# Patient Record
Sex: MALE | Race: WHITE | NOT HISPANIC OR LATINO | Employment: OTHER | URBAN - METROPOLITAN AREA
[De-identification: names, ages, dates, MRNs, and addresses within clinical notes are randomized per-mention and may not be internally consistent; named-entity substitution may affect disease eponyms.]

---

## 2017-03-23 DIAGNOSIS — I10 ESSENTIAL HYPERTENSION WITH GOAL BLOOD PRESSURE LESS THAN 130/80: ICD-10-CM

## 2017-03-23 RX ORDER — LOSARTAN POTASSIUM 100 MG/1
TABLET ORAL
Qty: 90 TABLET | Refills: 0 | Status: SHIPPED | OUTPATIENT
Start: 2017-03-23 | End: 2017-06-20

## 2017-04-12 DIAGNOSIS — K21.9 GASTROESOPHAGEAL REFLUX DISEASE WITHOUT ESOPHAGITIS: ICD-10-CM

## 2017-04-12 RX ORDER — PANTOPRAZOLE SODIUM 40 MG/1
TABLET, DELAYED RELEASE ORAL
Qty: 90 TABLET | Refills: 0 | Status: ON HOLD | OUTPATIENT
Start: 2017-04-12 | End: 2017-06-30

## 2017-04-20 DIAGNOSIS — E78.00 HYPERCHOLESTEREMIA: ICD-10-CM

## 2017-04-20 RX ORDER — SIMVASTATIN 10 MG
TABLET ORAL
Qty: 90 TABLET | Refills: 0 | Status: ON HOLD | OUTPATIENT
Start: 2017-04-20 | End: 2017-06-30

## 2017-05-10 DIAGNOSIS — G47.00 PERSISTENT DISORDER OF INITIATING OR MAINTAINING SLEEP: ICD-10-CM

## 2017-05-10 DIAGNOSIS — Z76.0 ENCOUNTER FOR MEDICATION REFILL: Primary | ICD-10-CM

## 2017-05-10 NOTE — TELEPHONE ENCOUNTER
Pending Prescriptions:                       Disp   Refills    zolpidem (AMBIEN) 10 MG tablet [Pharmacy M*90 tab*0        Sig: TAKE 1 TABLET BY MOUTH EVERY NIGHT AT BEDTIME    Last OV 10/20/16  TSH, Lipid, BMP 10/17/15  CBC 3/27/15

## 2017-05-11 RX ORDER — ZOLPIDEM TARTRATE 10 MG/1
TABLET ORAL
Qty: 90 TABLET | Refills: 0 | Status: ON HOLD | OUTPATIENT
Start: 2017-05-11 | End: 2017-06-30

## 2017-05-25 ENCOUNTER — MYC MEDICAL ADVICE (OUTPATIENT)
Dept: FAMILY MEDICINE | Facility: CLINIC | Age: 62
End: 2017-05-25

## 2017-05-26 ENCOUNTER — HOSPITAL ENCOUNTER (EMERGENCY)
Facility: CLINIC | Age: 62
Discharge: HOME | End: 2017-05-26
Attending: EMERGENCY MEDICINE | Admitting: EMERGENCY MEDICINE
Payer: COMMERCIAL

## 2017-05-26 ENCOUNTER — APPOINTMENT (OUTPATIENT)
Dept: MRI IMAGING | Facility: CLINIC | Age: 62
End: 2017-05-26
Attending: EMERGENCY MEDICINE
Payer: COMMERCIAL

## 2017-05-26 VITALS
HEIGHT: 69 IN | WEIGHT: 210 LBS | SYSTOLIC BLOOD PRESSURE: 137 MMHG | DIASTOLIC BLOOD PRESSURE: 78 MMHG | OXYGEN SATURATION: 95 % | HEART RATE: 87 BPM | TEMPERATURE: 98.1 F | BODY MASS INDEX: 31.1 KG/M2 | RESPIRATION RATE: 18 BRPM

## 2017-05-26 DIAGNOSIS — R60.0 BILATERAL LEG EDEMA: ICD-10-CM

## 2017-05-26 DIAGNOSIS — M51.26 DISPLACEMENT OF LUMBAR INTERVERTEBRAL DISC WITHOUT MYELOPATHY: ICD-10-CM

## 2017-05-26 DIAGNOSIS — M54.16 LUMBAR RADICULOPATHY: ICD-10-CM

## 2017-05-26 DIAGNOSIS — R20.2 PARESTHESIA OF LEFT LEG: ICD-10-CM

## 2017-05-26 DIAGNOSIS — M48.061 SPINAL STENOSIS OF LUMBAR REGION: ICD-10-CM

## 2017-05-26 DIAGNOSIS — M47.26 OSTEOARTHRITIS OF SPINE WITH RADICULOPATHY, LUMBAR REGION: ICD-10-CM

## 2017-05-26 LAB
ANION GAP SERPL CALCULATED.3IONS-SCNC: 9 MMOL/L (ref 3–14)
BASOPHILS # BLD AUTO: 0 10E9/L (ref 0–0.2)
BASOPHILS NFR BLD AUTO: 0.2 %
BUN SERPL-MCNC: 11 MG/DL (ref 7–30)
CALCIUM SERPL-MCNC: 8.7 MG/DL (ref 8.5–10.1)
CHLORIDE SERPL-SCNC: 95 MMOL/L (ref 94–109)
CO2 SERPL-SCNC: 28 MMOL/L (ref 20–32)
CREAT SERPL-MCNC: 1.03 MG/DL (ref 0.66–1.25)
DIFFERENTIAL METHOD BLD: ABNORMAL
EOSINOPHIL # BLD AUTO: 0.3 10E9/L (ref 0–0.7)
EOSINOPHIL NFR BLD AUTO: 4.1 %
ERYTHROCYTE [DISTWIDTH] IN BLOOD BY AUTOMATED COUNT: 12.2 % (ref 10–15)
GFR SERPL CREATININE-BSD FRML MDRD: 73 ML/MIN/1.7M2
GLUCOSE SERPL-MCNC: 160 MG/DL (ref 70–99)
HCT VFR BLD AUTO: 37.2 % (ref 40–53)
HGB BLD-MCNC: 13.6 G/DL (ref 13.3–17.7)
IMM GRANULOCYTES # BLD: 0 10E9/L (ref 0–0.4)
IMM GRANULOCYTES NFR BLD: 0.4 %
LYMPHOCYTES # BLD AUTO: 0.9 10E9/L (ref 0.8–5.3)
LYMPHOCYTES NFR BLD AUTO: 11.3 %
MCH RBC QN AUTO: 33.3 PG (ref 26.5–33)
MCHC RBC AUTO-ENTMCNC: 36.6 G/DL (ref 31.5–36.5)
MCV RBC AUTO: 91 FL (ref 78–100)
MONOCYTES # BLD AUTO: 0.7 10E9/L (ref 0–1.3)
MONOCYTES NFR BLD AUTO: 9.1 %
NEUTROPHILS # BLD AUTO: 6.1 10E9/L (ref 1.6–8.3)
NEUTROPHILS NFR BLD AUTO: 74.9 %
NRBC # BLD AUTO: 0 10*3/UL
NRBC BLD AUTO-RTO: 0 /100
PLATELET # BLD AUTO: 197 10E9/L (ref 150–450)
POTASSIUM SERPL-SCNC: 3.6 MMOL/L (ref 3.4–5.3)
RBC # BLD AUTO: 4.08 10E12/L (ref 4.4–5.9)
SODIUM SERPL-SCNC: 132 MMOL/L (ref 133–144)
WBC # BLD AUTO: 8.1 10E9/L (ref 4–11)

## 2017-05-26 PROCEDURE — 80048 BASIC METABOLIC PNL TOTAL CA: CPT | Performed by: EMERGENCY MEDICINE

## 2017-05-26 PROCEDURE — 96375 TX/PRO/DX INJ NEW DRUG ADDON: CPT

## 2017-05-26 PROCEDURE — 96376 TX/PRO/DX INJ SAME DRUG ADON: CPT

## 2017-05-26 PROCEDURE — 72148 MRI LUMBAR SPINE W/O DYE: CPT

## 2017-05-26 PROCEDURE — 85025 COMPLETE CBC W/AUTO DIFF WBC: CPT | Performed by: EMERGENCY MEDICINE

## 2017-05-26 PROCEDURE — 25000128 H RX IP 250 OP 636: Performed by: EMERGENCY MEDICINE

## 2017-05-26 PROCEDURE — 96374 THER/PROPH/DIAG INJ IV PUSH: CPT

## 2017-05-26 PROCEDURE — 99285 EMERGENCY DEPT VISIT HI MDM: CPT | Mod: 25

## 2017-05-26 RX ORDER — DEXAMETHASONE SODIUM PHOSPHATE 10 MG/ML
10 INJECTION, SOLUTION INTRAMUSCULAR; INTRAVENOUS ONCE
Status: COMPLETED | OUTPATIENT
Start: 2017-05-26 | End: 2017-05-26

## 2017-05-26 RX ORDER — OXYCODONE HYDROCHLORIDE 5 MG/1
5-10 TABLET ORAL EVERY 6 HOURS PRN
Qty: 25 TABLET | Refills: 0 | Status: SHIPPED | OUTPATIENT
Start: 2017-05-26 | End: 2017-06-01

## 2017-05-26 RX ORDER — HYDROMORPHONE HYDROCHLORIDE 1 MG/ML
0.5 INJECTION, SOLUTION INTRAMUSCULAR; INTRAVENOUS; SUBCUTANEOUS
Status: COMPLETED | OUTPATIENT
Start: 2017-05-26 | End: 2017-05-26

## 2017-05-26 RX ORDER — HYDROMORPHONE HYDROCHLORIDE 1 MG/ML
0.5 INJECTION, SOLUTION INTRAMUSCULAR; INTRAVENOUS; SUBCUTANEOUS ONCE
Status: COMPLETED | OUTPATIENT
Start: 2017-05-26 | End: 2017-05-26

## 2017-05-26 RX ORDER — FUROSEMIDE 20 MG
20 TABLET ORAL DAILY
Qty: 5 TABLET | Refills: 0 | Status: ON HOLD | OUTPATIENT
Start: 2017-05-26 | End: 2017-06-06

## 2017-05-26 RX ORDER — GABAPENTIN 300 MG/1
300-600 CAPSULE ORAL AT BEDTIME
Qty: 60 CAPSULE | Refills: 1 | Status: SHIPPED | OUTPATIENT
Start: 2017-05-26 | End: 2017-06-01

## 2017-05-26 RX ORDER — DEXAMETHASONE 4 MG/1
4 TABLET ORAL 3 TIMES DAILY
Qty: 15 TABLET | Refills: 0 | Status: SHIPPED | OUTPATIENT
Start: 2017-05-26 | End: 2017-06-01

## 2017-05-26 RX ORDER — LORAZEPAM 2 MG/ML
0.5 INJECTION INTRAMUSCULAR ONCE
Status: COMPLETED | OUTPATIENT
Start: 2017-05-26 | End: 2017-05-26

## 2017-05-26 RX ADMIN — HYDROMORPHONE HYDROCHLORIDE 0.5 MG: 1 INJECTION, SOLUTION INTRAMUSCULAR; INTRAVENOUS; SUBCUTANEOUS at 12:21

## 2017-05-26 RX ADMIN — HYDROMORPHONE HYDROCHLORIDE 0.5 MG: 1 INJECTION, SOLUTION INTRAMUSCULAR; INTRAVENOUS; SUBCUTANEOUS at 11:10

## 2017-05-26 RX ADMIN — HYDROMORPHONE HYDROCHLORIDE 0.5 MG: 1 INJECTION, SOLUTION INTRAMUSCULAR; INTRAVENOUS; SUBCUTANEOUS at 11:44

## 2017-05-26 RX ADMIN — DEXAMETHASONE SODIUM PHOSPHATE 10 MG: 10 INJECTION, SOLUTION INTRAMUSCULAR; INTRAVENOUS at 10:05

## 2017-05-26 RX ADMIN — LORAZEPAM 0.5 MG: 2 INJECTION INTRAMUSCULAR; INTRAVENOUS at 10:10

## 2017-05-26 RX ADMIN — HYDROMORPHONE HYDROCHLORIDE 0.5 MG: 1 INJECTION, SOLUTION INTRAMUSCULAR; INTRAVENOUS; SUBCUTANEOUS at 10:06

## 2017-05-26 ASSESSMENT — ENCOUNTER SYMPTOMS
FREQUENCY: 0
WEAKNESS: 1
DYSURIA: 0
BACK PAIN: 1
NUMBNESS: 1
DIFFICULTY URINATING: 0

## 2017-05-26 NOTE — DISCHARGE INSTRUCTIONS
Discharge Instructions  Back Pain  You were seen today for back pain. Back pain can have many causes, but most will get better without surgery or other specific treatment. Sometimes there is a herniated ( slipped ) disc. We don t usually do MRI scans to look for these right away, since most herniated discs will get better on their own with time.  Today, we did not find any evidence that your back pain was caused by a serious condition, such as an infection, fracture, or tumor. However, sometimes symptoms develop over time and cannot be found during an emergency visit, so it is very important that you follow up with your primary doctor.  Return to the Emergency Department if:    You develop a fever with your back pain.     You have weakness or change in sensation in one or both legs.    You lose control of your bowels or bladder, or can t empty your bladder.    Your pain gets much worse.     Follow-up with your doctor:    Unless your pain has completely gone away, please make an appointment with your doctor within one week.  You may need further management of your back pain, such as more pain medication, imaging such as an X-ray or MRI, or physical therapy.    What can I do to help myself?    Remain Active -- People are often afraid that they will hurt their back further or delay recovery by remaining active, but this is one of the best things you can do for your back. In fact, prolonged bed rest is not recommended. Studies have shown that people with low back pain recover faster when they remain active. Movement helps to bring blood flow to the muscles and relieve muscle spasms as well as preventing loss of muscle strength.    Heat -- Using a heating pad can help with low back pain during the first few weeks. Do not sleep with a heating pad, as you can be burned.     Pain medications - You may take a pain medication such as Tylenol  (acetaminophen), Advil , Nuprin  (ibuprofen) or Aleve  (naproxen).  If you have  been given a narcotic such as Vicodin  (hydrocodone with acetaminophen), Percocet  (oxycodone with acetaminophen), codeine, or a muscle relaxant such as Flexeril  (cyclobenzaprine) or Soma  (carisoprodol), do not drive for four hours after you have taken it. If the narcotic contains Tylenol  (acetaminophen), do not take Tylenol  with it. All narcotics will cause constipation, so eat a high fiber diet.   If you were given a prescription for medicine here today, be sure to read all of the information (including the package insert) that comes with your prescription.  This will include important information about the medicine, its side effects, and any warnings that you need to know about.  The pharmacist who fills the prescription can provide more information and answer questions you may have about the medicine.  If you have questions or concerns that the pharmacist cannot address, please call or return to the Emergency Department.   Opioid Medication Information    Pain medications are among the most commonly prescribed medicines, so we are including this information for all our patients. If you did not receive pain medication or get a prescription for pain medicine, you can ignore it.     You may have been given a prescription for an opioid (narcotic) pain medicine and/or have received a pain medicine while here in the Emergency Department. These medicines can make you drowsy or impaired. You must not drive, operate dangerous equipment, or engage in any other dangerous activities while taking these medications. If you drive while taking these medications, you could be arrested for DUI, or driving under the influence. Do not drink any alcohol while you are taking these medications.     Opioid pain medications can cause addiction. If you have a history of chemical dependency of any type, you are at a higher risk of becoming addicted to pain medications.  Only take these prescribed medications to treat your pain when  all other options have been tried. Take it for as short a time and as few doses as possible. Store your pain pills in a secure place, as they are frequently stolen and provide a dangerous opportunity for children or visitors in your house to start abusing these powerful medications. We will not replace any lost or stolen medicine.  As soon as your pain is better, you should flush all your remaining medication.     Many prescription pain medications contain Tylenol  (acetaminophen), including Vicodin , Tylenol #3 , Norco , Lortab , and Percocet .  You should not take any extra pills of Tylenol  if you are using these prescription medications or you can get very sick.  Do not ever take more than 3000 mg of acetaminophen in any 24 hour period.    All opioids tend to cause constipation. Drink plenty of water and eat foods that have a lot of fiber, such as fruits, vegetables, prune juice, apple juice and high fiber cereal.  Take a laxative if you don t move your bowels at least every other day. Miralax , Milk of Magnesia, Colace , or Senna  can be used to keep you regular.      Remember that you can always come back to the Emergency Department if you are not able to see your regular doctor in the amount of time listed above, if you get any new symptoms, or if there is anything that worries you.        See Neurosurgery for consult next week  Return if pain is not controlled

## 2017-05-26 NOTE — ED PROVIDER NOTES
History     Chief Complaint:  Back Pain      HPI   Syd Joyce is a 62 year old male who presents to the emergency department today for evaluation of back pain. The patient complains of chronic back pain that increased in intensity over the past week.  His PCP, Dr. Shan Arenas, prescribed Flexeril which the patient has taken with no improvements in pain.  He reports that his back pain today feels more like nerve pain rather than his typical muscle pain. His pain shoots down the back of his legs. He describes the pain in his legs as a sharp sensation.  Patient also reports increased bilateral leg swelling and weakness that began a few months ago. In addition, he also complains of bilateral toe and feet tingling. Patient has abrasion to right knee from crawling on the carpet because he couldn't ambulate secondary to pain. He also has some bruising in his toes from a possible fall however patient is not sure if he fell or hit his toes on something. Patient denies any changes in bladder or bowel habits. He also denies history of back surgery.      Allergies:  No Known Drug Allergies    Medications:    zolpidem (AMBIEN) 10 MG tablet  simvastatin (ZOCOR) 10 MG tablet  pantoprazole (PROTONIX) 40 MG EC tablet  losartan (COZAAR) 100 MG tablet  amLODIPine (NORVASC) 10 MG tablet  cyclobenzaprine (FLEXERIL) 10 MG tablet  linagliptin (TRADJENTA) 5 MG TABS tablet  lisinopril-hydrochlorothiazide (PRINZIDE,ZESTORETIC) 20-12.5 MG per tablet  metFORMIN (GLUCOPHAGE-XR) 500 MG 24 hr tablet  carvedilol (COREG) 12.5 MG tablet  blood glucose monitoring (ACCU-CHEK MED PLUS) test strip  aspirin 81 MG tablet  blood glucose monitoring (ACCU-CHEK MED PLUS) meter device kit    Past Medical History:    DM2 (diabetes mellitus, type 2) (H)   Esophageal reflux   HTN (hypertension)   Obesity, unspecified    Past Surgical History:    History reviewed. No pertinent past surgical history.    Family History:    History reviewed. No pertinent  "family history.     Social History:  The patient was accompanied to the ED by wife.  Smoking Status: Never smoker  Smokeless Tobacco: Never used  Alcohol Use: No  Marital Status:   [2]       Review of Systems   Cardiovascular: Positive for leg swelling (with pain).   Genitourinary: Negative for difficulty urinating, dysuria, frequency and urgency.   Musculoskeletal: Positive for back pain.   Neurological: Positive for weakness and numbness (tingling ).   All other systems reviewed and are negative.    Physical Exam   Vitals:  Patient Vitals for the past 24 hrs:   BP Temp Temp src Pulse Resp SpO2 Height Weight   05/26/17 0904 138/66 98.1  F (36.7  C) Oral 87 18 98 % 1.753 m (5' 9\") 95.3 kg (210 lb)       Physical Exam  General: The patient appears uncomfortable  Head:  The scalp, face, and head appear normal  Eyes:  The pupils are equal, round, and reactive to light    There is no nystagmus    Extraocular muscles are intact    Conjunctivae and sclerae are normal  ENT:    The nose is normal    Pinnae are normal    The oropharynx is normal    Uvula is in the midline  Neck:  Normal range of motion    There is no midline cervical spine pain/tenderness  CV:  Regular rate and underlying rhythm     Normal S1 and S2    No S3 or S4    No pathological murmur detected  Resp:  Lungs are clear    There is no tachypnea    Non-labored breathing    No rales    No wheezing   GI:  Abdomen is soft, there is no rigidity    No distension    No tympani    No rebound tenderness     Non-surgical without peritoneal features  MS:  Back:    The cervical and thoracic spine is non-tender in the midline    The lumbar spine is non-tender in the midline    There is mild lumbar paraspinous muscular pain to palpation    Legs:    There is normal motor strength:     Iliopsoas, quadriceps, hamstrings, tibialis anterior, gastrocnemius, EHL    There is bilateral mild sensory abnormality/paresthesia, both feet    There is bilateral sibjective " numbness, both feet    There is bilateral S1 intermittent radiculopathy    There is normal capillary refill to the toes    There is 2+ symmetric edema to the bilateral legs which is subacute, no asymmetry   Skin:  No rash or acute skin lesions noted.  No shingles noted. Rug burn on right knee.  Neuro: Speech is normal and fluent    Reflexes patellar are normal.    Achilles reflexes are absent bilaterally       Brachioradialis and Triceps Reflexes are normal  Psych:  Awake. Alert.  Normal affect.  Appropriate interactions.  Lymph: No anterior or posterior cervical lymphadenopathy noted    Emergency Department Course   Imaging:  Radiology findings were communicated with the patient who voiced understanding of the findings.    Lumbar spine MRI W/o Contrast:  1. Mild or early multilevel degenerative disc disease from L2-L3  through L5-S1.  2. Multilevel central stenosis, greatest at L2-L3 and next greatest at  L3-L4.  3. Multilevel foraminal stenosis, greatest on the left at L4-L5 and  next greatest on the right at L3-L4.  4. L4-L5 broad-based left foraminal/lateral recess disc protrusion.  Besides resulting in the left L4 foraminal stenosis, this could  asymmetrically contact or compress the descending left L5 nerve root  within the left lateral recess.  5. L5-S1 small central disc protrusion without apparent neural  impingement or central stenosis.  Reading per radiology    Laboratory:  Laboratory findings were communicated with the patient and family who voiced understanding of the findings.    CBC: AWNL. (WBC 8.1, HGB 13.6, )     basic metabolic panel: Glucose: 160(H), NA: 132(L)    Interventions:  1006-Dilaudid 0.5 mg IV  1005- Decadron 10 mg IV  1010- Ativan 0.5 mg IV   1110- Dilaudid 0.5 mg IV  1144- Dilaudid 0.5 mg IV  1221- Dilaudid 0.5 mg IV    Emergency Department Course:  Nursing notes and vitals reviewed.  I performed an exam of the patient as documented above.     IV was inserted and blood was drawn  for laboratory testing, results above.    The patient was sent for a MRI while in the emergency department, results above.       1207: I rechecked the patient and his pain improved but he still has some noticeable pain on his left.       I discussed the treatment plan with the patient. They expressed understanding of this plan and consented to discharge.    I personally reviewed the laboratory results with the Patient and answered all related questions prior to discharge.    Impression & Plan      Medical Decision Making:  Syd Joyce is a 62 year old male presents with low back pain and bilateral leg paresthesias, numbness, and some mild weakness. The patient underwent extensive evaluation in the ED. His lumbar MRI is shown above and this does reveal diffuse generalized disc disease, numerous areas of foraminal narrowing, and a disc herniation at L4-L5 which is trending toward the left. There is no evidence of cauda equina syndrome clinically or on the MRI. The patient will likely need neurosurgical consultation as an outpatient for coordination of care and potential surgery if things do not improve. He will be given pain control agents, low dose Gabapentin at night to use for nerve pain and paresthesia, and Dexamethasone. He also notes increasing lower extremity edema and he will be started on a low dose diuretic this week to help clear this up.  This may relate to his anti-hypertensive agents.          Diagnosis:    ICD-10-CM    1. Osteoarthritis of spine with radiculopathy, lumbar region M47.26    2. Displacement of lumbar intervertebral disc without myelopathy M51.26    3. Lumbar radiculopathy M54.16    4. Paresthesia of left leg R20.2    5. Spinal stenosis of lumbar region M48.06          Disposition:   The patient was discharged home. Follow up with neurosurgery and follow up with primary care Dr. Arenas as needed. Return to the ED as needed for increased pain. Take medications as directed.      Discharge  Medications:  New Prescriptions    DEXAMETHASONE (DECADRON) 4 MG TABLET    Take 1 tablet (4 mg) by mouth 3 times daily for 5 days    FUROSEMIDE (LASIX) 20 MG TABLET    Take 1 tablet (20 mg) by mouth daily for 5 days    GABAPENTIN (NEURONTIN) 300 MG CAPSULE    Take 1-2 capsules (300-600 mg) by mouth At Bedtime As needed for nerve pain    OXYCODONE (ROXICODONE) 5 MG IR TABLET    Take 1-2 tablets (5-10 mg) by mouth every 6 hours as needed for moderate to severe pain       Scribe Disclosure:  Paul PINTO, am serving as a scribe at 9:21 AM on 5/26/2017 to document services personally performed by Parth Magaña MD, based on my observations and the provider's statements to me.    5/26/2017    EMERGENCY DEPARTMENT       Parth Magaña MD  05/26/17 2889

## 2017-05-26 NOTE — ED AVS SNAPSHOT
Emergency Department    [address] [place] [id number]    Phone:  [phone]    Fax:  [phone]                                       Syd Joyce   MRN: [id number]    Department:   Emergency Department   Date of Visit:  5/26/2017           After Visit Summary Signature Page     I have received my discharge instructions, and my questions have been answered. I have discussed any challenges I see with this plan with the nurse or doctor.    ..........................................................................................................................................  Patient/Patient Representative Signature      ..........................................................................................................................................  Patient Representative Print Name and Relationship to Patient    ..................................................               ................................................  Date                                            Time    ..........................................................................................................................................  Reviewed by Signature/Title    ...................................................              ..............................................  Date                                                            Time

## 2017-05-26 NOTE — ED AVS SNAPSHOT
Emergency Department    [address] [facility] [address]    Phone:  [phone]    Fax:  [phone]                                       Syd Joyce   MRN: [id number]    Department:   Emergency Department   Date of Visit:  5/26/2017           Patient Information     Date Of Birth          1955        Your diagnoses for this visit were:     Osteoarthritis of spine with radiculopathy, lumbar region     Displacement of lumbar intervertebral disc without myelopathy     Lumbar radiculopathy     Paresthesia of left leg     Spinal stenosis of lumbar region        You were seen by Parth Magaña MD.      Follow-up Information     Follow up with Shan Arenas MD.    Specialty:  Family Practice    Why:  If symptoms worsen    Contact information:    [facility]  [address]  [facility] [address] [phone]          Follow up with  Emergency Department.    Specialty:  EMERGENCY MEDICINE    Why:  As needed, If symptoms worsen    Contact information:    [address] [facility] [address] [phone]        Schedule an appointment as soon as possible for a visit with Ray Perez MD.    Specialty:  Neurosurgery    Contact information:    [facility] AT   [address] [facility] [address] [facility] [address]  [facility] [place] [address] [phone]          Discharge Instructions         Discharge Instructions  Back Pain  You were seen today for back pain. Back pain can have many causes, but most will get better without surgery or other specific treatment. Sometimes there is a herniated ( slipped ) disc. We don t usually do MRI scans to look for these right away, since most herniated discs will get better on their own with time.  Today, we did not find any evidence that your back pain was caused by a serious condition, such as an infection, fracture, or tumor. However, sometimes symptoms develop over time and cannot be found during an emergency visit, so it is  very important that you follow up with your primary doctor.  Return to the Emergency Department if:    You develop a fever with your back pain.     You have weakness or change in sensation in one or both legs.    You lose control of your bowels or bladder, or can t empty your bladder.    Your pain gets much worse.     Follow-up with your doctor:    Unless your pain has completely gone away, please make an appointment with your doctor within one week.  You may need further management of your back pain, such as more pain medication, imaging such as an X-ray or MRI, or physical therapy.    What can I do to help myself?    Remain Active -- People are often afraid that they will hurt their back further or delay recovery by remaining active, but this is one of the best things you can do for your back. In fact, prolonged bed rest is not recommended. Studies have shown that people with low back pain recover faster when they remain active. Movement helps to bring blood flow to the muscles and relieve muscle spasms as well as preventing loss of muscle strength.    Heat -- Using a heating pad can help with low back pain during the first few weeks. Do not sleep with a heating pad, as you can be burned.     Pain medications - You may take a pain medication such as Tylenol  (acetaminophen), Advil , Nuprin  (ibuprofen) or Aleve  (naproxen).  If you have been given a narcotic such as Vicodin  (hydrocodone with acetaminophen), Percocet  (oxycodone with acetaminophen), codeine, or a muscle relaxant such as Flexeril  (cyclobenzaprine) or Soma  (carisoprodol), do not drive for four hours after you have taken it. If the narcotic contains Tylenol  (acetaminophen), do not take Tylenol  with it. All narcotics will cause constipation, so eat a high fiber diet.   If you were given a prescription for medicine here today, be sure to read all of the information (including the package insert) that comes with your prescription.  This will include  important information about the medicine, its side effects, and any warnings that you need to know about.  The pharmacist who fills the prescription can provide more information and answer questions you may have about the medicine.  If you have questions or concerns that the pharmacist cannot address, please call or return to the Emergency Department.   Opioid Medication Information    Pain medications are among the most commonly prescribed medicines, so we are including this information for all our patients. If you did not receive pain medication or get a prescription for pain medicine, you can ignore it.     You may have been given a prescription for an opioid (narcotic) pain medicine and/or have received a pain medicine while here in the Emergency Department. These medicines can make you drowsy or impaired. You must not drive, operate dangerous equipment, or engage in any other dangerous activities while taking these medications. If you drive while taking these medications, you could be arrested for DUI, or driving under the influence. Do not drink any alcohol while you are taking these medications.     Opioid pain medications can cause addiction. If you have a history of chemical dependency of any type, you are at a higher risk of becoming addicted to pain medications.  Only take these prescribed medications to treat your pain when all other options have been tried. Take it for as short a time and as few doses as possible. Store your pain pills in a secure place, as they are frequently stolen and provide a dangerous opportunity for children or visitors in your house to start abusing these powerful medications. We will not replace any lost or stolen medicine.  As soon as your pain is better, you should flush all your remaining medication.     Many prescription pain medications contain Tylenol  (acetaminophen), including Vicodin , Tylenol #3 , Norco , Lortab , and Percocet .  You should not take any extra pills of  Tylenol  if you are using these prescription medications or you can get very sick.  Do not ever take more than 3000 mg of acetaminophen in any 24 hour period.    All opioids tend to cause constipation. Drink plenty of water and eat foods that have a lot of fiber, such as fruits, vegetables, prune juice, apple juice and high fiber cereal.  Take a laxative if you don t move your bowels at least every other day. Miralax , Milk of Magnesia, Colace , or Senna  can be used to keep you regular.      Remember that you can always come back to the Emergency Department if you are not able to see your regular doctor in the amount of time listed above, if you get any new symptoms, or if there is anything that worries you.        See Neurosurgery for consult next week  Return if pain is not controlled      Discharge References/Attachments     SCIATICA, UNDERSTANDING (ENGLISH)    BACK PAIN W/ SCIATICA (ENGLISH)      Future Appointments        Provider Department Dept Phone Center    5/26/2017 2:15 PM Dianne Rivera MD [facility] [phone] [facility]    7/7/2017 8:15 AM [facility] [phone] [facility]    8/1/2017 1:00 PM Shan Arenas MD [facility] [phone] [facility]      24 Hour Appointment Hotline       To make an appointment at any [facility], call [phone] ([phone]). If you don't have a family doctor or clinic, we will help you find one. [place] clinics are conveniently located to serve the needs of you and your family.             Review of your medicines      START taking        Dose / Directions Last dose taken    dexamethasone 4 MG tablet   Commonly known as:  DECADRON   Dose:  4 mg   Quantity:  15 tablet        Take 1 tablet (4 mg) by mouth 3 times daily for 5 days   Refills:  0        furosemide 20 MG tablet   Commonly known as:  LASIX   Dose:  20 mg   Quantity:  5 tablet        Take 1 tablet (20 mg) by mouth daily  for 5 days   Refills:  0        gabapentin 300 MG capsule   Commonly known as:  NEURONTIN   Dose:  300-600 mg   Quantity:  60 capsule        Take 1-2 capsules (300-600 mg) by mouth At Bedtime As needed for nerve pain   Refills:  1        oxyCODONE 5 MG IR tablet   Commonly known as:  ROXICODONE   Dose:  5-10 mg   Quantity:  25 tablet        Take 1-2 tablets (5-10 mg) by mouth every 6 hours as needed for moderate to severe pain   Refills:  0          Our records show that you are taking the medicines listed below. If these are incorrect, please call your family doctor or clinic.        Dose / Directions Last dose taken    amLODIPine 10 MG tablet   Commonly known as:  NORVASC   Dose:  10 mg   Quantity:  90 tablet        Take 1 tablet (10 mg) by mouth daily   Refills:  3        aspirin 81 MG tablet   Dose:  81 mg   Quantity:  90 tablet        Take 1 tablet (81 mg) by mouth daily   Refills:  prn        blood glucose monitoring meter device kit   Quantity:  1 kit        Use to test blood sugar 4 to 5 times weekly or as directed.   Refills:  0        blood glucose monitoring test strip   Commonly known as:  ACCU-CHEK MED PLUS   Quantity:  150 each        USE 4 TO 5 TIMES PER WEEK OR AS DIRECTED   Refills:  3        carvedilol 12.5 MG tablet   Commonly known as:  COREG   Quantity:  180 tablet        TAKE 1 TABLET BY MOUTH TWICE DAILY WITH MEALS   Refills:  3        cyclobenzaprine 10 MG tablet   Commonly known as:  FLEXERIL   Dose:  10 mg   Quantity:  90 tablet        Take 1 tablet (10 mg) by mouth daily as needed for muscle spasms   Refills:  1        linagliptin 5 MG Tabs tablet   Commonly known as:  TRADJENTA   Dose:  5 mg   Quantity:  90 tablet        Take 1 tablet (5 mg) by mouth daily   Refills:  3        lisinopril-hydrochlorothiazide 20-12.5 MG per tablet   Commonly known as:  PRINZIDE/ZESTORETIC   Dose:  1 tablet   Quantity:  90 tablet        Take 1 tablet by mouth daily   Refills:  3        losartan 100 MG  tablet   Commonly known as:  COZAAR   Quantity:  90 tablet        TAKE 1 TABLET(100 MG) BY MOUTH DAILY   Refills:  0        metFORMIN 500 MG 24 hr tablet   Commonly known as:  GLUCOPHAGE-XR   Quantity:  180 tablet        TAKE 2 TABLETS BY MOUTH EVERY MORNING   Refills:  3        pantoprazole 40 MG EC tablet   Commonly known as:  PROTONIX   Quantity:  90 tablet        TAKE 1 TABLET(40 MG) BY MOUTH DAILY   Refills:  0        simvastatin 10 MG tablet   Commonly known as:  ZOCOR   Quantity:  90 tablet        TAKE 1 TABLET BY MOUTH EVERY NIGHT AT BEDTIME   Refills:  0        zolpidem 10 MG tablet   Commonly known as:  AMBIEN   Quantity:  90 tablet        TAKE 1 TABLET BY MOUTH EVERY NIGHT AT BEDTIME   Refills:  0                Prescriptions were sent or printed at these locations (4 Prescriptions)                   Other Prescriptions                Printed at Department/Unit printer (4 of 4)         oxyCODONE (ROXICODONE) 5 MG IR tablet               gabapentin (NEURONTIN) 300 MG capsule               dexamethasone (DECADRON) 4 MG tablet               furosemide (LASIX) 20 MG tablet                Procedures and tests performed during your visit     Basic metabolic panel    CBC with platelets differential    IV access    Lumbar spine MRI w/o contrast      Orders Needing Specimen Collection     None      Pending Results     No orders found from 5/24/2017 to 5/27/2017.            Pending Culture Results     No orders found from 5/24/2017 to 5/27/2017.            Pending Results Instructions     If you had any lab results that were not finalized at the time of your Discharge, you can call the ED Lab Result RN at [phone]. You will be contacted by this team for any positive Lab results or changes in treatment. The nurses are available 7 days a week from 10A to 6:30P.  You can leave a message 24 hours per day and they will return your call.        Test Results From Your Hospital Stay        5/26/2017 10:12 AM       Component Results     Component Value Ref Range & Units Status    WBC 8.1 4.0 - 11.0 10e9/L Final    RBC Count 4.08 (L) 4.4 - 5.9 10e12/L Final    Hemoglobin 13.6 13.3 - 17.7 g/dL Final    Hematocrit 37.2 (L) 40.0 - 53.0 % Final    MCV 91 78 - 100 fl Final    MCH 33.3 (H) 26.5 - 33.0 pg Final    MCHC 36.6 (H) 31.5 - 36.5 g/dL Final    RDW 12.2 10.0 - 15.0 % Final    Platelet Count 197 150 - 450 10e9/L Final    Diff Method Automated Method  Final    % Neutrophils 74.9 % Final    % Lymphocytes 11.3 % Final    % Monocytes 9.1 % Final    % Eosinophils 4.1 % Final    % Basophils 0.2 % Final    % Immature Granulocytes 0.4 % Final    Nucleated RBCs 0 0 /100 Final    Absolute Neutrophil 6.1 1.6 - 8.3 10e9/L Final    Absolute Lymphocytes 0.9 0.8 - 5.3 10e9/L Final    Absolute Monocytes 0.7 0.0 - 1.3 10e9/L Final    Absolute Eosinophils 0.3 0.0 - 0.7 10e9/L Final    Absolute Basophils 0.0 0.0 - 0.2 10e9/L Final    Abs Immature Granulocytes 0.0 0 - 0.4 10e9/L Final    Absolute Nucleated RBC 0.0  Final         5/26/2017 10:26 AM      Component Results     Component Value Ref Range & Units Status    Sodium 132 (L) 133 - 144 mmol/L Final    Potassium 3.6 3.4 - 5.3 mmol/L Final    Chloride 95 94 - 109 mmol/L Final    Carbon Dioxide 28 20 - 32 mmol/L Final    Anion Gap 9 3 - 14 mmol/L Final    Glucose 160 (H) 70 - 99 mg/dL Final    Urea Nitrogen 11 7 - 30 mg/dL Final    Creatinine 1.03 0.66 - 1.25 mg/dL Final    GFR Estimate 73 >60 mL/min/1.7m2 Final    Non  GFR Calc    GFR Estimate If Black 88 >60 mL/min/1.7m2 Final    African American GFR Calc    Calcium 8.7 8.5 - 10.1 mg/dL Final         5/26/2017 11:43 AM      Narrative     MR LUMBAR SPINE WITHOUT CONTRAST  5/26/2017 10:55 AM    HISTORY:  Bilateral S1 pain, numbness/tingling in both legs, poor  Achilles reflexes, back pain; cauda equina, large L5-S1 disc  herniation.    TECHNIQUE: Sagittal T1 and T2, sagittal IR, and transverse proton  density and T2-weighted  pulse sequences.    FINDINGS: Five lumbar vertebrae are assumed. Degenerative loss of disc  signal is noted at each disc level from L2-L3 through L5-S1 with  normal disc height throughout. Apophyseal joints appear essentially  within normal limits with no periarticular reactive marrow edema.  Vertebral body heights and sagittal alignment are within normal  limits. The conus medullaris is unremarkable in appearance on the  sagittal images.     T12-L1, L1-L2: Normal    L2-L3: Diffuse annular bulge with mild bilateral foraminal stenosis  below the exiting nerve roots and borderline central stenosis.    L3-L4: Diffuse annular bulge and ligamentum flavum thickening,  superimposed on a congenitally small central canal with resultant  moderate to severe central stenosis. Right foraminal disc protrusion  results in moderate to severe right L4 foraminal stenosis. Moderate  left L4 foraminal stenosis is also noted.    L4-L5: Diffuse annular bulge and superimposed left foraminal/lateral  recess broad-based disc protrusion. This results in severe left L4  foraminal stenosis. Mild right L4 foraminal stenosis. Mild or  borderline central stenosis.    L5-S1: Posterior annular bulge or broad-based central disc protrusion  without indentation on the thecal sac. There is no central stenosis or  displacement of either S1 nerve root. L5 neural foramina are  bilaterally patent.        Impression     IMPRESSION:  1. Mild or early multilevel degenerative disc disease from L2-L3  through L5-S1.  2. Multilevel central stenosis, greatest at L2-L3 and next greatest at  L3-L4.  3. Multilevel foraminal stenosis, greatest on the left at L4-L5 and  next greatest on the right at L3-L4.  4. L4-L5 broad-based left foraminal/lateral recess disc protrusion.  Besides resulting in the left L4 foraminal stenosis, this could  asymmetrically contact or compress the descending left L5 nerve root  within the left lateral recess.  5. L5-S1 small central disc  protrusion without apparent neural  impingement or central stenosis.    NADJA RINCON MD                Clinical Quality Measure: Blood Pressure Screening     Your blood pressure was checked while you were in the emergency department today. The last reading we obtained was  BP: 146/83 . Please read the guidelines below about what these numbers mean and what you should do about them.  If your systolic blood pressure (the top number) is less than 120 and your diastolic blood pressure (the bottom number) is less than 80, then your blood pressure is normal. There is nothing more that you need to do about it.  If your systolic blood pressure (the top number) is 120-139 or your diastolic blood pressure (the bottom number) is 80-89, your blood pressure may be higher than it should be. You should have your blood pressure rechecked within a year by a primary care provider.  If your systolic blood pressure (the top number) is 140 or greater or your diastolic blood pressure (the bottom number) is 90 or greater, you may have high blood pressure. High blood pressure is treatable, but if left untreated over time it can put you at risk for heart attack, stroke, or kidney failure. You should have your blood pressure rechecked by a primary care provider within the next 4 weeks.  If your provider in the emergency department today gave you specific instructions to follow-up with your doctor or provider even sooner than that, you should follow that instruction and not wait for up to 4 weeks for your follow-up visit.        Thank you for choosing [facility]       Thank you for choosing [facility] for your care. Our goal is always to provide you with excellent care. Hearing back from our patients is one way we can continue to improve our services. Please take a few minutes to complete the written survey that you may receive in the mail after you visit with us. Thank you!        [facility] Information     [employer] gives you secure access to your  electronic health record. If you see a primary care provider, you can also send messages to your care team and make appointments. If you have questions, please call your primary care clinic.  If you do not have a primary care provider, please call [phone] and they will assist you.        Care EveryWhere ID     This is your Care EveryWhere ID. This could be used by other organizations to access your [facility] medical records  [id number]        After Visit Summary       This is your record. Keep this with you and show to your community pharmacist(s) and doctor(s) at your next visit.

## 2017-05-27 ENCOUNTER — MYC MEDICAL ADVICE (OUTPATIENT)
Dept: FAMILY MEDICINE | Facility: CLINIC | Age: 62
End: 2017-05-27

## 2017-05-30 NOTE — TELEPHONE ENCOUNTER
Called and spoke with patients spouse.  She states Syd is feeling slightly better.  He has an appointment to see neurosurgeon June 5th.  She feels he is fine to wait until then. Informed Dr Arenas.

## 2017-05-31 ENCOUNTER — OFFICE VISIT (OUTPATIENT)
Dept: NEUROSURGERY | Facility: CLINIC | Age: 62
End: 2017-05-31
Attending: PHYSICIAN ASSISTANT
Payer: COMMERCIAL

## 2017-05-31 ENCOUNTER — MYC MEDICAL ADVICE (OUTPATIENT)
Dept: FAMILY MEDICINE | Facility: CLINIC | Age: 62
End: 2017-05-31

## 2017-05-31 VITALS
DIASTOLIC BLOOD PRESSURE: 77 MMHG | HEIGHT: 69 IN | BODY MASS INDEX: 31.1 KG/M2 | HEART RATE: 83 BPM | OXYGEN SATURATION: 97 % | TEMPERATURE: 98.2 F | WEIGHT: 210 LBS | SYSTOLIC BLOOD PRESSURE: 159 MMHG

## 2017-05-31 DIAGNOSIS — M54.16 LUMBAR RADICULOPATHY: Primary | ICD-10-CM

## 2017-05-31 PROCEDURE — 99211 OFF/OP EST MAY X REQ PHY/QHP: CPT | Performed by: PHYSICIAN ASSISTANT

## 2017-05-31 PROCEDURE — 99203 OFFICE O/P NEW LOW 30 MIN: CPT | Performed by: PHYSICIAN ASSISTANT

## 2017-05-31 RX ORDER — HYDROMORPHONE HYDROCHLORIDE 2 MG/1
2 TABLET ORAL EVERY 4 HOURS PRN
Qty: 40 TABLET | Refills: 0 | Status: SHIPPED | OUTPATIENT
Start: 2017-05-31 | End: 2017-06-01

## 2017-05-31 NOTE — PROGRESS NOTES
"Dr. Ray Perez  [facility]  Neurosurgery Clinic Visit      CC: Low back pain with radiculopathy and weakness    Primary care Provider: Shan Arenas      Reason For Visit:   I was asked by Shan Arenas MD to consult on the patient for low back pain with radiculopathy and weakness.      HPI: Syd Joyce is a 62 year old male who presents for evaluation of low back pain with radiculopathy and weakness. Pain has been chronic, but recently worsened over the past week. Pain radiates down posterior aspect of bilateral legs to the ankles. Describes the pain as intermittent and sharp and achy. He was also seen at [facility] ED on 5/26/2017 and MRI was ordered which revealed disc herniation at L4-5. He was also started on a low dose diuretic to help with his BLE edema. He subsequently followed up with his PCP who noted increased weakness, difficulty walking, and and BLE edema who referred him here. Pain worsens with transition with sitting to standing. Pain improves with \"walking it off\". He has been able to sleep well. He has had difficulty walking and difficulty with standing and feels as though his balance his significantly declined. He has not done any injections or PT. Denies bladder or bowel incontinence.  Current pain: 7/10 At worst: 9/10    Past Medical History:   Diagnosis Date     DM2 (diabetes mellitus, type 2) (H)      Esophageal reflux 6/15/2012     HTN (hypertension) 8/12/2011     Obesity, unspecified 1/10/2014       Past Medical History reviewed with patient during visit.    Past Surgical History:   Procedure Laterality Date     NO HISTORY OF SURGERY       Past Surgical History reviewed with patient during visit.    Current Outpatient Prescriptions   Medication     oxyCODONE (ROXICODONE) 5 MG IR tablet     gabapentin (NEURONTIN) 300 MG capsule     dexamethasone (DECADRON) 4 MG tablet     furosemide (LASIX) 20 MG tablet     zolpidem (AMBIEN) 10 MG tablet     simvastatin (ZOCOR) 10 " MG tablet     pantoprazole (PROTONIX) 40 MG EC tablet     losartan (COZAAR) 100 MG tablet     amLODIPine (NORVASC) 10 MG tablet     cyclobenzaprine (FLEXERIL) 10 MG tablet     linagliptin (TRADJENTA) 5 MG TABS tablet     lisinopril-hydrochlorothiazide (PRINZIDE,ZESTORETIC) 20-12.5 MG per tablet     metFORMIN (GLUCOPHAGE-XR) 500 MG 24 hr tablet     carvedilol (COREG) 12.5 MG tablet     blood glucose monitoring (ACCU-CHEK MED PLUS) test strip     aspirin 81 MG tablet     blood glucose monitoring (ACCU-CHEK MED PLUS) meter device kit     No current facility-administered medications for this visit.        No Known Allergies    Social History     Social History     Marital status:      Spouse name: N/A     Number of children: 2     Years of education: N/A     Occupational History     manager [employer]     Social History Main Topics     Smoking status: Never Smoker     Smokeless tobacco: Never Used     Alcohol use No     Drug use: No     Sexual activity: Not on file     Other Topics Concern     Not on file     Social History Narrative       No family history on file.       ROS: 10 point ROS neg other than the symptoms noted above in the HPI.    Vital Signs: There were no vitals taken for this visit.    Examination:  Constitutional:  Alert, well nourished, NAD.  HEENT: Normocephalic, atraumatic.   Pulmonary:  Without shortness of breath, normal effort.   Lymph: no lymphadenopathy to low back or LE.   Integumentary: Skin is free of rashes or lesions.   Cardiovascular:  No pitting edema of BLE.      Neurological:  Awake  Alert  Oriented x 3  Speech clear  Cranial nerves II - XII grossly intact  PERRL  EOMI  Face symmetric  Tongue midline  Motor exam   Shoulder Abduction:  Right:  5/5   Left:  5/5  Biceps:                      Right:  5/5   Left:  5/5  Triceps:                     Right:  5/5   Left:  5/5  Wrist Extensors:       Right:  5/5   Left:  5/5  Wrist Flexors:           Right:  5/5    Left:  5/5  Intrinsics:                   Right:  5/5   Left:  5/5  Hip Flexor:                Right: 5/5  Left:  5/5  Hip Adductor:             Right:  5/5  Left:  5/5  Hip Abductor:             Right:  5/5  Left:  5/5  Gastroc Soleus:        Right:  5/5  Left:  5/5  Tib/Ant:                      Right:  5/5  Left:  5/5  EHL:                          Right:  4/5  Left:  4/5       Sensation normal to bilateral upper and lower extremities.    Reflexes are 2+ in the patellar and Achilles. There is no clonus. Downgoing Babinski.    Reflexes are 2+ in the brachial radialis and triceps. Negative Lucius sign bilaterally.  Musculoskeletal:  Gait: Able to stand from a seated position with difficulty. Antalgic gait noted.  Unable to heel/toe walk without loss of balance  Lumbar examination reveals mild tenderness of the spine. No paraspinous muscles.  Hip height is symmetrical. Negative SI joint, sciatic notch or greater trochanteric tenderness to palpation bilaterally.  Straight leg raise is negative bilaterally.      Imaging:   MRI of the lumbar spine from 5/26/2017 was reviewed in the office today.     MR LUMBAR SPINE WITHOUT CONTRAST  5/26/2017 10:55 AM     HISTORY:  Bilateral S1 pain, numbness/tingling in both legs, poor  Achilles reflexes, back pain; cauda equina, large L5-S1 disc  herniation.     TECHNIQUE: Sagittal T1 and T2, sagittal IR, and transverse proton density and T2-weighted pulse sequences.     FINDINGS: Five lumbar vertebrae are assumed. Degenerative loss of disc signal is noted at each disc level from L2-L3 through L5-S1 with normal disc height throughout. Apophyseal joints appear essentially within normal limits with no periarticular reactive marrow edema. Vertebral body heights and sagittal alignment are within normal limits. The conus medullaris is unremarkable in appearance on the sagittal images.      T12-L1, L1-L2: Normal     L2-L3: Diffuse annular bulge with mild bilateral foraminal  stenosis  below the exiting nerve roots and borderline central stenosis.     L3-L4: Diffuse annular bulge and ligamentum flavum thickening,  superimposed on a congenitally small central canal with resultant  moderate to severe central stenosis. Right foraminal disc protrusion results in moderate to severe right L4 foraminal stenosis. Moderate left L4 foraminal stenosis is also noted.     L4-L5: Diffuse annular bulge and superimposed left foraminal/lateral recess broad-based disc protrusion. This results in severe left L4 foraminal stenosis. Mild right L4 foraminal stenosis. Mild or borderline central stenosis.     L5-S1: Posterior annular bulge or broad-based central disc protrusion without indentation on the thecal sac. There is no central stenosis or displacement of either S1 nerve root. L5 neural foramina are bilaterally patent.         IMPRESSION:  1. Mild or early multilevel degenerative disc disease from L2-L3  through L5-S1.  2. Multilevel central stenosis, greatest at L2-L3 and next greatest at L3-L4.  3. Multilevel foraminal stenosis, greatest on the left at L4-L5 and  next greatest on the right at L3-L4.  4. L4-L5 broad-based left foraminal/lateral recess disc protrusion.  Besides resulting in the left L4 foraminal stenosis, this could  asymmetrically contact or compress the descending left L5 nerve rootwithin the left lateral recess.  5. L5-S1 small central disc protrusion without apparent neural  impingement or central stenosis.     NADJA RINCON MD    Assessment/Plan:   Syd Joyce is a 62 year old male who presents for evaluation of low back pain with radiculopathy and weakness. Pain has been chronic, but recently worsened over the past week. Pain radiates down posterior aspect of bilateral legs to the ankles. Describes the pain as intermittent and sharp and achy. MRI reviewed in office with patient. Discussed based on his symptoms and imaging, I will order an L4-5 GARETH and have him follow-up in  clinic with Dr. Perez. Advised that should he develop any increased weakness or other neurological changes, he should present to the ED immediately. Patient in agreement with this plan.           Dariana Lopez PA-C  [facility]  [address] [address]    Tel [phone]  Pager [phone]

## 2017-05-31 NOTE — TELEPHONE ENCOUNTER
See patients mychart message below.  [facility] where patient has an appointment for June 5 th.  Spoke with Genevieve and asked if they could get  Patient in sooner to be seen.  She will check with the Dr and PA and see if anyone can see patient sooner.  She will call patient to let him know.  I called patient to   Inform him of this.  If they can not get him seen in the next day or so  he will call us back and come in to see someone.

## 2017-05-31 NOTE — NURSING NOTE
"Syd Joyce is a 62 year old male who presents for:  Chief Complaint   Patient presents with     Neurologic Problem     BOAZ 5-26-17 lumbar radiculopathy, weakness, edema and pain        Initial Vitals:  There were no vitals taken for this visit. Estimated body mass index is 31.01 kg/(m^2) as calculated from the following:    Height as of 5/26/17: 5' 9\" (1.753 m).    Weight as of 5/26/17: 210 lb (95.3 kg).. There is no height or weight on file to calculate BSA. BP completed using cuff size: large  Data Unavailable    Do you feel safe in your environment?  Yes  Do you need any refills today? No    Nursing Comments: BOAZ 5-26-17 lumbar radiculopathy, weakness, edema and pain.  Patient rates his pain today as 10  Bilateral leg pain, weakness, swelling      5 min. nursing intake time  Genevieve River CMA, AAS      Discharge plan:   See providers dictation   2 min. nursing discharge time  Genevieve River CMA, AAS       "

## 2017-05-31 NOTE — MR AVS SNAPSHOT
After Visit Summary   5/31/2017    Syd Joyce    MRN: [id number]           Patient Information     Date Of Birth          1955        Visit Information        Provider Department      5/31/2017 1:00 PM Dariana Lopez PA-C [facility] Neurosurgery [facility]        Today's Diagnoses     Lumbar radiculopathy    -  1       Follow-ups after your visit        Your next 10 appointments already scheduled     Jul 07, 2017  8:15 AM CDT   LAB with RF LAB   [facility] ([facility])    [address] [place] [address] [phone]           Patient must bring picture ID.  Patient should be prepared to give a urine specimen  Please do not eat 10-12 hours before your appointment if you are coming in fasting for labs on lipids, cholesterol, or glucose (sugar).  Pregnant women should follow their Care Team instructions. Water with medications is okay. Do not drink coffee or other fluids.   If you have concerns about taking  your medications, please ask at office or if scheduling via [employer], send a message by clicking on Secure Messaging, Message Your Care Team.            Aug 01, 2017  1:00 PM CDT   PHYSICAL with Shan Arenas MD   [facility] ([facility])    [address] [place] [address] [phone]              Future tests that were ordered for you today     Open Future Orders        Priority Expected Expires Ordered    XR Lumbar Epidural Injection Incl Imaging Routine 5/31/2017 5/31/2018 5/31/2017            Who to contact     If you have questions or need follow up information about today's clinic visit or your schedule please contact [facility]UROS[facility] directly at [phone].  Normal or non-critical lab and imaging results will be communicated to you by MyChart, letter or phone within 4 business days after the clinic has received the results. If you do not hear from us within 7 days,  "please contact the clinic through [employer] or phone. If you have a critical or abnormal lab result, we will notify you by phone as soon as possible.  Submit refill requests through [employer] or call your pharmacy and they will forward the refill request to us. Please allow 3 business days for your refill to be completed.          Additional Information About Your Visit        [email or web address] Information     [employer] gives you secure access to your electronic health record. If you see a primary care provider, you can also send messages to your care team and make appointments. If you have questions, please call your primary care clinic.  If you do not have a primary care provider, please call [phone] and they will assist you.        Care EveryWhere ID     This is your Care EveryWhere ID. This could be used by other organizations to access your [place] medical records  [id number]        Your Vitals Were     Pulse Temperature Height Pulse Oximetry BMI (Body Mass Index)       83 98.2  F (36.8  C) (Oral) 5' 9\" (1.753 m) 97% 31.01 kg/m2        Blood Pressure from Last 3 Encounters:   05/31/17 159/77   05/26/17 137/78   10/20/16 158/78    Weight from Last 3 Encounters:   05/31/17 210 lb (95.3 kg)   05/26/17 210 lb (95.3 kg)   10/20/16 203 lb (92.1 kg)                 Today's Medication Changes          These changes are accurate as of: 5/31/17  1:57 PM.  If you have any questions, ask your nurse or doctor.               Start taking these medicines.        Dose/Directions    HYDROmorphone 2 MG tablet   Commonly known as:  DILAUDID   Used for:  Lumbar radiculopathy   Started by:  Dariana Lopez PA-C        Dose:  2 mg   Take 1 tablet (2 mg) by mouth every 4 hours as needed for pain   Quantity:  40 tablet   Refills:  0            Where to get your medicines      Some of these will need a paper prescription and others can be bought over the counter.  Ask your nurse if you have questions.     Bring a paper prescription for " each of these medications     HYDROmorphone 2 MG tablet                Primary Care Provider Office Phone # Fax #    Shan Arenas [phone]       [facility] [address]  [facility] [address]        Thank you!     Thank you for choosing [facility]  for your care. Our goal is always to provide you with excellent care. Hearing back from our patients is one way we can continue to improve our services. Please take a few minutes to complete the written survey that you may receive in the mail after your visit with us. Thank you!             Your Updated Medication List - Protect others around you: Learn how to safely use, store and throw away your medicines at www.disposemymeds.org.          This list is accurate as of: 5/31/17  1:57 PM.  Always use your most recent med list.                   Brand Name Dispense Instructions for use    amLODIPine 10 MG tablet    NORVASC    90 tablet    Take 1 tablet (10 mg) by mouth daily       aspirin 81 MG tablet     90 tablet    Take 1 tablet (81 mg) by mouth daily       blood glucose monitoring meter device kit     1 kit    Use to test blood sugar 4 to 5 times weekly or as directed.       blood glucose monitoring test strip    ACCU-CHEK MED PLUS    150 each    USE 4 TO 5 TIMES PER WEEK OR AS DIRECTED       carvedilol 12.5 MG tablet    COREG    180 tablet    TAKE 1 TABLET BY MOUTH TWICE DAILY WITH MEALS       cyclobenzaprine 10 MG tablet    FLEXERIL    90 tablet    Take 1 tablet (10 mg) by mouth daily as needed for muscle spasms       dexamethasone 4 MG tablet    DECADRON    15 tablet    Take 1 tablet (4 mg) by mouth 3 times daily for 5 days       furosemide 20 MG tablet    LASIX    5 tablet    Take 1 tablet (20 mg) by mouth daily for 5 days       gabapentin 300 MG capsule    NEURONTIN    60 capsule    Take 1-2 capsules (300-600 mg) by mouth At Bedtime As needed for nerve pain       HYDROmorphone 2 MG tablet     DILAUDID    40 tablet    Take 1 tablet (2 mg) by mouth every 4 hours as needed for pain       linagliptin 5 MG Tabs tablet    TRADJENTA    90 tablet    Take 1 tablet (5 mg) by mouth daily       lisinopril-hydrochlorothiazide 20-12.5 MG per tablet    PRINZIDE/ZESTORETIC    90 tablet    Take 1 tablet by mouth daily       losartan 100 MG tablet    COZAAR    90 tablet    TAKE 1 TABLET(100 MG) BY MOUTH DAILY       metFORMIN 500 MG 24 hr tablet    GLUCOPHAGE-XR    180 tablet    TAKE 2 TABLETS BY MOUTH EVERY MORNING       oxyCODONE 5 MG IR tablet    ROXICODONE    25 tablet    Take 1-2 tablets (5-10 mg) by mouth every 6 hours as needed for moderate to severe pain       pantoprazole 40 MG EC tablet    PROTONIX    90 tablet    TAKE 1 TABLET(40 MG) BY MOUTH DAILY       simvastatin 10 MG tablet    ZOCOR    90 tablet    TAKE 1 TABLET BY MOUTH EVERY NIGHT AT BEDTIME       zolpidem 10 MG tablet    AMBIEN    90 tablet    TAKE 1 TABLET BY MOUTH EVERY NIGHT AT BEDTIME

## 2017-06-01 ENCOUNTER — OFFICE VISIT (OUTPATIENT)
Dept: FAMILY MEDICINE | Facility: CLINIC | Age: 62
End: 2017-06-01

## 2017-06-01 VITALS
HEART RATE: 80 BPM | OXYGEN SATURATION: 97 % | DIASTOLIC BLOOD PRESSURE: 70 MMHG | BODY MASS INDEX: 31.1 KG/M2 | TEMPERATURE: 97.9 F | HEIGHT: 69 IN | SYSTOLIC BLOOD PRESSURE: 144 MMHG | WEIGHT: 210 LBS

## 2017-06-01 DIAGNOSIS — E11.43 TYPE 2 DIABETES MELLITUS WITH DIABETIC AUTONOMIC NEUROPATHY, WITHOUT LONG-TERM CURRENT USE OF INSULIN (H): ICD-10-CM

## 2017-06-01 DIAGNOSIS — M54.16 LUMBAR RADICULOPATHY: ICD-10-CM

## 2017-06-01 DIAGNOSIS — I10 ESSENTIAL HYPERTENSION: Primary | ICD-10-CM

## 2017-06-01 DIAGNOSIS — E78.00 HYPERCHOLESTEREMIA: ICD-10-CM

## 2017-06-01 PROCEDURE — 99214 OFFICE O/P EST MOD 30 MIN: CPT | Performed by: FAMILY MEDICINE

## 2017-06-01 RX ORDER — HYDROMORPHONE HYDROCHLORIDE 2 MG/1
4 TABLET ORAL EVERY 4 HOURS PRN
Qty: 40 TABLET | Refills: 0 | COMMUNITY
Start: 2017-06-01 | End: 2017-06-19

## 2017-06-01 RX ORDER — MORPHINE SULFATE 15 MG/1
15 TABLET, FILM COATED, EXTENDED RELEASE ORAL EVERY 12 HOURS
Qty: 60 TABLET | Refills: 0 | Status: ON HOLD | OUTPATIENT
Start: 2017-06-01 | End: 2017-06-30

## 2017-06-01 RX ORDER — LIDOCAINE 50 MG/G
PATCH TOPICAL
Qty: 30 PATCH | Refills: 0 | Status: SHIPPED | OUTPATIENT
Start: 2017-06-01 | End: 2017-06-19

## 2017-06-01 RX ORDER — GABAPENTIN 300 MG/1
900 CAPSULE ORAL AT BEDTIME
Qty: 90 CAPSULE | Refills: 1 | Status: ON HOLD | OUTPATIENT
Start: 2017-06-01 | End: 2017-06-30

## 2017-06-01 NOTE — PROGRESS NOTES
SUBJECTIVE:                                                    Syd Joyce is a 62 year old male who presents to clinic today for the following health issues:  CC leg pain    Patient Description: white male glasses    The main issue today is pain control  He brings papers from ER and copy of MRI report.    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  Friday am [facility] MRI Multilevel DJD, central stenosis  Epidural schedule for Tuesday [facility].  ER Oxycontin no change in pain  Dilaudid from Neurosurgery [facility] yesterday did nothing  Was on dexadrone pills now done. No change  Edema improved per his report    He does not report a history of cancer PSA negative 11/24/14        Smoker no never  ETOH rare beer  Chem dep no history  Caffeine yes 1 cup coffee        Colon 11/1/06 and 6/15/12    Musculoskeletal problem/pain leg pain      Duration: see above    Description  Location: see above    Intensity:  severe    Accompanying signs and symptoms: radiation of pain to legs both, numbness and weakness of LE bilateral    History  Previous similar problem: no   Previous evaluation:  MRI and neurosurgery consult and planning epidural injection Tuesday    Precipitating or alleviating factors:  Trauma or overuse: no   Aggravating factors include: walking    Therapies tried and outcome: see above         Diabetes Follow-up  A1C goal: <7    Lab Results   Component Value Date    A1C 5.5 10/17/2016    A1C 5.3 09/21/2015    A1C 5.7 11/24/2014    A1C 6.1 01/10/2014    A1C 5.4 06/07/2013     No results found for: TSH     BS not checked today  Every couple weeks 130 BS      Patient is checking blood sugars: not at all    Diabetic concerns: None     Symptoms of hypoglycemia (low blood sugar): none      Paresthesias (numbness or burning in feet) or sores: Yes      Diabetic eye exam within the last year: Yes     Hyperlipidemia Follow-Up  LDL goal: <100    LDL Cholesterol Calculated   Date Value Ref Range Status   10/17/2016 67 0 - 99 mg/dL Final   ]      Rate your low fat/cholesterol diet?: good    Taking statin?  Yes, no muscle aches from statin    Other lipid medications/supplements?:  none     Hypertension Follow-up  BP goal: <140/90    BP Readings from Last 3 Encounters:   06/01/17 144/70   05/31/17 159/77   05/26/17 137/78     Last Basic Metabolic Panel:  Lab Results   Component Value Date     05/26/2017      Lab Results   Component Value Date    POTASSIUM 3.6 05/26/2017     Lab Results   Component Value Date    CHLORIDE 95 05/26/2017     Lab Results   Component Value Date    HEAVENLY 8.7 05/26/2017     Lab Results   Component Value Date    CO2 28 05/26/2017     Lab Results   Component Value Date    BUN 11 05/26/2017     Lab Results   Component Value Date    CR 1.03 05/26/2017     Lab Results   Component Value Date     05/26/2017         Outpatient blood pressures are not being checked.    Low Salt Diet: no added salt     Depression Followup    Status since last visit: Stable     See PHQ-9 for current symptoms.  Other associated symptoms: None    Complicating factors:   Significant life event:  Yes-  Unable to work due to pain   Current substance abuse:  None  Anxiety or Panic symptoms:  No  MENTAL STATUS EXAM  Appearance: Appears stated age, dressed and groomed appropriately for the visit today.  Attitude: cooperative  Behavior: normal  Eye Contact: normal  Speech: normal  Orientation: oriented to person, place, time and situation  Mood:  admits sadness and anxiety most of time  Affect: Mood Congruent  Thought Process: clear  Suicidal Ideation: reports no thoughts, no intention  Hallucination: no  Paranoid-no  Manic-no  Panic-no  Self harm-no  OCD-no  Gambling-no  Excessive shopping no  Legal no  Self  Harm no    Sleep - good 8 hours  Appetite - ok  Exercise - limited walking 20 feet     Sit 4-5/10  Stand/walk 10/10  Transitioning hurts  Lying is fine    They bought folding walker front wheels      Diluadid 2 mg at 9 am and 1 pm with no relief  Oxycodone yesterday am 10 mg and did not help.      Recent falls - Thursday night onto side lost balance  Recent blackouts - no  Seizures - no        Left leg is weaker      PHQ-9  English PHQ-9   Any Language             Amount of exercise or physical activity: None    Problems taking medications regularly: No    Medication side effects: none    Diet: low salt, low fat/cholesterol and diabetic    Social History   Substance Use Topics     Smoking status: Never Smoker     Smokeless tobacco: Never Used     Alcohol use No      Problem list and histories reviewed & adjusted, as indicated.  Additional history: as documented    Patient Active Problem List   Diagnosis     HTN (hypertension)     Hypercholesteremia     Esophageal reflux     Advanced directives, counseling/discussion     Insomnia     Type 2 diabetes mellitus with diabetic autonomic neuropathy, without long-term current use of insulin (H)     Past Surgical History:   Procedure Laterality Date     NO HISTORY OF SURGERY         Social History   Substance Use Topics     Smoking status: Never Smoker     Smokeless tobacco: Never Used     Alcohol use No     No family history on file.      Current Outpatient Prescriptions   Medication Sig Dispense Refill     HYDROmorphone (DILAUDID) 2 MG tablet Take 1 tablet (2 mg) by mouth every 4 hours as needed for pain 40 tablet 0     oxyCODONE (ROXICODONE) 5 MG IR tablet Take 1-2 tablets (5-10 mg) by mouth every 6 hours as needed for moderate to severe pain 25 tablet 0     gabapentin (NEURONTIN) 300 MG capsule Take 1-2 capsules (300-600 mg) by mouth At Bedtime As needed for nerve pain 60 capsule 1     dexamethasone (DECADRON) 4 MG tablet Take 1 tablet (4 mg) by mouth 3 times daily for 5  days 15 tablet 0     furosemide (LASIX) 20 MG tablet Take 1 tablet (20 mg) by mouth daily for 5 days 5 tablet 0     zolpidem (AMBIEN) 10 MG tablet TAKE 1 TABLET BY MOUTH EVERY NIGHT AT BEDTIME 90 tablet 0     simvastatin (ZOCOR) 10 MG tablet TAKE 1 TABLET BY MOUTH EVERY NIGHT AT BEDTIME 90 tablet 0     pantoprazole (PROTONIX) 40 MG EC tablet TAKE 1 TABLET(40 MG) BY MOUTH DAILY 90 tablet 0     losartan (COZAAR) 100 MG tablet TAKE 1 TABLET(100 MG) BY MOUTH DAILY 90 tablet 0     amLODIPine (NORVASC) 10 MG tablet Take 1 tablet (10 mg) by mouth daily 90 tablet 3     cyclobenzaprine (FLEXERIL) 10 MG tablet Take 1 tablet (10 mg) by mouth daily as needed for muscle spasms 90 tablet 1     linagliptin (TRADJENTA) 5 MG TABS tablet Take 1 tablet (5 mg) by mouth daily 90 tablet 3     lisinopril-hydrochlorothiazide (PRINZIDE,ZESTORETIC) 20-12.5 MG per tablet Take 1 tablet by mouth daily 90 tablet 3     metFORMIN (GLUCOPHAGE-XR) 500 MG 24 hr tablet TAKE 2 TABLETS BY MOUTH EVERY MORNING 180 tablet 3     carvedilol (COREG) 12.5 MG tablet TAKE 1 TABLET BY MOUTH TWICE DAILY WITH MEALS 180 tablet 3     blood glucose monitoring (ACCU-CHEK MED PLUS) test strip USE 4 TO 5 TIMES PER WEEK OR AS DIRECTED 150 each 3     aspirin 81 MG tablet Take 1 tablet (81 mg) by mouth daily 90 tablet prn     blood glucose monitoring (ACCU-CHEK MED PLUS) meter device kit Use to test blood sugar 4 to 5 times weekly or as directed. 1 kit 0     No Known Allergies  Recent Labs   Lab Test  05/26/17   0957  10/17/16   1017  09/21/15   0901  03/27/15   1950  11/24/14   0959   06/07/13   0959   06/11/12   1607   A1C   --   5.5  5.3   --   5.7   < >   --    < >   --    LDL   --   67   --    --   58   --   60   --   50   HDL   --   87   --    --   72   --   72   --   53   TRIG   --   79   --    --   54   --   54   --   163*   ALT   --    --    --    --   32   --   29   --   39   CR  1.03  1.12   --   1.03  1.03   < >  0.87   --   1.27   GFRESTIMATED  73   --     "--   73   --    --    --    --    --    GFRESTBLACK  88   --    --   89   --    --    --    --    --    POTASSIUM  3.6  4.3   --   3.7  4.2   < >  4.0   --   3.7    < > = values in this interval not displayed.      BP Readings from Last 3 Encounters:   05/31/17 159/77   05/26/17 137/78   10/20/16 158/78    Wt Readings from Last 3 Encounters:   05/31/17 95.3 kg (210 lb)   05/26/17 95.3 kg (210 lb)   10/20/16 92.1 kg (203 lb)            Labs reviewed in EPIC  Problem list, Medication list, Allergies, and Medical/Social/Surgical histories reviewed in [facility] and updated as appropriate.    ROS: 10 point ROS neg other than the symptoms noted above in the HPI.      OBJECTIVE:                                                    /70  Pulse 80  Temp 97.9  F (36.6  C) (Oral)  Ht 1.753 m (5' 9\")  Wt 95.3 kg (210 lb)  SpO2 97%  BMI 31.01 kg/m2  There is no height or weight on file to calculate BMI.  GENERAL:  White male, healthy, alert and no distress  He has to push off to stand and cannot get on the table. He was able to stand for part of the exam holding on to the table.   EYES: Eyes grossly normal to inspection, extraocular movements - intact, and PERRL  HENT: ear canals- normal; TMs- normal; Nose- normal; Mouth- no ulcers, no lesions  NECK: no tenderness, no adenopathy, no asymmetry, no masses, no stiffness; thyroid- normal to palpation  RESP: lungs clear to auscultation - no rales, no rhonchi, no wheezes  CV: regular rates and rhythm, normal S1 S2, no S3 or S4 and no murmur, no click or rub -no edema no elder/cords  ABDOMEN: soft, no tenderness, no  hepatosplenomegaly, no masses, normal bowel sounds no bruit or mass  MS: extremities- no gross deformities noted, no edema His LLE is weaker than the right. UE is normal  (yesterday neurosurgery notes EHL 4/5 bilateral otherwise they thought it was equal see their notes)  Light touch minimal change  SKIN: no suspicious lesions, no rashes  NEURO: strength and tone- " normal, sensory exam- grossly normal, mentation- intact, speech- normal, reflexes- symmetric  Non focal no aphasia. No facial asymmetry. Finger to nose, rapid alteration, finger thumb opposition, heel knee shin are abnormal. Antalgic gait.  Diabetic foot exam: unremarkable  BACK: no CVA tenderness, mild paralumbar tenderness  PSYCH: Alert and oriented times 3; speech- coherent , normal rate and volume; able to articulate logical thoughts, able to abstract reason, no tangential thoughts, no hallucinations or delusions, affect- normal  LYMPHATICS: ant. cervical- normal, post. cervical- normal, axillary- normal, supraclavicular- normal, inguinal- normal     IMPRESSION:  1. Mild or early multilevel degenerative disc disease from L2-L3  through L5-S1.  2. Multilevel central stenosis, greatest at L2-L3 and next greatest at  L3-L4.  3. Multilevel foraminal stenosis, greatest on the left at L4-L5 and  next greatest on the right at L3-L4.  4. L4-L5 broad-based left foraminal/lateral recess disc protrusion.  Besides resulting in the left L4 foraminal stenosis, this could  asymmetrically contact or compress the descending left L5 nerve root  within the left lateral recess.  5. L5-S1 small central disc protrusion without apparent neural  impingement or central stenosis.     NADJA RINCON MD    Planning L4-5 GARETH  Reviewed Neurosurgery notes Dariana MENDOSA from yesterday. Dr Ray Perez.  ASSESSMENT/PLAN:                                                      ASSESSMENT / PLAN:  (M54.16) Lumbar radiculopathy  Comment: PLANNING l4-5 INJECTION  Plan: gabapentin (NEURONTIN) 300 MG capsule,         HYDROmorphone (DILAUDID) 2 MG tablet, lidocaine        (LIDODERM) 5 % Patch, morphine (MS CONTIN) 15         MG 12 hr tablet, order for DME          H/O htn  BP Readings from Last 3 Encounters:   06/01/17 144/70   05/31/17 159/77   05/26/17 137/78   CONTINUE CARES    H/O DIABETES  Lab Results   Component Value Date    A1C 5.5 10/17/2016     A1C 5.3 09/21/2015    A1C 5.7 11/24/2014    A1C 6.1 01/10/2014    A1C 5.4 06/07/2013   CONTINUE CARES    LDL Cholesterol Calculated   Date Value Ref Range Status   10/17/2016 67 0 - 99 mg/dL Final   ]  CONTINUE CARES    Patient Instructions:  Handicapped parking sticker    MS Contin 15 mg twice daily scheduled  Diluadid up to 4 mg every 4 hours as needed for breakthru pain  Lidocaine topical patch  Increased gabapentin to 900 mg at bedtime    You declined narcan    You will  Miralax over the counter    F/u @ 2 weeks        Dianne Rivera MD  [facility]

## 2017-06-01 NOTE — MR AVS SNAPSHOT
After Visit Summary   6/1/2017    Syd Joyce    MRN: [id number]           Patient Information     Date Of Birth          1955        Visit Information        Provider Department      6/1/2017 1:45 PM Dianne Rivera MD [facility]        Today's Diagnoses     Lumbar radiculopathy          Care Instructions    Handicapped parking sticker    MS Contin 15 mg twice daily scheduled  Diluadid up to 4 mg every 4 hours as needed for breakthru pain  Lidocaine topical patch  Increased gabapentin to 900 mg at bedtime    You declined narcan    You will  Miralax over the counter    F/u @ 2 weeks          Follow-ups after your visit        Your next 10 appointments already scheduled     Jun 06, 2017  9:00 AM CDT   XR LUMBAR EPIDURAL INJECTION with BRUCE, SULMA IMAGING NURSE, SULMA HELMS   [facility] ([facility])    [address] [place] [id number] [phone]           For nerve root injection, please send or bring copies of any MRIs or other scans you have had.  Bring a list of your current medicines to your exam. (Include vitamins, minerals and over-the-counter medicines.) Leave your valuables at home.  Plan to have someone drive you home afterward.  Stop taking the following medicines (but talk to your doctor first):   If you take blood thinners, you may need to stop taking them a few days before treatment. Talk to your doctor before stopping these medicines.Stop taking Coumadin (warfarin) 3 days before treatment. Restart the day after treatment.   If you take Plavix, Ticlid, Pletal or Persantine, please ask your doctor if you should stop these medicines. You may need extra tests on the morning of your scan. You may take your other medicines as normal.  Stop all food and drink (including water) 3 hours before your test or treatment.  Please tell the doctor:   If you are allergic to X-ray dye (contrast fluid).   If you may be pregnant.   Injections take about 30 to 45 minutes. Most people spend up to 2 hours in the clinic or hospital.  Please call the Imaging Department at your exam site with any questions            Jun 12, 2017 10:00 AM CDT   Return Visit with Ray Perez MD   [facility] ([facility])    [address] [place] [address]   [phone]            Jul 07, 2017  8:15 AM CDT   LAB with RF LAB   [facility] ([facility])    [address] [place] [address] [phone]           Patient must bring picture ID.  Patient should be prepared to give a urine specimen  Please do not eat 10-12 hours before your appointment if you are coming in fasting for labs on lipids, cholesterol, or glucose (sugar).  Pregnant women should follow their Care Team instructions. Water with medications is okay. Do not drink coffee or other fluids.   If you have concerns about taking  your medications, please ask at office or if scheduling via TasteBook, send a message by clicking on Secure Messaging, Message Your Care Team.            Aug 01, 2017  1:00 PM CDT   PHYSICAL with Shan Arenas MD   [facility] ([facility])    [address] [place] [address] [phone]              Future tests that were ordered for you today     Open Future Orders        Priority Expected Expires Ordered    XR Lumbar Epidural Injection Incl Imaging Routine 5/31/2017 5/31/2018 5/31/2017            Who to contact     If you have questions or need follow up information about today's clinic visit or your schedule please contact [facility] directly at [phone].  Normal or non-critical lab and imaging results will be communicated to you by MyChart, letter or phone within 4 business days after the clinic has received the results. If you do not hear from us within 7 days, please contact the clinic through TasteBook or  "phone. If you have a critical or abnormal lab result, we will notify you by phone as soon as possible.  Submit refill requests through [employer] or call your pharmacy and they will forward the refill request to us. Please allow 3 business days for your refill to be completed.          Additional Information About Your Visit        Geles[employer] Information     [employer] gives you secure access to your electronic health record. If you see a primary care provider, you can also send messages to your care team and make appointments. If you have questions, please call your primary care clinic.  If you do not have a primary care provider, please call [phone] and they will assist you.        Care EveryWhere ID     This is your Care EveryWhere ID. This could be used by other organizations to access your [facility] medical records  [id number]        Your Vitals Were     Pulse Temperature Height Pulse Oximetry BMI (Body Mass Index)       80 97.9  F (36.6  C) (Oral) 1.753 m (5' 9\") 97% 31.01 kg/m2        Blood Pressure from Last 3 Encounters:   06/01/17 144/70   05/31/17 159/77   05/26/17 137/78    Weight from Last 3 Encounters:   06/01/17 95.3 kg (210 lb)   05/31/17 95.3 kg (210 lb)   05/26/17 95.3 kg (210 lb)              Today, you had the following     No orders found for display         Today's Medication Changes          These changes are accurate as of: 6/1/17  2:46 PM.  If you have any questions, ask your nurse or doctor.               Start taking these medicines.        Dose/Directions    lidocaine 5 % Patch   Commonly known as:  LIDODERM   Used for:  Lumbar radiculopathy   Started by:  Dianne Rivera MD        Apply up to 3 patches to painful area at once for up to 12 h within a 24 h period.  Remove after 12 hours.   Quantity:  30 patch   Refills:  0       morphine 15 MG 12 hr tablet   Commonly known as:  MS CONTIN   Used for:  Lumbar radiculopathy   Started by:  Dianne Rivera MD        Dose:  15 mg   Take 1 " tablet (15 mg) by mouth every 12 hours maximum 2 tablet(s) per day   Quantity:  60 tablet   Refills:  0       order for DME   Used for:  Lumbar radiculopathy   Started by:  Dianne Rivera MD        One tens unit   Quantity:  1 Device   Refills:  0         These medicines have changed or have updated prescriptions.        Dose/Directions    gabapentin 300 MG capsule   Commonly known as:  NEURONTIN   This may have changed:  how much to take   Used for:  Lumbar radiculopathy   Changed by:  Dianne Rivera MD        Dose:  900 mg   Take 3 capsules (900 mg) by mouth At Bedtime As needed for nerve pain   Quantity:  90 capsule   Refills:  1       HYDROmorphone 2 MG tablet   Commonly known as:  DILAUDID   This may have changed:  how much to take   Used for:  Lumbar radiculopathy   Changed by:  Dianne Rivera MD        Dose:  4 mg   Take 2 tablets (4 mg) by mouth every 4 hours as needed for pain   Quantity:  40 tablet   Refills:  0            Where to get your medicines      These medications were sent to [email or web address] [id number] [place] [address] [address] AT [facility] [address]  [address] [facility] [address]     Phone:  [phone]     gabapentin 300 MG capsule    lidocaine 5 % Patch         Some of these will need a paper prescription and others can be bought over the counter.  Ask your nurse if you have questions.     Bring a paper prescription for each of these medications     morphine 15 MG 12 hr tablet    order for DME                Primary Care Provider Office Phone # Fax #    Shan Arenas [phone]       [facility] [address]  [facility] [address]        Thank you!     Thank you for choosing [facility]  for your care. Our goal is always to provide you with excellent care. Hearing back from our patients is one way we can continue to improve our services. Please take a few minutes to complete the written survey that you  may receive in the mail after your visit with us. Thank you!             Your Updated Medication List - Protect others around you: Learn how to safely use, store and throw away your medicines at www.disposemymeds.org.          This list is accurate as of: 6/1/17  2:46 PM.  Always use your most recent med list.                   Brand Name Dispense Instructions for use    amLODIPine 10 MG tablet    NORVASC    90 tablet    Take 1 tablet (10 mg) by mouth daily       aspirin 81 MG tablet     90 tablet    Take 1 tablet (81 mg) by mouth daily       blood glucose monitoring meter device kit     1 kit    Use to test blood sugar 4 to 5 times weekly or as directed.       blood glucose monitoring test strip    ACCU-CHEK MED PLUS    150 each    USE 4 TO 5 TIMES PER WEEK OR AS DIRECTED       carvedilol 12.5 MG tablet    COREG    180 tablet    TAKE 1 TABLET BY MOUTH TWICE DAILY WITH MEALS       cyclobenzaprine 10 MG tablet    FLEXERIL    90 tablet    Take 1 tablet (10 mg) by mouth daily as needed for muscle spasms       furosemide 20 MG tablet    LASIX    5 tablet    Take 1 tablet (20 mg) by mouth daily for 5 days       gabapentin 300 MG capsule    NEURONTIN    90 capsule    Take 3 capsules (900 mg) by mouth At Bedtime As needed for nerve pain       HYDROmorphone 2 MG tablet    DILAUDID    40 tablet    Take 2 tablets (4 mg) by mouth every 4 hours as needed for pain       lidocaine 5 % Patch    LIDODERM    30 patch    Apply up to 3 patches to painful area at once for up to 12 h within a 24 h period.  Remove after 12 hours.       linagliptin 5 MG Tabs tablet    TRADJENTA    90 tablet    Take 1 tablet (5 mg) by mouth daily       lisinopril-hydrochlorothiazide 20-12.5 MG per tablet    PRINZIDE/ZESTORETIC    90 tablet    Take 1 tablet by mouth daily       losartan 100 MG tablet    COZAAR    90 tablet    TAKE 1 TABLET(100 MG) BY MOUTH DAILY       metFORMIN 500 MG 24 hr tablet    GLUCOPHAGE-XR    180 tablet    TAKE 2 TABLETS BY MOUTH  EVERY MORNING       morphine 15 MG 12 hr tablet    MS CONTIN    60 tablet    Take 1 tablet (15 mg) by mouth every 12 hours maximum 2 tablet(s) per day       order for DME     1 Device    One tens unit       pantoprazole 40 MG EC tablet    PROTONIX    90 tablet    TAKE 1 TABLET(40 MG) BY MOUTH DAILY       simvastatin 10 MG tablet    ZOCOR    90 tablet    TAKE 1 TABLET BY MOUTH EVERY NIGHT AT BEDTIME       zolpidem 10 MG tablet    AMBIEN    90 tablet    TAKE 1 TABLET BY MOUTH EVERY NIGHT AT BEDTIME

## 2017-06-01 NOTE — PATIENT INSTRUCTIONS
Handicapped parking sticker    MS Contin 15 mg twice daily scheduled  Diluadid up to 4 mg every 4 hours as needed for breakthru pain  Lidocaine topical patch  Increased gabapentin to 900 mg at bedtime    You declined narcan    You will  Miralax over the counter    F/u @ 2 weeks

## 2017-06-06 ENCOUNTER — HOSPITAL ENCOUNTER (OUTPATIENT)
Dept: GENERAL RADIOLOGY | Facility: CLINIC | Age: 62
End: 2017-06-06
Attending: PHYSICIAN ASSISTANT
Payer: COMMERCIAL

## 2017-06-06 ENCOUNTER — HOSPITAL ENCOUNTER (OUTPATIENT)
Facility: CLINIC | Age: 62
Discharge: HOME | End: 2017-06-06
Admitting: PHYSICIAN ASSISTANT
Payer: COMMERCIAL

## 2017-06-06 VITALS
RESPIRATION RATE: 14 BRPM | DIASTOLIC BLOOD PRESSURE: 76 MMHG | OXYGEN SATURATION: 95 % | SYSTOLIC BLOOD PRESSURE: 145 MMHG | HEART RATE: 80 BPM | TEMPERATURE: 95.6 F

## 2017-06-06 DIAGNOSIS — M54.16 LUMBAR RADICULOPATHY: ICD-10-CM

## 2017-06-06 PROCEDURE — 25000125 ZZHC RX 250: Performed by: PHYSICIAN ASSISTANT

## 2017-06-06 PROCEDURE — 25000128 H RX IP 250 OP 636: Performed by: PHYSICIAN ASSISTANT

## 2017-06-06 PROCEDURE — 40000863 ZZH STATISTIC RADIOLOGY XRAY, US, CT, MAR, NM

## 2017-06-06 PROCEDURE — 62323 NJX INTERLAMINAR LMBR/SAC: CPT

## 2017-06-06 PROCEDURE — 25500064 ZZH RX 255 OP 636: Performed by: PHYSICIAN ASSISTANT

## 2017-06-06 RX ORDER — IOPAMIDOL 408 MG/ML
10 INJECTION, SOLUTION INTRATHECAL ONCE
Status: COMPLETED | OUTPATIENT
Start: 2017-06-06 | End: 2017-06-06

## 2017-06-06 RX ORDER — DEXAMETHASONE SODIUM PHOSPHATE 10 MG/ML
10 INJECTION, SOLUTION INTRAMUSCULAR; INTRAVENOUS ONCE
Status: COMPLETED | OUTPATIENT
Start: 2017-06-06 | End: 2017-06-06

## 2017-06-06 RX ADMIN — LIDOCAINE HYDROCHLORIDE 9 ML: 10 INJECTION, SOLUTION EPIDURAL; INFILTRATION; INTRACAUDAL; PERINEURAL at 09:29

## 2017-06-06 RX ADMIN — IOPAMIDOL 3 ML: 408 INJECTION, SOLUTION INTRATHECAL at 09:30

## 2017-06-06 RX ADMIN — DEXAMETHASONE SODIUM PHOSPHATE 20 MG: 10 INJECTION, SOLUTION INTRAMUSCULAR; INTRAVENOUS at 09:32

## 2017-06-06 NOTE — PROGRESS NOTES
RADIOLOGY PROCEDURE NOTE  Patient name: Syd Joyce  MRN: [id number]  : 1955    Pre-procedure diagnosis: Low back pain  Post-procedure diagnosis: Same    Procedure Date/Time: 2017  9:36 AM  Procedure: Caudal GARETH  Estimated blood loss: None  Specimen(s) collected with description: none  The patient tolerated the procedure well with no immediate complications.  Significant findings:none    See imaging dictation for procedural details.    Provider name: Dipak Nation  Assistant(s):None

## 2017-06-06 NOTE — DISCHARGE INSTRUCTIONS
Lumbar Puncture/Blood Patch Discharge Instructions     After you go home:      You may resume your normal diet    Continue to drink at least 8 ounces of fluid every 1-2 hours until bedtime tonight and continue to drink extra fluids for the next 2 days    Caffeinated beverages may help prevent or reduce spinal headaches    Care of Puncture Site:      If there is a bandaid - you may remove it tomorrow morning    You may shower tomorrow    No tub baths, whirlpools or swimming for 48 hours     Activity - to help prevent spinal headache or spinal fluid leakage:      Minimize your activity today. Flat bedrest for 24 hrs is strongly suggested as this will help to prevent a spinal headache.  You can be up to the bathroom and for meals.    Resume normal activities tomorrow.     Avoid strenuous activity for the next 2 days    Do not drive a vehicle until tomorrow morning    Medicines:      You may resume all medications    Resume your Warfarin/Coumadin at your regular dose today. Follow up with your provider to have your INR rechecked    Resume your Platelet Inhibitors and Aspirin tomorrow at your regular dose    For minor pain, you may take Acetaminophen (Tylenol) or Ibuprofen (Advil)            Call the provider who ordered this procedure if:      Your headache becomes worse or is severe. (A minor headache is not unusual)    You have nausea or vomiting    The site is red, swollen, hot or tender    You have chills or a fever greater than 101 F (38 C)    Any questions or concerns    If you have questions call:        [facility] Radiology Dept @ [phone]    The provider who performed your procedure was Dr. Banks.

## 2017-06-06 NOTE — IP AVS SNAPSHOT
[place] [address]    [place] [address]    Phone:  [phone]                                       After Visit Summary   6/6/2017    Syd Joyce    MRN: [id number]           After Visit Summary Signature Page     I have received my discharge instructions, and my questions have been answered. I have discussed any challenges I see with this plan with the nurse or doctor.    ..........................................................................................................................................  Patient/Patient Representative Signature      ..........................................................................................................................................  Patient Representative Print Name and Relationship to Patient    ..................................................               ................................................  Date                                            Time    ..........................................................................................................................................  Reviewed by Signature/Title    ...................................................              ..............................................  Date                                                            Time

## 2017-06-06 NOTE — PROGRESS NOTES
"Pt admitted for lumbar GARETH, accompanied by spouse. Currently rating pain \"9/10\" with movement. Epidural questionnaire given.  "

## 2017-06-06 NOTE — IP AVS SNAPSHOT
MRN:[id number]                      After Visit Summary   6/6/2017    Syd Joyce    MRN: [id number]           Visit Information        Department      6/6/2017  8:26 AM [facility]          Review of your medicines      UNREVIEWED medicines. Ask your doctor about these medicines        Dose / Directions    amLODIPine 10 MG tablet   Commonly known as:  NORVASC   Used for:  Essential hypertension with goal blood pressure less than 130/80        Dose:  10 mg   Take 1 tablet (10 mg) by mouth daily   Quantity:  90 tablet   Refills:  3       aspirin 81 MG tablet   Used for:  Type II or unspecified type diabetes mellitus without mention of complication, not stated as uncontrolled        Dose:  81 mg   Take 1 tablet (81 mg) by mouth daily   Quantity:  90 tablet   Refills:  prn       carvedilol 12.5 MG tablet   Commonly known as:  COREG   Used for:  Essential hypertension with goal blood pressure less than 130/80        TAKE 1 TABLET BY MOUTH TWICE DAILY WITH MEALS   Quantity:  180 tablet   Refills:  3       cyclobenzaprine 10 MG tablet   Commonly known as:  FLEXERIL   Used for:  Chronic bilateral low back pain without sciatica        Dose:  10 mg   Take 1 tablet (10 mg) by mouth daily as needed for muscle spasms   Quantity:  90 tablet   Refills:  1       gabapentin 300 MG capsule   Commonly known as:  NEURONTIN   Used for:  Lumbar radiculopathy        Dose:  900 mg   Take 3 capsules (900 mg) by mouth At Bedtime As needed for nerve pain   Quantity:  90 capsule   Refills:  1       HYDROmorphone 2 MG tablet   Commonly known as:  DILAUDID   Used for:  Lumbar radiculopathy        Dose:  4 mg   Take 2 tablets (4 mg) by mouth every 4 hours as needed for pain   Quantity:  40 tablet   Refills:  0       lidocaine 5 % Patch   Commonly known as:  LIDODERM   Used for:  Lumbar radiculopathy        Apply up to 3 patches to painful area at once for up to 12 h within a 24 h period.  Remove after 12  hours.   Quantity:  30 patch   Refills:  0       linagliptin 5 MG Tabs tablet   Commonly known as:  TRADJENTA   Used for:  Type 2 diabetes mellitus with diabetic autonomic neuropathy, without long-term current use of insulin (H)        Dose:  5 mg   Take 1 tablet (5 mg) by mouth daily   Quantity:  90 tablet   Refills:  3       lisinopril-hydrochlorothiazide 20-12.5 MG per tablet   Commonly known as:  PRINZIDE/ZESTORETIC   Used for:  Essential hypertension with goal blood pressure less than 130/80        Dose:  1 tablet   Take 1 tablet by mouth daily   Quantity:  90 tablet   Refills:  3       losartan 100 MG tablet   Commonly known as:  COZAAR   Used for:  Essential hypertension with goal blood pressure less than 130/80        TAKE 1 TABLET(100 MG) BY MOUTH DAILY   Quantity:  90 tablet   Refills:  0       metFORMIN 500 MG 24 hr tablet   Commonly known as:  GLUCOPHAGE-XR   Used for:  Type 2 diabetes mellitus with diabetic autonomic neuropathy, without long-term current use of insulin (H)        TAKE 2 TABLETS BY MOUTH EVERY MORNING   Quantity:  180 tablet   Refills:  3       morphine 15 MG 12 hr tablet   Commonly known as:  MS CONTIN   Used for:  Lumbar radiculopathy        Dose:  15 mg   Take 1 tablet (15 mg) by mouth every 12 hours maximum 2 tablet(s) per day   Quantity:  60 tablet   Refills:  0       NEXIUM PO   Indication:  Heartburn        Dose:  20 mg   Take 20 mg by mouth daily   Refills:  0       pantoprazole 40 MG EC tablet   Commonly known as:  PROTONIX   Used for:  Gastroesophageal reflux disease without esophagitis        TAKE 1 TABLET(40 MG) BY MOUTH DAILY   Quantity:  90 tablet   Refills:  0       simvastatin 10 MG tablet   Commonly known as:  ZOCOR   Used for:  Hypercholesteremia        TAKE 1 TABLET BY MOUTH EVERY NIGHT AT BEDTIME   Quantity:  90 tablet   Refills:  0       zolpidem 10 MG tablet   Commonly known as:  AMBIEN   Used for:  Encounter for medication refill        TAKE 1 TABLET BY MOUTH EVERY  NIGHT AT BEDTIME   Quantity:  90 tablet   Refills:  0         CONTINUE these medicines which have NOT CHANGED        Dose / Directions    blood glucose monitoring meter device kit   Used for:  Refill clinic medication management patient        Use to test blood sugar 4 to 5 times weekly or as directed.   Quantity:  1 kit   Refills:  0       blood glucose monitoring test strip   Commonly known as:  ACCU-CHEK MED PLUS   Used for:  Refill clinic medication management patient        USE 4 TO 5 TIMES PER WEEK OR AS DIRECTED   Quantity:  150 each   Refills:  3       order for DME   Used for:  Lumbar radiculopathy        One tens unit   Quantity:  1 Device   Refills:  0                Protect others around you: Learn how to safely use, store and throw away your medicines at www.disposemymeds.org.         Follow-ups after your visit        Your next 10 appointments already scheduled     Jun 12, 2017 10:00 AM CDT   Return Visit with Ray Perez MD   [facility] ([facility])    [address] [place] [id number] [phone]            Jul 07, 2017  8:15 AM CDT   LAB with RF LAB   [facility]    [address] [place] [address] [phone]           Patient must bring picture ID.  Patient should be prepared to give a urine specimen  Please do not eat 10-12 hours before your appointment if you are coming in fasting for labs on lipids, cholesterol, or glucose (sugar).  Pregnant women should follow their Care Team instructions. Water with medications is okay. Do not drink coffee or other fluids.   If you have concerns about taking  your medications, please ask at office or if scheduling via [employer], send a message by clicking on Secure Messaging, Message Your Care Team.            Aug 01, 2017  1:00 PM CDT   PHYSICAL with Shan Arenas MD   [facility]     [address] [place] [address] [phone]               Care Instructions        Further instructions from your care team       Lumbar Puncture/Blood Patch Discharge Instructions     After you go home:      You may resume your normal diet    Continue to drink at least 8 ounces of fluid every 1-2 hours until bedtime tonight and continue to drink extra fluids for the next 2 days    Caffeinated beverages may help prevent or reduce spinal headaches    Care of Puncture Site:      If there is a bandaid - you may remove it tomorrow morning    You may shower tomorrow    No tub baths, whirlpools or swimming for 48 hours     Activity - to help prevent spinal headache or spinal fluid leakage:      Minimize your activity today. Flat bedrest for 24 hrs is strongly suggested as this will help to prevent a spinal headache.  You can be up to the bathroom and for meals.    Resume normal activities tomorrow.     Avoid strenuous activity for the next 2 days    Do not drive a vehicle until tomorrow morning    Medicines:      You may resume all medications    Resume your Warfarin/Coumadin at your regular dose today. Follow up with your provider to have your INR rechecked    Resume your Platelet Inhibitors and Aspirin tomorrow at your regular dose    For minor pain, you may take Acetaminophen (Tylenol) or Ibuprofen (Advil)            Call the provider who ordered this procedure if:      Your headache becomes worse or is severe. (A minor headache is not unusual)    You have nausea or vomiting    The site is red, swollen, hot or tender    You have chills or a fever greater than 101 F (38 C)    Any questions or concerns    If you have questions call:        [facility] Radiology Dept @ [phone]    The provider who performed your procedure was Dr. Banks.     Additional Information About Your Visit        [employer] Information     [employer] gives you secure access to your electronic health record. If you see a  primary care provider, you can also send messages to your care team and make appointments. If you have questions, please call your primary care clinic.  If you do not have a primary care provider, please call [phone] and they will assist you.        Care EveryWhere ID     This is your Care EveryWhere ID. This could be used by other organizations to access your [facility] medical records  [id number]        Your Vitals Were     Blood Pressure Pulse Temperature Respirations Pulse Oximetry       145/76 80 95.6  F (35.3  C) (Oral) 14 95%        Primary Care Provider Office Phone # Fax #    Shan Arenas [phone]      Thank you!     Thank you for choosing [facility] for your care. Our goal is always to provide you with excellent care. Hearing back from our patients is one way we can continue to improve our services. Please take a few minutes to complete the written survey that you may receive in the mail after you visit with us. Thank you!             Medication List: This is a list of all your medications and when to take them. Check marks below indicate your daily home schedule. Keep this list as a reference.      Medications           Morning Afternoon Evening Bedtime As Needed    amLODIPine 10 MG tablet   Commonly known as:  NORVASC   Take 1 tablet (10 mg) by mouth daily                                aspirin 81 MG tablet   Take 1 tablet (81 mg) by mouth daily                                blood glucose monitoring meter device kit   Use to test blood sugar 4 to 5 times weekly or as directed.                                blood glucose monitoring test strip   Commonly known as:  ACCU-CHEK MED PLUS   USE 4 TO 5 TIMES PER WEEK OR AS DIRECTED                                carvedilol 12.5 MG tablet   Commonly known as:  COREG   TAKE 1 TABLET BY MOUTH TWICE DAILY WITH MEALS                                cyclobenzaprine 10 MG tablet   Commonly known as:  FLEXERIL   Take 1 tablet (10 mg) by  mouth daily as needed for muscle spasms                                gabapentin 300 MG capsule   Commonly known as:  NEURONTIN   Take 3 capsules (900 mg) by mouth At Bedtime As needed for nerve pain                                HYDROmorphone 2 MG tablet   Commonly known as:  DILAUDID   Take 2 tablets (4 mg) by mouth every 4 hours as needed for pain                                lidocaine 5 % Patch   Commonly known as:  LIDODERM   Apply up to 3 patches to painful area at once for up to 12 h within a 24 h period.  Remove after 12 hours.                                linagliptin 5 MG Tabs tablet   Commonly known as:  TRADJENTA   Take 1 tablet (5 mg) by mouth daily                                lisinopril-hydrochlorothiazide 20-12.5 MG per tablet   Commonly known as:  PRINZIDE/ZESTORETIC   Take 1 tablet by mouth daily                                losartan 100 MG tablet   Commonly known as:  COZAAR   TAKE 1 TABLET(100 MG) BY MOUTH DAILY                                metFORMIN 500 MG 24 hr tablet   Commonly known as:  GLUCOPHAGE-XR   TAKE 2 TABLETS BY MOUTH EVERY MORNING                                morphine 15 MG 12 hr tablet   Commonly known as:  MS CONTIN   Take 1 tablet (15 mg) by mouth every 12 hours maximum 2 tablet(s) per day                                NEXIUM PO   Take 20 mg by mouth daily                                order for DME   One tens unit                                pantoprazole 40 MG EC tablet   Commonly known as:  PROTONIX   TAKE 1 TABLET(40 MG) BY MOUTH DAILY                                simvastatin 10 MG tablet   Commonly known as:  ZOCOR   TAKE 1 TABLET BY MOUTH EVERY NIGHT AT BEDTIME                                zolpidem 10 MG tablet   Commonly known as:  AMBIEN   TAKE 1 TABLET BY MOUTH EVERY NIGHT AT BEDTIME

## 2017-06-06 NOTE — PROGRESS NOTES
"Lumbar injection site dry, intact. Pt reporting pain \"10/10\" with movement, improved once BLE elevated on pillows. Discharge instructions reviewed with pt and spouse, questions answered. Both deny concerns. Discharged home with spouse.  "

## 2017-06-12 ENCOUNTER — OFFICE VISIT (OUTPATIENT)
Dept: NEUROSURGERY | Facility: CLINIC | Age: 62
End: 2017-06-12
Attending: NEUROLOGICAL SURGERY
Payer: COMMERCIAL

## 2017-06-12 VITALS
HEART RATE: 93 BPM | SYSTOLIC BLOOD PRESSURE: 107 MMHG | OXYGEN SATURATION: 95 % | TEMPERATURE: 97.9 F | WEIGHT: 208 LBS | DIASTOLIC BLOOD PRESSURE: 66 MMHG | BODY MASS INDEX: 30.72 KG/M2

## 2017-06-12 DIAGNOSIS — M54.16 LUMBAR RADICULOPATHY: Primary | ICD-10-CM

## 2017-06-12 PROCEDURE — 99214 OFFICE O/P EST MOD 30 MIN: CPT | Performed by: NEUROLOGICAL SURGERY

## 2017-06-12 PROCEDURE — 99211 OFF/OP EST MAY X REQ PHY/QHP: CPT | Performed by: NEUROLOGICAL SURGERY

## 2017-06-12 NOTE — MR AVS SNAPSHOT
After Visit Summary   6/12/2017    Syd Joyce    MRN: [id number]           Patient Information     Date Of Birth          1955        Visit Information        Provider Department      6/12/2017 10:00 AM Ray Perez MD [facility]        Today's Diagnoses     Lumbar radiculopathy    -  1      Care Instructions    Physical therapy referral to [facility]. They will call you to schedule.           Follow-ups after your visit        Additional Services     [facility] PT, HAND, AND CHIROPRACTIC REFERRAL       **This order will print in the [facility] Scheduling Office**    Physical Therapy, Hand Therapy and Chiropractic Care are available through:    *[facility]  *[facility]  *[facility]    Call one number to schedule at any of the above locations: [phone].    Your provider has referred you to: Physical Therapy at [facility] or [facility]    Indication/Reason for Referral: Low Back Pain  Onset of Illness:   Therapy Orders: Evaluate and Treat  Special Programs: None  Special Request: None    Mj Barker      Additional Comments for the Therapist or Chiropractor: please call patient to schedule. He'd like [facility]     Please be aware that coverage of these services is subject to the terms and limitations of your health insurance plan.  Call member services at your health plan with any benefit or coverage questions.      Please bring the following to your appointment:    *Your personal calendar for scheduling future appointments  *Comfortable clothing                  Your next 10 appointments already scheduled     Jun 19, 2017 10:45 AM CDT   Pre-Op physical with Dianne Rivera MD   [facility] ([facility])    [address] [place] [address] [phone]            Jun 30, 2017   Procedure with Ray Perez MD   [facility] (--)    [address]  [place] [address]   [phone]            Jul 07, 2017  8:15 AM CDT   LAB with RF LAB   [facility] ([facility])    [address] [place] [address] [phone]           Patient must bring picture ID.  Patient should be prepared to give a urine specimen  Please do not eat 10-12 hours before your appointment if you are coming in fasting for labs on lipids, cholesterol, or glucose (sugar).  Pregnant women should follow their Care Team instructions. Water with medications is okay. Do not drink coffee or other fluids.   If you have concerns about taking  your medications, please ask at office or if scheduling via [employer], send a message by clicking on Secure Messaging, Message Your Care Team.            Aug 01, 2017  1:00 PM CDT   PHYSICAL with Shan Arenas MD   [facility] ([facility])    [address] [place] [id number] [phone]              Who to contact     If you have questions or need follow up information about today's clinic visit or your schedule please contact [facility] directly at [phone].  Normal or non-critical lab and imaging results will be communicated to you by [employer], letter or phone within 4 business days after the clinic has received the results. If you do not hear from us within 7 days, please contact the clinic through [employer] or phone. If you have a critical or abnormal lab result, we will notify you by phone as soon as possible.  Submit refill requests through [employer] or call your pharmacy and they will forward the refill request to us. Please allow 3 business days for your refill to be completed.          Additional Information About Your Visit        [employer] Information     [employer] gives you secure access to your electronic health record. If you see a primary care provider, you can also send messages to your care team and make appointments. If you have  questions, please call your primary care clinic.  If you do not have a primary care provider, please call [phone] and they will assist you.        Care EveryWhere ID     This is your Care EveryWhere ID. This could be used by other organizations to access your [facility] medical records  [id number]        Your Vitals Were     Pulse Temperature Pulse Oximetry BMI (Body Mass Index)          93 97.9  F (36.6  C) (Oral) 95% 30.72 kg/m2         Blood Pressure from Last 3 Encounters:   06/12/17 107/66   06/06/17 145/76   06/01/17 144/70    Weight from Last 3 Encounters:   06/12/17 94.3 kg (208 lb)   06/01/17 95.3 kg (210 lb)   05/31/17 95.3 kg (210 lb)              We Performed the Following     [facility] PT, HAND, AND CHIROPRACTIC REFERRAL        Primary Care Provider Office Phone # Fax #    Shan Arenas [phone]       [facility] [address]  [facility] [address]        Thank you!     Thank you for choosing [facility]  for your care. Our goal is always to provide you with excellent care. Hearing back from our patients is one way we can continue to improve our services. Please take a few minutes to complete the written survey that you may receive in the mail after your visit with us. Thank you!             Your Updated Medication List - Protect others around you: Learn how to safely use, store and throw away your medicines at www.disposemymeds.org.          This list is accurate as of: 6/12/17 11:59 PM.  Always use your most recent med list.                   Brand Name Dispense Instructions for use    amLODIPine 10 MG tablet    NORVASC    90 tablet    Take 1 tablet (10 mg) by mouth daily       aspirin 81 MG tablet     90 tablet    Take 1 tablet (81 mg) by mouth daily       blood glucose monitoring meter device kit     1 kit    Use to test blood sugar 4 to 5 times weekly or as directed.       blood glucose monitoring test strip    ACCU-CHEK MED  PLUS    150 each    USE 4 TO 5 TIMES PER WEEK OR AS DIRECTED       carvedilol 12.5 MG tablet    COREG    180 tablet    TAKE 1 TABLET BY MOUTH TWICE DAILY WITH MEALS       cyclobenzaprine 10 MG tablet    FLEXERIL    90 tablet    Take 1 tablet (10 mg) by mouth daily as needed for muscle spasms       gabapentin 300 MG capsule    NEURONTIN    90 capsule    Take 3 capsules (900 mg) by mouth At Bedtime As needed for nerve pain       HYDROmorphone 2 MG tablet    DILAUDID    40 tablet    Take 2 tablets (4 mg) by mouth every 4 hours as needed for pain       lidocaine 5 % Patch    LIDODERM    30 patch    Apply up to 3 patches to painful area at once for up to 12 h within a 24 h period.  Remove after 12 hours.       linagliptin 5 MG Tabs tablet    TRADJENTA    90 tablet    Take 1 tablet (5 mg) by mouth daily       lisinopril-hydrochlorothiazide 20-12.5 MG per tablet    PRINZIDE/ZESTORETIC    90 tablet    Take 1 tablet by mouth daily       losartan 100 MG tablet    COZAAR    90 tablet    TAKE 1 TABLET(100 MG) BY MOUTH DAILY       metFORMIN 500 MG 24 hr tablet    GLUCOPHAGE-XR    180 tablet    TAKE 2 TABLETS BY MOUTH EVERY MORNING       morphine 15 MG 12 hr tablet    MS CONTIN    60 tablet    Take 1 tablet (15 mg) by mouth every 12 hours maximum 2 tablet(s) per day       NEXIUM PO      Take 20 mg by mouth daily       order for DME     1 Device    One tens unit       pantoprazole 40 MG EC tablet    PROTONIX    90 tablet    TAKE 1 TABLET(40 MG) BY MOUTH DAILY       simvastatin 10 MG tablet    ZOCOR    90 tablet    TAKE 1 TABLET BY MOUTH EVERY NIGHT AT BEDTIME       zolpidem 10 MG tablet    AMBIEN    90 tablet    TAKE 1 TABLET BY MOUTH EVERY NIGHT AT BEDTIME

## 2017-06-12 NOTE — NURSING NOTE
"Syd Joyce is a 62 year old male who presents for:  Chief Complaint   Patient presents with     Neurologic Problem     follow up lumbar radiculopathy, failed injection 6-6-17        Initial Vitals:  There were no vitals taken for this visit. Estimated body mass index is 31.01 kg/(m^2) as calculated from the following:    Height as of 6/1/17: 5' 9\" (1.753 m).    Weight as of 6/1/17: 210 lb (95.3 kg).. There is no height or weight on file to calculate BSA. BP completed using cuff size: large  Data Unavailable    Do you feel safe in your environment?  Yes  Do you need any refills today? No    Nursing Comments: follow up lumbar radiculopathy, failed injection 6-6-17.  Patient rates his pain today as 6-7 bilateral leg pain, injection gave slight improvement      5 min. nursing intake time  Genevieve River CMA, A[place]      Discharge plan:   See providers dictation   2 min. nursing discharge time  Genevieve River CMA, AAS       "

## 2017-06-13 ENCOUNTER — TELEPHONE (OUTPATIENT)
Dept: FAMILY MEDICINE | Facility: CLINIC | Age: 62
End: 2017-06-13

## 2017-06-13 ENCOUNTER — TELEPHONE (OUTPATIENT)
Dept: NEUROSURGERY | Facility: CLINIC | Age: 62
End: 2017-06-13

## 2017-06-13 NOTE — LETTER
[facility]   [facility]  [address] [place] [id number]    6/14/17    To Whom it May Concern,      Syd Joyce is being seen at our clinic and is scheduled for surgery with us. Please excuse him from work starting 6/12/17 to 6/30/17. He will also need 4-6 weeks off work after the surgery to help recover and heal.     Please call our clinic with questions or concerns.       Sincerely,            Ray Perez MD  [facility]  [address] [address]    Tel [phone]

## 2017-06-13 NOTE — TELEPHONE ENCOUNTER
Spoke with Kamini from [facility]. Patient would like a work letter excusing him from 6/12/17 to surgical date 6/30/17. Will write letter and mail to patient's home address.

## 2017-06-13 NOTE — TELEPHONE ENCOUNTER
Patient calls requesting letter for employer excusing him from work. Has been unable to work since 5/31 due to low back pain and leg weakness. Seen by Dr. Rivera 5/26/17 and 6/1/17. Has since seen Dr. Perez (neurosurgery) and is scheduled for lumbar surgery on 6/30/17. Scheduled for preop with Dr. Rivera 6/19/17.  Plan: letter done and signed by Dr. Rivera. Mailed to patient.   Kamini Price RN

## 2017-06-13 NOTE — TELEPHONE ENCOUNTER
[facility] pt called to get note to get out of work until surgery.   Dr Perez's note is not in EPIC can you please Kamini to confirm before she reaches out to pt.   If call [phone] goes to voicemail call  [phone]

## 2017-06-13 NOTE — LETTER
[facility] [address] [place]  [address]  Phone: [phone]          2017    Syd Joyce  : 1955    To whom it may concern-     Mr Joyce was seen in clinic on 17 and 17 for back pain and leg weakness. He has been unable to work since  due to these issues. He has since seen a neurosurgeon and is scheduled for back surgery on 17.           Dianne Rivera MD   [facility]

## 2017-06-13 NOTE — PROGRESS NOTES
"Syd Joyce is a 62 year old male who presents for evaluation of low back pain with radiculopathy and weakness. Pain has been chronic, but recently worsened over the past week. Pain radiates down posterior aspect of bilateral legs to the ankles. Describes the pain as intermittent and sharp and achy. He was also seen at [facility] ED on 5/26/2017 and MRI was ordered which revealed disc herniation at L4-5. He was also started on a low dose diuretic to help with his BLE edema. He subsequently followed up with his PCP who noted increased weakness, difficulty walking, and and BLE edema who referred him here. Pain worsens with transition with sitting to standing. Pain improves with \"walking it off\". He has been able to sleep well. He has had difficulty walking and difficulty with standing and feels as though his balance his significantly declined. He has not done any injections or PT. Denies bladder or bowel incontinence.    Returns for follow up.  Continues to have severe pain to the left>right leg, worse with standing, no improvement with GARETH.    Past Medical History:   Diagnosis Date     DM2 (diabetes mellitus, type 2) (H)      Esophageal reflux 6/15/2012     HTN (hypertension) 8/12/2011     Obesity, unspecified 1/10/2014       Past Medical History reviewed with patient during visit.    Past Surgical History:   Procedure Laterality Date     NO HISTORY OF SURGERY       Past Surgical History reviewed with patient during visit.    Current Outpatient Prescriptions   Medication     Esomeprazole Magnesium (NEXIUM PO)     gabapentin (NEURONTIN) 300 MG capsule     HYDROmorphone (DILAUDID) 2 MG tablet     lidocaine (LIDODERM) 5 % Patch     morphine (MS CONTIN) 15 MG 12 hr tablet     order for DME     zolpidem (AMBIEN) 10 MG tablet     simvastatin (ZOCOR) 10 MG tablet     pantoprazole (PROTONIX) 40 MG EC tablet     losartan (COZAAR) 100 MG tablet     amLODIPine (NORVASC) 10 MG tablet     cyclobenzaprine (FLEXERIL) 10 MG tablet     " linagliptin (TRADJENTA) 5 MG TABS tablet     lisinopril-hydrochlorothiazide (PRINZIDE,ZESTORETIC) 20-12.5 MG per tablet     metFORMIN (GLUCOPHAGE-XR) 500 MG 24 hr tablet     carvedilol (COREG) 12.5 MG tablet     blood glucose monitoring (ACCU-CHEK MED PLUS) test strip     aspirin 81 MG tablet     blood glucose monitoring (ACCU-CHEK MED PLUS) meter device kit     No current facility-administered medications for this visit.        No Known Allergies    Social History     Social History     Marital status:      Spouse name: N/A     Number of children: 2     Years of education: N/A     Occupational History     manager [employer]     Social History Main Topics     Smoking status: Never Smoker     Smokeless tobacco: Never Used     Alcohol use No     Drug use: No     Sexual activity: Not on file     Other Topics Concern     Not on file     Social History Narrative       History reviewed. No pertinent family history.       ROS: 10 point ROS neg other than the symptoms noted above in the HPI.    Vital Signs: /66 (BP Location: Left arm, Cuff Size: Adult Large)  Pulse 93  Temp 97.9  F (36.6  C) (Oral)  Wt 94.3 kg (208 lb)  SpO2 95%  BMI 30.72 kg/m2    Examination:  Constitutional:  Alert, well nourished, NAD.  HEENT: Normocephalic, atraumatic.   Pulmonary:  Without shortness of breath, normal effort.   Lymph: no lymphadenopathy to low back or LE.   Integumentary: Skin is free of rashes or lesions.   Cardiovascular:  No pitting edema of BLE.      Neurological:  Awake  Alert  Oriented x 3  Speech clear  Cranial nerves II - XII grossly intact  PERRL  EOMI  Face symmetric  Tongue midline  Motor exam   Shoulder Abduction:  Right:  5/5   Left:  5/5  Biceps:                      Right:  5/5   Left:  5/5  Triceps:                     Right:  5/5   Left:  5/5  Wrist Extensors:       Right:  5/5   Left:  5/5  Wrist Flexors:           Right:  5/5   Left:  5/5  Intrinsics:                   Right:   5/5   Left:  5/5  Hip Flexor:                Right: 5/5  Left:  5/5  Hip Adductor:             Right:  5/5  Left:  5/5  Hip Abductor:             Right:  5/5  Left:  5/5  Gastroc Soleus:        Right:  5/5  Left:  5/5  Tib/Ant:                      Right:  5/5  Left:  5/5  EHL:                          Right:  4/5  Left:  4/5       Sensation normal to bilateral upper and lower extremities.    Reflexes are 2+ in the patellar and Achilles. There is no clonus. Downgoing Babinski.    Reflexes are 2+ in the brachial radialis and triceps. Negative Lucius sign bilaterally.  Musculoskeletal:  Gait: Able to stand from a seated position with difficulty. Antalgic gait noted.  Unable to heel/toe walk without loss of balance  Lumbar examination reveals mild tenderness of the spine. No paraspinous muscles.  Hip height is symmetrical. Negative SI joint, sciatic notch or greater trochanteric tenderness to palpation bilaterally.  Straight leg raise is negative bilaterally.      Imaging:   MRI of the lumbar spine from 5/26/2017 was reviewed in the office today.     MR LUMBAR SPINE WITHOUT CONTRAST  5/26/2017 10:55 AM     HISTORY:  Bilateral S1 pain, numbness/tingling in both legs, poor  Achilles reflexes, back pain; cauda equina, large L5-S1 disc  herniation.     TECHNIQUE: Sagittal T1 and T2, sagittal IR, and transverse proton density and T2-weighted pulse sequences.     FINDINGS: Five lumbar vertebrae are assumed. Degenerative loss of disc signal is noted at each disc level from L2-L3 through L5-S1 with normal disc height throughout. Apophyseal joints appear essentially within normal limits with no periarticular reactive marrow edema. Vertebral body heights and sagittal alignment are within normal limits. The conus medullaris is unremarkable in appearance on the sagittal images.      T12-L1, L1-L2: Normal     L2-L3: Diffuse annular bulge with mild bilateral foraminal stenosis  below the exiting nerve roots and borderline central  stenosis.     L3-L4: Diffuse annular bulge and ligamentum flavum thickening,  superimposed on a congenitally small central canal with resultant  moderate to severe central stenosis. Right foraminal disc protrusion results in moderate to severe right L4 foraminal stenosis. Moderate left L4 foraminal stenosis is also noted.     L4-L5: Diffuse annular bulge and superimposed left foraminal/lateral recess broad-based disc protrusion. This results in severe left L4 foraminal stenosis. Mild right L4 foraminal stenosis. Mild or borderline central stenosis.     L5-S1: Posterior annular bulge or broad-based central disc protrusion without indentation on the thecal sac. There is no central stenosis or displacement of either S1 nerve root. L5 neural foramina are bilaterally patent.         IMPRESSION:  1. Mild or early multilevel degenerative disc disease from L2-L3  through L5-S1.  2. Multilevel central stenosis, greatest at L2-L3 and next greatest at L3-L4.  3. Multilevel foraminal stenosis, greatest on the left at L4-L5 and  next greatest on the right at L3-L4.  4. L4-L5 broad-based left foraminal/lateral recess disc protrusion.  Besides resulting in the left L4 foraminal stenosis, this could  asymmetrically contact or compress the descending left L5 nerve rootwithin the left lateral recess.  5. L5-S1 small central disc protrusion without apparent neural  impingement or central stenosis.     NADJA RINCON MD    Assessment/Plan:   Syd Joyce is a 62 year old male who presents for evaluation of low back pain with radiculopathy and weakness. Pain has been chronic, but recently worsened over the past week. Pain radiates down posterior aspect of bilateral legs to the ankles.    Discussed that he has left L5 radiculopathy from L4-5 disk herniation, and neurogenic claudications from L3-4 central stenosis  Will plan for L3-5 posterior decompression and Coflex stabilization  Risks and benefits discussed, and he wishes to  proceed

## 2017-06-14 NOTE — TELEPHONE ENCOUNTER
Pre-operative Education    Education included but not limited to:  - Pre-operative physical with primary care physician within 30 days of surgical date.   - Pre-operative clearance (cardiology, hematology, etc).   - Discontinue NSAIDS x 7 days prior to surgical date.   -Do not begin taking Non-Steroidal Anti-Inflammatory Drugs or NSAIDs (Advil,Motrin, Ibuprofen,Nuprin, Diclofenac,Meloxicam, Aleve, Celebrex, Aspirin, etc.) until 6 weeks after surgery if you had a fusion. May cause bleeding and interfere with bone healing.    -May try tylenol for pain 1000 mg three times per day for pain    -Patient is not a smoker  -Forms to be completed: 4-6 weeks    -Surgical risks: blood clots in the leg or lung, problems urinating, nerve damage, drainage from the incision, infection, stiffness.    -Preparation timeline   When to start NPO   Pre-op shower    -Hospital stay:   Checking in   Surgery   Recovery room   Hospital room     - Pain management  - Likely 1 night stay  - Post operative incisional pain x 1-2 weeks which will require pain medications and muscle relaxants.   -Do NOT drive while taking narcotic pain medication.  -Post operative incision care:   Keep your incision clean and dry at all times. OK to remove dressing on postop day 2. OK to shower on postop day 3 and allow water to run over incision, pat dry after shower.    No bathing, swimming or submerging in water. Call immediately or come to ED if any drainage occurs, or you develop new pain.   Watch for signs of infection: redness, swelling, warmth, drainage, and fever of 101 degrees or higher. Notify clinic.   - Post operative activity limitations: 6-8 weeks, no lifting > 10 pounds, no bending, twisting, or overhead reaching.  -If a brace is required per Dr. Perez, Orthotics will fit you for the brace in the hospital. Brace type and length of time to wear it will be determined by Dr. Perez.   - Follow up appointments: 2 week for staple removal, 6 week post op with  xray, 3 months post op. Please call to schedule follow up appointment at [phone].   - Education book was also given to the patient for further review.      Patient verbalized understanding of above instructions. All questions were answered to the best of my ability and the patient's satisfaction. Patient advised to call with any additional questions or concerns.

## 2017-06-14 NOTE — PATIENT INSTRUCTIONS
Surgery scheduled at [facility] for decompression and interspinous fixation without fusion (Coflex)    Pre-Operative:  -Surgical risks: blood clots in the leg or lung, problems urinating, nerve damage, drainage from the incision, infection, stiffness.  - Pre-operative physical with primary care physician within 30 days of surgical date.   -Stop all solid foods 8 hours before surgery.  -Keep drinking clear liquids until 4 hours before surgery. Clear liquids include water, clear juice, black coffee, or clear tea without milk, Gatorade, clear soda.   -Shower procedure: Please shower with antibacterial soap the night before surgery and the morning of surgery. Refer to information sheet in folder.   - Discontinue Aspirin, NSAIDs (Advil/Ibuprofen, Naproxen,Nuprin, Diclofenac,Meloxicam, Aleve, Celebrex) x 7 days prior to surgical date. Do not begin taking until 12 weeks after surgery. May cause bleeding and interfere with bone healing.  - May try Tylenol for pain 1000 mg three times per day for pain.      Post-Operative:  -1 night hospitalization.   - Post operative incisional pain x 1-2 weeks which will require pain medications and muscle relaxants. You will receive medication upon discharge.  -Do NOT drive while taking narcotic pain medication.  -Post operative incision care- Watch for signs of infection: redness, swelling, warmth, drainage, and fever of 101 degrees or higher. Notify clinic [phone].  -No submerging incision in water such as pools, hot tubs, baths for at least 8 weeks or until incision is healed. Showers are fine.   - Post operative activity limitations: 6-8 weeks, no lifting > 10 pounds, no bending, twisting, or overhead reaching.  -If a brace is required per Dr. Perez, Orth[facility] will fit you for the brace in the hospital. Brace type and length of time to wear it will be determined by Dr. Perez.   -If you are currently employed, you will need to be off work for 4-6 weeks for post op  recovery and healing.  - Follow up appointments: 6 week post op with xray prior, 3 months post op. Please call to schedule these appointments at [phone].

## 2017-06-15 NOTE — PROGRESS NOTES
Preop for back surgery  Per surgery notes  Assessment/Plan:   Syd Joyce is a 62 year old male who presents for evaluation of low back pain with radiculopathy and weakness. Pain has been chronic, but recently worsened over the past week. Pain radiates down posterior aspect of bilateral legs to the ankles.     Discussed that he has left L5 radiculopathy from L4-5 disk herniation, and neurogenic claudications from L3-4 central stenosis  Will plan for L3-5 posterior decompression and Coflex stabilization  Risks and benefits discussed, and he wishes to proceed    HCM due  Eye exam-  was done at [facility] and need cataract surgery in the future.  TSH  Microalbumin  Colon cancer screen-Colonoscopy 10 years ago, now due. Polyps sigmoid. [facility].  A1C      [facility] [address] [place] [address] [phone]  Dept: [phone]    PRE-OP EVALUATION:  Today's date: 2017    Syd Joyce (: 1955) presents for pre-operative evaluation assessment as requested by Dr. PRIDE.  He requires evaluation and anesthesia risk assessment prior to undergoing surgery/procedure for treatment of ruptured disc .  Proposed procedure: repair spinal stenosis     Date of Surgery/ Procedure: 2017  Time of Surgery/ Procedure:7:30 AM   Hospital/Surgical Facility: [facility]    Primary Physician: Shan Arenas  Type of Anesthesia Anticipated: TO BE DETERMINE.     Patient has a Health Care Directive or Living Will:  NO  1. NO - Do you have a history of heart attack, stroke, stent, bypass or surgery on an artery in the head, neck, heart or legs?  2. NO - Do you ever have any pain or discomfort in your chest?  3. NO - Do you have a history of  Heart Failure?  4. NO - Are you troubled by shortness of breath when: walking on the level, up a slight hill or at night?  5. NO - Do you currently have a cold, bronchitis or other respiratory infection?  6. NO - Do you have a cough,  shortness of breath or wheezing?  7. YES - DO YOU SOMETIMES GET PAINS IN THE CALVES OF YOUR LEGS WHEN YOU WALK? YES   8. NO - Do you or anyone in your family have previous history of blood clots?  9. NO - Do you or does anyone in your family have a serious bleeding problem such as prolonged bleeding following surgeries or cuts?  10. NO - Have you ever had problems with anemia or been told to take iron pills?  11. NO - Have you had any abnormal blood loss such as black, tarry or bloody stools, or abnormal vaginal bleeding?  12. NO - Have you ever had a blood transfusion?  13. NO - Have you or any of your relatives ever had problems with anesthesia?  14. NO - Do you have sleep apnea, excessive snoring or daytime drowsiness?  15. NO - Do you have any prosthetic heart valves?  16. NO - Do you have prosthetic joints?  17. NO - Is there any chance that you may be pregnant?      HPI:                                                      Brief HPI related to upcoming procedure: spinal stenosis surgery      DIABETES - Patient has a longstanding history of DiabetesType Type II .   Linagliptin 5 mg tabs daily    Metformin xr 1000 mg     ASA 81 mg on hold for surgery. 2 weeks ago stopped  BS today not today. Saturday was 130 fasting  Lab Results   Component Value Date    A1C 5.5 10/17/2016    A1C 5.3 09/21/2015    A1C 5.7 11/24/2014    A1C 6.1 01/10/2014    A1C 5.4 06/07/2013     Creatinine   Date Value Ref Range Status   05/26/2017 1.03 0.66 - 1.25 mg/dL Final   ]                                                                                                            .  HYPERTENSION - Patient has longstanding history of mod-severe HTN , currently denies any symptoms referable to elevated blood pressure. Specifically denies chest pain, palpitations, dyspnea, orthopnea, PND or peripheral edema. Blood pressure readings have been in normal range. Current medication regimen is as listed below. Patient denies any side effects of  medication.    Cozaar 100 mg QD  Norvasc 10 mg QD  Lisinopril 20/HCTZ 12.5  Coreg 12.5 mg po BID    No CP    No home numbers          BP Readings from Last 3 Encounters:   06/19/17 124/70   06/12/17 107/66   06/06/17 145/76     Last Basic Metabolic Panel:  Lab Results   Component Value Date     05/26/2017      Lab Results   Component Value Date    POTASSIUM 3.6 05/26/2017     Lab Results   Component Value Date    CHLORIDE 95 05/26/2017     Lab Results   Component Value Date    HEAVENLY 8.7 05/26/2017     Lab Results   Component Value Date    CO2 28 05/26/2017     Lab Results   Component Value Date    BUN 11 05/26/2017     Lab Results   Component Value Date    CR 1.03 05/26/2017     Lab Results   Component Value Date     05/26/2017                                                                                                                                                                                       .  HYPERLIPIDEMIA - Patient has a long history of significant Hyperlipidemia requiring medication for treatment with recent good control. Patient reports no problems or side effects with the medication.     LDL Cholesterol Calculated   Date Value Ref Range Status   10/17/2016 67 0 - 99 mg/dL Final   ]on Zocor 10 mg    GERD on protonix/nexium no symptoms today                                                                                                                                                      .  SLEEP PROBLEM - Patient has a longstanding history of insomnia on chart of moderate severity. Patient has tried OTC medications with limited success.      On Ambien. No devices. Last night 8.5 hours. Sleeping well.    Chronic pain:  Neurontin (gabapentin)  High Risk Drug Monitoring? Yes  Name of Drug being monitored: Neurontin  Reason for drug, and what is being monitored and why: no Mood/SI,no seizure, no postherpetic neuralgia  Follow-up Plan: continue    Pain rating 6/10 today across buttocks and  pain in lower legs    Dilaudid 4 mg q4 hrs prn done now.   Lidoderm patch 5% Not prescription took patch OTC   MS Contin 15 mg po BID   Took at 730 am (helping per his report)    Flexeril 10 mg po TID prn  Not using                                                                                                                             .    MEDICAL HISTORY:                                                      Patient Active Problem List    Diagnosis Date Noted     Type 2 diabetes mellitus with diabetic autonomic neuropathy, without long-term current use of insulin (H) 10/20/2016     Priority: Medium     Mostly in the medial lower leg.       Insomnia 09/11/2013     Priority: Medium     Esophageal reflux 06/15/2012     Priority: Medium     Advanced directives, counseling/discussion 06/15/2012     Priority: Medium     As is reasonable care for Most of us, wants in the Short term aggressive care.   No desire for long term prolongation of life through artificial means if no hope to bring back to a reasonable status.          HTN (hypertension) 08/12/2011     Priority: Medium     Hypercholesteremia 08/12/2011     Priority: Medium      Past Medical History:   Diagnosis Date     DM2 (diabetes mellitus, type 2) (H)      Esophageal reflux 6/15/2012     HTN (hypertension) 8/12/2011     Obesity, unspecified 1/10/2014     Past Surgical History:   Procedure Laterality Date     NO HISTORY OF SURGERY       Current Outpatient Prescriptions   Medication Sig Dispense Refill     Esomeprazole Magnesium (NEXIUM PO) Take 20 mg by mouth daily       gabapentin (NEURONTIN) 300 MG capsule Take 3 capsules (900 mg) by mouth At Bedtime As needed for nerve pain 90 capsule 1     HYDROmorphone (DILAUDID) 2 MG tablet Take 2 tablets (4 mg) by mouth every 4 hours as needed for pain 40 tablet 0     lidocaine (LIDODERM) 5 % Patch Apply up to 3 patches to painful area at once for up to 12 h within a 24 h period.  Remove after 12 hours. 30 patch 0      morphine (MS CONTIN) 15 MG 12 hr tablet Take 1 tablet (15 mg) by mouth every 12 hours maximum 2 tablet(s) per day 60 tablet 0     order for DME One tens unit 1 Device 0     zolpidem (AMBIEN) 10 MG tablet TAKE 1 TABLET BY MOUTH EVERY NIGHT AT BEDTIME 90 tablet 0     simvastatin (ZOCOR) 10 MG tablet TAKE 1 TABLET BY MOUTH EVERY NIGHT AT BEDTIME 90 tablet 0     pantoprazole (PROTONIX) 40 MG EC tablet TAKE 1 TABLET(40 MG) BY MOUTH DAILY 90 tablet 0     losartan (COZAAR) 100 MG tablet TAKE 1 TABLET(100 MG) BY MOUTH DAILY 90 tablet 0     amLODIPine (NORVASC) 10 MG tablet Take 1 tablet (10 mg) by mouth daily 90 tablet 3     cyclobenzaprine (FLEXERIL) 10 MG tablet Take 1 tablet (10 mg) by mouth daily as needed for muscle spasms 90 tablet 1     linagliptin (TRADJENTA) 5 MG TABS tablet Take 1 tablet (5 mg) by mouth daily 90 tablet 3     lisinopril-hydrochlorothiazide (PRINZIDE,ZESTORETIC) 20-12.5 MG per tablet Take 1 tablet by mouth daily 90 tablet 3     metFORMIN (GLUCOPHAGE-XR) 500 MG 24 hr tablet TAKE 2 TABLETS BY MOUTH EVERY MORNING 180 tablet 3     carvedilol (COREG) 12.5 MG tablet TAKE 1 TABLET BY MOUTH TWICE DAILY WITH MEALS 180 tablet 3     blood glucose monitoring (ACCU-CHEK MED PLUS) test strip USE 4 TO 5 TIMES PER WEEK OR AS DIRECTED 150 each 3     aspirin 81 MG tablet Take 1 tablet (81 mg) by mouth daily 90 tablet prn     blood glucose monitoring (ACCU-CHEK MED PLUS) meter device kit Use to test blood sugar 4 to 5 times weekly or as directed. 1 kit 0     OTC products: no recent use of OTC ASA, NSAIDS or Steroids    No Known Allergies   Latex Allergy: NO    Social History   Substance Use Topics     Smoking status: Never Smoker     Smokeless tobacco: Never Used     Alcohol use No     History   Drug Use No       REVIEW OF SYSTEMS:                                                    Constitutional, neuro, ENT, endocrine, pulmonary, cardiac, gastrointestinal, genitourinary, musculoskeletal, integument and  "psychiatric systems are negative, except as otherwise noted.    EXAM:                                                    /70  Pulse 64  Temp 97.9  F (36.6  C) (Oral)  Resp 16  Ht 1.753 m (5' 9\")  Wt 94.3 kg (208 lb)  SpO2 98%  BMI 30.72 kg/m2    GENERAL APPEARANCE: healthy, alert and no distress     EYES: EOMI,  PERRL     HENT: ear canals and TM's normal and nose and mouth without ulcers or lesions     NECK: no adenopathy, no asymmetry, masses, or scars and thyroid normal to palpation     RESP: lungs clear to auscultation - no rales, rhonchi or wheezes     CV: regular rates and rhythm, normal S1 S2, no S3 or S4 and no murmur, click or rub no edema no elder/cords     ABDOMEN:  soft, nontender, no HSM or masses and bowel sounds normal     MS: extremities normal- no gross deformities noted, no evidence of inflammation in joints, FROM in all extremities.     SKIN: no suspicious lesions or rashes     NEURO: Normal strength and tone, sensory exam grossly normal, mentation intact and speech normal     PSYCH: mentation appears normal. and affect normal/bright     LYMPHATICS: No axillary, cervical, or supraclavicular nodes  Back exam per surgery  Foot exam UTD  Deferred genital/rectal exam at patient request    DIAGNOSTICS:                                                      EKG: appears normal, NSR, normal axis, normal intervals, no acute ST/T changes c/w ischemia, no LVH by voltage criteria, unchanged from previous tracings  Chest XRay negative sent for final reading.  Labs Drawn and in Process:   Unresulted Labs Ordered in the Past 30 Days of this Admission     No orders found from 4/20/2017 to 6/20/2017.          Recent Labs   Lab Test  05/26/17   0957  10/17/16   1017  09/21/15   0901  03/27/15   1950   HGB  13.6   --    --   13.2*   PLT  197   --    --   160   NA  132*  140   --   133   POTASSIUM  3.6  4.3   --   3.7   CR  1.03  1.12   --   1.03   A1C   --   5.5  5.3   --       Results for orders placed " or performed in visit on 06/19/17   CBC with Diff/Plt ([facility])   Result Value Ref Range    WBC x10/cmm 6.4 3.8 - 11.0 x10/cmm    % Lymphocytes 15.9 (A) 20.5 - 51.1 %    % Monocytes 6.8 1.7 - 9.3 %    % Granulocytes 77.3 (A) 42.2 - 75.2 %    RBC x10/cmm 3.82 (A) 4.2 - 5.9 x10/cmm    Hemoglobin 12.3 (A) 13.4 - 17.5 g/dl    Hematocrit 35.4 (A) 39 - 51 %    MCV 92.7 80 - 100 fL    MCH 32.3 (A) 27.0 - 31.0 pg    MCHC 34.8 33.0 - 37.0 g/dL    Platelet Count 303 140 - 450 K/uL   Microalbumin ([facility])   Result Value Ref Range    Albumin mg/L 30 (A) mg/L    Urine Creatinine Mg/Dl 100 mg/dL    A/C Ratio mg/g <30 mg/g    Interpretation normal    Urinalysis w/reflex protein, bili ([facility])   Result Value Ref Range    Color Urine Yellow     pH Urine 5.5 5 - 9 pH    Specific Gravity Urine 1.020 1.005 - 1.025    Protein Urine 0 0.01 mg/dL    Glucose Urine 1+ (A)     Ketones Urine trace (A)     Leukocyte Esterase Urine 0     Blood Urine 0     Nitrite Urine Neg NEG    Bilirubin Urine Dip 0     Urobilinogen Urine 0.2 0.2 - 1.0 EU/dL    WBC Urine 0 0 - 3    RBC Urine 0 0 - 3    Epithelial Cells few     Crystal Urine 0     Bacteria Urine 0     Mucous Urine 0     Casts/LPF 0        IMPRESSION:                                                    Reason for surgery/procedure: spinal stenosis  Diagnosis/reason for consult: preoperative clearance    The proposed surgical procedure is considered INTERMEDIATE risk.    REVISED CARDIAC RISK INDEX  The patient has the following serious cardiovascular risks for perioperative complications such as (MI, PE, VFib and 3  AV Block):  No serious cardiac risks other Diabetes NOT on insulin  LDL Cholesterol Calculated   Date Value Ref Range Status   10/17/2016 67 0 - 99 mg/dL Final   ]  BP Readings from Last 3 Encounters:   06/19/17 124/70   06/12/17 107/66   06/06/17 145/76     Lab Results   Component Value Date    A1C 5.5 10/17/2016    A1C 5.3 09/21/2015    A1C 5.7 11/24/2014    A1C 6.1 01/10/2014    A1C 5.4  06/07/2013       INTERPRETATION: 0 risks: Class I (very low risk - 0.4% complication rate)    The patient has the following additional risks for perioperative complications:  No identified additional risks      ICD-10-CM    1. Preop general physical exam Z01.818 Urinalysis w/reflex protein, bili ([facility])     XR Chest 2 Views     EKG 12-lead complete w/read - Clinics     PSA Serum ([facility])     HCV Antibody ([facility])     ToxASSURE Urine Drug Screen ([facility])     XR Chest 2 Views     CANCELED: Comp. Metabolic Panel (14) ([facility])     CANCELED: Lipid Panel ([facility])     CANCELED: Hemoglobin A1C ([facility])     CANCELED: TSH ([facility])     CANCELED: PSA Serum ([facility])     CANCELED: HCV Antibody ([facility])     CANCELED: Drug of Abuse Screen Urine POCT   2. Spinal stenosis of lumbar region with neurogenic claudication M48.06    3. Lumbar radiculopathy M54.16    4. Type 2 diabetes mellitus with diabetic autonomic neuropathy, without long-term current use of insulin (H) E11.43 CBC with Diff/Plt ([facility])     Microalbumin ([facility])     Comp. Metabolic Panel (14) ([facility])     Hemoglobin A1C ([facility])     TSH ([facility])   5. Insomnia, unspecified type G47.00    6. Type 2 diabetes mellitus with hyperlipidemia (H) E11.69     E78.5    7. Gastroesophageal reflux disease without esophagitis K21.9 CBC with Diff/Plt ([facility])     Lipid Panel ([facility])   8. Hypertension due to endocrine disorder I15.2 Comp. Metabolic Panel (14) ([facility])   9. Screening for human immunodeficiency virus Z11.4 HIV 1/0/2 Rflx ([facility])   10. Screen for colon cancer Z12.11 GASTROENTEROLOGY ADULT REF PROCEDURE ONLY       RECOMMENDATIONS:                                                      --Consult hospital rounder / IM to assist post-op medical management    --Patient is to take all scheduled medications on the day of surgery EXCEPT for modifications listed below.    Diabetes Medication Use    -----Hold usual  oral diabetic meds (e.g. Metformin, Actos, Glipizide) while  NPO.       ACE Inhibitor or Angiotensin Receptor Blocker (ARB) Use  Ace inhibitor or Angiotensin Receptor Blocker (ARB) and will continue this medication due to the higher risk of uncontrolled perioerative hypertension (e.g. neurosurgical procedure)      APPROVAL GIVEN to proceed with proposed procedure, without further diagnostic evaluation       Signed Electronically by: Dianne Rivera MD    Copy of this evaluation report is provided to requesting physician.    [facility] Preop Guidelines

## 2017-06-15 NOTE — PATIENT INSTRUCTIONS
Before Your Surgery      Call your surgeon if there is any change in your health. This includes signs of a cold or flu (such as a sore throat, runny nose, cough, rash or fever).    Do not smoke, drink alcohol or take over the counter medicine (unless your surgeon or primary care doctor tells you to) for the 24 hours before and after surgery.    If you take prescribed drugs: Follow your doctor s orders about which medicines to take and which to stop until after surgery.    Eating and drinking prior to surgery: follow the instructions from your surgeon    Take a shower or bath the night before surgery. Use the soap your surgeon gave you to gently clean your skin. If you do not have soap from your surgeon, use your regular soap. Do not shave or scrub the surgery site.  Wear clean pajamas and have clean sheets on your bed.     We reviewed which medications to hold: metformin, ASA, Linagliptin, MS contin , protonix when NPO for surgery  Take BP medications with small sip of water day of surgery

## 2017-06-19 ENCOUNTER — OFFICE VISIT (OUTPATIENT)
Dept: FAMILY MEDICINE | Facility: CLINIC | Age: 62
End: 2017-06-19

## 2017-06-19 VITALS
DIASTOLIC BLOOD PRESSURE: 70 MMHG | OXYGEN SATURATION: 98 % | HEIGHT: 69 IN | WEIGHT: 208 LBS | BODY MASS INDEX: 30.81 KG/M2 | SYSTOLIC BLOOD PRESSURE: 124 MMHG | TEMPERATURE: 97.9 F | RESPIRATION RATE: 16 BRPM | HEART RATE: 64 BPM

## 2017-06-19 DIAGNOSIS — M54.16 LUMBAR RADICULOPATHY: ICD-10-CM

## 2017-06-19 DIAGNOSIS — Z11.4 SCREENING FOR HUMAN IMMUNODEFICIENCY VIRUS: ICD-10-CM

## 2017-06-19 DIAGNOSIS — Z01.818 PREOP GENERAL PHYSICAL EXAM: Primary | ICD-10-CM

## 2017-06-19 DIAGNOSIS — I15.2 HYPERTENSION DUE TO ENDOCRINE DISORDER: ICD-10-CM

## 2017-06-19 DIAGNOSIS — E78.5 TYPE 2 DIABETES MELLITUS WITH HYPERLIPIDEMIA (H): ICD-10-CM

## 2017-06-19 DIAGNOSIS — K21.9 GASTROESOPHAGEAL REFLUX DISEASE WITHOUT ESOPHAGITIS: ICD-10-CM

## 2017-06-19 DIAGNOSIS — G47.00 INSOMNIA, UNSPECIFIED TYPE: ICD-10-CM

## 2017-06-19 DIAGNOSIS — Z12.11 SCREEN FOR COLON CANCER: ICD-10-CM

## 2017-06-19 DIAGNOSIS — M48.062 SPINAL STENOSIS OF LUMBAR REGION WITH NEUROGENIC CLAUDICATION: ICD-10-CM

## 2017-06-19 DIAGNOSIS — E11.43 TYPE 2 DIABETES MELLITUS WITH DIABETIC AUTONOMIC NEUROPATHY, WITHOUT LONG-TERM CURRENT USE OF INSULIN (H): ICD-10-CM

## 2017-06-19 DIAGNOSIS — E11.69 TYPE 2 DIABETES MELLITUS WITH HYPERLIPIDEMIA (H): ICD-10-CM

## 2017-06-19 LAB
% GRANULOCYTES: 77.3 % (ref 42.2–75.2)
A/C RATIO MG/G: <30 MG/G
ALBUMIN (URINE) MG/L: 30 MG/L
BACTERIA URINE: 0
BILIRUB UR QL STRIP: 0
BLOOD URINE DIP: 0
CASTS/LPF: 0
COLOR UR: YELLOW
CRYSTAL URINE: 0
EPITHELIAL CELLS - QUEST: ABNORMAL
GLUCOSE UR STRIP-MCNC: ABNORMAL MG/DL
HCT VFR BLD AUTO: 35.4 % (ref 39–51)
HEMOGLOBIN: 12.3 G/DL (ref 13.4–17.5)
INTERPRETATION: NORMAL
KETONES UR QL STRIP: ABNORMAL
LEUKOCYTE ESTERASE URINE DIP: 0
LYMPHOCYTES NFR BLD AUTO: 15.9 % (ref 20.5–51.1)
MCH RBC QN AUTO: 32.3 PG (ref 27–31)
MCHC RBC AUTO-ENTMCNC: 34.8 G/DL (ref 33–37)
MCV RBC AUTO: 92.7 FL (ref 80–100)
MONOCYTES NFR BLD AUTO: 6.8 % (ref 1.7–9.3)
MUCOUS URINE: 0
NITRITE UR QL STRIP: ABNORMAL
PH UR STRIP: 5.5 PH (ref 5–9)
PLATELET # BLD AUTO: 303 K/UL (ref 140–450)
PROT UR QL: 0 MG/DL (ref ?–0.01)
RBC # BLD AUTO: 3.82 X10/CMM (ref 4.2–5.9)
RBC URINE: 0 (ref 0–3)
SP GR UR STRIP: 1.02 (ref 1–1.02)
URINE CREATININE MG/DL - QUEST: 100 MG/DL
UROBILINOGEN UR QL STRIP: 0.2 EU/DL (ref 0.2–1)
WBC # BLD AUTO: 6.4 X10/CMM (ref 3.8–11)
WBC URINE: 0 (ref 0–3)

## 2017-06-19 PROCEDURE — 36415 COLL VENOUS BLD VENIPUNCTURE: CPT | Performed by: FAMILY MEDICINE

## 2017-06-19 PROCEDURE — 81003 URINALYSIS AUTO W/O SCOPE: CPT | Performed by: FAMILY MEDICINE

## 2017-06-19 PROCEDURE — 82044 UR ALBUMIN SEMIQUANTITATIVE: CPT | Performed by: FAMILY MEDICINE

## 2017-06-19 PROCEDURE — 93000 ELECTROCARDIOGRAM COMPLETE: CPT | Performed by: FAMILY MEDICINE

## 2017-06-19 PROCEDURE — 85025 COMPLETE CBC W/AUTO DIFF WBC: CPT | Performed by: FAMILY MEDICINE

## 2017-06-19 PROCEDURE — 82570 ASSAY OF URINE CREATININE: CPT | Performed by: FAMILY MEDICINE

## 2017-06-19 PROCEDURE — 71020 XR CHEST 2 VW: CPT | Performed by: FAMILY MEDICINE

## 2017-06-19 PROCEDURE — 99214 OFFICE O/P EST MOD 30 MIN: CPT | Performed by: FAMILY MEDICINE

## 2017-06-19 RX ORDER — OXYCODONE HYDROCHLORIDE 5 MG/1
TABLET ORAL
Refills: 0 | COMMUNITY
Start: 2017-05-26 | End: 2017-06-19

## 2017-06-19 NOTE — MR AVS SNAPSHOT
After Visit Summary   6/19/2017    Syd Joyce    MRN: [id number]           Patient Information     Date Of Birth          1955        Visit Information        Provider Department      6/19/2017 10:45 AM Dianne Rivera MD [facility]        Today's Diagnoses     Preop general physical exam    -  1      Care Instructions      Before Your Surgery      Call your surgeon if there is any change in your health. This includes signs of a cold or flu (such as a sore throat, runny nose, cough, rash or fever).    Do not smoke, drink alcohol or take over the counter medicine (unless your surgeon or primary care doctor tells you to) for the 24 hours before and after surgery.    If you take prescribed drugs: Follow your doctor s orders about which medicines to take and which to stop until after surgery.    Eating and drinking prior to surgery: follow the instructions from your surgeon    Take a shower or bath the night before surgery. Use the soap your surgeon gave you to gently clean your skin. If you do not have soap from your surgeon, use your regular soap. Do not shave or scrub the surgery site.  Wear clean pajamas and have clean sheets on your bed.     We reviewed which medications to hold: metformin, ASA, Linagliptin, MS contin , protonix when NPO for surgery  Take BP medications with small sip of water day of surgery          Follow-ups after your visit        Additional Services     GASTROENTEROLOGY ADULT REF PROCEDURE ONLY       Last Lab Result: Creatinine (mg/dL)       Date                     Value                 05/26/2017               1.03             ----------  There is no height or weight on file to calculate BMI.     Needed:  No  Language:  English    Patient will be contacted to schedule procedure.     Please be aware that coverage of these services is subject to the terms and limitations of your health insurance plan.  Call member services at your health plan  with any benefit or coverage questions.  Any procedures must be performed at a [facility] facility OR coordinated by your clinic's referral office.    Please bring the following with you to your appointment:    (1) Any X-Rays, CTs or MRIs which have been performed.  Contact the facility where they were done to arrange for  prior to your scheduled appointment.    (2) List of current medications   (3) This referral request   (4) Any documents/labs given to you for this referral                  Your next 10 appointments already scheduled     Jun 30, 2017   Procedure with Ray Perez MD   [facility] (--)    [address], [address]  [facility] [address]   [phone]            Jul 07, 2017  8:15 AM CDT   LAB with  LAB   [facility] ([facility]    [address] [place] [address] [phone]           Patient must bring picture ID.  Patient should be prepared to give a urine specimen  Please do not eat 10-12 hours before your appointment if you are coming in fasting for labs on lipids, cholesterol, or glucose (sugar).  Pregnant women should follow their Care Team instructions. Water with medications is okay. Do not drink coffee or other fluids.   If you have concerns about taking  your medications, please ask at office or if scheduling via [employer], send a message by clicking on Secure Messaging, Message Your Care Team.            Aug 01, 2017  1:00 PM CDT   PHYSICAL with Shan Arenas MD   [facility] ([facility]    [address] [place] [address] [phone]              Who to contact     If you have questions or need follow up information about today's clinic visit or your schedule please contact [facility] directly at [phone].  Normal or non-critical lab and imaging results will be communicated to you by MyChart, letter or phone within 4 business days after the clinic  "has received the results. If you do not hear from us within 7 days, please contact the clinic through Dr. Z or phone. If you have a critical or abnormal lab result, we will notify you by phone as soon as possible.  Submit refill requests through Dr. Z or call your pharmacy and they will forward the refill request to us. Please allow 3 business days for your refill to be completed.          Additional Information About Your Visit        Remote [employer] It Information     Dr. Z gives you secure access to your electronic health record. If you see a primary care provider, you can also send messages to your care team and make appointments. If you have questions, please call your primary care clinic.  If you do not have a primary care provider, please call [phone] and they will assist you.        Care EveryWhere ID     This is your Care EveryWhere ID. This could be used by other organizations to access your [facility] medical records  [id number]        Your Vitals Were     Pulse Temperature Respirations Height Pulse Oximetry BMI (Body Mass Index)    64 97.9  F (36.6  C) (Oral) 16 1.753 m (5' 9\") 98% 30.72 kg/m2       Blood Pressure from Last 3 Encounters:   06/19/17 124/70   06/12/17 107/66   06/06/17 145/76    Weight from Last 3 Encounters:   06/19/17 94.3 kg (208 lb)   06/12/17 94.3 kg (208 lb)   06/01/17 95.3 kg (210 lb)              We Performed the Following     CBC with Diff/Plt ([facility])     Comp. Metabolic Panel (14) ([facility])     EKG 12-lead complete w/read - Clinics     GASTROENTEROLOGY ADULT REF PROCEDURE ONLY     HCV Antibody ([facility])     Hemoglobin A1C ([facility])     HIV 1/0/2 Rflx ([facility])     Lipid Panel ([facility])     Microalbumin ([facility])     PSA Serum ([facility])     ToxASSURE Urine Drug Screen ([facility])     TSH ([facility])     Urinalysis w/reflex protein, bili ([facility])     XR Chest 2 Views        Primary Care Provider Office Phone # Fax #    Shan Arenas [phone]       [facility] " GROUP [address]  [facility] [address]        Thank you!     Thank you for choosing [facility]  for your care. Our goal is always to provide you with excellent care. Hearing back from our patients is one way we can continue to improve our services. Please take a few minutes to complete the written survey that you may receive in the mail after your visit with us. Thank you!             Your Updated Medication List - Protect others around you: Learn how to safely use, store and throw away your medicines at www.disposemymeds.org.          This list is accurate as of: 6/19/17  1:39 PM.  Always use your most recent med list.                   Brand Name Dispense Instructions for use    amLODIPine 10 MG tablet    NORVASC    90 tablet    Take 1 tablet (10 mg) by mouth daily       aspirin 81 MG tablet     90 tablet    Take 1 tablet (81 mg) by mouth daily       blood glucose monitoring meter device kit     1 kit    Use to test blood sugar 4 to 5 times weekly or as directed.       blood glucose monitoring test strip    ACCU-CHEK MED PLUS    150 each    USE 4 TO 5 TIMES PER WEEK OR AS DIRECTED       carvedilol 12.5 MG tablet    COREG    180 tablet    TAKE 1 TABLET BY MOUTH TWICE DAILY WITH MEALS       cyclobenzaprine 10 MG tablet    FLEXERIL    90 tablet    Take 1 tablet (10 mg) by mouth daily as needed for muscle spasms       gabapentin 300 MG capsule    NEURONTIN    90 capsule    Take 3 capsules (900 mg) by mouth At Bedtime As needed for nerve pain       linagliptin 5 MG Tabs tablet    TRADJENTA    90 tablet    Take 1 tablet (5 mg) by mouth daily       lisinopril-hydrochlorothiazide 20-12.5 MG per tablet    PRINZIDE/ZESTORETIC    90 tablet    Take 1 tablet by mouth daily       losartan 100 MG tablet    COZAAR    90 tablet    TAKE 1 TABLET(100 MG) BY MOUTH DAILY       metFORMIN 500 MG 24 hr tablet    GLUCOPHAGE-XR    180 tablet    TAKE 2 TABLETS BY MOUTH EVERY MORNING       morphine 15 MG 12 hr tablet     MS CONTIN    60 tablet    Take 1 tablet (15 mg) by mouth every 12 hours maximum 2 tablet(s) per day       NEXIUM PO      Take 20 mg by mouth daily       order for DME     1 Device    One tens unit       pantoprazole 40 MG EC tablet    PROTONIX    90 tablet    TAKE 1 TABLET(40 MG) BY MOUTH DAILY       simvastatin 10 MG tablet    ZOCOR    90 tablet    TAKE 1 TABLET BY MOUTH EVERY NIGHT AT BEDTIME       zolpidem 10 MG tablet    AMBIEN    90 tablet    TAKE 1 TABLET BY MOUTH EVERY NIGHT AT BEDTIME

## 2017-06-20 DIAGNOSIS — I10 ESSENTIAL HYPERTENSION WITH GOAL BLOOD PRESSURE LESS THAN 130/80: ICD-10-CM

## 2017-06-20 LAB — HBA1C MFR BLD: 6.8 % (ref 4.8–5.6)

## 2017-06-20 RX ORDER — LOSARTAN POTASSIUM 100 MG/1
TABLET ORAL
Qty: 90 TABLET | Refills: 0 | Status: ON HOLD | OUTPATIENT
Start: 2017-06-20 | End: 2017-06-30

## 2017-06-20 NOTE — PROGRESS NOTES
a1c in the diabetic range. THIS NEEDS WORK. Consider meeting with ZANDER Koroma D to discuss medication options  Low level albumin in the urine  1+glucose and trace ketone in urine.  CBC hgb 12.3

## 2017-06-21 LAB
ALBUMIN SERPL-MCNC: 3.9 G/DL (ref 3.6–4.8)
ALBUMIN/GLOB SERPL: 2.2 {RATIO} (ref 1.2–2.2)
ALP SERPL-CCNC: 124 IU/L (ref 39–117)
ALT SERPL-CCNC: 50 IU/L (ref 0–44)
AST SERPL-CCNC: 30 IU/L (ref 0–40)
BILIRUB SERPL-MCNC: 0.3 MG/DL (ref 0–1.2)
BUN SERPL-MCNC: 15 MG/DL (ref 8–27)
BUN/CREATININE RATIO: 15 (ref 10–24)
CALCIUM SERPL-MCNC: 8.8 MG/DL (ref 8.6–10.2)
CHLORIDE SERPLBLD-SCNC: 97 MMOL/L (ref 96–106)
CHOLEST SERPL-MCNC: 160 MG/DL (ref 100–199)
CREAT SERPL-MCNC: 1.02 MG/DL (ref 0.76–1.27)
EGFR IF AFRICN AM: 89 ML/MIN/1.73
EGFR IF NONAFRICN AM: 77 ML/MIN/1.73
GLOBULIN, TOTAL: 1.8 G/DL (ref 1.5–4.5)
GLUCOSE SERPL-MCNC: 247 MG/DL (ref 65–99)
HCV AB SERPL QL IA: <0.1 S/CO RATIO (ref 0–0.9)
HDLC SERPL-MCNC: 56 MG/DL
HIV SCREEN 4TH GEN WITH RFLX: NON REACTIVE
LDL/HDL RATIO: 1.3 RATIO UNITS (ref 0–3.6)
LDLC SERPL CALC-MCNC: 71 MG/DL (ref 0–99)
POTASSIUM SERPL-SCNC: 4.5 MMOL/L (ref 3.5–5.2)
PROT SERPL-MCNC: 5.7 G/DL (ref 6–8.5)
PSA NG/ML: 0.7 NG/ML (ref 0–4)
SODIUM SERPL-SCNC: 139 MMOL/L (ref 134–144)
TOTAL CO2: 28 MMOL/L (ref 18–28)
TRIGL SERPL-MCNC: 167 MG/DL (ref 0–149)
TSH BLD-ACNC: 2.89 UIU/ML (ref 0.45–4.5)
VLDLC SERPL CALC-MCNC: 33 MG/DL (ref 5–40)

## 2017-06-22 NOTE — PROGRESS NOTES
HIV negative  Glucose elevated  Total protein is low  ALT liver test is mildly elevated  Alkaline phosphatase mildly elevated  CMP otherwise negative    Triglycerides are elevated. Eat less sugars. LDL is good at 71    TSH is fine  PSA is fine  Hep C is negative    A1C is in DIABETES RANGE. NEEDS WORK Monitor Blood sugars before meals and at bedtime. Make follow up appointment.  Continue medications for Diabetes and recheck lab 3 months.

## 2017-06-27 LAB
PDF RESULT: NORMAL
SUMMARY OF DRUGS IDENTIFIED: NORMAL

## 2017-06-27 NOTE — H&P (VIEW-ONLY)
Preop for back surgery  Per surgery notes  Assessment/Plan:   Syd Joyce is a 62 year old male who presents for evaluation of low back pain with radiculopathy and weakness. Pain has been chronic, but recently worsened over the past week. Pain radiates down posterior aspect of bilateral legs to the ankles.     Discussed that he has left L5 radiculopathy from L4-5 disk herniation, and neurogenic claudications from L3-4 central stenosis  Will plan for L3-5 posterior decompression and Coflex stabilization  Risks and benefits discussed, and he wishes to proceed    HCM due  Eye exam-  was done at [facility] and need cataract surgery in the future.  TSH  Microalbumin  Colon cancer screen-Colonoscopy 10 years ago, now due. Polyps sigmoid. [facility].  A1C      [facility] [address] [place] [address] [phone]  Dept: [phone]    PRE-OP EVALUATION:  Today's date: 2017    Syd Joyce (: 1955) presents for pre-operative evaluation assessment as requested by Dr. PRIDE.  He requires evaluation and anesthesia risk assessment prior to undergoing surgery/procedure for treatment of ruptured disc .  Proposed procedure: repair spinal stenosis     Date of Surgery/ Procedure: 2017  Time of Surgery/ Procedure:7:30 AM   Hospital/Surgical Facility: [facility]    Primary Physician: Shan Arenas  Type of Anesthesia Anticipated: TO BE DETERMINE.     Patient has a Health Care Directive or Living Will:  NO  1. NO - Do you have a history of heart attack, stroke, stent, bypass or surgery on an artery in the head, neck, heart or legs?  2. NO - Do you ever have any pain or discomfort in your chest?  3. NO - Do you have a history of  Heart Failure?  4. NO - Are you troubled by shortness of breath when: walking on the level, up a slight hill or at night?  5. NO - Do you currently have a cold, bronchitis or other respiratory infection?  6. NO - Do you have a cough,  shortness of breath or wheezing?  7. YES - DO YOU SOMETIMES GET PAINS IN THE CALVES OF YOUR LEGS WHEN YOU WALK? YES   8. NO - Do you or anyone in your family have previous history of blood clots?  9. NO - Do you or does anyone in your family have a serious bleeding problem such as prolonged bleeding following surgeries or cuts?  10. NO - Have you ever had problems with anemia or been told to take iron pills?  11. NO - Have you had any abnormal blood loss such as black, tarry or bloody stools, or abnormal vaginal bleeding?  12. NO - Have you ever had a blood transfusion?  13. NO - Have you or any of your relatives ever had problems with anesthesia?  14. NO - Do you have sleep apnea, excessive snoring or daytime drowsiness?  15. NO - Do you have any prosthetic heart valves?  16. NO - Do you have prosthetic joints?  17. NO - Is there any chance that you may be pregnant?      HPI:                                                      Brief HPI related to upcoming procedure: spinal stenosis surgery      DIABETES - Patient has a longstanding history of DiabetesType Type II .   Linagliptin 5 mg tabs daily    Metformin xr 1000 mg     ASA 81 mg on hold for surgery. 2 weeks ago stopped  BS today not today. Saturday was 130 fasting  Lab Results   Component Value Date    A1C 5.5 10/17/2016    A1C 5.3 09/21/2015    A1C 5.7 11/24/2014    A1C 6.1 01/10/2014    A1C 5.4 06/07/2013     Creatinine   Date Value Ref Range Status   05/26/2017 1.03 0.66 - 1.25 mg/dL Final   ]                                                                                                            .  HYPERTENSION - Patient has longstanding history of mod-severe HTN , currently denies any symptoms referable to elevated blood pressure. Specifically denies chest pain, palpitations, dyspnea, orthopnea, PND or peripheral edema. Blood pressure readings have been in normal range. Current medication regimen is as listed below. Patient denies any side effects of  medication.    Cozaar 100 mg QD  Norvasc 10 mg QD  Lisinopril 20/HCTZ 12.5  Coreg 12.5 mg po BID    No CP    No home numbers          BP Readings from Last 3 Encounters:   06/19/17 124/70   06/12/17 107/66   06/06/17 145/76     Last Basic Metabolic Panel:  Lab Results   Component Value Date     05/26/2017      Lab Results   Component Value Date    POTASSIUM 3.6 05/26/2017     Lab Results   Component Value Date    CHLORIDE 95 05/26/2017     Lab Results   Component Value Date    HEAVENLY 8.7 05/26/2017     Lab Results   Component Value Date    CO2 28 05/26/2017     Lab Results   Component Value Date    BUN 11 05/26/2017     Lab Results   Component Value Date    CR 1.03 05/26/2017     Lab Results   Component Value Date     05/26/2017                                                                                                                                                                                       .  HYPERLIPIDEMIA - Patient has a long history of significant Hyperlipidemia requiring medication for treatment with recent good control. Patient reports no problems or side effects with the medication.     LDL Cholesterol Calculated   Date Value Ref Range Status   10/17/2016 67 0 - 99 mg/dL Final   ]on Zocor 10 mg    GERD on protonix/nexium no symptoms today                                                                                                                                                      .  SLEEP PROBLEM - Patient has a longstanding history of insomnia on chart of moderate severity. Patient has tried OTC medications with limited success.      On Ambien. No devices. Last night 8.5 hours. Sleeping well.    Chronic pain:  Neurontin (gabapentin)  High Risk Drug Monitoring? Yes  Name of Drug being monitored: Neurontin  Reason for drug, and what is being monitored and why: no Mood/SI,no seizure, no postherpetic neuralgia  Follow-up Plan: continue    Pain rating 6/10 today across buttocks and  pain in lower legs    Dilaudid 4 mg q4 hrs prn done now.   Lidoderm patch 5% Not prescription took patch OTC   MS Contin 15 mg po BID   Took at 730 am (helping per his report)    Flexeril 10 mg po TID prn  Not using                                                                                                                             .    MEDICAL HISTORY:                                                      Patient Active Problem List    Diagnosis Date Noted     Type 2 diabetes mellitus with diabetic autonomic neuropathy, without long-term current use of insulin (H) 10/20/2016     Priority: Medium     Mostly in the medial lower leg.       Insomnia 09/11/2013     Priority: Medium     Esophageal reflux 06/15/2012     Priority: Medium     Advanced directives, counseling/discussion 06/15/2012     Priority: Medium     As is reasonable care for Most of us, wants in the Short term aggressive care.   No desire for long term prolongation of life through artificial means if no hope to bring back to a reasonable status.          HTN (hypertension) 08/12/2011     Priority: Medium     Hypercholesteremia 08/12/2011     Priority: Medium      Past Medical History:   Diagnosis Date     DM2 (diabetes mellitus, type 2) (H)      Esophageal reflux 6/15/2012     HTN (hypertension) 8/12/2011     Obesity, unspecified 1/10/2014     Past Surgical History:   Procedure Laterality Date     NO HISTORY OF SURGERY       Current Outpatient Prescriptions   Medication Sig Dispense Refill     Esomeprazole Magnesium (NEXIUM PO) Take 20 mg by mouth daily       gabapentin (NEURONTIN) 300 MG capsule Take 3 capsules (900 mg) by mouth At Bedtime As needed for nerve pain 90 capsule 1     HYDROmorphone (DILAUDID) 2 MG tablet Take 2 tablets (4 mg) by mouth every 4 hours as needed for pain 40 tablet 0     lidocaine (LIDODERM) 5 % Patch Apply up to 3 patches to painful area at once for up to 12 h within a 24 h period.  Remove after 12 hours. 30 patch 0      morphine (MS CONTIN) 15 MG 12 hr tablet Take 1 tablet (15 mg) by mouth every 12 hours maximum 2 tablet(s) per day 60 tablet 0     order for DME One tens unit 1 Device 0     zolpidem (AMBIEN) 10 MG tablet TAKE 1 TABLET BY MOUTH EVERY NIGHT AT BEDTIME 90 tablet 0     simvastatin (ZOCOR) 10 MG tablet TAKE 1 TABLET BY MOUTH EVERY NIGHT AT BEDTIME 90 tablet 0     pantoprazole (PROTONIX) 40 MG EC tablet TAKE 1 TABLET(40 MG) BY MOUTH DAILY 90 tablet 0     losartan (COZAAR) 100 MG tablet TAKE 1 TABLET(100 MG) BY MOUTH DAILY 90 tablet 0     amLODIPine (NORVASC) 10 MG tablet Take 1 tablet (10 mg) by mouth daily 90 tablet 3     cyclobenzaprine (FLEXERIL) 10 MG tablet Take 1 tablet (10 mg) by mouth daily as needed for muscle spasms 90 tablet 1     linagliptin (TRADJENTA) 5 MG TABS tablet Take 1 tablet (5 mg) by mouth daily 90 tablet 3     lisinopril-hydrochlorothiazide (PRINZIDE,ZESTORETIC) 20-12.5 MG per tablet Take 1 tablet by mouth daily 90 tablet 3     metFORMIN (GLUCOPHAGE-XR) 500 MG 24 hr tablet TAKE 2 TABLETS BY MOUTH EVERY MORNING 180 tablet 3     carvedilol (COREG) 12.5 MG tablet TAKE 1 TABLET BY MOUTH TWICE DAILY WITH MEALS 180 tablet 3     blood glucose monitoring (ACCU-CHEK MED PLUS) test strip USE 4 TO 5 TIMES PER WEEK OR AS DIRECTED 150 each 3     aspirin 81 MG tablet Take 1 tablet (81 mg) by mouth daily 90 tablet prn     blood glucose monitoring (ACCU-CHEK MED PLUS) meter device kit Use to test blood sugar 4 to 5 times weekly or as directed. 1 kit 0     OTC products: no recent use of OTC ASA, NSAIDS or Steroids    No Known Allergies   Latex Allergy: NO    Social History   Substance Use Topics     Smoking status: Never Smoker     Smokeless tobacco: Never Used     Alcohol use No     History   Drug Use No       REVIEW OF SYSTEMS:                                                    Constitutional, neuro, ENT, endocrine, pulmonary, cardiac, gastrointestinal, genitourinary, musculoskeletal, integument and  "psychiatric systems are negative, except as otherwise noted.    EXAM:                                                    /70  Pulse 64  Temp 97.9  F (36.6  C) (Oral)  Resp 16  Ht 1.753 m (5' 9\")  Wt 94.3 kg (208 lb)  SpO2 98%  BMI 30.72 kg/m2    GENERAL APPEARANCE: healthy, alert and no distress     EYES: EOMI,  PERRL     HENT: ear canals and TM's normal and nose and mouth without ulcers or lesions     NECK: no adenopathy, no asymmetry, masses, or scars and thyroid normal to palpation     RESP: lungs clear to auscultation - no rales, rhonchi or wheezes     CV: regular rates and rhythm, normal S1 S2, no S3 or S4 and no murmur, click or rub no edema no elder/cords     ABDOMEN:  soft, nontender, no HSM or masses and bowel sounds normal     MS: extremities normal- no gross deformities noted, no evidence of inflammation in joints, FROM in all extremities.     SKIN: no suspicious lesions or rashes     NEURO: Normal strength and tone, sensory exam grossly normal, mentation intact and speech normal     PSYCH: mentation appears normal. and affect normal/bright     LYMPHATICS: No axillary, cervical, or supraclavicular nodes  Back exam per surgery  Foot exam UTD  Deferred genital/rectal exam at patient request    DIAGNOSTICS:                                                      EKG: appears normal, NSR, normal axis, normal intervals, no acute ST/T changes c/w ischemia, no LVH by voltage criteria, unchanged from previous tracings  Chest XRay negative sent for final reading.  Labs Drawn and in Process:   Unresulted Labs Ordered in the Past 30 Days of this Admission     No orders found from 4/20/2017 to 6/20/2017.          Recent Labs   Lab Test  05/26/17   0957  10/17/16   1017  09/21/15   0901  03/27/15   1950   HGB  13.6   --    --   13.2*   PLT  197   --    --   160   NA  132*  140   --   133   POTASSIUM  3.6  4.3   --   3.7   CR  1.03  1.12   --   1.03   A1C   --   5.5  5.3   --       Results for orders placed " or performed in visit on 06/19/17   CBC with Diff/Plt ([facility])   Result Value Ref Range    WBC x10/cmm 6.4 3.8 - 11.0 x10/cmm    % Lymphocytes 15.9 (A) 20.5 - 51.1 %    % Monocytes 6.8 1.7 - 9.3 %    % Granulocytes 77.3 (A) 42.2 - 75.2 %    RBC x10/cmm 3.82 (A) 4.2 - 5.9 x10/cmm    Hemoglobin 12.3 (A) 13.4 - 17.5 g/dl    Hematocrit 35.4 (A) 39 - 51 %    MCV 92.7 80 - 100 fL    MCH 32.3 (A) 27.0 - 31.0 pg    MCHC 34.8 33.0 - 37.0 g/dL    Platelet Count 303 140 - 450 K/uL   Microalbumin ([facility])   Result Value Ref Range    Albumin mg/L 30 (A) mg/L    Urine Creatinine Mg/Dl 100 mg/dL    A/C Ratio mg/g <30 mg/g    Interpretation normal    Urinalysis w/reflex protein, bili ([facility])   Result Value Ref Range    Color Urine Yellow     pH Urine 5.5 5 - 9 pH    Specific Gravity Urine 1.020 1.005 - 1.025    Protein Urine 0 0.01 mg/dL    Glucose Urine 1+ (A)     Ketones Urine trace (A)     Leukocyte Esterase Urine 0     Blood Urine 0     Nitrite Urine Neg NEG    Bilirubin Urine Dip 0     Urobilinogen Urine 0.2 0.2 - 1.0 EU/dL    WBC Urine 0 0 - 3    RBC Urine 0 0 - 3    Epithelial Cells few     Crystal Urine 0     Bacteria Urine 0     Mucous Urine 0     Casts/LPF 0        IMPRESSION:                                                    Reason for surgery/procedure: spinal stenosis  Diagnosis/reason for consult: preoperative clearance    The proposed surgical procedure is considered INTERMEDIATE risk.    REVISED CARDIAC RISK INDEX  The patient has the following serious cardiovascular risks for perioperative complications such as (MI, PE, VFib and 3  AV Block):  No serious cardiac risks other Diabetes NOT on insulin  LDL Cholesterol Calculated   Date Value Ref Range Status   10/17/2016 67 0 - 99 mg/dL Final   ]  BP Readings from Last 3 Encounters:   06/19/17 124/70   06/12/17 107/66   06/06/17 145/76     Lab Results   Component Value Date    A1C 5.5 10/17/2016    A1C 5.3 09/21/2015    A1C 5.7 11/24/2014    A1C 6.1 01/10/2014    A1C 5.4  06/07/2013       INTERPRETATION: 0 risks: Class I (very low risk - 0.4% complication rate)    The patient has the following additional risks for perioperative complications:  No identified additional risks      ICD-10-CM    1. Preop general physical exam Z01.818 Urinalysis w/reflex protein, bili ([facility])     XR Chest 2 Views     EKG 12-lead complete w/read - Clinics     PSA Serum ([facility])     HCV Antibody ([facility])     ToxASSURE Urine Drug Screen ([facility])     XR Chest 2 Views     CANCELED: Comp. Metabolic Panel (14) ([facility])     CANCELED: Lipid Panel ([facility])     CANCELED: Hemoglobin A1C ([facility])     CANCELED: TSH ([facility])     CANCELED: PSA Serum ([facility])     CANCELED: HCV Antibody ([facility])     CANCELED: Drug of Abuse Screen Urine POCT   2. Spinal stenosis of lumbar region with neurogenic claudication M48.06    3. Lumbar radiculopathy M54.16    4. Type 2 diabetes mellitus with diabetic autonomic neuropathy, without long-term current use of insulin (H) E11.43 CBC with Diff/Plt ([facility])     Microalbumin ([facility])     Comp. Metabolic Panel (14) ([facility])     Hemoglobin A1C ([facility])     TSH ([facility])   5. Insomnia, unspecified type G47.00    6. Type 2 diabetes mellitus with hyperlipidemia (H) E11.69     E78.5    7. Gastroesophageal reflux disease without esophagitis K21.9 CBC with Diff/Plt ([facility])     Lipid Panel ([facility])   8. Hypertension due to endocrine disorder I15.2 Comp. Metabolic Panel (14) ([facility])   9. Screening for human immunodeficiency virus Z11.4 HIV 1/0/2 Rflx ([facility])   10. Screen for colon cancer Z12.11 GASTROENTEROLOGY ADULT REF PROCEDURE ONLY       RECOMMENDATIONS:                                                      --Consult hospital rounder / IM to assist post-op medical management    --Patient is to take all scheduled medications on the day of surgery EXCEPT for modifications listed below.    Diabetes Medication Use    -----Hold usual  oral diabetic meds (e.g. Metformin, Actos, Glipizide) while  NPO.       ACE Inhibitor or Angiotensin Receptor Blocker (ARB) Use  Ace inhibitor or Angiotensin Receptor Blocker (ARB) and will continue this medication due to the higher risk of uncontrolled perioerative hypertension (e.g. neurosurgical procedure)      APPROVAL GIVEN to proceed with proposed procedure, without further diagnostic evaluation       Signed Electronically by: Dianne Rivera MD    Copy of this evaluation report is provided to requesting physician.    [place] Preop Guidelines

## 2017-06-30 ENCOUNTER — ANESTHESIA (OUTPATIENT)
Dept: SURGERY | Facility: CLINIC | Age: 62
DRG: 520 | End: 2017-06-30
Payer: COMMERCIAL

## 2017-06-30 ENCOUNTER — HOSPITAL ENCOUNTER (INPATIENT)
Facility: CLINIC | Age: 62
LOS: 1 days | Discharge: HOME | DRG: 520 | End: 2017-07-01
Attending: NEUROLOGICAL SURGERY | Admitting: NEUROLOGICAL SURGERY
Payer: COMMERCIAL

## 2017-06-30 ENCOUNTER — SURGERY (OUTPATIENT)
Age: 62
End: 2017-06-30

## 2017-06-30 ENCOUNTER — DOCUMENTATION ONLY (OUTPATIENT)
Dept: NEUROSURGERY | Facility: CLINIC | Age: 62
End: 2017-06-30

## 2017-06-30 ENCOUNTER — APPOINTMENT (OUTPATIENT)
Dept: GENERAL RADIOLOGY | Facility: CLINIC | Age: 62
DRG: 520 | End: 2017-06-30
Attending: NEUROLOGICAL SURGERY
Payer: COMMERCIAL

## 2017-06-30 ENCOUNTER — ANESTHESIA EVENT (OUTPATIENT)
Dept: SURGERY | Facility: CLINIC | Age: 62
DRG: 520 | End: 2017-06-30
Payer: COMMERCIAL

## 2017-06-30 DIAGNOSIS — Z98.890 S/P LUMBAR LAMINECTOMY: Primary | ICD-10-CM

## 2017-06-30 LAB
GLUCOSE BLDC GLUCOMTR-MCNC: 145 MG/DL (ref 70–99)
GLUCOSE BLDC GLUCOMTR-MCNC: 171 MG/DL (ref 70–99)
GLUCOSE BLDC GLUCOMTR-MCNC: 194 MG/DL (ref 70–99)
GLUCOSE BLDC GLUCOMTR-MCNC: 285 MG/DL (ref 70–99)

## 2017-06-30 PROCEDURE — 25000128 H RX IP 250 OP 636: Performed by: NURSE ANESTHETIST, CERTIFIED REGISTERED

## 2017-06-30 PROCEDURE — 36000065 ZZH SURGERY LEVEL 4 W FLUORO 1ST 30 MIN: Performed by: NEUROLOGICAL SURGERY

## 2017-06-30 PROCEDURE — 22868 INSJ STABLJ DEV W/DCMPRN: CPT | Mod: AS | Performed by: PHYSICIAN ASSISTANT

## 2017-06-30 PROCEDURE — 25000128 H RX IP 250 OP 636: Performed by: PHYSICIAN ASSISTANT

## 2017-06-30 PROCEDURE — 25000125 ZZHC RX 250: Performed by: NEUROLOGICAL SURGERY

## 2017-06-30 PROCEDURE — 36000063 ZZH SURGERY LEVEL 4 EA 15 ADDTL MIN: Performed by: NEUROLOGICAL SURGERY

## 2017-06-30 PROCEDURE — 99207 ZZC DOWN CODE DUE TO INITIAL EXAM: CPT | Performed by: PHYSICIAN ASSISTANT

## 2017-06-30 PROCEDURE — 27210995 ZZH RX 272: Performed by: NEUROLOGICAL SURGERY

## 2017-06-30 PROCEDURE — 71000012 ZZH RECOVERY PHASE 1 LEVEL 1 FIRST HR: Performed by: NEUROLOGICAL SURGERY

## 2017-06-30 PROCEDURE — 40000277 XR SURGERY CARM FLUORO LESS THAN 5 MIN W STILLS

## 2017-06-30 PROCEDURE — 71000013 ZZH RECOVERY PHASE 1 LEVEL 1 EA ADDTL HR: Performed by: NEUROLOGICAL SURGERY

## 2017-06-30 PROCEDURE — 25000131 ZZH RX MED GY IP 250 OP 636 PS 637: Performed by: PHYSICIAN ASSISTANT

## 2017-06-30 PROCEDURE — 25000128 H RX IP 250 OP 636: Performed by: NEUROLOGICAL SURGERY

## 2017-06-30 PROCEDURE — 22867 INSJ STABLJ DEV W/DCMPRN: CPT | Performed by: NEUROLOGICAL SURGERY

## 2017-06-30 PROCEDURE — 0SB20ZZ EXCISION OF LUMBAR VERTEBRAL DISC, OPEN APPROACH: ICD-10-PCS | Performed by: NEUROLOGICAL SURGERY

## 2017-06-30 PROCEDURE — 37000009 ZZH ANESTHESIA TECHNICAL FEE, EACH ADDTL 15 MIN: Performed by: NEUROLOGICAL SURGERY

## 2017-06-30 PROCEDURE — 25000128 H RX IP 250 OP 636: Performed by: ANESTHESIOLOGY

## 2017-06-30 PROCEDURE — 25000566 ZZH SEVOFLURANE, EA 15 MIN: Performed by: NEUROLOGICAL SURGERY

## 2017-06-30 PROCEDURE — 00000146 ZZHCL STATISTIC GLUCOSE BY METER IP

## 2017-06-30 PROCEDURE — 0SH004Z INSERTION OF INTERNAL FIXATION DEVICE INTO LUMBAR VERTEBRAL JOINT, OPEN APPROACH: ICD-10-PCS | Performed by: NEUROLOGICAL SURGERY

## 2017-06-30 PROCEDURE — 99222 1ST HOSP IP/OBS MODERATE 55: CPT | Performed by: PHYSICIAN ASSISTANT

## 2017-06-30 PROCEDURE — 12000007 ZZH R&B INTERMEDIATE

## 2017-06-30 PROCEDURE — 27210794 ZZH OR GENERAL SUPPLY STERILE: Performed by: NEUROLOGICAL SURGERY

## 2017-06-30 PROCEDURE — 25000125 ZZHC RX 250: Performed by: NURSE ANESTHETIST, CERTIFIED REGISTERED

## 2017-06-30 PROCEDURE — 40000170 ZZH STATISTIC PRE-PROCEDURE ASSESSMENT II: Performed by: NEUROLOGICAL SURGERY

## 2017-06-30 PROCEDURE — C1713 ANCHOR/SCREW BN/BN,TIS/BN: HCPCS | Performed by: NEUROLOGICAL SURGERY

## 2017-06-30 PROCEDURE — 22868 INSJ STABLJ DEV W/DCMPRN: CPT | Performed by: NEUROLOGICAL SURGERY

## 2017-06-30 PROCEDURE — 25000132 ZZH RX MED GY IP 250 OP 250 PS 637: Performed by: PHYSICIAN ASSISTANT

## 2017-06-30 PROCEDURE — 27810322 ZZHC OR SPINE - CAGE/SPACER/DISK/CORD/CONNECTOR OPNP: Performed by: NEUROLOGICAL SURGERY

## 2017-06-30 PROCEDURE — 25000125 ZZHC RX 250: Performed by: ANESTHESIOLOGY

## 2017-06-30 PROCEDURE — 25000301 ZZH OR RX SURGIFLO W/THROMBIN KIT 2ML 1991 OPNP: Performed by: NEUROLOGICAL SURGERY

## 2017-06-30 PROCEDURE — 37000008 ZZH ANESTHESIA TECHNICAL FEE, 1ST 30 MIN: Performed by: NEUROLOGICAL SURGERY

## 2017-06-30 RX ORDER — NICOTINE POLACRILEX 4 MG
15-30 LOZENGE BUCCAL
Status: DISCONTINUED | OUTPATIENT
Start: 2017-06-30 | End: 2017-07-01 | Stop reason: HOSPADM

## 2017-06-30 RX ORDER — KETAMINE HCL IN 0.9 % NACL 20 MG/2 ML
15 SYRINGE (ML) INTRAVENOUS ONCE
Status: COMPLETED | OUTPATIENT
Start: 2017-06-30 | End: 2017-06-30

## 2017-06-30 RX ORDER — CEFAZOLIN SODIUM 2 G/100ML
2 INJECTION, SOLUTION INTRAVENOUS
Status: COMPLETED | OUTPATIENT
Start: 2017-06-30 | End: 2017-06-30

## 2017-06-30 RX ORDER — AMOXICILLIN 250 MG
1-2 CAPSULE ORAL 2 TIMES DAILY
Status: DISCONTINUED | OUTPATIENT
Start: 2017-06-30 | End: 2017-07-01 | Stop reason: HOSPADM

## 2017-06-30 RX ORDER — CALCIUM CARBONATE 500 MG/1
500-1000 TABLET, CHEWABLE ORAL 4 TIMES DAILY PRN
Status: DISCONTINUED | OUTPATIENT
Start: 2017-06-30 | End: 2017-07-01 | Stop reason: HOSPADM

## 2017-06-30 RX ORDER — PANTOPRAZOLE SODIUM 40 MG/1
40 TABLET, DELAYED RELEASE ORAL
Status: DISCONTINUED | OUTPATIENT
Start: 2017-06-30 | End: 2017-07-01 | Stop reason: HOSPADM

## 2017-06-30 RX ORDER — SODIUM CHLORIDE, SODIUM LACTATE, POTASSIUM CHLORIDE, CALCIUM CHLORIDE 600; 310; 30; 20 MG/100ML; MG/100ML; MG/100ML; MG/100ML
INJECTION, SOLUTION INTRAVENOUS CONTINUOUS
Status: DISCONTINUED | OUTPATIENT
Start: 2017-06-30 | End: 2017-06-30

## 2017-06-30 RX ORDER — MEPERIDINE HYDROCHLORIDE 25 MG/ML
12.5 INJECTION INTRAMUSCULAR; INTRAVENOUS; SUBCUTANEOUS
Status: DISCONTINUED | OUTPATIENT
Start: 2017-06-30 | End: 2017-06-30

## 2017-06-30 RX ORDER — FENTANYL CITRATE 0.05 MG/ML
25-50 INJECTION, SOLUTION INTRAMUSCULAR; INTRAVENOUS
Status: DISCONTINUED | OUTPATIENT
Start: 2017-06-30 | End: 2017-06-30

## 2017-06-30 RX ORDER — LISINOPRIL AND HYDROCHLOROTHIAZIDE 12.5; 2 MG/1; MG/1
1 TABLET ORAL DAILY
Status: DISCONTINUED | OUTPATIENT
Start: 2017-07-01 | End: 2017-06-30

## 2017-06-30 RX ORDER — FENTANYL CITRATE 50 UG/ML
INJECTION, SOLUTION INTRAMUSCULAR; INTRAVENOUS PRN
Status: DISCONTINUED | OUTPATIENT
Start: 2017-06-30 | End: 2017-06-30

## 2017-06-30 RX ORDER — LIDOCAINE 40 MG/G
CREAM TOPICAL
Status: DISCONTINUED | OUTPATIENT
Start: 2017-06-30 | End: 2017-07-01 | Stop reason: HOSPADM

## 2017-06-30 RX ORDER — LOSARTAN POTASSIUM 100 MG/1
100 TABLET ORAL EVERY MORNING
Status: DISCONTINUED | OUTPATIENT
Start: 2017-07-01 | End: 2017-06-30

## 2017-06-30 RX ORDER — OXYCODONE HYDROCHLORIDE 5 MG/1
5-10 TABLET ORAL EVERY 4 HOURS PRN
Qty: 90 TABLET | Refills: 0 | Status: SHIPPED | OUTPATIENT
Start: 2017-06-30 | End: 2017-07-01

## 2017-06-30 RX ORDER — CYCLOBENZAPRINE HCL 10 MG
10 TABLET ORAL 3 TIMES DAILY PRN
Status: DISCONTINUED | OUTPATIENT
Start: 2017-06-30 | End: 2017-07-01 | Stop reason: HOSPADM

## 2017-06-30 RX ORDER — AMLODIPINE BESYLATE 10 MG/1
10 TABLET ORAL EVERY MORNING
Status: DISCONTINUED | OUTPATIENT
Start: 2017-07-01 | End: 2017-07-01 | Stop reason: HOSPADM

## 2017-06-30 RX ORDER — HYDROXYZINE HYDROCHLORIDE 50 MG/ML
50 INJECTION, SOLUTION INTRAMUSCULAR ONCE
Status: COMPLETED | OUTPATIENT
Start: 2017-06-30 | End: 2017-06-30

## 2017-06-30 RX ORDER — ACETAMINOPHEN 325 MG/1
975 TABLET ORAL EVERY 8 HOURS
Status: DISCONTINUED | OUTPATIENT
Start: 2017-06-30 | End: 2017-07-01 | Stop reason: HOSPADM

## 2017-06-30 RX ORDER — ACETAMINOPHEN 10 MG/ML
1000 INJECTION, SOLUTION INTRAVENOUS ONCE
Status: COMPLETED | OUTPATIENT
Start: 2017-06-30 | End: 2017-06-30

## 2017-06-30 RX ORDER — ONDANSETRON 2 MG/ML
INJECTION INTRAMUSCULAR; INTRAVENOUS PRN
Status: DISCONTINUED | OUTPATIENT
Start: 2017-06-30 | End: 2017-06-30

## 2017-06-30 RX ORDER — HYDROMORPHONE HYDROCHLORIDE 1 MG/ML
.3-.5 INJECTION, SOLUTION INTRAMUSCULAR; INTRAVENOUS; SUBCUTANEOUS
Status: DISCONTINUED | OUTPATIENT
Start: 2017-06-30 | End: 2017-07-01 | Stop reason: HOSPADM

## 2017-06-30 RX ORDER — CEFAZOLIN SODIUM 1 G/3ML
1 INJECTION, POWDER, FOR SOLUTION INTRAMUSCULAR; INTRAVENOUS SEE ADMIN INSTRUCTIONS
Status: DISCONTINUED | OUTPATIENT
Start: 2017-06-30 | End: 2017-06-30 | Stop reason: HOSPADM

## 2017-06-30 RX ORDER — ONDANSETRON 4 MG/1
4 TABLET, ORALLY DISINTEGRATING ORAL EVERY 6 HOURS PRN
Status: DISCONTINUED | OUTPATIENT
Start: 2017-06-30 | End: 2017-07-01 | Stop reason: HOSPADM

## 2017-06-30 RX ORDER — KETOROLAC TROMETHAMINE 30 MG/ML
30 INJECTION, SOLUTION INTRAMUSCULAR; INTRAVENOUS ONCE
Status: COMPLETED | OUTPATIENT
Start: 2017-06-30 | End: 2017-06-30

## 2017-06-30 RX ORDER — HYDROXYZINE HYDROCHLORIDE 25 MG/1
25 TABLET, FILM COATED ORAL EVERY 6 HOURS PRN
Status: DISCONTINUED | OUTPATIENT
Start: 2017-06-30 | End: 2017-07-01 | Stop reason: HOSPADM

## 2017-06-30 RX ORDER — PROPOFOL 10 MG/ML
INJECTION, EMULSION INTRAVENOUS PRN
Status: DISCONTINUED | OUTPATIENT
Start: 2017-06-30 | End: 2017-06-30

## 2017-06-30 RX ORDER — METOCLOPRAMIDE HYDROCHLORIDE 5 MG/ML
10 INJECTION INTRAMUSCULAR; INTRAVENOUS EVERY 6 HOURS PRN
Status: DISCONTINUED | OUTPATIENT
Start: 2017-06-30 | End: 2017-07-01 | Stop reason: HOSPADM

## 2017-06-30 RX ORDER — METFORMIN HCL 500 MG
1000 TABLET, EXTENDED RELEASE 24 HR ORAL EVERY MORNING
Status: DISCONTINUED | OUTPATIENT
Start: 2017-07-01 | End: 2017-06-30

## 2017-06-30 RX ORDER — CYCLOBENZAPRINE HCL 10 MG
5-10 TABLET ORAL 3 TIMES DAILY PRN
Qty: 90 TABLET | Refills: 1 | Status: SHIPPED | OUTPATIENT
Start: 2017-06-30 | End: 2017-09-20

## 2017-06-30 RX ORDER — HYDRALAZINE HYDROCHLORIDE 20 MG/ML
10 INJECTION INTRAMUSCULAR; INTRAVENOUS EVERY 4 HOURS PRN
Status: DISCONTINUED | OUTPATIENT
Start: 2017-06-30 | End: 2017-07-01 | Stop reason: HOSPADM

## 2017-06-30 RX ORDER — DEXAMETHASONE SODIUM PHOSPHATE 4 MG/ML
INJECTION, SOLUTION INTRA-ARTICULAR; INTRALESIONAL; INTRAMUSCULAR; INTRAVENOUS; SOFT TISSUE PRN
Status: DISCONTINUED | OUTPATIENT
Start: 2017-06-30 | End: 2017-06-30

## 2017-06-30 RX ORDER — METOCLOPRAMIDE 10 MG/1
10 TABLET ORAL EVERY 6 HOURS PRN
Status: DISCONTINUED | OUTPATIENT
Start: 2017-06-30 | End: 2017-07-01 | Stop reason: HOSPADM

## 2017-06-30 RX ORDER — ONDANSETRON 4 MG/1
4 TABLET, ORALLY DISINTEGRATING ORAL EVERY 30 MIN PRN
Status: DISCONTINUED | OUTPATIENT
Start: 2017-06-30 | End: 2017-06-30

## 2017-06-30 RX ORDER — PROCHLORPERAZINE MALEATE 5 MG
5-10 TABLET ORAL EVERY 6 HOURS PRN
Status: DISCONTINUED | OUTPATIENT
Start: 2017-06-30 | End: 2017-07-01 | Stop reason: HOSPADM

## 2017-06-30 RX ORDER — CARVEDILOL 12.5 MG/1
12.5 TABLET ORAL 2 TIMES DAILY WITH MEALS
Status: DISCONTINUED | OUTPATIENT
Start: 2017-06-30 | End: 2017-07-01 | Stop reason: HOSPADM

## 2017-06-30 RX ORDER — NALOXONE HYDROCHLORIDE 0.4 MG/ML
.1-.4 INJECTION, SOLUTION INTRAMUSCULAR; INTRAVENOUS; SUBCUTANEOUS
Status: DISCONTINUED | OUTPATIENT
Start: 2017-06-30 | End: 2017-07-01 | Stop reason: HOSPADM

## 2017-06-30 RX ORDER — EPHEDRINE SULFATE 50 MG/ML
INJECTION, SOLUTION INTRAMUSCULAR; INTRAVENOUS; SUBCUTANEOUS PRN
Status: DISCONTINUED | OUTPATIENT
Start: 2017-06-30 | End: 2017-06-30

## 2017-06-30 RX ORDER — OXYCODONE HYDROCHLORIDE 5 MG/1
5-10 TABLET ORAL
Status: DISCONTINUED | OUTPATIENT
Start: 2017-06-30 | End: 2017-07-01 | Stop reason: HOSPADM

## 2017-06-30 RX ORDER — GABAPENTIN 300 MG/1
900 CAPSULE ORAL
Status: DISCONTINUED | OUTPATIENT
Start: 2017-06-30 | End: 2017-07-01 | Stop reason: HOSPADM

## 2017-06-30 RX ORDER — NALOXONE HYDROCHLORIDE 0.4 MG/ML
.1-.4 INJECTION, SOLUTION INTRAMUSCULAR; INTRAVENOUS; SUBCUTANEOUS
Status: DISCONTINUED | OUTPATIENT
Start: 2017-06-30 | End: 2017-06-30

## 2017-06-30 RX ORDER — BUPIVACAINE HYDROCHLORIDE AND EPINEPHRINE 5; 5 MG/ML; UG/ML
INJECTION, SOLUTION PERINEURAL PRN
Status: DISCONTINUED | OUTPATIENT
Start: 2017-06-30 | End: 2017-06-30 | Stop reason: HOSPADM

## 2017-06-30 RX ORDER — GLYCOPYRROLATE 0.2 MG/ML
INJECTION, SOLUTION INTRAMUSCULAR; INTRAVENOUS PRN
Status: DISCONTINUED | OUTPATIENT
Start: 2017-06-30 | End: 2017-06-30

## 2017-06-30 RX ORDER — SIMVASTATIN 10 MG
10 TABLET ORAL
Status: DISCONTINUED | OUTPATIENT
Start: 2017-06-30 | End: 2017-07-01 | Stop reason: HOSPADM

## 2017-06-30 RX ORDER — DEXTROSE MONOHYDRATE 25 G/50ML
25-50 INJECTION, SOLUTION INTRAVENOUS
Status: DISCONTINUED | OUTPATIENT
Start: 2017-06-30 | End: 2017-07-01 | Stop reason: HOSPADM

## 2017-06-30 RX ORDER — LIDOCAINE HYDROCHLORIDE 20 MG/ML
INJECTION, SOLUTION INFILTRATION; PERINEURAL PRN
Status: DISCONTINUED | OUTPATIENT
Start: 2017-06-30 | End: 2017-06-30

## 2017-06-30 RX ORDER — ONDANSETRON 2 MG/ML
4 INJECTION INTRAMUSCULAR; INTRAVENOUS EVERY 6 HOURS PRN
Status: DISCONTINUED | OUTPATIENT
Start: 2017-06-30 | End: 2017-07-01 | Stop reason: HOSPADM

## 2017-06-30 RX ORDER — SODIUM CHLORIDE 9 MG/ML
INJECTION, SOLUTION INTRAVENOUS CONTINUOUS
Status: DISCONTINUED | OUTPATIENT
Start: 2017-06-30 | End: 2017-07-01 | Stop reason: HOSPADM

## 2017-06-30 RX ORDER — ONDANSETRON 2 MG/ML
4 INJECTION INTRAMUSCULAR; INTRAVENOUS EVERY 30 MIN PRN
Status: DISCONTINUED | OUTPATIENT
Start: 2017-06-30 | End: 2017-06-30

## 2017-06-30 RX ORDER — SODIUM CHLORIDE, SODIUM LACTATE, POTASSIUM CHLORIDE, CALCIUM CHLORIDE 600; 310; 30; 20 MG/100ML; MG/100ML; MG/100ML; MG/100ML
INJECTION, SOLUTION INTRAVENOUS CONTINUOUS PRN
Status: DISCONTINUED | OUTPATIENT
Start: 2017-06-30 | End: 2017-06-30

## 2017-06-30 RX ORDER — NEOSTIGMINE METHYLSULFATE 1 MG/ML
VIAL (ML) INJECTION PRN
Status: DISCONTINUED | OUTPATIENT
Start: 2017-06-30 | End: 2017-06-30

## 2017-06-30 RX ORDER — ACETAMINOPHEN 325 MG/1
650 TABLET ORAL EVERY 4 HOURS PRN
Status: DISCONTINUED | OUTPATIENT
Start: 2017-07-03 | End: 2017-07-01 | Stop reason: HOSPADM

## 2017-06-30 RX ADMIN — ACETAMINOPHEN 1000 MG: 10 INJECTION, SOLUTION INTRAVENOUS at 11:56

## 2017-06-30 RX ADMIN — PHENYLEPHRINE HYDROCHLORIDE 100 MCG: 10 INJECTION, SOLUTION INTRAMUSCULAR; INTRAVENOUS; SUBCUTANEOUS at 08:27

## 2017-06-30 RX ADMIN — HYDROMORPHONE HYDROCHLORIDE 0.5 MG: 1 INJECTION, SOLUTION INTRAMUSCULAR; INTRAVENOUS; SUBCUTANEOUS at 12:35

## 2017-06-30 RX ADMIN — LIDOCAINE HYDROCHLORIDE 80 MG: 20 INJECTION, SOLUTION INFILTRATION; PERINEURAL at 07:45

## 2017-06-30 RX ADMIN — HYDROMORPHONE HYDROCHLORIDE 0.5 MG: 1 INJECTION, SOLUTION INTRAMUSCULAR; INTRAVENOUS; SUBCUTANEOUS at 11:28

## 2017-06-30 RX ADMIN — OXYCODONE HYDROCHLORIDE 10 MG: 5 TABLET ORAL at 16:05

## 2017-06-30 RX ADMIN — GENTAMICIN SULFATE 1000 ML: 40 INJECTION, SOLUTION INTRAMUSCULAR; INTRAVENOUS at 08:18

## 2017-06-30 RX ADMIN — SIMVASTATIN 10 MG: 10 TABLET, FILM COATED ORAL at 16:05

## 2017-06-30 RX ADMIN — PHENYLEPHRINE HYDROCHLORIDE 100 MCG: 10 INJECTION, SOLUTION INTRAMUSCULAR; INTRAVENOUS; SUBCUTANEOUS at 08:00

## 2017-06-30 RX ADMIN — ROCURONIUM BROMIDE 50 MG: 10 INJECTION INTRAVENOUS at 07:45

## 2017-06-30 RX ADMIN — SODIUM CHLORIDE, POTASSIUM CHLORIDE, SODIUM LACTATE AND CALCIUM CHLORIDE: 600; 310; 30; 20 INJECTION, SOLUTION INTRAVENOUS at 08:30

## 2017-06-30 RX ADMIN — PHENYLEPHRINE HYDROCHLORIDE 100 MCG: 10 INJECTION, SOLUTION INTRAMUSCULAR; INTRAVENOUS; SUBCUTANEOUS at 08:15

## 2017-06-30 RX ADMIN — CEFAZOLIN SODIUM 1 G: 2 INJECTION, SOLUTION INTRAVENOUS at 10:00

## 2017-06-30 RX ADMIN — PHENYLEPHRINE HYDROCHLORIDE 100 MCG: 10 INJECTION, SOLUTION INTRAMUSCULAR; INTRAVENOUS; SUBCUTANEOUS at 09:12

## 2017-06-30 RX ADMIN — PHENYLEPHRINE HYDROCHLORIDE 100 MCG: 10 INJECTION, SOLUTION INTRAMUSCULAR; INTRAVENOUS; SUBCUTANEOUS at 08:57

## 2017-06-30 RX ADMIN — GABAPENTIN 900 MG: 300 CAPSULE ORAL at 19:26

## 2017-06-30 RX ADMIN — FENTANYL CITRATE 50 MCG: 50 INJECTION, SOLUTION INTRAMUSCULAR; INTRAVENOUS at 10:55

## 2017-06-30 RX ADMIN — ONDANSETRON 4 MG: 2 INJECTION INTRAMUSCULAR; INTRAVENOUS at 09:50

## 2017-06-30 RX ADMIN — HYDROMORPHONE HYDROCHLORIDE 0.5 MG: 1 INJECTION, SOLUTION INTRAMUSCULAR; INTRAVENOUS; SUBCUTANEOUS at 11:20

## 2017-06-30 RX ADMIN — CYCLOBENZAPRINE HYDROCHLORIDE 10 MG: 10 TABLET, FILM COATED ORAL at 20:04

## 2017-06-30 RX ADMIN — CEFAZOLIN SODIUM 2 G: 2 INJECTION, SOLUTION INTRAVENOUS at 08:00

## 2017-06-30 RX ADMIN — MIDAZOLAM HYDROCHLORIDE 2 MG: 1 INJECTION, SOLUTION INTRAMUSCULAR; INTRAVENOUS at 07:39

## 2017-06-30 RX ADMIN — Medication 5 MG: at 08:39

## 2017-06-30 RX ADMIN — RANITIDINE 150 MG: 150 TABLET ORAL at 20:08

## 2017-06-30 RX ADMIN — HYDROXYZINE HYDROCHLORIDE 25 MG: 25 TABLET ORAL at 13:37

## 2017-06-30 RX ADMIN — FENTANYL CITRATE 50 MCG: 50 INJECTION, SOLUTION INTRAMUSCULAR; INTRAVENOUS at 11:38

## 2017-06-30 RX ADMIN — PHENYLEPHRINE HYDROCHLORIDE 100 MCG: 10 INJECTION, SOLUTION INTRAMUSCULAR; INTRAVENOUS; SUBCUTANEOUS at 08:06

## 2017-06-30 RX ADMIN — OXYCODONE HYDROCHLORIDE 10 MG: 5 TABLET ORAL at 19:26

## 2017-06-30 RX ADMIN — PROPOFOL 200 MG: 10 INJECTION, EMULSION INTRAVENOUS at 07:45

## 2017-06-30 RX ADMIN — PHENYLEPHRINE HYDROCHLORIDE 150 MCG: 10 INJECTION, SOLUTION INTRAMUSCULAR; INTRAVENOUS; SUBCUTANEOUS at 08:11

## 2017-06-30 RX ADMIN — PHENYLEPHRINE HYDROCHLORIDE 100 MCG: 10 INJECTION, SOLUTION INTRAMUSCULAR; INTRAVENOUS; SUBCUTANEOUS at 08:51

## 2017-06-30 RX ADMIN — CYCLOBENZAPRINE HYDROCHLORIDE 10 MG: 10 TABLET, FILM COATED ORAL at 13:37

## 2017-06-30 RX ADMIN — ACETAMINOPHEN 975 MG: 325 TABLET, FILM COATED ORAL at 20:08

## 2017-06-30 RX ADMIN — PHENYLEPHRINE HYDROCHLORIDE 100 MCG: 10 INJECTION, SOLUTION INTRAMUSCULAR; INTRAVENOUS; SUBCUTANEOUS at 09:03

## 2017-06-30 RX ADMIN — DEXAMETHASONE SODIUM PHOSPHATE 4 MG: 4 INJECTION, SOLUTION INTRA-ARTICULAR; INTRALESIONAL; INTRAMUSCULAR; INTRAVENOUS; SOFT TISSUE at 08:23

## 2017-06-30 RX ADMIN — SODIUM CHLORIDE, POTASSIUM CHLORIDE, SODIUM LACTATE AND CALCIUM CHLORIDE: 600; 310; 30; 20 INJECTION, SOLUTION INTRAVENOUS at 11:45

## 2017-06-30 RX ADMIN — SODIUM CHLORIDE: 9 INJECTION, SOLUTION INTRAVENOUS at 13:40

## 2017-06-30 RX ADMIN — HYDROMORPHONE HYDROCHLORIDE 0.5 MG: 1 INJECTION, SOLUTION INTRAMUSCULAR; INTRAVENOUS; SUBCUTANEOUS at 09:41

## 2017-06-30 RX ADMIN — DEXMEDETOMIDINE HYDROCHLORIDE 0.3 MCG/KG/HR: 4 INJECTION, SOLUTION INTRAVENOUS at 08:18

## 2017-06-30 RX ADMIN — PHENYLEPHRINE HYDROCHLORIDE 100 MCG: 10 INJECTION, SOLUTION INTRAMUSCULAR; INTRAVENOUS; SUBCUTANEOUS at 08:23

## 2017-06-30 RX ADMIN — DEXMEDETOMIDINE HYDROCHLORIDE 12 MCG: 100 INJECTION, SOLUTION INTRAVENOUS at 07:43

## 2017-06-30 RX ADMIN — HYDROMORPHONE HYDROCHLORIDE 0.5 MG: 1 INJECTION, SOLUTION INTRAMUSCULAR; INTRAVENOUS; SUBCUTANEOUS at 11:45

## 2017-06-30 RX ADMIN — FENTANYL CITRATE 50 MCG: 50 INJECTION, SOLUTION INTRAMUSCULAR; INTRAVENOUS at 07:39

## 2017-06-30 RX ADMIN — Medication 5 MG: at 08:56

## 2017-06-30 RX ADMIN — PHENYLEPHRINE HYDROCHLORIDE 150 MCG: 10 INJECTION, SOLUTION INTRAMUSCULAR; INTRAVENOUS; SUBCUTANEOUS at 08:43

## 2017-06-30 RX ADMIN — SODIUM CHLORIDE, POTASSIUM CHLORIDE, SODIUM LACTATE AND CALCIUM CHLORIDE: 600; 310; 30; 20 INJECTION, SOLUTION INTRAVENOUS at 07:39

## 2017-06-30 RX ADMIN — HYDROMORPHONE HYDROCHLORIDE 0.5 MG: 1 INJECTION, SOLUTION INTRAMUSCULAR; INTRAVENOUS; SUBCUTANEOUS at 14:32

## 2017-06-30 RX ADMIN — CARVEDILOL 12.5 MG: 12.5 TABLET, FILM COATED ORAL at 20:00

## 2017-06-30 RX ADMIN — Medication 15 MG: at 12:19

## 2017-06-30 RX ADMIN — HYDROMORPHONE HYDROCHLORIDE 0.5 MG: 1 INJECTION, SOLUTION INTRAMUSCULAR; INTRAVENOUS; SUBCUTANEOUS at 20:48

## 2017-06-30 RX ADMIN — HYDROMORPHONE HYDROCHLORIDE 0.5 MG: 1 INJECTION, SOLUTION INTRAMUSCULAR; INTRAVENOUS; SUBCUTANEOUS at 17:10

## 2017-06-30 RX ADMIN — FENTANYL CITRATE 50 MCG: 50 INJECTION, SOLUTION INTRAMUSCULAR; INTRAVENOUS at 07:43

## 2017-06-30 RX ADMIN — KETOROLAC TROMETHAMINE 30 MG: 30 INJECTION, SOLUTION INTRAMUSCULAR at 12:11

## 2017-06-30 RX ADMIN — GLYCOPYRROLATE 0.6 MG: 0.2 INJECTION, SOLUTION INTRAMUSCULAR; INTRAVENOUS at 10:32

## 2017-06-30 RX ADMIN — DEXMEDETOMIDINE HYDROCHLORIDE 16 MCG: 100 INJECTION, SOLUTION INTRAVENOUS at 07:44

## 2017-06-30 RX ADMIN — Medication 5 MG: at 08:44

## 2017-06-30 RX ADMIN — PHENYLEPHRINE HYDROCHLORIDE 0.25 MCG/KG/MIN: 10 INJECTION, SOLUTION INTRAMUSCULAR; INTRAVENOUS; SUBCUTANEOUS at 09:11

## 2017-06-30 RX ADMIN — PHENYLEPHRINE HYDROCHLORIDE 100 MCG: 10 INJECTION, SOLUTION INTRAMUSCULAR; INTRAVENOUS; SUBCUTANEOUS at 08:01

## 2017-06-30 RX ADMIN — FENTANYL CITRATE 50 MCG: 50 INJECTION, SOLUTION INTRAMUSCULAR; INTRAVENOUS at 11:05

## 2017-06-30 RX ADMIN — INSULIN GLARGINE 5 UNITS: 100 INJECTION, SOLUTION SUBCUTANEOUS at 23:47

## 2017-06-30 RX ADMIN — NEOSTIGMINE METHYLSULFATE 4 MG: 1 INJECTION INTRAMUSCULAR; INTRAVENOUS; SUBCUTANEOUS at 10:32

## 2017-06-30 RX ADMIN — SODIUM CHLORIDE, POTASSIUM CHLORIDE, SODIUM LACTATE AND CALCIUM CHLORIDE: 600; 310; 30; 20 INJECTION, SOLUTION INTRAVENOUS at 10:46

## 2017-06-30 RX ADMIN — PROPOFOL 40 MG: 10 INJECTION, EMULSION INTRAVENOUS at 07:47

## 2017-06-30 RX ADMIN — SENNOSIDES AND DOCUSATE SODIUM 1 TABLET: 8.6; 5 TABLET ORAL at 20:09

## 2017-06-30 RX ADMIN — PHENYLEPHRINE HYDROCHLORIDE 100 MCG: 10 INJECTION, SOLUTION INTRAMUSCULAR; INTRAVENOUS; SUBCUTANEOUS at 08:31

## 2017-06-30 RX ADMIN — HYDROXYZINE HYDROCHLORIDE 50 MG: 50 INJECTION, SOLUTION INTRAMUSCULAR at 11:31

## 2017-06-30 RX ADMIN — THROMBIN, TOPICAL (BOVINE) 20000 UNITS: KIT at 08:18

## 2017-06-30 RX ADMIN — HYDROMORPHONE HYDROCHLORIDE 0.5 MG: 1 INJECTION, SOLUTION INTRAMUSCULAR; INTRAVENOUS; SUBCUTANEOUS at 11:14

## 2017-06-30 RX ADMIN — DEXMEDETOMIDINE HYDROCHLORIDE 8 MCG: 100 INJECTION, SOLUTION INTRAVENOUS at 10:09

## 2017-06-30 RX ADMIN — BUPIVACAINE HYDROCHLORIDE AND EPINEPHRINE BITARTRATE 10 ML: 5; .005 INJECTION, SOLUTION PERINEURAL at 08:20

## 2017-06-30 RX ADMIN — PANTOPRAZOLE SODIUM 40 MG: 40 TABLET, DELAYED RELEASE ORAL at 16:05

## 2017-06-30 NOTE — DISCHARGE INSTRUCTIONS
Same Day Surgery Discharge Instructions for  Sedation and General Anesthesia       It's not unusual to feel dizzy, light-headed or faint for up to 24 hours after surgery or while taking pain medication.  If you have these symptoms: sit for a few minutes before standing and have someone assist you when you get up to walk or use the bathroom.      You should rest and relax for the next 24 hours. We recommend you make arrangements to have an adult stay with you for at least 24 hours after your discharge.  Avoid hazardous and strenuous activity.      DO NOT DRIVE any vehicle or operate mechanical equipment for 24 hours following the end of your surgery.  Even though you may feel normal, your reactions may be affected by the medication you have received.      Do not drink alcoholic beverages for 24 hours following surgery.       Slowly progress to your regular diet as you feel able. It's not unusual to feel nauseated and/or vomit after receiving anesthesia.  If you develop these symptoms, drink clear liquids (apple juice, ginger ale, broth, 7-up, etc. ) until you feel better.  If your nausea and vomiting persists for 24 hours, please notify your surgeon.        All narcotic pain medications, along with inactivity and anesthesia, can cause constipation. Drinking plenty of liquids and increasing fiber intake will help.      For any questions of a medical nature, call your surgeon.      Do not make important decisions for 24 hours.      If you had general anesthesia, you may have a sore throat for a couple of days related to the breathing tube used during surgery.  You may use Cepacol lozenges to help with this discomfort.  If it worsens or if you develop a fever, contact your surgeon.       If you feel your pain is not well managed with the pain medications prescribed by your surgeon, please contact your surgeon's office to let them know so they can address your concerns.         Discharge Instructions for Back  Surgery  Dr. MARIAELENA Perez    -Post Operative incisional pain often lasts 1-2 weeks which will require pain medications and muscle relaxants.  You will receive medication upon discharge.    - Do NOT drive while taking narcotic pain medication.  - No NSAIDs (Ibuprofen, Advil, Motrin, Aleve, Naproxen) for 7 days    Post-Operative Incision Care  - Watch for signs of infection:  redness, swelling, warmth, drainage, and fever 101 degrees or higher.  Notify Dr. Perez s office of any signs of infection.    - No submerging incision in water such as pools, hot tubs, and baths for at least 8 weeks or until incision is healed.  Showers are fine.    Post-Operative Activity Limitations  - For 6-8 weeks:  No lifting greater than 10 pounds, no bending, twisting or overhead reaching.    -If you are currently employed, you will need to be off work for 2-4 weeks for post-op recovery and healing.    Follow Up Appointment  Neurosurgery appointment with Dr. Perez or Devin Naylor PA-C at the clinic in 6 weeks.  Call [phone] for appointment.

## 2017-06-30 NOTE — PROGRESS NOTES
Completed the children's short term disability claim notice attending physician statement   Faxed to [phone]  Placed a copy in the bin and sent original to medical records

## 2017-06-30 NOTE — ANESTHESIA POSTPROCEDURE EVALUATION
Patient: Syd Joyce    Procedure(s):  L3-4 L4-5 POSTERIOR DECOMPRESSION AND INTERSPINUS FIXATION WITHOUT FUSION - Wound Class: I-Clean    Diagnosis:LUMBAR STENOSIS  Diagnosis Additional Information: No value filed.    Anesthesia Type:  General, ETT    Note:  Anesthesia Post Evaluation    Patient location during evaluation: bedside  Patient participation: Able to fully participate in evaluation  Level of consciousness: awake  Pain management: adequate  Airway patency: patent  Cardiovascular status: acceptable  Respiratory status: acceptable  Hydration status: acceptable  PONV: none     Anesthetic complications: None    Comments: No anesthetic complications noted.         Last vitals:  Vitals:    06/30/17 1230 06/30/17 1314 06/30/17 1341   BP: 132/73 130/76 110/66   Resp: 20 16 16   Temp: 36  C (96.8  F)  36.2  C (97.1  F)   SpO2: 100% 100% 95%         Electronically Signed By: Omega Pressley DO, DO  June 30, 2017  2:15 PM

## 2017-06-30 NOTE — ANESTHESIA CARE TRANSFER NOTE
Patient: Syd Joyce    Procedure(s):  L3-4 L4-5 POSTERIOR DECOMPRESSION AND INTERSPINUS FIXATION WITHOUT FUSION - Wound Class: I-Clean    Diagnosis: LUMBAR STENOSIS  Diagnosis Additional Information: No value filed.    Anesthesia Type:   General, ETT     Note:  Airway :Face Mask  Patient transferred to:PACU  Comments: To [facility] PACU: Arouses easily, good airway, 02 face mask, VSS  Follows simple commands & moves all extremities  Report to RN      Vitals: (Last set prior to Anesthesia Care Transfer)    CRNA VITALS  6/30/2017 1012 - 6/30/2017 1049      6/30/2017             Pulse: 92    SpO2: 99 %    Resp Rate (observed): (!)  1                Electronically Signed By: STACY Lyons CRNA  June 30, 2017  10:49 AM

## 2017-06-30 NOTE — PROGRESS NOTES
Admission medication history interview status for the 6/30/2017  admission is complete. See EPIC admission navigator for prior to admission medications     Medication history source reliability:Good    Medication history interview source(s):Patient    Medication history resources (including written lists, pill bottles, clinic record):None    Primary pharmacy.[facility], [place] ([address])    Additional medication history information not noted on PTA med list :None    Time spent in this activity: 30 minutes    Prior to Admission medications    Medication Sig Last Dose Taking? Auth Provider   AmLODIPine Besylate (NORVASC PO) Take 10 mg by mouth every morning 6/30/2017 at 0500 Yes Reported, Patient   ASPIRIN EC PO Take 81 mg by mouth every morning Over 2 weeks ago at am Yes Reported, Patient   Carvedilol (COREG PO) Take 12.5 mg by mouth 2 times daily (with meals) 6/30/2017 at 0500 Yes Reported, Patient   Gabapentin (NEURONTIN PO) Take 900 mg by mouth daily (with dinner) (patient takes 3 X 300 mg = 900 mg dose) 6/29/2017 at 1730 Yes Reported, Patient   Linagliptin (TRADJENTA PO) Take 5 mg by mouth every morning 6/29/2017 at am Yes Reported, Patient   Losartan Potassium (COZAAR PO) Take 100 mg by mouth every morning 6/30/2017 at 0500 Yes Reported, Patient   MetFORMIN HCl (GLUCOPHAGE XR PO) Take 1,000 mg by mouth every morning (patient takes 2 X 500 mg = 1,000 mg dose) 6/29/2017 at am Yes Reported, Patient   Morphine Sulfate (MS CONTIN PO) Take 15 mg by mouth every 12 hours (max 2 tablets per 24 hours.) 6/29/2017 at 1800 Yes Reported, Patient   Pantoprazole Sodium (PROTONIX PO) Take 40 mg by mouth daily (with dinner) (at 17:00) 6/29/2017 at 1700 Yes Reported, Patient   Simvastatin (ZOCOR PO) Take 10 mg by mouth daily (with dinner) 6/29/2017 at 1730 Yes Reported, Patient   Zolpidem Tartrate (AMBIEN PO) Take 10 mg by mouth At Bedtime 6/29/2017 at 1800 Yes Reported, Patient   Calcium Carbonate Antacid (TUMS PO) Take 2  chew tab by mouth daily (with dinner) 6/29/2017 at 1730 Yes Reported, Patient   Esomeprazole Magnesium (NEXIUM PO) Take 20 mg by mouth daily (with dinner) (at 17:00) 6/29/2017 at 1700 Yes Reported, Patient   lisinopril-hydrochlorothiazide (PRINZIDE,ZESTORETIC) 20-12.5 MG per tablet Take 1 tablet by mouth daily 6/30/2017 at 0500 Yes Shan Arenas MD   order for [facility] One tens unit   Dianne Rivera MD   blood glucose monitoring (ACCU-CHEK MED PLUS) test strip USE 4 TO 5 TIMES PER WEEK OR AS DIRECTED   Shan Arenas MD   blood glucose monitoring (ACCU-CHEK MED PLUS) meter device kit Use to test blood sugar 4 to 5 times weekly or as directed.   Shan Arenas MD

## 2017-06-30 NOTE — ANESTHESIA PREPROCEDURE EVALUATION
Anesthesia Evaluation     .             ROS/MED HX    ENT/Pulmonary:  - neg pulmonary ROS     Neurologic:  - neg neurologic ROS     Cardiovascular:     (+) Dyslipidemia, hypertension----. : . . . :. .       METS/Exercise Tolerance:     Hematologic:  - neg hematologic  ROS       Musculoskeletal:   (+) arthritis, , , -       GI/Hepatic:     (+) GERD Asymptomatic on medication,       Renal/Genitourinary:         Endo:     (+) type II DM Last HgA1c: 5.4 Not using insulin Obesity, .      Psychiatric:  - neg psychiatric ROS       Infectious Disease:         Malignancy:         Other:    (+) H/O Chronic Pain,H/O chronic opiod use ,                    Physical Exam  Normal systems: cardiovascular, pulmonary and dental    Airway   Mallampati: II  TM distance: >3 FB    Dental     Cardiovascular       Pulmonary                     Anesthesia Plan      History & Physical Review  History and physical reviewed and following examination; no interval change.    ASA Status:  3 .    NPO Status:  > 8 hours    Plan for General and ETT with Intravenous induction. Maintenance will be Balanced.    PONV prophylaxis:  Ondansetron (or other 5HT-3) and Dexamethasone or Solumedrol  Additional equipment: Videolaryngoscope      Postoperative Care  Postoperative pain management:  IV analgesics.      Consents  Anesthetic plan, risks, benefits and alternatives discussed with:  Patient..              Procedure: Procedure(s):  DISCECTOMY LUMBAR POSTERIOR MICROSCOPIC TWO LEVELS  Preop diagnosis: LUMBAR STENOSIS    No Known Allergies  Past Medical History:   Diagnosis Date     DM2 (diabetes mellitus, type 2) (H)      Esophageal reflux 6/15/2012     HTN (hypertension) 8/12/2011     Obesity, unspecified 1/10/2014     Past Surgical History:   Procedure Laterality Date     NO HISTORY OF SURGERY       Prior to Admission medications    Medication Sig Start Date End Date Taking? Authorizing Provider   AmLODIPine Besylate (NORVASC PO) Take 10 mg by mouth  every morning   Yes Reported, Patient   ASPIRIN EC PO Take 81 mg by mouth every morning   Yes Reported, Patient   Carvedilol (COREG PO) Take 12.5 mg by mouth 2 times daily (with meals)   Yes Reported, Patient   Gabapentin (NEURONTIN PO) Take 900 mg by mouth daily (with dinner) (patient takes 3 X 300 mg = 900 mg dose)   Yes Reported, Patient   Linagliptin (TRADJENTA PO) Take 5 mg by mouth every morning   Yes Reported, Patient   Losartan Potassium (COZAAR PO) Take 100 mg by mouth every morning   Yes Reported, Patient   MetFORMIN HCl (GLUCOPHAGE XR PO) Take 1,000 mg by mouth every morning (patient takes 2 X 500 mg = 1,000 mg dose)   Yes Reported, Patient   Morphine Sulfate (MS CONTIN PO) Take 15 mg by mouth every 12 hours (max 2 tablets per 24 hours.)   Yes Reported, Patient   Pantoprazole Sodium (PROTONIX PO) Take 40 mg by mouth daily (with dinner) (at 17:00)   Yes Reported, Patient   Simvastatin (ZOCOR PO) Take 10 mg by mouth daily (with dinner)   Yes Reported, Patient   Zolpidem Tartrate (AMBIEN PO) Take 10 mg by mouth At Bedtime   Yes Reported, Patient   Calcium Carbonate Antacid (TUMS PO) Take 2 chew tab by mouth daily (with dinner)   Yes Reported, Patient   Esomeprazole Magnesium (NEXIUM PO) Take 20 mg by mouth daily (with dinner) (at 17:00)   Yes Reported, Patient   lisinopril-hydrochlorothiazide (PRINZIDE,ZESTORETIC) 20-12.5 MG per tablet Take 1 tablet by mouth daily 10/20/16  Yes Shan Arenas MD   order for DME One tens unit 6/1/17   Dianne Rivera MD   blood glucose monitoring (ACCU-CHEK MED PLUS) test strip USE 4 TO 5 TIMES PER WEEK OR AS DIRECTED 10/20/16   Shan Arenas MD   blood glucose monitoring (ACCU-CHEK MED PLUS) meter device kit Use to test blood sugar 4 to 5 times weekly or as directed. 10/14/14   Shan Arenas MD     Current Facility-Administered Medications Ordered in Epic   Medication Dose Route Frequency Last Rate Last Dose     ceFAZolin sodium-dextrose (ANCEF)  infusion 2 g  2 g Intravenous Pre-Op/Pre-procedure x 1 dose         ceFAZolin (ANCEF) 1 g vial to attach to  ml bag for ADULT or 50 ml bag for PEDS  1 g Intravenous See Admin Instructions         No current Epic-ordered outpatient prescriptions on file.     Wt Readings from Last 1 Encounters:   06/30/17 93.1 kg (205 lb 4 oz)     Temp Readings from Last 1 Encounters:   06/30/17 36.2  C (97.2  F) (Oral)     BP Readings from Last 6 Encounters:   06/30/17 129/61   06/19/17 124/70   06/12/17 107/66   06/06/17 145/76   06/01/17 144/70   05/31/17 159/77     Pulse Readings from Last 4 Encounters:   06/19/17 64   06/12/17 93   06/06/17 80   06/01/17 80     Resp Readings from Last 1 Encounters:   06/30/17 16     SpO2 Readings from Last 1 Encounters:   06/30/17 96%     Recent Labs   Lab Test  06/19/17   1337  05/26/17   0957   03/27/15   1950   NA  139  132*   < >  133   POTASSIUM  4.5  3.6   < >  3.7   CHLORIDE  97  95   < >  98   CO2   --   28   --   29   ANIONGAP   --   9   --   6   GLC  247*  160*   < >  114*   BUN  15  15  11   < >  11   CR  1.02  1.03   < >  1.03   HEAVENLY  8.8  8.7   < >  8.3*    < > = values in this interval not displayed.     Recent Labs   Lab Test  06/19/17   1133  05/26/17   0957   WBC  6.4  8.1   HGB  12.3*  13.6   PLT  303  197     No results for input(s): INR in the last 97625 hours.    Invalid input(s): APTT   RECENT LABS:   ECG:   ECHO:   CXR:                .

## 2017-06-30 NOTE — IP AVS SNAPSHOT
MRN:[id number]                      After Visit Summary   6/30/2017    Syd Joyce    MRN: [id number]           Thank you!     Thank you for choosing [facility] for your care. Our goal is always to provide you with excellent care. Hearing back from our patients is one way we can continue to improve our services. Please take a few minutes to complete the written survey that you may receive in the mail after you visit with us. Thank you!        Patient Information     Date Of Birth          1955        Designated Caregiver       Most Recent Value    Caregiver    Will someone help with your care after discharge? yes    Name of designated caregiver ulises    Phone number of caregiver     Caregiver address home address      About your hospital stay     You were admitted on:  June 30, 2017 You last received care in the:  [facility] [address] [facility]    You were discharged on:  July 1, 2017        Reason for your hospital stay       You were in the hospital for lumbar surgery.                  Who to Call     For medical emergencies, please call 911.  For non-urgent questions about your medical care, please call your primary care provider or clinic, [phone]  For questions related to your surgery, please call your surgery clinic        Attending Provider     Provider Ray Murray MD Neurosurgery       Primary Care Provider Office Phone # Fax #    Shan Arenas [phone]      After Care Instructions     Activity       Discharge instructions:  No lifting of more than 10 pounds, no bending, no twisting until follow up visit.    Ok to shampoo hair, shower or bathe, but, do not scrub or submerge incision under water until evaluated post-operatively in clinic.    Ok to walk as tolerated, avoid bed rest and prolonged sitting.    No contact sports until after follow up visit  No high impact activities such as; running/jogging,  snowmobile or 4 sanchez riding or any other recreational vehicles.    Do not take NSAIDS (ibuprofen, advil, aleve, naproxen, etc) for pain control.    Do NOT drive while taking narcotic pain medication.    Call the [facility] at [phone] for increasing redness, swelling or pus draining from the incision, increased pain or any other questions and concerns.            Diet       Follow this diet upon discharge: Orders Placed This Encounter    Home diet            Wound care and dressings       Keep your incision clean and dry at all times.  OK to remove dressing on postop day 2.  OK to shower on postop day 3 and allow water to run over incision, pat dry after shower.  No bathing swimming or submerging in water.  Call immediately or come to ED if any drainage occurs, or you develop new pain, redness, or swelling.                  Follow-up Appointments     Follow-up and recommended labs and tests        Please follow up at the [facility] in 10-14 days for staple removal.  Please call the clinic at [phone] to schedule your appointment with Dariana Lopez PA-C or Devin Naylor PA-C.                  Your next 10 appointments already scheduled     Jul 07, 2017  8:15 AM CDT   LAB with [facility] LAB   [facility]    [address] [place] [address] [phone]           Patient must bring picture ID.  Patient should be prepared to give a urine specimen  Please do not eat 10-12 hours before your appointment if you are coming in fasting for labs on lipids, cholesterol, or glucose (sugar).  Pregnant women should follow their Care Team instructions. Water with medications is okay. Do not drink coffee or other fluids.   If you have concerns about taking  your medications, please ask at office or if scheduling via Sk[employer], send a message by clicking on Secure Messaging, Message Your Care Team.            Aug 01, 2017  1:00 PM CDT   PHYSICAL with Shan Hutchinson  MD Dagoberto   [facility] ([facility])    [address] [place] [address] [phone]              Future tests that were ordered for you     XR Lumbar Spine 2/3 Views                 Further instructions from your care team       Same Day Surgery Discharge Instructions for  Sedation and General Anesthesia       It's not unusual to feel dizzy, light-headed or faint for up to 24 hours after surgery or while taking pain medication.  If you have these symptoms: sit for a few minutes before standing and have someone assist you when you get up to walk or use the bathroom.      You should rest and relax for the next 24 hours. We recommend you make arrangements to have an adult stay with you for at least 24 hours after your discharge.  Avoid hazardous and strenuous activity.      DO NOT DRIVE any vehicle or operate mechanical equipment for 24 hours following the end of your surgery.  Even though you may feel normal, your reactions may be affected by the medication you have received.      Do not drink alcoholic beverages for 24 hours following surgery.       Slowly progress to your regular diet as you feel able. It's not unusual to feel nauseated and/or vomit after receiving anesthesia.  If you develop these symptoms, drink clear liquids (apple juice, ginger ale, broth, 7-up, etc. ) until you feel better.  If your nausea and vomiting persists for 24 hours, please notify your surgeon.        All narcotic pain medications, along with inactivity and anesthesia, can cause constipation. Drinking plenty of liquids and increasing fiber intake will help.      For any questions of a medical nature, call your surgeon.      Do not make important decisions for 24 hours.      If you had general anesthesia, you may have a sore throat for a couple of days related to the breathing tube used during surgery.  You may use Cepacol lozenges to help with this discomfort.  If it worsens or if you develop a  fever, contact your surgeon.       If you feel your pain is not well managed with the pain medications prescribed by your surgeon, please contact your surgeon's office to let them know so they can address your concerns.         Discharge Instructions for Back Surgery  Dr. MARIAELENA Perez    -Post Operative incisional pain often lasts 1-2 weeks which will require pain medications and muscle relaxants.  You will receive medication upon discharge.    - Do NOT drive while taking narcotic pain medication.  - No NSAIDs (Ibuprofen, Advil, Motrin, Aleve, Naproxen) for 7 days    Post-Operative Incision Care  - Watch for signs of infection:  redness, swelling, warmth, drainage, and fever 101 degrees or higher.  Notify Dr. Perez s office of any signs of infection.    - No submerging incision in water such as pools, hot tubs, and baths for at least 8 weeks or until incision is healed.  Showers are fine.    Post-Operative Activity Limitations  - For 6-8 weeks:  No lifting greater than 10 pounds, no bending, twisting or overhead reaching.    -If you are currently employed, you will need to be off work for 2-4 weeks for post-op recovery and healing.    Follow Up Appointment  Neurosurgery appointment with Dr. Perez or Devin Naylor PA-C at the clinic in 6 weeks.  Call [phone] for appointment.      Pending Results     Date and Time Order Name Status Description    6/30/2017 1024 XR Surgery SHAYNE L/T 5 Min Fluoro w Stills Preliminary             Statement of Approval     Ordered          07/01/17 0711  I have reviewed and agree with all the recommendations and orders detailed in this document.  EFFECTIVE NOW     Approved and electronically signed by:  Dariana Lopez PA-C             Admission Information     Date & Time Provider Department Dept. Phone    6/30/2017 Ray Perez MD [facility] [address] [facility] [phone]      Your Vitals Were     Blood Pressure Temperature Respirations Height Weight Pulse Oximetry     "115/64 98  F (36.7  C) (Oral) 16 1.753 m (5' 9\") 93.1 kg (205 lb 4 oz) 94%    BMI (Body Mass Index)                   30.31 kg/m2           [employer] Information     [employer] gives you secure access to your electronic health record. If you see a primary care provider, you can also send messages to your care team and make appointments. If you have questions, please call your primary care clinic.  If you do not have a primary care provider, please call [phone] and they will assist you.        Care EveryWhere ID     This is your Care EveryWhere ID. This could be used by other organizations to access your [facility] medical records  [id number]        Equal Access to Services     GAIL GRIFFITHS : Kimberlyn Garg, steven nixon, brenna church, jose elias. So [facility] [phone].    ATENCIÓN: Si habla español, tiene a persaud disposición servicios gratuitos de asistencia lingüística. Llame al [phone].    We comply with applicable federal civil rights laws and [place] laws. We do not discriminate on the basis of race, color, national origin, age, disability sex, sexual orientation or gender identity.               Review of your medicines      START taking        Dose / Directions    cyclobenzaprine 10 MG tablet   Commonly known as:  FLEXERIL        Dose:  5-10 mg   Take 0.5-1 tablets (5-10 mg) by mouth 3 times daily as needed for muscle spasms   Quantity:  90 tablet   Refills:  1         CONTINUE these medicines which may have CHANGED, or have new prescriptions. If we are uncertain of the size of tablets/capsules you have at home, strength may be listed as something that might have changed.        Dose / Directions    morphine 15 MG 12 hr tablet   Commonly known as:  MS CONTIN   This may have changed:  additional instructions   Notes to Patient:  Resume  Home schedule        Dose:  15 mg   Take 1 tablet (15 mg) by mouth every 12 hours maximum 2 tablet(s) per day "   Quantity:  60 tablet   Refills:  0         CONTINUE these medicines which have NOT CHANGED        Dose / Directions    blood glucose monitoring meter device kit   Used for:  Refill clinic medication management patient        Use to test blood sugar 4 to 5 times weekly or as directed.   Quantity:  1 kit   Refills:  0       blood glucose monitoring test strip   Commonly known as:  ACCU-CHEK MED PLUS   Used for:  Refill clinic medication management patient        USE 4 TO 5 TIMES PER WEEK OR AS DIRECTED   Quantity:  150 each   Refills:  3       COREG PO        Dose:  12.5 mg   Take 12.5 mg by mouth 2 times daily (with meals)   Refills:  0       COZAAR PO        Dose:  100 mg   Take 100 mg by mouth every morning   Refills:  0       GLUCOPHAGE XR PO   Notes to Patient:  Resume home schedule        Dose:  1000 mg   Take 1,000 mg by mouth every morning (patient takes 2 X 500 mg = 1,000 mg dose)   Refills:  0       lisinopril-hydrochlorothiazide 20-12.5 MG per tablet   Commonly known as:  PRINZIDE/ZESTORETIC   Used for:  Essential hypertension with goal blood pressure less than 130/80   Notes to Patient:  Resume home schedule        Dose:  1 tablet   Take 1 tablet by mouth daily   Quantity:  90 tablet   Refills:  3       NEURONTIN PO        Dose:  900 mg   Take 900 mg by mouth daily (with dinner) (patient takes 3 X 300 mg = 900 mg dose)   Refills:  0       NEXIUM PO   Indication:  Heartburn        Dose:  20 mg   Take 20 mg by mouth daily (with dinner) (at 17:00)   Refills:  0       NORVASC PO        Dose:  10 mg   Take 10 mg by mouth every morning   Refills:  0       order for DME   Used for:  Lumbar radiculopathy        One tens unit   Quantity:  1 Device   Refills:  0       PROTONIX PO        Dose:  40 mg   Take 40 mg by mouth daily (with dinner) (at 17:00)   Refills:  0       TRADJENTA PO   Notes to Patient:  Resume home schedule        Dose:  5 mg   Take 5 mg by mouth every morning   Refills:  0       TUMS PO    Notes to Patient:  Resume home schedule        Dose:  2 chew tab   Take 2 chew tab by mouth daily (with dinner)   Refills:  0       ZOCOR PO        Dose:  10 mg   Take 10 mg by mouth daily (with dinner)   Refills:  0         STOP taking     AMBIEN PO           ASPIRIN EC PO                Where to get your medicines      These medications were sent to [facility] [place], [place] - [address]  [address], [place] [address]     Phone:  [phone]     cyclobenzaprine 10 MG tablet         Some of these will need a paper prescription and others can be bought over the counter. Ask your nurse if you have questions.     Bring a paper prescription for each of these medications     morphine 15 MG 12 hr tablet                Protect others around you: Learn how to safely use, store and throw away your medicines at www.disposemymeds.org.             Medication List: This is a list of all your medications and when to take them. Check marks below indicate your daily home schedule. Keep this list as a reference.      Medications           Morning Afternoon Evening Bedtime As Needed    blood glucose monitoring meter device kit   Use to test blood sugar 4 to 5 times weekly or as directed.                                blood glucose monitoring test strip   Commonly known as:  ACCU-CHEK MED PLUS   USE 4 TO 5 TIMES PER WEEK OR AS DIRECTED                                COREG PO   Take 12.5 mg by mouth 2 times daily (with meals)   Last time this was given:  12.5 mg on 7/1/2017  9:23 AM   Next Dose Due:  This evening                                      COZAAR PO   Take 100 mg by mouth every morning   Next Dose Due:  Tomorrow morning                                   cyclobenzaprine 10 MG tablet   Commonly known as:  FLEXERIL   Take 0.5-1 tablets (5-10 mg) by mouth 3 times daily as needed for muscle spasms   Last time this was given:  10 mg on 6/30/2017  8:04 PM                                    GLUCOPHAGE XR PO   Take 1,000 mg by mouth every morning (patient takes 2 X 500 mg = 1,000 mg dose)   Notes to Patient:  Resume home schedule                                   lisinopril-hydrochlorothiazide 20-12.5 MG per tablet   Commonly known as:  PRINZIDE/ZESTORETIC   Take 1 tablet by mouth daily   Notes to Patient:  Resume home schedule                                   morphine 15 MG 12 hr tablet   Commonly known as:  MS CONTIN   Take 1 tablet (15 mg) by mouth every 12 hours maximum 2 tablet(s) per day   Notes to Patient:  Resume  Home schedule                                      NEURONTIN PO   Take 900 mg by mouth daily (with dinner) (patient takes 3 X 300 mg = 900 mg dose)   Last time this was given:  900 mg on 6/30/2017  7:26 PM   Next Dose Due:  This evening                                   NEXIUM PO   Take 20 mg by mouth daily (with dinner) (at 17:00)   Next Dose Due:  This evening                                   NORVASC PO   Take 10 mg by mouth every morning   Last time this was given:  10 mg on 7/1/2017  9:23 AM   Next Dose Due:  Tomorrow morning                                   order for DME   One tens unit                                PROTONIX PO   Take 40 mg by mouth daily (with dinner) (at 17:00)   Last time this was given:  40 mg on 6/30/2017  4:05 PM   Next Dose Due:  This evening with dinner                                   TRADJENTA PO   Take 5 mg by mouth every morning   Notes to Patient:  Resume home schedule                                   TUMS PO   Take 2 chew tab by mouth daily (with dinner)   Notes to Patient:  Resume home schedule                                   ZOCOR PO   Take 10 mg by mouth daily (with dinner)   Last time this was given:  10 mg on 6/30/2017  4:05 PM   Next Dose Due:  This evening

## 2017-06-30 NOTE — IP AVS SNAPSHOT
[address] [facility]    [facility] [address]    [place] [address]    Phone:  [phone]                                       After Visit Summary   6/30/2017    Syd Joyce    MRN: [id number]           After Visit Summary Signature Page     I have received my discharge instructions, and my questions have been answered. I have discussed any challenges I see with this plan with the nurse or doctor.    ..........................................................................................................................................  Patient/Patient Representative Signature      ..........................................................................................................................................  Patient Representative Print Name and Relationship to Patient    ..................................................               ................................................  Date                                            Time    ..........................................................................................................................................  Reviewed by Signature/Title    ...................................................              ..............................................  Date                                                            Time

## 2017-06-30 NOTE — OR NURSING
Report called to RN on 7th floor. Patient continues to have incisional back pain but has decreased to an 8 on a 1-10 scale. Denies nausea. VSS Neuro checks intact. Transferred with all personal belongings including walker and duffel bag. Wife updated. Dr. Perez here at bedside earlier and informed of patients pain level.

## 2017-06-30 NOTE — PLAN OF CARE
Problem: Goal Outcome Summary  Goal: Goal Outcome Summary  PT-Patient just had spine surgery today. Will reschedule PT for tomorrow.

## 2017-06-30 NOTE — PROGRESS NOTES
After 2 mg of Dilaudid and 150 mcg of Fentanyl and pain remained unchanged. MDA was updated and new orders for IV Tylenol and Ketamine were given.

## 2017-06-30 NOTE — BRIEF OP NOTE
[facility]   Brief Operative Note    Pre-operative diagnosis: LUMBAR STENOSIS   Post-operative diagnosis Same   Procedure: Procedure(s):  L3-4 L4-5 POSTERIOR DECOMPRESSION AND INTERSPINUS FIXATION WITHOUT FUSION - Wound Class: I-Clean   Surgeon(s): Surgeon(s) and Role:     * Ray Perez MD - Primary     * Dariana Lopez PA-C - Assisting   Estimated blood loss: 50 mL    Specimens: * No specimens in log *   Findings: None

## 2017-06-30 NOTE — PROGRESS NOTES
Patient complaining of severe back pain 10/10 despite receiving 100 mcg of Fentanyl and 1 mg of Dilaudid.  IGOR Montano was updated and new orders given for Dilaudid and Vistaril.

## 2017-06-30 NOTE — PLAN OF CARE
Problem: Goal Outcome Summary  Goal: Goal Outcome Summary  Outcome: Improving  Arrived on unit at approx 1315.  C/o severe pain in PACU with multiple meds given.  Upon arrival pain 8/10, currently 6/10 after flexeril and atarax.  CMS intact.  BS + x 4.  VSS.  Ox 95% 1 L.  Plan for pain management.

## 2017-07-01 ENCOUNTER — APPOINTMENT (OUTPATIENT)
Dept: PHYSICAL THERAPY | Facility: CLINIC | Age: 62
DRG: 520 | End: 2017-07-01
Attending: NEUROLOGICAL SURGERY
Payer: COMMERCIAL

## 2017-07-01 VITALS
WEIGHT: 205.25 LBS | SYSTOLIC BLOOD PRESSURE: 115 MMHG | HEIGHT: 69 IN | DIASTOLIC BLOOD PRESSURE: 64 MMHG | TEMPERATURE: 98 F | OXYGEN SATURATION: 94 % | BODY MASS INDEX: 30.4 KG/M2 | RESPIRATION RATE: 16 BRPM

## 2017-07-01 LAB
GLUCOSE BLDC GLUCOMTR-MCNC: 165 MG/DL (ref 70–99)
GLUCOSE BLDC GLUCOMTR-MCNC: 167 MG/DL (ref 70–99)

## 2017-07-01 PROCEDURE — 00000146 ZZHCL STATISTIC GLUCOSE BY METER IP

## 2017-07-01 PROCEDURE — 25000132 ZZH RX MED GY IP 250 OP 250 PS 637: Performed by: PHYSICIAN ASSISTANT

## 2017-07-01 PROCEDURE — 40000193 ZZH STATISTIC PT WARD VISIT

## 2017-07-01 PROCEDURE — 97161 PT EVAL LOW COMPLEX 20 MIN: CPT | Mod: GP

## 2017-07-01 PROCEDURE — 25000128 H RX IP 250 OP 636: Performed by: PHYSICIAN ASSISTANT

## 2017-07-01 PROCEDURE — 97530 THERAPEUTIC ACTIVITIES: CPT | Mod: GP

## 2017-07-01 RX ORDER — MORPHINE SULFATE 15 MG/1
15 TABLET, FILM COATED, EXTENDED RELEASE ORAL EVERY 12 HOURS
Qty: 60 TABLET | Refills: 0 | Status: SHIPPED | OUTPATIENT
Start: 2017-07-01 | End: 2017-07-01

## 2017-07-01 RX ORDER — MORPHINE SULFATE 15 MG/1
15 TABLET, FILM COATED, EXTENDED RELEASE ORAL EVERY 12 HOURS
Qty: 60 TABLET | Refills: 0 | Status: SHIPPED | OUTPATIENT
Start: 2017-07-01 | End: 2018-02-14

## 2017-07-01 RX ADMIN — ACETAMINOPHEN 975 MG: 325 TABLET, FILM COATED ORAL at 05:06

## 2017-07-01 RX ADMIN — RANITIDINE 150 MG: 150 TABLET ORAL at 09:23

## 2017-07-01 RX ADMIN — OXYCODONE HYDROCHLORIDE 10 MG: 5 TABLET ORAL at 00:08

## 2017-07-01 RX ADMIN — OXYCODONE HYDROCHLORIDE 10 MG: 5 TABLET ORAL at 05:06

## 2017-07-01 RX ADMIN — SENNOSIDES AND DOCUSATE SODIUM 1 TABLET: 8.6; 5 TABLET ORAL at 09:23

## 2017-07-01 RX ADMIN — AMLODIPINE BESYLATE 10 MG: 10 TABLET ORAL at 09:23

## 2017-07-01 RX ADMIN — CARVEDILOL 12.5 MG: 12.5 TABLET, FILM COATED ORAL at 09:23

## 2017-07-01 RX ADMIN — SODIUM CHLORIDE: 9 INJECTION, SOLUTION INTRAVENOUS at 02:54

## 2017-07-01 RX ADMIN — OXYCODONE HYDROCHLORIDE 5 MG: 5 TABLET ORAL at 13:56

## 2017-07-01 NOTE — PROGRESS NOTES
Lunch time insulin not given as patient ate a late breakfast/early lunch in which breakfast meal insulin was given for coverage.

## 2017-07-01 NOTE — PROGRESS NOTES
" 07/01/17 1000   Quick Adds   Type of Visit Initial PT Evaluation   Living Environment   Lives With spouse   Living Arrangements house   Home Accessibility stairs to enter home;stairs within home   Number of Stairs to Enter Home 2   Number of Stairs Within Home 10   Stair Railings at Home outside, present on right side;inside, present on right side   Transportation Available car;family or friend will provide   Living Environment Comment bedroom is downstairs   Self-Care   Usual Activity Tolerance good   Current Activity Tolerance good   Activity/Exercise/Self-Care Comment Pt reports beginning to use a FWW for mobility about 2 weeks due to LE weakness. He has a walker on both levels of the house   Functional Level Prior   Ambulation 1-->assistive equipment  (FWW)   Transferring 0-->independent   Toileting 0-->independent   Bathing 1-->assistive equipment  (shower chair)   Dressing 0-->independent   Eating 0-->independent   Communication 0-->understands/communicates without difficulty   Swallowing 0-->swallows foods/liquids without difficulty   Cognition 0 - no cognition issues reported   Fall history within last six months no   Prior Functional Level Comment Walk-in shower   General Information   Onset of Illness/Injury or Date of Surgery - Date 06/30/17   Referring Physician Dariana Lopez PA-C   Patient/Family Goals Statement \"Go home\"   Pertinent History of Current Problem (include personal factors and/or comorbidities that impact the POC) 62 YOM diagnosed with lumbar stenosis and neurogenic claudication now s/p L3-5 posterior decompression with interspinal fixation without fusion. PMH: HTN   Precautions/Limitations fall precautions;spinal precautions   Weight-Bearing Status - LUE full weight-bearing   Weight-Bearing Status - RUE full weight-bearing   Weight-Bearing Status - LLE full weight-bearing   Weight-Bearing Status - RLE full weight-bearing   General Observations Pleasant and cooperative   General Info " Comments Activity: ambulate   Cognitive Status Examination   Orientation orientation to person, place and time   Level of Consciousness alert   Follows Commands and Answers Questions 100% of the time;able to follow multistep instructions   Personal Safety and Judgment intact   Memory intact   Cognitive Comment no concerns   Pain Assessment   Patient Currently in Pain Yes, see Vital Sign flowsheet  (1/10 incision site)   Posture    Posture Forward head position;Protracted shoulders   Range of Motion (ROM)   ROM Comment BLEs WFL   Strength   Strength Comments BLEs 5/5 throughout   Bed Mobility   Bed Mobility Comments Supine>sit with SBA   Transfer Skills   Transfer Comments Sit<>stand from EOB to FWW modified independent   Gait   Gait Comments Gait with FWW x 300' modified independent with no LOB, safe use of walker, navigates around obstacles easily. Declined trial of gait without AD preferring to use the walker for now. Up/down 10 stairs with single railing and reciprocal pattern modified independent   Balance   Balance Comments Good sitting and static standing, pt prefers a FW for dynamic standing tasks   Sensory Examination   Sensory Perception no deficits were identified   Coordination   Coordination no deficits were identified   General Therapy Interventions   Planned Therapy Interventions bed mobility training;other (see comments)   Intervention Comments posture and body mechanics training   Clinical Impression   Criteria for Skilled Therapeutic Intervention yes, treatment indicated   PT Diagnosis Impaired posture and awareness of correct mobility techniques after spine surgery   Influenced by the following impairments pain   Functional limitations due to impairments bed mobility   Clinical Presentation Stable/Uncomplicated   Clinical Presentation Rationale elective spine surgery progressing per pathway   Clinical Decision Making (Complexity) Low complexity   Therapy Frequency` other (see comments)  (eval plus  "one treat)   Predicted Duration of Therapy Intervention (days/wks) 1 day   Anticipated Discharge Disposition Home with Assist   Risk & Benefits of therapy have been explained Yes   Patient, Family & other staff in agreement with plan of care Yes   Clinical Impression Comments OT screen completed with no skilled OT needs identified   [facility] TM \"6 Clicks\"   2016, Trustees of [facility], under license to [employer].  All rights reserved.   6 Clicks Short Forms Basic Mobility Inpatient Short Form   [facility]  \"6 Clicks\" V.2 Basic Mobility Inpatient Short Form   1. Turning from your back to your side while in a flat bed without using bedrails? 4 - None   2. Moving from lying on your back to sitting on the side of a flat bed without using bedrails? 4 - None   3. Moving to and from a bed to a chair (including a wheelchair)? 4 - None   4. Standing up from a chair using your arms (e.g., wheelchair, or bedside chair)? 4 - None   5. To walk in hospital room? 4 - None   6. Climbing 3-5 steps with a railing? 4 - None   Basic Mobility Raw Score (Score out of 24.Lower scores equate to lower levels of function) 24   Total Evaluation Time   Total Evaluation Time (Minutes) 15     "

## 2017-07-01 NOTE — PLAN OF CARE
Problem: Goal Outcome Summary  Goal: Goal Outcome Summary  PT: PT orders received, initial eval completed and treatment initiated. Patient lives with his wife in a house with stairs to enter and to access his bedroom. Previously independent with all ADLs using a shower chair in his walk-in shower and independent with mobility using a FWW on each floor level at home. Pt presents s/p L3-5 posterior decompression with interspinal fixation without fusion.     Discharge Planner PT   Patient plan for discharge: home with wife assist  Current status: Instructed pt in spine precautions and log roll with bed mobility. Pt demonstrates modified independence with bed mobility after instruction. Pt is modified independent with transfers and gait x 300' with his own FWW, up/down stairs with single railing. Provided handout and education regarding proper posture, positioning and body mechanics with functional tasks. OT screen completed with no skilled OT needs identified.  Barriers to return to prior living situation: none  Recommendations for discharge: Home with wife assist for household tasks  Rationale for recommendations: Pt is mobilizing independently. Pt declined to trial gait without a device. Anticipate pt will progress to independence with gait without a device without further PT intervention. Encouraged ambulation for exercise. Goal met. Will sign off.        Entered by: Naz Tolbert 07/01/2017 10:30 AM

## 2017-07-01 NOTE — PLAN OF CARE
Problem: Goal Outcome Summary  Goal: Goal Outcome Summary  Outcome: Improving  A/O, VSS.  Urine output good per quezada.  Tolerating clears, reg diet.  Oxycodone given x 2 for pain 7-8/10.  Repositioning with log rolling.  Dressing CDI.  CMS intact.

## 2017-07-01 NOTE — PLAN OF CARE
Problem: Goal Outcome Summary  Goal: Goal Outcome Summary  OT:Orders received chart reviewed. Per PT, no skilled OT needs. Will complete order, sign-off.

## 2017-07-01 NOTE — PLAN OF CARE
Problem: Goal Outcome Summary  Goal: Goal Outcome Summary  Outcome: Adequate for Discharge Date Met:  07/01/17  AxOx4. CMS intact. Bowel sounds active and audible in all quadrants. VSS. New dressing applied, CDI. No drain. Up independently. Complains of pain 5/10 declines pain medication. Able to void. Patient discharge to home. All education and discharge instructions reviewed with the patient with all questions answered.

## 2017-07-01 NOTE — PLAN OF CARE
Problem: Goal Outcome Summary  Goal: Goal Outcome Summary  Outcome: Improving  POD #0L3-5 posterior decompression. A&O x4. Neuros intact ; BLE numbness resolved since surgery. VSS. Tolerating regular diet well. Up with A1 + GB + walker. Oxycodone, dilaudid, flexeril and atarax helped control pain. Continue to monitor and follow POC.

## 2017-07-01 NOTE — OP NOTE
Date of surgery: 6/30/2017  Surgeon: Ray Perez MD  Assistant: DEONDRE Ramirez  Note: Dariana Lopez was present for and assisted with the entire surgery, and her role as an assistant was crucial for her aid in positioning, exposure, suctioning, retraction, and closure    Preoperative diagnosis: Neurogenic claudications  Postoperative diagnosis: Neurogenic claudications    Procedure:  1.  L3-4 posterior decompression with interspinous fixation without fusion with Coflex interspinous device  2.  L4-5 posterior decompression with interspinous fixation without fusion with Coflex interspinous device  3.  Use of intraoperative microscope and fluoroscopy    EBL: 50 mL    Indications: 62-year-old male who presented with back pain and neurogenic claudications.  MRI showed severe stenosis and slight spondylolisthesis at L3-4, and L4-5.  He underwent non-operative management with failure to improve.  Risks, benefits, indications, and alternatives were discussed with the patient and his family in detail, and they wished to proceed.    Description of surgery: The patient was positioned prone.  Sterile prepping and draping procedures were performed.  Antibiotics were administered and timeout was performed.  A midline lumbar incision was performed.  The monopolar was used to expose the spinous processes, lamina, and medial facets from L3-L5.  Fluoroscopy was used to verify the correct level.  The Leksell rongeurs was used to remove the interspinous ligament at L3-4, and L4-5.  The high speed drill was then used to perform bilateral laminotomies and medial facetectomies at both L3-4, and L4-5.  The Kerrison rongeurs were then used to remove the Ligamentum flavum at both L3-4, and L4-5, with decompression of the thecal sac and bilateral nerve roots.  The left L5 traversing root was retracted medially, and an extruded disk fragment was removed at L4-5.  Coflex interspinous devices were inserted, and finally compressed into position at  L3-4, and at L4-5.  Fluoroscopy demonstrated excellent positioning of the hardware.  The fascia was closed with 0-Vicryl sutures, and the dermal layer was closed with 2-0 vicryl sutures.  There were no intraprocedural complications.

## 2017-07-01 NOTE — DISCHARGE SUMMARY
"[facility]    Discharge Summary  Neurosurgery    Date of Admission:  6/30/2017  Date of Discharge:  7/1/2017  Discharging Provider: Dariana Lopez  Date of Service (when I saw the patient): 07/01/17    Discharge Diagnoses   Active Problems:    S/P lumbar laminectomy      History of Present Illness   Syd Joyce is an 62 year old male who presented with low back pain with radiculopathy and weakness. Pain has been chronic, but recently worsened over the past week. Pain radiates down posterior aspect of bilateral legs to the ankles. Describes the pain as intermittent and sharp and achy. He was also seen at [facility] ED on 5/26/2017 and MRI was ordered which revealed disc herniation at L4-5. He was also started on a low dose diuretic to help with his BLE edema. He subsequently followed up with his PCP who noted increased weakness, difficulty walking, and and BLE edema who referred him here. Pain worsens with transition with sitting to standing. Pain improves with \"walking it off\". He has been able to sleep well. He has had difficulty walking and difficulty with standing and feels as though his balance his significantly declined. Subsequently underwent L3-5 posterior decompression and interspinous fixation without fusion. Today, he is lying in bed and states he is feeling well. His radicular pain has resolved and only notes some incisional site pain. He has been up out of bed which he states went well. He is eager to discharge home. Denies nausea.Per Dr. Perez may resume aspirin 81mg in 2 weeks.    Hospital Course   Syd Joyce was admitted on 6/30/2017.  The following problems were addressed during his hospitalization:    Active Problems:    S/P lumbar laminectomy    Assessment: s/p L3-5 Koflex    Plan:   -D/c to home today  -[facility] in 10-14 days for stable removal        I have discussed the following assessment and plan with Dr. Perez who is in agreement with initial plan " and will follow up with further consultation recommendations.    Dariana Lopez PA-C  [facility]  [address]  [address] [place] [id number]    Tel [phone]  Pager [phone]        Pending Results   These results will be followed up by Dr. Perez  Unresulted Labs Ordered in the Past 30 Days of this Admission     No orders found for last 61 day(s).          Code Status   Full Code    Primary Care Physician   Shan Arenas    Physical Exam   Temp: 98.1  F (36.7  C) Temp src: Oral BP: 110/60   Heart Rate: 84 Resp: 16 SpO2: 95 % O2 Device: None (Room air) Oxygen Delivery: 1 LPM  Vitals:    06/30/17 0614   Weight: 205 lb 4 oz (93.1 kg)     Vital Signs with Ranges  Temp:  [96.7  F (35.9  C)-98.4  F (36.9  C)] 98.1  F (36.7  C)  Heart Rate:  [59-88] 84  Resp:  [10-20] 16  BP: ()/(60-76) 110/60  SpO2:  [88 %-100 %] 95 %  I/O last 3 completed shifts:  In: 4038.75 [P.O.:740; I.V.:3298.75]  Out: 2400 [Urine:2350; Blood:50]    Constitutional: Awake, alert, cooperative, no apparent distress, and appears stated age.  Eyes: Lids and lashes normal, pupils equal, round, extra ocular muscles intact  ENT: Normocephalic, without obvious abnormality, atraumatic  Respiratory: No increased work of breathing  Skin: No bruising or bleeding, normal skin color, texture, turgor, no redness, warmth, or swelling.  Incision with staples intact covered with dressing CDI.  Musculoskeletal: There is no redness, warmth, or swelling of the joints.  Full range of motion noted.  Motor strength is 5 out of 5 all extremities bilaterally.  Tone is normal.  Neurologic: Awake, alert, oriented to name, place and time.  Cranial nerves II-XII are grossly intact.  Motor is 5 out of 5 bilaterally.     Neuropsychiatric: Calm, normal eye contact, alert, normal affect, oriented to self, place, time and situation, memory for past and recent events intact and thought process normal.       Time Spent on  this Encounter   I, Dariana Lopez, personally saw the patient today and spent less than or equal to 30 minutes discharging this patient.    Discharge Disposition   Discharged to home  Condition at discharge: Stable    Consultations This Hospital Stay   OCCUPATIONAL THERAPY ADULT IP CONSULT  PHYSICAL THERAPY ADULT IP CONSULT  HOSPITALIST IP CONSULT    Discharge Orders     XR Lumbar Spine 2/3 Views     Follow-up and recommended labs and tests    Please follow up at the [facility] in 10-14 days for staple removal.  Please call the clinic at [phone] to schedule your appointment with Dariana Lopez PA-C or Devin Naylor PA-C.     Reason for your hospital stay   You were in the hospital for lumbar surgery.     Activity   Discharge instructions:  No lifting of more than 10 pounds, no bending, no twisting until follow up visit.    Ok to shampoo hair, shower or bathe, but, do not scrub or submerge incision under water until evaluated post-operatively in clinic.    Ok to walk as tolerated, avoid bed rest and prolonged sitting.    No contact sports until after follow up visit  No high impact activities such as; running/jogging, snowmobile or 4 sanchez riding or any other recreational vehicles.    Do not take NSAIDS (ibuprofen, advil, aleve, naproxen, etc) for pain control.    Do NOT drive while taking narcotic pain medication.    Call the [facility] at [phone] for increasing redness, swelling or pus draining from the incision, increased pain or any other questions and concerns.     Wound care and dressings   Keep your incision clean and dry at all times.  OK to remove dressing on postop day 2.  OK to shower on postop day 3 and allow water to run over incision, pat dry after shower.  No bathing swimming or submerging in water.  Call immediately or come to ED if any drainage occurs, or you develop new pain, redness, or swelling.     Full Code     Diet   Follow this diet upon discharge: Orders Placed  This Encounter   Home diet       Discharge Medications   Current Discharge Medication List      START taking these medications    Details   cyclobenzaprine (FLEXERIL) 10 MG tablet Take 0.5-1 tablets (5-10 mg) by mouth 3 times daily as needed for muscle spasms  Qty: 90 tablet, Refills: 1    Associated Diagnoses: S/P lumbar laminectomy         CONTINUE these medications which have CHANGED    Details   morphine (MS CONTIN) 15 MG 12 hr tablet Take 1 tablet (15 mg) by mouth every 12 hours maximum 2 tablet(s) per day  Qty: 60 tablet, Refills: 0    Associated Diagnoses: S/P lumbar laminectomy         CONTINUE these medications which have NOT CHANGED    Details   AmLODIPine Besylate (NORVASC PO) Take 10 mg by mouth every morning      Carvedilol (COREG PO) Take 12.5 mg by mouth 2 times daily (with meals)      Gabapentin (NEURONTIN PO) Take 900 mg by mouth daily (with dinner) (patient takes 3 X 300 mg = 900 mg dose)      Linagliptin (TRADJENTA PO) Take 5 mg by mouth every morning      Losartan Potassium (COZAAR PO) Take 100 mg by mouth every morning      MetFORMIN HCl (GLUCOPHAGE XR PO) Take 1,000 mg by mouth every morning (patient takes 2 X 500 mg = 1,000 mg dose)      Pantoprazole Sodium (PROTONIX PO) Take 40 mg by mouth daily (with dinner) (at 17:00)      Simvastatin (ZOCOR PO) Take 10 mg by mouth daily (with dinner)      Zolpidem Tartrate (AMBIEN PO) Take 10 mg by mouth At Bedtime      Calcium Carbonate Antacid (TUMS PO) Take 2 chew tab by mouth daily (with dinner)      Esomeprazole Magnesium (NEXIUM PO) Take 20 mg by mouth daily (with dinner) (at 17:00)      lisinopril-hydrochlorothiazide (PRINZIDE,ZESTORETIC) 20-12.5 MG per tablet Take 1 tablet by mouth daily  Qty: 90 tablet, Refills: 3    Associated Diagnoses: Essential hypertension with goal blood pressure less than 130/80      order for DME One tens unit  Qty: 1 Device, Refills: 0    Associated Diagnoses: Lumbar radiculopathy      blood glucose monitoring (ACCU-CHEK  MED PLUS) test strip USE 4 TO 5 TIMES PER WEEK OR AS DIRECTED  Qty: 150 each, Refills: 3    Associated Diagnoses: Refill clinic medication management patient      blood glucose monitoring (ACCU-CHEK MED PLUS) meter device kit Use to test blood sugar 4 to 5 times weekly or as directed.  Qty: 1 kit, Refills: 0    Associated Diagnoses: Refill clinic medication management patient         STOP taking these medications       ASPIRIN EC PO Comments:   Reason for Stopping:             Allergies   No Known Allergies    Discharge Orders   Discharge Medications   Current Discharge Medication List      START taking these medications    Details   cyclobenzaprine (FLEXERIL) 10 MG tablet Take 0.5-1 tablets (5-10 mg) by mouth 3 times daily as needed for muscle spasms  Qty: 90 tablet, Refills: 1    Associated Diagnoses: S/P lumbar laminectomy         CONTINUE these medications which have CHANGED    Details   morphine (MS CONTIN) 15 MG 12 hr tablet Take 1 tablet (15 mg) by mouth every 12 hours maximum 2 tablet(s) per day  Qty: 60 tablet, Refills: 0    Associated Diagnoses: S/P lumbar laminectomy         CONTINUE these medications which have NOT CHANGED    Details   AmLODIPine Besylate (NORVASC PO) Take 10 mg by mouth every morning      Carvedilol (COREG PO) Take 12.5 mg by mouth 2 times daily (with meals)      Gabapentin (NEURONTIN PO) Take 900 mg by mouth daily (with dinner) (patient takes 3 X 300 mg = 900 mg dose)      Linagliptin (TRADJENTA PO) Take 5 mg by mouth every morning      Losartan Potassium (COZAAR PO) Take 100 mg by mouth every morning      MetFORMIN HCl (GLUCOPHAGE XR PO) Take 1,000 mg by mouth every morning (patient takes 2 X 500 mg = 1,000 mg dose)      Pantoprazole Sodium (PROTONIX PO) Take 40 mg by mouth daily (with dinner) (at 17:00)      Simvastatin (ZOCOR PO) Take 10 mg by mouth daily (with dinner)      Calcium Carbonate Antacid (TUMS PO) Take 2 chew tab by mouth daily (with dinner)      Esomeprazole  Magnesium (NEXIUM PO) Take 20 mg by mouth daily (with dinner) (at 17:00)      lisinopril-hydrochlorothiazide (PRINZIDE,ZESTORETIC) 20-12.5 MG per tablet Take 1 tablet by mouth daily  Qty: 90 tablet, Refills: 3    Associated Diagnoses: Essential hypertension with goal blood pressure less than 130/80      order for DME One tens unit  Qty: 1 Device, Refills: 0    Associated Diagnoses: Lumbar radiculopathy      blood glucose monitoring (ACCU-CHEK MED PLUS) test strip USE 4 TO 5 TIMES PER WEEK OR AS DIRECTED  Qty: 150 each, Refills: 3    Associated Diagnoses: Refill clinic medication management patient      blood glucose monitoring (ACCU-CHEK MED PLUS) meter device kit Use to test blood sugar 4 to 5 times weekly or as directed.  Qty: 1 kit, Refills: 0    Associated Diagnoses: Refill clinic medication management patient         STOP taking these medications       ASPIRIN EC PO Comments:   Reason for Stopping:         Zolpidem Tartrate (AMBIEN PO) Comments:   Reason for Stopping:             Allergies   No Known Allergies

## 2017-07-03 ENCOUNTER — TELEPHONE (OUTPATIENT)
Dept: NEUROSURGERY | Facility: CLINIC | Age: 62
End: 2017-07-03

## 2017-07-03 DIAGNOSIS — Z98.890 S/P LUMBAR LAMINECTOMY: Primary | ICD-10-CM

## 2017-07-03 RX ORDER — CYCLOBENZAPRINE HCL 10 MG
5-10 TABLET ORAL 3 TIMES DAILY PRN
Qty: 90 TABLET | Refills: 1 | Status: SHIPPED | OUTPATIENT
Start: 2017-07-03 | End: 2018-02-14

## 2017-07-03 RX ORDER — METHYLPREDNISOLONE 4 MG
TABLET, DOSE PACK ORAL
Qty: 21 TABLET | Refills: 0 | Status: SHIPPED | OUTPATIENT
Start: 2017-07-03 | End: 2018-02-14

## 2017-07-03 NOTE — TELEPHONE ENCOUNTER
I did send a prescription for Flexeril and for a Medrol Dosepak over to his [facility] pharmacy in [place].    Devin Naylor

## 2017-07-03 NOTE — TELEPHONE ENCOUNTER
Pt states he was prescribed Flexeril but it never got filled. He has morphine but reports that is not helping with the pain and he can't walk. Requesting a call back asap.

## 2017-07-10 ENCOUNTER — OFFICE VISIT (OUTPATIENT)
Dept: NEUROSURGERY | Facility: CLINIC | Age: 62
End: 2017-07-10
Attending: PHYSICIAN ASSISTANT
Payer: COMMERCIAL

## 2017-07-10 VITALS
DIASTOLIC BLOOD PRESSURE: 62 MMHG | BODY MASS INDEX: 30.89 KG/M2 | OXYGEN SATURATION: 96 % | WEIGHT: 209.2 LBS | SYSTOLIC BLOOD PRESSURE: 118 MMHG | HEART RATE: 92 BPM

## 2017-07-10 DIAGNOSIS — K21.9 GASTROESOPHAGEAL REFLUX DISEASE WITHOUT ESOPHAGITIS: ICD-10-CM

## 2017-07-10 DIAGNOSIS — Z76.0 ENCOUNTER FOR MEDICATION REFILL: Primary | ICD-10-CM

## 2017-07-10 DIAGNOSIS — Z98.890 S/P LUMBAR LAMINECTOMY: Primary | ICD-10-CM

## 2017-07-10 PROCEDURE — 99024 POSTOP FOLLOW-UP VISIT: CPT | Performed by: PHYSICIAN ASSISTANT

## 2017-07-10 PROCEDURE — 40000269 ZZH STATISTIC NO CHARGE FACILITY FEE: Performed by: PHYSICIAN ASSISTANT

## 2017-07-10 NOTE — MR AVS SNAPSHOT
After Visit Summary   7/10/2017    Syd Joyce    MRN: [id number]           Patient Information     Date Of Birth          1955        Visit Information        Provider Department      7/10/2017 10:30 AM Dariana Lopez PA-C [facility] Neurosurgery [facility]        Today's Diagnoses     S/P lumbar laminectomy    -  1       Follow-ups after your visit        Who to contact     If you have questions or need follow up information about today's clinic visit or your schedule please contact [facility] NEUROSURGERY [facility] directly at [phone].  Normal or non-critical lab and imaging results will be communicated to you by [employer], letter or phone within 4 business days after the clinic has received the results. If you do not hear from us within 7 days, please contact the clinic through [employer] or phone. If you have a critical or abnormal lab result, we will notify you by phone as soon as possible.  Submit refill requests through [employer] or call your pharmacy and they will forward the refill request to us. Please allow 3 business days for your refill to be completed.          Additional Information About Your Visit        MyChart Information     [employer] gives you secure access to your electronic health record. If you see a primary care provider, you can also send messages to your care team and make appointments. If you have questions, please call your primary care clinic.  If you do not have a primary care provider, please call [phone] and they will assist you.        Care EveryWhere ID     This is your Care EveryWhere ID. This could be used by other organizations to access your [place] medical records  [id number]        Your Vitals Were     Pulse Pulse Oximetry BMI (Body Mass Index)             92 96% 30.89 kg/m2          Blood Pressure from Last 3 Encounters:   07/10/17 118/62   07/01/17 115/64   06/19/17 124/70    Weight from Last 3 Encounters:   07/10/17 209 lb 3.2 oz  (94.9 kg)   06/30/17 205 lb 4 oz (93.1 kg)   06/19/17 208 lb (94.3 kg)              Today, you had the following     No orders found for display       Primary Care Provider Office Phone # Fax #    Shan Arenas [phone]       [facility] [address]  [facility] [address]        Equal Access to Services     GAIL GRIFFITHS : Hadii aad ku hadasho Soomaali, waaxda luqadaha, qaybta kaalmada adeegyada, waxay idiin hayaan adeeg kharash la'flavio . So [facility] [phone].    ATENCIÓN: Si habla español, tiene a persaud disposición servicios gratuitos de asistencia lingüística. Llame al [phone].    We comply with applicable federal civil rights laws and [place] laws. We do not discriminate on the basis of race, color, national origin, age, disability sex, sexual orientation or gender identity.            Thank you!     Thank you for choosing [facility] NEUROSURGERY [facility]  for your care. Our goal is always to provide you with excellent care. Hearing back from our patients is one way we can continue to improve our services. Please take a few minutes to complete the written survey that you may receive in the mail after your visit with us. Thank you!             Your Updated Medication List - Protect others around you: Learn how to safely use, store and throw away your medicines at www.disposemymeds.org.          This list is accurate as of: 7/10/17 10:50 AM.  Always use your most recent med list.                   Brand Name Dispense Instructions for use Diagnosis    blood glucose monitoring meter device kit     1 kit    Use to test blood sugar 4 to 5 times weekly or as directed.    Refill clinic medication management patient       blood glucose monitoring test strip    ACCU-CHEK MED PLUS    150 each    USE 4 TO 5 TIMES PER WEEK OR AS DIRECTED    Refill clinic medication management patient       COREG PO      Take 12.5 mg by mouth 2 times daily (with meals)        COZAAR PO       Take 100 mg by mouth every morning        * cyclobenzaprine 10 MG tablet    FLEXERIL    90 tablet    Take 0.5-1 tablets (5-10 mg) by mouth 3 times daily as needed for muscle spasms    S/P lumbar laminectomy       * cyclobenzaprine 10 MG tablet    FLEXERIL    90 tablet    Take 0.5-1 tablets (5-10 mg) by mouth 3 times daily as needed for muscle spasms    S/P lumbar laminectomy       GLUCOPHAGE XR PO      Take 1,000 mg by mouth every morning (patient takes 2 X 500 mg = 1,000 mg dose)        lisinopril-hydrochlorothiazide 20-12.5 MG per tablet    PRINZIDE/ZESTORETIC    90 tablet    Take 1 tablet by mouth daily    Essential hypertension with goal blood pressure less than 130/80       methylPREDNISolone 4 MG tablet    MEDROL DOSEPAK    21 tablet    Follow package instructions    S/P lumbar laminectomy       morphine 15 MG 12 hr tablet    MS CONTIN    60 tablet    Take 1 tablet (15 mg) by mouth every 12 hours maximum 2 tablet(s) per day    S/P lumbar laminectomy       NEURONTIN PO      Take 900 mg by mouth daily (with dinner) (patient takes 3 X 300 mg = 900 mg dose)        NEXIUM PO      Take 20 mg by mouth daily (with dinner) (at 17:00)        NORVASC PO      Take 10 mg by mouth every morning        order for DME     1 Device    One tens unit    Lumbar radiculopathy       PROTONIX PO      Take 40 mg by mouth daily (with dinner) (at 17:00)        TRADJENTA PO      Take 5 mg by mouth every morning        TUMS PO      Take 2 chew tab by mouth daily (with dinner)        ZOCOR PO      Take 10 mg by mouth daily (with dinner)        * Notice:  This list has 2 medication(s) that are the same as other medications prescribed for you. Read the directions carefully, and ask your doctor or other care provider to review them with you.

## 2017-07-10 NOTE — NURSING NOTE
"Syd Joyce is a 62 year old male who presents for:  Chief Complaint   Patient presents with     Neurologic Problem     suture removal, s/p lumbar discectomy 6/30/17        Initial Vitals:  /62 (BP Location: Right arm, Patient Position: Chair, Cuff Size: Adult Large)  Pulse 92  Wt 209 lb 3.2 oz (94.9 kg)  SpO2 96%  BMI 30.89 kg/m2 Estimated body mass index is 30.89 kg/(m^2) as calculated from the following:    Height as of 6/30/17: 5' 9\" (1.753 m).    Weight as of this encounter: 209 lb 3.2 oz (94.9 kg).. Body surface area is 2.15 meters squared. BP completed using cuff size: large  Data Unavailable    Do you feel safe in your environment?  Yes  Do you need any refills today? No    Nursing Comments:suture removal, s/p lumbar discectomy 6/30/17 .  Patient rates 3 pain today as 7/10/17      5 min. nursing intake time  Rosalia Rabago CMA      Discharge plan: See PA dictation  2 min. nursing discharge time  Rosalia Rabago CMA         "

## 2017-07-10 NOTE — TELEPHONE ENCOUNTER
pantoprazole (PROTONIX) 40 MG EC tablet, LAST OV: 6/19/2017  TSH:6/19/2017  LIPID:6/19/2017  CMP:6/19/2017  CBC: 6/19/2017

## 2017-07-11 DIAGNOSIS — E11.43 TYPE 2 DIABETES MELLITUS WITH DIABETIC AUTONOMIC NEUROPATHY, WITHOUT LONG-TERM CURRENT USE OF INSULIN (H): ICD-10-CM

## 2017-07-11 DIAGNOSIS — I10 ESSENTIAL HYPERTENSION WITH GOAL BLOOD PRESSURE LESS THAN 130/80: ICD-10-CM

## 2017-07-11 RX ORDER — PANTOPRAZOLE SODIUM 40 MG/1
TABLET, DELAYED RELEASE ORAL
Qty: 90 TABLET | Refills: 0 | Status: SHIPPED | OUTPATIENT
Start: 2017-07-11 | End: 2017-10-18

## 2017-07-13 RX ORDER — CARVEDILOL 12.5 MG/1
TABLET ORAL
Qty: 180 TABLET | Refills: 0 | Status: SHIPPED | OUTPATIENT
Start: 2017-07-13 | End: 2018-02-03

## 2017-07-13 RX ORDER — METFORMIN HCL 500 MG
TABLET, EXTENDED RELEASE 24 HR ORAL
Qty: 180 TABLET | Refills: 0 | Status: SHIPPED | OUTPATIENT
Start: 2017-07-13 | End: 2018-01-13

## 2017-07-26 DIAGNOSIS — E78.00 HYPERCHOLESTEREMIA: ICD-10-CM

## 2017-07-26 NOTE — TELEPHONE ENCOUNTER
simvastatin last OV 6/1/17 last lipid 6/19/17  Rylie Clark MA July 26, 2017 1:54 PM    Recent Labs   Lab Test  06/19/17   1337  10/17/16   1017   CHOL  160  170   HDL  56  87   LDL  71  67   TRIG  167*  79

## 2017-07-28 RX ORDER — SIMVASTATIN 10 MG
TABLET ORAL
Qty: 90 TABLET | Refills: 3 | Status: ON HOLD | OUTPATIENT
Start: 2017-07-28 | End: 2018-02-21

## 2017-08-03 DIAGNOSIS — Z76.0 ENCOUNTER FOR MEDICATION REFILL: Primary | ICD-10-CM

## 2017-08-03 RX ORDER — ZOLPIDEM TARTRATE 10 MG/1
TABLET ORAL
Qty: 90 TABLET | Refills: 0 | Status: SHIPPED | OUTPATIENT
Start: 2017-08-03 | End: 2017-11-08

## 2017-08-07 ENCOUNTER — OFFICE VISIT (OUTPATIENT)
Dept: NEUROSURGERY | Facility: CLINIC | Age: 62
End: 2017-08-07
Attending: PHYSICIAN ASSISTANT
Payer: COMMERCIAL

## 2017-08-07 VITALS
SYSTOLIC BLOOD PRESSURE: 120 MMHG | WEIGHT: 218 LBS | DIASTOLIC BLOOD PRESSURE: 76 MMHG | BODY MASS INDEX: 32.19 KG/M2 | OXYGEN SATURATION: 97 % | TEMPERATURE: 97.5 F | HEART RATE: 92 BPM

## 2017-08-07 DIAGNOSIS — Z98.890 S/P LUMBAR LAMINECTOMY: ICD-10-CM

## 2017-08-07 DIAGNOSIS — Z98.890 S/P LUMBAR LAMINECTOMY: Primary | ICD-10-CM

## 2017-08-07 PROCEDURE — 99211 OFF/OP EST MAY X REQ PHY/QHP: CPT | Performed by: PHYSICIAN ASSISTANT

## 2017-08-07 PROCEDURE — 99024 POSTOP FOLLOW-UP VISIT: CPT | Performed by: PHYSICIAN ASSISTANT

## 2017-08-07 PROCEDURE — 72100 X-RAY EXAM L-S SPINE 2/3 VWS: CPT

## 2017-08-07 NOTE — MR AVS SNAPSHOT
After Visit Summary   8/7/2017    Syd Joyce    MRN: [id number]           Patient Information     Date Of Birth          1955        Visit Information        Provider Department      8/7/2017 10:10 AM Dariana Lopez PA-C [facility] Neurosurgery [facility]        Today's Diagnoses     S/P lumbar laminectomy    -  1       Follow-ups after your visit        Future tests that were ordered for you today     Open Future Orders        Priority Expected Expires Ordered    XR Lumbar Spine 2/3 Views Routine 9/18/2017 8/7/2018 8/7/2017            Who to contact     If you have questions or need follow up information about today's clinic visit or your schedule please contact [facility] NEUROSURGERY [facility] directly at [phone].  Normal or non-critical lab and imaging results will be communicated to you by [employer], letter or phone within 4 business days after the clinic has received the results. If you do not hear from us within 7 days, please contact the clinic through [employer] or phone. If you have a critical or abnormal lab result, we will notify you by phone as soon as possible.  Submit refill requests through [employer] or call your pharmacy and they will forward the refill request to us. Please allow 3 business days for your refill to be completed.          Additional Information About Your Visit        MyChart Information     [employer] gives you secure access to your electronic health record. If you see a primary care provider, you can also send messages to your care team and make appointments. If you have questions, please call your primary care clinic.  If you do not have a primary care provider, please call [phone] and they will assist you.        Care EveryWhere ID     This is your Care EveryWhere ID. This could be used by other organizations to access your [place] medical records  [id number]        Your Vitals Were     Pulse Temperature Pulse Oximetry BMI (Body Mass Index)           92 97.5  F (36.4  C) (Oral) 97% 32.19 kg/m2         Blood Pressure from Last 3 Encounters:   08/07/17 120/76   07/10/17 118/62   07/01/17 115/64    Weight from Last 3 Encounters:   08/07/17 218 lb (98.9 kg)   07/10/17 209 lb 3.2 oz (94.9 kg)   06/30/17 205 lb 4 oz (93.1 kg)               Primary Care Provider Office Phone # Fax #    Shan Arenas [phone]       [facility] [address]  [facility] [address]        Equal Access to Services     GAIL GRIFFITHS : Hadii eliana esquivel hadasho Soomaali, waaxda luqadaha, qaybta kaalmada adeegyada, jose elias. So [facility] [phone].    ATENCIÓN: Si habla español, tiene a persaud disposición servicios gratuitos de asistencia lingüística. Llame al [phone].    We comply with applicable federal civil rights laws and [place] laws. We do not discriminate on the basis of race, color, national origin, age, disability sex, sexual orientation or gender identity.            Thank you!     Thank you for choosing [facility] NEUROSURGERY [facility]  for your care. Our goal is always to provide you with excellent care. Hearing back from our patients is one way we can continue to improve our services. Please take a few minutes to complete the written survey that you may receive in the mail after your visit with us. Thank you!             Your Updated Medication List - Protect others around you: Learn how to safely use, store and throw away your medicines at www.disposemymeds.org.          This list is accurate as of: 8/7/17 10:21 AM.  Always use your most recent med list.                   Brand Name Dispense Instructions for use Diagnosis    blood glucose monitoring meter device kit     1 kit    Use to test blood sugar 4 to 5 times weekly or as directed.    Refill clinic medication management patient       blood glucose monitoring test strip    ACCU-CHEK MED PLUS    150 each    USE 4 TO 5 TIMES PER WEEK OR  AS DIRECTED    Refill clinic medication management patient       * COREG PO      Take 12.5 mg by mouth 2 times daily (with meals)        * carvedilol 12.5 MG tablet    COREG    180 tablet    TAKE 1 TABLET BY MOUTH TWICE DAILY WITH MEALS    Essential hypertension with goal blood pressure less than 130/80       COZAAR PO      Take 100 mg by mouth every morning        * cyclobenzaprine 10 MG tablet    FLEXERIL    90 tablet    Take 0.5-1 tablets (5-10 mg) by mouth 3 times daily as needed for muscle spasms    S/P lumbar laminectomy       * cyclobenzaprine 10 MG tablet    FLEXERIL    90 tablet    Take 0.5-1 tablets (5-10 mg) by mouth 3 times daily as needed for muscle spasms    S/P lumbar laminectomy       * GLUCOPHAGE XR PO      Take 1,000 mg by mouth every morning (patient takes 2 X 500 mg = 1,000 mg dose)        * metFORMIN 500 MG 24 hr tablet    GLUCOPHAGE-XR    180 tablet    TAKE 2 TABLETS BY MOUTH EVERY MORNING    Type 2 diabetes mellitus with diabetic autonomic neuropathy, without long-term current use of insulin (H)       lisinopril-hydrochlorothiazide 20-12.5 MG per tablet    PRINZIDE/ZESTORETIC    90 tablet    Take 1 tablet by mouth daily    Essential hypertension with goal blood pressure less than 130/80       methylPREDNISolone 4 MG tablet    MEDROL DOSEPAK    21 tablet    Follow package instructions    S/P lumbar laminectomy       morphine 15 MG 12 hr tablet    MS CONTIN    60 tablet    Take 1 tablet (15 mg) by mouth every 12 hours maximum 2 tablet(s) per day    S/P lumbar laminectomy       NEURONTIN PO      Take 900 mg by mouth daily (with dinner) (patient takes 3 X 300 mg = 900 mg dose)        NEXIUM PO      Take 20 mg by mouth daily (with dinner) (at 17:00)        NORVASC PO      Take 10 mg by mouth every morning        order for DME     1 Device    One tens unit    Lumbar radiculopathy       * PROTONIX PO      Take 40 mg by mouth daily (with dinner) (at 17:00)        * pantoprazole 40 MG EC tablet     PROTONIX    90 tablet    TAKE 1 TABLET(40 MG) BY MOUTH DAILY    Encounter for medication refill       TRADJENTA PO      Take 5 mg by mouth every morning        TUMS PO      Take 2 chew tab by mouth daily (with dinner)        * ZOCOR PO      Take 10 mg by mouth daily (with dinner)        * simvastatin 10 MG tablet    ZOCOR    90 tablet    TAKE 1 TABLET BY MOUTH EVERY NIGHT AT BEDTIME    Hypercholesteremia       zolpidem 10 MG tablet    AMBIEN    90 tablet    TAKE 1 TABLET BY MOUTH EVERY NIGHT AT BEDTIME.    Encounter for medication refill       * Notice:  This list has 10 medication(s) that are the same as other medications prescribed for you. Read the directions carefully, and ask your doctor or other care provider to review them with you.

## 2017-08-07 NOTE — NURSING NOTE
"Syd Joyce is a 62 year old male who presents for:  Chief Complaint   Patient presents with     Neurologic Problem     s/p lumbar discectomy 6-30-17        Initial Vitals:  There were no vitals taken for this visit. Estimated body mass index is 30.89 kg/(m^2) as calculated from the following:    Height as of 6/30/17: 5' 9\" (1.753 m).    Weight as of 7/10/17: 209 lb 3.2 oz (94.9 kg).. There is no height or weight on file to calculate BSA. BP completed using cuff size: regular  Data Unavailable    Do you feel safe in your environment?  Yes  Do you need any refills today? No    Nursing Comments: s/p lumbar discectomy 6-30-17.  Patient rates his pain today as 0 just tight      5 min. nursing intake time  Genevieve River CMA, AAS      Discharge plan:   See providers dictation   2 min. nursing discharge time  Genevieve River CMA, AAS       "

## 2017-08-07 NOTE — PROGRESS NOTES
[facility]  Neurosurgery followup:    HPI: 6 weeks s/p L3-5 posterior decompression and Coflex interspinous fixation without fusion. He states his radicular pain and weakness has improved significantly from pre-operatively and mainly only has some low back tightness at this time. He has not been taking any pain medication as he feels good.  Exam:  Constitutional:  Alert, well nourished, NAD.  HEENT: Normocephalic, atraumatic.   Pulm:  Without shortness of breath   CV:  No pitting edema of BLE.      Neurological:  Awake  Alert  Oriented x 3  Motor exam:        IP Q DF PF EHL  R   5  5   5   5    5  L   5  5   5   5    5     Reflexes are 2+ in the patellar and Achilles. There is no clonus. Downgoing Babinski.    Reflexes are 2+ in the brachial radialis and triceps. Negative Lucius sign bilaterally.    Able to spontaneously move L/E bilaterally  Sensation intact throughout all L/E dermatomes     Incision: Healing nicely.  Imaging: AP and lateral lumbar xray reveals stable hardware.  A/P: 6 weeks s/p L3-5 posterior decompression and Coflex interspinous fixation without fusion. Overall, he is doing well and his radicular pain and weakness has improved. Imaging looks good. Filled out paperwork to return to work (7/11/2017) with normal 6 week post-op restrictions.  - May increase lifting restriction to 20 pounds   - followup in 6 weeks with xray prior   - Call the clinic at [phone] for increasing redness, swelling or pus draining from the incision, increased pain or any other questions and concerns.      Dariana Lopez PA-C  [facility]  [address]  [address] [place] [address]    Tel [phone]  Pager [phone]

## 2017-09-20 ENCOUNTER — RADIANT APPOINTMENT (OUTPATIENT)
Dept: GENERAL RADIOLOGY | Facility: CLINIC | Age: 62
End: 2017-09-20
Attending: PHYSICIAN ASSISTANT
Payer: COMMERCIAL

## 2017-09-20 ENCOUNTER — OFFICE VISIT (OUTPATIENT)
Dept: NEUROSURGERY | Facility: CLINIC | Age: 62
End: 2017-09-20
Attending: PHYSICIAN ASSISTANT
Payer: COMMERCIAL

## 2017-09-20 VITALS
DIASTOLIC BLOOD PRESSURE: 78 MMHG | BODY MASS INDEX: 32.19 KG/M2 | HEART RATE: 95 BPM | SYSTOLIC BLOOD PRESSURE: 137 MMHG | OXYGEN SATURATION: 97 % | WEIGHT: 218 LBS | TEMPERATURE: 99.5 F

## 2017-09-20 DIAGNOSIS — Z98.890 S/P LUMBAR LAMINECTOMY: ICD-10-CM

## 2017-09-20 DIAGNOSIS — Z98.890 S/P LUMBAR LAMINECTOMY: Primary | ICD-10-CM

## 2017-09-20 PROCEDURE — 72100 X-RAY EXAM L-S SPINE 2/3 VWS: CPT

## 2017-09-20 PROCEDURE — 99024 POSTOP FOLLOW-UP VISIT: CPT | Performed by: PHYSICIAN ASSISTANT

## 2017-09-20 PROCEDURE — 99211 OFF/OP EST MAY X REQ PHY/QHP: CPT | Performed by: PHYSICIAN ASSISTANT

## 2017-09-20 RX ORDER — CYCLOBENZAPRINE HCL 10 MG
5-10 TABLET ORAL 3 TIMES DAILY PRN
Qty: 90 TABLET | Refills: 1 | Status: SHIPPED | OUTPATIENT
Start: 2017-09-20 | End: 2018-06-21

## 2017-09-20 NOTE — PROGRESS NOTES
[facility]  Neurosurgery followup:    HPI: 3 months s/p L3-5 posterior decompression and Coflex interspinous fixation. Sates he is doing wella nd rates his current pain 0/10. He would like to increase some of his restrictions at work but continue with his standing restriction of 5-10 minutes as this seems to cause him the most pain. He would also like to discuss starting PT for improvement of strength and gait. Denies radicular pain or weakness.  Exam:  Constitutional:  Alert, well nourished, NAD.  HEENT: Normocephalic, atraumatic.   Pulm:  Without shortness of breath   CV:  No pitting edema of BLE.      Neurological:  Awake  Alert  Oriented x 3  Motor exam:        IP Q DF PF EHL  R   5  5   5   5    5  L   5  5   5   5    5     Reflexes are 2+ in the patellar and Achilles. There is no clonus. Downgoing Babinski.    Reflexes are 2+ in the brachial radialis and triceps. Negative Lucius sign bilaterally.    Able to spontaneously move L/E bilaterally  Sensation intact throughout all L/E dermatomes     Incision: Nicely healed.  Imaging: AP and lateral lumbar films reveal stable hardware.  A/P: 3 months s/p L3-5 posterior decompression and Coflex interspinous fixation. Doing well and is pain free. Images reveal stable hardware. Updated work restriction to light work (20 pounds) and continued 5-10 minute standing restriction. I have also placed a referral for PT and refilled his flexeril.  - followup in 3 months with xray prior   - Call the clinic at [phone] for increased pain or any other questions and concerns.      Dariana Lopez PA-C  [facility]  [address]  [address] [place] [address]    Tel [phone]  Pager [phone]

## 2017-09-20 NOTE — NURSING NOTE
"Syd Joyce is a 62 year old male who presents for:  Chief Complaint   Patient presents with     Neurologic Problem     s/p lumbar discectomy 6-30-17        Initial Vitals:  There were no vitals taken for this visit. Estimated body mass index is 32.19 kg/(m^2) as calculated from the following:    Height as of 6/30/17: 5' 9\" (1.753 m).    Weight as of 8/7/17: 218 lb (98.9 kg).. There is no height or weight on file to calculate BSA. BP completed using cuff size: large  Data Unavailable    Do you feel safe in your environment?  Yes  Do you need any refills today? Yes  cyclobenzaprine    Nursing Comments: s/p lumbar discectomy 6-30-17.  Patient rates his pain today as 0      5 min. nursing intake time  Genevieve River CMA, AAS      Discharge plan:   See providers dictation   2 min. nursing discharge time  Genevieve River CMA, AAS       "

## 2017-09-20 NOTE — MR AVS SNAPSHOT
After Visit Summary   9/20/2017    Syd Joyce    MRN: [id number]           Patient Information     Date Of Birth          1955        Visit Information        Provider Department      9/20/2017 11:25 AM Dariana Lopez PA-C [facility]        Today's Diagnoses     S/P lumbar laminectomy    -  1       Follow-ups after your visit        Additional Services     [facility] PT, HAND, AND CHIROPRACTIC REFERRAL       **This order will print in the [facility] Scheduling Office**    Physical Therapy, Hand Therapy and Chiropractic Care are available through:    *[facility]  *[facility]  *[facility]    Call one number to schedule at any of the above locations: [phone].    Your provider has referred you to: Physical Therapy at [facility] or [facility]    Indication/Reason for Referral: Improve leg strength and gait s/p lumbar surgery  Onset of Illness:   Therapy Orders: Evaluate and Treat  Special Programs: None  Special Request: None    Mj Barker      Additional Comments for the Therapist or Chiropractor:     Please be aware that coverage of these services is subject to the terms and limitations of your health insurance plan.  Call member services at your health plan with any benefit or coverage questions.      Please bring the following to your appointment:    *Your personal calendar for scheduling future appointments  *Comfortable clothing                  Your next 10 appointments already scheduled     Dec 20, 2017 10:35 AM CST   XR LUMBAR SPINE 2/3 VIEWS with [facility]   [facility]    [address] [facility] [address] [address] [phone]           Please bring a list of your current medicines to your exam. (Include vitamins, minerals and over-thecounter medicines.) Leave your valuables at home.  Tell your doctor if there is a chance you may be pregnant.  You do not need to do anything  special for this exam.            Dec 20, 2017 11:05 AM CST   Return Visit with Dariana Lopez PA-C   [facility] ([facility])    [address] [place] [address] [phone]              Future tests that were ordered for you today     Open Future Orders        Priority Expected Expires Ordered    XR Lumbar Spine 2/3 Views Routine 12/20/2017 9/20/2018 9/20/2017            Who to contact     If you have questions or need follow up information about today's clinic visit or your schedule please contact [facility] directly at [phone].  Normal or non-critical lab and imaging results will be communicated to you by PolicyBazaarhart, letter or phone within 4 business days after the clinic has received the results. If you do not hear from us within 7 days, please contact the clinic through PolicyBazaarhart or phone. If you have a critical or abnormal lab result, we will notify you by phone as soon as possible.  Submit refill requests through Qpyn or call your pharmacy and they will forward the refill request to us. Please allow 3 business days for your refill to be completed.          Additional Information About Your Visit        MyChart Information     Qpyn gives you secure access to your electronic health record. If you see a primary care provider, you can also send messages to your care team and make appointments. If you have questions, please call your primary care clinic.  If you do not have a primary care provider, please call [phone] and they will assist you.        Care EveryWhere ID     This is your Care EveryWhere ID. This could be used by other organizations to access your [facility] medical records  [id number]        Your Vitals Were     Pulse Temperature Pulse Oximetry BMI (Body Mass Index)          95 99.5  F (37.5  C) (Oral) 97% 32.19 kg/m2         Blood Pressure from Last 3 Encounters:   09/20/17 137/78    08/07/17 120/76   07/10/17 118/62    Weight from Last 3 Encounters:   09/20/17 218 lb (98.9 kg)   08/07/17 218 lb (98.9 kg)   07/10/17 209 lb 3.2 oz (94.9 kg)              We Performed the Following     [facility] PT, HAND, AND CHIROPRACTIC REFERRAL          Where to get your medicines      These medications were sent to [facility] [address] AT [facility] [address]  [address], [facility] [address]     Phone:  [phone]     cyclobenzaprine 10 MG tablet          Primary Care Provider Office Phone # Fax #    Shan Arenas [phone] [address]  [facility] [address]        Equal Access to Services     [facility] Hospital[facility]ADDIE : Hadii aad sierra hadasho Soomaali, waaxda luqadaha, qaybta kaalmada adeegyada, waxay evelyn hayflavio kang . So [facility] [phone].    ATENCIÓN: Si habla español, tiene a persaud disposición servicios gratuitos de asistencia lingüística. Amanda al [phone].    We comply with applicable federal civil rights laws and [place] laws. We do not discriminate on the basis of race, color, national origin, age, disability sex, sexual orientation or gender identity.            Thank you!     Thank you for choosing [facility]  for your care. Our goal is always to provide you with excellent care. Hearing back from our patients is one way we can continue to improve our services. Please take a few minutes to complete the written survey that you may receive in the mail after your visit with us. Thank you!             Your Updated Medication List - Protect others around you: Learn how to safely use, store and throw away your medicines at www.disposemymeds.org.          This list is accurate as of: 9/20/17 11:40 AM.  Always use your most recent med list.                   Brand Name Dispense Instructions for use Diagnosis    blood glucose monitoring meter device kit     1 kit    Use to test blood sugar  4 to 5 times weekly or as directed.    Refill clinic medication management patient       blood glucose monitoring test strip    ACCU-CHEK MED PLUS    150 each    USE 4 TO 5 TIMES PER WEEK OR AS DIRECTED    Refill clinic medication management patient       * COREG PO      Take 12.5 mg by mouth 2 times daily (with meals)        * carvedilol 12.5 MG tablet    COREG    180 tablet    TAKE 1 TABLET BY MOUTH TWICE DAILY WITH MEALS    Essential hypertension with goal blood pressure less than 130/80       COZAAR PO      Take 100 mg by mouth every morning        * cyclobenzaprine 10 MG tablet    FLEXERIL    90 tablet    Take 0.5-1 tablets (5-10 mg) by mouth 3 times daily as needed for muscle spasms    S/P lumbar laminectomy       * cyclobenzaprine 10 MG tablet    FLEXERIL    90 tablet    Take 0.5-1 tablets (5-10 mg) by mouth 3 times daily as needed for muscle spasms    S/P lumbar laminectomy       * GLUCOPHAGE XR PO      Take 1,000 mg by mouth every morning (patient takes 2 X 500 mg = 1,000 mg dose)        * metFORMIN 500 MG 24 hr tablet    GLUCOPHAGE-XR    180 tablet    TAKE 2 TABLETS BY MOUTH EVERY MORNING    Type 2 diabetes mellitus with diabetic autonomic neuropathy, without long-term current use of insulin (H)       lisinopril-hydrochlorothiazide 20-12.5 MG per tablet    PRINZIDE/ZESTORETIC    90 tablet    Take 1 tablet by mouth daily    Essential hypertension with goal blood pressure less than 130/80       methylPREDNISolone 4 MG tablet    MEDROL DOSEPAK    21 tablet    Follow package instructions    S/P lumbar laminectomy       morphine 15 MG 12 hr tablet    MS CONTIN    60 tablet    Take 1 tablet (15 mg) by mouth every 12 hours maximum 2 tablet(s) per day    S/P lumbar laminectomy       NEURONTIN PO      Take 900 mg by mouth daily (with dinner) (patient takes 3 X 300 mg = 900 mg dose)        NEXIUM PO      Take 20 mg by mouth daily (with dinner) (at 17:00)        NORVASC PO      Take 10 mg by mouth every morning         order for DME     1 Device    One tens unit    Lumbar radiculopathy       * PROTONIX PO      Take 40 mg by mouth daily (with dinner) (at 17:00)        * pantoprazole 40 MG EC tablet    PROTONIX    90 tablet    TAKE 1 TABLET(40 MG) BY MOUTH DAILY    Encounter for medication refill       TRADJENTA PO      Take 5 mg by mouth every morning        TUMS PO      Take 2 chew tab by mouth daily (with dinner)        * ZOCOR PO      Take 10 mg by mouth daily (with dinner)        * simvastatin 10 MG tablet    ZOCOR    90 tablet    TAKE 1 TABLET BY MOUTH EVERY NIGHT AT BEDTIME    Hypercholesteremia       zolpidem 10 MG tablet    AMBIEN    90 tablet    TAKE 1 TABLET BY MOUTH EVERY NIGHT AT BEDTIME.    Encounter for medication refill       * Notice:  This list has 10 medication(s) that are the same as other medications prescribed for you. Read the directions carefully, and ask your doctor or other care provider to review them with you.

## 2017-09-24 DIAGNOSIS — I10 ESSENTIAL HYPERTENSION WITH GOAL BLOOD PRESSURE LESS THAN 130/80: ICD-10-CM

## 2017-09-24 RX ORDER — LOSARTAN POTASSIUM 100 MG/1
TABLET ORAL
Qty: 90 TABLET | Refills: 0 | Status: SHIPPED | OUTPATIENT
Start: 2017-09-24 | End: 2017-12-26

## 2017-10-04 ENCOUNTER — THERAPY VISIT (OUTPATIENT)
Dept: PHYSICAL THERAPY | Facility: CLINIC | Age: 62
End: 2017-10-04
Payer: COMMERCIAL

## 2017-10-04 DIAGNOSIS — Z98.1 S/P LUMBAR SPINAL FUSION: ICD-10-CM

## 2017-10-04 DIAGNOSIS — R26.89 LOSS OF BALANCE: ICD-10-CM

## 2017-10-04 DIAGNOSIS — R29.898 LEG WEAKNESS, BILATERAL: Primary | ICD-10-CM

## 2017-10-04 PROCEDURE — 97161 PT EVAL LOW COMPLEX 20 MIN: CPT | Mod: GP | Performed by: PHYSICAL THERAPIST

## 2017-10-04 PROCEDURE — 97110 THERAPEUTIC EXERCISES: CPT | Mod: GP | Performed by: PHYSICAL THERAPIST

## 2017-10-04 NOTE — PROGRESS NOTES
[facility] Initial Evaluation    Subjective:    Patient is a 62 year old male presenting with rehab back hpi.   Syd Joyce is a 62 year old male with a lumbar condition.  Condition occurred with:  Other reason.  Condition occurred: other.  This is a new condition  Patient notes B leg weakness and loss of balance and coordination following ruptured disc and spinal stenosis, of which he underwent L3-5 decompression/fusion surgery for on 6/30/17. .    Site of Pain: soreness/pain B hamstrings gastrocs.    Pain is described as aching and is intermittent and reported as 5/10.  Associated symptoms:  Loss of strength and loss of balance (swelling in B legs). Pain is worse in the P.M..  Exacerbated by: standing, walking for more than 5-10 minutes. Relieved by: sitting, rest, stretching legs.  Since onset symptoms are gradually improving.  Special tests:  X-ray (post op lumbar x-rays with good report).  Previous treatment includes surgery.  Improvement with previous treatment: good reponse to surgery from a pain and strength standpoint, still with weakness/fatigue and uncoordination LE's.  General health as reported by patient is fair.  Pertinent medical history includes:  High blood pressure and diabetes.  Medical allergies: no.  Other surgeries include:  No.  Current medications:  Muscle relaxants and high blood pressure medication.  Current occupation is   .  Patient is working in normal job with restrictions (standing limited to 5-10 minutes).  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    Barriers include:  None as reported by the patient.    Red flags:  None as reported by the patient.                        Objective:  LUMBAR:    Neurological:    Motor Deficit:  Myotomes L R   L1-2 (hip flexion) 5 5   L3 (knee extension) 5 5   L4 (ankle DF) 5 5   L5 (g. toe ext) 5 5   S1 (ankle PF or knee flex) 5 5     Sensory Deficit, Reflexes: intact  light touch dermatomal screen    Dural Signs:   L R   Slump negative negative   SLR         AROM: (Major, Moderate, Minimal or Nil loss)  Movement Loss Abdelrahman Mod Min Nil Pain   Flexion   X  To ankles without pain   Extension   X  Without pain   Side Gliding L   X  Without pain   Side Gliding R   X  Without pain     HIP:    Strength:   L R   HIP     Flex 5 5   Ext 4 4   Abd 5 5   KNEE     Flex 5 5   Ext 5 5     Balance able to stand feet together eyes open on level surface.  Unable to stand for more than 2 seconds in single leg stance prior to touch down.     Palpation: note edema B lower legs and feet moderate.  He may try over the counter compression socks    Functional: Able to squat double leg to 70 degree depth without pain, supported at sink    Gait: unsteadiness with gate noted, toe out, wide stance, side to side sway is increased---some loss of coordination with this.    System    Physical Exam    General     ROS    Assessment/Plan:      Patient is a 62 year old male with lumbar and bilateral leg complaints.    Patient has the following significant findings with corresponding treatment plan.                Diagnosis 1:  Bilateral leg weakness, incoordination and difficulty with gait 3 months s/p L3-5 lumbar decompression and fusion  (6/30/17)  Pain -  hot/cold therapy  Decreased ROM/flexibility - manual therapy and therapeutic exercise  Decreased strength - therapeutic exercise and therapeutic activities  Impaired balance - neuro re-education and therapeutic activities  Decreased proprioception - neuro re-education and therapeutic activities  Edema - cold therapy  Impaired gait - gait training  Decreased function - therapeutic activities  Impaired posture - neuro re-education    Therapy Evaluation Codes:   1) History comprised of:   Personal factors that impact the plan of care:      None.    Comorbidity factors that impact the plan of care are:      None.     Medications impacting care: None.  2) Examination  of Body Systems comprised of:   Body structures and functions that impact the plan of care:      Lumbar spine and B legs.   Activity limitations that impact the plan of care are:      Standing and Walking.  3) Clinical presentation characteristics are:   Stable/Uncomplicated.  4) Decision-Making    Low complexity using standardized patient assessment instrument and/or measureable assessment of functional outcome.  Cumulative Therapy Evaluation is: Low complexity.    Previous and current functional limitations:  (See Goal Flow Sheet for this information)    Short term and Long term goals: (See Goal Flow Sheet for this information)     Communication ability:  Patient appears to be able to clearly communicate and understand verbal and written communication and follow directions correctly.  Treatment Explanation - The following has been discussed with the patient:   RX ordered/plan of care  Anticipated outcomes  Possible risks and side effects  This patient would benefit from PT intervention to resume normal activities.   Rehab potential is good.    Frequency:  1 X week, once daily  Duration:  for 8 weeks  Discharge Plan:  Achieve all LTG.  Independent in home treatment program.  Reach maximal therapeutic benefit.    Please refer to the daily flowsheet for treatment today, total treatment time and time spent performing 1:1 timed codes.

## 2017-10-18 ENCOUNTER — THERAPY VISIT (OUTPATIENT)
Dept: PHYSICAL THERAPY | Facility: CLINIC | Age: 62
End: 2017-10-18
Payer: COMMERCIAL

## 2017-10-18 DIAGNOSIS — E11.43 TYPE 2 DIABETES MELLITUS WITH DIABETIC AUTONOMIC NEUROPATHY, WITHOUT LONG-TERM CURRENT USE OF INSULIN (H): ICD-10-CM

## 2017-10-18 DIAGNOSIS — R29.898 LEG WEAKNESS, BILATERAL: ICD-10-CM

## 2017-10-18 DIAGNOSIS — I10 ESSENTIAL HYPERTENSION WITH GOAL BLOOD PRESSURE LESS THAN 130/80: ICD-10-CM

## 2017-10-18 DIAGNOSIS — R26.89 LOSS OF BALANCE: ICD-10-CM

## 2017-10-18 DIAGNOSIS — Z98.1 S/P LUMBAR SPINAL FUSION: ICD-10-CM

## 2017-10-18 DIAGNOSIS — Z76.0 ENCOUNTER FOR MEDICATION REFILL: ICD-10-CM

## 2017-10-18 PROCEDURE — 97110 THERAPEUTIC EXERCISES: CPT | Mod: GP | Performed by: PHYSICAL THERAPIST

## 2017-10-18 PROCEDURE — 97112 NEUROMUSCULAR REEDUCATION: CPT | Mod: GP | Performed by: PHYSICAL THERAPIST

## 2017-10-18 PROCEDURE — 97530 THERAPEUTIC ACTIVITIES: CPT | Mod: GP | Performed by: PHYSICAL THERAPIST

## 2017-10-18 NOTE — TELEPHONE ENCOUNTER
TRADJENTA, protonix, lisinopril-hctz last OV & labs 6/19/17  Rylie Clark MA October 18, 2017 4:54 PM      Lab Results   Component Value Date    A1C 6.8 06/19/2017    A1C 5.5 10/17/2016    A1C 5.3 09/21/2015    A1C 5.7 11/24/2014    A1C 6.1 01/10/2014     BP Readings from Last 3 Encounters:   09/20/17 137/78   08/07/17 120/76   07/10/17 118/62

## 2017-10-19 RX ORDER — LISINOPRIL AND HYDROCHLOROTHIAZIDE 12.5; 2 MG/1; MG/1
TABLET ORAL
Qty: 90 TABLET | Refills: 3 | Status: SHIPPED | OUTPATIENT
Start: 2017-10-19 | End: 2018-10-05

## 2017-10-19 RX ORDER — PANTOPRAZOLE SODIUM 40 MG/1
TABLET, DELAYED RELEASE ORAL
Qty: 90 TABLET | Refills: 3 | Status: SHIPPED | OUTPATIENT
Start: 2017-10-19 | End: 2018-10-05

## 2017-10-19 RX ORDER — LINAGLIPTIN 5 MG/1
TABLET, FILM COATED ORAL
Qty: 90 TABLET | Refills: 3 | Status: SHIPPED | OUTPATIENT
Start: 2017-10-19 | End: 2018-10-05

## 2017-11-08 ENCOUNTER — THERAPY VISIT (OUTPATIENT)
Dept: PHYSICAL THERAPY | Facility: CLINIC | Age: 62
End: 2017-11-08
Payer: COMMERCIAL

## 2017-11-08 DIAGNOSIS — R29.898 LEG WEAKNESS, BILATERAL: ICD-10-CM

## 2017-11-08 DIAGNOSIS — Z98.1 S/P LUMBAR SPINAL FUSION: ICD-10-CM

## 2017-11-08 DIAGNOSIS — R26.89 LOSS OF BALANCE: ICD-10-CM

## 2017-11-08 PROCEDURE — 97112 NEUROMUSCULAR REEDUCATION: CPT | Mod: GP | Performed by: PHYSICAL THERAPIST

## 2017-11-08 PROCEDURE — 97530 THERAPEUTIC ACTIVITIES: CPT | Mod: GP | Performed by: PHYSICAL THERAPIST

## 2017-11-08 PROCEDURE — 97110 THERAPEUTIC EXERCISES: CPT | Mod: GP | Performed by: PHYSICAL THERAPIST

## 2017-11-09 NOTE — PROGRESS NOTES
Subjective:    HPI                    Objective:    System    Physical Exam    General     ROS    Assessment/Plan:      PROGRESS  REPORT    Progress reporting period is from 10/4/17 to 11/8/17 (3 visits).       SUBJECTIVE  Subjective changes noted by patient:  Patient reports that he feels his strength in his legs has improved; however, he doesn't not much change in his balance or gait.  He notes he's still walking with a shuffling gait pattern with a wide base of support and doesn't feel coordinated.  He wants more exercises for home regarding prioprioception, balance and will try those on his own until he see's his surgeon again later this year.     Current Pain level: 3/10.     Initial Pain level: 5/10.   Changes in function:  Yes (See Goal flowsheet attached for changes in current functional level)  Adverse reaction to treatment or activity: None    OBJECTIVE  Changes noted in objective findings:  Yes,   Objective:   Hip stength improved, now able to do 20 reps with supported squatting and reciprocate stairs.  Still with gait deviations with wide base of support and shuffling pattern.  Better with cues for heel to toe.  Given more balance/proprioception drills for home in addition to initial strength program.  He understands these concepts well.      ASSESSMENT/PLAN  Updated problem list and treatment plan: Diagnosis 1:  LE weakness, lack of coordination/balance s/p L3-L5 decompression/fusion    Pain -  hot/cold therapy and directional preference exercise  Decreased ROM/flexibility - manual therapy and therapeutic exercise  Decreased strength - therapeutic exercise and therapeutic activities  Impaired balance - neuro re-education and therapeutic activities  Decreased proprioception - neuro re-education and therapeutic activities  Impaired gait - gait training  Decreased function - therapeutic activities  Impaired posture - neuro re-education  STG/LTGs have been met or progress has been made towards goals:  Yes  (See Goal flow sheet completed today.)  Assessment of Progress: The patient's condition has potential to improve.  Self Management Plans:  Patient has been instructed in a home treatment program.  I have re-evaluated this patient and find that the nature, scope, duration and intensity of the therapy is appropriate for the medical condition of the patient.  Syd continues to require the following intervention to meet STG and LTG's:  Will continue to work on his HEP until he sees his surgeon, per his request.    Recommendations:  Continue HEP and re check with surgeon as planned.    Please refer to the daily flowsheet for treatment today, total treatment time and time spent performing 1:1 timed codes.

## 2017-12-20 ENCOUNTER — OFFICE VISIT (OUTPATIENT)
Dept: NEUROSURGERY | Facility: CLINIC | Age: 62
End: 2017-12-20
Attending: PHYSICIAN ASSISTANT
Payer: COMMERCIAL

## 2017-12-20 ENCOUNTER — RADIANT APPOINTMENT (OUTPATIENT)
Dept: GENERAL RADIOLOGY | Facility: CLINIC | Age: 62
End: 2017-12-20
Attending: PHYSICIAN ASSISTANT
Payer: COMMERCIAL

## 2017-12-20 VITALS
BODY MASS INDEX: 32.19 KG/M2 | OXYGEN SATURATION: 98 % | WEIGHT: 218 LBS | SYSTOLIC BLOOD PRESSURE: 141 MMHG | HEART RATE: 94 BPM | DIASTOLIC BLOOD PRESSURE: 78 MMHG | TEMPERATURE: 98 F

## 2017-12-20 DIAGNOSIS — Z98.890 S/P LUMBAR LAMINECTOMY: ICD-10-CM

## 2017-12-20 DIAGNOSIS — Z98.890 S/P LUMBAR LAMINECTOMY: Primary | ICD-10-CM

## 2017-12-20 PROCEDURE — 99211 OFF/OP EST MAY X REQ PHY/QHP: CPT | Performed by: PHYSICIAN ASSISTANT

## 2017-12-20 PROCEDURE — 72100 X-RAY EXAM L-S SPINE 2/3 VWS: CPT

## 2017-12-20 PROCEDURE — 99213 OFFICE O/P EST LOW 20 MIN: CPT | Performed by: PHYSICIAN ASSISTANT

## 2017-12-20 NOTE — LETTER
12/20/2017         RE: Syd Joyce  [address]  [facility] [address]        Dear Colleague,    Thank you for referring your patient, Syd Joyce, to the [facility]. Please see a copy of my visit note below.    [facility]  Neurosurgery followup:    HPI: 6 months s/p L3-5 posterior decompression and coflex interspinous fixation. He continues to have some localized right sided low back pain and intermittent numbness to the left leg. He has been working with physical therapy and taking flexeril as needed, which has been helpful. He states he has also felt more shortness of breath and overall weakness lately, as well as LLE edema. Denies overt weakness or bladder/bowel changes.  Exam:  Constitutional:  Alert, well nourished, NAD.  HEENT: Normocephalic, atraumatic.   Pulm:  Without shortness of breath   CV:  No pitting edema of BLE.      Neurological:  Awake  Alert  Oriented x 3  Motor exam:        IP Q DF PF EHL  R   5  5   5   5    5  L   5  5   5   5    5     Reflexes are 2+ in the patellar and Achilles. There is no clonus. Downgoing Babinski.    Reflexes are 2+ in the brachial radialis and triceps. Negative Lucius sign bilaterally.    Able to spontaneously move L/E bilaterally  Sensation intact throughout all L/E dermatomes     Incision: Nicely healed.  Imaging: AP and lateral lumbar films reveal stable hardware.  A/P: 6 months s/p L3-5 posterior decompression and coflex interspinous fixation. Discussed that nerves do take time to heal and that the numbness should continue to resolve with time. XR reveals stable hardware. Recommend continuing to work with PT and taking flexeril as needed for his localized low back pain which seems muscular in nature. I also advised he follow up with his PCP regarding his recent increase in SOB and fatigue as well as his LLE edema. Patient voiced understanding and agreement.  - followup in 6 months with xray prior   -  Call the clinic at [phone] for increased pain or any other questions and concerns.      Dariana Lopez PA-C  [facility]  [address] [address]    Tel [phone]  Pager [phone]      Again, thank you for allowing me to participate in the care of your patient.        Sincerely,        Dariana Lopez PA-C

## 2017-12-20 NOTE — PROGRESS NOTES
[facility]  Neurosurgery followup:    HPI: 6 months s/p L3-5 posterior decompression and coflex interspinous fixation. He continues to have some localized right sided low back pain and intermittent numbness to the left leg. He has been working with physical therapy and taking flexeril as needed, which has been helpful. He states he has also felt more shortness of breath and overall weakness lately, as well as LLE edema. Denies overt weakness or bladder/bowel changes.  Exam:  Constitutional:  Alert, well nourished, NAD.  HEENT: Normocephalic, atraumatic.   Pulm:  Without shortness of breath   CV:  No pitting edema of BLE.      Neurological:  Awake  Alert  Oriented x 3  Motor exam:        IP Q DF PF EHL  R   5  5   5   5    5  L   5  5   5   5    5     Reflexes are 2+ in the patellar and Achilles. There is no clonus. Downgoing Babinski.    Reflexes are 2+ in the brachial radialis and triceps. Negative Lucius sign bilaterally.    Able to spontaneously move L/E bilaterally  Sensation intact throughout all L/E dermatomes    1+ pitting edema noted to LLE.     Incision: Nicely healed.  Imaging: AP and lateral lumbar films reveal stable hardware.  A/P: 6 months s/p L3-5 posterior decompression and coflex interspinous fixation. Discussed that nerves do take time to heal and that the numbness should continue to resolve with time. XR reveals stable hardware. Recommend continuing to work with PT and taking flexeril as needed for his localized low back pain which seems muscular in nature. I also advised he follow up with his PCP regarding his recent increase in SOB and fatigue as well as his LLE edema. I have also continued with current work restrictions of light duty and no standing >5-10 minutes. Patient voiced understanding and agreement.  - followup in 6 months with xray prior   - Call the clinic at [phone] for increased pain or any other questions and concerns.      Dariana Lopez PA-C  [facility]  [address]  [address]  [place] [address]    Tel [phone]  Pager [phone]

## 2017-12-20 NOTE — PATIENT INSTRUCTIONS
1. Continue to working with physical therapy and completing exercises  2. Follow up with PCP regarding SOB and edema of left leg  3. followup in 6 months with xray prior   4. Call the clinic at [phone] for increased pain or any other questions and concerns.

## 2017-12-20 NOTE — MR AVS SNAPSHOT
After Visit Summary   12/20/2017    Syd Joyce    MRN: [id number]           Patient Information     Date Of Birth          1955        Visit Information        Provider Department      12/20/2017 11:05 AM Dariana Lopez PA-C [facility]        Today's Diagnoses     S/P lumbar laminectomy    -  1      Care Instructions    1. Continue to working with physical therapy and completing exercises  2. Follow up with PCP regarding SOB and edema of left leg  3. followup in 6 months with xray prior   4. Call the clinic at [phone] for increased pain or any other questions and concerns.          Follow-ups after your visit        Future tests that were ordered for you today     Open Future Orders        Priority Expected Expires Ordered    XR Lumbar Spine 2/3 Views Routine 6/20/2018 12/20/2018 12/20/2017            Who to contact     If you have questions or need follow up information about today's clinic visit or your schedule please contact [facility] directly at [phone].  Normal or non-critical lab and imaging results will be communicated to you by [employer], letter or phone within 4 business days after the clinic has received the results. If you do not hear from us within 7 days, please contact the clinic through [employer] or phone. If you have a critical or abnormal lab result, we will notify you by phone as soon as possible.  Submit refill requests through [employer] or call your pharmacy and they will forward the refill request to us. Please allow 3 business days for your refill to be completed.          Additional Information About Your Visit        [employer] Information     [employer] gives you secure access to your electronic health record. If you see a primary care provider, you can also send messages to your care team and make appointments. If you have questions, please call your primary care clinic.  If you do not have a primary care  provider, please call [phone] and they will assist you.        Care EveryWhere ID     This is your Care EveryWhere ID. This could be used by other organizations to access your [place] medical records  [id number]        Your Vitals Were     Pulse Temperature Pulse Oximetry BMI (Body Mass Index)          94 98  F (36.7  C) (Oral) 98% 32.19 kg/m2         Blood Pressure from Last 3 Encounters:   12/20/17 141/78   09/20/17 137/78   08/07/17 120/76    Weight from Last 3 Encounters:   12/20/17 218 lb (98.9 kg)   09/20/17 218 lb (98.9 kg)   08/07/17 218 lb (98.9 kg)               Primary Care Provider Office Phone # Fax #    Shan Arenas [phone] [address]  [facility] [address]        Equal Access to Services     [facility]: Hadii aad ku hadasho Soomaali, waaxda luqadaha, qaybta kaalmada adeegyada, waxay lindain haychandan venita kang . So [facility] [phone].    ATENCIÓN: Si habla español, tiene a persaud disposición servicios gratuitos de asistencia lingüística. Amanda al [phone].    We comply with applicable federal civil rights laws and [place] laws. We do not discriminate on the basis of race, color, national origin, age, disability, sex, sexual orientation, or gender identity.            Thank you!     Thank you for choosing [facility] NEUROSURGERY [facility]  for your care. Our goal is always to provide you with excellent care. Hearing back from our patients is one way we can continue to improve our services. Please take a few minutes to complete the written survey that you may receive in the mail after your visit with us. Thank you!             Your Updated Medication List - Protect others around you: Learn how to safely use, store and throw away your medicines at www.disposemymeds.org.          This list is accurate as of: 12/20/17 11:16 AM.  Always use your most recent med list.                   Brand Name Dispense Instructions for use Diagnosis     blood glucose monitoring meter device kit     1 kit    Use to test blood sugar 4 to 5 times weekly or as directed.    Refill clinic medication management patient       blood glucose monitoring test strip    ACCU-CHEK MED PLUS    150 each    USE 4 TO 5 TIMES PER WEEK OR AS DIRECTED    Refill clinic medication management patient       * COREG PO      Take 12.5 mg by mouth 2 times daily (with meals)        * carvedilol 12.5 MG tablet    COREG    180 tablet    TAKE 1 TABLET BY MOUTH TWICE DAILY WITH MEALS    Essential hypertension with goal blood pressure less than 130/80       * COZAAR PO      Take 100 mg by mouth every morning        * losartan 100 MG tablet    COZAAR    90 tablet    TAKE 1 TABLET(100 MG) BY MOUTH DAILY    Essential hypertension with goal blood pressure less than 130/80       * cyclobenzaprine 10 MG tablet    FLEXERIL    90 tablet    Take 0.5-1 tablets (5-10 mg) by mouth 3 times daily as needed for muscle spasms    S/P lumbar laminectomy       * cyclobenzaprine 10 MG tablet    FLEXERIL    90 tablet    Take 0.5-1 tablets (5-10 mg) by mouth 3 times daily as needed for muscle spasms    S/P lumbar laminectomy       * GLUCOPHAGE XR PO      Take 1,000 mg by mouth every morning (patient takes 2 X 500 mg = 1,000 mg dose)        * metFORMIN 500 MG 24 hr tablet    GLUCOPHAGE-XR    180 tablet    TAKE 2 TABLETS BY MOUTH EVERY MORNING    Type 2 diabetes mellitus with diabetic autonomic neuropathy, without long-term current use of insulin (H)       lisinopril-hydrochlorothiazide 20-12.5 MG per tablet    PRINZIDE/ZESTORETIC    90 tablet    TAKE 1 TABLET BY MOUTH DAILY    Essential hypertension with goal blood pressure less than 130/80       methylPREDNISolone 4 MG tablet    MEDROL DOSEPAK    21 tablet    Follow package instructions    S/P lumbar laminectomy       morphine 15 MG 12 hr tablet    MS CONTIN    60 tablet    Take 1 tablet (15 mg) by mouth every 12 hours maximum 2 tablet(s) per day    S/P lumbar  laminectomy       NEURONTIN PO      Take 900 mg by mouth daily (with dinner) (patient takes 3 X 300 mg = 900 mg dose)        NEXIUM PO      Take 20 mg by mouth daily (with dinner) (at 17:00)        * NORVASC PO      Take 10 mg by mouth every morning        * amLODIPine 10 MG tablet    NORVASC    90 tablet    TAKE 1 TABLET(10 MG) BY MOUTH DAILY    Essential hypertension with goal blood pressure less than 130/80       order for DME     1 Device    One tens unit    Lumbar radiculopathy       * PROTONIX PO      Take 40 mg by mouth daily (with dinner) (at 17:00)        * pantoprazole 40 MG EC tablet    PROTONIX    90 tablet    TAKE 1 TABLET(40 MG) BY MOUTH DAILY    Encounter for medication refill       * TRADJENTA PO      Take 5 mg by mouth every morning        * TRADJENTA 5 MG Tabs tablet   Generic drug:  linagliptin     90 tablet    TAKE 1 TABLET(5 MG) BY MOUTH DAILY    Type 2 diabetes mellitus with diabetic autonomic neuropathy, without long-term current use of insulin (H)       TUMS PO      Take 2 chew tab by mouth daily (with dinner)        * ZOCOR PO      Take 10 mg by mouth daily (with dinner)        * simvastatin 10 MG tablet    ZOCOR    90 tablet    TAKE 1 TABLET BY MOUTH EVERY NIGHT AT BEDTIME    Hypercholesteremia       zolpidem 10 MG tablet    AMBIEN    90 tablet    TAKE 1 TABLET BY MOUTH EVERY NIGHT AT BEDTIME    Encounter for medication refill       * Notice:  This list has 16 medication(s) that are the same as other medications prescribed for you. Read the directions carefully, and ask your doctor or other care provider to review them with you.

## 2017-12-20 NOTE — NURSING NOTE
"Syd Joyce is a 62 year old male who presents for:  Chief Complaint   Patient presents with     Neurologic Problem     s/p lumbar discectomy 6-30-17        Initial Vitals:  There were no vitals taken for this visit. Estimated body mass index is 32.19 kg/(m^2) as calculated from the following:    Height as of 6/30/17: 5' 9\" (1.753 m).    Weight as of 9/20/17: 218 lb (98.9 kg).. There is no height or weight on file to calculate BSA. BP completed using cuff size: regular  Data Unavailable    Do you feel safe in your environment?  Yes  Do you need any refills today? No    Nursing Comments: s/p lumbar discectomy 6-30-17.  Patient rates his pain today as 7-8 legs and low back numb and tingling      5 min. nursing intake time  Genevieve River CMA, AAS      Discharge plan:   See providers dictation   2 min. nursing discharge time  Genevieve River CMA, AAS       "

## 2017-12-26 DIAGNOSIS — I10 ESSENTIAL HYPERTENSION WITH GOAL BLOOD PRESSURE LESS THAN 130/80: ICD-10-CM

## 2017-12-26 RX ORDER — LOSARTAN POTASSIUM 100 MG/1
TABLET ORAL
Qty: 90 TABLET | Refills: 0 | Status: SHIPPED | OUTPATIENT
Start: 2017-12-26 | End: 2018-03-28

## 2017-12-26 NOTE — TELEPHONE ENCOUNTER
Last OV (PreOp) 6/19/17  Last Labs 6/19/17    BP Readings from Last 3 Encounters:   12/20/17 141/78   09/20/17 137/78   08/07/17 120/76     Last Basic Metabolic Panel:  Lab Results   Component Value Date     06/19/2017      Lab Results   Component Value Date    POTASSIUM 4.5 06/19/2017     Lab Results   Component Value Date    CHLORIDE 97 06/19/2017     Lab Results   Component Value Date    HEAVENLY 8.8 06/19/2017     Lab Results   Component Value Date    CO2 28 05/26/2017     Lab Results   Component Value Date    BUN 15 06/19/2017    BUN 15 06/19/2017     Lab Results   Component Value Date    CR 1.02 06/19/2017     Lab Results   Component Value Date     06/19/2017

## 2018-01-13 DIAGNOSIS — E11.43 TYPE 2 DIABETES MELLITUS WITH DIABETIC AUTONOMIC NEUROPATHY, WITHOUT LONG-TERM CURRENT USE OF INSULIN (H): ICD-10-CM

## 2018-01-14 RX ORDER — METFORMIN HCL 500 MG
TABLET, EXTENDED RELEASE 24 HR ORAL
Qty: 180 TABLET | Refills: 0 | Status: SHIPPED | OUTPATIENT
Start: 2018-01-14 | End: 2018-04-11

## 2018-01-22 ENCOUNTER — OFFICE VISIT (OUTPATIENT)
Dept: FAMILY MEDICINE | Facility: CLINIC | Age: 63
End: 2018-01-22

## 2018-01-22 VITALS — SYSTOLIC BLOOD PRESSURE: 140 MMHG | BODY MASS INDEX: 32.05 KG/M2 | DIASTOLIC BLOOD PRESSURE: 64 MMHG | WEIGHT: 217 LBS

## 2018-01-22 DIAGNOSIS — R60.0 PERIPHERAL EDEMA: ICD-10-CM

## 2018-01-22 DIAGNOSIS — R06.09 DYSPNEA ON EXERTION: Primary | ICD-10-CM

## 2018-01-22 LAB
% GRANULOCYTES: 82.7 % (ref 42.2–75.2)
HCT VFR BLD AUTO: 39 % (ref 39–51)
HEMOGLOBIN: 13.6 G/DL (ref 13.4–17.5)
LYMPHOCYTES NFR BLD AUTO: 13.8 % (ref 20.5–51.1)
MCH RBC QN AUTO: 32.4 PG (ref 27–31)
MCHC RBC AUTO-ENTMCNC: 35 G/DL (ref 33–37)
MCV RBC AUTO: 92.5 FL (ref 80–100)
MONOCYTES NFR BLD AUTO: 3.5 % (ref 1.7–9.3)
PLATELET # BLD AUTO: 231 K/UL (ref 140–450)
RBC # BLD AUTO: 4.21 X10/CMM (ref 4.2–5.9)
WBC # BLD AUTO: 9.3 X10/CMM (ref 3.8–11)

## 2018-01-22 PROCEDURE — 83880 ASSAY OF NATRIURETIC PEPTIDE: CPT | Mod: 90 | Performed by: FAMILY MEDICINE

## 2018-01-22 PROCEDURE — 84439 ASSAY OF FREE THYROXINE: CPT | Mod: 90 | Performed by: FAMILY MEDICINE

## 2018-01-22 PROCEDURE — 71046 X-RAY EXAM CHEST 2 VIEWS: CPT | Performed by: FAMILY MEDICINE

## 2018-01-22 PROCEDURE — 99214 OFFICE O/P EST MOD 30 MIN: CPT | Performed by: FAMILY MEDICINE

## 2018-01-22 PROCEDURE — 36415 COLL VENOUS BLD VENIPUNCTURE: CPT | Performed by: FAMILY MEDICINE

## 2018-01-22 PROCEDURE — 80050 GENERAL HEALTH PANEL: CPT | Mod: 90 | Performed by: FAMILY MEDICINE

## 2018-01-22 NOTE — MR AVS SNAPSHOT
After Visit Summary   1/22/2018    Syd Joyce    MRN: [id number]           Patient Information     Date Of Birth          1955        Visit Information        Provider Department      1/22/2018 10:15 AM Shan Arenas MD [facility]        Today's Diagnoses     Dyspnea on exertion    -  1    Peripheral edema          Care Instructions    Radiology work up first and if those are ok then Echo of the heart will be next.            Follow-ups after your visit        Additional Services     Referral to [facility]       Referral to [facility]  Phone: [phone]  Fax: [phone]  Reason for referral:venous doppler of both lower extremities and  Ct Chest to evaluate increasing dyspnea with leg swelling                  Your next 10 appointments already scheduled     Jun 27, 2018 10:05 AM CDT   (Arrive by 9:50 AM)   XR LUMBAR SPINE 2/3 VIEWS [facility]    [address] [place] [id number] [phone]           Please bring a list of your current medicines to your exam. (Include vitamins, minerals and over-thecounter medicines.) Leave your valuables at home.  Tell your doctor if there is a chance you may be pregnant.  You do not need to do anything special for this exam.            Jun 27, 2018 11:05 AM CDT   Return Visit with Dariana Lopez PA-C   [facility] ([facility])    [address] [place] [id number] [phone]              Who to contact     If you have questions or need follow up information about today's clinic visit or your schedule please contact [facility] directly at [phone].  Normal or non-critical lab and imaging results will be communicated to you by MyChart, letter or phone within 4 business days after the clinic has received the results. If you do not hear from us within 7 days, please contact  the clinic through [employer] or phone. If you have a critical or abnormal lab result, we will notify you by phone as soon as possible.  Submit refill requests through [employer] or call your pharmacy and they will forward the refill request to us. Please allow 3 business days for your refill to be completed.          Additional Information About Your Visit        [employer] Information     [employer] gives you secure access to your electronic health record. If you see a primary care provider, you can also send messages to your care team and make appointments. If you have questions, please call your primary care clinic.  If you do not have a primary care provider, please call [phone] and they will assist you.        Care EveryWhere ID     This is your Care EveryWhere ID. This could be used by other organizations to access your [place] medical records  [id number]        Your Vitals Were     BMI (Body Mass Index)                   32.05 kg/m2            Blood Pressure from Last 3 Encounters:   01/22/18 140/64   12/20/17 141/78   09/20/17 137/78    Weight from Last 3 Encounters:   01/22/18 98.4 kg (217 lb)   12/20/17 98.9 kg (218 lb)   09/20/17 98.9 kg (218 lb)              We Performed the Following     B-Type Natriuretic Peptide ([facility])     CBC with Diff/Plt ([facility])     Comp. Metabolic Panel (14) ([facility])     Referral to [facility]     Thyroxine (T4) Free  Direct  S ([facility])     TSH ([facility])     X-ray Chest 2 vws*        Primary Care Provider Office Phone # Fax #    Shan Arenas [phone] [address]  [facility] [address]        Equal Access to Services     [facility] : Hadii eliana Garg, waaxda hussain, qaybta kaaljose whaley. So [facility] [phone].    ATENCIÓN: Si habla español, tiene a persaud disposición servicios gratuitos de asistencia lingüística. Llame al [phone].    We comply with applicable federal  civil rights laws and [place] laws. We do not discriminate on the basis of race, color, national origin, age, disability, sex, sexual orientation, or gender identity.            Thank you!     Thank you for choosing [facility]  for your care. Our goal is always to provide you with excellent care. Hearing back from our patients is one way we can continue to improve our services. Please take a few minutes to complete the written survey that you may receive in the mail after your visit with us. Thank you!             Your Updated Medication List - Protect others around you: Learn how to safely use, store and throw away your medicines at www.disposemymeds.org.          This list is accurate as of: 1/22/18 11:36 AM.  Always use your most recent med list.                   Brand Name Dispense Instructions for use Diagnosis    blood glucose monitoring meter device kit     1 kit    Use to test blood sugar 4 to 5 times weekly or as directed.    Refill clinic medication management patient       blood glucose monitoring test strip    ACCU-CHEK MED PLUS    150 each    USE 4 TO 5 TIMES PER WEEK OR AS DIRECTED    Refill clinic medication management patient       * COREG PO      Take 12.5 mg by mouth 2 times daily (with meals)        * carvedilol 12.5 MG tablet    COREG    180 tablet    TAKE 1 TABLET BY MOUTH TWICE DAILY WITH MEALS    Essential hypertension with goal blood pressure less than 130/80       * COZAAR PO      Take 100 mg by mouth every morning        * losartan 100 MG tablet    COZAAR    90 tablet    TAKE 1 TABLET(100 MG) BY MOUTH DAILY    Essential hypertension with goal blood pressure less than 130/80       * cyclobenzaprine 10 MG tablet    FLEXERIL    90 tablet    Take 0.5-1 tablets (5-10 mg) by mouth 3 times daily as needed for muscle spasms    S/P lumbar laminectomy       * cyclobenzaprine 10 MG tablet    FLEXERIL    90 tablet    Take 0.5-1 tablets (5-10 mg) by mouth 3 times daily as needed for  muscle spasms    S/P lumbar laminectomy       * GLUCOPHAGE XR PO      Take 1,000 mg by mouth every morning (patient takes 2 X 500 mg = 1,000 mg dose)        * metFORMIN 500 MG 24 hr tablet    GLUCOPHAGE-XR    180 tablet    TAKE 2 TABLETS BY MOUTH EVERY MORNING    Type 2 diabetes mellitus with diabetic autonomic neuropathy, without long-term current use of insulin (H)       lisinopril-hydrochlorothiazide 20-12.5 MG per tablet    PRINZIDE/ZESTORETIC    90 tablet    TAKE 1 TABLET BY MOUTH DAILY    Essential hypertension with goal blood pressure less than 130/80       methylPREDNISolone 4 MG tablet    MEDROL DOSEPAK    21 tablet    Follow package instructions    S/P lumbar laminectomy       morphine 15 MG 12 hr tablet    MS CONTIN    60 tablet    Take 1 tablet (15 mg) by mouth every 12 hours maximum 2 tablet(s) per day    S/P lumbar laminectomy       NEURONTIN PO      Take 900 mg by mouth daily (with dinner) (patient takes 3 X 300 mg = 900 mg dose)        NEXIUM PO      Take 20 mg by mouth daily (with dinner) (at 17:00)        * NORVASC PO      Take 10 mg by mouth every morning        * amLODIPine 10 MG tablet    NORVASC    90 tablet    TAKE 1 TABLET(10 MG) BY MOUTH DAILY    Essential hypertension with goal blood pressure less than 130/80       order for DME     1 Device    One tens unit    Lumbar radiculopathy       * PROTONIX PO      Take 40 mg by mouth daily (with dinner) (at 17:00)        * pantoprazole 40 MG EC tablet    PROTONIX    90 tablet    TAKE 1 TABLET(40 MG) BY MOUTH DAILY    Encounter for medication refill       * TRADJENTA PO      Take 5 mg by mouth every morning        * TRADJENTA 5 MG Tabs tablet   Generic drug:  linagliptin     90 tablet    TAKE 1 TABLET(5 MG) BY MOUTH DAILY    Type 2 diabetes mellitus with diabetic autonomic neuropathy, without long-term current use of insulin (H)       TUMS PO      Take 2 chew tab by mouth daily (with dinner)        * ZOCOR PO      Take 10 mg by mouth daily (with  dinner)        * simvastatin 10 MG tablet    ZOCOR    90 tablet    TAKE 1 TABLET BY MOUTH EVERY NIGHT AT BEDTIME    Hypercholesteremia       zolpidem 10 MG tablet    AMBIEN    90 tablet    TAKE 1 TABLET BY MOUTH EVERY NIGHT AT BEDTIME    Encounter for medication refill       * Notice:  This list has 16 medication(s) that are the same as other medications prescribed for you. Read the directions carefully, and ask your doctor or other care provider to review them with you.

## 2018-01-22 NOTE — PROGRESS NOTES
Back surgery in June at [facility].  Post surgery has been getting more swelling in his legs, and getting more and more short of breath.  Slowly worsening over the past 6 months.  No acute event but now hard to do much active at all.  Limits salt to a great degree    Leg strength has been slow to recover.    Problem(s) Oriented visit      ROS:  5 point ROS completed and negative except noted above, including Gen, CV, Resp, GI, MS Resp:  POSITIVE for SOB/dyspnea and NEGATIVE for cough-non productive, cough-productive, hemoptysis, Hx asthma, Hx chronic bronchitis, Hx COPD and Hx positve PPD/mantoux     HISTORY:   reports that he drinks alcohol.   reports that he has never smoked. He has never used smokeless tobacco.    Past Medical History:   Diagnosis Date     DM2 (diabetes mellitus, type 2) (H)      Esophageal reflux 6/15/2012     HTN (hypertension) 8/12/2011     Leg pain, bilateral      Low back pain      Numbness and tingling      Obesity, unspecified 1/10/2014     Past Surgical History:   Procedure Laterality Date     COLONOSCOPY       DISCECTOMY LUMBAR POSTERIOR MICROSCOPIC TWO LEVELS N/A 6/30/2017    Procedure: DISCECTOMY LUMBAR POSTERIOR MICROSCOPIC TWO LEVELS;  L3-4 L4-5 POSTERIOR DECOMPRESSION AND INTERSPINUS FIXATION WITHOUT FUSION;  Surgeon: Ray Perez MD;  Location: [facility] OR     WISDOM TEETH         EXAM:  BP: 140/64   Pulse: Data Unavailable    Temp: Data Unavailable    Wt Readings from Last 2 Encounters:   01/22/18 98.4 kg (217 lb)   12/20/17 98.9 kg (218 lb)       BMI= Body mass index is 32.05 kg/(m^2).    EXAM:  APPEARANCE: = Relaxed and in no distress  Conj/Eyelids = noninjected and lids and lashes are without inflammation  PERRLA/Irises = Pupils Round Reactive to Light and Irisis without inflammation  Ears/Nose = External structures and Nares have usual shape and form  Ear canals and TM's = Canals are not inflammed and have none or little wax and the drums are not injected and have a light  "reflex   Lips/Teeth/Gums = No lesions seen, good dentition, and gums seem healthy  Oropharynx = No leukoplakia, No injection to the tissues, Normal Uvula  Neck = No anterior or posterior adenopathy appreciated.  Resp effort = Calm regular breathing  Breath Sounds = Good air movement with no rales or rhonchi in any lung fields  Ext (edema) =  2+ and to knees  Recent/Remote Memory = Alert and Oriented x 3  Mood/Affect = Cooperative and interested      Assessment/Plan:  Syd was seen today for edema.    Diagnoses and all orders for this visit:    Dyspnea on exertion  -     Comp. Metabolic Panel (14) ([facility])  -     CBC with Diff/Plt ([facility])  -     TSH ([facility])  -     Thyroxine (T4) Free  Direct  S ([facility])  -     B-Type Natriuretic Peptide ([facility])  -     X-ray Chest 2 vws*    Peripheral edema  -     Comp. Metabolic Panel (14) ([facility])  -     CBC with Diff/Plt ([facility])  -     TSH ([facility])  -     Thyroxine (T4) Free  Direct  S ([facility])  -     B-Type Natriuretic Peptide ([facility])  -     X-ray Chest 2 vws*        COUNSELING:   reports that he has never smoked. He has never used smokeless tobacco.    Estimated body mass index is 32.05 kg/(m^2) as calculated from the following:    Height as of 6/30/17: 1.753 m (5' 9\").    Weight as of this encounter: 98.4 kg (217 lb).   Weight management plan: Discussed healthy diet and exercise guidelines and patient will follow up in 1 month in clinic to re-evaluate.    Appropriate preventive services were discussed with this patient, including applicable screening as appropriate for cardiovascular disease, diabetes, osteopenia/osteoporosis, and glaucoma.  As appropriate for age/gender, discussed screening for colorectal cancer, prostate cancer, breast cancer, and cervical cancer. Checklist reviewing preventive services available has been given to the patient.    Reviewed patients plan of care and provided an AVS. The Basic Care Plan (routine screening as documented in Health " Maintenance) for Syd meets the Care Plan requirement. This Care Plan has been established and reviewed with the  Patient.      The following health maintenance items are reviewed in Epic and correct as of today:  Health Maintenance   Topic Date Due     EYE EXAM Q1 YEAR  04/14/2015     COLON CANCER SCREEN (SYSTEM ASSIGNED)  11/01/2016     ADVANCE DIRECTIVE PLANNING Q5 YRS  06/15/2017     FOOT EXAM Q1 YEAR  10/20/2017     A1C Q6 [place]  12/19/2017     CREATININE Q1 YEAR  06/19/2018     LIPID MONITORING Q1 YEAR  06/19/2018     MICROALBUMIN Q1 YEAR  06/19/2018     TSH W/ FREE T4 REFLEX Q2 YEAR  06/19/2019     TETANUS IMMUNIZATION (SYSTEM ASSIGNED)  06/14/2023     INFLUENZA VACCINE (SYSTEM ASSIGNED)  Completed     HEPATITIS C SCREENING  Completed       Shan Arenas  [facility]  For any issues my office # is [phone]

## 2018-01-24 LAB
ALBUMIN SERPL-MCNC: 4 G/DL (ref 3.6–4.8)
ALBUMIN/GLOB SERPL: 1.7 {RATIO} (ref 1.2–2.2)
ALP SERPL-CCNC: 138 IU/L (ref 39–117)
ALT SERPL-CCNC: 27 IU/L (ref 0–44)
AST SERPL-CCNC: 26 IU/L (ref 0–40)
BILIRUB SERPL-MCNC: 0.5 MG/DL (ref 0–1.2)
BUN SERPL-MCNC: 12 MG/DL (ref 8–27)
BUN/CREATININE RATIO: 10 (ref 10–24)
CALCIUM SERPL-MCNC: 9.1 MG/DL (ref 8.6–10.2)
CHLORIDE SERPLBLD-SCNC: 95 MMOL/L (ref 96–106)
CREAT SERPL-MCNC: 1.17 MG/DL (ref 0.76–1.27)
EGFR IF AFRICN AM: 76 ML/MIN/1.73
EGFR IF NONAFRICN AM: 65 ML/MIN/1.73
GLOBULIN, TOTAL: 2.4 G/DL (ref 1.5–4.5)
GLUCOSE SERPL-MCNC: 251 MG/DL (ref 65–99)
POTASSIUM SERPL-SCNC: 4 MMOL/L (ref 3.5–5.2)
PROT SERPL-MCNC: 6.4 G/DL (ref 6–8.5)
SODIUM SERPL-SCNC: 134 MMOL/L (ref 134–144)
T4 FREE SERPL-MCNC: 1.29 NG/DL (ref 0.82–1.77)
TOTAL CO2: 25 MMOL/L (ref 18–28)
TSH BLD-ACNC: 1.52 UIU/ML (ref 0.45–4.5)

## 2018-01-25 ENCOUNTER — TRANSFERRED RECORDS (OUTPATIENT)
Dept: FAMILY MEDICINE | Facility: CLINIC | Age: 63
End: 2018-01-25

## 2018-01-25 LAB
BNP SERPL-MCNC: 9.3 PG/ML (ref 0–100)
SPECIMEN STATUS REPORT: NORMAL

## 2018-01-30 ENCOUNTER — MYC MEDICAL ADVICE (OUTPATIENT)
Dept: FAMILY MEDICINE | Facility: CLINIC | Age: 63
End: 2018-01-30

## 2018-01-30 DIAGNOSIS — I25.10 CORONARY ATHEROSCLEROSIS OF NATIVE CORONARY ARTERY: Primary | ICD-10-CM

## 2018-01-30 NOTE — TELEPHONE ENCOUNTER
Called patient with CT of chest result.  Informed him that CT shows heavy calcification  in the coronary artery.   Recommended is cardiac evaluation.  Called [facility] and first appointment with cardiologist is Feb 14th.  Dr Arenas would like patient to get nuclear stress test done first before appointment.  Will send referral to [facility].

## 2018-01-31 DIAGNOSIS — I25.10 CORONARY ATHEROSCLEROSIS DUE TO CALCIFIED CORONARY LESION: ICD-10-CM

## 2018-01-31 DIAGNOSIS — R06.00 DYSPNEA: Primary | ICD-10-CM

## 2018-01-31 DIAGNOSIS — R60.9 EDEMA: ICD-10-CM

## 2018-01-31 DIAGNOSIS — I25.84 CORONARY ATHEROSCLEROSIS DUE TO CALCIFIED CORONARY LESION: ICD-10-CM

## 2018-02-03 DIAGNOSIS — I10 ESSENTIAL HYPERTENSION WITH GOAL BLOOD PRESSURE LESS THAN 130/80: ICD-10-CM

## 2018-02-04 RX ORDER — CARVEDILOL 12.5 MG/1
TABLET ORAL
Qty: 180 TABLET | Refills: 0 | Status: SHIPPED | OUTPATIENT
Start: 2018-02-04 | End: 2018-02-14

## 2018-02-06 ENCOUNTER — HOSPITAL ENCOUNTER (OUTPATIENT)
Dept: CARDIOLOGY | Facility: CLINIC | Age: 63
Discharge: HOME | End: 2018-02-06
Attending: FAMILY MEDICINE | Admitting: FAMILY MEDICINE
Payer: COMMERCIAL

## 2018-02-06 ENCOUNTER — MEDICAL CORRESPONDENCE (OUTPATIENT)
Dept: FAMILY MEDICINE | Facility: CLINIC | Age: 63
End: 2018-02-06

## 2018-02-06 VITALS — SYSTOLIC BLOOD PRESSURE: 132 MMHG | DIASTOLIC BLOOD PRESSURE: 76 MMHG

## 2018-02-06 DIAGNOSIS — R06.00 DYSPNEA: ICD-10-CM

## 2018-02-06 DIAGNOSIS — I25.84 CORONARY ATHEROSCLEROSIS DUE TO CALCIFIED CORONARY LESION: ICD-10-CM

## 2018-02-06 DIAGNOSIS — R60.9 EDEMA: ICD-10-CM

## 2018-02-06 DIAGNOSIS — I25.10 CORONARY ATHEROSCLEROSIS DUE TO CALCIFIED CORONARY LESION: ICD-10-CM

## 2018-02-06 PROCEDURE — 25000128 H RX IP 250 OP 636: Performed by: INTERNAL MEDICINE

## 2018-02-06 PROCEDURE — 93017 CV STRESS TEST TRACING ONLY: CPT

## 2018-02-06 PROCEDURE — 93018 CV STRESS TEST I&R ONLY: CPT | Performed by: INTERNAL MEDICINE

## 2018-02-06 PROCEDURE — 78452 HT MUSCLE IMAGE SPECT MULT: CPT | Mod: 26 | Performed by: INTERNAL MEDICINE

## 2018-02-06 PROCEDURE — 93016 CV STRESS TEST SUPVJ ONLY: CPT | Performed by: INTERNAL MEDICINE

## 2018-02-06 PROCEDURE — A9502 TC99M TETROFOSMIN: HCPCS | Performed by: FAMILY MEDICINE

## 2018-02-06 PROCEDURE — 34300033 ZZH RX 343: Performed by: FAMILY MEDICINE

## 2018-02-06 RX ORDER — ACYCLOVIR 200 MG/1
0-1 CAPSULE ORAL
Status: DISCONTINUED | OUTPATIENT
Start: 2018-02-06 | End: 2018-02-07 | Stop reason: HOSPADM

## 2018-02-06 RX ORDER — AMINOPHYLLINE 25 MG/ML
50-100 INJECTION, SOLUTION INTRAVENOUS
Status: DISCONTINUED | OUTPATIENT
Start: 2018-02-06 | End: 2018-02-07 | Stop reason: HOSPADM

## 2018-02-06 RX ORDER — ALBUTEROL SULFATE 90 UG/1
2 AEROSOL, METERED RESPIRATORY (INHALATION) EVERY 5 MIN PRN
Status: DISCONTINUED | OUTPATIENT
Start: 2018-02-06 | End: 2018-02-07 | Stop reason: HOSPADM

## 2018-02-06 RX ORDER — REGADENOSON 0.08 MG/ML
0.4 INJECTION, SOLUTION INTRAVENOUS ONCE
Status: COMPLETED | OUTPATIENT
Start: 2018-02-06 | End: 2018-02-06

## 2018-02-06 RX ADMIN — TETROFOSMIN 11.5 MCI.: 1.38 INJECTION, POWDER, LYOPHILIZED, FOR SOLUTION INTRAVENOUS at 13:18

## 2018-02-06 RX ADMIN — TETROFOSMIN 4.04 MCI.: 1.38 INJECTION, POWDER, LYOPHILIZED, FOR SOLUTION INTRAVENOUS at 11:23

## 2018-02-06 RX ADMIN — REGADENOSON 0.4 MG: 0.08 INJECTION, SOLUTION INTRAVENOUS at 13:36

## 2018-02-08 ENCOUNTER — MYC MEDICAL ADVICE (OUTPATIENT)
Dept: FAMILY MEDICINE | Facility: CLINIC | Age: 63
End: 2018-02-08

## 2018-02-08 NOTE — LETTER
[facility] [address] [place] [address] [phone]      February 8, 2018      Syd Joyce  [address]  [facility] [address]              Dear Syd,    -Colon Cancer Screening- Recommended every 5-10 years, depending on your history, in order to prevent and detect colon cancer at its earliest stages.  Colon cancer is now the second leading cause of death in the [place] for both men and women and there are over 130,000 new cases and 50,000 deaths per year from colon cancer.  Colonoscopies can prevent 90-95% of these deaths.  Problem lesions can be removed before they ever become cancer.  This test is not only looking for cancer, but also getting rid of precancerious lesions.  You are usually given some sedation which makes the test very comfortable for most people.      If you do not wish to do a colonoscopy or cannot afford to do one, at this time, there is another option. It is called a FIT test or Fecal Immunochemical Occult Blood Test (take home stool sample kit).  It does not replace the colonoscopy for colorectal cancer screening, but it can detect hidden bleeding in the lower colon.  It does need to be repeated every year and if a positive result is obtained, you would be referred for a colonoscopy. The FIT test is really easy to do and does not require any  diet or medication restrictions and involves only one collection sample.      If you have completed either one of these tests or had a flexible sigmoidoscopy in the past five years at another facility, please have the records sent to our clinic so that we can best coordinate your care.  Please call us ([phone]) if you have questions or would like arrange either to do a colonoscopy or obtain the necessary test kit for the Fecal Occult Test.         Sincerely,    Shan Arenas M.D.

## 2018-02-08 NOTE — TELEPHONE ENCOUNTER
Per HM - needs colonoscopy- letter mailed & MYCHART note sent  February 8, 2018  Rylie Clark MA February 8, 2018 9:52 AM

## 2018-02-14 ENCOUNTER — OFFICE VISIT (OUTPATIENT)
Dept: CARDIOLOGY | Facility: CLINIC | Age: 63
End: 2018-02-14
Payer: COMMERCIAL

## 2018-02-14 VITALS
HEIGHT: 69 IN | BODY MASS INDEX: 32.88 KG/M2 | HEART RATE: 96 BPM | SYSTOLIC BLOOD PRESSURE: 152 MMHG | DIASTOLIC BLOOD PRESSURE: 72 MMHG | WEIGHT: 222 LBS

## 2018-02-14 DIAGNOSIS — I10 BENIGN ESSENTIAL HYPERTENSION: ICD-10-CM

## 2018-02-14 DIAGNOSIS — G47.33 OSA (OBSTRUCTIVE SLEEP APNEA): ICD-10-CM

## 2018-02-14 DIAGNOSIS — I25.118 CORONARY ARTERY DISEASE OF NATIVE ARTERY OF NATIVE HEART WITH STABLE ANGINA PECTORIS (H): Primary | ICD-10-CM

## 2018-02-14 DIAGNOSIS — I87.2 VENOUS (PERIPHERAL) INSUFFICIENCY: ICD-10-CM

## 2018-02-14 DIAGNOSIS — E11.43 TYPE 2 DIABETES MELLITUS WITH DIABETIC AUTONOMIC NEUROPATHY, WITHOUT LONG-TERM CURRENT USE OF INSULIN (H): ICD-10-CM

## 2018-02-14 DIAGNOSIS — R06.09 DYSPNEA ON EXERTION: ICD-10-CM

## 2018-02-14 DIAGNOSIS — I89.0 LYMPHEDEMA OF BOTH LOWER EXTREMITIES: ICD-10-CM

## 2018-02-14 DIAGNOSIS — E78.2 MIXED HYPERLIPIDEMIA: ICD-10-CM

## 2018-02-14 DIAGNOSIS — R94.39 ABNORMAL CARDIOVASCULAR STRESS TEST: ICD-10-CM

## 2018-02-14 PROCEDURE — 99205 OFFICE O/P NEW HI 60 MIN: CPT | Performed by: INTERNAL MEDICINE

## 2018-02-14 RX ORDER — CARVEDILOL 12.5 MG/1
12.5 TABLET ORAL 2 TIMES DAILY WITH MEALS
Status: ON HOLD | COMMUNITY
End: 2018-02-21

## 2018-02-14 NOTE — PROGRESS NOTES
HPI and Plan:   Nice 63-year-old gentleman is referred with multiple concerns, including severely limiting dyspnea on exertion, abnormal coronary calcium score and abnormal cardiovascular stress test, resistant hypertension, and symptomatic uncontrolled peripheral edema.  He is accompanied by his wife who helps confirm the history.    He had spinal surgery last summer and was laid up for 3 months with minimal activity after that.  Since that time he is having limiting dyspnea on exertion at around 100-200 feet even at a  slower than normal pace.  This resolved in a few minutes with rest.  He is not having chest discomfort, epigastric discomfort, arm neck or jaw discomfort.  He denies wheezing.  He is not short of breath at rest and  denies orthopnea, PND.  He is without syncope, near syncope palpitations.  There is no cardiac history, no tobacco history.  No family history of vascular or cardiac disease in first-degree relatives, but he does have long-standing and currently resistant hypertension on 5 antiantihypertensive agents without optimal control.  He essentially has normal renal function for age, he has type 2 diabetes mellitus, and is on statin therapy for mixed dyslipidemia.    In addition he has had years of problematic peripheral edema.  He did not tolerate pressure stockings as they were too uncomfortable.  Edema is improved but not resolved at night and is worse during the day.  At his job he is mainly sitting and standing, particularly now that his activity is limited by his dyspnea.  His back surgery did significantly improve his radiculopathy although he has some residual neuropathic symptoms.    Because of his s dyspnea evaluation is included a chest CT which did not show significant pulmonary abnormalities but did show lots of coronary calcium, primarily in the LAD which was quantified as extensive.  A subsequent stress nuclear study done a week ago was abnormal with some apical anterior  anterolateral perfusion abnormalities with stress, normal arrest, normal ejection fraction.   Additionally his wife notes he is a very prominent snore and is concerned about apneic episodes.  He has not had a sleep study.   Other review of studies and show recent normal thyroid functions, hemoglobin 13.6, electrolytes, creatinine 1.17 with GFR estimate of 65.  Glucose 251 and last hemoglobin A1c 6.8%.    Exam-full exam below.  In summary:  Blood pressure elevated at 150 systolic.  He is very nervous.  No respiratory distress.  Regular tachycardic rhythm at 100.   Lungs-clear  Cardiac-grade 2/6 systolic ejection murmur mainly at the left lower sternal border and right upper sternal border heard fairly broadly.  Carotid radial and brachial pulses have fairly prominent almost hyperdynamic or pistol shot quality but are not increased in volume.  No diastolic murmurs.  No gallop.  JVD hard to assess but does appear elevated with some HJR.    Abdomen-nontender, obese, no bruits or organomegaly   Vascular-carotid femoral and pedal pulses normal no bruits   Extremities-there is 3+ pitting edema senior care to the knees bilaterally.  Chronic mild stasis changes.  There are some excoriated superficial abrasions bilaterally but no ulcerated lesion or cellulitis or chronic erythema   Remainder of exam below.    Impression/plan  1-limiting dyspnea on exertion.  Marked coronary calcification, abnormal stress study in the LAD distribution.  Symptoms will be readily compatible with dyspnea as anginal equivalent and it is quite limiting already onto antianginal therapies.  Therefore I recommend proceeding with definitive evaluation with coronary angiography.  I did discuss potential additional medical therapy and assessment of his response to that, and the risks indications and benefits of both coronary angiography and coronary angioplasty and drug-eluting stents.  Following this discussion they wanted to proceed with early angiography  which I think is quite reasonable.  I will arrange for this.  I will also check an echocardiogram to assess for diastolic function and any significant valvular disease.    2-probable sleep apnea.  This may be contributing to his resistant hypertension.  I have recommended a sleep study and they have asked me to arrange for this which I will do.    3-resistant hypertension.  Has been long-standing.  Already on 5 agents without adequate control.  There is room to move on the beta-blocker as he remains tachycardic.  There may be an element of sleep apnea contributing to this so we will get a sleep study as above.  In addition he is overweight.  Several years ago he was down almost 30 pounds below current level at that weight I suspect his blood pressure will be significantly improved and he recalls that it was.  I recommended weight loss approach also.  However it is difficult for him to do any exercise right now in combination with that so we will proceed as #1 above.    4-likely deconditioning.  He was out for 3 months after his surgery and has never really gone back previously normal activities.  If there is no significant coronary disease and an echo does not demonstrate evidence for diastolic or valvular disease he will need to go to a rehab program.    5-lymphedema due to severe symptomatic resistant peripheral venous insufficinecy.  Extremely likely due to venous insufficiency.  He has failed initial management with pressure stockings.  I will refer him to lymphedema clinic, and depending the results of that we can consider vein clinic and evaluation for ablation.  This bothers him quite a bit and we do need to address this.    6-mixed with  hyperlipidemia .  On statin therapy.  Reasonable lipid profile recently with LDL 71, triglycerides 167.  This would also be improved by weight loss further.      To assess further management and results of that study.  I will start with the above issues.  I will have him  back about a week after his angiogram to assess results of that and further recommendations    Orders Placed This Encounter   Procedures     LYMPHEDEMA THERAPY REFERRAL     SLEEP EVALUATION & MANAGEMENT REFERRAL - [facility] [phone]  (Age 18 and up)     Follow-Up with Cardiac Advanced Practice Provider     Echocardiogram       Orders Placed This Encounter   Medications     Aspirin (ASPIR-81 PO)     Sig: Take by mouth daily     carvedilol (COREG) 12.5 MG tablet     Sig: Take 12.5 mg by mouth 2 times daily (with meals)       Medications Discontinued During This Encounter   Medication Reason     AmLODIPine Besylate (NORVASC PO) Medication Reconciliation Clean Up     Carvedilol (COREG PO) Medication Reconciliation Clean Up     MetFORMIN HCl (GLUCOPHAGE XR PO) Medication Reconciliation Clean Up     carvedilol (COREG) 12.5 MG tablet Medication Reconciliation Clean Up     cyclobenzaprine (FLEXERIL) 10 MG tablet Medication Reconciliation Clean Up     Esomeprazole Magnesium (NEXIUM PO) Stopped by Patient     Linagliptin (TRADJENTA PO) Medication Reconciliation Clean Up     Losartan Potassium (COZAAR PO) Medication Reconciliation Clean Up     methylPREDNISolone (MEDROL DOSEPAK) 4 MG tablet Therapy completed     morphine (MS CONTIN) 15 MG 12 hr tablet Therapy completed     order for DME Stopped by Patient     Pantoprazole Sodium (PROTONIX PO) Medication Reconciliation Clean Up     Simvastatin (ZOCOR PO) Medication Reconciliation Clean Up         Encounter Diagnoses   Name Primary?     Benign essential hypertension      MARTELL (obstructive sleep apnea)      Coronary artery disease of native artery of native heart with stable angina pectoris (H) Yes     Dyspnea on exertion      Venous (peripheral) insufficiency      Type 2 diabetes mellitus with diabetic autonomic neuropathy, without long-term current use of insulin (H)      Mixed hyperlipidemia      Abnormal cardiovascular stress test       Lymphedema of both lower extremities        CURRENT MEDICATIONS:  Current Outpatient Prescriptions   Medication Sig Dispense Refill     Aspirin (ASPIR-81 PO) Take by mouth daily       carvedilol (COREG) 12.5 MG tablet Take 12.5 mg by mouth 2 times daily (with meals)       metFORMIN (GLUCOPHAGE-XR) 500 MG 24 hr tablet TAKE 2 TABLETS BY MOUTH EVERY MORNING 180 tablet 0     losartan (COZAAR) 100 MG tablet TAKE 1 TABLET(100 MG) BY MOUTH DAILY 90 tablet 0     zolpidem (AMBIEN) 10 MG tablet TAKE 1 TABLET BY MOUTH EVERY NIGHT AT BEDTIME 90 tablet 1     amLODIPine (NORVASC) 10 MG tablet TAKE 1 TABLET(10 MG) BY MOUTH DAILY 90 tablet 3     lisinopril-hydrochlorothiazide (PRINZIDE/ZESTORETIC) 20-12.5 MG per tablet TAKE 1 TABLET BY MOUTH DAILY 90 tablet 3     pantoprazole (PROTONIX) 40 MG EC tablet TAKE 1 TABLET(40 MG) BY MOUTH DAILY 90 tablet 3     TRADJENTA 5 MG TABS tablet TAKE 1 TABLET(5 MG) BY MOUTH DAILY 90 tablet 3     cyclobenzaprine (FLEXERIL) 10 MG tablet Take 0.5-1 tablets (5-10 mg) by mouth 3 times daily as needed for muscle spasms 90 tablet 1     simvastatin (ZOCOR) 10 MG tablet TAKE 1 TABLET BY MOUTH EVERY NIGHT AT BEDTIME 90 tablet 3     Gabapentin (NEURONTIN PO) Take 900 mg by mouth daily (with dinner) (patient takes 3 X 300 mg = 900 mg dose)       Calcium Carbonate Antacid (TUMS PO) Take 2 chew tab by mouth daily (with dinner)       blood glucose monitoring (ACCU-CHEK MED PLUS) test strip USE 4 TO 5 TIMES PER WEEK OR AS DIRECTED 150 each 3     blood glucose monitoring (ACCU-CHEK MED PLUS) meter device kit Use to test blood sugar 4 to 5 times weekly or as directed. 1 kit 0     [DISCONTINUED] AmLODIPine Besylate (NORVASC PO) Take 10 mg by mouth every morning       [DISCONTINUED] Linagliptin (TRADJENTA PO) Take 5 mg by mouth every morning       [DISCONTINUED] Losartan Potassium (COZAAR PO) Take 100 mg by mouth every morning       [DISCONTINUED] MetFORMIN HCl (GLUCOPHAGE XR PO) Take 1,000 mg by mouth every  morning (patient takes 2 X 500 mg = 1,000 mg dose)       [DISCONTINUED] Simvastatin (ZOCOR PO) Take 10 mg by mouth daily (with dinner)         ALLERGIES   No Known Allergies    PAST MEDICAL HISTORY:  Past Medical History:   Diagnosis Date     DM2 (diabetes mellitus, type 2) (H)      Esophageal reflux 6/15/2012     HTN (hypertension) 8/12/2011     Leg pain, bilateral      Low back pain      Numbness and tingling     LEGS, R/T LUMBAR STENOSIS     Obesity, unspecified 1/10/2014       PAST SURGICAL HISTORY:  Past Surgical History:   Procedure Laterality Date     COLONOSCOPY       DISCECTOMY LUMBAR POSTERIOR MICROSCOPIC TWO LEVELS N/A 6/30/2017    Procedure: DISCECTOMY LUMBAR POSTERIOR MICROSCOPIC TWO LEVELS;  L3-4 L4-5 POSTERIOR DECOMPRESSION AND INTERSPINUS FIXATION WITHOUT FUSION;  Surgeon: Ray Perez MD;  Location: [facility] OR     WISDOM TEETH         FAMILY HISTORY:  History reviewed. No pertinent family history.    SOCIAL HISTORY:  Social History     Social History     Marital status:      Spouse name: N/A     Number of children: 2     Years of education: N/A     Occupational History     manager [employer]     Social History Main Topics     Smoking status: Never Smoker     Smokeless tobacco: Current User     Alcohol use Yes      Comment: RARELY     Drug use: No     Sexual activity: Not Asked     Other Topics Concern     Parent/Sibling W/ Cabg, Mi Or Angioplasty Before 65f 55m? No     Social History Narrative       Review of Systems:  Skin:  Negative       Eyes:  Positive for cataracts    ENT:  Negative      Respiratory:  Positive for dyspnea on exertion     Cardiovascular:    Positive for;edema ( bilateral  up to knees)    Gastroenterology: Positive for reflux    Genitourinary:  Positive for nocturia (1-3 xs)    Musculoskeletal:  Positive for back pain (surgery hx)    Neurologic:  Negative      Psychiatric:         Heme/Lymph/Imm:  Negative      Endocrine:    diabetes      Physical  "Exam:  Vitals: /72  Pulse 96  Ht 1.753 m (5' 9\")  Wt 100.7 kg (222 lb)  BMI 32.78 kg/m2    Constitutional:  cooperative, alert and oriented, well developed, well nourished, in no acute distress        Skin:  warm and dry to the touch venous stasis changes    (superficial excoriated patch areas distal LE bilateral. L>R)    Head:  normocephalic, no masses or lesions        Eyes:  pupils equal and round;conjunctivae and lids unremarkable;sclera white;EOMS intact        Lymph:      ENT:  no pallor or cyanosis, dentition good  (Mallampati 3)      Neck:  carotid pulses are full and equal bilaterally, JVP normal, no carotid bruit JVP 8-10      Respiratory:  normal breath sounds, clear to auscultation, normal A-P diameter, normal symmetry, normal respiratory excursion, no use of accessory muscles         Cardiac: regular rhythm;normal S1 and S2;no S3 or S4 tachycardic     systolic ejection murmur;LLSB;RUSB;grade 2        pulses full and equal, no bruits auscultated                                        GI:  abdomen soft, non-tender, BS normoactive, no mass, no HSM, no bruits obese      Extremities and Muscular Skeletal:  no deformities, clubbing, cyanosis, erythema observed stasis pigmentation bilateral LE edema;pitting;3+          Neurological:  no gross motor deficits;affect appropriate        Psych:  affect appropriate, oriented to time, person and place        CC  Shan Arenas MD  [address] [place] [address]              "

## 2018-02-14 NOTE — LETTER
2/14/2018    Shan Arenas MD  [address]  [facility] [id number]    RE: Syd Joyce       Dear Colleague,    I had the pleasure of seeing Syd Joyce in the [facility].    HPI and Plan:   Nice 63-year-old gentleman is referred with multiple concerns, including severely limiting dyspnea on exertion, abnormal coronary calcium score and abnormal cardiovascular stress test, resistant hypertension, and symptomatic uncontrolled peripheral edema.  He is accompanied by his wife who helps confirm the history.    He had spinal surgery last summer and was laid up for 3 months with minimal activity after that.  Since that time he is having limiting dyspnea on exertion at around 100-200 feet even at a  slower than normal pace.  This resolved in a few minutes with rest.  He is not having chest discomfort, epigastric discomfort, arm neck or jaw discomfort.  He denies wheezing.  He is not short of breath at rest and  denies orthopnea, PND.  He is without syncope, near syncope palpitations.  There is no cardiac history, no tobacco history.  No family history of vascular or cardiac disease in first-degree relatives, but he does have long-standing and currently resistant hypertension on 5 antiantihypertensive agents without optimal control.  He essentially has normal renal function for age, he has type 2 diabetes mellitus, and is on statin therapy for mixed dyslipidemia.    In addition he has had years of problematic peripheral edema.  He did not tolerate pressure stockings as they were too uncomfortable.  Edema is improved but not resolved at night and is worse during the day.  At his job he is mainly sitting and standing, particularly now that his activity is limited by his dyspnea.  His back surgery did significantly improve his radiculopathy although he has some residual neuropathic symptoms.    Because of his s dyspnea evaluation is included a chest CT which did not show  significant pulmonary abnormalities but did show lots of coronary calcium, primarily in the LAD which was quantified as extensive.  A subsequent stress nuclear study done a week ago was abnormal with some apical anterior anterolateral perfusion abnormalities with stress, normal arrest, normal ejection fraction.   Additionally his wife notes he is a very prominent snore and is concerned about apneic episodes.  He has not had a sleep study.   Other review of studies and show recent normal thyroid functions, hemoglobin 13.6, electrolytes, creatinine 1.17 with GFR estimate of 65.  Glucose 251 and last hemoglobin A1c 6.8%.    Exam-full exam below.  In summary:  Blood pressure elevated at 150 systolic.  He is very nervous.  No respiratory distress.  Regular tachycardic rhythm at 100.   Lungs-clear  Cardiac-grade 2/6 systolic ejection murmur mainly at the left lower sternal border and right upper sternal border heard fairly broadly.  Carotid radial and brachial pulses have fairly prominent almost hyperdynamic or pistol shot quality but are not increased in volume.  No diastolic murmurs.  No gallop.  JVD hard to assess but does appear elevated with some HJR.    Abdomen-nontender, obese, no bruits or organomegaly   Vascular-carotid femoral and pedal pulses normal no bruits   Extremities-there is 3+ pitting edema nursing home to the knees bilaterally.  Chronic mild stasis changes.  There are some excoriated superficial abrasions bilaterally but no ulcerated lesion or cellulitis or chronic erythema   Remainder of exam below.    Impression/plan  1-limiting dyspnea on exertion.  Marked coronary calcification, abnormal stress study in the LAD distribution.  Symptoms will be readily compatible with dyspnea as anginal equivalent and it is quite limiting already onto antianginal therapies.  Therefore I recommend proceeding with definitive evaluation with coronary angiography.  I did discuss potential additional medical therapy and  assessment of his response to that, and the risks indications and benefits of both coronary angiography and coronary angioplasty and drug-eluting stents.  Following this discussion they wanted to proceed with early angiography which I think is quite reasonable.  I will arrange for this.  I will also check an echocardiogram to assess for diastolic function and any significant valvular disease.    2-probable sleep apnea.  This may be contributing to his resistant hypertension.  I have recommended a sleep study and they have asked me to arrange for this which I will do.    3-resistant hypertension.  Has been long-standing.  Already on 5 agents without adequate control.  There is room to move on the beta-blocker as he remains tachycardic.  There may be an element of sleep apnea contributing to this so we will get a sleep study as above.  In addition he is overweight.  Several years ago he was down almost 30 pounds below current level at that weight I suspect his blood pressure will be significantly improved and he recalls that it was.  I recommended weight loss approach also.  However it is difficult for him to do any exercise right now in combination with that so we will proceed as #1 above.    4-likely deconditioning.  He was out for 3 months after his surgery and has never really gone back previously normal activities.  If there is no significant coronary disease and an echo does not demonstrate evidence for diastolic or valvular disease he will need to go to a rehab program.    5-lymphedema due to severe symptomatic resistant peripheral venous insufficinecy.  Extremely likely due to venous insufficiency.  He has failed initial management with pressure stockings.  I will refer him to lymphedema clinic, and depending the results of that we can consider vein clinic and evaluation for ablation.  This bothers him quite a bit and we do need to address this.    6-mixed with  hyperlipidemia .  On statin therapy.  Reasonable  lipid profile recently with LDL 71, triglycerides 167.  This would also be improved by weight loss further.      To assess further management and results of that study.  I will start with the above issues.  I will have him back about a week after his angiogram to assess results of that and further recommendations    Orders Placed This Encounter   Procedures     LYMPHEDEMA THERAPY REFERRAL     SLEEP EVALUATION & MANAGEMENT REFERRAL - ADULT [facility] [place] [phone]  (Age 18 and up)     Follow-Up with Cardiac Advanced Practice Provider     Echocardiogram       Orders Placed This Encounter   Medications     Aspirin (ASPIR-81 PO)     Sig: Take by mouth daily     carvedilol (COREG) 12.5 MG tablet     Sig: Take 12.5 mg by mouth 2 times daily (with meals)       Medications Discontinued During This Encounter   Medication Reason     AmLODIPine Besylate (NORVASC PO) Medication Reconciliation Clean Up     Carvedilol (COREG PO) Medication Reconciliation Clean Up     MetFORMIN HCl (GLUCOPHAGE XR PO) Medication Reconciliation Clean Up     carvedilol (COREG) 12.5 MG tablet Medication Reconciliation Clean Up     cyclobenzaprine (FLEXERIL) 10 MG tablet Medication Reconciliation Clean Up     Esomeprazole Magnesium (NEXIUM PO) Stopped by Patient     Linagliptin (TRADJENTA PO) Medication Reconciliation Clean Up     Losartan Potassium (COZAAR PO) Medication Reconciliation Clean Up     methylPREDNISolone (MEDROL DOSEPAK) 4 MG tablet Therapy completed     morphine (MS CONTIN) 15 MG 12 hr tablet Therapy completed     order for DME Stopped by Patient     Pantoprazole Sodium (PROTONIX PO) Medication Reconciliation Clean Up     Simvastatin (ZOCOR PO) Medication Reconciliation Clean Up         Encounter Diagnoses   Name Primary?     Benign essential hypertension      MARTELL (obstructive sleep apnea)      Coronary artery disease of native artery of native heart with stable angina pectoris (H) Yes     Dyspnea on exertion       Venous (peripheral) insufficiency      Type 2 diabetes mellitus with diabetic autonomic neuropathy, without long-term current use of insulin (H)      Mixed hyperlipidemia      Abnormal cardiovascular stress test      Lymphedema of both lower extremities        CURRENT MEDICATIONS:  Current Outpatient Prescriptions   Medication Sig Dispense Refill     Aspirin (ASPIR-81 PO) Take by mouth daily       carvedilol (COREG) 12.5 MG tablet Take 12.5 mg by mouth 2 times daily (with meals)       metFORMIN (GLUCOPHAGE-XR) 500 MG 24 hr tablet TAKE 2 TABLETS BY MOUTH EVERY MORNING 180 tablet 0     losartan (COZAAR) 100 MG tablet TAKE 1 TABLET(100 MG) BY MOUTH DAILY 90 tablet 0     zolpidem (AMBIEN) 10 MG tablet TAKE 1 TABLET BY MOUTH EVERY NIGHT AT BEDTIME 90 tablet 1     amLODIPine (NORVASC) 10 MG tablet TAKE 1 TABLET(10 MG) BY MOUTH DAILY 90 tablet 3     lisinopril-hydrochlorothiazide (PRINZIDE/ZESTORETIC) 20-12.5 MG per tablet TAKE 1 TABLET BY MOUTH DAILY 90 tablet 3     pantoprazole (PROTONIX) 40 MG EC tablet TAKE 1 TABLET(40 MG) BY MOUTH DAILY 90 tablet 3     TRADJENTA 5 MG TABS tablet TAKE 1 TABLET(5 MG) BY MOUTH DAILY 90 tablet 3     cyclobenzaprine (FLEXERIL) 10 MG tablet Take 0.5-1 tablets (5-10 mg) by mouth 3 times daily as needed for muscle spasms 90 tablet 1     simvastatin (ZOCOR) 10 MG tablet TAKE 1 TABLET BY MOUTH EVERY NIGHT AT BEDTIME 90 tablet 3     Gabapentin (NEURONTIN PO) Take 900 mg by mouth daily (with dinner) (patient takes 3 X 300 mg = 900 mg dose)       Calcium Carbonate Antacid (TUMS PO) Take 2 chew tab by mouth daily (with dinner)       blood glucose monitoring (ACCU-CHEK MED PLUS) test strip USE 4 TO 5 TIMES PER WEEK OR AS DIRECTED 150 each 3     blood glucose monitoring (ACCU-CHEK MED PLUS) meter device kit Use to test blood sugar 4 to 5 times weekly or as directed. 1 kit 0     [DISCONTINUED] AmLODIPine Besylate (NORVASC PO) Take 10 mg by mouth every morning       [DISCONTINUED] Linagliptin  (TRADJENTA PO) Take 5 mg by mouth every morning       [DISCONTINUED] Losartan Potassium (COZAAR PO) Take 100 mg by mouth every morning       [DISCONTINUED] MetFORMIN HCl (GLUCOPHAGE XR PO) Take 1,000 mg by mouth every morning (patient takes 2 X 500 mg = 1,000 mg dose)       [DISCONTINUED] Simvastatin (ZOCOR PO) Take 10 mg by mouth daily (with dinner)         ALLERGIES   No Known Allergies    PAST MEDICAL HISTORY:  Past Medical History:   Diagnosis Date     DM2 (diabetes mellitus, type 2) (H)      Esophageal reflux 6/15/2012     HTN (hypertension) 8/12/2011     Leg pain, bilateral      Low back pain      Numbness and tingling     LEGS, R/T LUMBAR STENOSIS     Obesity, unspecified 1/10/2014       PAST SURGICAL HISTORY:  Past Surgical History:   Procedure Laterality Date     COLONOSCOPY       DISCECTOMY LUMBAR POSTERIOR MICROSCOPIC TWO LEVELS N/A 6/30/2017    Procedure: DISCECTOMY LUMBAR POSTERIOR MICROSCOPIC TWO LEVELS;  L3-4 L4-5 POSTERIOR DECOMPRESSION AND INTERSPINUS FIXATION WITHOUT FUSION;  Surgeon: Ray Perez MD;  Location: [facility] OR     WISDOM TEETH         FAMILY HISTORY:  History reviewed. No pertinent family history.    SOCIAL HISTORY:  Social History     Social History     Marital status:      Spouse name: N/A     Number of children: 2     Years of education: N/A     Occupational History     manager [employer]     Social History Main Topics     Smoking status: Never Smoker     Smokeless tobacco: Current User     Alcohol use Yes      Comment: RARELY     Drug use: No     Sexual activity: Not Asked     Other Topics Concern     Parent/Sibling W/ Cabg, Mi Or Angioplasty Before 65f 55m? No     Social History Narrative       Review of Systems:  Skin:  Negative       Eyes:  Positive for cataracts    ENT:  Negative      Respiratory:  Positive for dyspnea on exertion     Cardiovascular:    Positive for;edema ( bilateral  up to knees)    Gastroenterology: Positive for reflux   "  Genitourinary:  Positive for nocturia (1-3 xs)    Musculoskeletal:  Positive for back pain (surgery hx)    Neurologic:  Negative      Psychiatric:         Heme/Lymph/Imm:  Negative      Endocrine:    diabetes      Physical Exam:  Vitals: /72  Pulse 96  Ht 1.753 m (5' 9\")  Wt 100.7 kg (222 lb)  BMI 32.78 kg/m2    Constitutional:  cooperative, alert and oriented, well developed, well nourished, in no acute distress        Skin:  warm and dry to the touch venous stasis changes    (superficial excoriated patch areas distal LE bilateral. L>R)    Head:  normocephalic, no masses or lesions        Eyes:  pupils equal and round;conjunctivae and lids unremarkable;sclera white;EOMS intact        Lymph:      ENT:  no pallor or cyanosis, dentition good  (Mallampati 3)      Neck:  carotid pulses are full and equal bilaterally, JVP normal, no carotid bruit JVP 8-10      Respiratory:  normal breath sounds, clear to auscultation, normal A-P diameter, normal symmetry, normal respiratory excursion, no use of accessory muscles         Cardiac: regular rhythm;normal S1 and S2;no S3 or S4 tachycardic     systolic ejection murmur;LLSB;RUSB;grade 2        pulses full and equal, no bruits auscultated                                        GI:  abdomen soft, non-tender, BS normoactive, no mass, no HSM, no bruits obese      Extremities and Muscular Skeletal:  no deformities, clubbing, cyanosis, erythema observed stasis pigmentation bilateral LE edema;pitting;3+          Neurological:  no gross motor deficits;affect appropriate        Psych:  affect appropriate, oriented to time, person and place      Thank you for allowing me to participate in the care of your patient.    Sincerely,     Syd Bardales MD     [facility]    "

## 2018-02-14 NOTE — LETTER
2/14/2018    Shan Arenas MD  [address]  [facility] [address]    RE: Syd Joyce       Dear Colleague,    I had the pleasure of seeing Syd Joyce in the [facility].    HPI and Plan:   Nice 63-year-old gentleman is referred with multiple concerns, including severely limiting dyspnea on exertion, abnormal coronary calcium score and abnormal cardiovascular stress test, resistant hypertension, and symptomatic uncontrolled peripheral edema.  He is accompanied by his wife who helps confirm the history.    He had spinal surgery last summer and was laid up for 3 months with minimal activity after that.  Since that time he is having limiting dyspnea on exertion at around 100-200 feet even at a  slower than normal pace.  This resolved in a few minutes with rest.  He is not having chest discomfort, epigastric discomfort, arm neck or jaw discomfort.  He denies wheezing.  He is not short of breath at rest and  denies orthopnea, PND.  He is without syncope, near syncope palpitations.  There is no cardiac history, no tobacco history.  No family history of vascular or cardiac disease in first-degree relatives, but he does have long-standing and currently resistant hypertension on 5 antiantihypertensive agents without optimal control.  He essentially has normal renal function for age, he has type 2 diabetes mellitus, and is on statin therapy for mixed dyslipidemia.    In addition he has had years of problematic peripheral edema.  He did not tolerate pressure stockings as they were too uncomfortable.  Edema is improved but not resolved at night and is worse during the day.  At his job he is mainly sitting and standing, particularly now that his activity is limited by his dyspnea.  His back surgery did significantly improve his radiculopathy although he has some residual neuropathic symptoms.    Because of his s dyspnea evaluation is included a chest CT which did not show  significant pulmonary abnormalities but did show lots of coronary calcium, primarily in the LAD which was quantified as extensive.  A subsequent stress nuclear study done a week ago was abnormal with some apical anterior anterolateral perfusion abnormalities with stress, normal arrest, normal ejection fraction.   Additionally his wife notes he is a very prominent snore and is concerned about apneic episodes.  He has not had a sleep study.   Other review of studies and show recent normal thyroid functions, hemoglobin 13.6, electrolytes, creatinine 1.17 with GFR estimate of 65.  Glucose 251 and last hemoglobin A1c 6.8%.    Exam-full exam below.  In summary:  Blood pressure elevated at 150 systolic.  He is very nervous.  No respiratory distress.  Regular tachycardic rhythm at 100.   Lungs-clear  Cardiac-grade 2/6 systolic ejection murmur mainly at the left lower sternal border and right upper sternal border heard fairly broadly.  Carotid radial and brachial pulses have fairly prominent almost hyperdynamic or pistol shot quality but are not increased in volume.  No diastolic murmurs.  No gallop.  JVD hard to assess but does appear elevated with some HJR.    Abdomen-nontender, obese, no bruits or organomegaly   Vascular-carotid femoral and pedal pulses normal no bruits   Extremities-there is 3+ pitting edema MCFP to the knees bilaterally.  Chronic mild stasis changes.  There are some excoriated superficial abrasions bilaterally but no ulcerated lesion or cellulitis or chronic erythema   Remainder of exam below.    Impression/plan  1-limiting dyspnea on exertion.  Marked coronary calcification, abnormal stress study in the LAD distribution.  Symptoms will be readily compatible with dyspnea as anginal equivalent and it is quite limiting already onto antianginal therapies.  Therefore I recommend proceeding with definitive evaluation with coronary angiography.  I did discuss potential additional medical therapy and  assessment of his response to that, and the risks indications and benefits of both coronary angiography and coronary angioplasty and drug-eluting stents.  Following this discussion they wanted to proceed with early angiography which I think is quite reasonable.  I will arrange for this.  I will also check an echocardiogram to assess for diastolic function and any significant valvular disease.    2-probable sleep apnea.  This may be contributing to his resistant hypertension.  I have recommended a sleep study and they have asked me to arrange for this which I will do.    3-resistant hypertension.  Has been long-standing.  Already on 5 agents without adequate control.  There is room to move on the beta-blocker as he remains tachycardic.  There may be an element of sleep apnea contributing to this so we will get a sleep study as above.  In addition he is overweight.  Several years ago he was down almost 30 pounds below current level at that weight I suspect his blood pressure will be significantly improved and he recalls that it was.  I recommended weight loss approach also.  However it is difficult for him to do any exercise right now in combination with that so we will proceed as #1 above.    4-likely deconditioning.  He was out for 3 months after his surgery and has never really gone back previously normal activities.  If there is no significant coronary disease and an echo does not demonstrate evidence for diastolic or valvular disease he will need to go to a rehab program.    5-lymphedema due to severe symptomatic resistant peripheral venous insufficinecy.  Extremely likely due to venous insufficiency.  He has failed initial management with pressure stockings.  I will refer him to lymphedema clinic, and depending the results of that we can consider vein clinic and evaluation for ablation.  This bothers him quite a bit and we do need to address this.    6-mixed with  hyperlipidemia .  On statin therapy.  Reasonable  lipid profile recently with LDL 71, triglycerides 167.  This would also be improved by weight loss further.      To assess further management and results of that study.  I will start with the above issues.  I will have him back about a week after his angiogram to assess results of that and further recommendations    Orders Placed This Encounter   Procedures     LYMPHEDEMA THERAPY REFERRAL     SLEEP EVALUATION & MANAGEMENT REFERRAL - ADULT [facility] [phone]  (Age 18 and up)     Follow-Up with Cardiac Advanced Practice Provider     Echocardiogram       Orders Placed This Encounter   Medications     Aspirin (ASPIR-81 PO)     Sig: Take by mouth daily     carvedilol (COREG) 12.5 MG tablet     Sig: Take 12.5 mg by mouth 2 times daily (with meals)       Medications Discontinued During This Encounter   Medication Reason     AmLODIPine Besylate (NORVASC PO) Medication Reconciliation Clean Up     Carvedilol (COREG PO) Medication Reconciliation Clean Up     MetFORMIN HCl (GLUCOPHAGE XR PO) Medication Reconciliation Clean Up     carvedilol (COREG) 12.5 MG tablet Medication Reconciliation Clean Up     cyclobenzaprine (FLEXERIL) 10 MG tablet Medication Reconciliation Clean Up     Esomeprazole Magnesium (NEXIUM PO) Stopped by Patient     Linagliptin (TRADJENTA PO) Medication Reconciliation Clean Up     Losartan Potassium (COZAAR PO) Medication Reconciliation Clean Up     methylPREDNISolone (MEDROL DOSEPAK) 4 MG tablet Therapy completed     morphine (MS CONTIN) 15 MG 12 hr tablet Therapy completed     order for DME Stopped by Patient     Pantoprazole Sodium (PROTONIX PO) Medication Reconciliation Clean Up     Simvastatin (ZOCOR PO) Medication Reconciliation Clean Up         Encounter Diagnoses   Name Primary?     Benign essential hypertension      MARTELL (obstructive sleep apnea)      Coronary artery disease of native artery of native heart with stable angina pectoris (H) Yes     Dyspnea on exertion       Venous (peripheral) insufficiency      Type 2 diabetes mellitus with diabetic autonomic neuropathy, without long-term current use of insulin (H)      Mixed hyperlipidemia      Abnormal cardiovascular stress test      Lymphedema of both lower extremities        CURRENT MEDICATIONS:  Current Outpatient Prescriptions   Medication Sig Dispense Refill     Aspirin (ASPIR-81 PO) Take by mouth daily       carvedilol (COREG) 12.5 MG tablet Take 12.5 mg by mouth 2 times daily (with meals)       metFORMIN (GLUCOPHAGE-XR) 500 MG 24 hr tablet TAKE 2 TABLETS BY MOUTH EVERY MORNING 180 tablet 0     losartan (COZAAR) 100 MG tablet TAKE 1 TABLET(100 MG) BY MOUTH DAILY 90 tablet 0     zolpidem (AMBIEN) 10 MG tablet TAKE 1 TABLET BY MOUTH EVERY NIGHT AT BEDTIME 90 tablet 1     amLODIPine (NORVASC) 10 MG tablet TAKE 1 TABLET(10 MG) BY MOUTH DAILY 90 tablet 3     lisinopril-hydrochlorothiazide (PRINZIDE/ZESTORETIC) 20-12.5 MG per tablet TAKE 1 TABLET BY MOUTH DAILY 90 tablet 3     pantoprazole (PROTONIX) 40 MG EC tablet TAKE 1 TABLET(40 MG) BY MOUTH DAILY 90 tablet 3     TRADJENTA 5 MG TABS tablet TAKE 1 TABLET(5 MG) BY MOUTH DAILY 90 tablet 3     cyclobenzaprine (FLEXERIL) 10 MG tablet Take 0.5-1 tablets (5-10 mg) by mouth 3 times daily as needed for muscle spasms 90 tablet 1     simvastatin (ZOCOR) 10 MG tablet TAKE 1 TABLET BY MOUTH EVERY NIGHT AT BEDTIME 90 tablet 3     Gabapentin (NEURONTIN PO) Take 900 mg by mouth daily (with dinner) (patient takes 3 X 300 mg = 900 mg dose)       Calcium Carbonate Antacid (TUMS PO) Take 2 chew tab by mouth daily (with dinner)       blood glucose monitoring (ACCU-CHEK MED PLUS) test strip USE 4 TO 5 TIMES PER WEEK OR AS DIRECTED 150 each 3     blood glucose monitoring (ACCU-CHEK MED PLUS) meter device kit Use to test blood sugar 4 to 5 times weekly or as directed. 1 kit 0     [DISCONTINUED] AmLODIPine Besylate (NORVASC PO) Take 10 mg by mouth every morning       [DISCONTINUED] Linagliptin  (TRADJENTA PO) Take 5 mg by mouth every morning       [DISCONTINUED] Losartan Potassium (COZAAR PO) Take 100 mg by mouth every morning       [DISCONTINUED] MetFORMIN HCl (GLUCOPHAGE XR PO) Take 1,000 mg by mouth every morning (patient takes 2 X 500 mg = 1,000 mg dose)       [DISCONTINUED] Simvastatin (ZOCOR PO) Take 10 mg by mouth daily (with dinner)         ALLERGIES   No Known Allergies    PAST MEDICAL HISTORY:  Past Medical History:   Diagnosis Date     DM2 (diabetes mellitus, type 2) (H)      Esophageal reflux 6/15/2012     HTN (hypertension) 8/12/2011     Leg pain, bilateral      Low back pain      Numbness and tingling     LEGS, R/T LUMBAR STENOSIS     Obesity, unspecified 1/10/2014       PAST SURGICAL HISTORY:  Past Surgical History:   Procedure Laterality Date     COLONOSCOPY       DISCECTOMY LUMBAR POSTERIOR MICROSCOPIC TWO LEVELS N/A 6/30/2017    Procedure: DISCECTOMY LUMBAR POSTERIOR MICROSCOPIC TWO LEVELS;  L3-4 L4-5 POSTERIOR DECOMPRESSION AND INTERSPINUS FIXATION WITHOUT FUSION;  Surgeon: Ray Perez MD;  Location: [facility] OR     WISDOM TEETH         FAMILY HISTORY:  History reviewed. No pertinent family history.    SOCIAL HISTORY:  Social History     Social History     Marital status:      Spouse name: N/A     Number of children: 2     Years of education: N/A     Occupational History     manager [employer]     Social History Main Topics     Smoking status: Never Smoker     Smokeless tobacco: Current User     Alcohol use Yes      Comment: RARELY     Drug use: No     Sexual activity: Not Asked     Other Topics Concern     Parent/Sibling W/ Cabg, Mi Or Angioplasty Before 65f 55m? No     Social History Narrative       Review of Systems:  Skin:  Negative       Eyes:  Positive for cataracts    ENT:  Negative      Respiratory:  Positive for dyspnea on exertion     Cardiovascular:    Positive for;edema ( bilateral  up to knees)    Gastroenterology: Positive for reflux   "  Genitourinary:  Positive for nocturia (1-3 xs)    Musculoskeletal:  Positive for back pain (surgery hx)    Neurologic:  Negative      Psychiatric:         Heme/Lymph/Imm:  Negative      Endocrine:    diabetes      Physical Exam:  Vitals: /72  Pulse 96  Ht 1.753 m (5' 9\")  Wt 100.7 kg (222 lb)  BMI 32.78 kg/m2    Constitutional:  cooperative, alert and oriented, well developed, well nourished, in no acute distress        Skin:  warm and dry to the touch venous stasis changes    (superficial excoriated patch areas distal LE bilateral. L>R)    Head:  normocephalic, no masses or lesions        Eyes:  pupils equal and round;conjunctivae and lids unremarkable;sclera white;EOMS intact        Lymph:      ENT:  no pallor or cyanosis, dentition good  (Mallampati 3)      Neck:  carotid pulses are full and equal bilaterally, JVP normal, no carotid bruit JVP 8-10      Respiratory:  normal breath sounds, clear to auscultation, normal A-P diameter, normal symmetry, normal respiratory excursion, no use of accessory muscles         Cardiac: regular rhythm;normal S1 and S2;no S3 or S4 tachycardic     systolic ejection murmur;LLSB;RUSB;grade 2        pulses full and equal, no bruits auscultated                                        GI:  abdomen soft, non-tender, BS normoactive, no mass, no HSM, no bruits obese      Extremities and Muscular Skeletal:  no deformities, clubbing, cyanosis, erythema observed stasis pigmentation bilateral LE edema;pitting;3+          Neurological:  no gross motor deficits;affect appropriate        Psych:  affect appropriate, oriented to time, person and place        CC  Shan Arenas MD  [address]  [place] [address]                Thank you for allowing me to participate in the care of your patient.      Sincerely,     Syd Bardales MD     [facility]    cc:   Shan Arenas MD  [id number]  [place] [address]        "

## 2018-02-14 NOTE — MR AVS SNAPSHOT
After Visit Summary   2/14/2018    Syd Joyce    MRN: [id number]           Patient Information     Date Of Birth          1955        Visit Information        Provider Department      2/14/2018 7:45 AM Syd Bardales MD [facility]        Today's Diagnoses     Coronary artery disease of native artery of native heart with stable angina pectoris (H)    -  1    Benign essential hypertension        MARTELL (obstructive sleep apnea)        Dyspnea on exertion        Venous (peripheral) insufficiency        Type 2 diabetes mellitus with diabetic autonomic neuropathy, without long-term current use of insulin (H)        Mixed hyperlipidemia        Abnormal cardiovascular stress test           Follow-ups after your visit        Additional Services     LYMPHEDEMA THERAPY REFERRAL           SLEEP EVALUATION & MANAGEMENT REFERRAL - ADULT -[facility] [phone]  (Age 18 and up)       Please be aware that coverage of these services is subject to the terms and limitations of your health insurance plan.  Call member services at your health plan with any benefit or coverage questions.      Please bring the following to your appointment:    >>   List of current medications   >>   This referral request   >>   Any documents/labs given to you for this referral                Follow-Up with Cardiac Advanced Practice Provider                 Your next 10 appointments already scheduled     Jun 27, 2018 10:05 AM CDT   (Arrive by 9:50 AM)   XR LUMBAR SPINE 2/3 VIEWS with CSXR1   [facility] ([facility])    [address] [place] [address] [phone]           Please bring a list of your current medicines to your exam. (Include vitamins, minerals and over-thecounter medicines.) Leave your valuables at home.  Tell your doctor if there is a chance you may be pregnant.  You do not need to do anything special for this  exam.            Jun 27, 2018 11:05 AM CDT   Return Visit with Dariana Lopez PA-C   [facility] ([facility])    [address] [place] [address]  [place] [id number] [phone]              Future tests that were ordered for you today     Open Future Orders        Priority Expected Expires Ordered    Follow-Up with Cardiac Advanced Practice Provider Routine 2/27/2018 2/14/2019 2/14/2018    Echocardiogram Routine 2/21/2018 2/14/2019 2/14/2018    Cardiac Cath: Coronary Angiography Adult Order Routine 2/21/2018 2/14/2019 2/14/2018    LYMPHEDEMA THERAPY REFERRAL Routine 2/21/2018 2/14/2019 2/14/2018    SLEEP EVALUATION & MANAGEMENT REFERRAL - ADULT -[facility] [phone]  (Age 18 and up) Routine 2/21/2018 2/14/2019 2/14/2018            Who to contact     If you have questions or need follow up information about today's clinic visit or your schedule please contact [facility]   [place] directly at [phone].  Normal or non-critical lab and imaging results will be communicated to you by [employer], letter or phone within 4 business days after the clinic has received the results. If you do not hear from us within 7 days, please contact the clinic through [employer] or phone. If you have a critical or abnormal lab result, we will notify you by phone as soon as possible.  Submit refill requests through [employer] or call your pharmacy and they will forward the refill request to us. Please allow 3 business days for your refill to be completed.          Additional Information About Your Visit        MyChart Information     [employer] gives you secure access to your electronic health record. If you see a primary care provider, you can also send messages to your care team and make appointments. If you have questions, please call your primary care clinic.  If you do not have a primary care provider, please call [phone] and  "they will assist you.        Care EveryWhere ID     This is your Care EveryWhere ID. This could be used by other organizations to access your [facility] medical records  [id number]        Your Vitals Were     Pulse Height BMI (Body Mass Index)             96 1.753 m (5' 9\") 32.78 kg/m2          Blood Pressure from Last 3 Encounters:   02/14/18 152/72   02/06/18 132/76   01/22/18 140/64    Weight from Last 3 Encounters:   02/14/18 100.7 kg (222 lb)   01/22/18 98.4 kg (217 lb)   12/20/17 98.9 kg (218 lb)               Primary Care Provider Office Phone # Fax #    Shan Arenas [phone] [address]  [facility] [address]        Equal Access to Services     GAIL [facility] Center[facility]ADDIE : Hadii aad sierra hadasho Soomaali, waaxda luqadaha, qaybta kaalmada adeegyada, jose rivas hayflavio kang . So [facility] [phone].    ATENCIÓN: Si habla español, tiene a persaud disposición servicios gratuitos de asistencia lingüística. Amanda al [phone].    We comply with applicable federal civil rights laws and [place] laws. We do not discriminate on the basis of race, color, national origin, age, disability, sex, sexual orientation, or gender identity.            Thank you!     Thank you for choosing [facility]  for your care. Our goal is always to provide you with excellent care. Hearing back from our patients is one way we can continue to improve our services. Please take a few minutes to complete the written survey that you may receive in the mail after your visit with us. Thank you!             Your Updated Medication List - Protect others around you: Learn how to safely use, store and throw away your medicines at www.disposemymeds.org.          This list is accurate as of 2/14/18  8:49 AM.  Always use your most recent med list.                   Brand Name Dispense Instructions for use Diagnosis    amLODIPine 10 MG tablet    NORVASC    90 tablet    TAKE 1 " TABLET(10 MG) BY MOUTH DAILY    Essential hypertension with goal blood pressure less than 130/80       ASPIR-81 PO      Take by mouth daily        blood glucose monitoring meter device kit     1 kit    Use to test blood sugar 4 to 5 times weekly or as directed.    Refill clinic medication management patient       blood glucose monitoring test strip    ACCU-CHEK MED PLUS    150 each    USE 4 TO 5 TIMES PER WEEK OR AS DIRECTED    Refill clinic medication management patient       carvedilol 12.5 MG tablet    COREG     Take 12.5 mg by mouth 2 times daily (with meals)        cyclobenzaprine 10 MG tablet    FLEXERIL    90 tablet    Take 0.5-1 tablets (5-10 mg) by mouth 3 times daily as needed for muscle spasms    S/P lumbar laminectomy       lisinopril-hydrochlorothiazide 20-12.5 MG per tablet    PRINZIDE/ZESTORETIC    90 tablet    TAKE 1 TABLET BY MOUTH DAILY    Essential hypertension with goal blood pressure less than 130/80       losartan 100 MG tablet    COZAAR    90 tablet    TAKE 1 TABLET(100 MG) BY MOUTH DAILY    Essential hypertension with goal blood pressure less than 130/80       metFORMIN 500 MG 24 hr tablet    GLUCOPHAGE-XR    180 tablet    TAKE 2 TABLETS BY MOUTH EVERY MORNING    Type 2 diabetes mellitus with diabetic autonomic neuropathy, without long-term current use of insulin (H)       NEURONTIN PO      Take 900 mg by mouth daily (with dinner) (patient takes 3 X 300 mg = 900 mg dose)        pantoprazole 40 MG EC tablet    PROTONIX    90 tablet    TAKE 1 TABLET(40 MG) BY MOUTH DAILY    Encounter for medication refill       simvastatin 10 MG tablet    ZOCOR    90 tablet    TAKE 1 TABLET BY MOUTH EVERY NIGHT AT BEDTIME    Hypercholesteremia       TRADJENTA 5 MG Tabs tablet   Generic drug:  linagliptin     90 tablet    TAKE 1 TABLET(5 MG) BY MOUTH DAILY    Type 2 diabetes mellitus with diabetic autonomic neuropathy, without long-term current use of insulin (H)       TUMS PO      Take 2 chew tab by mouth  daily (with dinner)        zolpidem 10 MG tablet    AMBIEN    90 tablet    TAKE 1 TABLET BY MOUTH EVERY NIGHT AT BEDTIME    Encounter for medication refill

## 2018-02-16 DIAGNOSIS — I89.0 LYMPHEDEMA OF BOTH LOWER EXTREMITIES: Primary | ICD-10-CM

## 2018-02-19 ENCOUNTER — HOSPITAL ENCOUNTER (OUTPATIENT)
Dept: CARDIOLOGY | Facility: CLINIC | Age: 63
Discharge: HOME | End: 2018-02-19
Attending: INTERNAL MEDICINE | Admitting: INTERNAL MEDICINE
Payer: COMMERCIAL

## 2018-02-19 ENCOUNTER — TELEPHONE (OUTPATIENT)
Dept: CARDIOLOGY | Facility: CLINIC | Age: 63
End: 2018-02-19

## 2018-02-19 DIAGNOSIS — I25.118 CORONARY ARTERY DISEASE OF NATIVE ARTERY OF NATIVE HEART WITH STABLE ANGINA PECTORIS (H): Primary | ICD-10-CM

## 2018-02-19 DIAGNOSIS — R06.09 DYSPNEA ON EXERTION: ICD-10-CM

## 2018-02-19 DIAGNOSIS — I25.118 CORONARY ARTERY DISEASE OF NATIVE ARTERY OF NATIVE HEART WITH STABLE ANGINA PECTORIS (H): ICD-10-CM

## 2018-02-19 PROCEDURE — 93306 TTE W/DOPPLER COMPLETE: CPT | Mod: 26 | Performed by: INTERNAL MEDICINE

## 2018-02-19 PROCEDURE — 25500064 ZZH RX 255 OP 636: Performed by: INTERNAL MEDICINE

## 2018-02-19 PROCEDURE — 93306 TTE W/DOPPLER COMPLETE: CPT

## 2018-02-19 RX ORDER — LIDOCAINE 40 MG/G
CREAM TOPICAL
Status: CANCELLED | OUTPATIENT
Start: 2018-02-19

## 2018-02-19 RX ORDER — SODIUM CHLORIDE 9 MG/ML
INJECTION, SOLUTION INTRAVENOUS CONTINUOUS
Status: CANCELLED | OUTPATIENT
Start: 2018-02-19

## 2018-02-19 RX ORDER — POTASSIUM CHLORIDE 1500 MG/1
20 TABLET, EXTENDED RELEASE ORAL
Status: CANCELLED | OUTPATIENT
Start: 2018-02-19

## 2018-02-19 RX ADMIN — SULFUR HEXAFLUORIDE 5 ML: KIT at 13:19

## 2018-02-19 NOTE — TELEPHONE ENCOUNTER
Contacted patient to review pre-cath procedures: patient is scheduled for coronary angiogram on 2/21/18 . Patient's arrival time is 8:00 am and procedure time is 10:00 am. Patient is aware to be NPO except for medications after midnight the night prior to procedure. Patient will hold the medications metformin and lisinopril/hydrochlorothiazide. Patient was instructed to hold metformin the morning of and two days after the procedure, and to contact PCP for glucose management while off of metformin. BMP ordered, lab appointment scheduled for 2/23/18 and patient was instructed not to resume metformin until he is called with those results. Patient has arranged for transportation and 24 hour f/u care. Patient has no known contrast dye allergy. Patient is not taking anti-coagulation medication. Patient's renal function is WNL for procedure. Patient will continue daily aspirin dose 81 mg. Post procedure VERONICA follow up scheduled for 2/28/18. Orders for procedure entered.    Pt inquired about results of echocardiogram done today. Reviewed results with patient, interpretation below. Pt verbalized understanding, no further questions at this time.    Interpretation Summary     The visual ejection fraction is estimated at 60-65%.  Normal left ventricular wall motion  The study was technically difficult. Contrast was used without apparent  complications. There is no comparison study available.

## 2018-02-21 ENCOUNTER — APPOINTMENT (OUTPATIENT)
Dept: CARDIOLOGY | Facility: CLINIC | Age: 63
End: 2018-02-21
Attending: INTERNAL MEDICINE
Payer: COMMERCIAL

## 2018-02-21 ENCOUNTER — HOSPITAL ENCOUNTER (OUTPATIENT)
Facility: CLINIC | Age: 63
Discharge: HOME | End: 2018-02-21
Attending: INTERNAL MEDICINE | Admitting: INTERNAL MEDICINE
Payer: COMMERCIAL

## 2018-02-21 VITALS
HEIGHT: 69 IN | TEMPERATURE: 97.9 F | WEIGHT: 222 LBS | OXYGEN SATURATION: 96 % | HEART RATE: 89 BPM | DIASTOLIC BLOOD PRESSURE: 66 MMHG | RESPIRATION RATE: 16 BRPM | SYSTOLIC BLOOD PRESSURE: 136 MMHG | BODY MASS INDEX: 32.88 KG/M2

## 2018-02-21 DIAGNOSIS — R06.09 DYSPNEA ON EXERTION: ICD-10-CM

## 2018-02-21 DIAGNOSIS — R94.39 ABNORMAL CARDIOVASCULAR STRESS TEST: ICD-10-CM

## 2018-02-21 DIAGNOSIS — Z98.890 STATUS POST CORONARY ANGIOGRAM: ICD-10-CM

## 2018-02-21 DIAGNOSIS — I25.118 CORONARY ARTERY DISEASE OF NATIVE ARTERY OF NATIVE HEART WITH STABLE ANGINA PECTORIS (H): ICD-10-CM

## 2018-02-21 DIAGNOSIS — I25.10 ATHEROSCLEROSIS OF NATIVE CORONARY ARTERY OF NATIVE HEART WITHOUT ANGINA PECTORIS: ICD-10-CM

## 2018-02-21 LAB
ANION GAP SERPL CALCULATED.3IONS-SCNC: 10 MMOL/L (ref 3–14)
APTT PPP: 28 SEC (ref 22–37)
BUN SERPL-MCNC: 14 MG/DL (ref 7–30)
CALCIUM SERPL-MCNC: 8.9 MG/DL (ref 8.5–10.1)
CHLORIDE SERPL-SCNC: 92 MMOL/L (ref 94–109)
CO2 SERPL-SCNC: 26 MMOL/L (ref 20–32)
CREAT SERPL-MCNC: 1 MG/DL (ref 0.66–1.25)
ERYTHROCYTE [DISTWIDTH] IN BLOOD BY AUTOMATED COUNT: 12 % (ref 10–15)
GFR SERPL CREATININE-BSD FRML MDRD: 75 ML/MIN/1.7M2
GLUCOSE SERPL-MCNC: 117 MG/DL (ref 70–99)
HCT VFR BLD AUTO: 36.4 % (ref 40–53)
HGB BLD-MCNC: 13.6 G/DL (ref 13.3–17.7)
INR PPP: 0.97 (ref 0.86–1.14)
MCH RBC QN AUTO: 33.2 PG (ref 26.5–33)
MCHC RBC AUTO-ENTMCNC: 37.4 G/DL (ref 31.5–36.5)
MCV RBC AUTO: 89 FL (ref 78–100)
PLATELET # BLD AUTO: 178 10E9/L (ref 150–450)
POTASSIUM SERPL-SCNC: 3.7 MMOL/L (ref 3.4–5.3)
RBC # BLD AUTO: 4.1 10E12/L (ref 4.4–5.9)
SODIUM SERPL-SCNC: 128 MMOL/L (ref 133–144)
WBC # BLD AUTO: 8.4 10E9/L (ref 4–11)

## 2018-02-21 PROCEDURE — 40000235 ZZH STATISTIC TELEMETRY

## 2018-02-21 PROCEDURE — 85027 COMPLETE CBC AUTOMATED: CPT | Performed by: INTERNAL MEDICINE

## 2018-02-21 PROCEDURE — 27210856 ZZH ACCESS HEART CATH CR2

## 2018-02-21 PROCEDURE — C1769 GUIDE WIRE: HCPCS

## 2018-02-21 PROCEDURE — 93005 ELECTROCARDIOGRAM TRACING: CPT

## 2018-02-21 PROCEDURE — 27210795 ZZH PAD DEFIB QUICK CR4

## 2018-02-21 PROCEDURE — 93458 L HRT ARTERY/VENTRICLE ANGIO: CPT

## 2018-02-21 PROCEDURE — 85730 THROMBOPLASTIN TIME PARTIAL: CPT | Performed by: INTERNAL MEDICINE

## 2018-02-21 PROCEDURE — 25000128 H RX IP 250 OP 636: Performed by: INTERNAL MEDICINE

## 2018-02-21 PROCEDURE — 99153 MOD SED SAME PHYS/QHP EA: CPT

## 2018-02-21 PROCEDURE — 93458 L HRT ARTERY/VENTRICLE ANGIO: CPT | Mod: 26 | Performed by: INTERNAL MEDICINE

## 2018-02-21 PROCEDURE — 25000132 ZZH RX MED GY IP 250 OP 250 PS 637: Performed by: INTERNAL MEDICINE

## 2018-02-21 PROCEDURE — 99152 MOD SED SAME PHYS/QHP 5/>YRS: CPT

## 2018-02-21 PROCEDURE — 93571 IV DOP VEL&/PRESS C FLO 1ST: CPT | Mod: LD

## 2018-02-21 PROCEDURE — 99152 MOD SED SAME PHYS/QHP 5/>YRS: CPT | Performed by: INTERNAL MEDICINE

## 2018-02-21 PROCEDURE — 27210946 ZZH KIT HC TOTES DISP CR8

## 2018-02-21 PROCEDURE — 27210914 ZZH SHEATH CR8

## 2018-02-21 PROCEDURE — 40000852 ZZH STATISTIC HEART CATH LAB OR EP LAB

## 2018-02-21 PROCEDURE — 93571 IV DOP VEL&/PRESS C FLO 1ST: CPT | Mod: 26 | Performed by: INTERNAL MEDICINE

## 2018-02-21 PROCEDURE — 25000125 ZZHC RX 250: Performed by: INTERNAL MEDICINE

## 2018-02-21 PROCEDURE — 27211089 ZZH KIT ACIST INJECTOR CR3

## 2018-02-21 PROCEDURE — 40000065 ZZH STATISTIC EKG NON-CHARGEABLE

## 2018-02-21 PROCEDURE — 93010 ELECTROCARDIOGRAM REPORT: CPT | Performed by: INTERNAL MEDICINE

## 2018-02-21 PROCEDURE — 85610 PROTHROMBIN TIME: CPT | Performed by: INTERNAL MEDICINE

## 2018-02-21 PROCEDURE — 27210787 ZZH MANIFOLD CR2

## 2018-02-21 PROCEDURE — 80048 BASIC METABOLIC PNL TOTAL CA: CPT | Performed by: INTERNAL MEDICINE

## 2018-02-21 RX ORDER — NALOXONE HYDROCHLORIDE 0.4 MG/ML
.1-.4 INJECTION, SOLUTION INTRAMUSCULAR; INTRAVENOUS; SUBCUTANEOUS
Status: DISCONTINUED | OUTPATIENT
Start: 2018-02-21 | End: 2018-02-21 | Stop reason: HOSPADM

## 2018-02-21 RX ORDER — NITROGLYCERIN 20 MG/100ML
.07-2 INJECTION INTRAVENOUS CONTINUOUS PRN
Status: DISCONTINUED | OUTPATIENT
Start: 2018-02-21 | End: 2018-02-21 | Stop reason: HOSPADM

## 2018-02-21 RX ORDER — LIDOCAINE 40 MG/G
CREAM TOPICAL
Status: DISCONTINUED | OUTPATIENT
Start: 2018-02-21 | End: 2018-02-21 | Stop reason: HOSPADM

## 2018-02-21 RX ORDER — FLUMAZENIL 0.1 MG/ML
0.2 INJECTION, SOLUTION INTRAVENOUS
Status: DISCONTINUED | OUTPATIENT
Start: 2018-02-21 | End: 2018-02-21 | Stop reason: HOSPADM

## 2018-02-21 RX ORDER — NALOXONE HYDROCHLORIDE 0.4 MG/ML
0.4 INJECTION, SOLUTION INTRAMUSCULAR; INTRAVENOUS; SUBCUTANEOUS EVERY 5 MIN PRN
Status: DISCONTINUED | OUTPATIENT
Start: 2018-02-21 | End: 2018-02-21 | Stop reason: HOSPADM

## 2018-02-21 RX ORDER — FENTANYL CITRATE 50 UG/ML
25-50 INJECTION, SOLUTION INTRAMUSCULAR; INTRAVENOUS
Status: DISCONTINUED | OUTPATIENT
Start: 2018-02-21 | End: 2018-02-21 | Stop reason: HOSPADM

## 2018-02-21 RX ORDER — LIDOCAINE HYDROCHLORIDE 10 MG/ML
30 INJECTION, SOLUTION EPIDURAL; INFILTRATION; INTRACAUDAL; PERINEURAL
Status: DISCONTINUED | OUTPATIENT
Start: 2018-02-21 | End: 2018-02-21 | Stop reason: HOSPADM

## 2018-02-21 RX ORDER — CARVEDILOL 25 MG/1
25 TABLET ORAL 2 TIMES DAILY
Qty: 180 TABLET | Refills: 3 | Status: SHIPPED | OUTPATIENT
Start: 2018-02-21 | End: 2018-02-28

## 2018-02-21 RX ORDER — POTASSIUM CHLORIDE 1500 MG/1
20 TABLET, EXTENDED RELEASE ORAL
Status: COMPLETED | OUTPATIENT
Start: 2018-02-21 | End: 2018-02-21

## 2018-02-21 RX ORDER — NALOXONE HYDROCHLORIDE 0.4 MG/ML
.2-.4 INJECTION, SOLUTION INTRAMUSCULAR; INTRAVENOUS; SUBCUTANEOUS
Status: DISCONTINUED | OUTPATIENT
Start: 2018-02-21 | End: 2018-02-21 | Stop reason: HOSPADM

## 2018-02-21 RX ORDER — BUPIVACAINE HYDROCHLORIDE 2.5 MG/ML
1-10 INJECTION, SOLUTION EPIDURAL; INFILTRATION; INTRACAUDAL
Status: DISCONTINUED | OUTPATIENT
Start: 2018-02-21 | End: 2018-02-21 | Stop reason: HOSPADM

## 2018-02-21 RX ORDER — LOSARTAN POTASSIUM 100 MG/1
100 TABLET ORAL DAILY
Status: CANCELLED | OUTPATIENT
Start: 2018-02-21

## 2018-02-21 RX ORDER — DOBUTAMINE HYDROCHLORIDE 200 MG/100ML
2-20 INJECTION INTRAVENOUS CONTINUOUS PRN
Status: DISCONTINUED | OUTPATIENT
Start: 2018-02-21 | End: 2018-02-21 | Stop reason: HOSPADM

## 2018-02-21 RX ORDER — EPINEPHRINE 1 MG/ML
0.3 INJECTION, SOLUTION, CONCENTRATE INTRAVENOUS
Status: DISCONTINUED | OUTPATIENT
Start: 2018-02-21 | End: 2018-02-21 | Stop reason: HOSPADM

## 2018-02-21 RX ORDER — NIFEDIPINE 10 MG/1
10 CAPSULE ORAL
Status: DISCONTINUED | OUTPATIENT
Start: 2018-02-21 | End: 2018-02-21 | Stop reason: HOSPADM

## 2018-02-21 RX ORDER — IOPAMIDOL 755 MG/ML
113 INJECTION, SOLUTION INTRAVASCULAR ONCE
Status: DISCONTINUED | OUTPATIENT
Start: 2018-02-21 | End: 2018-02-21 | Stop reason: HOSPADM

## 2018-02-21 RX ORDER — SODIUM CHLORIDE 9 MG/ML
INJECTION, SOLUTION INTRAVENOUS CONTINUOUS
Status: DISCONTINUED | OUTPATIENT
Start: 2018-02-21 | End: 2018-02-21 | Stop reason: HOSPADM

## 2018-02-21 RX ORDER — HYDRALAZINE HYDROCHLORIDE 20 MG/ML
10-20 INJECTION INTRAMUSCULAR; INTRAVENOUS
Status: DISCONTINUED | OUTPATIENT
Start: 2018-02-21 | End: 2018-02-21 | Stop reason: HOSPADM

## 2018-02-21 RX ORDER — ONDANSETRON 2 MG/ML
4 INJECTION INTRAMUSCULAR; INTRAVENOUS EVERY 4 HOURS PRN
Status: DISCONTINUED | OUTPATIENT
Start: 2018-02-21 | End: 2018-02-21 | Stop reason: HOSPADM

## 2018-02-21 RX ORDER — LISINOPRIL AND HYDROCHLOROTHIAZIDE 12.5; 2 MG/1; MG/1
1 TABLET ORAL DAILY
Status: CANCELLED | OUTPATIENT
Start: 2018-02-21

## 2018-02-21 RX ORDER — POTASSIUM CHLORIDE 29.8 MG/ML
20 INJECTION INTRAVENOUS
Status: DISCONTINUED | OUTPATIENT
Start: 2018-02-21 | End: 2018-02-21 | Stop reason: HOSPADM

## 2018-02-21 RX ORDER — FUROSEMIDE 10 MG/ML
20-100 INJECTION INTRAMUSCULAR; INTRAVENOUS
Status: DISCONTINUED | OUTPATIENT
Start: 2018-02-21 | End: 2018-02-21 | Stop reason: HOSPADM

## 2018-02-21 RX ORDER — ACETAMINOPHEN 325 MG/1
325-650 TABLET ORAL EVERY 4 HOURS PRN
Status: DISCONTINUED | OUTPATIENT
Start: 2018-02-21 | End: 2018-02-21 | Stop reason: HOSPADM

## 2018-02-21 RX ORDER — ATROPINE SULFATE 0.1 MG/ML
0.5 INJECTION INTRAVENOUS EVERY 5 MIN PRN
Status: DISCONTINUED | OUTPATIENT
Start: 2018-02-21 | End: 2018-02-21 | Stop reason: HOSPADM

## 2018-02-21 RX ORDER — DEXTROSE MONOHYDRATE 25 G/50ML
12.5-5 INJECTION, SOLUTION INTRAVENOUS EVERY 30 MIN PRN
Status: DISCONTINUED | OUTPATIENT
Start: 2018-02-21 | End: 2018-02-21 | Stop reason: HOSPADM

## 2018-02-21 RX ORDER — CLOPIDOGREL 300 MG/1
300-600 TABLET, FILM COATED ORAL
Status: DISCONTINUED | OUTPATIENT
Start: 2018-02-21 | End: 2018-02-21 | Stop reason: HOSPADM

## 2018-02-21 RX ORDER — PROTAMINE SULFATE 10 MG/ML
1-5 INJECTION, SOLUTION INTRAVENOUS
Status: DISCONTINUED | OUTPATIENT
Start: 2018-02-21 | End: 2018-02-21 | Stop reason: HOSPADM

## 2018-02-21 RX ORDER — PRASUGREL 10 MG/1
10-60 TABLET, FILM COATED ORAL
Status: DISCONTINUED | OUTPATIENT
Start: 2018-02-21 | End: 2018-02-21 | Stop reason: HOSPADM

## 2018-02-21 RX ORDER — HYDROCODONE BITARTRATE AND ACETAMINOPHEN 5; 325 MG/1; MG/1
1-2 TABLET ORAL EVERY 4 HOURS PRN
Status: DISCONTINUED | OUTPATIENT
Start: 2018-02-21 | End: 2018-02-21 | Stop reason: HOSPADM

## 2018-02-21 RX ORDER — METHYLPREDNISOLONE SODIUM SUCCINATE 125 MG/2ML
125 INJECTION, POWDER, LYOPHILIZED, FOR SOLUTION INTRAMUSCULAR; INTRAVENOUS
Status: DISCONTINUED | OUTPATIENT
Start: 2018-02-21 | End: 2018-02-21 | Stop reason: HOSPADM

## 2018-02-21 RX ORDER — ZOLPIDEM TARTRATE 10 MG/1
10 TABLET ORAL AT BEDTIME
Status: CANCELLED | OUTPATIENT
Start: 2018-02-21

## 2018-02-21 RX ORDER — NITROGLYCERIN 0.4 MG/1
0.4 TABLET SUBLINGUAL EVERY 5 MIN PRN
Status: DISCONTINUED | OUTPATIENT
Start: 2018-02-21 | End: 2018-02-21 | Stop reason: HOSPADM

## 2018-02-21 RX ORDER — PROMETHAZINE HYDROCHLORIDE 25 MG/ML
6.25-25 INJECTION, SOLUTION INTRAMUSCULAR; INTRAVENOUS EVERY 4 HOURS PRN
Status: DISCONTINUED | OUTPATIENT
Start: 2018-02-21 | End: 2018-02-21 | Stop reason: HOSPADM

## 2018-02-21 RX ORDER — ASPIRIN 325 MG
325 TABLET ORAL
Status: DISCONTINUED | OUTPATIENT
Start: 2018-02-21 | End: 2018-02-21 | Stop reason: HOSPADM

## 2018-02-21 RX ORDER — VERAPAMIL HYDROCHLORIDE 2.5 MG/ML
1-2.5 INJECTION, SOLUTION INTRAVENOUS
Status: COMPLETED | OUTPATIENT
Start: 2018-02-21 | End: 2018-02-21

## 2018-02-21 RX ORDER — NICARDIPINE HYDROCHLORIDE 2.5 MG/ML
100 INJECTION INTRAVENOUS
Status: DISCONTINUED | OUTPATIENT
Start: 2018-02-21 | End: 2018-02-21 | Stop reason: HOSPADM

## 2018-02-21 RX ORDER — CLOPIDOGREL BISULFATE 75 MG/1
75 TABLET ORAL
Status: DISCONTINUED | OUTPATIENT
Start: 2018-02-21 | End: 2018-02-21 | Stop reason: HOSPADM

## 2018-02-21 RX ORDER — NITROGLYCERIN 5 MG/ML
100-500 VIAL (ML) INTRAVENOUS
Status: COMPLETED | OUTPATIENT
Start: 2018-02-21 | End: 2018-02-21

## 2018-02-21 RX ORDER — LORAZEPAM 2 MG/ML
.5-2 INJECTION INTRAMUSCULAR EVERY 4 HOURS PRN
Status: DISCONTINUED | OUTPATIENT
Start: 2018-02-21 | End: 2018-02-21 | Stop reason: HOSPADM

## 2018-02-21 RX ORDER — ASPIRIN 81 MG/1
81-324 TABLET, CHEWABLE ORAL
Status: DISCONTINUED | OUTPATIENT
Start: 2018-02-21 | End: 2018-02-21 | Stop reason: HOSPADM

## 2018-02-21 RX ORDER — CARVEDILOL 12.5 MG/1
12.5 TABLET ORAL 2 TIMES DAILY WITH MEALS
Status: CANCELLED | OUTPATIENT
Start: 2018-02-21

## 2018-02-21 RX ORDER — DIPHENHYDRAMINE HYDROCHLORIDE 50 MG/ML
25-50 INJECTION INTRAMUSCULAR; INTRAVENOUS
Status: DISCONTINUED | OUTPATIENT
Start: 2018-02-21 | End: 2018-02-21 | Stop reason: HOSPADM

## 2018-02-21 RX ORDER — AMLODIPINE BESYLATE 10 MG/1
10 TABLET ORAL DAILY
Status: CANCELLED | OUTPATIENT
Start: 2018-02-21

## 2018-02-21 RX ORDER — NITROGLYCERIN 5 MG/ML
100-200 VIAL (ML) INTRAVENOUS
Status: DISCONTINUED | OUTPATIENT
Start: 2018-02-21 | End: 2018-02-21 | Stop reason: HOSPADM

## 2018-02-21 RX ORDER — PROTAMINE SULFATE 10 MG/ML
25-100 INJECTION, SOLUTION INTRAVENOUS EVERY 5 MIN PRN
Status: DISCONTINUED | OUTPATIENT
Start: 2018-02-21 | End: 2018-02-21 | Stop reason: HOSPADM

## 2018-02-21 RX ORDER — SIMVASTATIN 20 MG
20 TABLET ORAL EVERY EVENING
Qty: 90 TABLET | Refills: 3 | Status: SHIPPED | OUTPATIENT
Start: 2018-02-21 | End: 2018-04-03

## 2018-02-21 RX ORDER — ADENOSINE 3 MG/ML
12-12000 INJECTION, SOLUTION INTRAVENOUS
Status: DISCONTINUED | OUTPATIENT
Start: 2018-02-21 | End: 2018-02-21 | Stop reason: HOSPADM

## 2018-02-21 RX ORDER — DOPAMINE HYDROCHLORIDE 160 MG/100ML
2-20 INJECTION, SOLUTION INTRAVENOUS CONTINUOUS PRN
Status: DISCONTINUED | OUTPATIENT
Start: 2018-02-21 | End: 2018-02-21 | Stop reason: HOSPADM

## 2018-02-21 RX ORDER — METOPROLOL TARTRATE 1 MG/ML
5 INJECTION, SOLUTION INTRAVENOUS EVERY 5 MIN PRN
Status: DISCONTINUED | OUTPATIENT
Start: 2018-02-21 | End: 2018-02-21 | Stop reason: HOSPADM

## 2018-02-21 RX ORDER — ENALAPRILAT 1.25 MG/ML
1.25-2.5 INJECTION INTRAVENOUS
Status: DISCONTINUED | OUTPATIENT
Start: 2018-02-21 | End: 2018-02-21 | Stop reason: HOSPADM

## 2018-02-21 RX ORDER — HEPARIN SODIUM 1000 [USP'U]/ML
1000-10000 INJECTION, SOLUTION INTRAVENOUS; SUBCUTANEOUS EVERY 5 MIN PRN
Status: DISCONTINUED | OUTPATIENT
Start: 2018-02-21 | End: 2018-02-21 | Stop reason: HOSPADM

## 2018-02-21 RX ORDER — ATROPINE SULFATE 0.1 MG/ML
.5-1 INJECTION INTRAVENOUS
Status: DISCONTINUED | OUTPATIENT
Start: 2018-02-21 | End: 2018-02-21 | Stop reason: HOSPADM

## 2018-02-21 RX ORDER — SIMVASTATIN 10 MG
10 TABLET ORAL AT BEDTIME
Status: CANCELLED | OUTPATIENT
Start: 2018-02-21

## 2018-02-21 RX ORDER — SODIUM NITROPRUSSIDE 25 MG/ML
100-200 INJECTION INTRAVENOUS
Status: DISCONTINUED | OUTPATIENT
Start: 2018-02-21 | End: 2018-02-21 | Stop reason: HOSPADM

## 2018-02-21 RX ORDER — MORPHINE SULFATE 2 MG/ML
1-2 INJECTION, SOLUTION INTRAMUSCULAR; INTRAVENOUS EVERY 5 MIN PRN
Status: DISCONTINUED | OUTPATIENT
Start: 2018-02-21 | End: 2018-02-21 | Stop reason: HOSPADM

## 2018-02-21 RX ORDER — POTASSIUM CHLORIDE 7.45 MG/ML
10 INJECTION INTRAVENOUS
Status: DISCONTINUED | OUTPATIENT
Start: 2018-02-21 | End: 2018-02-21 | Stop reason: HOSPADM

## 2018-02-21 RX ORDER — LIDOCAINE HYDROCHLORIDE 10 MG/ML
1-10 INJECTION, SOLUTION EPIDURAL; INFILTRATION; INTRACAUDAL; PERINEURAL
Status: COMPLETED | OUTPATIENT
Start: 2018-02-21 | End: 2018-02-21

## 2018-02-21 RX ORDER — PHENYLEPHRINE HCL IN 0.9% NACL 1 MG/10 ML
20-100 SYRINGE (ML) INTRAVENOUS
Status: DISCONTINUED | OUTPATIENT
Start: 2018-02-21 | End: 2018-02-21 | Stop reason: HOSPADM

## 2018-02-21 RX ADMIN — Medication 5000 UNITS: at 13:01

## 2018-02-21 RX ADMIN — FENTANYL CITRATE 25 MCG: 50 INJECTION, SOLUTION INTRAMUSCULAR; INTRAVENOUS at 13:04

## 2018-02-21 RX ADMIN — ADENOSINE 720 MCG: 3 INJECTION, SOLUTION INTRAVENOUS at 13:27

## 2018-02-21 RX ADMIN — POTASSIUM CHLORIDE 20 MEQ: 1500 TABLET, EXTENDED RELEASE ORAL at 09:16

## 2018-02-21 RX ADMIN — SODIUM CHLORIDE: 9 INJECTION, SOLUTION INTRAVENOUS at 08:56

## 2018-02-21 RX ADMIN — MIDAZOLAM 0.5 MG: 1 INJECTION INTRAMUSCULAR; INTRAVENOUS at 13:06

## 2018-02-21 RX ADMIN — MIDAZOLAM 0.5 MG: 1 INJECTION INTRAMUSCULAR; INTRAVENOUS at 13:02

## 2018-02-21 RX ADMIN — FENTANYL CITRATE 25 MCG: 50 INJECTION, SOLUTION INTRAMUSCULAR; INTRAVENOUS at 12:56

## 2018-02-21 RX ADMIN — MIDAZOLAM 0.5 MG: 1 INJECTION INTRAMUSCULAR; INTRAVENOUS at 12:51

## 2018-02-21 RX ADMIN — MIDAZOLAM 0.5 MG: 1 INJECTION INTRAMUSCULAR; INTRAVENOUS at 12:58

## 2018-02-21 RX ADMIN — VERAPAMIL HYDROCHLORIDE 2.5 MG: 2.5 INJECTION, SOLUTION INTRAVENOUS at 13:02

## 2018-02-21 RX ADMIN — MIDAZOLAM 0.5 MG: 1 INJECTION INTRAMUSCULAR; INTRAVENOUS at 13:08

## 2018-02-21 RX ADMIN — LIDOCAINE HYDROCHLORIDE 20 MG: 10 INJECTION, SOLUTION EPIDURAL; INFILTRATION; INTRACAUDAL; PERINEURAL at 13:00

## 2018-02-21 RX ADMIN — MIDAZOLAM 0.5 MG: 1 INJECTION INTRAMUSCULAR; INTRAVENOUS at 13:13

## 2018-02-21 RX ADMIN — NITROGLYCERIN 200 MCG: 5 INJECTION, SOLUTION INTRAVENOUS at 13:02

## 2018-02-21 RX ADMIN — FENTANYL CITRATE 25 MCG: 50 INJECTION, SOLUTION INTRAMUSCULAR; INTRAVENOUS at 12:59

## 2018-02-21 RX ADMIN — FENTANYL CITRATE 25 MCG: 50 INJECTION, SOLUTION INTRAMUSCULAR; INTRAVENOUS at 12:50

## 2018-02-21 NOTE — DISCHARGE INSTRUCTIONS
Cardiac Angiogram Discharge Instructions - Radial    After you go home:      Have an adult stay with you until tomorrow.    Drink extra fluids for 2 days.    You may resume your normal diet.    No smoking       For 24 hours - due to the sedation you received:    Relax and take it easy.    Do NOT make any important or legal decisions.    Do NOT drive or operate machines at home or at work.    Do NOT drink alcohol.    Care of Wrist Puncture Site:      For the first 24 hrs - check the puncture site every 1-2 hours while awake.    It is normal to have soreness at the puncture site and mild tingling in your hand for up to 3 days.    Remove the bandaid after 24 hours. If there is minor oozing, apply another bandaid and remove it after 12 hours.    You may shower tomorrow.  Do NOT take a bath, or use a hot tub or pool for at least 3 days. Do NOT scrub the site. Do not use lotion or powder near the puncture site.           Activity:        For 2 days:     do not use your hand or arm to support your weight (such as rising from a chair)     do not bend your wrist (such as lifting a garage door).    do not lift more than 5 pounds or exercise your arm (such as tennis, golf or bowling).    Do NOT do any heavy activity such as exercise, lifting, or straining.     Bleeding:      If you start bleeding from the site in your wrist, sit down and press firmly on/above the site for 10 minutes.     Once bleeding stops, keep arm still for 2 hours.     Call [facility] as soon as you can.       Call 911 right away if you have heavy bleeding or bleeding that does not stop.      Medicines:      If you are on Metformin (Glucophage), do not restart it until you have blood tests (within 2 to 3 days after discharge).  After you have your blood drawn, you may restart the Metformin.     Take your medications, including blood thinners, unless your provider tells you not to.  If you take Coumadin (Warfarin), have your INR checked by your provider in   3-5 days. Call your clinic to schedule this.    If you have stopped any medicines, check with your provider about when to restart them.    Follow Up Appointments:      Follow up with [facility] Nurse Practitioner at [facility] of patient preference in 7-10 days.    Call the clinic if:      You have a large or growing hard lump around the site.    The site is red, swollen, hot or tender.    Blood or fluid is draining from the site.    You have chills or a fever greater than 101 F (38 C).    Your arm turns feels numb, cool or changes color.    You have hives, a rash or unusual itching.    Any questions or concerns.          [facility] [place]:    [phone] [facility] (7 days a week)

## 2018-02-21 NOTE — PROCEDURES
50% prox LAD in large wrap around LAD. IFR 0.89.  FFR with 240 IC adenosine 0.82.  No other CAD.  LVEF 55% LVEDP 20 mmHg    Rec med Rx   Continue ASA   Increase coreg to 25 bid  Increase simvastatin to 20 q HS FLP in 4-6 weeks.

## 2018-02-21 NOTE — PROGRESS NOTES
Back to room post procedure. VSS. Denies pain. Voided. Tr band to left wrist with 12 cc air. No bleeding/swelling at site. Instructed on activity restrictions. Taking fluids.SR.

## 2018-02-21 NOTE — IP AVS SNAPSHOT
[place] [address]    [place] [address]    Phone:  [phone]                                       After Visit Summary   2/21/2018    Syd Joyce    MRN: [id number]           After Visit Summary Signature Page     I have received my discharge instructions, and my questions have been answered. I have discussed any challenges I see with this plan with the nurse or doctor.    ..........................................................................................................................................  Patient/Patient Representative Signature      ..........................................................................................................................................  Patient Representative Print Name and Relationship to Patient    ..................................................               ................................................  Date                                            Time    ..........................................................................................................................................  Reviewed by Signature/Title    ...................................................              ..............................................  Date                                                            Time

## 2018-02-21 NOTE — PROGRESS NOTES
Admitted for heart cath. Denies pain or SOB. Procedure explained and questions answered. States understanding. Wife at bedside. Declined to watch angiogram video.

## 2018-02-21 NOTE — PROGRESS NOTES
Report from Gemma KUMAR RN. TR on left redial site CDI, decreasing air. Reviewed d/c instructions with pt and wife, state understanding. UAD book given to pt.   1515 Radial site bleeding with air removal at this time. 3 cc added with good hemostasis. Monitor closely.   1645 no further bleeding with TR band removal. CMS good to hand. Small bruised area around insertion site, flat and soft. Denies pain. Pt received take home meds. Up walking without any change in radial site. Discharged to home at this time.

## 2018-02-21 NOTE — IP AVS SNAPSHOT
MRN:[id number]                      After Visit Summary   2/21/2018    Syd Joyce    MRN: [id number]           Visit Information        Department      2/21/2018  8:07 AM [facility]          Review of your medicines      CONTINUE these medicines which may have CHANGED, or have new prescriptions. If we are uncertain of the size of tablets/capsules you have at home, strength may be listed as something that might have changed.        Dose / Directions    aspirin 81 MG EC tablet   This may have changed:    - how much to take  - additional instructions   Used for:  Atherosclerosis of native coronary artery of native heart without angina pectoris        Dose:  81 mg   Start taking on:  2/22/2018   Take 1 tablet (81 mg) by mouth daily Start tomorrow morning.   Quantity:  90 tablet   Refills:  3       carvedilol 25 MG tablet   Commonly known as:  COREG   This may have changed:    - medication strength  - how much to take  - when to take this  - additional instructions   Used for:  Atherosclerosis of native coronary artery of native heart without angina pectoris        Dose:  25 mg   Take 1 tablet (25 mg) by mouth 2 times daily Hold IF heart rate less than 55.   Quantity:  180 tablet   Refills:  3       simvastatin 20 MG tablet   Commonly known as:  ZOCOR   This may have changed:  See the new instructions.   Used for:  Atherosclerosis of native coronary artery of native heart without angina pectoris        Dose:  20 mg   Take 1 tablet (20 mg) by mouth every evening   Quantity:  90 tablet   Refills:  3         CONTINUE these medicines which have NOT CHANGED        Dose / Directions    amLODIPine 10 MG tablet   Commonly known as:  NORVASC   Used for:  Essential hypertension with goal blood pressure less than 130/80        TAKE 1 TABLET(10 MG) BY MOUTH DAILY   Quantity:  90 tablet   Refills:  3       blood glucose monitoring meter device kit   Used for:  Refill clinic medication  management patient        Use to test blood sugar 4 to 5 times weekly or as directed.   Quantity:  1 kit   Refills:  0       blood glucose monitoring test strip   Commonly known as:  ACCU-CHEK MED PLUS   Used for:  Refill clinic medication management patient        USE 4 TO 5 TIMES PER WEEK OR AS DIRECTED   Quantity:  150 each   Refills:  3       cyclobenzaprine 10 MG tablet   Commonly known as:  FLEXERIL   Used for:  S/P lumbar laminectomy        Dose:  5-10 mg   Take 0.5-1 tablets (5-10 mg) by mouth 3 times daily as needed for muscle spasms   Quantity:  90 tablet   Refills:  1       lisinopril-hydrochlorothiazide 20-12.5 MG per tablet   Commonly known as:  PRINZIDE/ZESTORETIC   Used for:  Essential hypertension with goal blood pressure less than 130/80        TAKE 1 TABLET BY MOUTH DAILY   Quantity:  90 tablet   Refills:  3       losartan 100 MG tablet   Commonly known as:  COZAAR   Used for:  Essential hypertension with goal blood pressure less than 130/80        TAKE 1 TABLET(100 MG) BY MOUTH DAILY   Quantity:  90 tablet   Refills:  0       metFORMIN 500 MG 24 hr tablet   Commonly known as:  GLUCOPHAGE-XR   Used for:  Type 2 diabetes mellitus with diabetic autonomic neuropathy, without long-term current use of insulin (H)        TAKE 2 TABLETS BY MOUTH EVERY MORNING   Quantity:  180 tablet   Refills:  0       NEURONTIN PO        Dose:  900 mg   Take 900 mg by mouth daily (with dinner) (patient takes 3 X 300 mg = 900 mg dose)   Refills:  0       pantoprazole 40 MG EC tablet   Commonly known as:  PROTONIX   Used for:  Encounter for medication refill        TAKE 1 TABLET(40 MG) BY MOUTH DAILY   Quantity:  90 tablet   Refills:  3       TRADJENTA 5 MG Tabs tablet   Used for:  Type 2 diabetes mellitus with diabetic autonomic neuropathy, without long-term current use of insulin (H)   Generic drug:  linagliptin        TAKE 1 TABLET(5 MG) BY MOUTH DAILY   Quantity:  90 tablet   Refills:  3       TUMS PO        Dose:  2  chew tab   Take 2 chew tab by mouth daily (with dinner)   Refills:  0       zolpidem 10 MG tablet   Commonly known as:  AMBIEN   Used for:  Encounter for medication refill        TAKE 1 TABLET BY MOUTH EVERY NIGHT AT BEDTIME   Quantity:  90 tablet   Refills:  1            Where to get your medicines      Some of these will need a paper prescription and others can be bought over the counter. Ask your nurse if you have questions.     Bring a paper prescription for each of these medications     aspirin 81 MG EC tablet    carvedilol 25 MG tablet    simvastatin 20 MG tablet               Prescriptions were sent or printed at these locations (3 Prescriptions)                   [facility] [address] [place], [place] - [address] AT [facility] [address]   [address], [facility] [address]    Telephone:  [phone]   Fax:  [phone]   Hours:                  Printed at Department/Unit printer (3 of 3)         aspirin 81 MG EC tablet               carvedilol (COREG) 25 MG tablet               simvastatin (ZOCOR) 20 MG tablet                 Protect others around you: Learn how to safely use, store and throw away your medicines at www.disposemymeds.org.         Follow-ups after your visit        Your next 10 appointments already scheduled     Feb 23, 2018  2:30 PM CST   LAB with JAQUE[facility] LAB   [facility] AT [place] ([facility])    [address] [place], [place] [address]  [facility] [address] [phone]           Please do not eat 10-12 hours before your appointment if you are coming in fasting for labs on lipids, cholesterol, or glucose (sugar). This does not apply to pregnant women. Water, hot tea and black coffee (with nothing added) are okay. Do not drink other fluids, diet soda or chew gum.            Feb 27, 2018  9:45 AM CST   Lymphedema Evaluation with Melissa Catalan OT   [facility] ([facility])    [address]  [facility]  [address]   [phone]            Feb 28, 2018 11:00 AM CST   New Sleep Patient with Bennett Ezra Goltz, PA-C   [facility] ([facility])    [address] [facility] [address] [facility] [address]  [place] [address]   [phone]            Feb 28, 2018  1:00 PM CST   Return Visit with STACY Chester CNP   [facility] ([facility])    [address] [facility] [address]  [facility] [address]   [phone]            Jun 27, 2018 10:05 AM CDT   (Arrive by 9:50 AM)   XR LUMBAR SPINE 2/3 VIEWS with CSXR1   [facility] ([facility])    [address] [facility] [address]   [phone]           Please bring a list of your current medicines to your exam. (Include vitamins, minerals and over-thecounter medicines.) Leave your valuables at home.  Tell your doctor if there is a chance you may be pregnant.  You do not need to do anything special for this exam.            Jun 27, 2018 11:05 AM CDT   Return Visit with Dariana Lopez PA-C   [facility] ([facility])    [address] [facility] [address]  [facility] [address]   [phone]               Care Instructions        After Care Instructions     Discharge Instructions - IF on Metformin (Glucophage or Glucovance) or Metformin containing medications       IF on Metformin (Glucophage or Glucovance) or Metformin containing medications , schedule a Basic Metabolic Panel at [facility] or Primary Clinic in 48 - 72 hours post procedure and PRIOR TO resuming the Metformin or Metformin containing medications.  Hold Metformin (Glucophage or Glucovance) or Metformin containing medications until after the Basic Metabolic Panel on the 2nd or 3rd day following the procedure.  May resume after blood draw is complete.                  Further instructions from your care team       Cardiac Angiogram Discharge Instructions - Radial    After  you go home:      Have an adult stay with you until tomorrow.    Drink extra fluids for 2 days.    You may resume your normal diet.    No smoking       For 24 hours - due to the sedation you received:    Relax and take it easy.    Do NOT make any important or legal decisions.    Do NOT drive or operate machines at home or at work.    Do NOT drink alcohol.    Care of Wrist Puncture Site:      For the first 24 hrs - check the puncture site every 1-2 hours while awake.    It is normal to have soreness at the puncture site and mild tingling in your hand for up to 3 days.    Remove the bandaid after 24 hours. If there is minor oozing, apply another bandaid and remove it after 12 hours.    You may shower tomorrow.  Do NOT take a bath, or use a hot tub or pool for at least 3 days. Do NOT scrub the site. Do not use lotion or powder near the puncture site.           Activity:        For 2 days:     do not use your hand or arm to support your weight (such as rising from a chair)     do not bend your wrist (such as lifting a garage door).    do not lift more than 5 pounds or exercise your arm (such as tennis, golf or bowling).    Do NOT do any heavy activity such as exercise, lifting, or straining.     Bleeding:      If you start bleeding from the site in your wrist, sit down and press firmly on/above the site for 10 minutes.     Once bleeding stops, keep arm still for 2 hours.     Call [facility] as soon as you can.       Call 911 right away if you have heavy bleeding or bleeding that does not stop.      Medicines:      If you are on Metformin (Glucophage), do not restart it until you have blood tests (within 2 to 3 days after discharge).  After you have your blood drawn, you may restart the Metformin.     Take your medications, including blood thinners, unless your provider tells you not to.  If you take Coumadin (Warfarin), have your INR checked by your provider in  3-5 days. Call your clinic to schedule this.    If you  "have stopped any medicines, check with your provider about when to restart them.    Follow Up Appointments:      Follow up with [facility] Nurse Practitioner at [facility] of patient preference in 7-10 days.    Call the clinic if:      You have a large or growing hard lump around the site.    The site is red, swollen, hot or tender.    Blood or fluid is draining from the site.    You have chills or a fever greater than 101 F (38 C).    Your arm turns feels numb, cool or changes color.    You have hives, a rash or unusual itching.    Any questions or concerns.          [facility] at [place]:    [phone] [facility] (7 days a week)             Additional Information About Your Visit        [employer] Information     [employer] gives you secure access to your electronic health record. If you see a primary care provider, you can also send messages to your care team and make appointments. If you have questions, please call your primary care clinic.  If you do not have a primary care provider, please call [phone] and they will assist you.        Care EveryWhere ID     This is your Care EveryWhere ID. This could be used by other organizations to access your [place] medical records  [id number]        Your Vitals Were     Blood Pressure Pulse Temperature Respirations Height Weight    118/72 89 97.9  F (36.6  C) (Oral) 18 1.753 m (5' 9\") 100.7 kg (222 lb)    Pulse Oximetry BMI (Body Mass Index)                94% 32.78 kg/m2           Primary Care Provider Office Phone # Fax #    Shan Arenas [phone]      Equal Access to Services     ESTELLA GRIFFITHS : Hadii eliana forbeso Soharper, waaxda luqadaha, qaybta kaalmada ranjit, jose elias. So [facility] [phone].    ATENCIÓN: Si habla español, tiene a persaud disposición servicios gratuitos de asistencia lingüística. Llame al [phone].    We comply with applicable federal civil rights laws and " [place] laws. We do not discriminate on the basis of race, color, national origin, age, disability, sex, sexual orientation, or gender identity.            Thank you!     Thank you for choosing [place] for your care. Our goal is always to provide you with excellent care. Hearing back from our patients is one way we can continue to improve our services. Please take a few minutes to complete the written survey that you may receive in the mail after you visit with us. Thank you!             Medication List: This is a list of all your medications and when to take them. Check marks below indicate your daily home schedule. Keep this list as a reference.      Medications           Morning Afternoon Evening Bedtime As Needed    amLODIPine 10 MG tablet   Commonly known as:  NORVASC   TAKE 1 TABLET(10 MG) BY MOUTH DAILY                                aspirin 81 MG EC tablet   Take 1 tablet (81 mg) by mouth daily Start tomorrow morning.   Start taking on:  2/22/2018                                blood glucose monitoring meter device kit   Use to test blood sugar 4 to 5 times weekly or as directed.                                blood glucose monitoring test strip   Commonly known as:  ACCU-CHEK MED PLUS   USE 4 TO 5 TIMES PER WEEK OR AS DIRECTED                                carvedilol 25 MG tablet   Commonly known as:  COREG   Take 1 tablet (25 mg) by mouth 2 times daily Hold IF heart rate less than 55.                                cyclobenzaprine 10 MG tablet   Commonly known as:  FLEXERIL   Take 0.5-1 tablets (5-10 mg) by mouth 3 times daily as needed for muscle spasms                                lisinopril-hydrochlorothiazide 20-12.5 MG per tablet   Commonly known as:  PRINZIDE/ZESTORETIC   TAKE 1 TABLET BY MOUTH DAILY                                losartan 100 MG tablet   Commonly known as:  COZAAR   TAKE 1 TABLET(100 MG) BY MOUTH DAILY                                metFORMIN 500 MG 24 hr tablet   Commonly  known as:  GLUCOPHAGE-XR   TAKE 2 TABLETS BY MOUTH EVERY MORNING                                NEURONTIN PO   Take 900 mg by mouth daily (with dinner) (patient takes 3 X 300 mg = 900 mg dose)                                pantoprazole 40 MG EC tablet   Commonly known as:  PROTONIX   TAKE 1 TABLET(40 MG) BY MOUTH DAILY                                simvastatin 20 MG tablet   Commonly known as:  ZOCOR   Take 1 tablet (20 mg) by mouth every evening                                TRADJENTA 5 MG Tabs tablet   TAKE 1 TABLET(5 MG) BY MOUTH DAILY   Generic drug:  linagliptin                                TUMS PO   Take 2 chew tab by mouth daily (with dinner)                                zolpidem 10 MG tablet   Commonly known as:  AMBIEN   TAKE 1 TABLET BY MOUTH EVERY NIGHT AT BEDTIME

## 2018-02-22 LAB — INTERPRETATION ECG - MUSE: NORMAL

## 2018-02-23 DIAGNOSIS — I25.118 CORONARY ARTERY DISEASE OF NATIVE ARTERY OF NATIVE HEART WITH STABLE ANGINA PECTORIS (H): ICD-10-CM

## 2018-02-23 LAB
ANION GAP SERPL CALCULATED.3IONS-SCNC: 11.9 MMOL/L (ref 6–17)
BUN SERPL-MCNC: 15 MG/DL (ref 7–30)
CALCIUM SERPL-MCNC: 9 MG/DL (ref 8.5–10.5)
CHLORIDE SERPL-SCNC: 93 MMOL/L (ref 98–107)
CO2 SERPL-SCNC: 29 MMOL/L (ref 23–29)
CREAT SERPL-MCNC: 1.19 MG/DL (ref 0.7–1.3)
GFR SERPL CREATININE-BSD FRML MDRD: 61 ML/MIN/1.7M2
GLUCOSE SERPL-MCNC: 196 MG/DL (ref 70–105)
POTASSIUM SERPL-SCNC: 3.9 MMOL/L (ref 3.5–5.1)
SODIUM SERPL-SCNC: 130 MMOL/L (ref 136–145)

## 2018-02-23 PROCEDURE — 80048 BASIC METABOLIC PNL TOTAL CA: CPT | Performed by: INTERNAL MEDICINE

## 2018-02-23 PROCEDURE — 36415 COLL VENOUS BLD VENIPUNCTURE: CPT | Performed by: INTERNAL MEDICINE

## 2018-02-26 ENCOUNTER — TELEPHONE (OUTPATIENT)
Dept: CARDIOLOGY | Facility: CLINIC | Age: 63
End: 2018-02-26

## 2018-02-26 NOTE — TELEPHONE ENCOUNTER
Called pt to review BMP results, instruct that it is OK to resume his metformin. Pt's wife Sarah answered, stated pt was at work and unable to be reached at the moment. Communicated results with Sarah. Sarah verbalized understanding and said she would give pt the message. Pt has appt with VERONICA on 2/28/18 to review cardiac cath results.       Last Basic Metabolic Panel:  Lab Results   Component Value Date     02/23/2018      Lab Results   Component Value Date    POTASSIUM 3.9 02/23/2018     Lab Results   Component Value Date    CHLORIDE 93 02/23/2018     Lab Results   Component Value Date    HEAVENLY 9.0 02/23/2018     Lab Results   Component Value Date    CO2 29 02/23/2018     Lab Results   Component Value Date    BUN 15 02/23/2018     Lab Results   Component Value Date    CR 1.19 02/23/2018     Lab Results   Component Value Date     02/23/2018

## 2018-02-27 ENCOUNTER — HOSPITAL ENCOUNTER (OUTPATIENT)
Dept: OCCUPATIONAL THERAPY | Facility: CLINIC | Age: 63
Setting detail: THERAPIES SERIES
End: 2018-02-27
Attending: INTERNAL MEDICINE
Payer: COMMERCIAL

## 2018-02-27 PROCEDURE — 40000445 ZZHC STATISTIC OT VISIT, LYMPHEDEMA

## 2018-02-27 PROCEDURE — 97165 OT EVAL LOW COMPLEX 30 MIN: CPT | Mod: GO

## 2018-02-27 PROCEDURE — 97535 SELF CARE MNGMENT TRAINING: CPT | Mod: GO

## 2018-02-27 PROCEDURE — 97110 THERAPEUTIC EXERCISES: CPT | Mod: GO

## 2018-02-27 NOTE — PROGRESS NOTES
" 02/27/18 1005   Rehab Discipline   Discipline OT   Type of Visit   Type of visit Initial Edema Evaluation   General Information   Start of care 02/27/18   Referring physician Syd Bardales MD   Orders Evaluate and treat as indicated   Order date 02/16/18   Medical diagnosis lymphedema of both lower extremities   Edema onset 02/16/18   Affected body parts LLE;RLE   Edema etiology comments patient with history of DM2, lumbar lami/spinal fusion, MARTELL, CAD and venous insufficiency   Pertinent history of current problem (PT: include personal factors and/or comorbidities that impact the POC; OT: include additional occupational profile info) patient states he had some swelling BBK about one year prior to back surgery; after lumbar lami and spinal fusion June 2017, patient was \"laid up\" for 6-8 weeks, after which time swelling dramatically worsened.  Has tried OTS BBK compression socks, but found poor fit/discomfort.  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 No   Have you fallen and had an injury in the past year? No   System Outcome Measures   Outcome Measures Lymphedema   Lymphedema Life Impact Scale (score range 0-72). A higher score indicates greater impairment. 44   Subjective Report   Patient report of symptoms poor shoe fit, impaired performance of leisure activities, pain    Patient / Family Goals   Patient / family goals statement \"be able to walk and stand (without pain)\"   Pain   Patient currently in " "pain Yes   Pain location BBK, L >R   Pain rating 8/10   Pain comments \"tingly, just pain\"   Cognitive Status   Orientation Orientation to person, place and time   Level of consciousness Alert   Follows commands and answers questions 100% of the time   Personal safety and judgement Intact   Memory Intact   Edema Exam / Assessment   Skin condition comments Deep, 3+ pitting L pre-tibial and dorsum of foot, 2+ R pre -tibial and dorsum of foot, deep grooves BBK from band around ankle socks, loss of ankle contour BBK, scaly patches of skin L>R, light petichiae R >L, toenails in good condition   Stemmer sign Positive   Stemmer sign comments b/l 2nd digit of feet   Girth Measurements   Girth Measurements Other  (deferred for ed)   Posture   Posture Normal   Activities of Daily Living   Activities of Daily Living mod I, increased time and effort all I/ADL   Sensory   Sensory perception Light touch   Light touch Intact   Sensory perception comments neuropathy, L >R   Planned Edema Interventions   Planned edema interventions Manual lymph drainage;Gradient compression bandaging;Fit for compression garment;Exercises;Precautions to prevent infection / exacerbation;Education;Skin care / precautions;Home management program development   Planned edema intervention comments recommend CDT   Clinical Impression   Criteria for skilled therapeutic intervention met Yes   Therapy diagnosis lymphedema BLE likely secondary to chronic venous insufficiency   Influenced by the following impairments / conditions Edema;Stage 2  (does not reverse with elevation)   Assessment of Occupational Performance 1-3 Performance Deficits   Identified Performance Deficits impaired I/ADL, complex PMH with extensive cardiac hx, limited understanding of lymphedema symptom management   Clinical Decision Making (Complexity) Low complexity   Treatment frequency 5 times / week   Treatment duration 2 weeks   Patient / family and/or staff in agreement with plan of " care Yes   Risks and benefits of therapy have been explained Yes   Goals   Edema Eval Goals 1;2;3;4   Goal 1   Goal identifier volume   Goal description For decreased risk of infection, skin breakdown/wounds & progressive soft-tissue fibrosis and improved fit of footwear, volume will be reduced by 300 mL in LLE, and 150 mL RLE.   Target date 04/27/18   Goal 2   Goal identifier GCB   Goal description To reduce volume of lymphedema and risk of soft tissue fibrosis, pt will tolerate up to 23hr/day wear gradient compression bandaging (GCB) of BLE.   Target date 04/27/18   Goal 3   Goal identifier home program   Goal description For long-term home mgmt chronic lymphedema pt/caregiver independent in home program a. GCB/GCB alternative garment for night wear b. compression garment for day wear c. ex to incr lymph flow/self-MLD.   Target date 04/27/18   Goal 4   Goal identifier precautions   Goal description Pt will independently verbalize the signs/symptoms of lymphedema, precautions and how to obtain a future lymphedema referral if edema persists to preserve skin integrity, prevent infection and preserve functional mobility.      Target date 04/27/18   Total Evaluation Time   Total evaluation time 20

## 2018-02-28 ENCOUNTER — OFFICE VISIT (OUTPATIENT)
Dept: CARDIOLOGY | Facility: CLINIC | Age: 63
End: 2018-02-28
Attending: INTERNAL MEDICINE
Payer: COMMERCIAL

## 2018-02-28 ENCOUNTER — OFFICE VISIT (OUTPATIENT)
Dept: SLEEP MEDICINE | Facility: CLINIC | Age: 63
End: 2018-02-28
Attending: INTERNAL MEDICINE
Payer: COMMERCIAL

## 2018-02-28 VITALS
HEART RATE: 90 BPM | RESPIRATION RATE: 16 BRPM | SYSTOLIC BLOOD PRESSURE: 132 MMHG | HEIGHT: 69 IN | OXYGEN SATURATION: 99 % | DIASTOLIC BLOOD PRESSURE: 77 MMHG | WEIGHT: 220 LBS | BODY MASS INDEX: 32.58 KG/M2

## 2018-02-28 VITALS
DIASTOLIC BLOOD PRESSURE: 78 MMHG | HEIGHT: 69 IN | WEIGHT: 220.9 LBS | SYSTOLIC BLOOD PRESSURE: 158 MMHG | BODY MASS INDEX: 32.72 KG/M2 | HEART RATE: 90 BPM

## 2018-02-28 DIAGNOSIS — I25.118 CORONARY ARTERY DISEASE OF NATIVE ARTERY OF NATIVE HEART WITH STABLE ANGINA PECTORIS (H): ICD-10-CM

## 2018-02-28 DIAGNOSIS — R06.09 DYSPNEA ON EXERTION: ICD-10-CM

## 2018-02-28 DIAGNOSIS — R06.83 SNORING: ICD-10-CM

## 2018-02-28 DIAGNOSIS — I10 BENIGN ESSENTIAL HYPERTENSION: Primary | ICD-10-CM

## 2018-02-28 DIAGNOSIS — I25.10 ATHEROSCLEROSIS OF NATIVE CORONARY ARTERY OF NATIVE HEART WITHOUT ANGINA PECTORIS: ICD-10-CM

## 2018-02-28 PROCEDURE — 99204 OFFICE O/P NEW MOD 45 MIN: CPT | Performed by: PHYSICIAN ASSISTANT

## 2018-02-28 PROCEDURE — 99214 OFFICE O/P EST MOD 30 MIN: CPT | Performed by: NURSE PRACTITIONER

## 2018-02-28 RX ORDER — CARVEDILOL 25 MG/1
37.5 TABLET ORAL 2 TIMES DAILY
Qty: 180 TABLET | Refills: 3 | Status: SHIPPED | OUTPATIENT
Start: 2018-02-28 | End: 2018-04-03

## 2018-02-28 NOTE — PROGRESS NOTES
HPI and Plan:   See dictation    Orders Placed This Encounter   Procedures     Basic metabolic panel     Follow-Up with Cardiac Advanced Practice Provider     Orders Placed This Encounter   Medications     carvedilol (COREG) 25 MG tablet     Sig: Take 1.5 tablets (37.5 mg) by mouth 2 times daily Hold IF heart rate less than 55.     Dispense:  180 tablet     Refill:  3     Medications Discontinued During This Encounter   Medication Reason     carvedilol (COREG) 25 MG tablet Reorder         Encounter Diagnoses   Name Primary?     Coronary artery disease of native artery of native heart with stable angina pectoris (H)      Dyspnea on exertion      Atherosclerosis of native coronary artery of native heart without angina pectoris        CURRENT MEDICATIONS:  Current Outpatient Prescriptions   Medication Sig Dispense Refill     carvedilol (COREG) 25 MG tablet Take 1.5 tablets (37.5 mg) by mouth 2 times daily Hold IF heart rate less than 55. 180 tablet 3     aspirin 81 MG EC tablet Take 1 tablet (81 mg) by mouth daily Start tomorrow morning. 90 tablet 3     simvastatin (ZOCOR) 20 MG tablet Take 1 tablet (20 mg) by mouth every evening 90 tablet 3     metFORMIN (GLUCOPHAGE-XR) 500 MG 24 hr tablet TAKE 2 TABLETS BY MOUTH EVERY MORNING 180 tablet 0     losartan (COZAAR) 100 MG tablet TAKE 1 TABLET(100 MG) BY MOUTH DAILY 90 tablet 0     zolpidem (AMBIEN) 10 MG tablet TAKE 1 TABLET BY MOUTH EVERY NIGHT AT BEDTIME 90 tablet 1     amLODIPine (NORVASC) 10 MG tablet TAKE 1 TABLET(10 MG) BY MOUTH DAILY 90 tablet 3     lisinopril-hydrochlorothiazide (PRINZIDE/ZESTORETIC) 20-12.5 MG per tablet TAKE 1 TABLET BY MOUTH DAILY 90 tablet 3     pantoprazole (PROTONIX) 40 MG EC tablet TAKE 1 TABLET(40 MG) BY MOUTH DAILY 90 tablet 3     TRADJENTA 5 MG TABS tablet TAKE 1 TABLET(5 MG) BY MOUTH DAILY 90 tablet 3     cyclobenzaprine (FLEXERIL) 10 MG tablet Take 0.5-1 tablets (5-10 mg) by mouth 3 times daily as needed for muscle spasms 90 tablet 1      Calcium Carbonate Antacid (TUMS PO) Take 2 chew tab by mouth daily (with dinner)       blood glucose monitoring (ACCU-CHEK MED PLUS) test strip USE 4 TO 5 TIMES PER WEEK OR AS DIRECTED 150 each 3     blood glucose monitoring (ACCU-CHEK MED PLUS) meter device kit Use to test blood sugar 4 to 5 times weekly or as directed. 1 kit 0     [DISCONTINUED] carvedilol (COREG) 25 MG tablet Take 1 tablet (25 mg) by mouth 2 times daily Hold IF heart rate less than 55. 180 tablet 3       ALLERGIES   No Known Allergies    PAST MEDICAL HISTORY:  Past Medical History:   Diagnosis Date     DM2 (diabetes mellitus, type 2) (H)      Esophageal reflux 6/15/2012     HTN (hypertension) 8/12/2011     Leg pain, bilateral      Low back pain      Numbness and tingling     LEGS, R/T LUMBAR STENOSIS     Obesity, unspecified 1/10/2014       PAST SURGICAL HISTORY:  Past Surgical History:   Procedure Laterality Date     COLONOSCOPY       DISCECTOMY LUMBAR POSTERIOR MICROSCOPIC TWO LEVELS N/A 6/30/2017    Procedure: DISCECTOMY LUMBAR POSTERIOR MICROSCOPIC TWO LEVELS;  L3-4 L4-5 POSTERIOR DECOMPRESSION AND INTERSPINUS FIXATION WITHOUT FUSION;  Surgeon: Ray Perez MD;  Location: [facility] OR     WISDOM TEETH         FAMILY HISTORY:  Family History   Problem Relation Age of Onset     DIABETES No family hx of      Coronary Artery Disease No family hx of      CEREBROVASCULAR DISEASE No family hx of        SOCIAL HISTORY:  Social History     Social History     Marital status:      Spouse name: N/A     Number of children: 2     Years of education: N/A     Occupational History     manager [employer]     Social History Main Topics     Smoking status: Never Smoker     Smokeless tobacco: Current User     Alcohol use Yes      Comment: RARELY     Drug use: No     Sexual activity: Not Asked     Other Topics Concern     Parent/Sibling W/ Cabg, Mi Or Angioplasty Before 65f 55m? No     Social History Narrative       Review of  "Systems:  Skin:  Negative     Eyes:  Positive for cataracts  ENT:  Negative    Respiratory:  Positive for dyspnea on exertion  Cardiovascular:    Positive for;edema  Gastroenterology: Positive for reflux  Genitourinary:  Positive for nocturia (1-3 xs)  Musculoskeletal:  Positive for back pain  Neurologic:  Negative    Psychiatric:  Negative    Heme/Lymph/Imm:  Negative    Endocrine:    diabetes    Physical Exam:  Vitals: /78  Pulse 90  Ht 1.753 m (5' 9\")  Wt 100.2 kg (220 lb 14.4 oz)  BMI 32.62 kg/m2    Constitutional:  cooperative, alert and oriented, well developed, well nourished, in no acute distress        Skin:  warm and dry to the touch venous stasis changes    (superficial excoriated patch areas distal LE bilateral. L>R)    Head:  normocephalic, no masses or lesions        Eyes:  pupils equal and round;conjunctivae and lids unremarkable;sclera white;EOMS intact        Lymph:      ENT:  no pallor or cyanosis, dentition good  (Mallampati 3)      Neck:  carotid pulses are full and equal bilaterally, JVP normal, no carotid bruit        Respiratory:  normal breath sounds, clear to auscultation, normal A-P diameter, normal symmetry, normal respiratory excursion, no use of accessory muscles         Cardiac: regular rhythm;normal S1 and S2;no S3 or S4 tachycardic     systolic ejection murmur;LLSB;RUSB;grade 2        pulses full and equal, no bruits auscultated                                        GI:  abdomen soft, non-tender, BS normoactive, no mass, no HSM, no bruits obese      Extremities and Muscular Skeletal:  no deformities, clubbing, cyanosis, erythema observed stasis pigmentation bilateral LE edema;pitting;2+          Neurological:  no gross motor deficits;affect appropriate        Psych:  affect appropriate, oriented to time, person and place        Recent Lab Results:  LIPID RESULTS:  Lab Results   Component Value Date    CHOL 160 06/19/2017    HDL 56 06/19/2017    LDL 71 06/19/2017    TRIG 167 " (H) 06/19/2017       LIVER ENZYME RESULTS:  Lab Results   Component Value Date    AST 26 01/22/2018    ALT 27 01/22/2018       CBC RESULTS:  Lab Results   Component Value Date    WBC 8.4 02/21/2018    RBC 4.10 (L) 02/21/2018    HGB 13.6 02/21/2018    HCT 36.4 (L) 02/21/2018    MCV 89 02/21/2018    MCH 33.2 (H) 02/21/2018    MCHC 37.4 (H) 02/21/2018    RDW 12.0 02/21/2018     02/21/2018       BMP RESULTS:  Lab Results   Component Value Date     (L) 02/23/2018    POTASSIUM 3.9 02/23/2018    CHLORIDE 93 (L) 02/23/2018    CO2 29 02/23/2018    ANIONGAP 11.9 02/23/2018     (H) 02/23/2018    BUN 15 02/23/2018    CR 1.19 02/23/2018    GFRESTIMATED 61 02/23/2018    GFRESTBLACK 74 02/23/2018    HEAVENLY 9.0 02/23/2018        A1C RESULTS:  Lab Results   Component Value Date    A1C 6.8 (H) 06/19/2017       INR RESULTS:  Lab Results   Component Value Date    INR 0.97 02/21/2018           CC  Syd Bardales MD  [id number]  [place] [id number]

## 2018-02-28 NOTE — PATIENT INSTRUCTIONS
For your blood pressure: increase your carvedilol to 37.5 mg twice a day    See me back in 4 weeks.

## 2018-02-28 NOTE — MR AVS SNAPSHOT
"              After Visit Summary   2/28/2018    Syd Joyce    MRN: [id number]           Patient Information     Date Of Birth          1955        Visit Information        Provider Department      2/28/2018 11:00 AM Goltz, Bennett Ezra, PA-C [facility]        Today's Diagnoses     Benign essential hypertension    -  1    Snoring        BMI 32.0-32.9,adult          Care Instructions    MY TREATMENT INFORMATION FOR SLEEP APNEA-  Syd Joyce    DOCTOR : Bennett Ezra Goltz, PA-C  SLEEP CENTER : [place]     MY CONTACT NUMBER: [phone]    Am I having a sleep study at a sleep center?  Make sure you have an appointment for the study before you leave!    Am I having a home sleep study?  Watch this video:  https://www.Finalta.com/watch?v=CteI_GhyP9g&list=[email or web address]_[email or web address]    Frequently asked questions:  1. What is Obstructive Sleep Apnea (MARTELL)? MARTELL is the most common type of sleep apnea. Apnea means, \"without breath.\"  Apnea is most often caused by narrowing or collapse of the upper airway as muscles relax during sleep.   Almost everyone has occasional apneas. Most people with sleep apnea have had brief interruptions at night frequently for many years.  The severity of sleep apnea is related to how frequent and severe the events are.   2. What are the consequences of MARTELL? Symptoms include: feeling sleepy during the day, snoring loudly, gasping or stopping of breathing, trouble sleeping, and occasionally morning headaches or heartburn at night.  Sleepiness can be serious and even increase the risk of falling asleep while driving. Other health consequences may include development of high blood pressure and other cardiovascular disease in persons who are susceptible. Untreated MARTELL  can contribute to heart disease, stroke and diabetes.   3. What are the treatment options? In most situations, sleep apnea is a lifelong disease that must be managed with daily therapy. Medications " are not effective for sleep apnea and surgery is generally not considered until other therapies have been tried. Your treatment is your choice . Continuous Positive Airway (CPAP) works right away and is the therapy that is effective in nearly everyone. An oral device to hold your jaw forward is usually the next most reliable option. Other options include postioning devices (to keep you off your back), weight loss, and surgery including a tongue pacing device. There is more detail about some of these options below.    Important tips for using CPAP and similar devices   Know your equipment:  CPAP is continuous positive airway pressure that prevents obstructive sleep apnea by keeping the throat from collapsing while you are sleeping. In most cases, the device is  smart  and can slowly self-adjusts if your throat collapses and keeps a record every day of how well you are treated-this information is available to you and your care team.  BPAP is bilevel positive airway pressure that keeps your throat open and also assists each breath with a pressure boost to maintain adequate breathing.  Special kinds of BPAP are used in patients who have inadequate breathing from lung or heart disease. In most cases, the device is  smart  and can slowly self-adjusts to assist breathing. Like CPAP, the device keeps a record of how well you are treated.  Your mask is your connection to the device. You get to choose what feels most comfortable and the staff will help to make sure if fits. Here: are some examples of the different masks that are available:       Key points to remember on your journey with sleep apnea:  1. Sleep study.  PAP devices often need to be adjusted during a sleep study to show that they are effective and adjusted right.  2. Good tips to remember: Try wearing just the mask during a quiet time during the day so your body adapts to wearing it. A humidifier is recommended for comfort in most cases to prevent drying of  your nose and throat. Allergy medication from your provider may help you if you are having nasal congestion.  3. Getting settled-in. It takes more than one night for most of us to get used to wearing a mask. Try wearing just the mask during a quiet time during the day so your body adapts to wearing it. A humidifier is recommended for comfort in most cases. Our team will work with you carefully on the first day and will be in contact within 4 days and again at 2 and 4 weeks for advice and remote device adjustments. Your therapy is evaluated by the device each day.   4. Use it every night. The more you are able to sleep naturally for 7-8 hours, the more likely you will have good sleep and to prevent health risks or symptoms from sleep apnea. Even if you use it 4 hours it helps. Occasionally all of us are unable to use a medical therapy, in sleep apnea, it is not dangerous to miss one night.   5. Communicate. Call our skilled team on the number provided on the first day if your visit for problems that make it difficult to wear the device. Over 2 out of 3 patients can learn to wear the device long-term with help from our team. Remember to call our team or your sleep providers if you are unable to wear the device as we may have other solutions for those who cannot adapt to mask CPAP therapy. It is recommended that you sleep your sleep provider within the first 3 months and yearly after that if you are not having problems.   Take care of your equipment. Make sure you clean your mask and tubing using directions every day and that your filter and mask are replaced as recommended or if they are not working.     BESIDES CPAP, WHAT OTHER THERAPIES ARE THERE?    Positioning Device  Positioning devices are generally used when sleep apnea is mild and only occurs on your back.This example shows a pillow that straps around the waist. It may be appropriate for those whose sleep study shows milder sleep apnea that occurs primarily  when lying flat on one's back. Preliminary studies have shown benefit but effectiveness at home may need to be verified by a home sleep test. These devices are generally not covered by medical insurance.  Examples of devices that maintain sleeping on the back to prevent snoring and mild sleep apnea.    Belt type body positioner  Http://Calibrus.Cardio control/    Electronic reminder  Http://nightshifttherapy.com/  Http://[email or web address].Cardio control.au/    Oral Appliance  What is oral appliance therapy?  An oral appliance device fits on your teeth at night like a retainer used after having braces. The device is made by a specialized dentist and requires several visits over 1-2 months before a manufactured device is made to fit your teeth and is adjusted to prevent your sleep apnea. Once an oral device is working properly, snoring should be improved. A home sleep test may be recommended at that time if to determine whether the sleep apnea is adequately treated.       Some things to remember:  -Oral devices are often, but not always, covered by your medical insurance. Be sure to check with your insurance provider.   -If you are referred for oral therapy, you will be given a list of specialized dentists to consider or you may choose to visit the Web site of the American Academy of Dental Sleep Medicine  -Oral devices are less likely to work if you have severe sleep apnea or are extremely overweight.     More detailed information  An oral appliance is a small acrylic device that fits over the upper and lower teeth  (similar to a retainer or a mouth guard). This device slightly moves jaw forward, which moves the base of the tongue forward, opens the airway, improves breathing for effective treat snoring and obstructive sleep apnea in perhaps 7 out of 10 people .  The best working devices are custom-made by a dental device  after a mold is made of the teeth 1, 2, 3.  When is an oral appliance indicated?  Oral appliance therapy is  recommended as a first-line treatment for patients with primary snoring, mild sleep apnea, and for patients with moderate sleep apnea who prefer appliance therapy to use of CPAP4, 5. Severity of sleep apnea is determined by sleep testing and is based on the number of respiratory events per hour of sleep.   How successful is oral appliance therapy?  The success rate of oral appliance therapy in patients with mild sleep apnea is 75-80% while in patients with moderate sleep apnea it is 50-70%. The chance of success in patients with severe sleep apnea is 40-50%. The research also shows that oral appliances have a beneficial effect on the cardiovascular health of MARTELL patients at the same magnitude as CPAP therapy7.  Oral appliances should be a second-line treatment in cases of severe sleep apnea, but if not completely successful then a combination therapy utilizing CPAP plus oral appliance therapy may be effective. Oral appliances tend to be effective in a broad range of patients although studies show that the patients who have the highest success are females, younger patients, those with milder disease, and less severe obesity. 3, 6.   Finding a dentist that practices dental sleep medicine  Specific training is available through the American Academy of Dental Sleep Medicine for dentists interested in working in the field of sleep. To find a dentist who is educated in the field of sleep and the use of oral appliances, near you, visit the Web site of the American Academy of Dental Sleep Medicine.    References  1. David et al. Objectively measured vs self-reported compliance during oral appliance therapy for sleep-disordered breathing. Chest 2013; 144(5): 4253-5638.  2. Maura et al. Objective measurement of compliance during oral appliance therapy for sleep-disordered breathing. Thorax 2013; 68(1): 91-96.  3. Kaylee et al. Mandibular advancement devices in 620 men and women with MARTELL and snoring:  tolerability and predictors of treatment success. Chest 2004; 125: 7693-2750.  4. Stacey et al. Oral appliances for snoring and MARTELL: a review. Sleep 2006; 29: 244-262.  5. Kevin et al. Oral appliance treatment for MARTELL: an update. J Clin Sleep Med 2014; 10(2): 215-227.  6. Quentin et al. Predictors of OSAH treatment outcome. J Dent Res 2007; 86: 7241-2229.    Weight Loss:    Weight loss is a long-term strategy that may improve sleep apnea in some patients.    Weight management is a personal decision and the decision should be based on your interest and the potential benefits.  If you are interested in exploring weight loss strategies, the following discussion covers the impact on weight loss on sleep apnea and the approaches that may be successful.    Being overweight does not necessarily mean you will have health consequences.  Those who have BMI over 35 or over 27 with existing medical conditions carries greater risk.   Weight loss decreases severity of sleep apnea in most people with obesity. For those with mild obesity who have developed snoring with weight gain, even 15-30 pound weight loss can improve and occasionally eliminate sleep apnea.  Structured and life-long dietary and health habits are necessary to lose weight and keep healthier weight levels.     Though there may be significant health benefits from weight loss, long-term weight loss is very difficult to achieve- studies show success with dietary management in less than 10% of people. In addition, substantial weight loss may require years of dietary control and may be difficult if patients have severe obesity. In these cases, surgical management may be considered.  Finally, older individuals who have tolerated obesity without health complications may be less likely to benefit from weight loss strategies.      Your BMI is Body mass index is 32.49 kg/(m^2).  Weight management is a personal decision.  If you are interested in exploring weight  loss strategies, the following discussion covers the approaches that may be successful. Body mass index (BMI) is one way to tell whether you are at a healthy weight, overweight, or obese. It measures your weight in relation to your height.  A BMI of 18.5 to 24.9 is in the healthy range. A person with a BMI of 25 to 29.9 is considered overweight, and someone with a BMI of 30 or greater is considered obese. More than two-thirds of American adults are considered overweight or obese.  Being overweight or obese increases the risk for further weight gain. Excess weight may lead to heart disease and diabetes.  Creating and following plans for healthy eating and physical activity may help you improve your health.  Weight control is part of healthy lifestyle and includes exercise, emotional health, and healthy eating habits. Careful eating habits lifelong are the mainstay of weight control. Though there are significant health benefits from weight loss, long-term weight loss with diet alone may be very difficult to achieve- studies show long-term success with dietary management in less than 10% of people. Attaining a healthy weight may be especially difficult to achieve in those with severe obesity. In some cases, medications, devices and surgical management might be considered.  What can you do?  If you are overweight or obese and are interested in methods for weight loss, you should discuss this with your provider.     Consider reducing daily calorie intake by 500 calories.     Keep a food journal.     Avoiding skipping meals, consider cutting portions instead.    Diet combined with exercise helps maintain muscle while optimizing fat loss. Strength training is particularly important for building and maintaining muscle mass. Exercise helps reduce stress, increase energy, and improves fitness. Increasing exercise without diet control, however, may not burn enough calories to loose weight.       Start walking three days a week  10-20 minutes at a time    Work towards walking thirty minutes five days a week     Eventually, increase the speed of your walking for 1-2 minutes at time    In addition, we recommend that you review healthy lifestyles and methods for weight loss available through the National Institutes of Health patient information sites:  http://win.niddk.nih.gov/publications/index.htm    And look into health and wellness programs that may be available through your health insurance provider, employer, local community center, or [employer]ma club.    Weight management plan: Patient was referred to their PCP to discuss a diet and exercise plan.    Surgery:  Surgery for obstructive sleep apnea is considered generally only when other therapies fail to work. Surgery may be discussed with you if you are having a difficult time tolerating CPAP and or when there is an abnormal structure that requires surgical correction.  Nose and throat surgeries often enlarge the airway to prevent collapse.  Most of these surgeries create pain for 1-2 weeks and up to half of the most common surgeries are not effective throughout life.  You should carefully discuss the benefits and drawbacks to surgery with your sleep provider and surgeon to determine if it is the best solution for you.   More information  Surgery for MARTELL is directed at areas that are responsible for narrowing or complete obstruction of the airway during sleep.  There are a wide range of procedures available to enlarge and/or stabilize the airway to prevent blockage of breathing in the three major areas where it can occur: the palate, tongue, and nasal regions.  Successful surgical treatment depends on the accurate identification of the factors responsible for obstructive sleep apnea in each person.  A personalized approach is required because there is no single treatment that works well for everyone.  Because of anatomic variation, consultation with an examination by a sleep surgeon is a  critical first step in determining what surgical options are best for each patient.  In some cases, examination during sedation may be recommended in order to guide the selection of procedures.  Patients will be counseled about risks and benefits as well as the typical recovery course after surgery. Surgery is typically not a cure for a person s MARTELL.  However, surgery will often significantly improve one s MARTELL severity (termed  success rate ).  Even in the absence of a cure, surgery will decrease the cardiovascular risk associated with OSA7; improve overall quality of life8 (sleepiness, functionality, sleep quality, etc).  Palate Procedures:  Patients with MARTELL often have narrowing of their airway in the region of their tonsils and uvula.  The goals of palate procedures are to widen the airway in this region as well as to help the tissues resist collapse.  Modern palate procedure techniques focus on tissue conservation and soft tissue rearrangement, rather than tissue removal.  Often the uvula is preserved in this procedure. Residual sleep apnea is common in patient after pharyngoplasty with an average reduction in sleep apnea events of 33%2.    Tongue Procedures:  ExamWhile patients are awake, the muscles that surround the throat are active and keep this region open for breathing. These muscles relax during sleep, allowing the tongue and other structures to collapse and block breathing.  There are several different tongue procedures available.  Selection of a tongue base procedure depends on characteristics seen on physical exam.  Generally, procedures are aimed at removing bulky tissues in this area or preventing the back of the tongue from falling back during sleep.  Success rates for tongue surgery range from 50-62%3.  Hypoglossal Nerve Stimulation:  Hypoglossal nerve stimulation has recently received approval from the [place] Food and Drug Administration for the treatment of obstructive sleep apnea.  This  is based on research showing that the system was safe and effective in treating sleep apnea6.  Results showed that the median AHI score decreased 68%, from 29.3 to 9.0. This therapy uses an implant system that senses breathing patterns and delivers mild stimulation to airway muscles, which keeps the airway open during sleep.  The system consists of three fully implanted components: a small generator (similar in size to a pacemaker), a breathing sensor, and a stimulation lead.  Using a small handheld remote, a patient turns the therapy on before bed and off upon awakening.    Candidates for this device must be greater than 22 years of age, have moderate to severe MARTELL (AHI between 20-65), BMI less than 32, have tried CPAP/oral appliance without success, and have appropriate upper airway anatomy (determined by a sleep endoscopy performed by Dr. Upton).  Hypoglossal Nerve Stimulation Pathway:    The sleep surgeon s office will work with the patient through the insurance prior-authorization process (including communications and appeals).    Nasal Procedures:  Nasal obstruction can interfere with nasal breathing during the day and night.  Studies have shown that relief of nasal obstruction can improve the ability of some patients to tolerate positive airway pressure therapy for obstructive sleep apnea1.  Treatment options include medications such as nasal saline, topical corticosteroid and antihistamine sprays, and oral medications such as antihistamines or decongestants. Non-surgical treatments can include external nasal dilators for selected patients. If these are not successful by themselves, surgery can improve the nasal airway either alone or in combination with these other options.  Combination Procedures:  Combination of surgical procedures and other treatments may be recommended, particularly if patients have more than one area of narrowing or persistent positional disease.  The success rate of combination surgery  ranges from 66-80%2,3.    References  1. Paul SERRANO. The Role of the Nose in Snoring and Obstructive Sleep Apnoea: An Update.  Eur Arch Otorhinolaryngol. 2011; 268: 1365-73.  2.  Tari SM; Linda JA; Kelby JR; Pallanch JF; Tomy MB; Moses SG; Nadya HOPSON. Surgical modifications of the upper airway for obstructive sleep apnea in adults: a systematic review and meta-analysis. SLEEP 2010;33(10):9849-5998. Yaw HALL. Hypopharyngeal surgery in obstructive sleep apnea: an evidence-based medicine review.  Arch Otolaryngol Head Neck Surg. 2006 Feb;132(2):206-13.  3. Kaveh YH1, Ashish Y, Claude SONA. The efficacy of anatomically based multilevel surgery for obstructive sleep apnea. Otolaryngol Head Neck Surg. 2003 Oct;129(4):327-35.  4. Yaw HALL, Goldberg A. Hypopharyngeal Surgery in Obstructive Sleep Apnea: An Evidence-Based Medicine Review. Arch Otolaryngol Head Neck Surg. 2006 Feb;132(2):206-13.  5. Yango PJ et al. Upper-Airway Stimulation for Obstructive Sleep Apnea.  N Engl J Med. 2014 Jan 9;370(2):139-49.  6. Rita Y et al. Increased Incidence of Cardiovascular Disease in Middle-aged Men with Obstructive Sleep Apnea. Am J Respir Crit Care Med; 2002 166: 159-165  7. Bejarano EM et al. Studying Life Effects and Effectiveness of Palatopharyngoplasty (SLEEP) study: Subjective Outcomes of Isolated Uvulopalatopharyngoplasty. Otolaryngol Head Neck Surg. 2011; 144: 623-631.            Follow-ups after your visit        Your next 10 appointments already scheduled     Feb 28, 2018  1:00 PM CST   Return Visit with STACY Chester CNP   [facility] ([facility])    [address] [place] [id number]   [phone]            Apr 02, 2018  3:45 PM CDT   Lymphedema Treatment with Cayla Eng OT   [facility] ([facility])    [address] [place] [address]  [address]  [facility] [address]   [phone]            Apr 03, 2018  3:45 PM CDT    Lymphedema Treatment with Cayla Eng, OT   [facility] ([facility])    [address]  [address]  [place] [address]   [phone]            Apr 04, 2018  8:30 AM CDT   Lymphedema Treatment with Melissa Catalan, OT   [facility] ([facility])    [address]  [address]  [place] [address]   [phone]            Apr 05, 2018  3:30 PM CDT   Lymphedema Treatment with Cayla Eng, OT   [facility] ([facility])    [address]  [address]  [place] [address]   [phone]            Apr 06, 2018  1:45 PM CDT   Lymphedema Treatment with Cayla Eng, OT   [facility] ([facility])    [address]  [address]  [place] [address]   [phone]            Apr 09, 2018  3:15 PM CDT   Lymphedema Treatment with Melissa Catalan, OT   [facility] ([facility])    [address]  [address]  [place] [address]   [phone]            Apr 10, 2018  3:45 PM CDT   Lymphedema Treatment with Cayla Eng, OT   [facility] ([facility])    [address]  [address]  [place] [address]   [phone]            Apr 11, 2018  3:15 PM CDT   Lymphedema Treatment with Melissa Catalan, OT   [facility] ([facility])    [address]  [address]  [place] [address]   [phone]            Apr 12, 2018  3:45 PM CDT   Lymphedema Treatment with Cayla Eng, OT   [facility] ([facility])    [address]  [address]  [place] [address]   [phone]              Future tests that were ordered for you today     Open Future Orders        Priority Expected Expires Ordered    Comprehensive Sleep Study Routine  8/27/2018 2/28/2018            Who to contact     If you have questions or need follow up information about today's clinic visit or your schedule please contact  "[facility] directly at [phone].  Normal or non-critical lab and imaging results will be communicated to you by MyChart, letter or phone within 4 business days after the clinic has received the results. If you do not hear from us within 7 days, please contact the clinic through [employer] or phone. If you have a critical or abnormal lab result, we will notify you by phone as soon as possible.  Submit refill requests through [employer] or call your pharmacy and they will forward the refill request to us. Please allow 3 business days for your refill to be completed.          Additional Information About Your Visit        [employer] Information     [employer] gives you secure access to your electronic health record. If you see a primary care provider, you can also send messages to your care team and make appointments. If you have questions, please call your primary care clinic.  If you do not have a primary care provider, please call [phone] and they will assist you.        Care EveryWhere ID     This is your Care EveryWhere ID. This could be used by other organizations to access your [place] medical records  [id number]        Your Vitals Were     Pulse Respirations Height Pulse Oximetry BMI (Body Mass Index)       90 16 1.753 m (5' 9\") 99% 32.49 kg/m2        Blood Pressure from Last 3 Encounters:   02/28/18 132/77   02/21/18 136/66   02/14/18 152/72    Weight from Last 3 Encounters:   02/28/18 99.8 kg (220 lb)   02/21/18 100.7 kg (222 lb)   02/14/18 100.7 kg (222 lb)              We Performed the Following     SLEEP EVALUATION & MANAGEMENT REFERRAL - ADULT -[place] [facility] [phone]  (Age 18 and up)        Primary Care Provider Office Phone # Fax #    Shan Arenas [phone] [address] [facility] [address]        Equal Access to Services     GAIL GRIFFITHS AH: Hadii eliana esquivel hadasho Soomaali, waaxda luqadaha, qaybta kaalmada yordanda, jose " evelyn farrellcherri schrader'aan ah. So [facility] [phone].    ATENCIÓN: Si torstenla fartun, tiene a persaud disposición servicios gratuitos de asistencia lingüística. Amanda al [phone].    We comply with applicable federal civil rights laws and [place] laws. We do not discriminate on the basis of race, color, national origin, age, disability, sex, sexual orientation, or gender identity.            Thank you!     Thank you for choosing [facility]  for your care. Our goal is always to provide you with excellent care. Hearing back from our patients is one way we can continue to improve our services. Please take a few minutes to complete the written survey that you may receive in the mail after your visit with us. Thank you!             Your Updated Medication List - Protect others around you: Learn how to safely use, store and throw away your medicines at www.disposemymeds.org.          This list is accurate as of 2/28/18 12:09 PM.  Always use your most recent med list.                   Brand Name Dispense Instructions for use Diagnosis    amLODIPine 10 MG tablet    NORVASC    90 tablet    TAKE 1 TABLET(10 MG) BY MOUTH DAILY    Essential hypertension with goal blood pressure less than 130/80       aspirin 81 MG EC tablet     90 tablet    Take 1 tablet (81 mg) by mouth daily Start tomorrow morning.    Atherosclerosis of native coronary artery of native heart without angina pectoris       blood glucose monitoring meter device kit     1 kit    Use to test blood sugar 4 to 5 times weekly or as directed.    Refill clinic medication management patient       blood glucose monitoring test strip    ACCU-CHEK MED PLUS    150 each    USE 4 TO 5 TIMES PER WEEK OR AS DIRECTED    Refill clinic medication management patient       carvedilol 25 MG tablet    COREG    180 tablet    Take 1 tablet (25 mg) by mouth 2 times daily Hold IF heart rate less than 55.    Atherosclerosis of native coronary artery of native  heart without angina pectoris       cyclobenzaprine 10 MG tablet    FLEXERIL    90 tablet    Take 0.5-1 tablets (5-10 mg) by mouth 3 times daily as needed for muscle spasms    S/P lumbar laminectomy       lisinopril-hydrochlorothiazide 20-12.5 MG per tablet    PRINZIDE/ZESTORETIC    90 tablet    TAKE 1 TABLET BY MOUTH DAILY    Essential hypertension with goal blood pressure less than 130/80       losartan 100 MG tablet    COZAAR    90 tablet    TAKE 1 TABLET(100 MG) BY MOUTH DAILY    Essential hypertension with goal blood pressure less than 130/80       metFORMIN 500 MG 24 hr tablet    GLUCOPHAGE-XR    180 tablet    TAKE 2 TABLETS BY MOUTH EVERY MORNING    Type 2 diabetes mellitus with diabetic autonomic neuropathy, without long-term current use of insulin (H)       pantoprazole 40 MG EC tablet    PROTONIX    90 tablet    TAKE 1 TABLET(40 MG) BY MOUTH DAILY    Encounter for medication refill       simvastatin 20 MG tablet    ZOCOR    90 tablet    Take 1 tablet (20 mg) by mouth every evening    Atherosclerosis of native coronary artery of native heart without angina pectoris       TRADJENTA 5 MG Tabs tablet   Generic drug:  linagliptin     90 tablet    TAKE 1 TABLET(5 MG) BY MOUTH DAILY    Type 2 diabetes mellitus with diabetic autonomic neuropathy, without long-term current use of insulin (H)       TUMS PO      Take 2 chew tab by mouth daily (with dinner)        zolpidem 10 MG tablet    AMBIEN    90 tablet    TAKE 1 TABLET BY MOUTH EVERY NIGHT AT BEDTIME    Encounter for medication refill

## 2018-02-28 NOTE — PROGRESS NOTES
Service Date: 02/28/2018      HISTORY OF PRESENT ILLNESS:  Syd is a 63-year-old gentleman whom I am seeing in followup to review the results of a coronary angiogram that was performed last week for further workup of an abnormal cardiovascular stress test.  In addition, this gentleman has resistive hypertension and blood pressure management is ongoing.      Syd underwent a cardiac consultation on 02/14/2018 by Dr. Bardales.  At that time, Dr. Bardales addressed Syd's dyspnea with exertion, abnormal calcium score, and an abnormal cardiovascular stress test.  Syd also carries a history of resistive hypertension and symptomatic peripheral edema.  Syd's past medical history includes spinal surgery in the summer of 2017.  This was followed by a 3-month period of minimal activity.  Syd noted after he started being up and about he had limiting dyspnea with exertion.  He had been walking at slower than a normal pace.  He also has a history of diabetes mellitus type 2 and is on statin therapy for mixed dyslipidemia.  He also reported problematic peripheral edema.  In the past, he has tried compression stockings, but felt they were uncomfortable.  Dr. Bardales ordered a coronary angiogram after reviewing an abnormal stress study that indicated ischemia in the LAD distribution.  The patient had this procedure on 02/21/2018 and it demonstrated a 50% calcified proximal LAD stenosis with an IFR of 0.89 and an FFR of 0.82.  It was not hemodynamically significant.  Medical management was recommended.  The patient also underwent a cardiac echo which showed normal left ventricular wall motion.  Ejection fraction of 60-65%.      The patient was referred to a sleep specialist for probable sleep apnea.  Syd states he has met with a sleep specialist and that he has a sleep study scheduled in May.      In addition, Dr. Bardales referred Syd to the L[facility]hedema Clinic.  Contact has been made with them.  His first appointment is  04/02/2018.      Today, Syd returns.  It appears right after his angiogram the interventional cardiologist increased his carvedilol to 25 mg twice daily.  Syd felt some lightheadedness with the first higher dose of carvedilol, but since then he has been fine.  Today, his blood pressure was checked x2 in the office.  It was 158/78  the first check, at 156/76 the second time.  His pulse rate remains around 90 beats per minute.  His weight is stable at 220 pounds.  His BMI is 32.6.      Syd states his dyspnea with exertion has improved with physical therapy.  He feels better as his endurance has improved.  Today he denies chest pain, chest pressure, worsening dyspnea with exertion or worsening lower leg edema.      His statin therapy was increased from 10 mg to 20 mg after his angiogram because of the presence of some LAD disease.      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      PHYSICAL EXAMINATION:   GENERAL:  The patient is alert and oriented.   SKIN:  Warm and dry.  He is in no acute distress.  Blood pressure, heart rate is above.   CARDIAC:  Heart tones are regular with an S1, S2 without an S3, S4.   LUNGS:  Clear without crackles or wheezes.   NECK:  Veins are flat.   LOWER EXTREMITIES:  With right leg 1+ edema, left leg 2+ edema.   SKIN:  Excoriated in some parts, redness hue bilaterally to both legs, some pitting edema noted.      IMPRESSION AND PLAN:   1.  Patient with known coronary artery disease as indicated by recent coronary angiogram.  He denies any worsening shortness of breath, any symptoms of angina or heart failure.  He will continue with aspirin, statin therapy and beta blockade.  He is currently undergoing physical therapy after recent back surgery.   2.  Resistant hypertension.  Today I am going to increase his carvedilol to 37.5 mg twice daily.  He will return in 4 weeks.  If his blood pressure remains elevated, I will increase his lisinopril to 40 mg  per day, keeping his hydrochlorothiazide at 12.5 mg per day.   3.  Lymphedema.  He is set up in the Lymphedema Clinic.  In 4 weeks, we will review his progress there.  Depending on the results, we also can consider Vein Clinic and evaluation for ablation.   4.  Probable sleep apnea.  This may be contributing to his resistive hypertension.  He has connected with the Sleep Clinic.  He has an overnight evaluation scheduled.     5.  Hyperlipidemia.  We will recheck his cholesterol in 4 weeks.      It has been my pleasure to meet Syd today.         STACY ROJAS, CNP             D: 2018   T: 2018   MT: KLAUS      Name:     SYD SILVEIRA   MRN:      [id number]        Account:      [id number]   :      1955           Service Date: 2018      Document: [id number]

## 2018-02-28 NOTE — LETTER
2/28/2018    Shan Arenas MD  [address] [facility] [place] [address]    RE: Syd Joyce       Dear Colleague,    I had the pleasure of seeing Sdy Joyce in the [facility].    HPI and Plan:   See dictation    Orders Placed This Encounter   Procedures     Basic metabolic panel     Follow-Up with Cardiac Advanced Practice Provider     Orders Placed This Encounter   Medications     carvedilol (COREG) 25 MG tablet     Sig: Take 1.5 tablets (37.5 mg) by mouth 2 times daily Hold IF heart rate less than 55.     Dispense:  180 tablet     Refill:  3     Medications Discontinued During This Encounter   Medication Reason     carvedilol (COREG) 25 MG tablet Reorder         Encounter Diagnoses   Name Primary?     Coronary artery disease of native artery of native heart with stable angina pectoris (H)      Dyspnea on exertion      Atherosclerosis of native coronary artery of native heart without angina pectoris        CURRENT MEDICATIONS:  Current Outpatient Prescriptions   Medication Sig Dispense Refill     carvedilol (COREG) 25 MG tablet Take 1.5 tablets (37.5 mg) by mouth 2 times daily Hold IF heart rate less than 55. 180 tablet 3     aspirin 81 MG EC tablet Take 1 tablet (81 mg) by mouth daily Start tomorrow morning. 90 tablet 3     simvastatin (ZOCOR) 20 MG tablet Take 1 tablet (20 mg) by mouth every evening 90 tablet 3     metFORMIN (GLUCOPHAGE-XR) 500 MG 24 hr tablet TAKE 2 TABLETS BY MOUTH EVERY MORNING 180 tablet 0     losartan (COZAAR) 100 MG tablet TAKE 1 TABLET(100 MG) BY MOUTH DAILY 90 tablet 0     zolpidem (AMBIEN) 10 MG tablet TAKE 1 TABLET BY MOUTH EVERY NIGHT AT BEDTIME 90 tablet 1     amLODIPine (NORVASC) 10 MG tablet TAKE 1 TABLET(10 MG) BY MOUTH DAILY 90 tablet 3     lisinopril-hydrochlorothiazide (PRINZIDE/ZESTORETIC) 20-12.5 MG per tablet TAKE 1 TABLET BY MOUTH DAILY 90 tablet 3     pantoprazole (PROTONIX) 40 MG EC tablet TAKE 1 TABLET(40 MG) BY MOUTH DAILY  90 tablet 3     TRADJENTA 5 MG TABS tablet TAKE 1 TABLET(5 MG) BY MOUTH DAILY 90 tablet 3     cyclobenzaprine (FLEXERIL) 10 MG tablet Take 0.5-1 tablets (5-10 mg) by mouth 3 times daily as needed for muscle spasms 90 tablet 1     Calcium Carbonate Antacid (TUMS PO) Take 2 chew tab by mouth daily (with dinner)       blood glucose monitoring (ACCU-CHEK MED PLUS) test strip USE 4 TO 5 TIMES PER WEEK OR AS DIRECTED 150 each 3     blood glucose monitoring (ACCU-CHEK MED PLUS) meter device kit Use to test blood sugar 4 to 5 times weekly or as directed. 1 kit 0     [DISCONTINUED] carvedilol (COREG) 25 MG tablet Take 1 tablet (25 mg) by mouth 2 times daily Hold IF heart rate less than 55. 180 tablet 3       ALLERGIES   No Known Allergies    PAST MEDICAL HISTORY:  Past Medical History:   Diagnosis Date     DM2 (diabetes mellitus, type 2) (H)      Esophageal reflux 6/15/2012     HTN (hypertension) 8/12/2011     Leg pain, bilateral      Low back pain      Numbness and tingling     LEGS, R/T LUMBAR STENOSIS     Obesity, unspecified 1/10/2014       PAST SURGICAL HISTORY:  Past Surgical History:   Procedure Laterality Date     COLONOSCOPY       DISCECTOMY LUMBAR POSTERIOR MICROSCOPIC TWO LEVELS N/A 6/30/2017    Procedure: DISCECTOMY LUMBAR POSTERIOR MICROSCOPIC TWO LEVELS;  L3-4 L4-5 POSTERIOR DECOMPRESSION AND INTERSPINUS FIXATION WITHOUT FUSION;  Surgeon: Ray Perez MD;  Location: [facility] OR     WISDOM TEETH         FAMILY HISTORY:  Family History   Problem Relation Age of Onset     DIABETES No family hx of      Coronary Artery Disease No family hx of      CEREBROVASCULAR DISEASE No family hx of        SOCIAL HISTORY:  Social History     Social History     Marital status:      Spouse name: N/A     Number of children: 2     Years of education: N/A     Occupational History     manager [employer]     Social History Main Topics     Smoking status: Never Smoker     Smokeless tobacco: Current User      "Alcohol use Yes      Comment: RARELY     Drug use: No     Sexual activity: Not Asked     Other Topics Concern     Parent/Sibling W/ Cabg, Mi Or Angioplasty Before 65f 55m? No     Social History Narrative       Review of Systems:  Skin:  Negative     Eyes:  Positive for cataracts  ENT:  Negative    Respiratory:  Positive for dyspnea on exertion  Cardiovascular:    Positive for;edema  Gastroenterology: Positive for reflux  Genitourinary:  Positive for nocturia (1-3 xs)  Musculoskeletal:  Positive for back pain  Neurologic:  Negative    Psychiatric:  Negative    Heme/Lymph/Imm:  Negative    Endocrine:    diabetes    Physical Exam:  Vitals: /78  Pulse 90  Ht 1.753 m (5' 9\")  Wt 100.2 kg (220 lb 14.4 oz)  BMI 32.62 kg/m2    Constitutional:  cooperative, alert and oriented, well developed, well nourished, in no acute distress        Skin:  warm and dry to the touch venous stasis changes    (superficial excoriated patch areas distal LE bilateral. L>R)    Head:  normocephalic, no masses or lesions        Eyes:  pupils equal and round;conjunctivae and lids unremarkable;sclera white;EOMS intact        Lymph:      ENT:  no pallor or cyanosis, dentition good  (Mallampati 3)      Neck:  carotid pulses are full and equal bilaterally, JVP normal, no carotid bruit        Respiratory:  normal breath sounds, clear to auscultation, normal A-P diameter, normal symmetry, normal respiratory excursion, no use of accessory muscles         Cardiac: regular rhythm;normal S1 and S2;no S3 or S4 tachycardic     systolic ejection murmur;LLSB;RUSB;grade 2        pulses full and equal, no bruits auscultated                                        GI:  abdomen soft, non-tender, BS normoactive, no mass, no HSM, no bruits obese      Extremities and Muscular Skeletal:  no deformities, clubbing, cyanosis, erythema observed stasis pigmentation bilateral LE edema;pitting;2+          Neurological:  no gross motor deficits;affect appropriate    "     Psych:  affect appropriate, oriented to time, person and place        Recent Lab Results:  LIPID RESULTS:  Lab Results   Component Value Date    CHOL 160 06/19/2017    HDL 56 06/19/2017    LDL 71 06/19/2017    TRIG 167 (H) 06/19/2017       LIVER ENZYME RESULTS:  Lab Results   Component Value Date    AST 26 01/22/2018    ALT 27 01/22/2018       CBC RESULTS:  Lab Results   Component Value Date    WBC 8.4 02/21/2018    RBC 4.10 (L) 02/21/2018    HGB 13.6 02/21/2018    HCT 36.4 (L) 02/21/2018    MCV 89 02/21/2018    MCH 33.2 (H) 02/21/2018    MCHC 37.4 (H) 02/21/2018    RDW 12.0 02/21/2018     02/21/2018       BMP RESULTS:  Lab Results   Component Value Date     (L) 02/23/2018    POTASSIUM 3.9 02/23/2018    CHLORIDE 93 (L) 02/23/2018    CO2 29 02/23/2018    ANIONGAP 11.9 02/23/2018     (H) 02/23/2018    BUN 15 02/23/2018    CR 1.19 02/23/2018    GFRESTIMATED 61 02/23/2018    GFRESTBLACK 74 02/23/2018    HEAVENLY 9.0 02/23/2018        A1C RESULTS:  Lab Results   Component Value Date    A1C 6.8 (H) 06/19/2017       INR RESULTS:  Lab Results   Component Value Date    INR 0.97 02/21/2018           CC  Syd Bardales MD  [address]  [place] [address]                  Service Date: 02/28/2018      HISTORY OF PRESENT ILLNESS:  Syd is a 63-year-old gentleman whom I am seeing in followup to review the results of a coronary angiogram that was performed last week for further workup of an abnormal cardiovascular stress test.  In addition, this gentleman has resistive hypertension and blood pressure management is ongoing.      Syd underwent a cardiac consultation on 02/14/2018 by Dr. Bardales.  At that time, Dr. Bardales addressed Syd's dyspnea with exertion, abnormal calcium score, and an abnormal cardiovascular stress test.  Syd also carries a history of resistive hypertension and symptomatic peripheral edema.  Syd's past medical history includes spinal surgery in the summer of 2017.  This  was followed by a 3-month period of minimal activity.  Syd noted after he started being up and about he had limiting dyspnea with exertion.  He had been walking at slower than a normal pace.  He also has a history of diabetes mellitus type 2 and is on statin therapy for mixed dyslipidemia.  He also reported problematic peripheral edema.  In the past, he has tried compression stockings, but felt they were uncomfortable.  Dr. Bardales ordered a coronary angiogram after reviewing an abnormal stress study that indicated ischemia in the LAD distribution.  The patient had this procedure on 02/21/2018 and it demonstrated a 50% calcified proximal LAD stenosis with an IFR of 0.89 and an FFR of 0.82.  It was not physiologically significant.  Medical management was recommended.  The patient also underwent a cardiac echo which showed normal left ventricular wall motion.  Ejection fraction of 60-65%.      The patient was referred to a sleep specialist for probable sleep apnea.  Syd states he has met with a sleep specialist and that he has a sleep study scheduled in May.      In addition, Dr. Bardales referred Syd to the Lymphedema Clinic.  Contact has been made with them.  His first appointment is 04/02/2018.      Today, Syd returns.  It appears right after his angiogram the interventional cardiologist increased his carvedilol to 25 mg twice daily.  Syd felt some lightheadedness with the first higher dose of carvedilol, but since then he has been fine.  Today, his blood pressure was checked x2 in the office.  It was 158/78  the first check, at 156/76 the second time.  His pulse rate remains around 90 beats per minute.  His weight is stable at 220 pounds.  His BMI is 32.6.      Syd states his dyspnea with exertion has improved with physical therapy.  He feels better as his endurance has improved.  Today he denies chest pain, chest pressure, worsening dyspnea with exertion or worsening lower leg edema.      His statin  therapy was increased from 10 mg to 20 mg after his angiogram because of the presence of some LAD disease.      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      PHYSICAL EXAMINATION:   GENERAL:  The patient is alert and oriented.   SKIN:  Warm and dry.  He is in no acute distress.  Blood pressure, heart rate is above.   CARDIAC:  Heart tones are regular with an S1, S2 without an S3, S4.   LUNGS:  Clear without crackles or wheezes.   NECK:  Veins are flat.   LOWER EXTREMITIES:  With right leg 1+ edema, left leg 2+ edema.   SKIN:  Excoriated in some parts, redness hue bilaterally to both legs, some pitting edema noted.      IMPRESSION AND PLAN:   1.  Patient with known coronary artery disease as indicated by recent coronary angiogram.  He denies any worsening shortness of breath, any symptoms of angina or heart failure.  He will continue with aspirin, statin therapy and beta blockade.  He is currently undergoing physical therapy after recent back surgery.   2.  Resistant hypertension.  Today I am going to increase his carvedilol to 37.5 mg twice daily.  He will return in 4 weeks.  If his blood pressure remains elevated, I will increase his lisinopril to 40 mg per day, keeping his hydrochlorothiazide at 12.5 mg per day.   3.  Lymphedema.  He is set up in the Lymphedema Clinic.  In 4 weeks, we will review his progress there.  Depending on the results, we also can consider Vein Clinic and evaluation for ablation.   4.  Probable sleep apnea.  This may be contributing to his resistive hypertension.  He has connected with the Sleep Clinic.  He has an overnight evaluation scheduled.     5.  Hyperlipidemia.  We will recheck his cholesterol in 4 weeks.      It has been my pleasure to meet Syd today.         STACY ROJAS, CNP             D: 2018   T: 2018   MT: KLAUS      Name:     SYD SILVEIRA   MRN:      [id number]        Account:      [id number]   :      1955            Service Date: 02/28/2018      Document: [id number]       Thank you for allowing me to participate in the care of your patient.      Sincerely,     STACY Garcia [facility]    cc:   Syd Bardales MD  [address]  [place] [id number]

## 2018-02-28 NOTE — LETTER
2/28/2018      Shan Arenas MD  [address]  [facility] [address]      RE: Syd JENNINGS Isiah       Dear Colleague,    I had the pleasure of seeing Syd Joyce in the [facility].    Service Date: 02/28/2018      HISTORY OF PRESENT ILLNESS:  Syd is a 63-year-old gentleman whom I am seeing in followup to review the results of a coronary angiogram that was performed last week for further workup of an abnormal cardiovascular stress test.  In addition, this gentleman has resistive hypertension and blood pressure management is ongoing.      Syd underwent a cardiac consultation on 02/14/2018 by Dr. Bardales.  At that time, Dr. Bardales addressed Syd's dyspnea with exertion, abnormal calcium score, and an abnormal cardiovascular stress test.  Syd also carries a history of resistive hypertension and symptomatic peripheral edema.  Syd's past medical history includes spinal surgery in the summer of 2017.  This was followed by a 3-month period of minimal activity.  Syd noted after he started being up and about he had limiting dyspnea with exertion.  He had been walking at slower than a normal pace.  He also has a history of diabetes mellitus type 2 and is on statin therapy for mixed dyslipidemia.  He also reported problematic peripheral edema.  In the past, he has tried compression stockings, but felt they were uncomfortable.  Dr. Bardales ordered a coronary angiogram after reviewing an abnormal stress study that indicated ischemia in the LAD distribution.  The patient had this procedure on 02/21/2018 and it demonstrated a 50% calcified proximal LAD stenosis with an IFR of 0.89 and an FFR of 0.82.  It was not physiologically significant.  Medical management was recommended.  The patient also underwent a cardiac echo which showed normal left ventricular wall motion.  Ejection fraction of 60-65%.      The patient was referred to a sleep specialist for probable sleep apnea.   Syd states he has met with a sleep specialist and that he has a sleep study scheduled in May.      In addition, Dr. Bardales referred Syd to the [facility].  Contact has been made with them.  His first appointment is 04/02/2018.      Today, Syd returns.  It appears right after his angiogram the interventional cardiologist increased his carvedilol to 25 mg twice daily.  Syd felt some lightheadedness with the first higher dose of carvedilol, but since then he has been fine.  Today, his blood pressure was checked x2 in the office.  It was 158/78  the first check, at 156/76 the second time.  His pulse rate remains around 90 beats per minute.  His weight is stable at 220 pounds.  His BMI is 32.6.      Syd states his dyspnea with exertion has improved with physical therapy.  He feels better as his endurance has improved.  Today he denies chest pain, chest pressure, worsening dyspnea with exertion or worsening lower leg edema.      His statin therapy was increased from 10 mg to 20 mg after his angiogram because of the presence of some LAD disease.      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      PHYSICAL EXAMINATION:   GENERAL:  The patient is alert and oriented.   SKIN:  Warm and dry.  He is in no acute distress.  Blood pressure, heart rate is above.   CARDIAC:  Heart tones are regular with an S1, S2 without an S3, S4.   LUNGS:  Clear without crackles or wheezes.   NECK:  Veins are flat.   LOWER EXTREMITIES:  With right leg 1+ edema, left leg 2+ edema.   SKIN:  Excoriated in some parts, redness hue bilaterally to both legs, some pitting edema noted.      IMPRESSION AND PLAN:   1.  Patient with known coronary artery disease as indicated by recent coronary angiogram.  He denies any worsening shortness of breath, any symptoms of angina or heart failure.  He will continue with aspirin, statin therapy and beta blockade.  He is currently undergoing physical therapy after  recent back surgery.   2.  Resistant hypertension.  Today I am going to increase his carvedilol to 37.5 mg twice daily.  He will return in 4 weeks.  If his blood pressure remains elevated, I will increase his lisinopril to 40 mg per day, keeping his hydrochlorothiazide at 12.5 mg per day.   3.  Lymphedema.  He is set up in the Lymphedema Clinic.  In 4 weeks, we will review his progress there.  Depending on the results, we also can consider Vein Clinic and evaluation for ablation.   4.  Probable sleep apnea.  This may be contributing to his resistive hypertension.  He has connected with the Sleep Clinic.  He has an overnight evaluation scheduled.     5.  Hyperlipidemia.  We will recheck his cholesterol in 4 weeks.      It has been my pleasure to meet Tere today.         STACY ROJAS, ELSI             D: 2018   T: 2018   MT: KLAUS      Name:     TERE SILVEIRA   MRN:      [id number]        Account:      [id number]   :      1955           Service Date: 2018      Document: [id number]         Outpatient Encounter Prescriptions as of 2018   Medication Sig Dispense Refill     carvedilol (COREG) 25 MG tablet Take 1.5 tablets (37.5 mg) by mouth 2 times daily Hold IF heart rate less than 55. 180 tablet 3     aspirin 81 MG EC tablet Take 1 tablet (81 mg) by mouth daily Start tomorrow morning. 90 tablet 3     simvastatin (ZOCOR) 20 MG tablet Take 1 tablet (20 mg) by mouth every evening 90 tablet 3     metFORMIN (GLUCOPHAGE-XR) 500 MG 24 hr tablet TAKE 2 TABLETS BY MOUTH EVERY MORNING 180 tablet 0     losartan (COZAAR) 100 MG tablet TAKE 1 TABLET(100 MG) BY MOUTH DAILY 90 tablet 0     zolpidem (AMBIEN) 10 MG tablet TAKE 1 TABLET BY MOUTH EVERY NIGHT AT BEDTIME 90 tablet 1     amLODIPine (NORVASC) 10 MG tablet TAKE 1 TABLET(10 MG) BY MOUTH DAILY 90 tablet 3     lisinopril-hydrochlorothiazide (PRINZIDE/ZESTORETIC) 20-12.5 MG per tablet TAKE 1 TABLET BY MOUTH DAILY 90 tablet 3     pantoprazole  (PROTONIX) 40 MG EC tablet TAKE 1 TABLET(40 MG) BY MOUTH DAILY 90 tablet 3     TRADJENTA 5 MG TABS tablet TAKE 1 TABLET(5 MG) BY MOUTH DAILY 90 tablet 3     cyclobenzaprine (FLEXERIL) 10 MG tablet Take 0.5-1 tablets (5-10 mg) by mouth 3 times daily as needed for muscle spasms 90 tablet 1     Calcium Carbonate Antacid (TUMS PO) Take 2 chew tab by mouth daily (with dinner)       blood glucose monitoring (ACCU-CHEK MED PLUS) test strip USE 4 TO 5 TIMES PER WEEK OR AS DIRECTED 150 each 3     blood glucose monitoring (ACCU-CHEK MED PLUS) meter device kit Use to test blood sugar 4 to 5 times weekly or as directed. 1 kit 0     [DISCONTINUED] carvedilol (COREG) 25 MG tablet Take 1 tablet (25 mg) by mouth 2 times daily Hold IF heart rate less than 55. 180 tablet 3     No facility-administered encounter medications on file as of 2/28/2018.        Again, thank you for allowing me to participate in the care of your patient.      Sincerely,    STACY Garcia [facility]

## 2018-02-28 NOTE — MR AVS SNAPSHOT
After Visit Summary   2/28/2018    Syd Joyce    MRN: [id number]           Patient Information     Date Of Birth          1955        Visit Information        Provider Department      2/28/2018 1:00 PM Fior Simon, APRN [facility]   [place]        Today's Diagnoses     Coronary artery disease of native artery of native heart with stable angina pectoris (H)        Dyspnea on exertion        Atherosclerosis of native coronary artery of native heart without angina pectoris          Care Instructions    For your blood pressure: increase your carvedilol to 37.5 mg twice a day    See me back in 4 weeks.          Follow-ups after your visit        Additional Services     Follow-Up with Cardiac Advanced Practice Provider                 Your next 10 appointments already scheduled     Apr 02, 2018  3:45 PM CDT   Lymphedema Treatment with Cayla Eng OT   [facility] ([facility])    [address]  [address]  [facility] [address]   [phone]            Apr 03, 2018  3:45 PM CDT   Lymphedema Treatment with Cayla Eng, OT   [facility] ([facility])    [address]  [address]  [facility] [address]   [phone]            Apr 04, 2018  8:30 AM CDT   Lymphedema Treatment with Melissa Catalan, OT   [facility] ([facility])    [address]  [address]  [facility] [address]   [phone]            Apr 05, 2018  3:30 PM CDT   Lymphedema Treatment with Cayla Eng, OT   [facility] ([facility])    [address]  [address]  [facility] [address]   [phone]            Apr 06, 2018  1:45 PM CDT   Lymphedema Treatment with Cayla Eng, OT   [facility] ([facility])    [address]  [address]  [facility] [address]   [phone]            Apr 09, 2018  3:15 PM CDT   Lymphedema Treatment with  Melissa Catalan, OT   [facility] [facility] ([facility])    [address]  [address]  [place] [address]   [phone]            Apr 10, 2018  3:45 PM CDT   Lymphedema Treatment with Cayla Eng, OT   [facility] ([facility])    [address]  [address]  [place] [address]   [phone]            Apr 11, 2018  3:15 PM CDT   Lymphedema Treatment with Melissa Catalan, OT   [facility] ([facility])    [address]  [address]  [place] [address]   [phone]            Apr 12, 2018  3:45 PM CDT   Lymphedema Treatment with Cayla Eng, OT   [facility] ([facility])    [address]  [address]  [place] [address]   [phone]            Apr 13, 2018  1:45 PM CDT   Lymphedema Treatment with Cayla Eng, OT   [facility] ([facility])    [address]  [address]  [place] [address]   [phone]              Future tests that were ordered for you today     Open Future Orders        Priority Expected Expires Ordered    Basic metabolic panel Routine 3/30/2018 2/28/2019 2/28/2018    Follow-Up with Cardiac Advanced Practice Provider Routine 3/30/2018 2/28/2019 2/28/2018    Comprehensive Sleep Study Routine  8/27/2018 2/28/2018            Who to contact     If you have questions or need follow up information about today's clinic visit or your schedule please contact [facility]   [place] directly at [phone].  Normal or non-critical lab and imaging results will be communicated to you by MyChart, letter or phone within 4 business days after the clinic has received the results. If you do not hear from us within 7 days, please contact the clinic through MyChart or phone. If you have a critical or abnormal lab result, we will notify you by phone as soon as possible.  Submit refill requests through [employer] or call your pharmacy  "and they will forward the refill request to us. Please allow 3 business days for your refill to be completed.          Additional Information About Your Visit        [employer] Information     [employer] gives you secure access to your electronic health record. If you see a primary care provider, you can also send messages to your care team and make appointments. If you have questions, please call your primary care clinic.  If you do not have a primary care provider, please call [phone] and they will assist you.        Care EveryWhere ID     This is your Care EveryWhere ID. This could be used by other organizations to access your [facility] medical records  [id number]        Your Vitals Were     Pulse Height BMI (Body Mass Index)             90 1.753 m (5' 9\") 32.62 kg/m2          Blood Pressure from Last 3 Encounters:   02/28/18 158/78   02/28/18 132/77   02/21/18 136/66    Weight from Last 3 Encounters:   02/28/18 100.2 kg (220 lb 14.4 oz)   02/28/18 99.8 kg (220 lb)   02/21/18 100.7 kg (222 lb)              We Performed the Following     Follow-Up with Cardiac Advanced Practice Provider          Today's Medication Changes          These changes are accurate as of 2/28/18  1:24 PM.  If you have any questions, ask your nurse or doctor.               These medicines have changed or have updated prescriptions.        Dose/Directions    carvedilol 25 MG tablet   Commonly known as:  COREG   This may have changed:  how much to take   Used for:  Atherosclerosis of native coronary artery of native heart without angina pectoris   Changed by:  Fior Simon Sm, APRN CNP        Dose:  37.5 mg   Take 1.5 tablets (37.5 mg) by mouth 2 times daily Hold IF heart rate less than 55.   Quantity:  180 tablet   Refills:  3            Where to get your medicines      These medications were sent to [facility] [address] [facility] [address] AT [facility] [address]  [address] [facility] [address]     Phone:  " [phone]     carvedilol 25 MG tablet                Primary Care Provider Office Phone # Fax #    Shan Arenas [phone] [address]  [facility] [address]        Equal Access to Services     BALTAESTELLA AYE : Hadii aad ku hadasho Soomaali, waaxda luqadaha, qaybta kaalmada adeegyada, waxay lindain hayaan adeeg vinod lafrancisesteban . So [facility] [phone].    ATENCIÓN: Si habla español, tiene a persaud disposición servicios gratuitos de asistencia lingüística. Llame al [phone].    We comply with applicable federal civil rights laws and [place] laws. We do not discriminate on the basis of race, color, national origin, age, disability, sex, sexual orientation, or gender identity.            Thank you!     Thank you for choosing [facility]  for your care. Our goal is always to provide you with excellent care. Hearing back from our patients is one way we can continue to improve our services. Please take a few minutes to complete the written survey that you may receive in the mail after your visit with us. Thank you!             Your Updated Medication List - Protect others around you: Learn how to safely use, store and throw away your medicines at www.disposemymeds.org.          This list is accurate as of 2/28/18  1:24 PM.  Always use your most recent med list.                   Brand Name Dispense Instructions for use Diagnosis    amLODIPine 10 MG tablet    NORVASC    90 tablet    TAKE 1 TABLET(10 MG) BY MOUTH DAILY    Essential hypertension with goal blood pressure less than 130/80       aspirin 81 MG EC tablet     90 tablet    Take 1 tablet (81 mg) by mouth daily Start tomorrow morning.    Atherosclerosis of native coronary artery of native heart without angina pectoris       blood glucose monitoring meter device kit     1 kit    Use to test blood sugar 4 to 5 times weekly or as directed.    Refill clinic medication management patient        blood glucose monitoring test strip    ACCU-CHEK MED PLUS    150 each    USE 4 TO 5 TIMES PER WEEK OR AS DIRECTED    Refill clinic medication management patient       carvedilol 25 MG tablet    COREG    180 tablet    Take 1.5 tablets (37.5 mg) by mouth 2 times daily Hold IF heart rate less than 55.    Atherosclerosis of native coronary artery of native heart without angina pectoris       cyclobenzaprine 10 MG tablet    FLEXERIL    90 tablet    Take 0.5-1 tablets (5-10 mg) by mouth 3 times daily as needed for muscle spasms    S/P lumbar laminectomy       lisinopril-hydrochlorothiazide 20-12.5 MG per tablet    PRINZIDE/ZESTORETIC    90 tablet    TAKE 1 TABLET BY MOUTH DAILY    Essential hypertension with goal blood pressure less than 130/80       losartan 100 MG tablet    COZAAR    90 tablet    TAKE 1 TABLET(100 MG) BY MOUTH DAILY    Essential hypertension with goal blood pressure less than 130/80       metFORMIN 500 MG 24 hr tablet    GLUCOPHAGE-XR    180 tablet    TAKE 2 TABLETS BY MOUTH EVERY MORNING    Type 2 diabetes mellitus with diabetic autonomic neuropathy, without long-term current use of insulin (H)       pantoprazole 40 MG EC tablet    PROTONIX    90 tablet    TAKE 1 TABLET(40 MG) BY MOUTH DAILY    Encounter for medication refill       simvastatin 20 MG tablet    ZOCOR    90 tablet    Take 1 tablet (20 mg) by mouth every evening    Atherosclerosis of native coronary artery of native heart without angina pectoris       TRADJENTA 5 MG Tabs tablet   Generic drug:  linagliptin     90 tablet    TAKE 1 TABLET(5 MG) BY MOUTH DAILY    Type 2 diabetes mellitus with diabetic autonomic neuropathy, without long-term current use of insulin (H)       TUMS PO      Take 2 chew tab by mouth daily (with dinner)        zolpidem 10 MG tablet    AMBIEN    90 tablet    TAKE 1 TABLET BY MOUTH EVERY NIGHT AT BEDTIME    Encounter for medication refill

## 2018-02-28 NOTE — PATIENT INSTRUCTIONS
"MY TREATMENT INFORMATION FOR SLEEP APNEA-  Syd Joyce    DOCTOR : Bennett Ezra Goltz, PA-C  SLEEP CENTER : [facility]     MY CONTACT NUMBER: [phone]    Am I having a sleep study at a sleep center?  Make sure you have an appointment for the study before you leave!    Am I having a home sleep study?  Watch this video:  [email or web address] [email or web address]/watch?v=CteI_[email or web address]&[email or web address]    Frequently asked questions:  1. What is Obstructive Sleep Apnea (MARTELL)? MARTELL is the most common type of sleep apnea. Apnea means, \"without breath.\"  Apnea is most often caused by narrowing or collapse of the upper airway as muscles relax during sleep.   Almost everyone has occasional apneas. Most people with sleep apnea have had brief interruptions at night frequently for many years.  The severity of sleep apnea is related to how frequent and severe the events are.   2. What are the consequences of MARTELL? Symptoms include: feeling sleepy during the day, snoring loudly, gasping or stopping of breathing, trouble sleeping, and occasionally morning headaches or heartburn at night.  Sleepiness can be serious and even increase the risk of falling asleep while driving. Other health consequences may include development of high blood pressure and other cardiovascular disease in persons who are susceptible. Untreated MARTELL  can contribute to heart disease, stroke and diabetes.   3. What are the treatment options? In most situations, sleep apnea is a lifelong disease that must be managed with daily therapy. Medications are not effective for sleep apnea and surgery is generally not considered until other therapies have been tried. Your treatment is your choice . Continuous Positive Airway (CPAP) works right away and is the therapy that is effective in nearly everyone. An oral device to hold your jaw forward is usually the next most reliable option. Other options include postioning devices (to keep you off your back), weight loss, " and surgery including a tongue pacing device. There is more detail about some of these options below.    Important tips for using CPAP and similar devices   Know your equipment:  CPAP is continuous positive airway pressure that prevents obstructive sleep apnea by keeping the throat from collapsing while you are sleeping. In most cases, the device is  smart  and can slowly self-adjusts if your throat collapses and keeps a record every day of how well you are treated-this information is available to you and your care team.  BPAP is bilevel positive airway pressure that keeps your throat open and also assists each breath with a pressure boost to maintain adequate breathing.  Special kinds of BPAP are used in patients who have inadequate breathing from lung or heart disease. In most cases, the device is  smart  and can slowly self-adjusts to assist breathing. Like CPAP, the device keeps a record of how well you are treated.  Your mask is your connection to the device. You get to choose what feels most comfortable and the staff will help to make sure if fits. Here: are some examples of the different masks that are available:       Key points to remember on your journey with sleep apnea:  1. Sleep study.  PAP devices often need to be adjusted during a sleep study to show that they are effective and adjusted right.  2. Good tips to remember: Try wearing just the mask during a quiet time during the day so your body adapts to wearing it. A humidifier is recommended for comfort in most cases to prevent drying of your nose and throat. Allergy medication from your provider may help you if you are having nasal congestion.  3. Getting settled-in. It takes more than one night for most of us to get used to wearing a mask. Try wearing just the mask during a quiet time during the day so your body adapts to wearing it. A humidifier is recommended for comfort in most cases. Our team will work with you carefully on the first day and  will be in contact within 4 days and again at 2 and 4 weeks for advice and remote device adjustments. Your therapy is evaluated by the device each day.   4. Use it every night. The more you are able to sleep naturally for 7-8 hours, the more likely you will have good sleep and to prevent health risks or symptoms from sleep apnea. Even if you use it 4 hours it helps. Occasionally all of us are unable to use a medical therapy, in sleep apnea, it is not dangerous to miss one night.   5. Communicate. Call our skilled team on the number provided on the first day if your visit for problems that make it difficult to wear the device. Over 2 out of 3 patients can learn to wear the device long-term with help from our team. Remember to call our team or your sleep providers if you are unable to wear the device as we may have other solutions for those who cannot adapt to mask CPAP therapy. It is recommended that you sleep your sleep provider within the first 3 months and yearly after that if you are not having problems.   Take care of your equipment. Make sure you clean your mask and tubing using directions every day and that your filter and mask are replaced as recommended or if they are not working.     BESIDES CPAP, WHAT OTHER THERAPIES ARE THERE?    Positioning Device  Positioning devices are generally used when sleep apnea is mild and only occurs on your back.This example shows a pillow that straps around the waist. It may be appropriate for those whose sleep study shows milder sleep apnea that occurs primarily when lying flat on one's back. Preliminary studies have shown benefit but effectiveness at home may need to be verified by a home sleep test. These devices are generally not covered by medical insurance.  Examples of devices that maintain sleeping on the back to prevent snoring and mild sleep apnea.    Belt type body positioner  Http://[employer].com/    Electronic reminder  Http://nightshifttherapy.com/   Http://[email or web address]/    Oral Appliance  What is oral appliance therapy?  An oral appliance device fits on your teeth at night like a retainer used after having braces. The device is made by a specialized dentist and requires several visits over 1-2 months before a manufactured device is made to fit your teeth and is adjusted to prevent your sleep apnea. Once an oral device is working properly, snoring should be improved. A home sleep test may be recommended at that time if to determine whether the sleep apnea is adequately treated.       Some things to remember:  -Oral devices are often, but not always, covered by your medical insurance. Be sure to check with your insurance provider.   -If you are referred for oral therapy, you will be given a list of specialized dentists to consider or you may choose to visit the Web site of the American Academy of Dental Sleep Medicine  -Oral devices are less likely to work if you have severe sleep apnea or are extremely overweight.     More detailed information  An oral appliance is a small acrylic device that fits over the upper and lower teeth  (similar to a retainer or a mouth guard). This device slightly moves jaw forward, which moves the base of the tongue forward, opens the airway, improves breathing for effective treat snoring and obstructive sleep apnea in perhaps 7 out of 10 people .  The best working devices are custom-made by a dental device  after a mold is made of the teeth 1, 2, 3.  When is an oral appliance indicated?  Oral appliance therapy is recommended as a first-line treatment for patients with primary snoring, mild sleep apnea, and for patients with moderate sleep apnea who prefer appliance therapy to use of CPAP4, 5. Severity of sleep apnea is determined by sleep testing and is based on the number of respiratory events per hour of sleep.   How successful is oral appliance therapy?  The success rate of oral appliance therapy in patients with  mild sleep apnea is 75-80% while in patients with moderate sleep apnea it is 50-70%. The chance of success in patients with severe sleep apnea is 40-50%. The research also shows that oral appliances have a beneficial effect on the cardiovascular health of MARTELL patients at the same magnitude as CPAP therapy7.  Oral appliances should be a second-line treatment in cases of severe sleep apnea, but if not completely successful then a combination therapy utilizing CPAP plus oral appliance therapy may be effective. Oral appliances tend to be effective in a broad range of patients although studies show that the patients who have the highest success are females, younger patients, those with milder disease, and less severe obesity. 3, 6.   Finding a dentist that practices dental sleep medicine  Specific training is available through the American Academy of Dental Sleep Medicine for dentists interested in working in the field of sleep. To find a dentist who is educated in the field of sleep and the use of oral appliances, near you, visit the Web site of the American Academy of Dental Sleep Medicine.    References  1. David et al. Objectively measured vs self-reported compliance during oral appliance therapy for sleep-disordered breathing. Chest 2013; 144(5): 9042-2049.  2. Maura, et al. Objective measurement of compliance during oral appliance therapy for sleep-disordered breathing. Thorax 2013; 68(1): 91-96.  3. Kaylee et al. Mandibular advancement devices in 620 men and women with MARTELL and snoring: tolerability and predictors of treatment success. Chest 2004; 125: 1162-6491.  4. Stacey, et al. Oral appliances for snoring and MARTELL: a review. Sleep 2006; 29: 244-262.  5. Kevin et al. Oral appliance treatment for MARTELL: an update. J Clin Sleep Med 2014; 10(2): 215-227.  6. Quentin, et al. Predictors of OSAH treatment outcome. J Dent Res 2007; 86: 2267-3307.    Weight Loss:    Weight loss is a long-term  strategy that may improve sleep apnea in some patients.    Weight management is a personal decision and the decision should be based on your interest and the potential benefits.  If you are interested in exploring weight loss strategies, the following discussion covers the impact on weight loss on sleep apnea and the approaches that may be successful.    Being overweight does not necessarily mean you will have health consequences.  Those who have BMI over 35 or over 27 with existing medical conditions carries greater risk.   Weight loss decreases severity of sleep apnea in most people with obesity. For those with mild obesity who have developed snoring with weight gain, even 15-30 pound weight loss can improve and occasionally eliminate sleep apnea.  Structured and life-long dietary and health habits are necessary to lose weight and keep healthier weight levels.     Though there may be significant health benefits from weight loss, long-term weight loss is very difficult to achieve- studies show success with dietary management in less than 10% of people. In addition, substantial weight loss may require years of dietary control and may be difficult if patients have severe obesity. In these cases, surgical management may be considered.  Finally, older individuals who have tolerated obesity without health complications may be less likely to benefit from weight loss strategies.      Your BMI is Body mass index is 32.49 kg/(m^2).  Weight management is a personal decision.  If you are interested in exploring weight loss strategies, the following discussion covers the approaches that may be successful. Body mass index (BMI) is one way to tell whether you are at a healthy weight, overweight, or obese. It measures your weight in relation to your height.  A BMI of 18.5 to 24.9 is in the healthy range. A person with a BMI of 25 to 29.9 is considered overweight, and someone with a BMI of 30 or greater is considered obese. More  than two-thirds of American adults are considered overweight or obese.  Being overweight or obese increases the risk for further weight gain. Excess weight may lead to heart disease and diabetes.  Creating and following plans for healthy eating and physical activity may help you improve your health.  Weight control is part of healthy lifestyle and includes exercise, emotional health, and healthy eating habits. Careful eating habits lifelong are the mainstay of weight control. Though there are significant health benefits from weight loss, long-term weight loss with diet alone may be very difficult to achieve- studies show long-term success with dietary management in less than 10% of people. Attaining a healthy weight may be especially difficult to achieve in those with severe obesity. In some cases, medications, devices and surgical management might be considered.  What can you do?  If you are overweight or obese and are interested in methods for weight loss, you should discuss this with your provider.     Consider reducing daily calorie intake by 500 calories.     Keep a food journal.     Avoiding skipping meals, consider cutting portions instead.    Diet combined with exercise helps maintain muscle while optimizing fat loss. Strength training is particularly important for building and maintaining muscle mass. Exercise helps reduce stress, increase energy, and improves fitness. Increasing exercise without diet control, however, may not burn enough calories to loose weight.       Start walking three days a week 10-20 minutes at a time    Work towards walking thirty minutes five days a week     Eventually, increase the speed of your walking for 1-2 minutes at time    In addition, we recommend that you review healthy lifestyles and methods for weight loss available through the National Institutes of Health patient information sites:  http://win.niddk.nih.gov/publications/index.htm    And look into health and wellness  programs that may be available through your health insurance provider, employer, local community center, or [employer].    Weight management plan: Patient was referred to their PCP to discuss a diet and exercise plan.    Surgery:  Surgery for obstructive sleep apnea is considered generally only when other therapies fail to work. Surgery may be discussed with you if you are having a difficult time tolerating CPAP and or when there is an abnormal structure that requires surgical correction.  Nose and throat surgeries often enlarge the airway to prevent collapse.  Most of these surgeries create pain for 1-2 weeks and up to half of the most common surgeries are not effective throughout life.  You should carefully discuss the benefits and drawbacks to surgery with your sleep provider and surgeon to determine if it is the best solution for you.   More information  Surgery for MARTELL is directed at areas that are responsible for narrowing or complete obstruction of the airway during sleep.  There are a wide range of procedures available to enlarge and/or stabilize the airway to prevent blockage of breathing in the three major areas where it can occur: the palate, tongue, and nasal regions.  Successful surgical treatment depends on the accurate identification of the factors responsible for obstructive sleep apnea in each person.  A personalized approach is required because there is no single treatment that works well for everyone.  Because of anatomic variation, consultation with an examination by a sleep surgeon is a critical first step in determining what surgical options are best for each patient.  In some cases, examination during sedation may be recommended in order to guide the selection of procedures.  Patients will be counseled about risks and benefits as well as the typical recovery course after surgery. Surgery is typically not a cure for a person s MARTELL.  However, surgery will often significantly improve one s MARTELL  severity (termed  success rate ).  Even in the absence of a cure, surgery will decrease the cardiovascular risk associated with OSA7; improve overall quality of life8 (sleepiness, functionality, sleep quality, etc).  Palate Procedures:  Patients with MARTELL often have narrowing of their airway in the region of their tonsils and uvula.  The goals of palate procedures are to widen the airway in this region as well as to help the tissues resist collapse.  Modern palate procedure techniques focus on tissue conservation and soft tissue rearrangement, rather than tissue removal.  Often the uvula is preserved in this procedure. Residual sleep apnea is common in patient after pharyngoplasty with an average reduction in sleep apnea events of 33%2.    Tongue Procedures:  ExamWhile patients are awake, the muscles that surround the throat are active and keep this region open for breathing. These muscles relax during sleep, allowing the tongue and other structures to collapse and block breathing.  There are several different tongue procedures available.  Selection of a tongue base procedure depends on characteristics seen on physical exam.  Generally, procedures are aimed at removing bulky tissues in this area or preventing the back of the tongue from falling back during sleep.  Success rates for tongue surgery range from 50-62%3.  Hypoglossal Nerve Stimulation:  Hypoglossal nerve stimulation has recently received approval from the [place] Food and Drug Administration for the treatment of obstructive sleep apnea.  This is based on research showing that the system was safe and effective in treating sleep apnea6.  Results showed that the median AHI score decreased 68%, from 29.3 to 9.0. This therapy uses an implant system that senses breathing patterns and delivers mild stimulation to airway muscles, which keeps the airway open during sleep.  The system consists of three fully implanted components: a small generator (similar in  size to a pacemaker), a breathing sensor, and a stimulation lead.  Using a small handheld remote, a patient turns the therapy on before bed and off upon awakening.    Candidates for this device must be greater than 22 years of age, have moderate to severe MARTELL (AHI between 20-65), BMI less than 32, have tried CPAP/oral appliance without success, and have appropriate upper airway anatomy (determined by a sleep endoscopy performed by Dr. Upton).  Hypoglossal Nerve Stimulation Pathway:    The sleep surgeon s office will work with the patient through the insurance prior-authorization process (including communications and appeals).    Nasal Procedures:  Nasal obstruction can interfere with nasal breathing during the day and night.  Studies have shown that relief of nasal obstruction can improve the ability of some patients to tolerate positive airway pressure therapy for obstructive sleep apnea1.  Treatment options include medications such as nasal saline, topical corticosteroid and antihistamine sprays, and oral medications such as antihistamines or decongestants. Non-surgical treatments can include external nasal dilators for selected patients. If these are not successful by themselves, surgery can improve the nasal airway either alone or in combination with these other options.  Combination Procedures:  Combination of surgical procedures and other treatments may be recommended, particularly if patients have more than one area of narrowing or persistent positional disease.  The success rate of combination surgery ranges from 66-80%2,3.    References  1. Paul SERRANO. The Role of the Nose in Snoring and Obstructive Sleep Apnoea: An Update.  Eur Arch Otorhinolaryngol. 2011; 268: 1365-73.  2.  Tari SM; Linda JA; Kelby JR; Pallanch JF; Tomy SYLVESTER; Moses KRUEGER; Nadya HOPSON. Surgical modifications of the upper airway for obstructive sleep apnea in adults: a systematic review and meta-analysis. SLEEP 2010;33(10):2208-6384.  Yaw HALL. Hypopharyngeal surgery in obstructive sleep apnea: an evidence-based medicine review.  Arch Otolaryngol Head Neck Surg. 2006 Feb;132(2):206-13.  3. Kaveh YH1, Ashish Y, Claude SONA. The efficacy of anatomically based multilevel surgery for obstructive sleep apnea. Otolaryngol Head Neck Surg. 2003 Oct;129(4):327-35.  4. Yaw HALL, Goldberg A. Hypopharyngeal Surgery in Obstructive Sleep Apnea: An Evidence-Based Medicine Review. Arch Otolaryngol Head Neck Surg. 2006 Feb;132(2):206-13.  5. Sadia PJ et al. Upper-Airway Stimulation for Obstructive Sleep Apnea.  N Engl J Med. 2014 Jan 9;370(2):139-49.  6. Rita Y et al. Increased Incidence of Cardiovascular Disease in Middle-aged Men with Obstructive Sleep Apnea. Am J Respir Crit Care Med; 2002 166: 159-165  7. Carlee MENG et al. Studying Life Effects and Effectiveness of Palatopharyngoplasty (SLEEP) study: Subjective Outcomes of Isolated Uvulopalatopharyngoplasty. Otolaryngol Head Neck Surg. 2011; 144: 623-631.

## 2018-02-28 NOTE — PROGRESS NOTES
Sleep Consultation:    Date on this visit: 2/28/2018    Syd Joyce is sent by Syd Bardales for a sleep consultation regarding MARTELL.    Primary Physician: Shan Arenas     Syd Joyce presents at the request of his cardiologist for concerns of apnea. Eddie feels he sleeps great. He does snore. His medical history is significant for HTN, DM 2, GERD, hyperlipidemia, peripheral edema.    Syd goes to sleep at 7:30 PM during the week. He wakes up at 4:00 AM with an alarm, no snooze. He falls asleep in 5 minutes.  Syd denies difficulty falling asleep. He occasionally takes zolpidem 10 mg if he has a racing mind. Sometimes it doesn't work. He has been on that for about a year. He has not tried other sleep aids. He wakes up 1-2 times a night for 5 minutes before falling back to sleep.  Syd wakes up to go to the bathroom.  On weekends, Syd goes to sleep at 8:00 PM.  He wakes up at 6:00-7:00 AM without an alarm. He may sometimes wake earlier and just enjoy being in bed for an hour. He falls asleep in 5 minutes.  Patient gets an average of 8 hours of sleep per night.     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     Syd does not do shift work.  He works early day shifts. 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  He lives with his wife.    Syd does snore some nights. He does not know much about his snoring since he and his wife sleep in separate rooms. They sleep separately since they got a dog about 6 years ago. He does not like to have the dog in the bed. There is not report of gasping, snorting or witnessed apneas. He says he did not set off the oxygen alarm when in the hospital after surgery.  Patient sleeps on his side. He denies snort arousals, morning dry mouth, morning headaches, morning confusion and restless legs. Syd denies  any bruxism, sleep walking, sleep talking, dream enactment, sleep paralysis, cataplexy and hypnogogic/hypnopompic hallucinations.    Syd denies difficulty breathing through his nose, claustrophobia, reflux at night, heartburn and depression.  He is on pantoprazole for reflux, for about 2 years. He was symptomatic in the daytime, not at night.     Syd has gained 20 pounds in the last year, related to being inactive after surgery.  Patient describes themself as a morning person.  He would prefer to go to sleep at 8:00 PM and wake up at 6:00 AM.  Patient's Perris Sleepiness score 1/24 inconsistent with daytime sleepiness.      Syd denies taking naps. He takes no inadvertant naps.  He denies dozing while driving.  Patient was counseled on the importance of driving while alert, to pull over if drowsy, or nap before getting into the vehicle if sleepy.  He uses 1 cups/day of coffee. Last caffeine intake is usually before noon.    Allergies:    No Known Allergies    Medications:    Current Outpatient Prescriptions   Medication Sig Dispense Refill     aspirin 81 MG EC tablet Take 1 tablet (81 mg) by mouth daily Start tomorrow morning. 90 tablet 3     carvedilol (COREG) 25 MG tablet Take 1 tablet (25 mg) by mouth 2 times daily Hold IF heart rate less than 55. 180 tablet 3     simvastatin (ZOCOR) 20 MG tablet Take 1 tablet (20 mg) by mouth every evening 90 tablet 3     metFORMIN (GLUCOPHAGE-XR) 500 MG 24 hr tablet TAKE 2 TABLETS BY MOUTH EVERY MORNING 180 tablet 0     losartan (COZAAR) 100 MG tablet TAKE 1 TABLET(100 MG) BY MOUTH DAILY 90 tablet 0     zolpidem (AMBIEN) 10 MG tablet TAKE 1 TABLET BY MOUTH EVERY NIGHT AT BEDTIME 90 tablet 1     amLODIPine (NORVASC) 10 MG tablet TAKE 1 TABLET(10 MG) BY MOUTH DAILY 90 tablet 3     lisinopril-hydrochlorothiazide (PRINZIDE/ZESTORETIC) 20-12.5 MG per tablet TAKE 1 TABLET BY MOUTH DAILY 90 tablet 3     pantoprazole (PROTONIX) 40 MG EC tablet TAKE 1 TABLET(40 MG) BY MOUTH DAILY  90 tablet 3     TRADJENTA 5 MG TABS tablet TAKE 1 TABLET(5 MG) BY MOUTH DAILY 90 tablet 3     cyclobenzaprine (FLEXERIL) 10 MG tablet Take 0.5-1 tablets (5-10 mg) by mouth 3 times daily as needed for muscle spasms 90 tablet 1     Calcium Carbonate Antacid (TUMS PO) Take 2 chew tab by mouth daily (with dinner)       blood glucose monitoring (ACCU-CHEK MED PLUS) test strip USE 4 TO 5 TIMES PER WEEK OR AS DIRECTED 150 each 3     blood glucose monitoring (ACCU-CHEK MED PLUS) meter device kit Use to test blood sugar 4 to 5 times weekly or as directed. 1 kit 0       Problem List:  Patient Active Problem List    Diagnosis Date Noted     Leg weakness, bilateral 10/04/2017     Priority: Medium     Loss of balance 10/04/2017     Priority: Medium     S/P lumbar spinal fusion 10/04/2017     Priority: Medium     S/P lumbar laminectomy 06/30/2017     Priority: Medium     Type 2 diabetes mellitus with hyperlipidemia (H) 06/19/2017     Priority: Medium     Type 2 diabetes mellitus with diabetic autonomic neuropathy, without long-term current use of insulin (H) 10/20/2016     Priority: Medium     Mostly in the medial lower leg.       Insomnia 09/11/2013     Priority: Medium     Esophageal reflux 06/15/2012     Priority: Medium     Advanced directives, counseling/discussion 06/15/2012     Priority: Medium     As is reasonable care for Most of us, wants in the Short term aggressive care.   No desire for long term prolongation of life through artificial means if no hope to bring back to a reasonable status.          Benign essential hypertension 08/12/2011     Priority: Medium     Mixed hyperlipidemia 08/12/2011     Priority: Medium        Past Medical/Surgical History:  Past Medical History:   Diagnosis Date     DM2 (diabetes mellitus, type 2) (H)      Esophageal reflux 6/15/2012     HTN (hypertension) 8/12/2011     Leg pain, bilateral      Low back pain      Numbness and tingling     LEGS, R/T LUMBAR STENOSIS     Obesity,  unspecified 1/10/2014     Past Surgical History:   Procedure Laterality Date     COLONOSCOPY       DISCECTOMY LUMBAR POSTERIOR MICROSCOPIC TWO LEVELS N/A 6/30/2017    Procedure: DISCECTOMY LUMBAR POSTERIOR MICROSCOPIC TWO LEVELS;  L3-4 L4-5 POSTERIOR DECOMPRESSION AND INTERSPINUS FIXATION WITHOUT FUSION;  Surgeon: Ray Perez MD;  Location: [facility] OR     WISDOM TEETH         Social History:  Social History     Social History     Marital status:      Spouse name: N/A     Number of children: 2     Years of education: N/A     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 No     Social History Narrative       Family History:  Family History   Problem Relation Age of Onset     DIABETES No family hx of      Coronary Artery Disease No family hx of      CEREBROVASCULAR DISEASE No family hx of        Review of Systems:  A complete review of systems reviewed by me is negative with the exeption of what has been mentioned in the history of present illness.  CONSTITUTIONAL: NEGATIVE for weight gain/loss, fever, chills, sweats or night sweats, drug allergies.  EYES: NEGATIVE for changes in vision, blind spots, double vision.  ENT: NEGATIVE for ear pain, sore throat, sinus pain, post-nasal drip, runny nose, bloody nose  CARDIAC: NEGATIVE for fast heartbeats or fluttering in chest, chest pain or pressure, breathlessness when lying flat.  CARDIAC:  POSITIVE for  swollen legs, swollen feet and HTN  NEUROLOGIC: NEGATIVE headaches, weakness or numbness in the arms or legs.  DERMATOLOGIC: NEGATIVE for rashes, new moles or change in mole(s)  PULMONARY: NEGATIVE SOB at rest, SOB with activity, dry cough, productive cough, coughing up  "blood, wheezing or whistling when breathing.    GASTROINTESTINAL: NEGATIVE for nausea or vomitting, loose or watery stools, fat or grease in stools, constipation, abdominal pain, bowel movements black in color or blood noted.  GENITOURINARY: NEGATIVE for pain during urination, blood in urine, urinating more frequently than usual, irregular menstrual periods.  MUSCULOSKELETAL: NEGATIVE for muscle pain, bone or joint pain, swollen joints.  ENDOCRINE: NEGATIVE for increased thirst or urination.  ENDOCRINE:  POSITIVE for  diabetes  LYMPHATIC: NEGATIVE for swollen lymph nodes, lumps or bumps in the breasts or nipple discharge.    Physical Examination:  Vitals: /82  Pulse 90  Resp 16  Ht 1.753 m (5' 9\")  Wt 99.8 kg (220 lb)  SpO2 99%  BMI 32.49 kg/m2  Neck Circumference: 45 cm.     Reinholds Total Score 2/28/2018   Total score - Reinholds 1       GENERAL APPEARANCE: healthy, alert, no distress and cooperative  EYES: Eyes grossly normal to inspection, PERRL, conjunctivae and sclerae normal and lids and lashes normal  HENT: nose and mouth without ulcers or lesions, soft palate dependent and rhinophyma  NECK: no adenopathy, no asymmetry, masses, or scars, thyroid normal to palpation and trachea midline and normal to palpation  RESP: lungs clear to auscultation - no rales, rhonchi or wheezes  CV: regular rates and rhythm, normal S1 S2, no S3 or S4, no murmur, click or rub and no irregular beats  LYMPHATICS: no cervical adenopathy  MS: extremities normal- no gross deformities noted and pitting lower extremity edema bilaterally  NEURO: Normal strength and tone, mentation intact, speech normal and cranial nerves 2-12 intact  Mallampati Class: III.  Tonsillar Stage: 1  hidden by pillars.    Impression/Plan:    (I10) Benign essential hypertension  (primary encounter diagnosis), (R06.83) Snoring, (Z68.32) BMI 32.0-32.9,adult  Comment: Mr. Burns presents at the request of his cardiologist for evaluation of possible sleep " apnea in the setting of peripheral edema and recalcitrant hypertension (on 5 antihypertensive agents).  Mr. Burns does not feel he has any problems with his sleep.  He has been told that he snores sometimes but he and his wife have slept in separate bedrooms for 6 years so he does not know details.  He has not been observed to have pauses in breathing in his sleep.  He denies daytime sleepiness and his ESS is normal at 1/24.  Despite being relatively asymptomatic he does have a high risk for MARTELL.  His Stop Bang is 5; snoring, HTN, age > 50 (63), neck circumference greater than 40 cm (45 cm), male gender.  Plan: Comprehensive Sleep Study        Split night PSG with CPAP/BiPAP titration if indicated. CARLOS raza was told he could bring his own zolpidem for the night of the study.      Literature provided regarding sleep apnea.      He will follow up with me in approximately two weeks after his sleep study has been competed to review the results and discuss plan of care.       Polysomnography reviewed.  Obstructive sleep apnea reviewed.  Complications of untreated sleep apnea were reviewed.  45 minutes was spent during this visit, over 50% in counseling and coordination of care.   Bennett Goltz, PA-C    CC: MD Shan Domínguez MD

## 2018-03-16 ENCOUNTER — TELEPHONE (OUTPATIENT)
Dept: CARDIOLOGY | Facility: CLINIC | Age: 63
End: 2018-03-16

## 2018-03-16 NOTE — TELEPHONE ENCOUNTER
----- Message from Syd Bardales MD sent at 3/6/2018  1:59 PM CST -----  Reviewed angio. Agree with medical tx.  Suspect sx more related to deconditioning, so recommend rehab trial first.      ----- Message -----     From: Santhosh Palafox MD     Sent: 2/21/2018   1:35 PM       To: Syd Bardales MD    Please review angio. 50% prox LAD in large caliber vessel . Surprising IFR 0.89 and FFR with 240 IC adenosine x 2 of 0.82. I treated medically. Check it out to see if you agree.

## 2018-03-27 ENCOUNTER — MEDICAL CORRESPONDENCE (OUTPATIENT)
Dept: HEALTH INFORMATION MANAGEMENT | Facility: CLINIC | Age: 63
End: 2018-03-27

## 2018-03-28 DIAGNOSIS — I10 ESSENTIAL HYPERTENSION WITH GOAL BLOOD PRESSURE LESS THAN 130/80: ICD-10-CM

## 2018-03-29 ENCOUNTER — DOCUMENTATION ONLY (OUTPATIENT)
Dept: CARDIOLOGY | Facility: CLINIC | Age: 63
End: 2018-03-29

## 2018-03-29 RX ORDER — LOSARTAN POTASSIUM 100 MG/1
TABLET ORAL
Qty: 90 TABLET | Refills: 3 | Status: SHIPPED | OUTPATIENT
Start: 2018-03-29 | End: 2019-03-26

## 2018-03-29 NOTE — TELEPHONE ENCOUNTER
losartan (COZAAR) 100 MG   LAST  Med check 1/22/18   last labs(for RX) 2/23/18 @ hospital  Next  appt scheduled =  none  Rylie Clark MA    BP Readings from Last 3 Encounters:   02/28/18 158/78   02/28/18 132/77   02/21/18 136/66     Last Basic Metabolic Panel:  Lab Results   Component Value Date     02/23/2018      Lab Results   Component Value Date    POTASSIUM 3.9 02/23/2018     Lab Results   Component Value Date    CHLORIDE 93 02/23/2018     Lab Results   Component Value Date    HEAVENLY 9.0 02/23/2018     Lab Results   Component Value Date    CO2 29 02/23/2018     Lab Results   Component Value Date    BUN 15 02/23/2018     Lab Results   Component Value Date    CR 1.19 02/23/2018     Lab Results   Component Value Date     02/23/2018

## 2018-03-29 NOTE — PROGRESS NOTES
Faxed signed order for compression socks and velcro shoe, per Dr. Bardales, to [facility] to [phone]. Original sent to HIM to scan into chart.

## 2018-03-30 ENCOUNTER — CARE COORDINATION (OUTPATIENT)
Dept: LAB | Facility: CLINIC | Age: 63
End: 2018-03-30

## 2018-03-30 NOTE — PROGRESS NOTES
Lipids not scheduled - called to patient he is not willing to fast for this later visit (1130)  and wishes to further discuss with Libertad at visit.   Lizy Kramer RN 03/30/18 2:38 PM

## 2018-04-02 ENCOUNTER — HOSPITAL ENCOUNTER (OUTPATIENT)
Dept: OCCUPATIONAL THERAPY | Facility: CLINIC | Age: 63
Setting detail: THERAPIES SERIES
End: 2018-04-02
Attending: INTERNAL MEDICINE
Payer: COMMERCIAL

## 2018-04-02 PROCEDURE — 97140 MANUAL THERAPY 1/> REGIONS: CPT | Mod: GO | Performed by: OCCUPATIONAL THERAPIST

## 2018-04-02 PROCEDURE — 40000445 ZZHC STATISTIC OT VISIT, LYMPHEDEMA: Performed by: OCCUPATIONAL THERAPIST

## 2018-04-03 ENCOUNTER — MYC MEDICAL ADVICE (OUTPATIENT)
Dept: CARDIOLOGY | Facility: CLINIC | Age: 63
End: 2018-04-03

## 2018-04-03 ENCOUNTER — OFFICE VISIT (OUTPATIENT)
Dept: CARDIOLOGY | Facility: CLINIC | Age: 63
End: 2018-04-03
Payer: COMMERCIAL

## 2018-04-03 ENCOUNTER — HOSPITAL ENCOUNTER (OUTPATIENT)
Dept: OCCUPATIONAL THERAPY | Facility: CLINIC | Age: 63
Setting detail: THERAPIES SERIES
End: 2018-04-03
Attending: INTERNAL MEDICINE
Payer: COMMERCIAL

## 2018-04-03 VITALS
DIASTOLIC BLOOD PRESSURE: 77 MMHG | SYSTOLIC BLOOD PRESSURE: 148 MMHG | BODY MASS INDEX: 31.7 KG/M2 | WEIGHT: 214 LBS | HEIGHT: 69 IN | HEART RATE: 80 BPM

## 2018-04-03 DIAGNOSIS — I25.10 ATHEROSCLEROSIS OF NATIVE CORONARY ARTERY OF NATIVE HEART WITHOUT ANGINA PECTORIS: ICD-10-CM

## 2018-04-03 DIAGNOSIS — I10 BENIGN ESSENTIAL HYPERTENSION: Primary | ICD-10-CM

## 2018-04-03 DIAGNOSIS — I25.118 CORONARY ARTERY DISEASE OF NATIVE ARTERY OF NATIVE HEART WITH STABLE ANGINA PECTORIS (H): ICD-10-CM

## 2018-04-03 DIAGNOSIS — R06.09 DYSPNEA ON EXERTION: ICD-10-CM

## 2018-04-03 LAB
ALT SERPL W P-5'-P-CCNC: 6 U/L (ref 5–30)
ANION GAP SERPL CALCULATED.3IONS-SCNC: 11.1 MMOL/L (ref 6–17)
BUN SERPL-MCNC: 10 MG/DL (ref 7–30)
CALCIUM SERPL-MCNC: 9.1 MG/DL (ref 8.5–10.5)
CHLORIDE SERPL-SCNC: 92 MMOL/L (ref 98–107)
CHOLEST SERPL-MCNC: 140 MG/DL
CO2 SERPL-SCNC: 30 MMOL/L (ref 23–29)
CREAT SERPL-MCNC: 1.22 MG/DL (ref 0.7–1.3)
GFR SERPL CREATININE-BSD FRML MDRD: 60 ML/MIN/1.7M2
GLUCOSE SERPL-MCNC: 127 MG/DL (ref 70–105)
HDLC SERPL-MCNC: 80 MG/DL
LDLC SERPL CALC-MCNC: 44 MG/DL
NONHDLC SERPL-MCNC: 60 MG/DL
POTASSIUM SERPL-SCNC: 4.1 MMOL/L (ref 3.5–5.1)
SODIUM SERPL-SCNC: 129 MMOL/L (ref 136–145)
TRIGL SERPL-MCNC: 78 MG/DL

## 2018-04-03 PROCEDURE — 36415 COLL VENOUS BLD VENIPUNCTURE: CPT | Performed by: NURSE PRACTITIONER

## 2018-04-03 PROCEDURE — 40000445 ZZHC STATISTIC OT VISIT, LYMPHEDEMA: Performed by: OCCUPATIONAL THERAPIST

## 2018-04-03 PROCEDURE — 80061 LIPID PANEL: CPT | Performed by: PHYSICIAN ASSISTANT

## 2018-04-03 PROCEDURE — 97140 MANUAL THERAPY 1/> REGIONS: CPT | Mod: GO | Performed by: OCCUPATIONAL THERAPIST

## 2018-04-03 PROCEDURE — 80048 BASIC METABOLIC PNL TOTAL CA: CPT | Performed by: NURSE PRACTITIONER

## 2018-04-03 PROCEDURE — 84460 ALANINE AMINO (ALT) (SGPT): CPT | Performed by: PHYSICIAN ASSISTANT

## 2018-04-03 PROCEDURE — 99214 OFFICE O/P EST MOD 30 MIN: CPT | Performed by: PHYSICIAN ASSISTANT

## 2018-04-03 RX ORDER — CARVEDILOL 25 MG/1
50 TABLET ORAL 2 TIMES DAILY
Qty: 360 TABLET | Refills: 4 | Status: SHIPPED | OUTPATIENT
Start: 2018-04-03 | End: 2019-08-02

## 2018-04-03 RX ORDER — SIMVASTATIN 20 MG
20 TABLET ORAL EVERY EVENING
Qty: 90 TABLET | Refills: 4 | Status: ON HOLD | OUTPATIENT
Start: 2018-04-03 | End: 2019-04-23

## 2018-04-03 NOTE — PROGRESS NOTES
Cardiology Progress Note    Date of Service: 04/03/2018      Reason for visit: Follow up after medication changes, hypertension.    Primary cardiologist: Dr. Syd Bardales      HPI:  Syd is a 63-year-old gentleman with a PMhx including resistant hypertension, DM, chronic peripheral edema, and dyslipidemia, who was seen in consultation by Dr. Bardales in February 2018 for worsening dyspnea on exertion, which followed a 3 month period of inactivity post spinal surgery 2017. As part of his evaluation he had a chest CT which did not show any significant pulmonary abnormalities, but did show a significant amount of coronary calcium, primarily in the LAD. A subsequent stress nuclear study was abnormal, with some apical anterior anterolateral perfusion abnormalities with stress, however normal at rest with a preserved. EF. He then underwent an angiogram which showed a 50% calcified proximal LAD stenosis with IFR 0.89 and FFR of 0.882. This was felt not to be physiologically significant, and thus, medical management was recommended. At that time, his simvastatin was increased to 20mg daily, and his carvedilol was increased to 25mg twice a day for improved BP control.     He returned to see Fior Simon NP a few weeks later for follow up. His BP remained elevated with 150s systolic. She then increased his carvedilol further to 37.5mg BID. He is here again today to ongoing management. Overall, he states he's feeling better. His dyspnea on exertion has improved significantly now that he is participating in a spinal rehab program and is more active. He has now been to the lymphedema clinic as well, and has been wrapping his legs. His edema is improving and his weight is actually down 6 lbs in a month. He denies any chest, arm, or jaw discomfort while participating in rehab. He is tolerating the increase in beta blocker with no dizziness, lightheadedness, or worsening fatigue. He has an appointment scheduled next month for a  sleep evaluation, due to high clinical suspicion of MARTELL as well.      On arrival, his BP remains somewhat elevated at 148/77. 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 His BMP today shows ongoing mild hyponatremia; I discussed this with him, and he denies any recent changes to his thiazide diuretic and states the Zestoretic has been a long term medication for him. He remains on the 20mg of simvastatin since his angiogram and tolerating this well. His FLP today shows excellent control of his LDL at 44, with an HDL of 80.             ASSESSMENT/PLAN:      1. Resistant hypertension.   --Still mildly elevated today. Will increase carvedilol to 50mg BID, and he will continue to monitor his BP at work. Asked him to call with any concerns of feeling lightheaded, or worsening fatigue.    --Will continue lisinopril/HCTZ without change, but need to monitor in the setting of mild hyponatremia recently. Will forward to PCP as patient states he has repeat labs planned this summer for a routine visit.    --No change to losartan 100mg daily, and amlodipine 10mg daily.    --Sleep evaluation planned for next month. High clinical suspicion for sleep apnea.    --Continue routine management as per PCP.    2. Coronary artery disease.   --Noted as coronary artery calcification on chest CT performed as dyspnea workup. Subsequent nuclear stress study abnormal, with anterior anterolateral perfusion abnormalities. He ultimately underwent an angiogram which showed a 50% calcified proximal LAD stenosis, which was felt not to be physiologically significant. Continue medical management.   --Recent echo 2/2018 with preserved EF 60-65%. No clinical evidence of heart failure, though does have chronic leg edema. Improving with wraps at lymphedema clinic. Continue to monitor.    --Continue ASA 81mg daily.   --Continue simvastatin 20mg daily. LDL today  excellent at 44.    --Continue carvedilol as above.    --Continue his current rehab program which is helping his dyspnea significantly.        Follow up plan: With Dr. Syd Bardales in 1 year, or sooner if needed.       Orders this Visit:  Orders Placed This Encounter   Procedures     Lipid Profile     ALT     Follow-Up with Cardiologist     Orders Placed This Encounter   Medications     simvastatin (ZOCOR) 20 MG tablet     Sig: Take 1 tablet (20 mg) by mouth every evening     Dispense:  90 tablet     Refill:  4     carvedilol (COREG) 25 MG tablet     Sig: Take 2 tablets (50 mg) by mouth 2 times daily Hold IF heart rate less than 55.     Dispense:  360 tablet     Refill:  4     Medications Discontinued During This Encounter   Medication Reason     simvastatin (ZOCOR) 20 MG tablet Reorder     carvedilol (COREG) 25 MG tablet Reorder           CURRENT MEDICATIONS:  Current Outpatient Prescriptions   Medication Sig Dispense Refill     simvastatin (ZOCOR) 20 MG tablet Take 1 tablet (20 mg) by mouth every evening 90 tablet 4     carvedilol (COREG) 25 MG tablet Take 2 tablets (50 mg) by mouth 2 times daily Hold IF heart rate less than 55. 360 tablet 4     losartan (COZAAR) 100 MG tablet TAKE 1 TABLET BY MOUTH EVERY DAY 90 tablet 3     aspirin 81 MG EC tablet Take 1 tablet (81 mg) by mouth daily Start tomorrow morning. 90 tablet 3     metFORMIN (GLUCOPHAGE-XR) 500 MG 24 hr tablet TAKE 2 TABLETS BY MOUTH EVERY MORNING 180 tablet 0     zolpidem (AMBIEN) 10 MG tablet TAKE 1 TABLET BY MOUTH EVERY NIGHT AT BEDTIME 90 tablet 1     amLODIPine (NORVASC) 10 MG tablet TAKE 1 TABLET(10 MG) BY MOUTH DAILY 90 tablet 3     lisinopril-hydrochlorothiazide (PRINZIDE/ZESTORETIC) 20-12.5 MG per tablet TAKE 1 TABLET BY MOUTH DAILY 90 tablet 3     pantoprazole (PROTONIX) 40 MG EC tablet TAKE 1 TABLET(40 MG) BY MOUTH DAILY 90 tablet 3     TRADJENTA 5 MG TABS tablet TAKE 1 TABLET(5 MG) BY MOUTH DAILY 90 tablet 3     cyclobenzaprine (FLEXERIL) 10  MG tablet Take 0.5-1 tablets (5-10 mg) by mouth 3 times daily as needed for muscle spasms 90 tablet 1     Calcium Carbonate Antacid (TUMS PO) Take 2 chew tab by mouth daily (with dinner)       [DISCONTINUED] carvedilol (COREG) 25 MG tablet Take 1.5 tablets (37.5 mg) by mouth 2 times daily Hold IF heart rate less than 55. 180 tablet 3     [DISCONTINUED] simvastatin (ZOCOR) 20 MG tablet Take 1 tablet (20 mg) by mouth every evening 90 tablet 3     blood glucose monitoring (ACCU-CHEK MED PLUS) test strip USE 4 TO 5 TIMES PER WEEK OR AS DIRECTED 150 each 3     blood glucose monitoring (ACCU-CHEK MED PLUS) meter device kit Use to test blood sugar 4 to 5 times weekly or as directed. 1 kit 0       ALLERGIES   No Known Allergies    PAST MEDICAL HISTORY:  Past Medical History:   Diagnosis Date     DM2 (diabetes mellitus, type 2) (H)      Esophageal reflux 6/15/2012     HTN (hypertension) 8/12/2011     Leg pain, bilateral      Low back pain      Numbness and tingling     LEGS, R/T LUMBAR STENOSIS     Obesity, unspecified 1/10/2014       PAST SURGICAL HISTORY:  Past Surgical History:   Procedure Laterality Date     COLONOSCOPY       DISCECTOMY LUMBAR POSTERIOR MICROSCOPIC TWO LEVELS N/A 6/30/2017    Procedure: DISCECTOMY LUMBAR POSTERIOR MICROSCOPIC TWO LEVELS;  L3-4 L4-5 POSTERIOR DECOMPRESSION AND INTERSPINUS FIXATION WITHOUT FUSION;  Surgeon: Ray Perez MD;  Location: [facility] OR     WISDOM TEETH         FAMILY HISTORY:  Family History   Problem Relation Age of Onset     DIABETES No family hx of      Coronary Artery Disease No family hx of      CEREBROVASCULAR DISEASE No family hx of        SOCIAL HISTORY:  Social History     Social History     Marital status:      Spouse name: N/A     Number of children: 2     Years of education: N/A     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 No     Social History Narrative       Review of Systems:  Cardiovascular: negative for chest pain, palpitations, orthopnea, pos chronic LE edema  Constitutional: negative for chills, sweats, fevers.   Resp: Negative for dyspnea at rest, dyspnea on exertion, cough, known chronic lung disease  HEENT: Negative for new visual changes, frequent headaches  Gastrointestinal: negative for abdominal pain, nausea, vomiting  Hematologic/lymphatic: negative for current systemic anticoagulation, hx of blood clots  Musculoskeletal: pos chronic back pain, neg joint pain  Neurological: negative for focal weakness, LOC, seizures, syncope      Physical Exam:  Vitals: /77  Pulse 80  Ht 1.753 m (5' 9\")  Wt 97.1 kg (214 lb)  BMI 31.6 kg/m2   Wt Readings from Last 4 Encounters:   04/03/18 97.1 kg (214 lb)   02/28/18 100.2 kg (220 lb 14.4 oz)   02/28/18 99.8 kg (220 lb)   02/21/18 100.7 kg (222 lb)       GEN:  In general, this is a well nourished  male in no acute distress on room air.  Patient ambulatory.  HEENT:  Pupils equal, round. Sclerae nonicteric. Clear oropharynx.   NECK: Supple, no masses appreciated. Trachea midline. No JVD upright.   C/V:  Regular rate and rhythm, no murmur, rub or gallop.   RESP: Respirations are unlabored. No use of accessory muscles. Clear to auscultation bilaterally without wheezing, rales, or rhonchi.  GI: Abdomen soft, nontender, nondistended.   EXTREM: 1-2+ bilateral LE edema. No cyanosis or clubbing.  NEURO: Alert and oriented, cooperative. Gait not formally assessed. No obvious focal deficits.   PSYCH: Normal affect.  SKIN: Warm and dry. No rashes or petechiae appreciated.       Recent Lab Results:  LIPID RESULTS:  Lab Results   Component Value Date    CHOL 140 04/03/2018    HDL 80 04/03/2018    LDL 44 04/03/2018    TRIG " 78 04/03/2018       BMP RESULTS:  Lab Results   Component Value Date     (L) 04/03/2018    POTASSIUM 4.1 04/03/2018    CHLORIDE 92 (L) 04/03/2018    CO2 30 (H) 04/03/2018    ANIONGAP 11.1 04/03/2018     (H) 04/03/2018    BUN 10 04/03/2018    CR 1.22 04/03/2018    GFRESTIMATED 60 (L) 04/03/2018    GFRESTBLACK 72 04/03/2018    HEAVENLY 9.1 04/03/2018          New/Pertinent imaging results since last visit:  Echo 2/19/18  Interpretation Summary     The visual ejection fraction is estimated at 60-65%.  Normal left ventricular wall motion  The study was technically difficult. Contrast was used without apparent  complications. There is no comparison study available        Libertad Warner PA-C  [facility]  Pager [phone]

## 2018-04-03 NOTE — LETTER
4/3/2018    Shan Arenas MD  [address]  [facility] [address]    RE: Syd Joyce       Dear Colleague,    I had the pleasure of seeing MalikMICHAELA Joyce in the [facility].      Cardiology Progress Note    Date of Service: 04/03/2018      Reason for visit: Follow up after medication changes, hypertension.    Primary cardiologist: Dr. Syd Bardales      HPI:  Syd is a 63-year-old gentleman with a PMhx including resistant hypertension, DM, chronic peripheral edema, and dyslipidemia, who was seen in consultation by Dr. Bardales in February 2018 for worsening dyspnea on exertion, which followed a 3 month period of inactivity post spinal surgery 2017. As part of his evaluation he had a chest CT which did not show any significant pulmonary abnormalities, but did show a significant amount of coronary calcium, primarily in the LAD. A subsequent stress nuclear study was abnormal, with some apical anterior anterolateral perfusion abnormalities with stress, however normal at rest with a preserved. EF. He then underwent an angiogram which showed a 50% calcified proximal LAD stenosis with IFR 0.89 and FFR of 0.882. This was felt not to be physiologically significant, and thus, medical management was recommended. At that time, his simvastatin was increased to 20mg daily, and his carvedilol was increased to 25mg twice a day for improved BP control.     He returned to see Fior Simon NP a few weeks later for follow up. His BP remained elevated with 150s systolic. She then increased his carvedilol further to 37.5mg BID. He is here again today to ongoing management. Overall, he states he's feeling better. His dyspnea on exertion has improved significantly now that he is participating in a spinal rehab program and is more active. He has now been to the lymphedema clinic as well, and has been wrapping his legs. His edema is improving and his weight is actually down 6 lbs in a month. He  denies any chest, arm, or jaw discomfort while participating in rehab. He is tolerating the increase in beta blocker with no dizziness, lightheadedness, or worsening fatigue. He has an appointment scheduled next month for a sleep evaluation, due to high clinical suspicion of MARTELL as well.      On arrival, his BP remains somewhat elevated at 148/77. 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 His BMP today shows ongoing mild hyponatremia; I discussed this with him, and he denies any recent changes to his thiazide diuretic and states the Zestoretic has been a long term medication for him. He remains on the 20mg of simvastatin since his angiogram and tolerating this well. His FLP today shows excellent control of his LDL at 44, with an HDL of 80.             ASSESSMENT/PLAN:      1. Resistant hypertension.   --Still mildly elevated today. Will increase carvedilol to 50mg BID, and he will continue to monitor his BP at work. Asked him to call with any concerns of feeling lightheaded, or worsening fatigue.    --Will continue lisinopril/HCTZ without change, but need to monitor in the setting of mild hyponatremia recently. Will forward to PCP as patient states he has repeat labs planned this summer for a routine visit.    --No change to losartan 100mg daily, and amlodipine 10mg daily.    --Sleep evaluation planned for next month. High clinical suspicion for sleep apnea.    --Continue routine management as per PCP.    2. Coronary artery disease.   --Noted as coronary artery calcification on chest CT performed as dyspnea workup. Subsequent nuclear stress study abnormal, with anterior anterolateral perfusion abnormalities. He ultimately underwent an angiogram which showed a 50% calcified proximal LAD stenosis, which was felt not to be physiologically significant. Continue medical management.   --Recent echo 2/2018 with preserved EF  60-65%. No clinical evidence of heart failure, though does have chronic leg edema. Improving with wraps at lymphedema clinic. Continue to monitor.    --Continue ASA 81mg daily.   --Continue simvastatin 20mg daily. LDL today excellent at 44.    --Continue carvedilol as above.    --Continue his current rehab program which is helping his dyspnea significantly.        Follow up plan: With Dr. Syd Bardales in 1 year, or sooner if needed.       Orders this Visit:  Orders Placed This Encounter   Procedures     Lipid Profile     ALT     Follow-Up with Cardiologist     Orders Placed This Encounter   Medications     simvastatin (ZOCOR) 20 MG tablet     Sig: Take 1 tablet (20 mg) by mouth every evening     Dispense:  90 tablet     Refill:  4     carvedilol (COREG) 25 MG tablet     Sig: Take 2 tablets (50 mg) by mouth 2 times daily Hold IF heart rate less than 55.     Dispense:  360 tablet     Refill:  4     Medications Discontinued During This Encounter   Medication Reason     simvastatin (ZOCOR) 20 MG tablet Reorder     carvedilol (COREG) 25 MG tablet Reorder           CURRENT MEDICATIONS:  Current Outpatient Prescriptions   Medication Sig Dispense Refill     simvastatin (ZOCOR) 20 MG tablet Take 1 tablet (20 mg) by mouth every evening 90 tablet 4     carvedilol (COREG) 25 MG tablet Take 2 tablets (50 mg) by mouth 2 times daily Hold IF heart rate less than 55. 360 tablet 4     losartan (COZAAR) 100 MG tablet TAKE 1 TABLET BY MOUTH EVERY DAY 90 tablet 3     aspirin 81 MG EC tablet Take 1 tablet (81 mg) by mouth daily Start tomorrow morning. 90 tablet 3     metFORMIN (GLUCOPHAGE-XR) 500 MG 24 hr tablet TAKE 2 TABLETS BY MOUTH EVERY MORNING 180 tablet 0     zolpidem (AMBIEN) 10 MG tablet TAKE 1 TABLET BY MOUTH EVERY NIGHT AT BEDTIME 90 tablet 1     amLODIPine (NORVASC) 10 MG tablet TAKE 1 TABLET(10 MG) BY MOUTH DAILY 90 tablet 3     lisinopril-hydrochlorothiazide (PRINZIDE/ZESTORETIC) 20-12.5 MG per tablet TAKE 1 TABLET BY MOUTH  DAILY 90 tablet 3     pantoprazole (PROTONIX) 40 MG EC tablet TAKE 1 TABLET(40 MG) BY MOUTH DAILY 90 tablet 3     TRADJENTA 5 MG TABS tablet TAKE 1 TABLET(5 MG) BY MOUTH DAILY 90 tablet 3     cyclobenzaprine (FLEXERIL) 10 MG tablet Take 0.5-1 tablets (5-10 mg) by mouth 3 times daily as needed for muscle spasms 90 tablet 1     Calcium Carbonate Antacid (TUMS PO) Take 2 chew tab by mouth daily (with dinner)       [DISCONTINUED] carvedilol (COREG) 25 MG tablet Take 1.5 tablets (37.5 mg) by mouth 2 times daily Hold IF heart rate less than 55. 180 tablet 3     [DISCONTINUED] simvastatin (ZOCOR) 20 MG tablet Take 1 tablet (20 mg) by mouth every evening 90 tablet 3     blood glucose monitoring (ACCU-CHEK MED PLUS) test strip USE 4 TO 5 TIMES PER WEEK OR AS DIRECTED 150 each 3     blood glucose monitoring (ACCU-CHEK MED PLUS) meter device kit Use to test blood sugar 4 to 5 times weekly or as directed. 1 kit 0       ALLERGIES   No Known Allergies    PAST MEDICAL HISTORY:  Past Medical History:   Diagnosis Date     DM2 (diabetes mellitus, type 2) (H)      Esophageal reflux 6/15/2012     HTN (hypertension) 8/12/2011     Leg pain, bilateral      Low back pain      Numbness and tingling     LEGS, R/T LUMBAR STENOSIS     Obesity, unspecified 1/10/2014       PAST SURGICAL HISTORY:  Past Surgical History:   Procedure Laterality Date     COLONOSCOPY       DISCECTOMY LUMBAR POSTERIOR MICROSCOPIC TWO LEVELS N/A 6/30/2017    Procedure: DISCECTOMY LUMBAR POSTERIOR MICROSCOPIC TWO LEVELS;  L3-4 L4-5 POSTERIOR DECOMPRESSION AND INTERSPINUS FIXATION WITHOUT FUSION;  Surgeon: Ray Perez MD;  Location:  OR     [facility]         FAMILY HISTORY:  Family History   Problem Relation Age of Onset     DIABETES No family hx of      Coronary Artery Disease No family hx of      CEREBROVASCULAR DISEASE No family hx of        SOCIAL HISTORY:  Social History     Social History     Marital status:      Spouse name: N/A     Number  "of children: 2     Years of education: N/A     Occupational History     manager [employer]     Social History Main Topics     Smoking status: Never Smoker     Smokeless tobacco: Current User     Alcohol use Yes      Comment: RARELY     Drug use: No     Sexual activity: Not Asked     Other Topics Concern     Parent/Sibling W/ Cabg, Mi Or Angioplasty Before 65f 55m? No     Social History Narrative       Review of Systems:  Cardiovascular: negative for chest pain, palpitations, orthopnea, pos chronic LE edema  Constitutional: negative for chills, sweats, fevers.   Resp: Negative for dyspnea at rest, dyspnea on exertion, cough, known chronic lung disease  HEENT: Negative for new visual changes, frequent headaches  Gastrointestinal: negative for abdominal pain, nausea, vomiting  Hematologic/lymphatic: negative for current systemic anticoagulation, hx of blood clots  Musculoskeletal: pos chronic back pain, neg joint pain  Neurological: negative for focal weakness, LOC, seizures, syncope      Physical Exam:  Vitals: /77  Pulse 80  Ht 1.753 m (5' 9\")  Wt 97.1 kg (214 lb)  BMI 31.6 kg/m2   Wt Readings from Last 4 Encounters:   04/03/18 97.1 kg (214 lb)   02/28/18 100.2 kg (220 lb 14.4 oz)   02/28/18 99.8 kg (220 lb)   02/21/18 100.7 kg (222 lb)       GEN:  In general, this is a well nourished  male in no acute distress on room air.  Patient ambulatory.  HEENT:  Pupils equal, round. Sclerae nonicteric. Clear oropharynx.   NECK: Supple, no masses appreciated. Trachea midline. No JVD upright.   C/V:  Regular rate and rhythm, no murmur, rub or gallop.   RESP: Respirations are unlabored. No use of accessory muscles. Clear to auscultation bilaterally without wheezing, rales, or rhonchi.  GI: Abdomen soft, nontender, nondistended.   EXTREM: 1-2+ bilateral LE edema. No cyanosis or clubbing.  NEURO: Alert and oriented, cooperative. Gait not formally assessed. No obvious focal deficits.   PSYCH: " Normal affect.  SKIN: Warm and dry. No rashes or petechiae appreciated.       Recent Lab Results:  LIPID RESULTS:  Lab Results   Component Value Date    CHOL 140 04/03/2018    HDL 80 04/03/2018    LDL 44 04/03/2018    TRIG 78 04/03/2018       BMP RESULTS:  Lab Results   Component Value Date     (L) 04/03/2018    POTASSIUM 4.1 04/03/2018    CHLORIDE 92 (L) 04/03/2018    CO2 30 (H) 04/03/2018    ANIONGAP 11.1 04/03/2018     (H) 04/03/2018    BUN 10 04/03/2018    CR 1.22 04/03/2018    GFRESTIMATED 60 (L) 04/03/2018    GFRESTBLACK 72 04/03/2018    HEAVENLY 9.1 04/03/2018          New/Pertinent imaging results since last visit:  Echo 2/19/18  Interpretation Summary     The visual ejection fraction is estimated at 60-65%.  Normal left ventricular wall motion  The study was technically difficult. Contrast was used without apparent  complications. There is no comparison study available        Libertad Warner PA-C  [facility]  Pager [phone]      Thank you for allowing me to participate in the care of your patient.    Sincerely,     DEONDRE Richards     [facility]

## 2018-04-03 NOTE — LETTER
4/3/2018    Shan Arenas MD  [address]  [facility] [address]    RE: Syd Joyce       Dear Colleague,    I had the pleasure of seeing MalikMICHAELA Joyce in the [facility].      Cardiology Progress Note    Date of Service: 04/03/2018      Reason for visit: Follow up after medication changes, hypertension.    Primary cardiologist: Dr. Syd Bardales      HPI:  Syd is a 63-year-old gentleman with a PMhx including resistant hypertension, DM, chronic peripheral edema, and dyslipidemia, who was seen in consultation by Dr. Bardales in February 2018 for worsening dyspnea on exertion, which followed a 3 month period of inactivity post spinal surgery 2017. As part of his evaluation he had a chest CT which did not show any significant pulmonary abnormalities, but did show a significant amount of coronary calcium, primarily in the LAD. A subsequent stress nuclear study was abnormal, with some apical anterior anterolateral perfusion abnormalities with stress, however normal at rest with a preserved. EF. He then underwent an angiogram which showed a 50% calcified proximal LAD stenosis with IFR 0.89 and FFR of 0.882. This was felt not to be physiologically significant, and thus, medical management was recommended. At that time, his simvastatin was increased to 20mg daily, and his carvedilol was increased to 25mg twice a day for improved BP control.     He returned to see Fior Simon NP a few weeks later for follow up. His BP remained elevated with 150s systolic. She then increased his carvedilol further to 37.5mg BID. He is here again today to ongoing management. Overall, he states he's feeling better. His dyspnea on exertion has improved significantly now that he is participating in a spinal rehab program and is more active. He has now been to the lymphedema clinic as well, and has been wrapping his legs. His edema is improving and his weight is actually down 6 lbs in a month. He  denies any chest, arm, or jaw discomfort while participating in rehab. He is tolerating the increase in beta blocker with no dizziness, lightheadedness, or worsening fatigue. He has an appointment scheduled next month for a sleep evaluation, due to high clinical suspicion of MARTELL as well.      On arrival, his BP remains somewhat elevated at 148/77. 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 His BMP today shows ongoing mild hyponatremia; I discussed this with him, and he denies any recent changes to his thiazide diuretic and states the Zestoretic has been a long term medication for him. He remains on the 20mg of simvastatin since his angiogram and tolerating this well. His FLP today shows excellent control of his LDL at 44, with an HDL of 80.             ASSESSMENT/PLAN:      1. Resistant hypertension.   --Still mildly elevated today. Will increase carvedilol to 50mg BID, and he will continue to monitor his BP at work. Asked him to call with any concerns of feeling lightheaded, or worsening fatigue.    --Will continue lisinopril/HCTZ without change, but need to monitor in the setting of mild hyponatremia recently. Will forward to PCP as patient states he has repeat labs planned this summer for a routine visit.    --No change to losartan 100mg daily, and amlodipine 10mg daily.    --Sleep evaluation planned for next month. High clinical suspicion for sleep apnea.    --Continue routine management as per PCP.    2. Coronary artery disease.   --Noted as coronary artery calcification on chest CT performed as dyspnea workup. Subsequent nuclear stress study abnormal, with anterior anterolateral perfusion abnormalities. He ultimately underwent an angiogram which showed a 50% calcified proximal LAD stenosis, which was felt not to be physiologically significant. Continue medical management.   --Recent echo 2/2018 with preserved EF  60-65%. No clinical evidence of heart failure, though does have chronic leg edema. Improving with wraps at lymphedema clinic. Continue to monitor.    --Continue ASA 81mg daily.   --Continue simvastatin 20mg daily. LDL today excellent at 44.    --Continue carvedilol as above.    --Continue his current rehab program which is helping his dyspnea significantly.        Follow up plan: With Dr. Syd Bardales in 1 year, or sooner if needed.       Orders this Visit:  Orders Placed This Encounter   Procedures     Lipid Profile     ALT     Follow-Up with Cardiologist     Orders Placed This Encounter   Medications     simvastatin (ZOCOR) 20 MG tablet     Sig: Take 1 tablet (20 mg) by mouth every evening     Dispense:  90 tablet     Refill:  4     carvedilol (COREG) 25 MG tablet     Sig: Take 2 tablets (50 mg) by mouth 2 times daily Hold IF heart rate less than 55.     Dispense:  360 tablet     Refill:  4     Medications Discontinued During This Encounter   Medication Reason     simvastatin (ZOCOR) 20 MG tablet Reorder     carvedilol (COREG) 25 MG tablet Reorder           CURRENT MEDICATIONS:  Current Outpatient Prescriptions   Medication Sig Dispense Refill     simvastatin (ZOCOR) 20 MG tablet Take 1 tablet (20 mg) by mouth every evening 90 tablet 4     carvedilol (COREG) 25 MG tablet Take 2 tablets (50 mg) by mouth 2 times daily Hold IF heart rate less than 55. 360 tablet 4     losartan (COZAAR) 100 MG tablet TAKE 1 TABLET BY MOUTH EVERY DAY 90 tablet 3     aspirin 81 MG EC tablet Take 1 tablet (81 mg) by mouth daily Start tomorrow morning. 90 tablet 3     metFORMIN (GLUCOPHAGE-XR) 500 MG 24 hr tablet TAKE 2 TABLETS BY MOUTH EVERY MORNING 180 tablet 0     zolpidem (AMBIEN) 10 MG tablet TAKE 1 TABLET BY MOUTH EVERY NIGHT AT BEDTIME 90 tablet 1     amLODIPine (NORVASC) 10 MG tablet TAKE 1 TABLET(10 MG) BY MOUTH DAILY 90 tablet 3     lisinopril-hydrochlorothiazide (PRINZIDE/ZESTORETIC) 20-12.5 MG per tablet TAKE 1 TABLET BY MOUTH  DAILY 90 tablet 3     pantoprazole (PROTONIX) 40 MG EC tablet TAKE 1 TABLET(40 MG) BY MOUTH DAILY 90 tablet 3     TRADJENTA 5 MG TABS tablet TAKE 1 TABLET(5 MG) BY MOUTH DAILY 90 tablet 3     cyclobenzaprine (FLEXERIL) 10 MG tablet Take 0.5-1 tablets (5-10 mg) by mouth 3 times daily as needed for muscle spasms 90 tablet 1     Calcium Carbonate Antacid (TUMS PO) Take 2 chew tab by mouth daily (with dinner)       [DISCONTINUED] carvedilol (COREG) 25 MG tablet Take 1.5 tablets (37.5 mg) by mouth 2 times daily Hold IF heart rate less than 55. 180 tablet 3     [DISCONTINUED] simvastatin (ZOCOR) 20 MG tablet Take 1 tablet (20 mg) by mouth every evening 90 tablet 3     blood glucose monitoring (ACCU-CHEK MED PLUS) test strip USE 4 TO 5 TIMES PER WEEK OR AS DIRECTED 150 each 3     blood glucose monitoring (ACCU-CHEK MED PLUS) meter device kit Use to test blood sugar 4 to 5 times weekly or as directed. 1 kit 0       ALLERGIES   No Known Allergies    PAST MEDICAL HISTORY:  Past Medical History:   Diagnosis Date     DM2 (diabetes mellitus, type 2) (H)      Esophageal reflux 6/15/2012     HTN (hypertension) 8/12/2011     Leg pain, bilateral      Low back pain      Numbness and tingling     LEGS, R/T LUMBAR STENOSIS     Obesity, unspecified 1/10/2014       PAST SURGICAL HISTORY:  Past Surgical History:   Procedure Laterality Date     COLONOSCOPY       DISCECTOMY LUMBAR POSTERIOR MICROSCOPIC TWO LEVELS N/A 6/30/2017    Procedure: DISCECTOMY LUMBAR POSTERIOR MICROSCOPIC TWO LEVELS;  L3-4 L4-5 POSTERIOR DECOMPRESSION AND INTERSPINUS FIXATION WITHOUT FUSION;  Surgeon: Ray Perez MD;  Location:  OR     [facility]         FAMILY HISTORY:  Family History   Problem Relation Age of Onset     DIABETES No family hx of      Coronary Artery Disease No family hx of      CEREBROVASCULAR DISEASE No family hx of        SOCIAL HISTORY:  Social History     Social History     Marital status:      Spouse name: N/A     Number  "of children: 2     Years of education: N/A     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 No     Social History Narrative       Review of Systems:  Cardiovascular: negative for chest pain, palpitations, orthopnea, pos chronic LE edema  Constitutional: negative for chills, sweats, fevers.   Resp: Negative for dyspnea at rest, dyspnea on exertion, cough, known chronic lung disease  HEENT: Negative for new visual changes, frequent headaches  Gastrointestinal: negative for abdominal pain, nausea, vomiting  Hematologic/lymphatic: negative for current systemic anticoagulation, hx of blood clots  Musculoskeletal: pos chronic back pain, neg joint pain  Neurological: negative for focal weakness, LOC, seizures, syncope      Physical Exam:  Vitals: /77  Pulse 80  Ht 1.753 m (5' 9\")  Wt 97.1 kg (214 lb)  BMI 31.6 kg/m2   Wt Readings from Last 4 Encounters:   04/03/18 97.1 kg (214 lb)   02/28/18 100.2 kg (220 lb 14.4 oz)   02/28/18 99.8 kg (220 lb)   02/21/18 100.7 kg (222 lb)       GEN:  In general, this is a well nourished  male in no acute distress on room air.  Patient ambulatory.  HEENT:  Pupils equal, round. Sclerae nonicteric. Clear oropharynx.   NECK: Supple, no masses appreciated. Trachea midline. No JVD upright.   C/V:  Regular rate and rhythm, no murmur, rub or gallop.   RESP: Respirations are unlabored. No use of accessory muscles. Clear to auscultation bilaterally without wheezing, rales, or rhonchi.  GI: Abdomen soft, nontender, nondistended.   EXTREM: 1-2+ bilateral LE edema. No cyanosis or clubbing.  NEURO: Alert and oriented, cooperative. Gait not formally assessed. No obvious focal deficits.   PSYCH: " Normal affect.  SKIN: Warm and dry. No rashes or petechiae appreciated.       Recent Lab Results:  LIPID RESULTS:  Lab Results   Component Value Date    CHOL 140 04/03/2018    HDL 80 04/03/2018    LDL 44 04/03/2018    TRIG 78 04/03/2018       BMP RESULTS:  Lab Results   Component Value Date     (L) 04/03/2018    POTASSIUM 4.1 04/03/2018    CHLORIDE 92 (L) 04/03/2018    CO2 30 (H) 04/03/2018    ANIONGAP 11.1 04/03/2018     (H) 04/03/2018    BUN 10 04/03/2018    CR 1.22 04/03/2018    GFRESTIMATED 60 (L) 04/03/2018    GFRESTBLACK 72 04/03/2018    HEAVENLY 9.1 04/03/2018          New/Pertinent imaging results since last visit:  Echo 2/19/18  Interpretation Summary     The visual ejection fraction is estimated at 60-65%.  Normal left ventricular wall motion  The study was technically difficult. Contrast was used without apparent  complications. There is no comparison study available        Libertad Warner PA-C  [facility]  Pager [phone]      Thank you for allowing me to participate in the care of your patient.      Sincerely,     DEONDRE Richards     [facility]    cc:   Fior Simon, APRN CNP  [address]  [place] [id number]

## 2018-04-03 NOTE — PATIENT INSTRUCTIONS
Visit Summary:    Today we discussed:   Your blood pressure is still a little high.  INCREASE the carvedilol to 50mg twice daily.  Please keep checking your BP at work when you can, if it remains >140s/80s routinely please send me a message.    Test results:   Results for TERE SILVEIRA (MRN [id number]) as of 4/3/2018 12:48   Ref. Range 2/21/2018 08:45 2/23/2018 14:29 4/3/2018 11:23   Sodium Latest Ref Range: 136 - 145 mmol/L 128 (L) 130 (L) 129 (L)   Potassium Latest Ref Range: 3.5 - 5.1 mmol/L 3.7 3.9 4.1   Chloride Latest Ref Range: 98 - 107 mmol/L 92 (L) 93 (L) 92 (L)   Carbon Dioxide Latest Ref Range: 23 - 29 mmol/L 26 29 30 (H)   Urea Nitrogen Latest Ref Range: 7 - 30 mg/dL 14 15 10   Creatinine Latest Ref Range: 0.70 - 1.30 mg/dL 1.00 1.19 1.22   GFR Estimate Latest Ref Range: >60 mL/min/1.7m2 75 61 60 (L)   GFR Estimate If Black Latest Ref Range: >60 mL/min/1.7m2 >90 74 72   Calcium Latest Ref Range: 8.5 - 10.5 mg/dL 8.9 9.0 9.1   Anion Gap Latest Ref Range: 6 - 17 mmol/L 10 11.9 11.1       Follow up:   With Dr. Bardales in 1 year, or sooner if needed.    Please continue to follow with your primary doctor for ongoing blood pressure check and cholesterol management.       Please call my nurse Lizy at [phone] with any questions or concerns. Scheduling phone number: [phone] if needed.

## 2018-04-03 NOTE — MR AVS SNAPSHOT
After Visit Summary   4/3/2018    Syd Silveira    MRN: [id number]           Patient Information     Date Of Birth          1955        Visit Information        Provider Department      4/3/2018 12:30 PM Libertad Warner PA [facility]        Today's Diagnoses     Coronary artery disease of native artery of native heart with stable angina pectoris (H)    -  1    Dyspnea on exertion        Atherosclerosis of native coronary artery of native heart without angina pectoris          Care Instructions    Visit Summary:    Today we discussed:   Your blood pressure is still a little high.  INCREASE the carvedilol to 50mg twice daily.  Please keep checking your BP at work when you can, if it remains >140s/80s routinely please send me a message.    Test results:   Results for SYD SILVEIRA (MRN [id number]) as of 4/3/2018 12:48   Ref. Range 2/21/2018 08:45 2/23/2018 14:29 4/3/2018 11:23   Sodium Latest Ref Range: 136 - 145 mmol/L 128 (L) 130 (L) 129 (L)   Potassium Latest Ref Range: 3.5 - 5.1 mmol/L 3.7 3.9 4.1   Chloride Latest Ref Range: 98 - 107 mmol/L 92 (L) 93 (L) 92 (L)   Carbon Dioxide Latest Ref Range: 23 - 29 mmol/L 26 29 30 (H)   Urea Nitrogen Latest Ref Range: 7 - 30 mg/dL 14 15 10   Creatinine Latest Ref Range: 0.70 - 1.30 mg/dL 1.00 1.19 1.22   GFR Estimate Latest Ref Range: >60 mL/min/1.7m2 75 61 60 (L)   GFR Estimate If Black Latest Ref Range: >60 mL/min/1.7m2 >90 74 72   Calcium Latest Ref Range: 8.5 - 10.5 mg/dL 8.9 9.0 9.1   Anion Gap Latest Ref Range: 6 - 17 mmol/L 10 11.9 11.1       Follow up:   With Dr. Bardales in 1 year, or sooner if needed.    Please continue to follow with your primary doctor for ongoing blood pressure check and cholesterol management.       Please call my nurse Lizy at [phone] with any questions or concerns. Scheduling phone number: [phone] if needed.                 Follow-ups after your visit        Additional  Services     Follow-Up with Cardiologist                 Your next 10 appointments already scheduled     Apr 03, 2018  3:45 PM CDT   Lymphedema Treatment with Cayla Eng, OT   [facility] ([facility])    [address]  [address]  [place] [address]   [phone]            Apr 04, 2018  8:30 AM CDT   Lymphedema Treatment with Melissa Catalan, OT   [facility] ([facility])    [address]  [address]  [place] [address]   [phone]            Apr 05, 2018  3:30 PM CDT   Lymphedema Treatment with Cayla Eng, OT   [facility] ([facility])    [address]  [address]  [place] [address]   [phone]            Apr 06, 2018  1:45 PM CDT   Lymphedema Treatment with Cayla Eng, OT   [facility] ([facility])    [address]  [address]  [place] [address]   [phone]            Apr 09, 2018  3:15 PM CDT   Lymphedema Treatment with Melissa Catalan, OT   [facility] ([facility])    [address]  [address]  [place] [address]   [phone]            Apr 10, 2018  3:45 PM CDT   Lymphedema Treatment with Cayla Eng, OT   [facility] ([facility])    [address]  [address]  [place] [address]   [phone]            Apr 11, 2018  3:15 PM CDT   Lymphedema Treatment with Melissa Catalan, OT   [facility] ([facility])    [address]  [address]  [place] [address]   [phone]            Apr 12, 2018  3:45 PM CDT   Lymphedema Treatment with Cayla Eng, OT   [facility] ([facility])    [address]  [address]  [place] [address]   [phone]            Apr 13, 2018  1:45 PM CDT   Lymphedema Treatment with Caylaita Eng, OT   [facility] ([facility])    [address] [place] [address]  [address]  [facility]  "[address]   [phone]            May 18, 2018  8:30 PM CDT   PSG Split with BED 4 SH SLEEP   [facility] [place] ([facility])    [address] [id number] [phone]              Future tests that were ordered for you today     Open Future Orders        Priority Expected Expires Ordered    Follow-Up with Cardiologist Routine 4/3/2019 4/4/2019 4/3/2018            Who to contact     If you have questions or need follow up information about today's clinic visit or your schedule please contact [facility] directly at [phone].  Normal or non-critical lab and imaging results will be communicated to you by Sw[employer], letter or phone within 4 business days after the clinic has received the results. If you do not hear from us within 7 days, please contact the clinic through [email or web address] or phone. If you have a critical or abnormal lab result, we will notify you by phone as soon as possible.  Submit refill requests through [employer] or call your pharmacy and they will forward the refill request to us. Please allow 3 business days for your refill to be completed.          Additional Information About Your Visit        [employer] Information     [employer] gives you secure access to your electronic health record. If you see a primary care provider, you can also send messages to your care team and make appointments. If you have questions, please call your primary care clinic.  If you do not have a primary care provider, please call [phone] and they will assist you.        Care EveryWhere ID     This is your Care EveryWhere ID. This could be used by other organizations to access your [facility] records  [id number]        Your Vitals Were     Pulse Height BMI (Body Mass Index)             80 1.753 m (5' 9\") 31.6 kg/m2          Blood Pressure from Last 3 Encounters:   04/03/18 148/77   02/28/18 158/78   02/28/18 132/77    " Weight from Last 3 Encounters:   04/03/18 97.1 kg (214 lb)   02/28/18 100.2 kg (220 lb 14.4 oz)   02/28/18 99.8 kg (220 lb)              We Performed the Following     Follow-Up with Cardiac Advanced Practice Provider          Today's Medication Changes          These changes are accurate as of 4/3/18  1:00 PM.  If you have any questions, ask your nurse or doctor.               These medicines have changed or have updated prescriptions.        Dose/Directions    carvedilol 25 MG tablet   Commonly known as:  COREG   This may have changed:  how much to take   Used for:  Atherosclerosis of native coronary artery of native heart without angina pectoris   Changed by:  Libertad Warner PA        Dose:  50 mg   Take 2 tablets (50 mg) by mouth 2 times daily Hold IF heart rate less than 55.   Quantity:  360 tablet   Refills:  4            Where to get your medicines      These medications were sent to [facility] [address] [place] [address] [address]  [address] [facility] [address]     Phone:  [phone]     carvedilol 25 MG tablet    simvastatin 20 MG tablet                Primary Care Provider Office Phone # Fax #    Shan Arenas [phone] [address]  [facility] [address]        Equal Access to Services     GAIL GRIFFITHS : Hadii aad ku hadasho Soomaali, waaxda luqadaha, qaybta kaalmada adeegyada, jose elias. So [facility] [phone].    ATENCIÓN: Si habla español, tiene a persaud disposición servicios gratuitos de asistencia lingüística. Llame al [phone].    We comply with applicable federal civil rights laws and [place] laws. We do not discriminate on the basis of race, color, national origin, age, disability, sex, sexual orientation, or gender identity.            Thank you!     Thank you for choosing [facility]  for your care. Our goal is always to provide you with  excellent care. Hearing back from our patients is one way we can continue to improve our services. Please take a few minutes to complete the written survey that you may receive in the mail after your visit with us. Thank you!             Your Updated Medication List - Protect others around you: Learn how to safely use, store and throw away your medicines at www.disposemymeds.org.          This list is accurate as of 4/3/18  1:00 PM.  Always use your most recent med list.                   Brand Name Dispense Instructions for use Diagnosis    amLODIPine 10 MG tablet    NORVASC    90 tablet    TAKE 1 TABLET(10 MG) BY MOUTH DAILY    Essential hypertension with goal blood pressure less than 130/80       aspirin 81 MG EC tablet     90 tablet    Take 1 tablet (81 mg) by mouth daily Start tomorrow morning.    Atherosclerosis of native coronary artery of native heart without angina pectoris       blood glucose monitoring meter device kit     1 kit    Use to test blood sugar 4 to 5 times weekly or as directed.    Refill clinic medication management patient       blood glucose monitoring test strip    ACCU-CHEK MED PLUS    150 each    USE 4 TO 5 TIMES PER WEEK OR AS DIRECTED    Refill clinic medication management patient       carvedilol 25 MG tablet    COREG    360 tablet    Take 2 tablets (50 mg) by mouth 2 times daily Hold IF heart rate less than 55.    Atherosclerosis of native coronary artery of native heart without angina pectoris       cyclobenzaprine 10 MG tablet    FLEXERIL    90 tablet    Take 0.5-1 tablets (5-10 mg) by mouth 3 times daily as needed for muscle spasms    S/P lumbar laminectomy       lisinopril-hydrochlorothiazide 20-12.5 MG per tablet    PRINZIDE/ZESTORETIC    90 tablet    TAKE 1 TABLET BY MOUTH DAILY    Essential hypertension with goal blood pressure less than 130/80       losartan 100 MG tablet    COZAAR    90 tablet    TAKE 1 TABLET BY MOUTH EVERY DAY    Essential hypertension with goal blood  pressure less than 130/80       metFORMIN 500 MG 24 hr tablet    GLUCOPHAGE-XR    180 tablet    TAKE 2 TABLETS BY MOUTH EVERY MORNING    Type 2 diabetes mellitus with diabetic autonomic neuropathy, without long-term current use of insulin (H)       pantoprazole 40 MG EC tablet    PROTONIX    90 tablet    TAKE 1 TABLET(40 MG) BY MOUTH DAILY    Encounter for medication refill       simvastatin 20 MG tablet    ZOCOR    90 tablet    Take 1 tablet (20 mg) by mouth every evening    Atherosclerosis of native coronary artery of native heart without angina pectoris       TRADJENTA 5 MG Tabs tablet   Generic drug:  linagliptin     90 tablet    TAKE 1 TABLET(5 MG) BY MOUTH DAILY    Type 2 diabetes mellitus with diabetic autonomic neuropathy, without long-term current use of insulin (H)       TUMS PO      Take 2 chew tab by mouth daily (with dinner)        zolpidem 10 MG tablet    AMBIEN    90 tablet    TAKE 1 TABLET BY MOUTH EVERY NIGHT AT BEDTIME    Encounter for medication refill

## 2018-04-04 ENCOUNTER — HOSPITAL ENCOUNTER (OUTPATIENT)
Dept: OCCUPATIONAL THERAPY | Facility: CLINIC | Age: 63
Setting detail: THERAPIES SERIES
End: 2018-04-04
Attending: INTERNAL MEDICINE
Payer: COMMERCIAL

## 2018-04-04 PROCEDURE — 97535 SELF CARE MNGMENT TRAINING: CPT | Mod: GO

## 2018-04-04 PROCEDURE — 97140 MANUAL THERAPY 1/> REGIONS: CPT | Mod: GO

## 2018-04-04 PROCEDURE — 40000445 ZZHC STATISTIC OT VISIT, LYMPHEDEMA

## 2018-04-05 ENCOUNTER — HOSPITAL ENCOUNTER (OUTPATIENT)
Dept: OCCUPATIONAL THERAPY | Facility: CLINIC | Age: 63
Setting detail: THERAPIES SERIES
End: 2018-04-05
Attending: INTERNAL MEDICINE
Payer: COMMERCIAL

## 2018-04-05 PROCEDURE — 97140 MANUAL THERAPY 1/> REGIONS: CPT | Mod: GO | Performed by: OCCUPATIONAL THERAPIST

## 2018-04-05 PROCEDURE — 40000445 ZZHC STATISTIC OT VISIT, LYMPHEDEMA: Performed by: OCCUPATIONAL THERAPIST

## 2018-04-05 PROCEDURE — 97535 SELF CARE MNGMENT TRAINING: CPT | Mod: GO | Performed by: OCCUPATIONAL THERAPIST

## 2018-04-06 ENCOUNTER — HOSPITAL ENCOUNTER (OUTPATIENT)
Dept: OCCUPATIONAL THERAPY | Facility: CLINIC | Age: 63
Setting detail: THERAPIES SERIES
End: 2018-04-06
Attending: INTERNAL MEDICINE
Payer: COMMERCIAL

## 2018-04-06 PROCEDURE — 97140 MANUAL THERAPY 1/> REGIONS: CPT | Mod: GO | Performed by: OCCUPATIONAL THERAPIST

## 2018-04-06 PROCEDURE — 40000445 ZZHC STATISTIC OT VISIT, LYMPHEDEMA: Performed by: OCCUPATIONAL THERAPIST

## 2018-04-09 ENCOUNTER — HOSPITAL ENCOUNTER (OUTPATIENT)
Dept: OCCUPATIONAL THERAPY | Facility: CLINIC | Age: 63
Setting detail: THERAPIES SERIES
End: 2018-04-09
Attending: INTERNAL MEDICINE
Payer: COMMERCIAL

## 2018-04-09 PROCEDURE — 97140 MANUAL THERAPY 1/> REGIONS: CPT | Mod: GO

## 2018-04-09 PROCEDURE — 40000445 ZZHC STATISTIC OT VISIT, LYMPHEDEMA

## 2018-04-10 ENCOUNTER — HOSPITAL ENCOUNTER (OUTPATIENT)
Dept: OCCUPATIONAL THERAPY | Facility: CLINIC | Age: 63
Setting detail: THERAPIES SERIES
End: 2018-04-10
Attending: INTERNAL MEDICINE
Payer: COMMERCIAL

## 2018-04-10 PROCEDURE — 97140 MANUAL THERAPY 1/> REGIONS: CPT | Mod: GO | Performed by: OCCUPATIONAL THERAPIST

## 2018-04-10 PROCEDURE — 40000445 ZZHC STATISTIC OT VISIT, LYMPHEDEMA: Performed by: OCCUPATIONAL THERAPIST

## 2018-04-11 ENCOUNTER — MYC MEDICAL ADVICE (OUTPATIENT)
Dept: CARDIOLOGY | Facility: CLINIC | Age: 63
End: 2018-04-11

## 2018-04-11 ENCOUNTER — HOSPITAL ENCOUNTER (OUTPATIENT)
Dept: OCCUPATIONAL THERAPY | Facility: CLINIC | Age: 63
Setting detail: THERAPIES SERIES
End: 2018-04-11
Attending: INTERNAL MEDICINE
Payer: COMMERCIAL

## 2018-04-11 DIAGNOSIS — E11.43 TYPE 2 DIABETES MELLITUS WITH DIABETIC AUTONOMIC NEUROPATHY, WITHOUT LONG-TERM CURRENT USE OF INSULIN (H): ICD-10-CM

## 2018-04-11 PROCEDURE — 40000445 ZZHC STATISTIC OT VISIT, LYMPHEDEMA

## 2018-04-11 PROCEDURE — 97140 MANUAL THERAPY 1/> REGIONS: CPT | Mod: GO

## 2018-04-11 RX ORDER — METFORMIN HCL 500 MG
TABLET, EXTENDED RELEASE 24 HR ORAL
Qty: 180 TABLET | Refills: 0 | Status: SHIPPED | OUTPATIENT
Start: 2018-04-11 | End: 2018-07-11

## 2018-04-12 ENCOUNTER — HOSPITAL ENCOUNTER (OUTPATIENT)
Dept: OCCUPATIONAL THERAPY | Facility: CLINIC | Age: 63
Setting detail: THERAPIES SERIES
End: 2018-04-12
Attending: INTERNAL MEDICINE
Payer: COMMERCIAL

## 2018-04-12 PROCEDURE — 40000445 ZZHC STATISTIC OT VISIT, LYMPHEDEMA: Performed by: OCCUPATIONAL THERAPIST

## 2018-04-12 PROCEDURE — 97140 MANUAL THERAPY 1/> REGIONS: CPT | Mod: GO | Performed by: OCCUPATIONAL THERAPIST

## 2018-04-13 ENCOUNTER — HOSPITAL ENCOUNTER (OUTPATIENT)
Dept: OCCUPATIONAL THERAPY | Facility: CLINIC | Age: 63
Setting detail: THERAPIES SERIES
End: 2018-04-13
Attending: INTERNAL MEDICINE
Payer: COMMERCIAL

## 2018-04-13 PROCEDURE — 40000445 ZZHC STATISTIC OT VISIT, LYMPHEDEMA: Performed by: OCCUPATIONAL THERAPIST

## 2018-04-13 PROCEDURE — 97140 MANUAL THERAPY 1/> REGIONS: CPT | Mod: GO | Performed by: OCCUPATIONAL THERAPIST

## 2018-04-13 NOTE — PROGRESS NOTES
"   04/13/18 1348   Signing Clinician's Name / Credentials   Signing clinician's name / credentials Cayla Eng, OTR/L CLT-JOHN   Session Number   Session Number 11: [facility]  (PROGRESS NOTE)   Adult OT Eval Goals   Edema Eval Goals 1;2;3;4   Goal 1   Goal identifier volume   Goal description For decreased risk of infection, skin breakdown/wounds & progressive soft-tissue fibrosis and improved fit of footwear, volume will be reduced by 300 mL in LLE, and 150 mL RLE.   Target date 04/27/18   Date met 04/13/18  (Goal exceeded)   Goal 2   Goal identifier GCB   Goal description To reduce volume of lymphedema and risk of soft tissue fibrosis, pt will tolerate up to 23hr/day wear gradient compression bandaging (GCB) of BLE.   Target date 04/27/18   Date met 04/13/18  (Goal met)   Goal 3   Goal identifier home program   Goal description For long-term home mgmt chronic lymphedema pt/caregiver independent in home program a. GCB/GCB alternative garment for night wear b. compression garment for day wear c. ex to incr lymph flow/self-MLD.   Target date 04/27/18   Goal 4   Goal identifier precautions   Goal description Pt will independently verbalize the signs/symptoms of lymphedema, precautions and how to obtain a future lymphedema referral if edema persists to preserve skin integrity, prevent infection and preserve functional mobility.      Target date 04/27/18   Subjective Report   Subjective Report \"I wore my compression socks all day at work again.   My legs feel good.\"   Objective Measure 1   Objective Measure Pain   Details BBK 0/10 (8/10 on eval 2.27.18)   Objective Measure 2   Objective Measure BBK volume (BBK = bilateral below knee)   Details vs 4.2.18 (1st day intensive lymphedema therapy) RBK decr 332ml & LBK decr 442ml (see \"Lymphedema Girth Measurement\" daily flowsheet for details).   Manual Therapy   Minutes 75 Minutes   Skilled Intervention Msmts, MLD, GCB   Patient Response Pt cites confident re able " "to follow-through with HP, tolerated GCB well with no skin breakdown or c/o discomfort.   Treatment Detail Took BBK msmts (see \"Objective Measure 2\" above for details).   MLD B terminus, B CERV/clav LN, B ANT IC LN/Lr's reflexes, B parasternal LN, TDL/B lumbar trunks, B ING/FV LN, skin drained BLE to B ING/FV LN.  BBK GCB applied with step-by-step verbal instruction & ED re theory of GCB: Shirley Dontrell stockinette, 6cm Transelast folded in   for toebands, 10cm Shirley Dontrell Gentle Pad to pad feet/ankles, 10cm Rosidal soft foam to pad ankles to knees, 8cm x 5m Rosidal short-stretch bandages (SSB) to compress feet, 10cm x 5m SSB to compress heels/ankles applied with ankles in flexion, 10cm x 5m SSB to compress ankles to knees.    Progress Met goals #1 & 2, + progress to goals #3 & 4   Plan   Home program HEP (see note 2.27.18 for details), BBK quick wrap, compression knee-highs, 20-30 mmHg   Updates to plan of care Final session in 10 month to assess progress to goals    Plan for next session BBK msmts, assess efficacy of HP, LLIS   Total Session Time   Timed Code Treatment Minutes 75   Total Treatment Time (sum of timed and untimed services) 75     "

## 2018-04-19 ENCOUNTER — OFFICE VISIT (OUTPATIENT)
Dept: FAMILY MEDICINE | Facility: CLINIC | Age: 63
End: 2018-04-19

## 2018-04-19 VITALS
OXYGEN SATURATION: 99 % | DIASTOLIC BLOOD PRESSURE: 70 MMHG | BODY MASS INDEX: 31.16 KG/M2 | SYSTOLIC BLOOD PRESSURE: 132 MMHG | HEART RATE: 88 BPM | WEIGHT: 211 LBS | RESPIRATION RATE: 16 BRPM

## 2018-04-19 DIAGNOSIS — E11.43 TYPE 2 DIABETES MELLITUS WITH DIABETIC AUTONOMIC NEUROPATHY, WITHOUT LONG-TERM CURRENT USE OF INSULIN (H): Primary | ICD-10-CM

## 2018-04-19 DIAGNOSIS — E78.2 MIXED HYPERLIPIDEMIA: ICD-10-CM

## 2018-04-19 DIAGNOSIS — Z13.6 SCREENING FOR AAA (ABDOMINAL AORTIC ANEURYSM): ICD-10-CM

## 2018-04-19 DIAGNOSIS — Z12.5 SCREENING FOR PROSTATE CANCER: ICD-10-CM

## 2018-04-19 DIAGNOSIS — R29.898 LEG WEAKNESS, BILATERAL: ICD-10-CM

## 2018-04-19 DIAGNOSIS — K21.9 GASTROESOPHAGEAL REFLUX DISEASE WITHOUT ESOPHAGITIS: ICD-10-CM

## 2018-04-19 DIAGNOSIS — Z23 NEED FOR VACCINATION: ICD-10-CM

## 2018-04-19 DIAGNOSIS — Z71.89 ADVANCED DIRECTIVES, COUNSELING/DISCUSSION: ICD-10-CM

## 2018-04-19 DIAGNOSIS — I10 BENIGN ESSENTIAL HYPERTENSION: ICD-10-CM

## 2018-04-19 PROCEDURE — 84443 ASSAY THYROID STIM HORMONE: CPT | Mod: 90 | Performed by: FAMILY MEDICINE

## 2018-04-19 PROCEDURE — 80053 COMPREHEN METABOLIC PANEL: CPT | Mod: 90 | Performed by: FAMILY MEDICINE

## 2018-04-19 PROCEDURE — 99214 OFFICE O/P EST MOD 30 MIN: CPT | Mod: 25 | Performed by: FAMILY MEDICINE

## 2018-04-19 PROCEDURE — 36415 COLL VENOUS BLD VENIPUNCTURE: CPT | Performed by: FAMILY MEDICINE

## 2018-04-19 PROCEDURE — 83036 HEMOGLOBIN GLYCOSYLATED A1C: CPT | Mod: 90 | Performed by: FAMILY MEDICINE

## 2018-04-19 PROCEDURE — 99207 C FOOT EXAM  NO CHARGE: CPT | Performed by: FAMILY MEDICINE

## 2018-04-19 PROCEDURE — 90732 PPSV23 VACC 2 YRS+ SUBQ/IM: CPT | Performed by: FAMILY MEDICINE

## 2018-04-19 PROCEDURE — 90471 IMMUNIZATION ADMIN: CPT | Performed by: FAMILY MEDICINE

## 2018-04-19 PROCEDURE — 84153 ASSAY OF PSA TOTAL: CPT | Mod: 90 | Performed by: FAMILY MEDICINE

## 2018-04-19 NOTE — PROGRESS NOTES
Problem(s) Oriented visit        SUBJECTIVE:                                                    Syd Joyce is a 63 year old male who presents to clinic today for the following health issues :        1. Type 2 diabetes mellitus with diabetic autonomic neuropathy, without long-term current use of insulin (H)  Diabetes  he  reports no new symptoms.  No significnat or regular episodes of hypo or hyperglycemia  Medication compliance: compliant most of the time  Diabetic diet compliance: compliant most of the time  Diabetic ROS: no polyuria or polydipsia, no chest pain, dyspnea or TIA's, no numbness, tingling or pain in extremities    Home blood sugar monitoring: are performed regularly, Review of patients self blood glucose monitoring shows fasting most always < 130 and post prandial average under 170.  As a direct cause of their history of diabetes they have the following Diabetic complications: peripheral neuropathy but may be complicated by edema    Most  recent A1C: Smoking: BP:   Lab Results   Component Value Date    A1C 6.8 06/19/2017    A1C 5.5 10/17/2016    A1C 5.3 09/21/2015    History   Smoking Status     Never Smoker   Smokeless Tobacco     Current User    BP Readings from Last 1 Encounters:   04/19/18 132/70        Kidney studies:  Creatinine   Date Value Ref Range Status   04/03/2018 1.22 0.70 - 1.30 mg/dL Final   02/23/2018 1.19 0.70 - 1.30 mg/dL Final   02/21/2018 1.00 0.66 - 1.25 mg/dL Final   ]    GFR Estimate   Date Value Ref Range Status   04/03/2018 60 (L) >60 mL/min/1.7m2 Final                No components found for: MICORALBUMINLC      GFR Estimate If Black   Date Value Ref Range Status   04/03/2018 72 >60 mL/min/1.7m2 Final     Medication (Note: This includes the hypertensive combination subclass to make sure to show all ACEI/ARB's.)     Angiotensin II Receptor Antagonists Sig    losartan (COZAAR) 100 MG tablet TAKE 1 TABLET BY MOUTH EVERY DAY    Antihypertensive Combinations Sig     lisinopril-hydrochlorothiazide (PRINZIDE/ZESTORETIC) 20-12.5 MG per tablet TAKE 1 TABLET BY MOUTH DAILY               Statin and ASA:  Current Outpatient Prescriptions   Medication     amLODIPine (NORVASC) 10 MG tablet     aspirin 81 MG EC tablet     blood glucose monitoring (ACCU-CHEK MED PLUS) meter device kit     blood glucose monitoring (ACCU-CHEK MED PLUS) test strip     Calcium Carbonate Antacid (TUMS PO)     carvedilol (COREG) 25 MG tablet     cyclobenzaprine (FLEXERIL) 10 MG tablet     lisinopril-hydrochlorothiazide (PRINZIDE/ZESTORETIC) 20-12.5 MG per tablet     losartan (COZAAR) 100 MG tablet     metFORMIN (GLUCOPHAGE-XR) 500 MG 24 hr tablet     pantoprazole (PROTONIX) 40 MG EC tablet     simvastatin (ZOCOR) 20 MG tablet     TRADJENTA 5 MG TABS tablet     zolpidem (AMBIEN) 10 MG tablet     No current facility-administered medications for this visit.        - Hemoglobin A1C ([facility])  - Comp. Metabolic Panel (14) ([facility])  - TSH ([facility])  - C FOOT EXAM  NO CHARGE    2. Leg weakness, bilateral  Now xochitl thte edema is improving would like to get stronger  Going to need to not work as he is unable to currently perform his job  - PHYSICAL THERAPY REFERRAL    3. Gastroesophageal reflux disease without esophagitis  GERD stable.  Taking meds as ordered, no reported side effects from medicines.  Eating properly to avoid sx.   No melena, no hematochezia, no diarrhea.  No unintended weigth loss.      4. Mixed hyperlipidemia  Has history of hyperlipidemia.  On statin for this, denies any significant side effects of this medication.      Latest labs reviewed:    Recent Labs   Lab Test  04/03/18   1123  06/19/17   1337   CHOL  140  160   HDL  80  56   LDL  44  71   TRIG  78  167*        Lab Results   Component Value Date    AST 26 01/22/2018          5. Benign essential hypertension  Blood presure remains well controlled when checked out of clinic.    Reviewed last 6 BP readings in chart:  BP Readings from Last 6  Encounters:   04/19/18 132/70   04/03/18 148/77   02/28/18 158/78   02/28/18 132/77   02/21/18 136/66   02/14/18 152/72       he has not experienced any significant side effects from medications for hypertension.    NO active cardiac complaints or symptoms with exercise.      6. Screening for prostate cancer  due  - PSA Serum ([facility])    7. Need for vaccination  due  - PNEUMOCOCCAL VACCINE,ADULT,SQ OR IM    8. Advanced directives, counseling/discussion  As is reasonable care for Most of us, wants in the Short term aggressive care.   No desire for long term prolongation of life through artificial means if no hope to bring back to a reasonable status.     - Full Code    9. Screening for AAA (abdominal aortic aneurysm)  No need  - Abdominal Aortic Aneurysm Screening/Tracking         Problem list, Medication list, Allergies, and Medical/Social/Surgical histories reviewed in EPIC and updated as appropriate.   Additional history: as documented    ROS:  General and CV completed and negative except as noted above    Histories:   Patient Active Problem List   Diagnosis     Benign essential hypertension     Mixed hyperlipidemia     Esophageal reflux     Advanced directives, counseling/discussion     Insomnia     Type 2 diabetes mellitus with diabetic autonomic neuropathy, without long-term current use of insulin (H)     Type 2 diabetes mellitus with hyperlipidemia (H)     S/P lumbar laminectomy     Leg weakness, bilateral     Loss of balance     S/P lumbar spinal fusion     Past Surgical History:   Procedure Laterality Date     COLONOSCOPY       DISCECTOMY LUMBAR POSTERIOR MICROSCOPIC TWO LEVELS N/A 6/30/2017    Procedure: DISCECTOMY LUMBAR POSTERIOR MICROSCOPIC TWO LEVELS;  L3-4 L4-5 POSTERIOR DECOMPRESSION AND INTERSPINUS FIXATION WITHOUT FUSION;  Surgeon: Ray Perez MD;  Location:  OR     [facility]         Social History   Substance Use Topics     Smoking status: Never Smoker     Smokeless tobacco: Current  User     Alcohol use Yes      Comment: RARELY     Family History   Problem Relation Age of Onset     DIABETES No family hx of      Coronary Artery Disease No family hx of      CEREBROVASCULAR DISEASE No family hx of            OBJECTIVE:                                                    /70  Pulse 88  Resp 16  Wt 95.7 kg (211 lb)  SpO2 99%  BMI 31.16 kg/m2  Body mass index is 31.16 kg/(m^2).   APPEARANCE: = Relaxed and in no distress  Conj/Eyelids = noninjected and lids and lashes are without inflammation  PERRLA/Irises = Pupils Round Reactive to Light and Irisis without inflammation  Neck = No anterior or posterior adenopathy appreciated.  Thyroid = Not enlarged and no masses felt  Resp effort = Calm regular breathing  Breath Sounds = Good air movement with no rales or rhonchi in any lung fields  Heart Rate, Rythym, & sounds (no Murm)  = Regular rate and rythym with no S3, S4, or murmer appreciated.  Carotid Art's = Pulses full and equal and no bruits appreciated  Abdomen = Soft, nontender, no masses, & bowel sounds in all quadrants  Liver/Spleen = Normal span and no splenomegaly noted  Digits and Nails = FROM in all finger joints, no nail dystrophy  Ext (edema) = No pretibial edema noted or elsewhere  Musculsktl =  Strength and ROM of major joints are within normal limits  Foot exam = Monofilament exam shows good sensation throughout bilaterally  SKIN = absent significant rashes or lesions   Recent/Remote Memory = Alert and Oriented x 3  Mood/Affect = Cooperative and interested     ASSESSMENT/PLAN:                                                        Syd was seen today for recheck.    Diagnoses and all orders for this visit:    Type 2 diabetes mellitus with diabetic autonomic neuropathy, without long-term current use of insulin (H)  -     Hemoglobin A1C ([facility])  -     Comp. Metabolic Panel (14) ([facility])  -     TSH ([facility])  -     C FOOT EXAM  NO CHARGE    Leg weakness, bilateral  -     PHYSICAL  THERAPY REFERRAL    Gastroesophageal reflux disease without esophagitis    Mixed hyperlipidemia    Benign essential hypertension    Screening for prostate cancer  -     PSA Serum ([facility])    Need for vaccination  -     PNEUMOCOCCAL VACCINE,ADULT,SQ OR IM    Advanced directives, counseling/discussion  -     Full Code    Screening for AAA (abdominal aortic aneurysm)  -     Abdominal Aortic Aneurysm Screening/Tracking        Work on weight loss  Regular exercise  See Patient Instructions    The following health maintenance items are reviewed in Epic and correct as of today:  Health Maintenance   Topic Date Due     HIV SCREEN (SYSTEM ASSIGNED)  [birth year]04/1973     EYE EXAM Q1 YEAR  04/14/2015     COLON CANCER SCREEN (SYSTEM ASSIGNED)  11/20/2016     A1C Q6 MO  12/19/2017     FALL RISK ASSESSMENT  02/04/2018     PNEUMOCOCCAL (2 of 2 - PPSV23) 02/04/2018     AORTIC ANEURYSM SCREENING (SYSTEM ASSIGNED)  02/04/2018     MICROALBUMIN Q1 YEAR  06/19/2018     CREATININE Q1 YEAR  04/03/2019     LIPID MONITORING Q1 YEAR  04/03/2019     FOOT EXAM Q1 YEAR  04/19/2019     TSH W/ FREE T4 REFLEX Q2 YEAR  01/22/2020     ADVANCE DIRECTIVE PLANNING Q5 YRS  04/19/2023     TETANUS IMMUNIZATION (SYSTEM ASSIGNED)  06/14/2023     INFLUENZA VACCINE  Completed     HEPATITIS C SCREENING  Completed       Shan Arenas MD  [facility]  Family Practice  [facility]  [phone]    For any issues my office # is [phone]

## 2018-04-20 LAB
ALBUMIN SERPL-MCNC: 4.4 G/DL (ref 3.6–4.8)
ALBUMIN/GLOB SERPL: 2 {RATIO} (ref 1.2–2.2)
ALP SERPL-CCNC: 118 IU/L (ref 39–117)
ALT SERPL-CCNC: 37 IU/L (ref 0–44)
AST SERPL-CCNC: 37 IU/L (ref 0–40)
BILIRUB SERPL-MCNC: 0.3 MG/DL (ref 0–1.2)
BUN SERPL-MCNC: 18 MG/DL (ref 8–27)
BUN/CREATININE RATIO: 16 (ref 10–24)
CALCIUM SERPL-MCNC: 10.1 MG/DL (ref 8.6–10.2)
CHLORIDE SERPLBLD-SCNC: 94 MMOL/L (ref 96–106)
CREAT SERPL-MCNC: 1.15 MG/DL (ref 0.76–1.27)
EGFR IF AFRICN AM: 77 ML/MIN/1.73
EGFR IF NONAFRICN AM: 66 ML/MIN/1.73
GLOBULIN, TOTAL: 2.2 G/DL (ref 1.5–4.5)
GLUCOSE SERPL-MCNC: 111 MG/DL (ref 65–99)
HBA1C MFR BLD: 5.5 % (ref 4.8–5.6)
POTASSIUM SERPL-SCNC: 4.4 MMOL/L (ref 3.5–5.2)
PROT SERPL-MCNC: 6.6 G/DL (ref 6–8.5)
PSA NG/ML: 0.9 NG/ML (ref 0–4)
SODIUM SERPL-SCNC: 137 MMOL/L (ref 134–144)
TOTAL CO2: 30 MMOL/L (ref 18–28)
TSH BLD-ACNC: 1.84 UIU/ML (ref 0.45–4.5)

## 2018-05-01 ENCOUNTER — HOSPITAL ENCOUNTER (OUTPATIENT)
Dept: PHYSICAL THERAPY | Facility: CLINIC | Age: 63
Setting detail: THERAPIES SERIES
End: 2018-05-01
Attending: FAMILY MEDICINE
Payer: COMMERCIAL

## 2018-05-01 PROCEDURE — 97112 NEUROMUSCULAR REEDUCATION: CPT | Mod: GP | Performed by: PHYSICAL THERAPIST

## 2018-05-01 PROCEDURE — 97110 THERAPEUTIC EXERCISES: CPT | Mod: GP | Performed by: PHYSICAL THERAPIST

## 2018-05-01 PROCEDURE — 40000719 ZZHC STATISTIC PT DEPARTMENT NEURO VISIT: Performed by: PHYSICAL THERAPIST

## 2018-05-01 PROCEDURE — 97161 PT EVAL LOW COMPLEX 20 MIN: CPT | Mod: GP | Performed by: PHYSICAL THERAPIST

## 2018-05-01 ASSESSMENT — 6 MINUTE WALK TEST (6MWT): TOTAL DISTANCE WALKED (FT): 1000

## 2018-05-01 NOTE — PROGRESS NOTES
05/01/18 1000   Quick Adds   Quick Adds Certification   Type of Visit Initial OP PT Evaluation   General Information   Start of Care Date 05/01/18   Referring Physician Shan Arenas MD   Orders Evaluate and Treat as Indicated   Order Date 04/19/18   Medical Diagnosis Leg Weakness, bilateral   Onset of illness/injury or Date of Surgery 06/30/16   Precautions/Limitations no known precautions/limitations   Surgical/Medical history reviewed Yes   Pertinent history of current problem (include personal factors and/or comorbidities that impact the POC) Reports did not do much for rehab right away following lumbar surgery last June. Recovery was complicated by LE lymphedema which is now improving. The client's main complaint is overall LE weakness and deconditioning. The client reports his balance feels off as well. Reports he has been walking more lately. Some minor tightness in low back but minimal pain.    Prior level of function comment Has been off of cane for over 6 months.  Was working in pathology. Started to walk a little more now that the weather is nicer.    Patient role/Employment history Disabled   Living environment House/townhome   Home/Community Accessibility Comments Wants to be able to do more around the house like mowing the lawn   Patient/Family Goals Statement Improve overall strength and endurance, be able [place] walk [place], be able to mow lawn   Fall Risk Screen   Fall screen completed by PT   Have you fallen 2 or more times in the past year? No   Have you fallen and had an injury in the past year? No   Is patient a fall risk? Yes   Fall screen comments Per FGA   Pain   Patient currently in pain No   Cognitive Status Examination   Level of Consciousness alert   Follows Commands and Answers Questions able to follow multistep instructions   Personal Safety and Judgment intact   Memory intact   Posture   Posture Forward head position   Range of Motion (ROM)   ROM Comment Grossly WNL except  bilateral ankle dorsiflexion is limited, bilateral hip internal rotation is limited, bilateral hip extension is limited   Strength   Strength Comments Left ankle PF <3/5, left hip extension 3+/5, left hip abduction 3+/5.  Right LE grossly 4+/5   Bed Mobility   Bed Mobility Comments independent   Transfer Skills   Transfer Comments able to perform sit>stand without use of UEs, independent with all transfers   Gait   Gait Comments Ambulates with decreased step and stride length bilaterally, increased supination with heel strike, toe out throughout gait, limited trunk rotation noted   Gait Special Tests   Gait Special Tests FUNCTIONAL GAIT ASSESSMENT;SIX MINUTE WALK TEST   Gait Special Tests Functional Gait Assessment Score out of 30   Score out of 30 18   Gait Special Tests Six Minute Walk Test   Feet 1000 Feet   Comments HR at 88, exertion at 3/10   Balance Special Tests   Balance Special Tests Sit to stand reps;Single leg stance right;Single leg stance left;Romberg;Sharpened Romberg   Balance Special Tests Single Leg Stance Right,   Right, seconds 0 Seconds   Balance Special Tests Single Leg Stance Left   Left, seconds 0 Seconds   Balance Special Tests Romberg   Seconds 30 Seconds   Comments EO and EC   Balance Special Tests Sharpened Romberg   Seconds 0 Seconds   Balance Special Tests Sit to Stand Reps in 30 Seconds   Reps in 30 seconds 7   Sensory Examination   Sensory Perception Comments not formally assessed today   Muscle Tone   Muscle Tone no deficits were identified   Planned Therapy Interventions   Planned Therapy Interventions balance training;gait training;neuromuscular re-education;ROM;stretching;strengthening   Clinical Impression   Criteria for Skilled Therapeutic Interventions Met yes, treatment indicated   PT Diagnosis Impaired activity tolerance, balance impairment   Influenced by the following impairments Decreased lower extremity strength, impaired activity tolerance, impaired static and dynamic  balance, impaired core strength, LE lymphedema   Functional limitations due to impairments Impaired tolerance for work and ADL activities, impaired ability for community mobility   Clinical Presentation Stable/Uncomplicated   Clinical Decision Making (Complexity) Low complexity   Therapy Frequency 1 time/week  (decreasing to every other week in 3 weeks)   Predicted Duration of Therapy Intervention (days/wks) 90 days   Risk & Benefits of therapy have been explained Yes   Patient, Family & other staff in agreement with plan of care Yes   Education Assessment   Barriers to Learning No barriers   GOALS   PT Eval Goals 1;3;2   Goal 1   Goal Identifier FGA   Goal Description The client will score at least 24/30 on FGA in order to demonstrate improved dynamic balance for decreased fall risk to be able to mow lawn   Target Date 06/29/18   Goal 2   Goal Identifier 6MWT   Goal Description The client will improve at least 70 meters on 6MWT for improved gait speed and tolerance   Target Date 07/29/18   Goal 3   Goal Identifier Walking tolerance   Goal Description The client will improve walking tolerance to at least 30 minutes continuously in order to work towards goal of walking [place] state [place]   Target Date 07/29/18   Total Evaluation Time   Total Evaluation Time (Minutes) 30

## 2018-05-01 NOTE — PROGRESS NOTES
05/01/18 1000   Signing Clinician's Name / Credentials   Signing clinician's name / credentials Kaden Maguire PT   Functional Gait Assessment (KEITH Tello, PAULA Mcdonnell, et al. (2004))   1. GAIT LEVEL SURFACE 2   2. CHANGE IN GAIT SPEED 2   3. GAIT WITH HORIZONTAL HEAD TURNS 3   4. GAIT WITH VERTICAL HEAD TURNS 3   5. GAIT AND PIVOT TURN 2   6. STEP OVER OBSTACLE 2   7. GAIT WITH NARROW BASE OF SUPPORT 0   8. GAIT WITH EYES CLOSED 1   9. AMBULATING BACKWARDS 1   10. STEPS 2   Total Functional Gait Assessment Score   TOTAL SCORE: (MAXIMUM SCORE 30) 18     Functional Gait Assessment (FGA): The FGA assesses postural stability during various walking tasks.   Gait assistive device used: none     Patient Score: 18/30  Scores of <22/30 have been correlated with predicting falls in community-dwelling older adults according to Omer & Ramiro 2010.   Scores of <18/30 have been correlated with increased risk for falls in patients with Parkinsons Disease according to Gildardo Costa, Cartagena et al 2014.  Minimal Detectable Change for patients with acute/chronic stroke = 4.2 according to Thibrijesh & Ritschel 2009  Minimal Detectable Change for patients with vestibular disorder = 8 according to Omer & Ramiro 2010    Assessment (rationale for performing, application to patient s function & care plan): Assess fall risk  Minutes billed as physical performance test: 0

## 2018-05-02 DIAGNOSIS — Z76.0 ENCOUNTER FOR MEDICATION REFILL: ICD-10-CM

## 2018-05-07 RX ORDER — ZOLPIDEM TARTRATE 10 MG/1
TABLET ORAL
Qty: 90 TABLET | Refills: 0 | Status: SHIPPED | OUTPATIENT
Start: 2018-05-07 | End: 2018-06-21

## 2018-05-09 NOTE — PROGRESS NOTES
Dear Syd,   I am writing to report that your included test results are within expected ranges. I do not suggest that we make any changes at this time.    Shan Arenas M.D.

## 2018-05-10 ENCOUNTER — HOSPITAL ENCOUNTER (OUTPATIENT)
Dept: PHYSICAL THERAPY | Facility: CLINIC | Age: 63
Setting detail: THERAPIES SERIES
End: 2018-05-10
Attending: FAMILY MEDICINE
Payer: COMMERCIAL

## 2018-05-10 PROCEDURE — 97110 THERAPEUTIC EXERCISES: CPT | Mod: GP | Performed by: PHYSICAL THERAPIST

## 2018-05-10 PROCEDURE — 40000719 ZZHC STATISTIC PT DEPARTMENT NEURO VISIT: Performed by: PHYSICAL THERAPIST

## 2018-05-11 ENCOUNTER — HOSPITAL ENCOUNTER (OUTPATIENT)
Dept: OCCUPATIONAL THERAPY | Facility: CLINIC | Age: 63
Setting detail: THERAPIES SERIES
End: 2018-05-11
Attending: INTERNAL MEDICINE
Payer: COMMERCIAL

## 2018-05-11 PROCEDURE — 97140 MANUAL THERAPY 1/> REGIONS: CPT | Mod: GO | Performed by: OCCUPATIONAL THERAPIST

## 2018-05-11 PROCEDURE — 40000445 ZZHC STATISTIC OT VISIT, LYMPHEDEMA: Performed by: OCCUPATIONAL THERAPIST

## 2018-05-11 PROCEDURE — 97535 SELF CARE MNGMENT TRAINING: CPT | Mod: GO | Performed by: OCCUPATIONAL THERAPIST

## 2018-05-11 NOTE — PROGRESS NOTES
"   05/11/18 1306   Signing Clinician's Name / Credentials   Signing clinician's name / credentials Cayla Eng, OTR/L CLT-JOHN   Session Number   Session Number 12: [facility]  (DISCHARGE NOTE)   Goal 1   Goal identifier volume   Goal description For decreased risk of infection, skin breakdown/wounds & progressive soft-tissue fibrosis and improved fit of footwear, volume will be reduced by 300 mL in LLE, and 150 mL RLE.   Target date 04/27/18   Date met 04/13/18  (Goal exceeded)   Goal 2   Goal identifier GCB   Goal description To reduce volume of lymphedema and risk of soft tissue fibrosis, pt will tolerate up to 23hr/day wear gradient compression bandaging (GCB) of BLE.   Target date 04/27/18   Date met 04/13/18  (Goal met)   Goal 3   Goal identifier home program   Goal description For long-term home mgmt chronic lymphedema pt/caregiver independent in home program a. GCB/GCB alternative garment for night wear b. compression garment for day wear c. ex to incr lymph flow/self-MLD.   Target date 04/27/18   Date met 05/11/18  (Goal met)   Goal 4   Goal identifier precautions   Goal description Pt will independently verbalize the signs/symptoms of lymphedema, precautions and how to obtain a future lymphedema referral if edema persists to preserve skin integrity, prevent infection and preserve functional mobility.      Target date 04/27/18   Date met 05/11/18  (Goal met)   Subjective Report   Subjective Report \"I've been doing great!   One day I didn't wear my compression socks and I just had a little bit of swelling in the ankles...Getting my strength back, walking around the block.\"   Pt continues to bandage legs at night, \"If I remember!   I missed a few nights I was too tired and fell asleep.\"   Objective Measure 2   Objective Measure BBK volume (BBK = bilateral below knee)   Details vs baseline 4.2.18 RBK decr 389ml & LBK decr 559ml, vs last day intensive CDT RBK decr 57ml & LBK decr 117ml (see " "\"Lymphedema Girth Measurement\" daily flowsheet for details)   System Outcome Measures   Lymphedema Life Impact Scale (score range 0-72). A higher score indicates greater impairment. 2   Therapeutic Procedure/Exercise   Minutes 15 Minutes   Skilled Intervention HP   Patient Response \"My quality of life is a lot better.   Your therapy works!\"   Treatment Detail Pt verbalized + understanding re long-term home mgmt chronic BBK lymphedema and importance of managing lymphedema to decr risk of infection/exacerbation.   Reviewed home program.   Progress Met goals #3 & 4   Manual Therapy   Minutes 15 Minutes   Skilled Intervention Msmts   Patient Response Pt has furthered volume reduction BBK s/p 1 month follow-through with HP.   Treatment Detail BBK msmts (see \"objective measure 1\" above for details)   Plan   Updates to plan of care Discharge Edema Treatment to home program.   Pt to return with new MD order if exacerbation of sxs or problems with HP: pt is in agreement with this POC.   Total Session Time   Timed Code Treatment Minutes 30   Total Treatment Time (sum of timed and untimed services) 30     "

## 2018-05-14 ENCOUNTER — HOSPITAL ENCOUNTER (OUTPATIENT)
Dept: PHYSICAL THERAPY | Facility: CLINIC | Age: 63
Setting detail: THERAPIES SERIES
End: 2018-05-14
Attending: FAMILY MEDICINE
Payer: COMMERCIAL

## 2018-05-14 DIAGNOSIS — R26.89 LOSS OF BALANCE: ICD-10-CM

## 2018-05-14 DIAGNOSIS — Z98.1 S/P LUMBAR SPINAL FUSION: ICD-10-CM

## 2018-05-14 DIAGNOSIS — R29.898 LEG WEAKNESS, BILATERAL: ICD-10-CM

## 2018-05-14 PROCEDURE — 97112 NEUROMUSCULAR REEDUCATION: CPT | Mod: GP | Performed by: PHYSICAL THERAPIST

## 2018-05-14 PROCEDURE — 97110 THERAPEUTIC EXERCISES: CPT | Mod: GP | Performed by: PHYSICAL THERAPIST

## 2018-05-14 PROCEDURE — 40000719 ZZHC STATISTIC PT DEPARTMENT NEURO VISIT: Performed by: PHYSICAL THERAPIST

## 2018-05-18 ENCOUNTER — THERAPY VISIT (OUTPATIENT)
Dept: SLEEP MEDICINE | Facility: CLINIC | Age: 63
End: 2018-05-18
Payer: COMMERCIAL

## 2018-05-18 DIAGNOSIS — R06.83 SNORING: ICD-10-CM

## 2018-05-18 DIAGNOSIS — I10 BENIGN ESSENTIAL HYPERTENSION: ICD-10-CM

## 2018-05-18 PROCEDURE — 95810 POLYSOM 6/> YRS 4/> PARAM: CPT | Performed by: PSYCHIATRY & NEUROLOGY

## 2018-05-22 LAB — SLPCOMP: NORMAL

## 2018-05-22 NOTE — PROCEDURES
" SLEEP STUDY INTERPRETATION  DIAGNOSTIC POLYSOMNOGRAPHY REPORT      Patient: SYD SILVEIRA  YOB: 1955  Study Date: 5/18/2018  MRN: [id number]  Referring Provider: Syd Kingston.MD  Ordering Provider: MD Carney Michael    Indications for Polysomnography: The patient is a 63 y old Male who is 5' 9\" and weighs 220.0 lbs. His BMI is 32.6, [place] sleepiness scale 1.0 and neck circumference is 45.0 cm. Relevant medical history includes obesity, intractable hypertension, HLD, T2DM, peripheral edema,  and chronic lower back pain, s/p lumbar laminectomy and fusion. He was referred by his cardiologist to undergo a diagnostic polysomnogram to evaluate for sleep apnea, due to symptoms of snoring and recalcitrant hypertension.    Polysomnogram Data: A full night polysomnogram recorded the standard physiologic parameters including EEG, EOG, EMG, ECG, nasal and oral airflow. Respiratory parameters of chest and abdominal movements were recorded with respiratory inductance plethysmography. Oxygen saturation was recorded by pulse oximetry. Hypopnea scoring rule used: 1B 4%.    Sleep Architecture: This study demonstrated prolonged sleep latency (30.4 min), with inefficient sleep (63.8%). This was a good study that included REM supine.     The total recording time of the polysomnogram was 401.0 minutes. The total sleep time was 256.0 minutes. Sleep latency was increased at 30.4 minutes with the use of a sleep aid (zolpidem). REM latency was 112.0 minutes. Arousal index was normal at 15.7 arousals per hour. Sleep efficiency was decreased at 63.8%. Wake after sleep onset was 109.5 minutes. The patient spent 11.3% of total sleep time in Stage N1, 36.9% in Stage N2, 17.8% in Stage N3, and 34.0% in REM. Time in REM supine was 37.0 minutes.    Respiration: Sleep-disordered breathing was noted, with increased Apnea-Hypopnea Index (AHI) of 21.8 along with O2 desaturations associated with the obstructive events. Obstructive apneas " occurred exclusively during REM, most frequently during REM supine.  The patient did not meet lab criteria for a therapeutic PAP trial.     Events ? The polysomnogram revealed a presence of 15 obstructive, - central, and - mixed apneas resulting in an apnea index of 3.5 events per hour. There were 78 obstructive hypopneas and - central hypopneas resulting in an obstructive hypopnea index of 18.3 and central hypopnea index of - events per hour. The combined apnea/hypopnea index was 21.8 events per hour (central apnea/hypopnea index was - events per hour). The REM AHI was 42.1 events per hour. The supine AHI was 23.2 events per hour. The RERA index was 0.7 events per hour.  The RDI was 22.5 events per hour.    Snoring - was reported as mild to moderate.    Respiratory rate and pattern - was notable for normal respiratory rate and pattern.    Sustained Sleep Associated Hypoventilation - Transcutaneous carbon dioxide monitoring was not used.    Sleep Associated Hypoxemia - (Greater than 5 minutes O2 sat at or below 88%) was present. Baseline oxygen saturation was 89.8%. Lowest oxygen saturation was 63.5%. Time spent less than or equal to 88% was 82.1 minutes. Time spent less than or equal to 89% was 114.9 minutes.    Movement Activity: Rare transient muscle activity was present during REM. This was not associated with abnormal sleep-related behaviors.    Periodic Limb Activity - There were 10 PLMs during the entire study. The PLM index was 2.3 movements per hour. The PLM Arousal Index was - per hour.    REM EMG Activity - Excessive transient muscle activity was present (mild).    Nocturnal Behavior - Abnormal sleep related behaviors were not noted during NREM or REM sleep.    Bruxism - None apparent.    Cardiac Summary: Predominantly narrow QRS complexes preceded by P waves, suggestive of normal sinus rhythm throughout all sleep stages.   The average pulse rate was 78.4 bpm. The minimum pulse rate was 66.4 bpm while the  maximum pulse rate was 95.2 bpm.        Assessment:     This study demonstrated prolonged sleep latency (30.4 min), with inefficient sleep (63.8%). This was a good study that included REM supine.    Sleep-disordered breathing was noted, with increased Apnea-Hypopnea Index (AHI) of 21.8 along with O2 desaturations associated with the obstructive events. Obstructive apneas occurred exclusively during REM, most frequently during REM supine.  The patient did not meet lab criteria for a therapeutic PAP trial.     Rare transient muscle activity was present during REM. This was not associated with abnormal sleep-related behaviors.    Predominantly narrow QRS complexes preceded by P waves, suggestive of normal sinus rhythm throughout all sleep stages.    Recommendations:    Treatment options for moderate, predominantly supine MARTELL include: auto titration CPAP, dental appliance, or upper airway surgery.  o Based on the presence of moderate obstructive sleep apnea, treatment could be empirically initiated with Auto?titrating PAP therapy. Recommend clinical follow up with sleep management team.  o Patient may be a candidate for dental appliance through referral to Sleep Dentistry for the treatment of obstructive sleep apnea and/or socially disruptive snoring.  o If devices are not acceptable or effective, patient may benefit from evaluation of possible surgical options. If he is interested, would recommend referral to specialized ENT-Sleep provider.    Advise regarding the risks of drowsy driving.    Suggest optimizing sleep schedule and avoiding sleep deprivation.    Weight management    Increased REM motor activity was noted however dream enactment was not. Of note increased REM tone is not unusual in the setting of sleep disordered breathing. Recommend screening the patient for dream enactment behavior once sleep disordered breathing is effectively treated and if the behaviors persist recommend referral to [facility]  clinic.      Diagnostic Codes:   Obstructive Sleep Apnea G47.33  Sleep Hypoxemia/Hypoventilation G47.36   Unspecified Sleep Disturbance G47.9    5/18/2018 [facility] Study (220.0 lbs) - AHI 21.8, RDI 22.5, Supine AHI 23.2, REM AHI 42.1, Low O2 63.5%, Time Spent ?88% 82.1 minutes / Time Spent ?89% 114.9 minutes.    Syd Caceres MD  Clinical Epilepsy Fellow    Attending MD: PSG was personally reviewed in detail with the Sleep Medicine Fellow.  The above interpretation reflects our joint assessment and recommendations.       _____________________________________   Electronically Signed By: Parth Carney MD 5-22-18

## 2018-05-29 ENCOUNTER — HOSPITAL ENCOUNTER (OUTPATIENT)
Dept: PHYSICAL THERAPY | Facility: CLINIC | Age: 63
Setting detail: THERAPIES SERIES
End: 2018-05-29
Attending: FAMILY MEDICINE
Payer: COMMERCIAL

## 2018-05-29 PROCEDURE — 40000719 ZZHC STATISTIC PT DEPARTMENT NEURO VISIT: Performed by: PHYSICAL THERAPIST

## 2018-05-29 PROCEDURE — 97110 THERAPEUTIC EXERCISES: CPT | Mod: GP | Performed by: PHYSICAL THERAPIST

## 2018-05-29 PROCEDURE — 97112 NEUROMUSCULAR REEDUCATION: CPT | Mod: GP | Performed by: PHYSICAL THERAPIST

## 2018-05-30 ENCOUNTER — OFFICE VISIT (OUTPATIENT)
Dept: SLEEP MEDICINE | Facility: CLINIC | Age: 63
End: 2018-05-30
Payer: COMMERCIAL

## 2018-05-30 VITALS
RESPIRATION RATE: 16 BRPM | DIASTOLIC BLOOD PRESSURE: 71 MMHG | HEIGHT: 69 IN | HEART RATE: 91 BPM | SYSTOLIC BLOOD PRESSURE: 120 MMHG | BODY MASS INDEX: 31.7 KG/M2 | WEIGHT: 214 LBS | OXYGEN SATURATION: 95 %

## 2018-05-30 DIAGNOSIS — G47.34 NOCTURNAL HYPOXEMIA: ICD-10-CM

## 2018-05-30 DIAGNOSIS — G47.33 OSA (OBSTRUCTIVE SLEEP APNEA): Primary | ICD-10-CM

## 2018-05-30 PROCEDURE — 99214 OFFICE O/P EST MOD 30 MIN: CPT | Performed by: PHYSICIAN ASSISTANT

## 2018-05-30 NOTE — PROGRESS NOTES
Sleep Study Follow-Up Visit:    Date on this visit: 5/30/2018    Syd Joyce comes in today for follow-up of his sleep study done on 5/18/2018 at the [facility] at the request of his cardiologist for concerns of apnea. Eddie feels he sleeps great. He does snore. His medical history is significant for HTN, DM 2, GERD, hyperlipidemia, peripheral edema.    Sleep latency 30.4 minutes with Ambien.  REM achieved.   REM latency 112 minutes.  Sleep efficiency 63.8%. Total sleep time 256 minutes.    Sleep architecture:  Stage 1, 11.3% (5%), stage 2, 36.9% (45-55%), stage 3, 17.8% (15-20%), stage REM, 34% (20-25%).  AHI was 21.8/hr, with desaturations down to 64%. He spent 114.9 minutes below 90% SpO2 and 82.1 minutes below 89%. His oxygen baseline dropped into the mid 80's both times he went into deep sleep. His O2 baseline was right around 90% in other stages. RDI 22.5/hr.  REM RDI 43.4/hr, consistent with severe REM MARTELL.  Supine RDI 24.5/hr, consistent with moderate SUPINE MARTELL. His non-supine RDI was 21.3/hr.  Periodic Limb Movement Index 2.3/hour. There was rare transient muscle activity in REM sleep but no abnormal behaviors.       CPAP titration:  CPAP was not initiated. These findings were reviewed with the patient.    Past medical/surgical history, family history, social history, medications and allergies were reviewed.      Problem List:  Patient Active Problem List    Diagnosis Date Noted     Leg weakness, bilateral 10/04/2017     Priority: Medium     Loss of balance 10/04/2017     Priority: Medium     S/P lumbar spinal fusion 10/04/2017     Priority: Medium     S/P lumbar laminectomy 06/30/2017     Priority: Medium     Type 2 diabetes mellitus with hyperlipidemia (H) 06/19/2017     Priority: Medium     Type 2 diabetes mellitus with diabetic autonomic neuropathy, without long-term current use of insulin (H) 10/20/2016     Priority: Medium     Mostly in the medial lower leg.       Insomnia  09/11/2013     Priority: Medium     Esophageal reflux 06/15/2012     Priority: Medium     Advanced directives, counseling/discussion 06/15/2012     Priority: Medium     As is reasonable care for Most of us, wants in the Short term aggressive care.   No desire for long term prolongation of life through artificial means if no hope to bring back to a reasonable status.          Benign essential hypertension 08/12/2011     Priority: Medium     Mixed hyperlipidemia 08/12/2011     Priority: Medium        Impression/Plan:    (G47.33) MARTELL (obstructive sleep apnea)  (primary encounter diagnosis), (G47.34) Nocturnal hypoxemia  Comment: Moderate MARTELL, AHI 21/hr, independent of position, worse in REM. Significant hypoxemia. Low oxygen baseline in stage N3, possibly an artifact, but seems true since it happened both times that he was in stage N3.  Plan: Comprehensive DME, Overnight oximetry study        Auto CPAP 5-15 cm, heated humidifier and compliance meter. The patient was informed of the mask exchange policy and the compliance goals. They were also informed that they would be followed by the sleep therapy management team during the first month of CPAP use. An order was placed for oximetry on CPAP to assess for residual hypoxemia.      He will follow up with me in about 2 month(s).     Twenty-five minutes spent with patient, all of which were spent face-to-face counseling, consulting, coordinating plan of care.      Bennett Goltz, PA-C    CC: Shan Arenas MD

## 2018-05-30 NOTE — NURSING NOTE
"Chief Complaint   Patient presents with     Study Results     Follow up psg       Initial /71  Pulse 91  Resp 16  Ht 1.753 m (5' 9\")  Wt 97.1 kg (214 lb)  SpO2 95%  BMI 31.6 kg/m2 Estimated body mass index is 31.6 kg/(m^2) as calculated from the following:    Height as of this encounter: 1.753 m (5' 9\").    Weight as of this encounter: 97.1 kg (214 lb).    Medication Reconciliation: complete    ESS 0    Clara Downing MA    "

## 2018-05-30 NOTE — MR AVS SNAPSHOT
After Visit Summary   5/30/2018    Syd Joyce    MRN: [id number]           Patient Information     Date Of Birth          1955        Visit Information        Provider Department      5/30/2018 1:30 PM Goltz, Bennett Ezra, PA-C [facility]        Today's Diagnoses     MARTELL (obstructive sleep apnea)    -  1    Nocturnal hypoxemia          Care Instructions    You will be provided with an auto-titrating CPAP with a pressure range of 5-15 cm with heated humidity to limit nasal congestion. Adjust the heat level on humidifier to find a setting that prevents dry nose but does not cause condensation in the hose or mask. Use distilled water in the humidifier.  The CPAP has a ramp function that starts the pressure lower than your prescribed pressure and gradually increases it over a number of minutes.  This may make it easier to fall asleep.    Try to use the CPAP every-night, all night (minimum of 4 hours). Many insurances require that we prove you are using the CPAP at least 4 hours on at least 70% of nights over a 30 day period. We have 90 days to meet those criteria.            Discussed weight management and the impact of weight gain on sleep apnea.  Let me know if you snore or feel the pressure is too high.    You can get new supplies (mask, hose and filter) for your CPAP every 3-6 months, covered by insurance. You do not need to get supplies that often, but they are available if you would like them.  You may exchange the mask once within the first month if you feel the initial mask does not fit well.  Contact your medical equipment provider for equipment issues.  Please let me know if you have any return of snoring, daytime sleepiness or poor sleep quality. We will want to make sure your CPAP is adequately treating your apnea.  There is a website called CPAP.com that has accessories that may make CPAP use easier. Please visit it at your convenience.  Our phone number is  [phone].    Follow-up 2 month.  Bring your CPAP machine with you to the follow up appointment.    Your BMI is Body mass index is 31.6 kg/(m^2).  Weight management is a personal decision.  If you are interested in exploring weight loss strategies, the following discussion covers the approaches that may be successful. Body mass index (BMI) is one way to tell whether you are at a healthy weight, overweight, or obese. It measures your weight in relation to your height.  A BMI of 18.5 to 24.9 is in the healthy range. A person with a BMI of 25 to 29.9 is considered overweight, and someone with a BMI of 30 or greater is considered obese. More than two-thirds of American adults are considered overweight or obese.  Being overweight or obese increases the risk for further weight gain. Excess weight may lead to heart disease and diabetes.  Creating and following plans for healthy eating and physical activity may help you improve your health.  Weight control is part of healthy lifestyle and includes exercise, emotional health, and healthy eating habits. Careful eating habits lifelong are the mainstay of weight control. Though there are significant health benefits from weight loss, long-term weight loss with diet alone may be very difficult to achieve- studies show long-term success with dietary management in less than 10% of people. Attaining a healthy weight may be especially difficult to achieve in those with severe obesity. In some cases, medications, devices and surgical management might be considered.  What can you do?  If you are overweight or obese and are interested in methods for weight loss, you should discuss this with your provider.     Consider reducing daily calorie intake by 500 calories.     Keep a food journal.     Avoiding skipping meals, consider cutting portions instead.    Diet combined with exercise helps maintain muscle while optimizing fat loss. Strength training is particularly important for  building and maintaining muscle mass. Exercise helps reduce stress, increase energy, and improves fitness. Increasing exercise without diet control, however, may not burn enough calories to loose weight.       Start walking three days a week 10-20 minutes at a time    Work towards walking thirty minutes five days a week     Eventually, increase the speed of your walking for 1-2 minutes at time    In addition, we recommend that you review healthy lifestyles and methods for weight loss available through the National Institutes of Health patient information sites:  http://win.niddk.nih.gov/publications/index.htm    And look into health and wellness programs that may be available through your health insurance provider, employer, local community center, or delma club.    Weight management plan: Patient was referred to their PCP to discuss a diet and exercise plan.              Follow-ups after your visit        Your next 10 appointments already scheduled     Jun 11, 2018 11:45 AM CDT   Neuro Treatment with Danisha Lopez, PT   [facility] ([facility])    [address] [place] [address] [address]  [facility] [address]   [phone]            Jun 21, 2018 12:00 PM CDT   SHORT with Shan Arenas MD   [facility]    [address] [place] [address] [phone]            Jun 25, 2018 11:45 AM CDT   Neuro Treatment with Danisha Lopez, PT   [facility] ([facility])    [address] [place] [address] [address]  [facility] [address]   [phone]            Jul 09, 2018  1:30 PM CDT   Neuro Treatment with Danisha Lopez, PT   [facility] ([facility])    [address] [place] [address] [address]  [facility] [address]   [phone]              Future tests that were ordered for you today     Open Future Orders        Priority Expected Expires Ordered    Overnight oximetry study Routine  11/26/2018 5/30/2018     "        Who to contact     If you have questions or need follow up information about today's clinic visit or your schedule please contact [facility] directly at [phone].  Normal or non-critical lab and imaging results will be communicated to you by MyChart, letter or phone within 4 business days after the clinic has received the results. If you do not hear from us within 7 days, please contact the clinic through MyChart or phone. If you have a critical or abnormal lab result, we will notify you by phone as soon as possible.  Submit refill requests through [employer] or call your pharmacy and they will forward the refill request to us. Please allow 3 business days for your refill to be completed.          Additional Information About Your Visit        [employer] Information     [employer] gives you secure access to your electronic health record. If you see a primary care provider, you can also send messages to your care team and make appointments. If you have questions, please call your primary care clinic.  If you do not have a primary care provider, please call [phone] and they will assist you.        Care EveryWhere ID     This is your Care EveryWhere ID. This could be used by other organizations to access your [facility] medical records  [id number]        Your Vitals Were     Pulse Respirations Height Pulse Oximetry BMI (Body Mass Index)       91 16 1.753 m (5' 9\") 95% 31.6 kg/m2        Blood Pressure from Last 3 Encounters:   05/30/18 120/71   04/19/18 132/70   04/03/18 148/77    Weight from Last 3 Encounters:   05/30/18 97.1 kg (214 lb)   04/19/18 95.7 kg (211 lb)   04/03/18 97.1 kg (214 lb)              We Performed the Following     Comprehensive DME        Primary Care Provider Office Phone # Fax #    Shan Arenas [phone] [address]  [facility] [address]        Equal Access to Services     GAIL GRIFFITHS AH: steven Amos " brenna nixontimbo budjose cabrera. So [facility] [phone].    ATENCIÓN: Si blake willoughby, tiene a persaud disposición servicios gratuitos de asistencia lingüística. Amanda al [phone].    We comply with applicable federal civil rights laws and [place] laws. We do not discriminate on the basis of race, color, national origin, age, disability, sex, sexual orientation, or gender identity.            Thank you!     Thank you for choosing [facility]  for your care. Our goal is always to provide you with excellent care. Hearing back from our patients is one way we can continue to improve our services. Please take a few minutes to complete the written survey that you may receive in the mail after your visit with us. Thank you!             Your Updated Medication List - Protect others around you: Learn how to safely use, store and throw away your medicines at www.disposemymeds.org.          This list is accurate as of 5/30/18  2:06 PM.  Always use your most recent med list.                   Brand Name Dispense Instructions for use Diagnosis    amLODIPine 10 MG tablet    NORVASC    90 tablet    TAKE 1 TABLET(10 MG) BY MOUTH DAILY    Essential hypertension with goal blood pressure less than 130/80       aspirin 81 MG EC tablet     90 tablet    Take 1 tablet (81 mg) by mouth daily Start tomorrow morning.    Atherosclerosis of native coronary artery of native heart without angina pectoris       blood glucose monitoring meter device kit     1 kit    Use to test blood sugar 4 to 5 times weekly or as directed.    Refill clinic medication management patient       blood glucose monitoring test strip    ACCU-CHEK MED PLUS    150 each    USE 4 TO 5 TIMES PER WEEK OR AS DIRECTED    Refill clinic medication management patient       carvedilol 25 MG tablet    COREG    360 tablet    Take 2 tablets (50 mg) by mouth 2 times daily Hold IF heart rate less than 55.         cyclobenzaprine 10 MG tablet    FLEXERIL    90 tablet    Take 0.5-1 tablets (5-10 mg) by mouth 3 times daily as needed for muscle spasms    S/P lumbar laminectomy       lisinopril-hydrochlorothiazide 20-12.5 MG per tablet    PRINZIDE/ZESTORETIC    90 tablet    TAKE 1 TABLET BY MOUTH DAILY    Essential hypertension with goal blood pressure less than 130/80       losartan 100 MG tablet    COZAAR    90 tablet    TAKE 1 TABLET BY MOUTH EVERY DAY    Essential hypertension with goal blood pressure less than 130/80       metFORMIN 500 MG 24 hr tablet    GLUCOPHAGE-XR    180 tablet    TAKE 2 TABLETS BY MOUTH EVERY MORNING    Type 2 diabetes mellitus with diabetic autonomic neuropathy, without long-term current use of insulin (H)       pantoprazole 40 MG EC tablet    PROTONIX    90 tablet    TAKE 1 TABLET(40 MG) BY MOUTH DAILY    Encounter for medication refill       simvastatin 20 MG tablet    ZOCOR    90 tablet    Take 1 tablet (20 mg) by mouth every evening        TRADJENTA 5 MG Tabs tablet   Generic drug:  linagliptin     90 tablet    TAKE 1 TABLET(5 MG) BY MOUTH DAILY    Type 2 diabetes mellitus with diabetic autonomic neuropathy, without long-term current use of insulin (H)       TUMS PO      Take 2 chew tab by mouth daily (with dinner)        zolpidem 10 MG tablet    AMBIEN    90 tablet    TAKE 1 TABLET BY MOUTH EVERY NIGHT AT BEDTIME    Encounter for medication refill

## 2018-05-30 NOTE — PATIENT INSTRUCTIONS
You will be provided with an auto-titrating CPAP with a pressure range of 5-15 cm with heated humidity to limit nasal congestion. Adjust the heat level on humidifier to find a setting that prevents dry nose but does not cause condensation in the hose or mask. Use distilled water in the humidifier.  The CPAP has a ramp function that starts the pressure lower than your prescribed pressure and gradually increases it over a number of minutes.  This may make it easier to fall asleep.    Try to use the CPAP every-night, all night (minimum of 4 hours). Many insurances require that we prove you are using the CPAP at least 4 hours on at least 70% of nights over a 30 day period. We have 90 days to meet those criteria.            Discussed weight management and the impact of weight gain on sleep apnea.  Let me know if you snore or feel the pressure is too high.    You can get new supplies (mask, hose and filter) for your CPAP every 3-6 months, covered by insurance. You do not need to get supplies that often, but they are available if you would like them.  You may exchange the mask once within the first month if you feel the initial mask does not fit well.  Contact your medical equipment provider for equipment issues.  Please let me know if you have any return of snoring, daytime sleepiness or poor sleep quality. We will want to make sure your CPAP is adequately treating your apnea.  There is a website called CPAP.com that has accessories that may make CPAP use easier. Please visit it at your convenience.  Our phone number is [phone].    Follow-up 2 month.  Bring your CPAP machine with you to the follow up appointment.    Your BMI is Body mass index is 31.6 kg/(m^2).  Weight management is a personal decision.  If you are interested in exploring weight loss strategies, the following discussion covers the approaches that may be successful. Body mass index (BMI) is one way to tell whether you are at a healthy weight,  overweight, or obese. It measures your weight in relation to your height.  A BMI of 18.5 to 24.9 is in the healthy range. A person with a BMI of 25 to 29.9 is considered overweight, and someone with a BMI of 30 or greater is considered obese. More than two-thirds of American adults are considered overweight or obese.  Being overweight or obese increases the risk for further weight gain. Excess weight may lead to heart disease and diabetes.  Creating and following plans for healthy eating and physical activity may help you improve your health.  Weight control is part of healthy lifestyle and includes exercise, emotional health, and healthy eating habits. Careful eating habits lifelong are the mainstay of weight control. Though there are significant health benefits from weight loss, long-term weight loss with diet alone may be very difficult to achieve- studies show long-term success with dietary management in less than 10% of people. Attaining a healthy weight may be especially difficult to achieve in those with severe obesity. In some cases, medications, devices and surgical management might be considered.  What can you do?  If you are overweight or obese and are interested in methods for weight loss, you should discuss this with your provider.     Consider reducing daily calorie intake by 500 calories.     Keep a food journal.     Avoiding skipping meals, consider cutting portions instead.    Diet combined with exercise helps maintain muscle while optimizing fat loss. Strength training is particularly important for building and maintaining muscle mass. Exercise helps reduce stress, increase energy, and improves fitness. Increasing exercise without diet control, however, may not burn enough calories to loose weight.       Start walking three days a week 10-20 minutes at a time    Work towards walking thirty minutes five days a week     Eventually, increase the speed of your walking for 1-2 minutes at time    In  addition, we recommend that you review healthy lifestyles and methods for weight loss available through the National Institutes of Health patient information sites:  http://win.niddk.nih.gov/publications/index.htm    And look into health and wellness programs that may be available through your health insurance provider, employer, local community center, or delma club.    Weight management plan: Patient was referred to their PCP to discuss a diet and exercise plan.

## 2018-06-04 ENCOUNTER — TELEPHONE (OUTPATIENT)
Dept: SLEEP MEDICINE | Facility: CLINIC | Age: 63
End: 2018-06-04

## 2018-06-11 ENCOUNTER — HOSPITAL ENCOUNTER (OUTPATIENT)
Dept: PHYSICAL THERAPY | Facility: CLINIC | Age: 63
Setting detail: THERAPIES SERIES
End: 2018-06-11
Attending: FAMILY MEDICINE
Payer: COMMERCIAL

## 2018-06-11 ENCOUNTER — TELEPHONE (OUTPATIENT)
Dept: FAMILY MEDICINE | Facility: CLINIC | Age: 63
End: 2018-06-11

## 2018-06-11 PROCEDURE — 97110 THERAPEUTIC EXERCISES: CPT | Mod: GP | Performed by: PHYSICAL THERAPIST

## 2018-06-11 PROCEDURE — 97112 NEUROMUSCULAR REEDUCATION: CPT | Mod: GP | Performed by: PHYSICAL THERAPIST

## 2018-06-11 PROCEDURE — 40000719 ZZHC STATISTIC PT DEPARTMENT NEURO VISIT: Performed by: PHYSICAL THERAPIST

## 2018-06-11 NOTE — TELEPHONE ENCOUNTER
Received fax from  stating patients PA for Zolpidem was approved.  Approval dates 05/12/2018-06/10/2021.  Called [facility] to inform them of approval.

## 2018-06-13 ENCOUNTER — TELEPHONE (OUTPATIENT)
Dept: SLEEP MEDICINE | Facility: CLINIC | Age: 63
End: 2018-06-13

## 2018-06-21 ENCOUNTER — OFFICE VISIT (OUTPATIENT)
Dept: FAMILY MEDICINE | Facility: CLINIC | Age: 63
End: 2018-06-21

## 2018-06-21 VITALS
SYSTOLIC BLOOD PRESSURE: 120 MMHG | DIASTOLIC BLOOD PRESSURE: 70 MMHG | RESPIRATION RATE: 12 BRPM | BODY MASS INDEX: 31.25 KG/M2 | HEART RATE: 64 BPM | WEIGHT: 211.6 LBS

## 2018-06-21 DIAGNOSIS — Z98.890 S/P LUMBAR LAMINECTOMY: ICD-10-CM

## 2018-06-21 DIAGNOSIS — G47.33 OSA (OBSTRUCTIVE SLEEP APNEA): ICD-10-CM

## 2018-06-21 DIAGNOSIS — Z76.0 ENCOUNTER FOR MEDICATION REFILL: ICD-10-CM

## 2018-06-21 DIAGNOSIS — E11.43 TYPE 2 DIABETES MELLITUS WITH DIABETIC AUTONOMIC NEUROPATHY, WITHOUT LONG-TERM CURRENT USE OF INSULIN (H): ICD-10-CM

## 2018-06-21 DIAGNOSIS — R29.898 LEG WEAKNESS, BILATERAL: Primary | ICD-10-CM

## 2018-06-21 DIAGNOSIS — N52.9 ERECTILE DYSFUNCTION, UNSPECIFIED ERECTILE DYSFUNCTION TYPE: ICD-10-CM

## 2018-06-21 PROCEDURE — 82044 UR ALBUMIN SEMIQUANTITATIVE: CPT | Performed by: FAMILY MEDICINE

## 2018-06-21 PROCEDURE — 82570 ASSAY OF URINE CREATININE: CPT | Performed by: FAMILY MEDICINE

## 2018-06-21 PROCEDURE — 99214 OFFICE O/P EST MOD 30 MIN: CPT | Performed by: FAMILY MEDICINE

## 2018-06-21 RX ORDER — SILDENAFIL CITRATE 20 MG/1
TABLET ORAL
Qty: 45 TABLET | Refills: 1 | Status: SHIPPED | OUTPATIENT
Start: 2018-06-21 | End: 2018-06-25

## 2018-06-21 RX ORDER — CYCLOBENZAPRINE HCL 10 MG
5-10 TABLET ORAL 3 TIMES DAILY PRN
Qty: 90 TABLET | Refills: 1 | Status: SHIPPED | OUTPATIENT
Start: 2018-06-21 | End: 2019-04-16

## 2018-06-21 RX ORDER — ZOLPIDEM TARTRATE 10 MG/1
10 TABLET ORAL AT BEDTIME
Qty: 30 TABLET | Refills: 5 | Status: SHIPPED | OUTPATIENT
Start: 2018-06-21 | End: 2018-07-27

## 2018-06-21 NOTE — PROGRESS NOTES
Problem(s) Oriented visit        SUBJECTIVE:                                                    Syd Joyce is a 63 year old male who presents to clinic today for the following health issues :        1. S/P lumbar laminectomy  Get occ spasm  - cyclobenzaprine (FLEXERIL) 10 MG tablet; Take 0.5-1 tablets (5-10 mg) by mouth 3 times daily as needed for muscle spasms  Dispense: 90 tablet; Refill: 1    2. MARTELL (obstructive sleep apnea)  Reports restless sleep, reports onogin daytime somnolence, does not wake up refreshed from a night's sleep.  More restless sleep in general than before.  Reports snoring.    - zolpidem (AMBIEN) 10 MG tablet; Take 1 tablet (10 mg) by mouth At Bedtime  Dispense: 30 tablet; Refill: 5    3. Leg weakness, bilateral  Improving, was due to lymphedema and inactivity    4. Encounter for medication refill      5. Erectile dysfunction, unspecified erectile dysfunction type  Pt. reports difficulty achieving and maintaining erections.  Denies other specific  complaints.  Has not tried VIAGRA or similar medication.  Is interested in trying VIAGRA.    - sildenafil (REVATIO) 20 MG tablet; Take2-4 tablet (20 mg) by mouth as needed.  Never use with nitroglycerin, terazosin or doxazosin.  Dispense: 45 tablet; Refill: 1    6. Type 2 diabetes mellitus with diabetic autonomic neuropathy, without long-term current use of insulin (H)  Lab Results   Component Value Date    A1C 5.5 04/19/2018    A1C 6.8 06/19/2017    A1C 5.5 10/17/2016    A1C 5.3 09/21/2015    A1C 5.7 11/24/2014       - Microalbumin (RMG)         Problem list, Medication list, Allergies, and Medical/Social/Surgical histories reviewed in [facility] and updated as appropriate.   Additional history: as documented    ROS:  General and CV completed and negative except as noted above    Histories:   Patient Active Problem List   Diagnosis     Benign essential hypertension     Mixed hyperlipidemia     Esophageal reflux     Advanced directives,  counseling/discussion     Insomnia     Type 2 diabetes mellitus with diabetic autonomic neuropathy, without long-term current use of insulin (H)     Type 2 diabetes mellitus with hyperlipidemia (H)     S/P lumbar laminectomy     Leg weakness, bilateral     Loss of balance     S/P lumbar spinal fusion     MARTELL (obstructive sleep apnea)     Past Surgical History:   Procedure Laterality Date     COLONOSCOPY       DISCECTOMY LUMBAR POSTERIOR MICROSCOPIC TWO LEVELS N/A 6/30/2017    Procedure: DISCECTOMY LUMBAR POSTERIOR MICROSCOPIC TWO LEVELS;  L3-4 L4-5 POSTERIOR DECOMPRESSION AND INTERSPINUS FIXATION WITHOUT FUSION;  Surgeon: Ray Perez MD;  Location:  OR     [facility]         Social History   Substance Use Topics     Smoking status: Never Smoker     Smokeless tobacco: Current User     Alcohol use Yes      Comment: RARELY     Family History   Problem Relation Age of Onset     Diabetes No family hx of      Coronary Artery Disease No family hx of      Cerebrovascular Disease No family hx of            OBJECTIVE:                                                    /70  Pulse 64  Resp 12  Wt 96 kg (211 lb 9.6 oz)  BMI 31.25 kg/m2  Body mass index is 31.25 kg/(m^2).   APPEARANCE: = Relaxed and in no distress  Conj/Eyelids = noninjected and lids and lashes are without inflammation  PERRLA/Irises = Pupils Round Reactive to Light and Irisis without inflammation  Neck = No anterior or posterior adenopathy appreciated.  Thyroid = Not enlarged and no masses felt  Resp effort = Calm regular breathing  Breath Sounds = Good air movement with no rales or rhonchi in any lung fields  Heart Rate, Rhythm, & sounds (no Murm)  = Regular rate and rhythm with no S3, S4, or murmur appreciated.  Carotid Art's = Pulses full and equal and no bruits appreciated  Abdomen = Soft, nontender, no masses, & bowel sounds in all quadrants  Liver/Spleen = Normal span and no splenomegaly noted  Digits and Nails = FROM in all finger  joints, no nail dystrophy  Ext (edema) = No pretibial edema noted or elsewhere  Musculsktl =  Strength and ROM of major joints are within normal limits  SKIN = absent significant rashes or lesions   Recent/Remote Memory = Alert and Oriented x 3  Mood/Affect = Cooperative and interested     ASSESSMENT/PLAN:                                                        Syd was seen today for hypertension, recheck medication, back pain and refill request.    Diagnoses and all orders for this visit:    Leg weakness, bilateral    S/P lumbar laminectomy  -     cyclobenzaprine (FLEXERIL) 10 MG tablet; Take 0.5-1 tablets (5-10 mg) by mouth 3 times daily as needed for muscle spasms    MARTELL (obstructive sleep apnea)  -     zolpidem (AMBIEN) 10 MG tablet; Take 1 tablet (10 mg) by mouth At Bedtime    Encounter for medication refill    Erectile dysfunction, unspecified erectile dysfunction type  -     sildenafil (REVATIO) 20 MG tablet; Take2-4 tablet (20 mg) by mouth as needed.  Never use with nitroglycerin, terazosin or doxazosin.    Type 2 diabetes mellitus with diabetic autonomic neuropathy, without long-term current use of insulin (H)  -     Microalbumin (RMG)    Other orders  -     Cancel: [employer]ox[employer] Urine Drug Screen ([employer])        Work on weight loss  DIscussed trying the Sleep apnea machine...    The following health maintenance items are reviewed in Epic and correct as of today:  Health Maintenance   Topic Date Due     PHQ-2 Q1 YR  02/04/1965     HIV SCREEN (SYSTEM ASSIGNED)  02/04/1971     EYE EXAM Q1 YEAR  04/14/2015     COLON CANCER SCREEN (SYSTEM ASSIGNED)  11/20/2016     MICROALBUMIN Q1 YEAR  06/19/2018     A1C Q6 MO  10/19/2018     LIPID MONITORING Q1 YEAR  04/03/2019     FOOT EXAM Q1 YEAR  04/19/2019     CREATININE Q1 YEAR  04/19/2019     FALL RISK ASSESSMENT  04/19/2019     TSH W/ FREE T4 REFLEX Q2 YEAR  04/19/2020     ADVANCE DIRECTIVE PLANNING Q5 YRS  04/19/2023     TETANUS IMMUNIZATION (SYSTEM ASSIGNED)   06/14/2023     PNEUMOCOCCAL  Completed     INFLUENZA VACCINE  Completed     AORTIC ANEURYSM SCREENING (SYSTEM ASSIGNED)  Completed     HEPATITIS C SCREENING  Completed       Shan Arenas MD  [facility]  [phone]    For any issues my office # is [phone]

## 2018-06-21 NOTE — MR AVS SNAPSHOT
After Visit Summary   6/21/2018    Syd Joyce    MRN: [id number]           Patient Information     Date Of Birth          1955        Visit Information        Provider Department      6/21/2018 12:00 PM Shan Arenas MD [facility]        Today's Diagnoses     Leg weakness, bilateral    -  1    S/P lumbar laminectomy        MARTELL (obstructive sleep apnea)        Encounter for medication refill        Erectile dysfunction, unspecified erectile dysfunction type        Type 2 diabetes mellitus with diabetic autonomic neuropathy, without long-term current use of insulin (H)           Follow-ups after your visit        Your next 10 appointments already scheduled     Jun 25, 2018 11:45 AM CDT   Neuro Treatment with Danisha Lopez, PT   [facility] ([facility])    [address] [place] [address]  [address]  [facility] [address]   [phone]            Jul 09, 2018  1:30 PM CDT   Neuro Treatment with Danisha Lopez, PT   [facility] ([facility])    [address]  [facility] [address]   [phone]              Who to contact     If you have questions or need follow up information about today's clinic visit or your schedule please contact [facility] directly at [phone].  Normal or non-critical lab and imaging results will be communicated to you by [email or web address], letter or phone within 4 business days after the clinic has received the results. If you do not hear from us within 7 days, please contact the clinic through [email or web address] or phone. If you have a critical or abnormal lab result, we will notify you by phone as soon as possible.  Submit refill requests through [employer] or call your pharmacy and they will forward the refill request to us. Please allow 3 business days for your refill to be completed.          Additional Information About Your Visit        [employer] Information     [employer] gives you secure access  to your electronic health record. If you see a primary care provider, you can also send messages to your care team and make appointments. If you have questions, please call your primary care clinic.  If you do not have a primary care provider, please call [phone] and they will assist you.        Care EveryWhere ID     This is your Care EveryWhere ID. This could be used by other organizations to access your [place] medical records  [id number]        Your Vitals Were     Pulse Respirations BMI (Body Mass Index)             64 12 31.25 kg/m2          Blood Pressure from Last 3 Encounters:   06/21/18 120/70   05/30/18 120/71   04/19/18 132/70    Weight from Last 3 Encounters:   06/21/18 96 kg (211 lb 9.6 oz)   05/30/18 97.1 kg (214 lb)   04/19/18 95.7 kg (211 lb)              We Performed the Following     Microalbumin (RMG)          Today's Medication Changes          These changes are accurate as of 6/21/18 12:26 PM.  If you have any questions, ask your nurse or doctor.               Start taking these medicines.        Dose/Directions    sildenafil 20 MG tablet   Commonly known as:  REVATIO   Used for:  Erectile dysfunction, unspecified erectile dysfunction type   Started by:  Shan Arenas MD        Take2-4 tablet (20 mg) by mouth as needed.  Never use with nitroglycerin, terazosin or doxazosin.   Quantity:  45 tablet   Refills:  1         These medicines have changed or have updated prescriptions.        Dose/Directions    zolpidem 10 MG tablet   Commonly known as:  AMBIEN   This may have changed:  See the new instructions.   Used for:  MARTELL (obstructive sleep apnea)   Changed by:  Shan Arenas MD        Dose:  10 mg   Take 1 tablet (10 mg) by mouth At Bedtime   Quantity:  30 tablet   Refills:  5            Where to get your medicines      These medications were sent to [facility] [address] [place], [place] - [address] AT [facility] [address]  [address], [facility]  [address]     Phone:  [phone]     cyclobenzaprine 10 MG tablet    sildenafil 20 MG tablet         Some of these will need a paper prescription and others can be bought over the counter.  Ask your nurse if you have questions.     Bring a paper prescription for each of these medications     zolpidem 10 MG tablet                Primary Care Provider Office Phone # Fax #    Shan Arenas [phone] [address]  [facility] [address]        Equal Access to Services     GAIL [facility] Center[facility]ADDIE : Hadii aad ku hadasho Soomaali, waaxda luqadaha, qaybta kaalmada adeegyada, waxay idiin hayaan adeeg khtania kang . So [facility] [phone].    ATENCIÓN: Si habla español, tiene a persaud disposición servicios gratuitos de asistencia lingüística. Llame al [phone].    We comply with applicable federal civil rights laws and [place] laws. We do not discriminate on the basis of race, color, national origin, age, disability, sex, sexual orientation, or gender identity.            Thank you!     Thank you for choosing [facility]  for your care. Our goal is always to provide you with excellent care. Hearing back from our patients is one way we can continue to improve our services. Please take a few minutes to complete the written survey that you may receive in the mail after your visit with us. Thank you!             Your Updated Medication List - Protect others around you: Learn how to safely use, store and throw away your medicines at www.disposemymeds.org.          This list is accurate as of 6/21/18 12:26 PM.  Always use your most recent med list.                   Brand Name Dispense Instructions for use Diagnosis    amLODIPine 10 MG tablet    NORVASC    90 tablet    TAKE 1 TABLET(10 MG) BY MOUTH DAILY    Essential hypertension with goal blood pressure less than 130/80       aspirin 81 MG EC tablet     90 tablet    Take 1 tablet (81 mg) by mouth daily Start tomorrow morning.     Atherosclerosis of native coronary artery of native heart without angina pectoris       blood glucose monitoring meter device kit     1 kit    Use to test blood sugar 4 to 5 times weekly or as directed.    Refill clinic medication management patient       blood glucose monitoring test strip    ACCU-CHEK MED PLUS    150 each    USE 4 TO 5 TIMES PER WEEK OR AS DIRECTED    Refill clinic medication management patient       carvedilol 25 MG tablet    COREG    360 tablet    Take 2 tablets (50 mg) by mouth 2 times daily Hold IF heart rate less than 55.        cyclobenzaprine 10 MG tablet    FLEXERIL    90 tablet    Take 0.5-1 tablets (5-10 mg) by mouth 3 times daily as needed for muscle spasms    S/P lumbar laminectomy       lisinopril-hydrochlorothiazide 20-12.5 MG per tablet    PRINZIDE/ZESTORETIC    90 tablet    TAKE 1 TABLET BY MOUTH DAILY    Essential hypertension with goal blood pressure less than 130/80       losartan 100 MG tablet    COZAAR    90 tablet    TAKE 1 TABLET BY MOUTH EVERY DAY    Essential hypertension with goal blood pressure less than 130/80       metFORMIN 500 MG 24 hr tablet    GLUCOPHAGE-XR    180 tablet    TAKE 2 TABLETS BY MOUTH EVERY MORNING    Type 2 diabetes mellitus with diabetic autonomic neuropathy, without long-term current use of insulin (H)       pantoprazole 40 MG EC tablet    PROTONIX    90 tablet    TAKE 1 TABLET(40 MG) BY MOUTH DAILY    Encounter for medication refill       sildenafil 20 MG tablet    REVATIO    45 tablet    Take2-4 tablet (20 mg) by mouth as needed.  Never use with nitroglycerin, terazosin or doxazosin.    Erectile dysfunction, unspecified erectile dysfunction type       simvastatin 20 MG tablet    ZOCOR    90 tablet    Take 1 tablet (20 mg) by mouth every evening        TRADJENTA 5 MG Tabs tablet   Generic drug:  linagliptin     90 tablet    TAKE 1 TABLET(5 MG) BY MOUTH DAILY    Type 2 diabetes mellitus with diabetic autonomic neuropathy, without long-term current  use of insulin (H)       TUMS PO      Take 2 chew tab by mouth as needed        zolpidem 10 MG tablet    AMBIEN    30 tablet    Take 1 tablet (10 mg) by mouth At Bedtime    MARTELL (obstructive sleep apnea)

## 2018-06-21 NOTE — LETTER
[facility]  [address] [place] [id number] [phone]      June 21, 2018      Syd Joyce  [address]  [facility] [address]              Dear Syd    [facility]  [address] [place] [address]  [phone]    Agreement on Controlled Medications    June 21, 2018         Syd Joyce 1955 [id number]      I understand that Shan Arenas MD has prescribed controlled substances (narcotics, tranquilizers, and/or stimulants) to help manage my condition(s).  I am taking this medicine to help me function or work.  I know that this is strong medicine.  It could have serious side effects and even cause a dependency on the drug.  If I stop these medicines suddenly, I could have severe withdrawal symptoms.    The risks, benefits, and side effects of these medication(s) were explained to me.  I agree that:  1. I will take part in other treatments as advised by my provider.  This may be psychiatry or counseling, physical therapy, behavioral therapy, group treatment, or a referral to a pain clinic.  I will reduce or stop my medicine when my provider tells me to do so.   2. I will take my medicines as prescribed.  I will not change the dose or schedule unless my provider tells me to.  There will be no refills if I  run out early.   I may be contacted at any time without warning and asked to complete a drug test or pill count.   3. [facility] requires a medication follow up appointment with Shan Arenas MD  every three months. The medication prescribed for you requires a written prescription script; this script will be given to you at your scheduled medication follow up visits and you will receive three one month written scripts for your medication. There may be times when your medication will need a refill between appointments. Refills will be made only during regular office hours. Refills will not be made at night, on weekends or during  holidays. It is your responsibility to make appointment arrangements 7-10 days before your medication runs out.  You may be given a refill for 5-7 day quantity if your provider is out of the office for an extended period to last until they return.    4. Only Shan Arenas MD of  [facility] will be prescribing all controlled substance medications for chronic pain and other medication. We expect that you will not accept or seek controlled substance medications from other providers, Emergency Room or Urgent Care.  5. I will use one pharmacy to fill all of my controlled substance prescriptions.  If my prescription is mailed to my pharmacy, it may take 5 to 7 days for my medicine to be ready. Pharmacy:   6. [facility] [address] [place] [address] - [address] AT [address]  7. [address]  8. [facility] [address]  9. Phone: [phone] Fax: [phone]  10.   11. I understand that my provider, clinic care team, and pharmacy can track controlled substance prescriptions from other providers through a central database (prescription monitoring program).  12. I will bring in my list of medications (or my medicine bottles) each time I come to the clinic.  13. Refills of controlled substances will be made only during office hours.  It is up to me to make sure that I do not run out of my medicines on weekends or holidays.     14. I am responsible for my prescriptions.  If the medicine is lost or stolen, it will not be replaced.   I also agree not to share these medicines with anyone.  15. I agree to not use ANY illegal or recreational drugs.  This includes marijuana, cocaine, bath salts or other drugs.  I agree not to use alcohol unless my provider says I may.  I agree to give urine samples whenever asked.  If I fail to give a urine sample, the provider may stop my medicine.    16. I understand that this medicine can affect my thinking and judgment.  It may be unsafe for me to drive, use  machinery and do dangerous tasks.  I will not do any of these things until I know how the medicine affects me.  If my dose changes, I will wait to see how it affects me.  I will contact my provider if I have concerns about medicine side effects.         I understand that if I do not follow any of the conditions above, my prescriptions or treatment may be stopped.    I agree that my provider, clinic care team, and pharmacy may work with any city, state or federal law enforcement agency that investigates the misuse, sale, or other diversion of my controlled medicine. I will allow my provider to discuss my care with or share a copy of this agreement with any other treating provider, pharmacy or emergency room where I receive care.  I agree to give up (waive) any right of privacy or confidentiality with respect to these authorizations.   I have read this agreement and have asked questions about anything I did not understand.   ___________________________________    ___________________________  Syd Joyce                                                          June 21, 2018  ___________________________________     ____________________________  Shan Arenas MD                                                     June 21, 2018            Sincerely,    Shan Arenas M.D.

## 2018-06-22 LAB
A/C RATIO MG/G: <30 MG/G
ALBUMIN (URINE) MG/L: 10 MG/L
INTERPRETATION: NORMAL
URINE CREATININE MG/DL - QUEST: 50 MG/DL

## 2018-06-25 ENCOUNTER — HOSPITAL ENCOUNTER (OUTPATIENT)
Dept: PHYSICAL THERAPY | Facility: CLINIC | Age: 63
Setting detail: THERAPIES SERIES
End: 2018-06-25
Attending: FAMILY MEDICINE
Payer: COMMERCIAL

## 2018-06-25 ENCOUNTER — TELEPHONE (OUTPATIENT)
Dept: FAMILY MEDICINE | Facility: CLINIC | Age: 63
End: 2018-06-25

## 2018-06-25 DIAGNOSIS — Z98.1 S/P LUMBAR SPINAL FUSION: ICD-10-CM

## 2018-06-25 DIAGNOSIS — N52.9 ERECTILE DYSFUNCTION, UNSPECIFIED ERECTILE DYSFUNCTION TYPE: ICD-10-CM

## 2018-06-25 DIAGNOSIS — R26.89 LOSS OF BALANCE: ICD-10-CM

## 2018-06-25 DIAGNOSIS — R29.898 LEG WEAKNESS, BILATERAL: ICD-10-CM

## 2018-06-25 PROCEDURE — 97110 THERAPEUTIC EXERCISES: CPT | Mod: GP | Performed by: PHYSICAL THERAPIST

## 2018-06-25 PROCEDURE — 40000719 ZZHC STATISTIC PT DEPARTMENT NEURO VISIT: Performed by: PHYSICAL THERAPIST

## 2018-06-25 RX ORDER — SILDENAFIL CITRATE 20 MG/1
TABLET ORAL
Qty: 90 TABLET | Refills: 1 | Status: ON HOLD | OUTPATIENT
Start: 2018-06-25 | End: 2019-04-23

## 2018-06-25 NOTE — TELEPHONE ENCOUNTER
Received fax from [employer] that patients rx for Cyclobenzaprine needs PA.  Submitted PA to [facility].   Waiting to hear back.

## 2018-07-02 ENCOUNTER — TELEPHONE (OUTPATIENT)
Dept: SLEEP MEDICINE | Facility: CLINIC | Age: 63
End: 2018-07-02

## 2018-07-02 NOTE — TELEPHONE ENCOUNTER
On 6/13/2018 spoke with pt and he was still undecided about trying CPAP therapy. He will call when he makes his decision.

## 2018-07-03 NOTE — TELEPHONE ENCOUNTER
Received fax from  stating patients rx for Cyclobenzaprine was approved for PA.  Approval dates 05/25/2018-06/25/2021.  Called [employer] to inform them of approval.  Patients rx for sildenafil tabs also state that he can only get #30 for 30.  Rx was written out for #45.  Also informed [facility] to change quantity to #30.

## 2018-07-10 ENCOUNTER — OFFICE VISIT (OUTPATIENT)
Dept: FAMILY MEDICINE | Facility: CLINIC | Age: 63
End: 2018-07-10

## 2018-07-10 VITALS
WEIGHT: 212.8 LBS | TEMPERATURE: 98.2 F | DIASTOLIC BLOOD PRESSURE: 64 MMHG | RESPIRATION RATE: 12 BRPM | BODY MASS INDEX: 31.52 KG/M2 | HEART RATE: 72 BPM | SYSTOLIC BLOOD PRESSURE: 122 MMHG | HEIGHT: 69 IN

## 2018-07-10 DIAGNOSIS — Z01.818 PREOP GENERAL PHYSICAL EXAM: Primary | ICD-10-CM

## 2018-07-10 DIAGNOSIS — I10 BENIGN ESSENTIAL HYPERTENSION: ICD-10-CM

## 2018-07-10 DIAGNOSIS — E11.43 TYPE 2 DIABETES MELLITUS WITH DIABETIC AUTONOMIC NEUROPATHY, WITHOUT LONG-TERM CURRENT USE OF INSULIN (H): ICD-10-CM

## 2018-07-10 DIAGNOSIS — G47.33 OSA (OBSTRUCTIVE SLEEP APNEA): ICD-10-CM

## 2018-07-10 DIAGNOSIS — H26.9 CATARACT OF BOTH EYES, UNSPECIFIED CATARACT TYPE: ICD-10-CM

## 2018-07-10 PROCEDURE — 99214 OFFICE O/P EST MOD 30 MIN: CPT | Performed by: NURSE PRACTITIONER

## 2018-07-10 NOTE — PROGRESS NOTES
[facility]  [address] [place] [id number] [phone]  Dept: [phone]    PRE-OP EVALUATION:  Today's date: 7/10/2018    Syd Joyce (: 1955) presents for pre-operative evaluation assessment as requested by Dr. Kay.  He requires evaluation and anesthesia risk assessment prior to undergoing surgery/procedure for treatment of bilateral cataracts .    Proposed Surgery/ Procedure: bilateral  EYES PHACOEMULSIFICATION CLEAR CORNEA WITH STANDARD INTRAOCULAR LENS IMPLANT (TOPICAL)  Date of Surgery/ Procedure: 18- right and 18 left  Time of Surgery/ Procedure: 18 @ 0945 am and 18 @ 0945 am  Hospital/Surgical Facility: [facility]       [facility] [phone]    Primary Physician: Shan Arenas/Yina VYAS today  Type of Anesthesia Anticipated: combine local with MAC    Patient has a Health Care Directive or Living Will: no    1. NO - Do you have a history of heart attack, stroke, stent, bypass or surgery on an artery in the head, neck, heart or legs?  2. NO - Do you ever have any pain or discomfort in your chest?  3. NO - Do you have a history of  Heart Failure?  4. NO - Are you troubled by shortness of breath when: walking on the level, up a slight hill or at night?  5. NO - Do you currently have a cold, bronchitis or other respiratory infection?  6. NO - Do you have a cough, shortness of breath or wheezing?  7. NO - Do you sometimes get pains in the calves of your legs when you walk?  8. NO - Do you or anyone in your family have previous history of blood clots?  9. NO - Do you or does anyone in your family have a serious bleeding problem such as prolonged bleeding following surgeries or cuts?  10. NO - Have you ever had problems with anemia or been told to take iron pills?  11. NO - Have you had any abnormal blood loss such as black, tarry or bloody stools, or abnormal vaginal bleeding?  12. NO - Have you ever had a blood  transfusion?  13. NO - Have you or any of your relatives ever had problems with anesthesia?  14. NO - Do you have sleep apnea, excessive snoring or daytime drowsiness?  15. NO - Do you have any prosthetic heart valves?  16. NO - Do you have prosthetic joints?  17. NO - Is there any chance that you may be pregnant?      HPI:     HPI related to upcoming procedure: cataracts     DIABETES - Patient has a longstanding history of DiabetesType Type II . Patient is being treated with oral agents and denies significant side effects. Control has been good. Complicating factors include but are not limited to: hypertension.     Last A1C 5.5 on 4/19/18.                                                                                                                       .  HYPERTENSION - Patient has longstanding history of HTN , currently denies any symptoms referable to elevated blood pressure. Specifically denies chest pain, palpitations, dyspnea, orthopnea, PND or peripheral edema. Blood pressure readings have been in normal range. Current medication regimen is as listed below. Patient denies any side effects of medication.                                                                                                                                                                                          .    MEDICAL HISTORY:     Patient Active Problem List    Diagnosis Date Noted     MARTELL (obstructive sleep apnea) 06/21/2018     Priority: Medium     Impression/Plan:     (G47.33) MARTELL (obstructive sleep apnea)  (primary encounter diagnosis), (G47.34) Nocturnal hypoxemia  Comment: Moderate MARTELL, AHI 21/hr, independent of position, worse in REM. Significant hypoxemia. Low oxygen baseline in stage N3, possibly an artifact, but seems true since it happened both times that he was in stage N3.  Plan: Comprehensive DME, Overnight oximetry study        Auto CPAP 5-15 cm, heated humidifier and compliance meter. The patient was informed  of the mask exchange policy and the compliance goals. They were also informed that they would be followed by the sleep therapy management team during the first month of CPAP use. An order was placed for oximetry on CPAP to assess for residual hypoxemia.       Leg weakness, bilateral 10/04/2017     Priority: Medium     Loss of balance 10/04/2017     Priority: Medium     S/P lumbar spinal fusion 10/04/2017     Priority: Medium     S/P lumbar laminectomy 06/30/2017     Priority: Medium     Type 2 diabetes mellitus with hyperlipidemia (H) 06/19/2017     Priority: Medium     Type 2 diabetes mellitus with diabetic autonomic neuropathy, without long-term current use of insulin (H) 10/20/2016     Priority: Medium     Mostly in the medial lower leg.       Insomnia 09/11/2013     Priority: Medium     Esophageal reflux 06/15/2012     Priority: Medium     Advanced directives, counseling/discussion 06/15/2012     Priority: Medium     As is reasonable care for Most of us, wants in the Short term aggressive care.   No desire for long term prolongation of life through artificial means if no hope to bring back to a reasonable status.          Benign essential hypertension 08/12/2011     Priority: Medium     Mixed hyperlipidemia 08/12/2011     Priority: Medium      Past Medical History:   Diagnosis Date     DM2 (diabetes mellitus, type 2) (H)      Esophageal reflux 6/15/2012     HTN (hypertension) 8/12/2011     Leg pain, bilateral      Low back pain      Numbness and tingling     LEGS, R/T LUMBAR STENOSIS     Obesity, unspecified 1/10/2014     Past Surgical History:   Procedure Laterality Date     COLONOSCOPY       DISCECTOMY LUMBAR POSTERIOR MICROSCOPIC TWO LEVELS N/A 6/30/2017    Procedure: DISCECTOMY LUMBAR POSTERIOR MICROSCOPIC TWO LEVELS;  L3-4 L4-5 POSTERIOR DECOMPRESSION AND INTERSPINUS FIXATION WITHOUT FUSION;  Surgeon: Ray Perez MD;  Location:  OR     [facility]       Current Outpatient Prescriptions    Medication Sig Dispense Refill     amLODIPine (NORVASC) 10 MG tablet TAKE 1 TABLET(10 MG) BY MOUTH DAILY 90 tablet 3     aspirin 81 MG EC tablet Take 1 tablet (81 mg) by mouth daily Start tomorrow morning. 90 tablet 3     blood glucose monitoring (ACCU-CHEK MED PLUS) meter device kit Use to test blood sugar 4 to 5 times weekly or as directed. 1 kit 0     blood glucose monitoring (ACCU-CHEK MED PLUS) test strip USE 4 TO 5 TIMES PER WEEK OR AS DIRECTED 150 each 3     Calcium Carbonate Antacid (TUMS PO) Take 2 chew tab by mouth as needed        carvedilol (COREG) 25 MG tablet Take 2 tablets (50 mg) by mouth 2 times daily Hold IF heart rate less than 55. 360 tablet 4     cyclobenzaprine (FLEXERIL) 10 MG tablet Take 0.5-1 tablets (5-10 mg) by mouth 3 times daily as needed for muscle spasms 90 tablet 1     lisinopril-hydrochlorothiazide (PRINZIDE/ZESTORETIC) 20-12.5 MG per tablet TAKE 1 TABLET BY MOUTH DAILY 90 tablet 3     losartan (COZAAR) 100 MG tablet TAKE 1 TABLET BY MOUTH EVERY DAY 90 tablet 3     metFORMIN (GLUCOPHAGE-XR) 500 MG 24 hr tablet TAKE 2 TABLETS BY MOUTH EVERY MORNING 180 tablet 0     pantoprazole (PROTONIX) 40 MG EC tablet TAKE 1 TABLET(40 MG) BY MOUTH DAILY 90 tablet 3     sildenafil (REVATIO) 20 MG tablet TAKE 2 TO 4 TABLETS BY MOUTH AS NEEDED NEVER USE WITH NITROGLYCERIN, TERAZOSIN, OR DOXAZOSIN. USE AS DIRECTED 90 tablet 1     simvastatin (ZOCOR) 20 MG tablet Take 1 tablet (20 mg) by mouth every evening 90 tablet 4     TRADJENTA 5 MG TABS tablet TAKE 1 TABLET(5 MG) BY MOUTH DAILY 90 tablet 3     zolpidem (AMBIEN) 10 MG tablet Take 1 tablet (10 mg) by mouth At Bedtime 30 tablet 5     OTC products: Aspirin    No Known Allergies   Latex Allergy: NO    Social History   Substance Use Topics     Smoking status: Never Smoker     Smokeless tobacco: Current User     Alcohol use Yes      Comment: RARELY     History   Drug Use No       REVIEW OF SYSTEMS:   CONSTITUTIONAL: NEGATIVE for fever, chills,  change in weight  ENT/MOUTH: NEGATIVE for ear, mouth and throat problems  RESP: NEGATIVE for significant cough or SOB  CV: NEGATIVE for chest pain, palpitations or peripheral edema      EXAM:   There were no vitals taken for this visit.  GENERAL APPEARANCE: healthy, alert and no distress  HENT: ear canals and TM's normal and nose and mouth without ulcers or lesions  RESP: lungs clear to auscultation - no rales, rhonchi or wheezes  CV: regular rate and rhythm, normal S1 S2, no S3 or S4 and no murmur, click or rub   ABDOMEN: soft, nontender, no HSM or masses and bowel sounds normal  NEURO: Normal strength and tone, sensory exam grossly normal, mentation intact and speech normal    DIAGNOSTICS:   No labs or EKG required for low risk surgery (cataract, skin procedure, breast biopsy, etc)    Recent Labs   Lab Test  04/19/18   1533  04/03/18   1123   02/21/18   0845   01/22/18   1119  06/19/17   1337   HGB   --    --    --   13.6   --   13.6   --    PLT   --    --    --   178   --   231   --    INR   --    --    --   0.97   --    --    --    NA  137  129*   < >  128*   < >   --   139   POTASSIUM  4.4  4.1   < >  3.7   < >   --   4.5   CR  1.15  1.22   < >  1.00   < >   --   1.02   A1C  5.5   --    --    --    --    --   6.8*    < > = values in this interval not displayed.        IMPRESSION:   Reason for surgery/procedure: cataracts  Diagnosis/reason for consult: pre op    The proposed surgical procedure is considered LOW risk.    REVISED CARDIAC RISK INDEX  The patient has the following serious cardiovascular risks for perioperative complications such as (MI, PE, VFib and 3  AV Block):  No serious cardiac risks  INTERPRETATION: 0 risks: Class I (very low risk - 0.4% complication rate)    The patient has the following additional risks for perioperative complications:  No identified additional risks      ICD-10-CM    1. Preop general physical exam Z01.818        RECOMMENDATIONS:     --Consult hospital rounder / IM to assist  post-op medical management    1. Preop general physical exam  Okay fir surgery    2. Cataract of both eyes, unspecified cataract type  Okay frr surgery    3. Type 2 diabetes mellitus with diabetic autonomic neuropathy, without long-term current use of insulin (H)  Controlled with metformin and Tradjenta    4. MARTELL (obstructive sleep apnea)  CPap used    5. Benign essential hypertension  Controlled with amlodipine and coreg and lisinopril    --Patient is to take all scheduled medications on the day of surgery    APPROVAL GIVEN to proceed with proposed procedure, without further diagnostic evaluation       Signed Electronically by: STACY Goodman CNP    Copy of this evaluation report is provided to requesting physician.    [facility] Preop Guidelines    Revised Cardiac Risk Index

## 2018-07-10 NOTE — MR AVS SNAPSHOT
After Visit Summary   7/10/2018    Syd Joyce    MRN: [id number]           Patient Information     Date Of Birth          1955        Visit Information        Provider Department      7/10/2018 10:30 AM Patricia Miller APRN [facility]        Today's Diagnoses     Preop general physical exam    -  1    Cataract of both eyes, unspecified cataract type        Type 2 diabetes mellitus with diabetic autonomic neuropathy, without long-term current use of insulin (H)        MARTELL (obstructive sleep apnea)        Benign essential hypertension          Care Instructions      Before Your Surgery      Call your surgeon if there is any change in your health. This includes signs of a cold or flu (such as a sore throat, runny nose, cough, rash or fever).    Do not smoke, drink alcohol or take over the counter medicine (unless your surgeon or primary care doctor tells you to) for the 24 hours before and after surgery.    If you take prescribed drugs: Follow your doctor s orders about which medicines to take and which to stop until after surgery.    Eating and drinking prior to surgery: follow the instructions from your surgeon    Take a shower or bath the night before surgery. Use the soap your surgeon gave you to gently clean your skin. If you do not have soap from your surgeon, use your regular soap. Do not shave or scrub the surgery site.  Wear clean pajamas and have clean sheets on your bed.           Follow-ups after your visit        Your next 10 appointments already scheduled     Jul 11, 2018 10:00 AM CDT   Neuro Treatment with Danisha Lopez PT   [facility] ([facility])    [address]  [address]  [facility] [address]   [phone]            Jul 17, 2018   Procedure with Elsie Kay MD   [facility] (--)    [address]  [facility] [address]   [phone]            Jul 31, 2018   Procedure with  "Elsie Kay MD   [facility] (--)    [address]  [facility] [address] [phone]              Who to contact     If you have questions or need follow up information about today's clinic visit or your schedule please contact [facility] directly at [phone].  Normal or non-critical lab and imaging results will be communicated to you by MyChart, letter or phone within 4 business days after the clinic has received the results. If you do not hear from us within 7 days, please contact the clinic through [employer] or phone. If you have a critical or abnormal lab result, we will notify you by phone as soon as possible.  Submit refill requests through [employer] or call your pharmacy and they will forward the refill request to us. Please allow 3 business days for your refill to be completed.          Additional Information About Your Visit        [employer] Information     [employer] gives you secure access to your electronic health record. If you see a primary care provider, you can also send messages to your care team and make appointments. If you have questions, please call your primary care clinic.  If you do not have a primary care provider, please call [phone] and they will assist you.        Care EveryWhere ID     This is your Care EveryWhere ID. This could be used by other organizations to access your [facility] medical records  [id number]        Your Vitals Were     Pulse Temperature Respirations Height BMI (Body Mass Index)       72 98.2  F (36.8  C) (Oral) 12 1.753 m (5' 9\") 31.43 kg/m2        Blood Pressure from Last 3 Encounters:   07/10/18 122/64   06/21/18 120/70   05/30/18 120/71    Weight from Last 3 Encounters:   07/10/18 96.5 kg (212 lb 12.8 oz)   06/21/18 96 kg (211 lb 9.6 oz)   05/30/18 97.1 kg (214 lb)              Today, you had the following     No orders found for display       Primary Care Provider Office Phone # Fax #    Shan Hutchinson " MD Dagober[place] [phone]       [address]  [facility] [address]        Equal Access to Services     GAIL GRIFFITHS : Kimberlyn aad ku hadyanelyomid Nardaali, waprestonda leighannritchie, qasofita kajasmyneda ranjit, jose hodgesesteban curt. So [facility] [place] [facility] [phone].    ATENCIÓN: Si habla español, tiene a persaud disposición servicios gratuitos de asistencia lingüística. Llame al [phone].    We comply with applicable federal civil rights laws and [place] laws. We do not discriminate on the basis of race, color, national origin, age, disability, sex, sexual orientation, or gender identity.            Thank you!     Thank you for choosing [facility]  for your care. Our goal is always to provide you with excellent care. Hearing back from our patients is one way we can continue to improve our services. Please take a few minutes to complete the written survey that you may receive in the mail after your visit with us. Thank you!             Your Updated Medication List - Protect others around you: Learn how to safely use, store and throw away your medicines at www.disposemymeds.org.          This list is accurate as of 7/10/18  1:43 PM.  Always use your most recent med list.                   Brand Name Dispense Instructions for use Diagnosis    amLODIPine 10 MG tablet    NORVASC    90 tablet    TAKE 1 TABLET(10 MG) BY MOUTH DAILY    Essential hypertension with goal blood pressure less than 130/80       aspirin 81 MG EC tablet     90 tablet    Take 1 tablet (81 mg) by mouth daily Start tomorrow morning.    Atherosclerosis of native coronary artery of native heart without angina pectoris       blood glucose monitoring meter device kit     1 kit    Use to test blood sugar 4 to 5 times weekly or as directed.    Refill clinic medication management patient       blood glucose monitoring test strip    ACCU-CHEK MED PLUS    150 each    USE 4 TO 5 TIMES PER WEEK OR AS DIRECTED    Refill clinic medication  management patient       carvedilol 25 MG tablet    COREG    360 tablet    Take 2 tablets (50 mg) by mouth 2 times daily Hold IF heart rate less than 55.        cyclobenzaprine 10 MG tablet    FLEXERIL    90 tablet    Take 0.5-1 tablets (5-10 mg) by mouth 3 times daily as needed for muscle spasms    S/P lumbar laminectomy       lisinopril-hydrochlorothiazide 20-12.5 MG per tablet    PRINZIDE/ZESTORETIC    90 tablet    TAKE 1 TABLET BY MOUTH DAILY    Essential hypertension with goal blood pressure less than 130/80       losartan 100 MG tablet    COZAAR    90 tablet    TAKE 1 TABLET BY MOUTH EVERY DAY    Essential hypertension with goal blood pressure less than 130/80       metFORMIN 500 MG 24 hr tablet    GLUCOPHAGE-XR    180 tablet    TAKE 2 TABLETS BY MOUTH EVERY MORNING    Type 2 diabetes mellitus with diabetic autonomic neuropathy, without long-term current use of insulin (H)       pantoprazole 40 MG EC tablet    PROTONIX    90 tablet    TAKE 1 TABLET(40 MG) BY MOUTH DAILY    Encounter for medication refill       sildenafil 20 MG tablet    REVATIO    90 tablet    TAKE 2 TO 4 TABLETS BY MOUTH AS NEEDED NEVER USE WITH NITROGLYCERIN, TERAZOSIN, OR DOXAZOSIN. USE AS DIRECTED    Erectile dysfunction, unspecified erectile dysfunction type       simvastatin 20 MG tablet    ZOCOR    90 tablet    Take 1 tablet (20 mg) by mouth every evening        TRADJENTA 5 MG Tabs tablet   Generic drug:  linagliptin     90 tablet    TAKE 1 TABLET(5 MG) BY MOUTH DAILY    Type 2 diabetes mellitus with diabetic autonomic neuropathy, without long-term current use of insulin (H)       TUMS PO      Take 2 chew tab by mouth as needed        zolpidem 10 MG tablet    AMBIEN    30 tablet    Take 1 tablet (10 mg) by mouth At Bedtime    MARTELL (obstructive sleep apnea)

## 2018-07-11 ENCOUNTER — HOSPITAL ENCOUNTER (OUTPATIENT)
Dept: PHYSICAL THERAPY | Facility: CLINIC | Age: 63
Setting detail: THERAPIES SERIES
End: 2018-07-11
Attending: FAMILY MEDICINE
Payer: COMMERCIAL

## 2018-07-11 DIAGNOSIS — R26.89 LOSS OF BALANCE: ICD-10-CM

## 2018-07-11 DIAGNOSIS — R29.898 LEG WEAKNESS, BILATERAL: ICD-10-CM

## 2018-07-11 DIAGNOSIS — Z98.1 S/P LUMBAR SPINAL FUSION: ICD-10-CM

## 2018-07-11 DIAGNOSIS — E11.43 TYPE 2 DIABETES MELLITUS WITH DIABETIC AUTONOMIC NEUROPATHY, WITHOUT LONG-TERM CURRENT USE OF INSULIN (H): ICD-10-CM

## 2018-07-11 PROCEDURE — 97750 PHYSICAL PERFORMANCE TEST: CPT | Mod: GP | Performed by: PHYSICAL THERAPIST

## 2018-07-11 PROCEDURE — 40000719 ZZHC STATISTIC PT DEPARTMENT NEURO VISIT: Performed by: PHYSICAL THERAPIST

## 2018-07-11 PROCEDURE — 97110 THERAPEUTIC EXERCISES: CPT | Mod: GP | Performed by: PHYSICAL THERAPIST

## 2018-07-11 NOTE — PROGRESS NOTES
Outpatient Physical Therapy Progress Note     Patient: Syd Joyce  : 1955    Beginning/End Dates of Reporting Period:  18 to 2018    Referring Provider: Shan Arenas MD    Therapy Diagnosis: difficulty with gait     Client Self Report: I decided and I'm having my to retire and I'm having my eyes done in the next week.     Objective Measurements:  Objective Measure: FGA  Details: scored 20/30, improved from 18/30  Objective Measure: 6MWT  Details: 1210 feet; improved from 1000 feet on evaluation    Goals:  Goal Identifier FGA   Goal Description The client will score at least 24/30 on FGA in order to demonstrate improved dynamic balance for decreased fall risk to be able to mow lawn   Target Date 18   Date Met      Progress:SCORES 20/30     Goal Identifier 6MWT   Goal Description The client will improve at least 70 meters on 6MWT for improved gait speed and tolerance   Target Date 18   Date Met  18   Progress: GOAL MET     Goal Identifier Walking tolerance   Goal Description The client will improve walking tolerance to at least 30 minutes continuously in order to work towards goal of walking [place]   Target Date 18   Date Met      Progress: AMBULATING 25'     GOALS PARTIALLY MET    Progress Toward Goals:   Progress this reporting period: Syd has demonstrated slow progress and improvement in his walking tolerance. He continues to be limited on uneven surfaces and balance challenges. He has decided to retire and plans to focus on his health ongoing. He has lingering back pain affecting his tolerance.     Plan:  Discharge from therapy.    Discharge:    Reason for Discharge: Patient chooses to discontinue therapy.    Discharge Plan: Patient to continue home program.

## 2018-07-11 NOTE — TELEPHONE ENCOUNTER
metFORMIN (GLUCOPHAGE-XR) 500 MG 24 hr tablet   LAST  Med check 7/10/18   last labs(for RX) 4/19/18  Next  appt scheduled =  none  Rylie Clark MA    Lab Results   Component Value Date    A1C 5.5 04/19/2018    A1C 6.8 06/19/2017    A1C 5.5 10/17/2016    A1C 5.3 09/21/2015    A1C 5.7 11/24/2014

## 2018-07-12 RX ORDER — METFORMIN HCL 500 MG
TABLET, EXTENDED RELEASE 24 HR ORAL
Qty: 180 TABLET | Refills: 3 | Status: SHIPPED | OUTPATIENT
Start: 2018-07-12 | End: 2019-07-15

## 2018-07-16 NOTE — H&P (VIEW-ONLY)
Formerly [facility]  [address] [place] [id number] [phone]  Dept: [phone]    PRE-OP EVALUATION:  Today's date: 7/10/2018    Syd Joyce (: 1955) presents for pre-operative evaluation assessment as requested by Dr. Kay.  He requires evaluation and anesthesia risk assessment prior to undergoing surgery/procedure for treatment of bilateral cataracts .    Proposed Surgery/ Procedure: bilateral  EYES PHACOEMULSIFICATION CLEAR CORNEA WITH STANDARD INTRAOCULAR LENS IMPLANT (TOPICAL)  Date of Surgery/ Procedure: 18- right and 18 left  Time of Surgery/ Procedure: 18 @ 0945 am and 18 @ 0945 am  Hospital/Surgical Facility: [facility]       [facility] [phone]    Primary Physician: Shan Arenas/Yina VYAS today  Type of Anesthesia Anticipated: combine local with MAC    Patient has a Health Care Directive or Living Will: no    1. NO - Do you have a history of heart attack, stroke, stent, bypass or surgery on an artery in the head, neck, heart or legs?  2. NO - Do you ever have any pain or discomfort in your chest?  3. NO - Do you have a history of  Heart Failure?  4. NO - Are you troubled by shortness of breath when: walking on the level, up a slight hill or at night?  5. NO - Do you currently have a cold, bronchitis or other respiratory infection?  6. NO - Do you have a cough, shortness of breath or wheezing?  7. NO - Do you sometimes get pains in the calves of your legs when you walk?  8. NO - Do you or anyone in your family have previous history of blood clots?  9. NO - Do you or does anyone in your family have a serious bleeding problem such as prolonged bleeding following surgeries or cuts?  10. NO - Have you ever had problems with anemia or been told to take iron pills?  11. NO - Have you had any abnormal blood loss such as black, tarry or bloody stools, or abnormal vaginal bleeding?  12. NO - Have you ever had a blood  transfusion?  13. NO - Have you or any of your relatives ever had problems with anesthesia?  14. NO - Do you have sleep apnea, excessive snoring or daytime drowsiness?  15. NO - Do you have any prosthetic heart valves?  16. NO - Do you have prosthetic joints?  17. NO - Is there any chance that you may be pregnant?      HPI:     HPI related to upcoming procedure: cataracts     DIABETES - Patient has a longstanding history of DiabetesType Type II . Patient is being treated with oral agents and denies significant side effects. Control has been good. Complicating factors include but are not limited to: hypertension.     Last A1C 5.5 on 4/19/18.                                                                                                                       .  HYPERTENSION - Patient has longstanding history of HTN , currently denies any symptoms referable to elevated blood pressure. Specifically denies chest pain, palpitations, dyspnea, orthopnea, PND or peripheral edema. Blood pressure readings have been in normal range. Current medication regimen is as listed below. Patient denies any side effects of medication.                                                                                                                                                                                          .    MEDICAL HISTORY:     Patient Active Problem List    Diagnosis Date Noted     MARTELL (obstructive sleep apnea) 06/21/2018     Priority: Medium     Impression/Plan:     (G47.33) MARTELL (obstructive sleep apnea)  (primary encounter diagnosis), (G47.34) Nocturnal hypoxemia  Comment: Moderate MARTELL, AHI 21/hr, independent of position, worse in REM. Significant hypoxemia. Low oxygen baseline in stage N3, possibly an artifact, but seems true since it happened both times that he was in stage N3.  Plan: Comprehensive DME, Overnight oximetry study        Auto CPAP 5-15 cm, heated humidifier and compliance meter. The patient was informed  of the mask exchange policy and the compliance goals. They were also informed that they would be followed by the sleep therapy management team during the first month of CPAP use. An order was placed for oximetry on CPAP to assess for residual hypoxemia.       Leg weakness, bilateral 10/04/2017     Priority: Medium     Loss of balance 10/04/2017     Priority: Medium     S/P lumbar spinal fusion 10/04/2017     Priority: Medium     S/P lumbar laminectomy 06/30/2017     Priority: Medium     Type 2 diabetes mellitus with hyperlipidemia (H) 06/19/2017     Priority: Medium     Type 2 diabetes mellitus with diabetic autonomic neuropathy, without long-term current use of insulin (H) 10/20/2016     Priority: Medium     Mostly in the medial lower leg.       Insomnia 09/11/2013     Priority: Medium     Esophageal reflux 06/15/2012     Priority: Medium     Advanced directives, counseling/discussion 06/15/2012     Priority: Medium     As is reasonable care for Most of us, wants in the Short term aggressive care.   No desire for long term prolongation of life through artificial means if no hope to bring back to a reasonable status.          Benign essential hypertension 08/12/2011     Priority: Medium     Mixed hyperlipidemia 08/12/2011     Priority: Medium      Past Medical History:   Diagnosis Date     DM2 (diabetes mellitus, type 2) (H)      Esophageal reflux 6/15/2012     HTN (hypertension) 8/12/2011     Leg pain, bilateral      Low back pain      Numbness and tingling     LEGS, R/T LUMBAR STENOSIS     Obesity, unspecified 1/10/2014     Past Surgical History:   Procedure Laterality Date     COLONOSCOPY       DISCECTOMY LUMBAR POSTERIOR MICROSCOPIC TWO LEVELS N/A 6/30/2017    Procedure: DISCECTOMY LUMBAR POSTERIOR MICROSCOPIC TWO LEVELS;  L3-4 L4-5 POSTERIOR DECOMPRESSION AND INTERSPINUS FIXATION WITHOUT FUSION;  Surgeon: Ray Perez MD;  Location:  OR     [facility]       Current Outpatient Prescriptions    Medication Sig Dispense Refill     amLODIPine (NORVASC) 10 MG tablet TAKE 1 TABLET(10 MG) BY MOUTH DAILY 90 tablet 3     aspirin 81 MG EC tablet Take 1 tablet (81 mg) by mouth daily Start tomorrow morning. 90 tablet 3     blood glucose monitoring (ACCU-CHEK MED PLUS) meter device kit Use to test blood sugar 4 to 5 times weekly or as directed. 1 kit 0     blood glucose monitoring (ACCU-CHEK MED PLUS) test strip USE 4 TO 5 TIMES PER WEEK OR AS DIRECTED 150 each 3     Calcium Carbonate Antacid (TUMS PO) Take 2 chew tab by mouth as needed        carvedilol (COREG) 25 MG tablet Take 2 tablets (50 mg) by mouth 2 times daily Hold IF heart rate less than 55. 360 tablet 4     cyclobenzaprine (FLEXERIL) 10 MG tablet Take 0.5-1 tablets (5-10 mg) by mouth 3 times daily as needed for muscle spasms 90 tablet 1     lisinopril-hydrochlorothiazide (PRINZIDE/ZESTORETIC) 20-12.5 MG per tablet TAKE 1 TABLET BY MOUTH DAILY 90 tablet 3     losartan (COZAAR) 100 MG tablet TAKE 1 TABLET BY MOUTH EVERY DAY 90 tablet 3     metFORMIN (GLUCOPHAGE-XR) 500 MG 24 hr tablet TAKE 2 TABLETS BY MOUTH EVERY MORNING 180 tablet 0     pantoprazole (PROTONIX) 40 MG EC tablet TAKE 1 TABLET(40 MG) BY MOUTH DAILY 90 tablet 3     sildenafil (REVATIO) 20 MG tablet TAKE 2 TO 4 TABLETS BY MOUTH AS NEEDED NEVER USE WITH NITROGLYCERIN, TERAZOSIN, OR DOXAZOSIN. USE AS DIRECTED 90 tablet 1     simvastatin (ZOCOR) 20 MG tablet Take 1 tablet (20 mg) by mouth every evening 90 tablet 4     TRADJENTA 5 MG TABS tablet TAKE 1 TABLET(5 MG) BY MOUTH DAILY 90 tablet 3     zolpidem (AMBIEN) 10 MG tablet Take 1 tablet (10 mg) by mouth At Bedtime 30 tablet 5     OTC products: Aspirin    No Known Allergies   Latex Allergy: NO    Social History   Substance Use Topics     Smoking status: Never Smoker     Smokeless tobacco: Current User     Alcohol use Yes      Comment: RARELY     History   Drug Use No       REVIEW OF SYSTEMS:   CONSTITUTIONAL: NEGATIVE for fever, chills,  change in weight  ENT/MOUTH: NEGATIVE for ear, mouth and throat problems  RESP: NEGATIVE for significant cough or SOB  CV: NEGATIVE for chest pain, palpitations or peripheral edema      EXAM:   There were no vitals taken for this visit.  GENERAL APPEARANCE: healthy, alert and no distress  HENT: ear canals and TM's normal and nose and mouth without ulcers or lesions  RESP: lungs clear to auscultation - no rales, rhonchi or wheezes  CV: regular rate and rhythm, normal S1 S2, no S3 or S4 and no murmur, click or rub   ABDOMEN: soft, nontender, no HSM or masses and bowel sounds normal  NEURO: Normal strength and tone, sensory exam grossly normal, mentation intact and speech normal    DIAGNOSTICS:   No labs or EKG required for low risk surgery (cataract, skin procedure, breast biopsy, etc)    Recent Labs   Lab Test  04/19/18   1533  04/03/18   1123   02/21/18   0845   01/22/18   1119  06/19/17   1337   HGB   --    --    --   13.6   --   13.6   --    PLT   --    --    --   178   --   231   --    INR   --    --    --   0.97   --    --    --    NA  137  129*   < >  128*   < >   --   139   POTASSIUM  4.4  4.1   < >  3.7   < >   --   4.5   CR  1.15  1.22   < >  1.00   < >   --   1.02   A1C  5.5   --    --    --    --    --   6.8*    < > = values in this interval not displayed.        IMPRESSION:   Reason for surgery/procedure: cataracts  Diagnosis/reason for consult: pre op    The proposed surgical procedure is considered LOW risk.    REVISED CARDIAC RISK INDEX  The patient has the following serious cardiovascular risks for perioperative complications such as (MI, PE, VFib and 3  AV Block):  No serious cardiac risks  INTERPRETATION: 0 risks: Class I (very low risk - 0.4% complication rate)    The patient has the following additional risks for perioperative complications:  No identified additional risks      ICD-10-CM    1. Preop general physical exam Z01.818        RECOMMENDATIONS:     --Consult hospital rounder / IM to assist  post-op medical management    1. Preop general physical exam  Okay fir surgery    2. Cataract of both eyes, unspecified cataract type  Okay frr surgery    3. Type 2 diabetes mellitus with diabetic autonomic neuropathy, without long-term current use of insulin (H)  Controlled with metformin and Tradjenta    4. MARTELL (obstructive sleep apnea)  CPap used    5. Benign essential hypertension  Controlled with amlodipine and coreg and lisinopril    --Patient is to take all scheduled medications on the day of surgery    APPROVAL GIVEN to proceed with proposed procedure, without further diagnostic evaluation       Signed Electronically by: STACY Goodman CNP    Copy of this evaluation report is provided to requesting physician.    [facility] Preop Guidelines    Revised Cardiac Risk Index

## 2018-07-17 ENCOUNTER — ANESTHESIA EVENT (OUTPATIENT)
Dept: SURGERY | Facility: CLINIC | Age: 63
End: 2018-07-17
Payer: COMMERCIAL

## 2018-07-17 ENCOUNTER — ANESTHESIA (OUTPATIENT)
Dept: SURGERY | Facility: CLINIC | Age: 63
End: 2018-07-17
Payer: COMMERCIAL

## 2018-07-17 ENCOUNTER — DOCUMENTATION ONLY (OUTPATIENT)
Dept: VASCULAR SURGERY | Facility: CLINIC | Age: 63
End: 2018-07-17

## 2018-07-17 ENCOUNTER — SURGERY (OUTPATIENT)
Age: 63
End: 2018-07-17

## 2018-07-17 ENCOUNTER — HOSPITAL ENCOUNTER (OUTPATIENT)
Facility: CLINIC | Age: 63
Discharge: HOME | End: 2018-07-17
Attending: OPHTHALMOLOGY | Admitting: OPHTHALMOLOGY
Payer: COMMERCIAL

## 2018-07-17 VITALS
RESPIRATION RATE: 16 BRPM | DIASTOLIC BLOOD PRESSURE: 72 MMHG | OXYGEN SATURATION: 97 % | SYSTOLIC BLOOD PRESSURE: 141 MMHG | TEMPERATURE: 97.3 F

## 2018-07-17 LAB — GLUCOSE BLDC GLUCOMTR-MCNC: 105 MG/DL (ref 70–99)

## 2018-07-17 PROCEDURE — 25000128 H RX IP 250 OP 636: Performed by: ANESTHESIOLOGY

## 2018-07-17 PROCEDURE — 37000009 ZZH ANESTHESIA TECHNICAL FEE, EACH ADDTL 15 MIN: Performed by: OPHTHALMOLOGY

## 2018-07-17 PROCEDURE — V2632 POST CHMBR INTRAOCULAR LENS: HCPCS | Performed by: OPHTHALMOLOGY

## 2018-07-17 PROCEDURE — 36000102 ZZH EYE SURGERY LEVEL 3 EA 15 ADDTL MIN: Performed by: OPHTHALMOLOGY

## 2018-07-17 PROCEDURE — 25000125 ZZHC RX 250: Performed by: ANESTHESIOLOGY

## 2018-07-17 PROCEDURE — 71000028 ZZH EYE RECOVERY PHASE 2 EACH 15 MINS: Performed by: OPHTHALMOLOGY

## 2018-07-17 PROCEDURE — 82962 GLUCOSE BLOOD TEST: CPT

## 2018-07-17 PROCEDURE — 27210794 ZZH OR GENERAL SUPPLY STERILE: Performed by: OPHTHALMOLOGY

## 2018-07-17 PROCEDURE — 25000128 H RX IP 250 OP 636: Performed by: OPHTHALMOLOGY

## 2018-07-17 PROCEDURE — 25000125 ZZHC RX 250: Performed by: OPHTHALMOLOGY

## 2018-07-17 PROCEDURE — 40000170 ZZH STATISTIC PRE-PROCEDURE ASSESSMENT II: Performed by: OPHTHALMOLOGY

## 2018-07-17 PROCEDURE — 25000128 H RX IP 250 OP 636: Performed by: NURSE ANESTHETIST, CERTIFIED REGISTERED

## 2018-07-17 PROCEDURE — 36000101 ZZH EYE SURGERY LEVEL 3 1ST 30 MIN: Performed by: OPHTHALMOLOGY

## 2018-07-17 PROCEDURE — 37000008 ZZH ANESTHESIA TECHNICAL FEE, 1ST 30 MIN: Performed by: OPHTHALMOLOGY

## 2018-07-17 DEVICE — EYE IMP IOL AMO PCL TECNIS ZCB00 17.5: Type: IMPLANTABLE DEVICE | Site: EYE | Status: FUNCTIONAL

## 2018-07-17 RX ORDER — PROPARACAINE HYDROCHLORIDE 5 MG/ML
1 SOLUTION/ DROPS OPHTHALMIC ONCE
Status: COMPLETED | OUTPATIENT
Start: 2018-07-17 | End: 2018-07-17

## 2018-07-17 RX ORDER — TROPICAMIDE 10 MG/ML
1 SOLUTION/ DROPS OPHTHALMIC
Status: COMPLETED | OUTPATIENT
Start: 2018-07-17 | End: 2018-07-17

## 2018-07-17 RX ORDER — PHENYLEPHRINE HYDROCHLORIDE 25 MG/ML
1 SOLUTION/ DROPS OPHTHALMIC
Status: COMPLETED | OUTPATIENT
Start: 2018-07-17 | End: 2018-07-17

## 2018-07-17 RX ORDER — DICLOFENAC SODIUM 1 MG/ML
1 SOLUTION/ DROPS OPHTHALMIC
Status: COMPLETED | OUTPATIENT
Start: 2018-07-17 | End: 2018-07-17

## 2018-07-17 RX ORDER — ONDANSETRON 2 MG/ML
INJECTION INTRAMUSCULAR; INTRAVENOUS PRN
Status: DISCONTINUED | OUTPATIENT
Start: 2018-07-17 | End: 2018-07-17

## 2018-07-17 RX ORDER — BALANCED SALT SOLUTION 6.4; .75; .48; .3; 3.9; 1.7 MG/ML; MG/ML; MG/ML; MG/ML; MG/ML; MG/ML
SOLUTION OPHTHALMIC PRN
Status: DISCONTINUED | OUTPATIENT
Start: 2018-07-17 | End: 2018-07-17 | Stop reason: HOSPADM

## 2018-07-17 RX ORDER — MOXIFLOXACIN 5 MG/ML
1 SOLUTION/ DROPS OPHTHALMIC
Status: COMPLETED | OUTPATIENT
Start: 2018-07-17 | End: 2018-07-17

## 2018-07-17 RX ORDER — SODIUM CHLORIDE, SODIUM LACTATE, POTASSIUM CHLORIDE, CALCIUM CHLORIDE 600; 310; 30; 20 MG/100ML; MG/100ML; MG/100ML; MG/100ML
INJECTION, SOLUTION INTRAVENOUS CONTINUOUS
Status: DISCONTINUED | OUTPATIENT
Start: 2018-07-17 | End: 2018-07-17 | Stop reason: HOSPADM

## 2018-07-17 RX ORDER — PROPARACAINE HYDROCHLORIDE 5 MG/ML
1 SOLUTION/ DROPS OPHTHALMIC ONCE
Status: DISCONTINUED | OUTPATIENT
Start: 2018-07-17 | End: 2018-07-17 | Stop reason: HOSPADM

## 2018-07-17 RX ORDER — LIDOCAINE HYDROCHLORIDE 10 MG/ML
INJECTION, SOLUTION EPIDURAL; INFILTRATION; INTRACAUDAL; PERINEURAL PRN
Status: DISCONTINUED | OUTPATIENT
Start: 2018-07-17 | End: 2018-07-17 | Stop reason: HOSPADM

## 2018-07-17 RX ORDER — NEOMYCIN SULFATE, POLYMYXIN B SULFATE, AND DEXAMETHASONE 3.5; 10000; 1 MG/G; [USP'U]/G; MG/G
OINTMENT OPHTHALMIC PRN
Status: DISCONTINUED | OUTPATIENT
Start: 2018-07-17 | End: 2018-07-17 | Stop reason: HOSPADM

## 2018-07-17 RX ADMIN — LIDOCAINE HYDROCHLORIDE 1 ML: 10 INJECTION, SOLUTION EPIDURAL; INFILTRATION; INTRACAUDAL; PERINEURAL at 10:13

## 2018-07-17 RX ADMIN — EPINEPHRINE 500 ML: 1 INJECTION, SOLUTION, CONCENTRATE INTRAVENOUS at 10:12

## 2018-07-17 RX ADMIN — ONDANSETRON 4 MG: 2 INJECTION INTRAMUSCULAR; INTRAVENOUS at 10:00

## 2018-07-17 RX ADMIN — MOXIFLOXACIN 1 DROP: 5 SOLUTION/ DROPS OPHTHALMIC at 08:41

## 2018-07-17 RX ADMIN — CEFUROXIME 0.1 ML: 1.5 INJECTION, POWDER, FOR SOLUTION INTRAVENOUS at 10:24

## 2018-07-17 RX ADMIN — LIDOCAINE HYDROCHLORIDE 2 DROP: 35 GEL OPHTHALMIC at 10:12

## 2018-07-17 RX ADMIN — TROPICAMIDE 1 DROP: 10 SOLUTION/ DROPS OPHTHALMIC at 08:52

## 2018-07-17 RX ADMIN — PHENYLEPHRINE HYDROCHLORIDE 1 DROP: 2.5 SOLUTION/ DROPS OPHTHALMIC at 08:52

## 2018-07-17 RX ADMIN — NEOMYCIN SULFATE, POLYMYXIN B SULFATE, AND DEXAMETHASONE 0.5 G: 3.5; 10000; 1 OINTMENT OPHTHALMIC at 10:24

## 2018-07-17 RX ADMIN — MOXIFLOXACIN 1 DROP: 5 SOLUTION/ DROPS OPHTHALMIC at 08:46

## 2018-07-17 RX ADMIN — TROPICAMIDE 1 DROP: 10 SOLUTION/ DROPS OPHTHALMIC at 08:46

## 2018-07-17 RX ADMIN — DICLOFENAC SODIUM 1 DROP: 1 SOLUTION OPHTHALMIC at 08:52

## 2018-07-17 RX ADMIN — BALANCED SALT SOLUTION 15 ML: 6.4; .75; .48; .3; 3.9; 1.7 SOLUTION OPHTHALMIC at 10:12

## 2018-07-17 RX ADMIN — DICLOFENAC SODIUM 1 DROP: 1 SOLUTION OPHTHALMIC at 08:41

## 2018-07-17 RX ADMIN — PHENYLEPHRINE HYDROCHLORIDE 1 DROP: 2.5 SOLUTION/ DROPS OPHTHALMIC at 08:46

## 2018-07-17 RX ADMIN — PROPARACAINE HYDROCHLORIDE 1 DROP: 5 SOLUTION/ DROPS OPHTHALMIC at 08:41

## 2018-07-17 RX ADMIN — APRACLONIDINE HYDROCHLORIDE 1 DROP: 10 SOLUTION/ DROPS OPHTHALMIC at 10:24

## 2018-07-17 RX ADMIN — LIDOCAINE HYDROCHLORIDE 1 ML: 10 INJECTION, SOLUTION EPIDURAL; INFILTRATION; INTRACAUDAL; PERINEURAL at 09:03

## 2018-07-17 RX ADMIN — SODIUM CHONDROITIN SULFATE / SODIUM HYALURONATE 1 ML: 0.55-0.5 INJECTION INTRAOCULAR at 10:13

## 2018-07-17 RX ADMIN — TROPICAMIDE 1 DROP: 10 SOLUTION/ DROPS OPHTHALMIC at 08:41

## 2018-07-17 RX ADMIN — PHENYLEPHRINE HYDROCHLORIDE 1 DROP: 2.5 SOLUTION/ DROPS OPHTHALMIC at 08:41

## 2018-07-17 RX ADMIN — DICLOFENAC SODIUM 1 DROP: 1 SOLUTION OPHTHALMIC at 08:46

## 2018-07-17 RX ADMIN — SODIUM CHLORIDE, POTASSIUM CHLORIDE, SODIUM LACTATE AND CALCIUM CHLORIDE: 600; 310; 30; 20 INJECTION, SOLUTION INTRAVENOUS at 09:03

## 2018-07-17 RX ADMIN — MIDAZOLAM 2 MG: 1 INJECTION INTRAMUSCULAR; INTRAVENOUS at 10:00

## 2018-07-17 RX ADMIN — MOXIFLOXACIN 1 DROP: 5 SOLUTION/ DROPS OPHTHALMIC at 08:52

## 2018-07-17 NOTE — OP NOTE
PREOPERATIVE DIAGNOSIS:  Visually significant cataract, Right eye.       POSTOPERATIVE DIAGNOSIS:  Visually significant cataract, Right eye.       PROCEDURE:  Phacoemulsification with intraocular lens implant, Right eye.       ANESTHESIA:  Topical with monitored anesthesia care.       COMPLICATIONS:  None.       INDICATIONS:  Syd Joyce is a 63 year old patient complaining of gradual painless loss of vision in the Right  Eye over the last several months.  This interferes with his activities of daily living.  On examination he was found to have best-corrected visual acuity of 20/50 with a nuclear sclerotic, cortical and posterior subcapsular cataract. After careful discussion regarding the risks, benefits and alternatives of  cataract surgery with intraocular lens implant, the patient elected to proceed, and informed written consent was obtained.        PROCEDURE:  On the day of surgery, the patient was identified in the preoperative waiting area.  An interval history was obtained and was unremarkable for change.  All sources indicated that the Right eye was the correct surgical site and a brady placed above the Right eye.  A drop of proparacaine followed by Betadine and lidocaine jelly were placed on the ocular surface.         The patient was escorted to the operating room where monitored intravenous sedation was administered.  The eye was prepped and draped in the usual sterile fashion for ophthalmic surgery and a lid speculum placed.  After confirming adequate anesthesia, a paracentesis port was made at the 10 o'clock position and used to instill intracameral lidocaine into the anterior chamber, followed by viscoelastic.  A temporal clear corneal wound was fashioned first with a crescent blade followed by a 2.5 mm keratome.   An anterior capsulorrhexis was made by raising a flap with a cystotome and then carrying out the flap in a continuous curvilinear fashion using Utrata forceps.  Hydrodissection was  accomplished with balanced salt solution on a flat tipped cannula.  Phaco-emulsification was done in a modified stop and chop technique, removing the nucleus without complication.  Residual cortex was removed with the automated irrigation and aspiration handpiece.  The back of the polymer tip was used to gently polish the posterior capsule.  The capsular bag was then reinflated with Provisc and a 17.5 diopter ALONSO lens model ZCBOO was placed in the capsular bag using the [place] injector.  The residual viscoelastic was removed with the automated irrigation and aspiration handpiece.  The lens was well centered and the pupil round.  ReSure corneal adhesive was used to re-oppose the edges of the clear corneal wound. All wounds were checked and found to be watertight. Cefuroxime solution was instilled into the anterior chamber via the paracentesis port. A drop of Betadine, gatifloxacin/prednisolone/bromfenec, and Iopodine solutions followed by Maxitrol ointment were placed on the ocular surface and the eye patched and shielded.  The patient was escorted to the recovery room in good condition having tolerated the procedure well.  There were no complications.      Implant Name Type Inv. Item Serial No.  Lot No. LRB No. Used   EYE IMP IOL ALONSO PCL TECNIS ZCB00 17.5 Lens/Eye Implant EYE IMP IOL ALONSO PCL TECNIS ZCB00 17.5 [id number] [employer]   Right 1            ELIE ARIAS MD

## 2018-07-17 NOTE — ANESTHESIA POSTPROCEDURE EVALUATION
Patient: Syd Joyce    Procedure(s):  RIGHT EYE PHACOEMULSIFICATION CLEAR CORNEA WITH STANDARD INTRAOCULAR LENS IMPLANT - Wound Class: I-Clean    Diagnosis:cataract  Diagnosis Additional Information: No value filed.    Anesthesia Type:  MAC    Note:  Anesthesia Post Evaluation    Patient location during evaluation: PACU  Patient participation: Able to fully participate in evaluation  Level of consciousness: awake and alert  Pain management: adequate  Airway patency: patent  Cardiovascular status: acceptable  Respiratory status: acceptable and unassisted  Hydration status: acceptable  PONV: none             Last vitals:  Vitals:    07/17/18 0846 07/17/18 1037 07/17/18 1051   BP: 139/73 139/82 141/72   Resp: 16 16 16   Temp: 36.3  C (97.3  F)     SpO2: 97% 96% 97%         Electronically Signed By: Ale Soliman MD  July 17, 2018  10:54 AM

## 2018-07-17 NOTE — IP AVS SNAPSHOT
MRN:[id number]                      After Visit Summary   7/17/2018    Syd Joyce    MRN: [id number]           Thank you!     Thank you for choosing [facility] for your care. Our goal is always to provide you with excellent care. Hearing back from our patients is one way we can continue to improve our services. Please take a few minutes to complete the written survey that you may receive in the mail after you visit with us. Thank you!        Patient Information     Date Of Birth          1955        About your hospital stay     You were admitted on:  July 17, 2018 You last received care in the:  [facility]    You were discharged on:  July 17, 2018       Who to Call     For medical emergencies, please call 911.  For non-urgent questions about your medical care, please call your primary care provider or clinic, [phone]  For questions related to your surgery, please call your surgery clinic        Attending Provider     Provider Specialty    Elsie Kay MD Ophthalmology       Primary Care Provider Office Phone # Fax #    Shan Arenas [phone]      Your next 10 appointments already scheduled     Jul 31, 2018   Procedure with Elsie Kay MD   [facility] (--)    [address]  [facility] [address] [phone]              Further instructions from your care team       [facility] Discharge  Instructions  Anesthesia ([facility])   Adult Discharge Instructions    For 24 hours after surgery    1. Get plenty of rest.  Make arrangements to have a responsible adult stay with you for at least 24 hours after you leave the hospital.  2. Do not drive or use heavy equipment for 24 hours.    3. Do not drink alcohol for 24 hours.  4. Do not sign legal documents or make important decisions for 24 hours.  5. Avoid strenuous or risky activities. You may feel lightheaded.   If so, sit for a few minutes before standing.  Have someone help you get up.   6. Conscious sedation patients may resume a regular diet..  7. Any questions of medical nature, call your physician.     POST-OPERATIVE CARE FOLLOWING CATARACT SURGERY    ELSIE KAY M.D.  [facility]  [phone]        If you have a gauze patch and plastic eye shield on, please do not remove it until it is removed by your physician at your first post-operative visit.  You will not start your drops until after having your patch removed by your physician unless instructed otherwise.      Wear the eye shield when sleeping for protection for one week after surgery.      Do not rub the operated eye.      Light sensitivity may be noticed. Sunglasses may be worn for comfort.      Some discomfort and irritation may be noticed. Acetaminophen (Tylenol) or Ibuprofen (Advil) may be taken for discomfort.      Avoid bending over, strenuous activity or heavy lifting (30 pounds or more) for one week.      No make-up or creams/lotions around the eye for two weeks.      You may wash your hair, bathe or shower, but keep the operated eye closed while doing so.  No swimming for three weeks.      Use medication exactly as prescribed by your doctor.  You may restart your regular home medications.      Bring all materials and medications to the clinic on your first post-operative visit.      Call the doctor s office if any of the following should occur:  -  any sudden vision change  -  increased amount of floaters (black spots in front of vision) or flashes  -  nausea, severe headache, or eye pain          -  or signs of infection (pus, increasing redness or tenderness)          Pending Results     No orders found from 7/15/2018 to 7/18/2018.            Admission Information     Date & Time Provider Department Dept. Phone    7/17/2018 Elsie Kay MD [facility] [phone]      Your Vitals Were     Blood Pressure  Temperature Respirations Pulse Oximetry          139/82 97.3  F (36.3  C) (Temporal) 16 96%        [employer] Information     [employer] gives you secure access to your electronic health record. If you see a primary care provider, you can also send messages to your care team and make appointments. If you have questions, please call your primary care clinic.  If you do not have a primary care provider, please call [phone] and they will assist you.        Care EveryWhere ID     This is your Care EveryWhere ID. This could be used by other organizations to access your [place] medical records  [id number]        Equal Access to Services     [facility]: Kimberlyn Garg, steven nixon, brenna church, jose kang . So [facility] [phone].    ATENCIÓN: Si habla español, tiene a persaud disposición servicios gratuitos de asistencia lingüística. Llame al [phone].    We comply with applicable federal civil rights laws and [place] laws. We do not discriminate on the basis of race, color, national origin, age, disability, sex, sexual orientation, or gender identity.               Review of your medicines      UNREVIEWED medicines. Ask your doctor about these medicines        Dose / Directions    amLODIPine 10 MG tablet   Commonly known as:  NORVASC   Used for:  Essential hypertension with goal blood pressure less than 130/80        TAKE 1 TABLET(10 MG) BY MOUTH DAILY   Quantity:  90 tablet   Refills:  3       aspirin 81 MG EC tablet   Used for:  Atherosclerosis of native coronary artery of native heart without angina pectoris        Dose:  81 mg   Take 1 tablet (81 mg) by mouth daily Start tomorrow morning.   Quantity:  90 tablet   Refills:  3       carvedilol 25 MG tablet   Commonly known as:  COREG        Dose:  50 mg   Take 2 tablets (50 mg) by mouth 2 times daily Hold IF heart rate less than 55.   Quantity:  360 tablet   Refills:  4       cyclobenzaprine 10 MG  tablet   Commonly known as:  FLEXERIL   Used for:  S/P lumbar laminectomy        Dose:  5-10 mg   Take 0.5-1 tablets (5-10 mg) by mouth 3 times daily as needed for muscle spasms   Quantity:  90 tablet   Refills:  1       lisinopril-hydrochlorothiazide 20-12.5 MG per tablet   Commonly known as:  PRINZIDE/ZESTORETIC   Used for:  Essential hypertension with goal blood pressure less than 130/80        TAKE 1 TABLET BY MOUTH DAILY   Quantity:  90 tablet   Refills:  3       losartan 100 MG tablet   Commonly known as:  COZAAR   Used for:  Essential hypertension with goal blood pressure less than 130/80        TAKE 1 TABLET BY MOUTH EVERY DAY   Quantity:  90 tablet   Refills:  3       metFORMIN 500 MG 24 hr tablet   Commonly known as:  GLUCOPHAGE-XR   Used for:  Type 2 diabetes mellitus with diabetic autonomic neuropathy, without long-term current use of insulin (H)        TAKE 2 TABLETS BY MOUTH EVERY MORNING   Quantity:  180 tablet   Refills:  3       pantoprazole 40 MG EC tablet   Commonly known as:  PROTONIX   Used for:  Encounter for medication refill        TAKE 1 TABLET(40 MG) BY MOUTH DAILY   Quantity:  90 tablet   Refills:  3       sildenafil 20 MG tablet   Commonly known as:  REVATIO   Used for:  Erectile dysfunction, unspecified erectile dysfunction type        TAKE 2 TO 4 TABLETS BY MOUTH AS NEEDED NEVER USE WITH NITROGLYCERIN, TERAZOSIN, OR DOXAZOSIN. USE AS DIRECTED   Quantity:  90 tablet   Refills:  1       simvastatin 20 MG tablet   Commonly known as:  ZOCOR        Dose:  20 mg   Take 1 tablet (20 mg) by mouth every evening   Quantity:  90 tablet   Refills:  4       TRADJENTA 5 MG Tabs tablet   Used for:  Type 2 diabetes mellitus with diabetic autonomic neuropathy, without long-term current use of insulin (H)   Generic drug:  linagliptin        TAKE 1 TABLET(5 MG) BY MOUTH DAILY   Quantity:  90 tablet   Refills:  3       TUMS PO        Dose:  2 chew tab   Take 2 chew tab by mouth as needed   Refills:  0        zolpidem 10 MG tablet   Commonly known as:  AMBIEN   Used for:  MARTELL (obstructive sleep apnea)        Dose:  10 mg   Take 1 tablet (10 mg) by mouth At Bedtime   Quantity:  30 tablet   Refills:  5         CONTINUE these medicines which have NOT CHANGED        Dose / Directions    blood glucose monitoring meter device kit   Used for:  Refill clinic medication management patient        Use to test blood sugar 4 to 5 times weekly or as directed.   Quantity:  1 kit   Refills:  0       blood glucose monitoring test strip   Commonly known as:  ACCU-CHEK MED PLUS   Used for:  Refill clinic medication management patient        USE 4 TO 5 TIMES PER WEEK OR AS DIRECTED   Quantity:  150 each   Refills:  3                Protect others around you: Learn how to safely use, store and throw away your medicines at www.disposemymeds.org.             Medication List: This is a list of all your medications and when to take them. Check marks below indicate your daily home schedule. Keep this list as a reference.      Medications           Morning Afternoon Evening Bedtime As Needed    amLODIPine 10 MG tablet   Commonly known as:  NORVASC   TAKE 1 TABLET(10 MG) BY MOUTH DAILY                                aspirin 81 MG EC tablet   Take 1 tablet (81 mg) by mouth daily Start tomorrow morning.                                blood glucose monitoring meter device kit   Use to test blood sugar 4 to 5 times weekly or as directed.                                blood glucose monitoring test strip   Commonly known as:  ACCU-CHEK MED PLUS   USE 4 TO 5 TIMES PER WEEK OR AS DIRECTED                                carvedilol 25 MG tablet   Commonly known as:  COREG   Take 2 tablets (50 mg) by mouth 2 times daily Hold IF heart rate less than 55.                                cyclobenzaprine 10 MG tablet   Commonly known as:  FLEXERIL   Take 0.5-1 tablets (5-10 mg) by mouth 3 times daily as needed for muscle spasms                                 lisinopril-hydrochlorothiazide 20-12.5 MG per tablet   Commonly known as:  PRINZIDE/ZESTORETIC   TAKE 1 TABLET BY MOUTH DAILY                                losartan 100 MG tablet   Commonly known as:  COZAAR   TAKE 1 TABLET BY MOUTH EVERY DAY                                metFORMIN 500 MG 24 hr tablet   Commonly known as:  GLUCOPHAGE-XR   TAKE 2 TABLETS BY MOUTH EVERY MORNING                                pantoprazole 40 MG EC tablet   Commonly known as:  PROTONIX   TAKE 1 TABLET(40 MG) BY MOUTH DAILY                                sildenafil 20 MG tablet   Commonly known as:  REVATIO   TAKE 2 TO 4 TABLETS BY MOUTH AS NEEDED NEVER USE WITH NITROGLYCERIN, TERAZOSIN, OR DOXAZOSIN. USE AS DIRECTED                                simvastatin 20 MG tablet   Commonly known as:  ZOCOR   Take 1 tablet (20 mg) by mouth every evening                                TRADJENTA 5 MG Tabs tablet   TAKE 1 TABLET(5 MG) BY MOUTH DAILY   Generic drug:  linagliptin                                TUMS PO   Take 2 chew tab by mouth as needed                                zolpidem 10 MG tablet   Commonly known as:  AMBIEN   Take 1 tablet (10 mg) by mouth At Bedtime

## 2018-07-17 NOTE — IP AVS SNAPSHOT
[facility]    [address]    [place] [address]    Phone:  [phone]    Fax:  [phone]                                       After Visit Summary   7/17/2018    Syd Joyce    MRN: [id number]           After Visit Summary Signature Page     I have received my discharge instructions, and my questions have been answered. I have discussed any challenges I see with this plan with the nurse or doctor.    ..........................................................................................................................................  Patient/Patient Representative Signature      ..........................................................................................................................................  Patient Representative Print Name and Relationship to Patient    ..................................................               ................................................  Date                                            Time    ..........................................................................................................................................  Reviewed by Signature/Title    ...................................................              ..............................................  Date                                                            Time

## 2018-07-17 NOTE — ANESTHESIA CARE TRANSFER NOTE
Patient: Syd Joyce    Procedure(s):  RIGHT EYE PHACOEMULSIFICATION CLEAR CORNEA WITH STANDARD INTRAOCULAR LENS IMPLANT - Wound Class: I-Clean    Diagnosis: cataract  Diagnosis Additional Information: No value filed.    Anesthesia Type:   MAC     Note:  Airway :Room Air  Patient transferred to:PACU  Comments: [facility] to [facility] recovery room in recliner with armrests up, spontaneous respirations, O2 saturation maintained greater than 98% with oxygen via room air. All monitors and alarms on and functioning, clinically stable vital signs. Report given to recovery RN and questions answered. Patient alert and following verbal directions.Handoff Report: Identifed the Patient, Identified the Reponsible Provider, Reviewed the pertinent medical history, Discussed the surgical course, Reviewed Intra-OP anesthesia mangement and issues during anesthesia, Set expectations for post-procedure period and Allowed opportunity for questions and acknowledgement of understanding      Vitals: (Last set prior to Anesthesia Care Transfer)    CRNA VITALS  7/17/2018 1001 - 7/17/2018 1032      7/17/2018             NIBP: 144/74    Pulse: 82    NIBP Mean: 99    Ht Rate: 80    SpO2: 96 %    Resp Rate (set): 10                Electronically Signed By: STACY Tucker CRNA  July 17, 2018  10:32 AM

## 2018-07-17 NOTE — OR NURSING
Post op instructions reviewed with pt and responsible adult.  All questions answered. VSS, denies pain.  D/C'd to home.

## 2018-07-17 NOTE — DISCHARGE INSTRUCTIONS
[facility] Discharge  Instructions  Anesthesia ([facility])   Adult Discharge Instructions    For 24 hours after surgery    1. Get plenty of rest.  Make arrangements to have a responsible adult stay with you for at least 24 hours after you leave the hospital.  2. Do not drive or use heavy equipment for 24 hours.    3. Do not drink alcohol for 24 hours.  4. Do not sign legal documents or make important decisions for 24 hours.  5. Avoid strenuous or risky activities. You may feel lightheaded.  If so, sit for a few minutes before standing.  Have someone help you get up.   6. Conscious sedation patients may resume a regular diet..  7. Any questions of medical nature, call your physician.     POST-OPERATIVE CARE FOLLOWING CATARACT SURGERY    ELIE ARIAS M.D.  [facility]  [phone]        If you have a gauze patch and plastic eye shield on, please do not remove it until it is removed by your physician at your first post-operative visit.  You will not start your drops until after having your patch removed by your physician unless instructed otherwise.      Wear the eye shield when sleeping for protection for one week after surgery.      Do not rub the operated eye.      Light sensitivity may be noticed. Sunglasses may be worn for comfort.      Some discomfort and irritation may be noticed. Acetaminophen (Tylenol) or Ibuprofen (Advil) may be taken for discomfort.      Avoid bending over, strenuous activity or heavy lifting (30 pounds or more) for one week.      No make-up or creams/lotions around the eye for two weeks.      You may wash your hair, bathe or shower, but keep the operated eye closed while doing so.  No swimming for three weeks.      Use medication exactly as prescribed by your doctor.  You may restart your regular home medications.      Bring all materials and medications to the clinic on your first post-operative visit.      Call the doctor s office  if any of the following should occur:  -  any sudden vision change  -  increased amount of floaters (black spots in front of vision) or flashes  -  nausea, severe headache, or eye pain          -  or signs of infection (pus, increasing redness or tenderness)

## 2018-07-17 NOTE — ANESTHESIA PREPROCEDURE EVALUATION
Anesthesia Evaluation     . Pt has had prior anesthetic.     No history of anesthetic complications          ROS/MED HX    ENT/Pulmonary:  - neg pulmonary ROS   (+)sleep apnea, doesn't use CPAP , . .    Neurologic:  - neg neurologic ROS     Cardiovascular:     (+) Dyslipidemia, hypertension----. : . . . :. .       METS/Exercise Tolerance:     Hematologic:  - neg hematologic  ROS       Musculoskeletal:   (+) arthritis, , , -       GI/Hepatic:     (+) GERD Asymptomatic on medication,       Renal/Genitourinary:         Endo:     (+) type II DM Not using insulin Obesity, .      Psychiatric:  - neg psychiatric ROS       Infectious Disease:         Malignancy:         Other:                     Physical Exam  Normal systems: cardiovascular, pulmonary and dental    Airway   Mallampati: I  TM distance: >3 FB  Neck ROM: full    Dental     Cardiovascular       Pulmonary                     Anesthesia Plan      History & Physical Review  History and physical reviewed and following examination; no interval change.    ASA Status:  2 .    NPO Status:  > 8 hours    Plan for MAC Reason for MAC:  Procedure to face, neck, head or breast  PONV prophylaxis:  Ondansetron (or other 5HT-3)       Postoperative Care  Postoperative pain management:  Oral pain medications.      Consents  Anesthetic plan, risks, benefits and alternatives discussed with:  Patient..                          .

## 2018-07-27 DIAGNOSIS — G47.33 OSA (OBSTRUCTIVE SLEEP APNEA): ICD-10-CM

## 2018-07-27 NOTE — TELEPHONE ENCOUNTER
zolpidem (AMBIEN) 10 MG   LAST  Med check 7/10/18   last labs(for RX) 4/19/18  Next  appt scheduled =  none  Rylie Clark MA

## 2018-07-27 NOTE — H&P (VIEW-ONLY)
[facility]  [address] [place] [id number] [phone]  Dept: [phone]    PRE-OP EVALUATION:  Today's date: 7/10/2018    Syd Joyce (: 1955) presents for pre-operative evaluation assessment as requested by Dr. Kay.  He requires evaluation and anesthesia risk assessment prior to undergoing surgery/procedure for treatment of bilateral cataracts .    Proposed Surgery/ Procedure: bilateral  EYES PHACOEMULSIFICATION CLEAR CORNEA WITH STANDARD INTRAOCULAR LENS IMPLANT (TOPICAL)  Date of Surgery/ Procedure: 18- right and 18 left  Time of Surgery/ Procedure: 18 @ 0945 am and 18 @ 0945 am  Hospital/Surgical Facility: [facility]       [facility] [phone]    Primary Physician: Shan Arenas/Yina VYAS today  Type of Anesthesia Anticipated: combine local with MAC    Patient has a Health Care Directive or Living Will: no    1. NO - Do you have a history of heart attack, stroke, stent, bypass or surgery on an artery in the head, neck, heart or legs?  2. NO - Do you ever have any pain or discomfort in your chest?  3. NO - Do you have a history of  Heart Failure?  4. NO - Are you troubled by shortness of breath when: walking on the level, up a slight hill or at night?  5. NO - Do you currently have a cold, bronchitis or other respiratory infection?  6. NO - Do you have a cough, shortness of breath or wheezing?  7. NO - Do you sometimes get pains in the calves of your legs when you walk?  8. NO - Do you or anyone in your family have previous history of blood clots?  9. NO - Do you or does anyone in your family have a serious bleeding problem such as prolonged bleeding following surgeries or cuts?  10. NO - Have you ever had problems with anemia or been told to take iron pills?  11. NO - Have you had any abnormal blood loss such as black, tarry or bloody stools, or abnormal vaginal bleeding?  12. NO - Have you ever had a blood  transfusion?  13. NO - Have you or any of your relatives ever had problems with anesthesia?  14. NO - Do you have sleep apnea, excessive snoring or daytime drowsiness?  15. NO - Do you have any prosthetic heart valves?  16. NO - Do you have prosthetic joints?  17. NO - Is there any chance that you may be pregnant?      HPI:     HPI related to upcoming procedure: cataracts     DIABETES - Patient has a longstanding history of DiabetesType Type II . Patient is being treated with oral agents and denies significant side effects. Control has been good. Complicating factors include but are not limited to: hypertension.     Last A1C 5.5 on 4/19/18.                                                                                                                       .  HYPERTENSION - Patient has longstanding history of HTN , currently denies any symptoms referable to elevated blood pressure. Specifically denies chest pain, palpitations, dyspnea, orthopnea, PND or peripheral edema. Blood pressure readings have been in normal range. Current medication regimen is as listed below. Patient denies any side effects of medication.                                                                                                                                                                                          .    MEDICAL HISTORY:     Patient Active Problem List    Diagnosis Date Noted     MARTELL (obstructive sleep apnea) 06/21/2018     Priority: Medium     Impression/Plan:     (G47.33) MARTELL (obstructive sleep apnea)  (primary encounter diagnosis), (G47.34) Nocturnal hypoxemia  Comment: Moderate MARTELL, AHI 21/hr, independent of position, worse in REM. Significant hypoxemia. Low oxygen baseline in stage N3, possibly an artifact, but seems true since it happened both times that he was in stage N3.  Plan: Comprehensive DME, Overnight oximetry study        Auto CPAP 5-15 cm, heated humidifier and compliance meter. The patient was informed  of the mask exchange policy and the compliance goals. They were also informed that they would be followed by the sleep therapy management team during the first month of CPAP use. An order was placed for oximetry on CPAP to assess for residual hypoxemia.       Leg weakness, bilateral 10/04/2017     Priority: Medium     Loss of balance 10/04/2017     Priority: Medium     S/P lumbar spinal fusion 10/04/2017     Priority: Medium     S/P lumbar laminectomy 06/30/2017     Priority: Medium     Type 2 diabetes mellitus with hyperlipidemia (H) 06/19/2017     Priority: Medium     Type 2 diabetes mellitus with diabetic autonomic neuropathy, without long-term current use of insulin (H) 10/20/2016     Priority: Medium     Mostly in the medial lower leg.       Insomnia 09/11/2013     Priority: Medium     Esophageal reflux 06/15/2012     Priority: Medium     Advanced directives, counseling/discussion 06/15/2012     Priority: Medium     As is reasonable care for Most of us, wants in the Short term aggressive care.   No desire for long term prolongation of life through artificial means if no hope to bring back to a reasonable status.          Benign essential hypertension 08/12/2011     Priority: Medium     Mixed hyperlipidemia 08/12/2011     Priority: Medium      Past Medical History:   Diagnosis Date     DM2 (diabetes mellitus, type 2) (H)      Esophageal reflux 6/15/2012     HTN (hypertension) 8/12/2011     Leg pain, bilateral      Low back pain      Numbness and tingling     LEGS, R/T LUMBAR STENOSIS     Obesity, unspecified 1/10/2014     Past Surgical History:   Procedure Laterality Date     COLONOSCOPY       DISCECTOMY LUMBAR POSTERIOR MICROSCOPIC TWO LEVELS N/A 6/30/2017    Procedure: DISCECTOMY LUMBAR POSTERIOR MICROSCOPIC TWO LEVELS;  L3-4 L4-5 POSTERIOR DECOMPRESSION AND INTERSPINUS FIXATION WITHOUT FUSION;  Surgeon: Ray Perez MD;  Location:  OR     [facility]       Current Outpatient Prescriptions    Medication Sig Dispense Refill     amLODIPine (NORVASC) 10 MG tablet TAKE 1 TABLET(10 MG) BY MOUTH DAILY 90 tablet 3     aspirin 81 MG EC tablet Take 1 tablet (81 mg) by mouth daily Start tomorrow morning. 90 tablet 3     blood glucose monitoring (ACCU-CHEK MED PLUS) meter device kit Use to test blood sugar 4 to 5 times weekly or as directed. 1 kit 0     blood glucose monitoring (ACCU-CHEK MED PLUS) test strip USE 4 TO 5 TIMES PER WEEK OR AS DIRECTED 150 each 3     Calcium Carbonate Antacid (TUMS PO) Take 2 chew tab by mouth as needed        carvedilol (COREG) 25 MG tablet Take 2 tablets (50 mg) by mouth 2 times daily Hold IF heart rate less than 55. 360 tablet 4     cyclobenzaprine (FLEXERIL) 10 MG tablet Take 0.5-1 tablets (5-10 mg) by mouth 3 times daily as needed for muscle spasms 90 tablet 1     lisinopril-hydrochlorothiazide (PRINZIDE/ZESTORETIC) 20-12.5 MG per tablet TAKE 1 TABLET BY MOUTH DAILY 90 tablet 3     losartan (COZAAR) 100 MG tablet TAKE 1 TABLET BY MOUTH EVERY DAY 90 tablet 3     metFORMIN (GLUCOPHAGE-XR) 500 MG 24 hr tablet TAKE 2 TABLETS BY MOUTH EVERY MORNING 180 tablet 0     pantoprazole (PROTONIX) 40 MG EC tablet TAKE 1 TABLET(40 MG) BY MOUTH DAILY 90 tablet 3     sildenafil (REVATIO) 20 MG tablet TAKE 2 TO 4 TABLETS BY MOUTH AS NEEDED NEVER USE WITH NITROGLYCERIN, TERAZOSIN, OR DOXAZOSIN. USE AS DIRECTED 90 tablet 1     simvastatin (ZOCOR) 20 MG tablet Take 1 tablet (20 mg) by mouth every evening 90 tablet 4     TRADJENTA 5 MG TABS tablet TAKE 1 TABLET(5 MG) BY MOUTH DAILY 90 tablet 3     zolpidem (AMBIEN) 10 MG tablet Take 1 tablet (10 mg) by mouth At Bedtime 30 tablet 5     OTC products: Aspirin    No Known Allergies   Latex Allergy: NO    Social History   Substance Use Topics     Smoking status: Never Smoker     Smokeless tobacco: Current User     Alcohol use Yes      Comment: RARELY     History   Drug Use No       REVIEW OF SYSTEMS:   CONSTITUTIONAL: NEGATIVE for fever, chills,  change in weight  ENT/MOUTH: NEGATIVE for ear, mouth and throat problems  RESP: NEGATIVE for significant cough or SOB  CV: NEGATIVE for chest pain, palpitations or peripheral edema      EXAM:   There were no vitals taken for this visit.  GENERAL APPEARANCE: healthy, alert and no distress  HENT: ear canals and TM's normal and nose and mouth without ulcers or lesions  RESP: lungs clear to auscultation - no rales, rhonchi or wheezes  CV: regular rate and rhythm, normal S1 S2, no S3 or S4 and no murmur, click or rub   ABDOMEN: soft, nontender, no HSM or masses and bowel sounds normal  NEURO: Normal strength and tone, sensory exam grossly normal, mentation intact and speech normal    DIAGNOSTICS:   No labs or EKG required for low risk surgery (cataract, skin procedure, breast biopsy, etc)    Recent Labs   Lab Test  04/19/18   1533  04/03/18   1123   02/21/18   0845   01/22/18   1119  06/19/17   1337   HGB   --    --    --   13.6   --   13.6   --    PLT   --    --    --   178   --   231   --    INR   --    --    --   0.97   --    --    --    NA  137  129*   < >  128*   < >   --   139   POTASSIUM  4.4  4.1   < >  3.7   < >   --   4.5   CR  1.15  1.22   < >  1.00   < >   --   1.02   A1C  5.5   --    --    --    --    --   6.8*    < > = values in this interval not displayed.        IMPRESSION:   Reason for surgery/procedure: cataracts  Diagnosis/reason for consult: pre op    The proposed surgical procedure is considered LOW risk.    REVISED CARDIAC RISK INDEX  The patient has the following serious cardiovascular risks for perioperative complications such as (MI, PE, VFib and 3  AV Block):  No serious cardiac risks  INTERPRETATION: 0 risks: Class I (very low risk - 0.4% complication rate)    The patient has the following additional risks for perioperative complications:  No identified additional risks      ICD-10-CM    1. Preop general physical exam Z01.818        RECOMMENDATIONS:     --Consult hospital rounder / IM to assist  post-op medical management    1. Preop general physical exam  Okay fir surgery    2. Cataract of both eyes, unspecified cataract type  Okay frr surgery    3. Type 2 diabetes mellitus with diabetic autonomic neuropathy, without long-term current use of insulin (H)  Controlled with metformin and Tradjenta    4. MARTELL (obstructive sleep apnea)  CPap used    5. Benign essential hypertension  Controlled with amlodipine and coreg and lisinopril    --Patient is to take all scheduled medications on the day of surgery    APPROVAL GIVEN to proceed with proposed procedure, without further diagnostic evaluation       Signed Electronically by: STACY Goodman CNP    Copy of this evaluation report is provided to requesting physician.    [facility] Preop Guidelines    Revised Cardiac Risk Index

## 2018-07-29 ENCOUNTER — TRANSFERRED RECORDS (OUTPATIENT)
Dept: FAMILY MEDICINE | Facility: CLINIC | Age: 63
End: 2018-07-29

## 2018-07-31 ENCOUNTER — HOSPITAL ENCOUNTER (OUTPATIENT)
Facility: CLINIC | Age: 63
Discharge: HOME | End: 2018-07-31
Attending: OPHTHALMOLOGY | Admitting: OPHTHALMOLOGY
Payer: COMMERCIAL

## 2018-07-31 ENCOUNTER — ANESTHESIA EVENT (OUTPATIENT)
Dept: SURGERY | Facility: CLINIC | Age: 63
End: 2018-07-31
Payer: COMMERCIAL

## 2018-07-31 ENCOUNTER — ANESTHESIA (OUTPATIENT)
Dept: SURGERY | Facility: CLINIC | Age: 63
End: 2018-07-31
Payer: COMMERCIAL

## 2018-07-31 ENCOUNTER — SURGERY (OUTPATIENT)
Age: 63
End: 2018-07-31

## 2018-07-31 VITALS
RESPIRATION RATE: 16 BRPM | TEMPERATURE: 98 F | OXYGEN SATURATION: 98 % | SYSTOLIC BLOOD PRESSURE: 141 MMHG | DIASTOLIC BLOOD PRESSURE: 82 MMHG

## 2018-07-31 LAB — GLUCOSE BLDC GLUCOMTR-MCNC: 121 MG/DL (ref 70–99)

## 2018-07-31 PROCEDURE — 25000125 ZZHC RX 250: Performed by: OPHTHALMOLOGY

## 2018-07-31 PROCEDURE — 36000102 ZZH EYE SURGERY LEVEL 3 EA 15 ADDTL MIN: Performed by: OPHTHALMOLOGY

## 2018-07-31 PROCEDURE — 71000028 ZZH EYE RECOVERY PHASE 2 EACH 15 MINS: Performed by: OPHTHALMOLOGY

## 2018-07-31 PROCEDURE — 25000125 ZZHC RX 250: Performed by: NURSE ANESTHETIST, CERTIFIED REGISTERED

## 2018-07-31 PROCEDURE — V2632 POST CHMBR INTRAOCULAR LENS: HCPCS | Performed by: OPHTHALMOLOGY

## 2018-07-31 PROCEDURE — 37000008 ZZH ANESTHESIA TECHNICAL FEE, 1ST 30 MIN: Performed by: OPHTHALMOLOGY

## 2018-07-31 PROCEDURE — 27210794 ZZH OR GENERAL SUPPLY STERILE: Performed by: OPHTHALMOLOGY

## 2018-07-31 PROCEDURE — 37000009 ZZH ANESTHESIA TECHNICAL FEE, EACH ADDTL 15 MIN: Performed by: OPHTHALMOLOGY

## 2018-07-31 PROCEDURE — 40000169 ZZH STATISTIC PRE-PROCEDURE ASSESSMENT I: Performed by: OPHTHALMOLOGY

## 2018-07-31 PROCEDURE — 82962 GLUCOSE BLOOD TEST: CPT

## 2018-07-31 PROCEDURE — 36000101 ZZH EYE SURGERY LEVEL 3 1ST 30 MIN: Performed by: OPHTHALMOLOGY

## 2018-07-31 PROCEDURE — 25000128 H RX IP 250 OP 636: Performed by: OPHTHALMOLOGY

## 2018-07-31 PROCEDURE — 25000128 H RX IP 250 OP 636: Performed by: NURSE ANESTHETIST, CERTIFIED REGISTERED

## 2018-07-31 DEVICE — EYE IMP IOL AMO PCL TECNIS ZCB00 18.0: Type: IMPLANTABLE DEVICE | Site: EYE | Status: FUNCTIONAL

## 2018-07-31 RX ORDER — TROPICAMIDE 10 MG/ML
1 SOLUTION/ DROPS OPHTHALMIC
Status: COMPLETED | OUTPATIENT
Start: 2018-07-31 | End: 2018-07-31

## 2018-07-31 RX ORDER — ONDANSETRON 2 MG/ML
INJECTION INTRAMUSCULAR; INTRAVENOUS PRN
Status: DISCONTINUED | OUTPATIENT
Start: 2018-07-31 | End: 2018-07-31

## 2018-07-31 RX ORDER — BALANCED SALT SOLUTION 6.4; .75; .48; .3; 3.9; 1.7 MG/ML; MG/ML; MG/ML; MG/ML; MG/ML; MG/ML
SOLUTION OPHTHALMIC PRN
Status: DISCONTINUED | OUTPATIENT
Start: 2018-07-31 | End: 2018-07-31 | Stop reason: HOSPADM

## 2018-07-31 RX ORDER — ZOLPIDEM TARTRATE 10 MG/1
TABLET ORAL
Qty: 90 TABLET | Refills: 0 | Status: SHIPPED | OUTPATIENT
Start: 2018-07-31 | End: 2018-10-31

## 2018-07-31 RX ORDER — PROPARACAINE HYDROCHLORIDE 5 MG/ML
1 SOLUTION/ DROPS OPHTHALMIC ONCE
Status: COMPLETED | OUTPATIENT
Start: 2018-07-31 | End: 2018-07-31

## 2018-07-31 RX ORDER — PHENYLEPHRINE HYDROCHLORIDE 25 MG/ML
1 SOLUTION/ DROPS OPHTHALMIC
Status: COMPLETED | OUTPATIENT
Start: 2018-07-31 | End: 2018-07-31

## 2018-07-31 RX ORDER — SODIUM CHLORIDE, SODIUM LACTATE, POTASSIUM CHLORIDE, CALCIUM CHLORIDE 600; 310; 30; 20 MG/100ML; MG/100ML; MG/100ML; MG/100ML
INJECTION, SOLUTION INTRAVENOUS CONTINUOUS
Status: CANCELLED | OUTPATIENT
Start: 2018-07-31

## 2018-07-31 RX ORDER — SODIUM CHLORIDE, SODIUM LACTATE, POTASSIUM CHLORIDE, CALCIUM CHLORIDE 600; 310; 30; 20 MG/100ML; MG/100ML; MG/100ML; MG/100ML
INJECTION, SOLUTION INTRAVENOUS CONTINUOUS PRN
Status: DISCONTINUED | OUTPATIENT
Start: 2018-07-31 | End: 2018-07-31

## 2018-07-31 RX ORDER — PROPARACAINE HYDROCHLORIDE 5 MG/ML
1 SOLUTION/ DROPS OPHTHALMIC ONCE
Status: DISCONTINUED | OUTPATIENT
Start: 2018-07-31 | End: 2018-07-31 | Stop reason: HOSPADM

## 2018-07-31 RX ORDER — NEOMYCIN SULFATE, POLYMYXIN B SULFATE, AND DEXAMETHASONE 3.5; 10000; 1 MG/G; [USP'U]/G; MG/G
OINTMENT OPHTHALMIC PRN
Status: DISCONTINUED | OUTPATIENT
Start: 2018-07-31 | End: 2018-07-31 | Stop reason: HOSPADM

## 2018-07-31 RX ORDER — MOXIFLOXACIN 5 MG/ML
1 SOLUTION/ DROPS OPHTHALMIC
Status: COMPLETED | OUTPATIENT
Start: 2018-07-31 | End: 2018-07-31

## 2018-07-31 RX ORDER — LIDOCAINE HYDROCHLORIDE 10 MG/ML
INJECTION, SOLUTION EPIDURAL; INFILTRATION; INTRACAUDAL; PERINEURAL PRN
Status: DISCONTINUED | OUTPATIENT
Start: 2018-07-31 | End: 2018-07-31 | Stop reason: HOSPADM

## 2018-07-31 RX ORDER — DICLOFENAC SODIUM 1 MG/ML
1 SOLUTION/ DROPS OPHTHALMIC
Status: COMPLETED | OUTPATIENT
Start: 2018-07-31 | End: 2018-07-31

## 2018-07-31 RX ORDER — FENTANYL CITRATE 50 UG/ML
INJECTION, SOLUTION INTRAMUSCULAR; INTRAVENOUS PRN
Status: DISCONTINUED | OUTPATIENT
Start: 2018-07-31 | End: 2018-07-31

## 2018-07-31 RX ADMIN — SODIUM CHLORIDE, POTASSIUM CHLORIDE, SODIUM LACTATE AND CALCIUM CHLORIDE: 600; 310; 30; 20 INJECTION, SOLUTION INTRAVENOUS at 09:37

## 2018-07-31 RX ADMIN — LIDOCAINE HYDROCHLORIDE 0.5 ML: 35 GEL OPHTHALMIC at 09:13

## 2018-07-31 RX ADMIN — LIDOCAINE HYDROCHLORIDE 1 ML: 10 INJECTION, SOLUTION EPIDURAL; INFILTRATION; INTRACAUDAL; PERINEURAL at 10:04

## 2018-07-31 RX ADMIN — EPINEPHRINE 500 ML: 1 INJECTION, SOLUTION, CONCENTRATE INTRAVENOUS at 10:04

## 2018-07-31 RX ADMIN — TROPICAMIDE 1 DROP: 10 SOLUTION/ DROPS OPHTHALMIC at 09:14

## 2018-07-31 RX ADMIN — PHENYLEPHRINE HYDROCHLORIDE 1 DROP: 2.5 SOLUTION/ DROPS OPHTHALMIC at 09:28

## 2018-07-31 RX ADMIN — MIDAZOLAM 1 MG: 1 INJECTION INTRAMUSCULAR; INTRAVENOUS at 09:52

## 2018-07-31 RX ADMIN — FENTANYL CITRATE 50 MCG: 50 INJECTION, SOLUTION INTRAMUSCULAR; INTRAVENOUS at 09:53

## 2018-07-31 RX ADMIN — CEFUROXIME 0.1 ML: 1.5 INJECTION, POWDER, FOR SOLUTION INTRAVENOUS at 10:19

## 2018-07-31 RX ADMIN — MIDAZOLAM 1 MG: 1 INJECTION INTRAMUSCULAR; INTRAVENOUS at 09:49

## 2018-07-31 RX ADMIN — PHENYLEPHRINE HYDROCHLORIDE 1 DROP: 2.5 SOLUTION/ DROPS OPHTHALMIC at 09:19

## 2018-07-31 RX ADMIN — Medication 1 DROP: at 09:13

## 2018-07-31 RX ADMIN — ONDANSETRON 4 MG: 2 INJECTION INTRAMUSCULAR; INTRAVENOUS at 09:57

## 2018-07-31 RX ADMIN — MOXIFLOXACIN 1 DROP: 5 SOLUTION/ DROPS OPHTHALMIC at 09:19

## 2018-07-31 RX ADMIN — PHENYLEPHRINE HYDROCHLORIDE 1 DROP: 2.5 SOLUTION/ DROPS OPHTHALMIC at 09:14

## 2018-07-31 RX ADMIN — SODIUM CHONDROITIN SULFATE / SODIUM HYALURONATE 1 ML: 0.55-0.5 INJECTION INTRAOCULAR at 10:05

## 2018-07-31 RX ADMIN — TROPICAMIDE 1 DROP: 10 SOLUTION/ DROPS OPHTHALMIC at 09:28

## 2018-07-31 RX ADMIN — DICLOFENAC SODIUM 1 DROP: 1 SOLUTION OPHTHALMIC at 09:19

## 2018-07-31 RX ADMIN — MOXIFLOXACIN 1 DROP: 5 SOLUTION/ DROPS OPHTHALMIC at 09:29

## 2018-07-31 RX ADMIN — DICLOFENAC SODIUM 1 DROP: 1 SOLUTION OPHTHALMIC at 09:14

## 2018-07-31 RX ADMIN — APRACLONIDINE HYDROCHLORIDE 1 DROP: 10 SOLUTION/ DROPS OPHTHALMIC at 10:19

## 2018-07-31 RX ADMIN — DEXMEDETOMIDINE HYDROCHLORIDE 8 MCG: 100 INJECTION, SOLUTION INTRAVENOUS at 10:01

## 2018-07-31 RX ADMIN — MOXIFLOXACIN 1 DROP: 5 SOLUTION/ DROPS OPHTHALMIC at 09:13

## 2018-07-31 RX ADMIN — BALANCED SALT SOLUTION 15 ML: 6.4; .75; .48; .3; 3.9; 1.7 SOLUTION OPHTHALMIC at 10:04

## 2018-07-31 RX ADMIN — LIDOCAINE HYDROCHLORIDE 2 DROP: 35 GEL OPHTHALMIC at 10:04

## 2018-07-31 RX ADMIN — PROPARACAINE HYDROCHLORIDE 1 DROP: 5 SOLUTION/ DROPS OPHTHALMIC at 09:14

## 2018-07-31 RX ADMIN — DEXMEDETOMIDINE HYDROCHLORIDE 12 MCG: 100 INJECTION, SOLUTION INTRAVENOUS at 09:55

## 2018-07-31 RX ADMIN — NEOMYCIN SULFATE, POLYMYXIN B SULFATE, AND DEXAMETHASONE 0.5 G: 3.5; 10000; 1 OINTMENT OPHTHALMIC at 10:20

## 2018-07-31 RX ADMIN — TROPICAMIDE 1 DROP: 10 SOLUTION/ DROPS OPHTHALMIC at 09:19

## 2018-07-31 RX ADMIN — DICLOFENAC SODIUM 1 DROP: 1 SOLUTION OPHTHALMIC at 09:29

## 2018-07-31 NOTE — OP NOTE
PREOPERATIVE DIAGNOSIS:  Visually significant cataract, Left eye.       POSTOPERATIVE DIAGNOSIS:  Visually significant cataract, Left eye.       PROCEDURE:  Phacoemulsification with intraocular lens implant, Left eye.       ANESTHESIA:  Topical with monitored anesthesia care.       COMPLICATIONS:  None.       INDICATIONS:  Syd Joyce is a 63 year old patient complaining of gradual painless loss of vision in the Left  Eye over the last several months.  This interferes with his activities of daily living.  On examination he was found to have best-corrected visual acuity of 20/50 with a nuclear sclerotic, cortical and posterior subcapsular cataract. After careful discussion regarding the risks, benefits and alternatives of  cataract surgery with intraocular lens implant, the patient elected to proceed, and informed written consent was obtained.        PROCEDURE:  On the day of surgery, the patient was identified in the preoperative waiting area.  An interval history was obtained and was unremarkable for change.  All sources indicated that the Left eye was the correct surgical site and a brady placed above the Left eye.  A drop of proparacaine followed by Betadine and lidocaine jelly were placed on the ocular surface.         The patient was escorted to the operating room where monitored intravenous sedation was administered.  The eye was prepped and draped in the usual sterile fashion for ophthalmic surgery and a lid speculum placed.  After confirming adequate anesthesia, a paracentesis port was made at the 4 o'clock position and used to instill intracameral lidocaine into the anterior chamber, followed by viscoelastic.  A temporal clear corneal wound was fashioned first with a crescent blade followed by a 2.5 mm keratome.  Due to limited pupillary dilation and loss of pupillary dilation during surgery on his right eye, a Malyugin pupil ring was placed at the pupil margin using the injector and manipulator.  An  anterior capsulorrhexis was made by raising a flap with a cystotome and then carrying out the flap in a continuous curvilinear fashion using Utrata forceps.  Hydrodissection was accomplished with balanced salt solution on a flat tipped cannula.  Phaco-emulsification was done in a modified stop and chop technique, removing the nucleus without complication.  Residual cortex was removed with the automated irrigation and aspiration handpiece.  The back of the polymer tip was used to gently polish the posterior capsule.  The capsular bag was then reinflated with Provisc and a 18.0 diopter ALONSO lens model ZCBOO was placed in the capsular bag using the RotaBan injector.  The Malyugin pupil ring was removed using the manipulator and injector.  The residual viscoelastic was removed with the automated irrigation and aspiration handpiece.  The lens was well centered and the pupil round. All wounds were checked and found to be watertight. Cefuroxime solution was instilled into the anterior chamber via the paracentesis port. A drop of Betadine, gatifloxacin/prednisolone/bromefenec, and Iopodine solutions followed by Maxitrol ointment were placed on the ocular surface and the eye patched and shielded.  The patient was escorted to the recovery room in good condition having tolerated the procedure well.  There were no complications.      Implant Name Type Inv. Item Serial No.  Lot No. LRB No. Used   EYE IMP IOL ALONSO PCL TECNIS ZCB00 18.0 Lens/Eye Implant EYE IMP IOL ALONSO PCL TECNIS ZCB00 18.0 [id number] [employer]   Left 1            ELIE ARIAS MD

## 2018-07-31 NOTE — ANESTHESIA CARE TRANSFER NOTE
Patient: Syd Joyce    Procedure(s):  COMPLEX LEFT EYE PHACOEMULSIFICATION CLEAR CORNEA WITH STANDARD INTRAOCULAR LENS IMPLANT WITH MALYUGIN RING - Wound Class: I-Clean    Diagnosis: cataract  Diagnosis Additional Information: No value filed.    Anesthesia Type:   MAC     Note:  Airway :Room Air  Patient transferred to:PACU  Comments: Pt exhibits spont resps, all monitors and alarms on in pacu, report given to RN, vss.Handoff Report: Identifed the Patient, Identified the Reponsible Provider, Reviewed the pertinent medical history, Discussed the surgical course, Reviewed Intra-OP anesthesia mangement and issues during anesthesia, Set expectations for post-procedure period and Allowed opportunity for questions and acknowledgement of understanding      Vitals: (Last set prior to Anesthesia Care Transfer)    CRNA VITALS  7/31/2018 0955 - 7/31/2018 1031      7/31/2018             Pulse: 70    Ht Rate: 70    SpO2: 100 %    Resp Rate (set): 10                Electronically Signed By: STACY Ayers CRNA  July 31, 2018  10:31 AM

## 2018-07-31 NOTE — IP AVS SNAPSHOT
MRN:[id number]                      After Visit Summary   7/31/2018    Syd Joyce    MRN: [id number]           Thank you!     Thank you for choosing [facility] for your care. Our goal is always to provide you with excellent care. Hearing back from our patients is one way we can continue to improve our services. Please take a few minutes to complete the written survey that you may receive in the mail after you visit with us. Thank you!        Patient Information     Date Of Birth          1955        Designated Caregiver       Most Recent Value    Caregiver    Will someone help with your care after discharge? yes    Name of designated caregiver Sarah      About your hospital stay     You were admitted on:  July 31, 2018 You last received care in the:  [facility]    You were discharged on:  July 31, 2018       Who to Call     For medical emergencies, please call 911.  For non-urgent questions about your medical care, please call your primary care provider or clinic, [phone]  For questions related to your surgery, please call your surgery clinic        Attending Provider     Provider Elsie Butterfield MD Ophthalmology       Primary Care Provider Office Phone # Fax #    Shan Arenas [phone]      Further instructions from your care team        POST-OPERATIVE CARE FOLLOWING CATARACT SURGERY    ELSIE ARIAS M.D.  [facility]  [phone]        If you have a gauze patch and plastic eye shield on, please do not remove it until it is removed by your physician at your first post-operative visit.  You will not start your drops until after having your patch removed by your physician unless instructed otherwise.      Wear the eye shield when sleeping for protection for one week after surgery.      Do not rub the operated eye.      Light sensitivity may be noticed. Sunglasses may be worn for comfort.      Some discomfort and  irritation may be noticed. Acetaminophen (Tylenol) or Ibuprofen (Advil) may be taken for discomfort.      Avoid bending over, strenuous activity or heavy lifting (30 pounds or more) for one week.      No make-up or creams/lotions around the eye for two weeks.      You may wash your hair, bathe or shower, but keep the operated eye closed while doing so.  No swimming for three weeks.      Use medication exactly as prescribed by your doctor.  You may restart your regular home medications.      Bring all materials and medications to the clinic on your first post-operative visit.      Call the doctor s office if any of the following should occur:  -  any sudden vision change  -  increased amount of floaters (black spots in front of vision) or flashes  -  nausea, severe headache, or eye pain          -  or signs of infection (pus, increasing redness or tenderness)            [facility] Discharge  Instructions  Anesthesia (Eye Care Center)   Adult Discharge Instructions    For 24 hours after surgery    1. Get plenty of rest.  Make arrangements to have a responsible adult stay with you for at least 24 hours after you leave the hospital.  2. Do not drive or use heavy equipment for 24 hours.    3. Do not drink alcohol for 24 hours.  4. Do not sign legal documents or make important decisions for 24 hours.  5. Avoid strenuous or risky activities. You may feel lightheaded.  If so, sit for a few minutes before standing.  Have someone help you get up.   6. Conscious sedation patients may resume a regular diet..  7. Any questions of medical nature, call your physician.    Pending Results     No orders found from 7/29/2018 to 8/1/2018.            Admission Information     Date & Time Provider Department Dept. Phone    7/31/2018 Elsie Kay MD [facility] [place] [phone]      Your Vitals Were     Blood Pressure Temperature Respirations Pulse Oximetry          150/80 98  F (36.7   C) (Temporal) 20 98%        [facility] Information     [facility] gives you secure access to your electronic health record. If you see a primary care provider, you can also send messages to your care team and make appointments. If you have questions, please call your primary care clinic.  If you do not have a primary care provider, please call [phone] and they will assist you.        Care EveryWhere ID     This is your Care EveryWhere ID. This could be used by other organizations to access your [place] medical records  [id number]        Equal Access to Services     GAIL GRIFFITHS : Hadii eliana esquivel hadasho Soomaali, waaxda luqadaha, qaybta kaalmada adecherriyaluis, jose kang . So [facility] [phone].    ATENCIÓN: Si habla español, tiene a persaud disposición servicios gratuitos de asistencia lingüística. Llame al [phone].    We comply with applicable federal civil rights laws and [place] laws. We do not discriminate on the basis of race, color, national origin, age, disability, sex, sexual orientation, or gender identity.               Review of your medicines      UNREVIEWED medicines. Ask your doctor about these medicines        Dose / Directions    amLODIPine 10 MG tablet   Commonly known as:  NORVASC   Used for:  Essential hypertension with goal blood pressure less than 130/80        TAKE 1 TABLET(10 MG) BY MOUTH DAILY   Quantity:  90 tablet   Refills:  3       aspirin 81 MG EC tablet   Used for:  Atherosclerosis of native coronary artery of native heart without angina pectoris        Dose:  81 mg   Take 1 tablet (81 mg) by mouth daily Start tomorrow morning.   Quantity:  90 tablet   Refills:  3       carvedilol 25 MG tablet   Commonly known as:  COREG        Dose:  50 mg   Take 2 tablets (50 mg) by mouth 2 times daily Hold IF heart rate less than 55.   Quantity:  360 tablet   Refills:  4       cyclobenzaprine 10 MG tablet   Commonly known as:  FLEXERIL   Used for:  S/P lumbar laminectomy         Dose:  5-10 mg   Take 0.5-1 tablets (5-10 mg) by mouth 3 times daily as needed for muscle spasms   Quantity:  90 tablet   Refills:  1       lisinopril-hydrochlorothiazide 20-12.5 MG per tablet   Commonly known as:  PRINZIDE/ZESTORETIC   Used for:  Essential hypertension with goal blood pressure less than 130/80        TAKE 1 TABLET BY MOUTH DAILY   Quantity:  90 tablet   Refills:  3       losartan 100 MG tablet   Commonly known as:  COZAAR   Used for:  Essential hypertension with goal blood pressure less than 130/80        TAKE 1 TABLET BY MOUTH EVERY DAY   Quantity:  90 tablet   Refills:  3       metFORMIN 500 MG 24 hr tablet   Commonly known as:  GLUCOPHAGE-XR   Used for:  Type 2 diabetes mellitus with diabetic autonomic neuropathy, without long-term current use of insulin (H)        TAKE 2 TABLETS BY MOUTH EVERY MORNING   Quantity:  180 tablet   Refills:  3       pantoprazole 40 MG EC tablet   Commonly known as:  PROTONIX   Used for:  Encounter for medication refill        TAKE 1 TABLET(40 MG) BY MOUTH DAILY   Quantity:  90 tablet   Refills:  3       sildenafil 20 MG tablet   Commonly known as:  REVATIO   Used for:  Erectile dysfunction, unspecified erectile dysfunction type        TAKE 2 TO 4 TABLETS BY MOUTH AS NEEDED NEVER USE WITH NITROGLYCERIN, TERAZOSIN, OR DOXAZOSIN. USE AS DIRECTED   Quantity:  90 tablet   Refills:  1       simvastatin 20 MG tablet   Commonly known as:  ZOCOR        Dose:  20 mg   Take 1 tablet (20 mg) by mouth every evening   Quantity:  90 tablet   Refills:  4       TRADJENTA 5 MG Tabs tablet   Used for:  Type 2 diabetes mellitus with diabetic autonomic neuropathy, without long-term current use of insulin (H)   Generic drug:  linagliptin        TAKE 1 TABLET(5 MG) BY MOUTH DAILY   Quantity:  90 tablet   Refills:  3       TUMS PO        Dose:  2 chew tab   Take 2 chew tab by mouth as needed   Refills:  0       zolpidem 10 MG tablet   Commonly known as:  AMBIEN   Used for:  MARTELL  (obstructive sleep apnea)        TAKE 1 TABLET BY MOUTH EVERY DAY AT BEDTIME   Quantity:  90 tablet   Refills:  0         CONTINUE these medicines which have NOT CHANGED        Dose / Directions    blood glucose monitoring meter device kit   Used for:  Refill clinic medication management patient        Use to test blood sugar 4 to 5 times weekly or as directed.   Quantity:  1 kit   Refills:  0       blood glucose monitoring test strip   Commonly known as:  ACCU-CHEK MED PLUS   Used for:  Refill clinic medication management patient        USE 4 TO 5 TIMES PER WEEK OR AS DIRECTED   Quantity:  150 each   Refills:  3                Protect others around you: Learn how to safely use, store and throw away your medicines at www.disposemymeds.org.             Medication List: This is a list of all your medications and when to take them. Check marks below indicate your daily home schedule. Keep this list as a reference.      Medications           Morning Afternoon Evening Bedtime As Needed    amLODIPine 10 MG tablet   Commonly known as:  NORVASC   TAKE 1 TABLET(10 MG) BY MOUTH DAILY                                aspirin 81 MG EC tablet   Take 1 tablet (81 mg) by mouth daily Start tomorrow morning.                                blood glucose monitoring meter device kit   Use to test blood sugar 4 to 5 times weekly or as directed.                                blood glucose monitoring test strip   Commonly known as:  ACCU-CHEK MED PLUS   USE 4 TO 5 TIMES PER WEEK OR AS DIRECTED                                carvedilol 25 MG tablet   Commonly known as:  COREG   Take 2 tablets (50 mg) by mouth 2 times daily Hold IF heart rate less than 55.                                cyclobenzaprine 10 MG tablet   Commonly known as:  FLEXERIL   Take 0.5-1 tablets (5-10 mg) by mouth 3 times daily as needed for muscle spasms                                lisinopril-hydrochlorothiazide 20-12.5 MG per tablet   Commonly known as:   PRINZIDE/ZESTORETIC   TAKE 1 TABLET BY MOUTH DAILY                                losartan 100 MG tablet   Commonly known as:  COZAAR   TAKE 1 TABLET BY MOUTH EVERY DAY                                metFORMIN 500 MG 24 hr tablet   Commonly known as:  GLUCOPHAGE-XR   TAKE 2 TABLETS BY MOUTH EVERY MORNING                                pantoprazole 40 MG EC tablet   Commonly known as:  PROTONIX   TAKE 1 TABLET(40 MG) BY MOUTH DAILY                                sildenafil 20 MG tablet   Commonly known as:  REVATIO   TAKE 2 TO 4 TABLETS BY MOUTH AS NEEDED NEVER USE WITH NITROGLYCERIN, TERAZOSIN, OR DOXAZOSIN. USE AS DIRECTED                                simvastatin 20 MG tablet   Commonly known as:  ZOCOR   Take 1 tablet (20 mg) by mouth every evening                                TRADJENTA 5 MG Tabs tablet   TAKE 1 TABLET(5 MG) BY MOUTH DAILY   Generic drug:  linagliptin                                TUMS PO   Take 2 chew tab by mouth as needed                                zolpidem 10 MG tablet   Commonly known as:  AMBIEN   TAKE 1 TABLET BY MOUTH EVERY DAY AT BEDTIME

## 2018-07-31 NOTE — ANESTHESIA PREPROCEDURE EVALUATION
Procedure: Procedure(s):  PHACOEMULSIFICATION CLEAR CORNEA WITH STANDARD INTRAOCULAR LENS IMPLANT  Preop diagnosis: cataract    No Known Allergies  Past Medical History:   Diagnosis Date     DM2 (diabetes mellitus, type 2) (H)      Esophageal reflux 6/15/2012     HTN (hypertension) 8/12/2011     Leg pain, bilateral      Low back pain      Numbness and tingling     LEGS, R/T LUMBAR STENOSIS     Obesity, unspecified 1/10/2014     Past Surgical History:   Procedure Laterality Date     COLONOSCOPY       DISCECTOMY LUMBAR POSTERIOR MICROSCOPIC TWO LEVELS N/A 6/30/2017    Procedure: DISCECTOMY LUMBAR POSTERIOR MICROSCOPIC TWO LEVELS;  L3-4 L4-5 POSTERIOR DECOMPRESSION AND INTERSPINUS FIXATION WITHOUT FUSION;  Surgeon: Ray Perez MD;  Location:  OR     PHACOEMULSIFICATION CLEAR CORNEA WITH STANDARD INTRAOCULAR LENS IMPLANT Right 7/17/2018    Procedure: PHACOEMULSIFICATION CLEAR CORNEA WITH STANDARD INTRAOCULAR LENS IMPLANT;  RIGHT EYE PHACOEMULSIFICATION CLEAR CORNEA WITH STANDARD INTRAOCULAR LENS IMPLANT;  Surgeon: Elsie Kay MD;  Location:  [facility]     WISDOM TEETH       Social History   Substance Use Topics     Smoking status: Never Smoker     Smokeless tobacco: Current User     Alcohol use Yes      Comment: RARELY     Prior to Admission medications    Medication Sig Start Date End Date Taking? Authorizing Provider   amLODIPine (NORVASC) 10 MG tablet TAKE 1 TABLET(10 MG) BY MOUTH DAILY 11/9/17  Yes Shan Arneas MD   aspirin 81 MG EC tablet Take 1 tablet (81 mg) by mouth daily Start tomorrow morning. 2/22/18  Yes Santhosh Palafox MD   blood glucose monitoring (ACCU-CHEK MED PLUS) meter device kit Use to test blood sugar 4 to 5 times weekly or as directed. 10/14/14  Yes Shan Arenas MD   blood glucose monitoring (ACCU-CHEK MED PLUS) test strip USE 4 TO 5 TIMES PER WEEK OR AS DIRECTED 10/20/16  Yes Shan Arenas MD   Calcium Carbonate Antacid (TUMS PO) Take 2 chew tab by mouth  as needed    Yes Reported, Patient   carvedilol (COREG) 25 MG tablet Take 2 tablets (50 mg) by mouth 2 times daily Hold IF heart rate less than 55. 4/3/18  Yes Libertad Warner PA   cyclobenzaprine (FLEXERIL) 10 MG tablet Take 0.5-1 tablets (5-10 mg) by mouth 3 times daily as needed for muscle spasms 6/21/18  Yes Shan Arenas MD   lisinopril-hydrochlorothiazide (PRINZIDE/ZESTORETIC) 20-12.5 MG per tablet TAKE 1 TABLET BY MOUTH DAILY 10/19/17  Yes Shan Arenas MD   losartan (COZAAR) 100 MG tablet TAKE 1 TABLET BY MOUTH EVERY DAY 3/29/18  Yes Shan Arenas MD   metFORMIN (GLUCOPHAGE-XR) 500 MG 24 hr tablet TAKE 2 TABLETS BY MOUTH EVERY MORNING 7/12/18  Yes Shan Arenas MD   pantoprazole (PROTONIX) 40 MG EC tablet TAKE 1 TABLET(40 MG) BY MOUTH DAILY 10/19/17  Yes Shan Arenas MD   sildenafil (REVATIO) 20 MG tablet TAKE 2 TO 4 TABLETS BY MOUTH AS NEEDED NEVER USE WITH NITROGLYCERIN, TERAZOSIN, OR DOXAZOSIN. USE AS DIRECTED 6/25/18  Yes Dianne Rivera MD   simvastatin (ZOCOR) 20 MG tablet Take 1 tablet (20 mg) by mouth every evening 4/3/18  Yes Libertad Warner PA   TRADJENTA 5 MG TABS tablet TAKE 1 TABLET(5 MG) BY MOUTH DAILY 10/19/17  Yes Shan Arenas MD   zolpidem (AMBIEN) 10 MG tablet TAKE 1 TABLET BY MOUTH EVERY DAY AT BEDTIME 7/31/18  Yes Abi Sepulveda MD     Current Facility-Administered Medications Ordered in Epic   Medication Dose Route Frequency Last Rate Last Dose     diclofenac (VOLTAREN) 0.1 % ophthalmic solution 1 drop  1 drop Ophthalmic q5 Min Prior to Surgery   1 drop at 07/31/18 0914     moxifloxacin (VIGAMOX) 0.5 % ophthalmic solution 1 drop  1 drop Ophthalmic q5 Min Prior to Surgery   1 drop at 07/31/18 0913     phenylephrine (MYDFRIN /MARIAN-SYNEPHRINE) 2.5 % ophthalmic solution 1 drop  1 drop Ophthalmic q5 Min Prior to Surgery   1 drop at 07/31/18 0914     proparacaine (ALCAINE) 0.5 % ophthalmic solution 1 drop  1 drop Ophthalmic Once          tropicamide (MYDRIACYL) 1 % ophthalmic solution 1 drop  1 drop Ophthalmic q5 Min Prior to Surgery   1 drop at 07/31/18 0914     No current [facility]-ordered outpatient prescriptions on file.       Wt Readings from Last 1 Encounters:   07/10/18 96.5 kg (212 lb 12.8 oz)     Temp Readings from Last 1 Encounters:   07/17/18 36.3  C (97.3  F) (Temporal)     BP Readings from Last 6 Encounters:   07/17/18 141/72   07/10/18 122/64   06/21/18 120/70   05/30/18 120/71   04/19/18 132/70   04/03/18 148/77     Pulse Readings from Last 4 Encounters:   07/10/18 72   06/21/18 64   05/30/18 91   04/19/18 88     Resp Readings from Last 1 Encounters:   07/17/18 16     SpO2 Readings from Last 1 Encounters:   07/17/18 97%     Recent Labs   Lab Test  04/19/18   1533  04/03/18   1123  02/23/18   1429   NA  137  129*  130*   POTASSIUM  4.4  4.1  3.9   CHLORIDE  94*  92*  93*   CO2   --   30*  29   ANIONGAP   --   11.1  11.9   GLC  111*  127*  196*   BUN  18  16  10  15   CR  1.15  1.22  1.19   HEAVENLY  10.1  9.1  9.0     Recent Labs   Lab Test  04/19/18   1533  04/03/18   1123  01/22/18   1122   AST  37   --   26   ALT  37  6  27   ALKPHOS  118*   --   138*   BILITOTAL  0.3   --   0.5     Recent Labs   Lab Test  02/21/18   0845  01/22/18   1119   WBC  8.4  9.3   HGB  13.6  13.6   PLT  178  231     No results for input(s): ABO, RH in the last 28598 hours.  Recent Labs   Lab Test  02/21/18   0845   INR  0.97   PTT  28      Recent Labs   Lab Test  03/27/15   1950   TROPI  <0.015  The 99th percentile for upper reference range is 0.045 ug/L.  Troponin values in   the range of 0.045 - 0.120 ug/L may be associated with risks of adverse   clinical events.   Effective 7/30/2014, the reference range for this assay has changed to reflect   new instrumentation/methodology.         Anesthesia Evaluation     . Pt has had prior anesthetic.     No history of anesthetic complications          ROS/MED HX    ENT/Pulmonary:  - neg pulmonary ROS   (+)sleep apnea,  doesn't use CPAP , . .    Neurologic:  - neg neurologic ROS     Cardiovascular:     (+) Dyslipidemia, hypertension----. : . . . :. .       METS/Exercise Tolerance:     Hematologic:  - neg hematologic  ROS       Musculoskeletal:   (+) arthritis, , , -       GI/Hepatic:     (+) GERD Asymptomatic on medication,      (-) liver disease   Renal/Genitourinary:      (-) renal disease   Endo:     (+) type II DM Not using insulin Obesity, .      Psychiatric:  - neg psychiatric ROS       Infectious Disease:         Malignancy:         Other:                     Physical Exam  Normal systems: cardiovascular, pulmonary and dental    Airway   Mallampati: I  TM distance: >3 FB  Neck ROM: full    Dental     Cardiovascular       Pulmonary                         Anesthesia Plan      History & Physical Review  History and physical reviewed and following examination; no interval change.    ASA Status:  2 .    NPO Status:  > 8 hours    Plan for MAC Reason for MAC:  Procedure to face, neck, head or breast    Patient reports having pain and discomfort with last procedure.  Patient will wiggle toes if having discomfort.       Postoperative Care      Consents  Anesthetic plan, risks, benefits and alternatives discussed with:  Patient..                          .

## 2018-07-31 NOTE — ANESTHESIA POSTPROCEDURE EVALUATION
Patient: Syd Joyce    Procedure(s):  COMPLEX LEFT EYE PHACOEMULSIFICATION CLEAR CORNEA WITH STANDARD INTRAOCULAR LENS IMPLANT WITH MALYUGIN RING - Wound Class: I-Clean    Diagnosis:cataract  Diagnosis Additional Information: No value filed.    Anesthesia Type:  MAC    Note:  Anesthesia Post Evaluation    Patient location during evaluation: PACU  Patient participation: Able to fully participate in evaluation  Level of consciousness: awake and awake and alert  Pain management: adequate  Airway patency: patent  Cardiovascular status: acceptable  Respiratory status: acceptable  Hydration status: acceptable  PONV: none     Anesthetic complications: None          Last vitals:  Vitals:    07/31/18 0918 07/31/18 1029 07/31/18 1042   BP: 150/80 137/82 141/82   Resp: 20 16 16   Temp: 36.7  C (98  F)     SpO2: 98% 99% 98%         Electronically Signed By: Peggy Doran  July 31, 2018  12:25 PM

## 2018-07-31 NOTE — DISCHARGE INSTRUCTIONS
POST-OPERATIVE CARE FOLLOWING CATARACT SURGERY    ELIE ARIAS M.D.  [facility]  [phone]        If you have a gauze patch and plastic eye shield on, please do not remove it until it is removed by your physician at your first post-operative visit.  You will not start your drops until after having your patch removed by your physician unless instructed otherwise.      Wear the eye shield when sleeping for protection for one week after surgery.      Do not rub the operated eye.      Light sensitivity may be noticed. Sunglasses may be worn for comfort.      Some discomfort and irritation may be noticed. Acetaminophen (Tylenol) or Ibuprofen (Advil) may be taken for discomfort.      Avoid bending over, strenuous activity or heavy lifting (30 pounds or more) for one week.      No make-up or creams/lotions around the eye for two weeks.      You may wash your hair, bathe or shower, but keep the operated eye closed while doing so.  No swimming for three weeks.      Use medication exactly as prescribed by your doctor.  You may restart your regular home medications.      Bring all materials and medications to the clinic on your first post-operative visit.      Call the doctor s office if any of the following should occur:  -  any sudden vision change  -  increased amount of floaters (black spots in front of vision) or flashes  -  nausea, severe headache, or eye pain          -  or signs of infection (pus, increasing redness or tenderness)            [facility]esthesia [facility] Care [facility] Discharge  Instructions  Anesthesia (Eye Care Center)   Adult Discharge Instructions    For 24 hours after surgery    1. Get plenty of rest.  Make arrangements to have a responsible adult stay with you for at least 24 hours after you leave the hospital.  2. Do not drive or use heavy equipment for 24 hours.    3. Do not drink alcohol for 24 hours.  4. Do not sign legal documents or make important decisions for 24  hours.  5. Avoid strenuous or risky activities. You may feel lightheaded.  If so, sit for a few minutes before standing.  Have someone help you get up.   6. Conscious sedation patients may resume a regular diet..  7. Any questions of medical nature, call your physician.

## 2018-07-31 NOTE — IP AVS SNAPSHOT
[facility]    [address]    [place] [address]    Phone:  [phone]    Fax:  [phone]                                       After Visit Summary   7/31/2018    Syd Joyce    MRN: [id number]           After Visit Summary Signature Page     I have received my discharge instructions, and my questions have been answered. I have discussed any challenges I see with this plan with the nurse or doctor.    ..........................................................................................................................................  Patient/Patient Representative Signature      ..........................................................................................................................................  Patient Representative Print Name and Relationship to Patient    ..................................................               ................................................  Date                                            Time    ..........................................................................................................................................  Reviewed by Signature/Title    ...................................................              ..............................................  Date                                                            Time

## 2018-09-10 ENCOUNTER — TELEPHONE (OUTPATIENT)
Dept: SLEEP MEDICINE | Facility: CLINIC | Age: 63
End: 2018-09-10

## 2018-09-10 NOTE — TELEPHONE ENCOUNTER
Spoke to patient to schedule an oximetry and the patient declined and stated he doesn't want to do it.     Ariadne Gómez

## 2018-10-05 DIAGNOSIS — Z76.0 ENCOUNTER FOR MEDICATION REFILL: ICD-10-CM

## 2018-10-05 DIAGNOSIS — I10 ESSENTIAL HYPERTENSION WITH GOAL BLOOD PRESSURE LESS THAN 130/80: ICD-10-CM

## 2018-10-05 DIAGNOSIS — E11.43 TYPE 2 DIABETES MELLITUS WITH DIABETIC AUTONOMIC NEUROPATHY, WITHOUT LONG-TERM CURRENT USE OF INSULIN (H): ICD-10-CM

## 2018-10-05 RX ORDER — LISINOPRIL AND HYDROCHLOROTHIAZIDE 12.5; 2 MG/1; MG/1
TABLET ORAL
Qty: 90 TABLET | Refills: 0 | Status: SHIPPED | OUTPATIENT
Start: 2018-10-05 | End: 2019-01-02

## 2018-10-05 RX ORDER — PANTOPRAZOLE SODIUM 40 MG/1
TABLET, DELAYED RELEASE ORAL
Qty: 90 TABLET | Refills: 0 | Status: SHIPPED | OUTPATIENT
Start: 2018-10-05 | End: 2019-01-02

## 2018-10-05 RX ORDER — LINAGLIPTIN 5 MG/1
TABLET, FILM COATED ORAL
Qty: 90 TABLET | Refills: 0 | Status: SHIPPED | OUTPATIENT
Start: 2018-10-05 | End: 2018-11-15 | Stop reason: ALTCHOICE

## 2018-10-11 ENCOUNTER — TELEPHONE (OUTPATIENT)
Dept: SLEEP MEDICINE | Facility: CLINIC | Age: 63
End: 2018-10-11

## 2018-10-11 NOTE — TELEPHONE ENCOUNTER
I called Mr. Joyce to discuss giving CPAP a try. He said no thank you and then hung up on me.  Bennett Goltz, PA-C

## 2018-10-23 DIAGNOSIS — I10 ESSENTIAL HYPERTENSION WITH GOAL BLOOD PRESSURE LESS THAN 130/80: ICD-10-CM

## 2018-10-24 RX ORDER — AMLODIPINE BESYLATE 10 MG/1
TABLET ORAL
Qty: 90 TABLET | Refills: 0 | Status: SHIPPED | OUTPATIENT
Start: 2018-10-24 | End: 2019-01-31

## 2018-10-31 DIAGNOSIS — G47.33 OSA (OBSTRUCTIVE SLEEP APNEA): ICD-10-CM

## 2018-11-01 RX ORDER — ZOLPIDEM TARTRATE 10 MG/1
TABLET ORAL
Qty: 30 TABLET | Refills: 3 | Status: SHIPPED | OUTPATIENT
Start: 2018-11-01 | End: 2019-02-28

## 2018-11-01 NOTE — TELEPHONE ENCOUNTER
Patient requesting refill on zolpidem 10mg tabs.   Last visit with Dr. Arenas regarding zolpidem was 6/2018.   Been on zolpidem x 4 + years per EPIC med .   Reviewed chart --   2/2018 cardiologist referred to  Sleep for apnea eval.   Patient had sleep study 5/2018 and dx MARTELL with CPAP recommended. Patient has declined CPAP treatment.   Controlled substance agreement - 6/22/18.   Plan: routed to Dr. Arenas for review.  Kamini Price RN

## 2018-11-07 ENCOUNTER — TELEPHONE (OUTPATIENT)
Dept: FAMILY MEDICINE | Facility: CLINIC | Age: 63
End: 2018-11-07

## 2018-11-07 DIAGNOSIS — Z98.890 S/P LUMBAR LAMINECTOMY: Primary | ICD-10-CM

## 2018-11-07 NOTE — TELEPHONE ENCOUNTER
Received fax from [facility] that patient's rx for Cyclobenzaprine needs PA.  Submitted PA to [facility].  Received fax back that PA was denied. Patient has to try two of the alternatives listed-Baclofen and Tizanidine.  Per Dr Arenas patient can try Tizanidine 4 mg daily.  Sent rx to pharmacy.

## 2018-11-13 ENCOUNTER — MYC MEDICAL ADVICE (OUTPATIENT)
Dept: FAMILY MEDICINE | Facility: CLINIC | Age: 63
End: 2018-11-13

## 2018-11-13 DIAGNOSIS — E11.9 DIABETES MELLITUS, TYPE 2 (H): Primary | ICD-10-CM

## 2018-11-15 NOTE — TELEPHONE ENCOUNTER
Call from patient that he wants change in Tradjenta prescription because of the cost. Per Yelena-pharmacist-she recommends Januvia 100 mg.  Sent rx to pharmacy.

## 2018-11-26 ENCOUNTER — OFFICE VISIT (OUTPATIENT)
Dept: FAMILY MEDICINE | Facility: CLINIC | Age: 63
End: 2018-11-26

## 2018-11-26 VITALS
WEIGHT: 220.6 LBS | RESPIRATION RATE: 16 BRPM | SYSTOLIC BLOOD PRESSURE: 106 MMHG | TEMPERATURE: 97.7 F | BODY MASS INDEX: 32.58 KG/M2 | DIASTOLIC BLOOD PRESSURE: 68 MMHG | HEART RATE: 68 BPM

## 2018-11-26 DIAGNOSIS — I10 BENIGN ESSENTIAL HYPERTENSION: ICD-10-CM

## 2018-11-26 DIAGNOSIS — Z23 NEED FOR PROPHYLACTIC VACCINATION AND INOCULATION AGAINST INFLUENZA: ICD-10-CM

## 2018-11-26 DIAGNOSIS — E78.5 TYPE 2 DIABETES MELLITUS WITH HYPERLIPIDEMIA (H): ICD-10-CM

## 2018-11-26 DIAGNOSIS — B00.9 HSV (HERPES SIMPLEX VIRUS) INFECTION: ICD-10-CM

## 2018-11-26 DIAGNOSIS — B02.29 POST HERPETIC NEURALGIA: ICD-10-CM

## 2018-11-26 DIAGNOSIS — E11.69 TYPE 2 DIABETES MELLITUS WITH HYPERLIPIDEMIA (H): ICD-10-CM

## 2018-11-26 DIAGNOSIS — B02.9 HERPES ZOSTER WITHOUT COMPLICATION: Primary | ICD-10-CM

## 2018-11-26 PROCEDURE — 90662 IIV NO PRSV INCREASED AG IM: CPT | Performed by: FAMILY MEDICINE

## 2018-11-26 PROCEDURE — G0008 ADMIN INFLUENZA VIRUS VAC: HCPCS | Performed by: FAMILY MEDICINE

## 2018-11-26 PROCEDURE — 99214 OFFICE O/P EST MOD 30 MIN: CPT | Mod: 25 | Performed by: FAMILY MEDICINE

## 2018-11-26 RX ORDER — GABAPENTIN 300 MG/1
CAPSULE ORAL
Qty: 180 CAPSULE | Refills: 11 | Status: SHIPPED | OUTPATIENT
Start: 2018-11-26 | End: 2019-04-16

## 2018-11-26 RX ORDER — VALACYCLOVIR HYDROCHLORIDE 1 G/1
1000 TABLET, FILM COATED ORAL 3 TIMES DAILY
Qty: 21 TABLET | Refills: 0 | Status: ON HOLD | OUTPATIENT
Start: 2018-11-26 | End: 2019-04-23

## 2018-11-26 RX ORDER — OXYCODONE HYDROCHLORIDE 5 MG/1
5-10 TABLET ORAL EVERY 6 HOURS PRN
Qty: 30 TABLET | Refills: 0 | Status: ON HOLD | OUTPATIENT
Start: 2018-11-26 | End: 2019-04-23

## 2018-11-26 ASSESSMENT — PAIN SCALES - GENERAL: PAINLEVEL: EXTREME PAIN (8)

## 2018-11-26 NOTE — MR AVS SNAPSHOT
After Visit Summary   11/26/2018    Syd Joyce    MRN: [id number]           Patient Information     Date Of Birth          1955        Visit Information        Provider Department      11/26/2018 1:00 PM Mariangel Layne MD [facility]        Today's Diagnoses     HSV (herpes simplex virus) infection    -  1    Post herpetic neuralgia        Need for influenza vaccination        Need for prophylactic vaccination and inoculation against influenza           Follow-ups after your visit        Who to contact     If you have questions or need follow up information about today's clinic visit or your schedule please contact [facility] directly at [phone].  Normal or non-critical lab and imaging results will be communicated to you by [employer], letter or phone within 4 business days after the clinic has received the results. If you do not hear from us within 7 days, please contact the clinic through [employer] or phone. If you have a critical or abnormal lab result, we will notify you by phone as soon as possible.  Submit refill requests through [employer] or call your pharmacy and they will forward the refill request to us. Please allow 3 business days for your refill to be completed.          Additional Information About Your Visit        MyChart Information     [employer] gives you secure access to your electronic health record. If you see a primary care provider, you can also send messages to your care team and make appointments. If you have questions, please call your primary care clinic.  If you do not have a primary care provider, please call [phone] and they will assist you.        Care EveryWhere ID     This is your Care EveryWhere ID. This could be used by other organizations to access your [place] medical records  [id number]        Your Vitals Were     Pulse Temperature Respirations BMI (Body Mass Index)          68 97.7  F (36.5  C) (Oral) 16 32.58 kg/m2          Blood Pressure from Last 3 Encounters:   11/26/18 106/68   07/31/18 141/82   07/17/18 141/72    Weight from Last 3 Encounters:   11/26/18 100.1 kg (220 lb 9.6 oz)   07/10/18 96.5 kg (212 lb 12.8 oz)   06/21/18 96 kg (211 lb 9.6 oz)              We Performed the Following     ADMIN INFLUENZA (For MEDICARE Patients ONLY) []     FLU VACCINE, INCREASED ANTIGEN, PRESV FREE, AGE 65+ [56192]          Today's Medication Changes          These changes are accurate as of 11/26/18  1:54 PM.  If you have any questions, ask your nurse or doctor.               Start taking these medicines.        Dose/Directions    gabapentin 300 MG capsule   Commonly known as:  NEURONTIN   Used for:  HSV (herpes simplex virus) infection   Started by:  Mariangel Layne MD        Take 300 mg by mouth before bed, increase daily dose by 300 mg every 3 days until 1800 mg/day for shingles pain. Use Lowest effective dose.   Quantity:  180 capsule   Refills:  11       oxyCODONE 5 MG tablet   Commonly known as:  ROXICODONE   Used for:  HSV (herpes simplex virus) infection, Post herpetic neuralgia   Started by:  Mariangel Layne MD        Dose:  5-10 mg   Take 1-2 tablets (5-10 mg) by mouth every 6 hours as needed (shingles pain.)   Quantity:  30 tablet   Refills:  0       valACYclovir 1000 mg tablet   Commonly known as:  VALTREX   Used for:  HSV (herpes simplex virus) infection   Started by:  Mariangel Layne MD        Dose:  1000 mg   Take 1 tablet (1,000 mg) by mouth 3 times daily   Quantity:  21 tablet   Refills:  0            Where to get your medicines      These medications were sent to [facility] [id number] [place] [address] - [address] AT [facility] & [address] [place], [place] [id number], [facility] [address]     Phone:  [phone]     gabapentin 300 MG capsule    valACYclovir 1000 mg tablet         Some of these will need a paper prescription and others can be bought over the counter.  Ask your nurse if  you have questions.     Bring a paper prescription for each of these medications     oxyCODONE 5 MG tablet               Information about OPIOIDS     PRESCRIPTION OPIOIDS: WHAT YOU NEED TO KNOW   We gave you an opioid (narcotic) pain medicine. It is important to manage your pain, but opioids are not always the best choice. You should first try all the other options your care team gave you. Take this medicine for as short a time (and as few doses) as possible.    Some activities can increase your pain, such as bandage changes or therapy sessions. It may help to take your pain medicine 30 to 60 minutes before these activities. Reduce your stress by getting enough sleep, working on hobbies you enjoy and practicing relaxation or meditation. Talk to your care team about ways to manage your pain beyond prescription opioids.    These medicines have risks:    DO NOT drive when on new or higher doses of pain medicine. These medicines can affect your alertness and reaction times, and you could be arrested for driving under the influence (DUI). If you need to use opioids long-term, talk to your care team about driving.    DO NOT operate heavy machinery    DO NOT do any other dangerous activities while taking these medicines.    DO NOT drink any alcohol while taking these medicines.     If the opioid prescribed includes acetaminophen, DO NOT take with any other medicines that contain acetaminophen. Read all labels carefully. Look for the word  acetaminophen  or  Tylenol.  Ask your pharmacist if you have questions or are unsure.    You can get addicted to pain medicines, especially if you have a history of addiction (chemical, alcohol or substance dependence). Talk to your care team about ways to reduce this risk.    All opioids tend to cause constipation. Drink plenty of water and eat foods that have a lot of fiber, such as fruits, vegetables, prune juice, apple juice and high-fiber cereal. Take a laxative (Miralax, milk of  magnesia, Colace, Senna) if you don t move your bowels at least every other day. Other side effects include upset stomach, sleepiness, dizziness, throwing up, tolerance (needing more of the medicine to have the same effect), physical dependence and slowed breathing.    Store your pills in a secure place, locked if possible. We will not replace any lost or stolen medicine. If you don t finish your medicine, please throw away (dispose) as directed by your pharmacist. The [place] Pollution Control Agency has more information about safe disposal: [email or web address]         Primary Care Provider Office Phone # Fax #    Shan Arenas [phone] [address]  [facility] [address]        Equal Access to Services     GAIL GRIFFITHS : Hadii eliana esquivel hadasho Soharper, waaxda luqadaha, qaybta kaalmada adecherriyaluis, jose kang . So [facility] [phone].    ATENCIÓN: Si habla español, tiene a persaud disposición servicios gratuitos de asistencia lingüística. Amanda al [phone].    We comply with applicable federal civil rights laws and [place] laws. We do not discriminate on the basis of race, color, national origin, age, disability, sex, sexual orientation, or gender identity.            Thank you!     Thank you for choosing [facility]  for your care. Our goal is always to provide you with excellent care. Hearing back from our patients is one way we can continue to improve our services. Please take a few minutes to complete the written survey that you may receive in the mail after your visit with us. Thank you!             Your Updated Medication List - Protect others around you: Learn how to safely use, store and throw away your medicines at www.disposemymeds.org.          This list is accurate as of 11/26/18  1:54 PM.  Always use your most recent med list.                   Brand Name Dispense  Instructions for use Diagnosis    amLODIPine 10 MG tablet    NORVASC    90 tablet    TAKE 1 TABLET(10 MG) BY MOUTH DAILY    Essential hypertension with goal blood pressure less than 130/80       aspirin 81 MG EC tablet    ASA    90 tablet    Take 1 tablet (81 mg) by mouth daily Start tomorrow morning.    Atherosclerosis of native coronary artery of native heart without angina pectoris       blood glucose monitoring meter device kit     1 kit    Use to test blood sugar 4 to 5 times weekly or as directed.    Refill clinic medication management patient       blood glucose monitoring test strip    ACCU-CHEK MED PLUS    150 each    USE 4 TO 5 TIMES PER WEEK OR AS DIRECTED    Refill clinic medication management patient       carvedilol 25 MG tablet    COREG    360 tablet    Take 2 tablets (50 mg) by mouth 2 times daily Hold IF heart rate less than 55.        cyclobenzaprine 10 MG tablet    FLEXERIL    90 tablet    Take 0.5-1 tablets (5-10 mg) by mouth 3 times daily as needed for muscle spasms    S/P lumbar laminectomy       gabapentin 300 MG capsule    NEURONTIN    180 capsule    Take 300 mg by mouth before bed, increase daily dose by 300 mg every 3 days until 1800 mg/day for shingles pain. Use Lowest effective dose.    HSV (herpes simplex virus) infection       lisinopril-hydrochlorothiazide 20-12.5 MG per tablet    PRINZIDE/ZESTORETIC    90 tablet    TAKE 1 TABLET BY MOUTH DAILY    Essential hypertension with goal blood pressure less than 130/80       losartan 100 MG tablet    COZAAR    90 tablet    TAKE 1 TABLET BY MOUTH EVERY DAY    Essential hypertension with goal blood pressure less than 130/80       metFORMIN 500 MG 24 hr tablet    GLUCOPHAGE-XR    180 tablet    TAKE 2 TABLETS BY MOUTH EVERY MORNING    Type 2 diabetes mellitus with diabetic autonomic neuropathy, without long-term current use of insulin (H)       oxyCODONE 5 MG tablet    ROXICODONE    30 tablet    Take 1-2 tablets (5-10 mg) by mouth every 6 hours  as needed (shingles pain.)    HSV (herpes simplex virus) infection, Post herpetic neuralgia       pantoprazole 40 MG EC tablet    PROTONIX    90 tablet    TAKE 1 TABLET(40 MG) BY MOUTH DAILY    Encounter for medication refill       sildenafil 20 MG tablet    REVATIO    90 tablet    TAKE 2 TO 4 TABLETS BY MOUTH AS NEEDED NEVER USE WITH NITROGLYCERIN, TERAZOSIN, OR DOXAZOSIN. USE AS DIRECTED    Erectile dysfunction, unspecified erectile dysfunction type       simvastatin 20 MG tablet    ZOCOR    90 tablet    Take 1 tablet (20 mg) by mouth every evening        sitagliptin 100 MG tablet    JANUVIA    90 tablet    Take 1 tablet (100 mg) by mouth daily    Diabetes mellitus, type 2 (H)       tiZANidine 4 MG tablet    ZANAFLEX    90 tablet    Take 1 tablet (4 mg) by mouth daily    S/P lumbar laminectomy       TUMS PO      Take 2 chew tab by mouth as needed        valACYclovir 1000 mg tablet    VALTREX    21 tablet    Take 1 tablet (1,000 mg) by mouth 3 times daily    HSV (herpes simplex virus) infection       zolpidem 10 MG tablet    AMBIEN    30 tablet    TAKE 1 TABLET BY MOUTH EVERY DAY AT BEDTIME    MARTELL (obstructive sleep apnea)

## 2018-11-26 NOTE — PROGRESS NOTES
Problem(s) Oriented visit      SUBJECTIVE:                                                    Syd Joyce is a 63 year old male who presents to clinic today for:  Patient presents with:  Derm Problem: Right arm/shoulder x 11/21/18    Rash      Duration: 11/21/18    Description (location/character/radiation): right shoulder /arm. Started as vesicles which popped and scabbed over. Some deep. No bleeding.     Intensity:  severe, 7-8/10    Accompanying signs and symptoms: pain radiates throughout R arm. Pain came first, then vesicles and erythema.     History (similar episodes/previous evaluation): Chicken pox at age 14. Has not had any form of zoster vaccination.     Precipitating or alleviating factors: None    Therapies tried and outcome: None     Blood sugars have been stable. No extreme highs or lows.   Is asymptomatic today w/ relative hypotension.      Problem list, Medication list, Allergies, and Medical/Social/Surgical histories reviewed in EPIC and updated as appropriate.     ROS:  10 point ROS completed and negative except noted above, including Gen, HEENT, CV, Resp, GI, , MS, Neurologic, Psych    Histories:   Patient Active Problem List   Diagnosis     Benign essential hypertension     Mixed hyperlipidemia     Esophageal reflux     Advanced directives, counseling/discussion     Insomnia     Type 2 diabetes mellitus with diabetic autonomic neuropathy, without long-term current use of insulin (H)     Type 2 diabetes mellitus with hyperlipidemia (H)     S/P lumbar laminectomy     MARTELL (obstructive sleep apnea)     Past Medical History:   Diagnosis Date     DM2 (diabetes mellitus, type 2) (H)      Esophageal reflux 6/15/2012     HTN (hypertension) 8/12/2011     Leg pain, bilateral      Low back pain      Numbness and tingling     LEGS, R/T LUMBAR STENOSIS     Obesity, unspecified 1/10/2014     Past Surgical History:   Procedure Laterality Date     COLONOSCOPY       DISCECTOMY LUMBAR POSTERIOR MICROSCOPIC  TWO LEVELS N/A 6/30/2017    Procedure: DISCECTOMY LUMBAR POSTERIOR MICROSCOPIC TWO LEVELS;  L3-4 L4-5 POSTERIOR DECOMPRESSION AND INTERSPINUS FIXATION WITHOUT FUSION;  Surgeon: Ray Perez MD;  Location:  OR     PHACOEMULSIFICATION CLEAR CORNEA WITH STANDARD INTRAOCULAR LENS IMPLANT Right 7/17/2018    Procedure: PHACOEMULSIFICATION CLEAR CORNEA WITH STANDARD INTRAOCULAR LENS IMPLANT;  RIGHT EYE PHACOEMULSIFICATION CLEAR CORNEA WITH STANDARD INTRAOCULAR LENS IMPLANT;  Surgeon: Eslie Kay MD;  Location:  EC     PHACOEMULSIFICATION CLEAR CORNEA WITH STANDARD INTRAOCULAR LENS IMPLANT Left 7/31/2018    Procedure: PHACOEMULSIFICATION CLEAR CORNEA WITH STANDARD INTRAOCULAR LENS IMPLANT;  COMPLEX LEFT EYE PHACOEMULSIFICATION CLEAR CORNEA WITH STANDARD INTRAOCULAR LENS IMPLANT WITH MALYUGIN RING;  Surgeon: Elsie Kay MD;  Location:  [facility]     WISDOM TEETH       Social History   Substance Use Topics     Smoking status: Never Smoker     Smokeless tobacco: Current User     Alcohol use Yes      Comment: RARELY     Family History   Problem Relation Age of Onset     Diabetes No family hx of      Coronary Artery Disease No family hx of      Cerebrovascular Disease No family hx of        OBJECTIVE:                                                    /68  Pulse 68  Temp 97.7  F (36.5  C) (Oral)  Resp 16  Wt 100.1 kg (220 lb 9.6 oz)  BMI 32.58 kg/m2  Body mass index is 32.58 kg/(m^2).   Gen: Cooperative. Appears uncomfortable but not toxic. Obese body habitus noted.   Eyes: PERRL, sclera white.   Ears/Nose/Mouth/Throat: Oropharynx mucous membranes moist, without lesions, erythema, or exudate.   Neck: Supple, without masses, lymphadenopathy or tenderness. Muscles in posterior neck are very tight. He is a little tender w/ palpation of posterior and R lateral neck.   Heart: Regular rate and rhythm without murmurs, rubs, or gallops.   Lungs: Normal respiratory effort. Lungs are clear to auscultation.  Good breath sounds bilaterally.   Musculoskeletal: No lower extremity edema.   Skin: Warm and well perfused. Diffuse, rash w/ excoriations and dried vesicles in various stages of healing on volar aspect of R arm from wrist to shoulder. Largest is 8 mm in flexor crease of R elbow. No signs of localized bacterial infection. None of the vesicles are weeping today.   Neurologic: Alert, oriented x3, nonfocal.   Psych:  Mood and affect are appropriate.     ASSESSMENT/PLAN:                                                      Syd was seen today for derm problem.    Diagnoses and all orders for this visit:    Herpes zoster without complication  -     valACYclovir (VALTREX) 1000 mg tablet; Take 1 tablet (1,000 mg) by mouth 3 times daily  -     gabapentin (NEURONTIN) 300 MG capsule; Take 300 mg by mouth before bed, increase daily dose by 300 mg every 3 days until 1800 mg/day for shingles pain. Use Lowest effective dose.  -     oxyCODONE (ROXICODONE) 5 MG tablet; Take 1-2 tablets (5-10 mg) by mouth every 6 hours as needed (shingles pain.) Discussed risks and benefits of narcotic use.     Post herpetic neuralgia  -     oxyCODONE (ROXICODONE) 5 MG tablet; Take 1-2 tablets (5-10 mg) by mouth every 6 hours as needed (shingles pain.)    Type 2 diabetes mellitus with hyperlipidemia (H)   He will watch blood sugar and his diet carefully while lesions heal to minimize risk of infection.     Benign essential hypertension   BP is low nml today. Asymptomatic.     Need for prophylactic vaccination and inoculation against influenza  -     FLU VACCINE, INCREASED ANTIGEN, PRESV FREE, AGE 65+ [48764]  -     ADMIN INFLUENZA (For MEDICARE Patients ONLY) []   OK to give flu shot today. He is not having a fever and is not on PO steroids.    Will vaccinate in unaffected arm.     HSV (herpes simplex virus) infection    I will see him in clinic if rash spreads or gets worse or lesions begin to appear infected.   Else, OK to manage med refills  for this prn over the phone.     The following health maintenance items are reviewed in Epic and correct as of today:  Health Maintenance   Topic Date Due     HIV SCREEN (SYSTEM ASSIGNED)  [birth year]04/1973     EYE EXAM Q1 YEAR  04/14/2015     COLON CANCER SCREEN (SYSTEM ASSIGNED)  11/20/2016     A1C Q6 MO  10/19/2018     LIPID MONITORING Q1 YEAR  04/03/2019     FOOT EXAM Q1 YEAR  04/19/2019     CREATININE Q1 YEAR  04/19/2019     FALL RISK ASSESSMENT  04/19/2019     MICROALBUMIN Q1 YEAR  06/21/2019     PHQ-2 Q1 YR  06/21/2019     TSH W/ FREE T4 REFLEX Q2 YEAR  04/19/2020     ADVANCE DIRECTIVE PLANNING Q5 YRS  04/19/2023     TETANUS IMMUNIZATION (SYSTEM ASSIGNED)  06/14/2023     PNEUMOCOCCAL  Completed     INFLUENZA VACCINE  Completed     AORTIC ANEURYSM SCREENING (SYSTEM ASSIGNED)  Completed     HEPATITIS C SCREENING  Completed       Mariangel Layne MD  Family Medicine    For any issues, please call my office  [facility] at [phone].

## 2018-11-26 NOTE — PROGRESS NOTES

## 2018-11-26 NOTE — PROGRESS NOTES

## 2018-11-28 ENCOUNTER — TELEPHONE (OUTPATIENT)
Dept: FAMILY MEDICINE | Facility: CLINIC | Age: 63
End: 2018-11-28

## 2019-01-02 DIAGNOSIS — I10 ESSENTIAL HYPERTENSION WITH GOAL BLOOD PRESSURE LESS THAN 130/80: ICD-10-CM

## 2019-01-02 DIAGNOSIS — Z76.0 ENCOUNTER FOR MEDICATION REFILL: ICD-10-CM

## 2019-01-03 RX ORDER — PANTOPRAZOLE SODIUM 40 MG/1
TABLET, DELAYED RELEASE ORAL
Qty: 90 TABLET | Refills: 0 | Status: SHIPPED | OUTPATIENT
Start: 2019-01-03 | End: 2019-04-10

## 2019-01-03 RX ORDER — LISINOPRIL AND HYDROCHLOROTHIAZIDE 12.5; 2 MG/1; MG/1
TABLET ORAL
Qty: 90 TABLET | Refills: 0 | Status: SHIPPED | OUTPATIENT
Start: 2019-01-03 | End: 2019-04-10

## 2019-01-15 ENCOUNTER — TELEPHONE (OUTPATIENT)
Dept: FAMILY MEDICINE | Facility: CLINIC | Age: 64
End: 2019-01-15

## 2019-01-15 NOTE — TELEPHONE ENCOUNTER
Received fax from [employer] with request for PA for Sildenafil.  Submitted through covermymeds. Received fax back stating PA was denied-drug excluded from coverage.  Called [employer] to inform of denial.

## 2019-01-30 DIAGNOSIS — I10 BENIGN ESSENTIAL HYPERTENSION: Primary | ICD-10-CM

## 2019-01-30 DIAGNOSIS — E11.69 TYPE 2 DIABETES MELLITUS WITH HYPERLIPIDEMIA (H): ICD-10-CM

## 2019-01-30 DIAGNOSIS — E78.5 TYPE 2 DIABETES MELLITUS WITH HYPERLIPIDEMIA (H): ICD-10-CM

## 2019-01-30 DIAGNOSIS — E78.2 MIXED HYPERLIPIDEMIA: ICD-10-CM

## 2019-01-30 PROCEDURE — 36415 COLL VENOUS BLD VENIPUNCTURE: CPT | Performed by: FAMILY MEDICINE

## 2019-01-30 NOTE — PROGRESS NOTES
Pre cpx labs comp, lipid, a1c  Too soon for PSA- due in May 2019  Rylie Clark MA January 30, 2019 8:46 AM

## 2019-01-30 NOTE — LETTER
[facility] [address] [place]  [address]  Phone: [phone]    February 1, 2019      Syd Joyce  [address]  [facility] [address]              Dear Syd,    I am writing to report that your included test results are within expected ranges. I do not suggest that we make any changes at this time.         Sincerely,     Shan Arenas M.D.    Results for orders placed or performed in visit on 01/30/19   Comp. Metabolic Panel (14) ([facility])   Result Value Ref Range    Glucose 100 (H) 65 - 99 mg/dL    Urea Nitrogen 11 8 - 27 mg/dL    Creatinine 1.14 0.76 - 1.27 mg/dL    eGFR If NonAfricn Am 67 >59 mL/min/1.73    eGFR If Africn Am 78 >59 mL/min/1.73    BUN/Creatinine Ratio 10 10 - 24    Sodium 137 134 - 144 mmol/L    Potassium 4.5 3.5 - 5.2 mmol/L    Chloride 95 (L) 96 - 106 mmol/L    Total CO2 27 20 - 29 mmol/L    Calcium 9.4 8.6 - 10.2 mg/dL    Protein Total 6.3 6.0 - 8.5 g/dL    Albumin 4.1 3.6 - 4.8 g/dL    Globulin, Total 2.2 1.5 - 4.5 g/dL    A/G Ratio 1.9 1.2 - 2.2    Bilirubin Total 0.4 0.0 - 1.2 mg/dL    Alkaline Phosphatase 119 (H) 39 - 117 IU/L    AST 29 0 - 40 IU/L    ALT 28 0 - 44 IU/L    Narrative    Performed at:  01 - [facility] [address], [place], [place]  [id number]  : Joel Long MD, Phone:  [phone]   Lipid Panel ([facility])   Result Value Ref Range    Cholesterol 141 100 - 199 mg/dL    Triglycerides 98 0 - 149 mg/dL    HDL Cholesterol 78 >39 mg/dL    VLDL Cholesterol Clemente 20 5 - 40 mg/dL    LDL Cholesterol Calculated 43 0 - 99 mg/dL    LDL/HDL Ratio 0.6 0.0 - 3.6 ratio    Narrative    Performed at:  01 - [facility] [address], [place], [place]  [id number]  : Joel Long MD, Phone:  [phone]   Hemoglobin A1C ([facility])   Result Value Ref Range    Hemoglobin A1C 5.6 4.8 - 5.6 %    Narrative    Performed at:  01 - [facility] [address], [place], [place]  [id number]  : Joel Long MD, Phone:   [phone]

## 2019-01-31 DIAGNOSIS — I10 ESSENTIAL HYPERTENSION WITH GOAL BLOOD PRESSURE LESS THAN 130/80: ICD-10-CM

## 2019-01-31 RX ORDER — AMLODIPINE BESYLATE 10 MG/1
TABLET ORAL
Qty: 90 TABLET | Refills: 0 | Status: SHIPPED | OUTPATIENT
Start: 2019-01-31 | End: 2019-02-05

## 2019-02-01 LAB
ALBUMIN SERPL-MCNC: 4.1 G/DL (ref 3.6–4.8)
ALBUMIN/GLOB SERPL: 1.9 {RATIO} (ref 1.2–2.2)
ALP SERPL-CCNC: 119 IU/L (ref 39–117)
ALT SERPL-CCNC: 28 IU/L (ref 0–44)
AST SERPL-CCNC: 29 IU/L (ref 0–40)
BILIRUB SERPL-MCNC: 0.4 MG/DL (ref 0–1.2)
BUN SERPL-MCNC: 11 MG/DL (ref 8–27)
BUN/CREATININE RATIO: 10 (ref 10–24)
CALCIUM SERPL-MCNC: 9.4 MG/DL (ref 8.6–10.2)
CHLORIDE SERPLBLD-SCNC: 95 MMOL/L (ref 96–106)
CHOLEST SERPL-MCNC: 141 MG/DL (ref 100–199)
CREAT SERPL-MCNC: 1.14 MG/DL (ref 0.76–1.27)
EGFR IF AFRICN AM: 78 ML/MIN/1.73
EGFR IF NONAFRICN AM: 67 ML/MIN/1.73
GLOBULIN, TOTAL: 2.2 G/DL (ref 1.5–4.5)
GLUCOSE SERPL-MCNC: 100 MG/DL (ref 65–99)
HBA1C MFR BLD: 5.6 % (ref 4.8–5.6)
HDLC SERPL-MCNC: 78 MG/DL
LDL/HDL RATIO: 0.6 RATIO (ref 0–3.6)
LDLC SERPL CALC-MCNC: 43 MG/DL (ref 0–99)
POTASSIUM SERPL-SCNC: 4.5 MMOL/L (ref 3.5–5.2)
PROT SERPL-MCNC: 6.3 G/DL (ref 6–8.5)
SODIUM SERPL-SCNC: 137 MMOL/L (ref 134–144)
TOTAL CO2: 27 MMOL/L (ref 20–29)
TRIGL SERPL-MCNC: 98 MG/DL (ref 0–149)
VLDLC SERPL CALC-MCNC: 20 MG/DL (ref 5–40)

## 2019-02-05 ENCOUNTER — OFFICE VISIT (OUTPATIENT)
Dept: FAMILY MEDICINE | Facility: CLINIC | Age: 64
End: 2019-02-05

## 2019-02-05 VITALS
RESPIRATION RATE: 16 BRPM | DIASTOLIC BLOOD PRESSURE: 72 MMHG | OXYGEN SATURATION: 98 % | WEIGHT: 223.6 LBS | HEART RATE: 76 BPM | BODY MASS INDEX: 32.01 KG/M2 | HEIGHT: 70 IN | SYSTOLIC BLOOD PRESSURE: 140 MMHG

## 2019-02-05 DIAGNOSIS — G47.00 INSOMNIA, UNSPECIFIED TYPE: ICD-10-CM

## 2019-02-05 DIAGNOSIS — I25.118 CORONARY ARTERY DISEASE OF NATIVE ARTERY OF NATIVE HEART WITH STABLE ANGINA PECTORIS (H): ICD-10-CM

## 2019-02-05 DIAGNOSIS — G63 POLYNEUROPATHY ASSOCIATED WITH UNDERLYING DISEASE (H): ICD-10-CM

## 2019-02-05 DIAGNOSIS — N40.1 BENIGN PROSTATIC HYPERPLASIA WITH LOWER URINARY TRACT SYMPTOMS, SYMPTOM DETAILS UNSPECIFIED: ICD-10-CM

## 2019-02-05 DIAGNOSIS — R60.0 PERIPHERAL EDEMA: ICD-10-CM

## 2019-02-05 DIAGNOSIS — K21.9 GASTROESOPHAGEAL REFLUX DISEASE WITHOUT ESOPHAGITIS: ICD-10-CM

## 2019-02-05 DIAGNOSIS — E11.69 TYPE 2 DIABETES MELLITUS WITH HYPERLIPIDEMIA (H): ICD-10-CM

## 2019-02-05 DIAGNOSIS — E78.5 TYPE 2 DIABETES MELLITUS WITH HYPERLIPIDEMIA (H): ICD-10-CM

## 2019-02-05 DIAGNOSIS — F34.1 DYSTHYMIA: ICD-10-CM

## 2019-02-05 DIAGNOSIS — G47.33 OSA (OBSTRUCTIVE SLEEP APNEA): Primary | ICD-10-CM

## 2019-02-05 PROCEDURE — G0439 PPPS, SUBSEQ VISIT: HCPCS | Performed by: FAMILY MEDICINE

## 2019-02-05 PROCEDURE — 99214 OFFICE O/P EST MOD 30 MIN: CPT | Mod: 25 | Performed by: FAMILY MEDICINE

## 2019-02-05 RX ORDER — FINASTERIDE 5 MG/1
5 TABLET, FILM COATED ORAL DAILY
Qty: 90 TABLET | Refills: 3 | Status: SHIPPED | OUTPATIENT
Start: 2019-02-05 | End: 2020-03-24

## 2019-02-05 ASSESSMENT — MIFFLIN-ST. JEOR: SCORE: 1800.49

## 2019-02-05 ASSESSMENT — PATIENT HEALTH QUESTIONNAIRE - PHQ9: SUM OF ALL RESPONSES TO PHQ QUESTIONS 1-9: 7

## 2019-02-05 NOTE — PROGRESS NOTES
"  SUBJECTIVE:   Syd Joyce is a 64 year old male who presents for Preventive Visit.    Are you in the first 12 months of your Medicare Part B coverage?  No    Physical Health:    In general, how would you rate your overall physical health? fair    Outside of work, how many days during the week do you exercise? none    Outside of work, approximately how many minutes a day do you exercise?less than 15 minutes    If you drink alcohol do you typically have >3 drinks per day or >7 drinks per week? No    Do you usually eat at least 4 servings of fruit and vegetables a day, include whole grains & fiber and avoid regularly eating high fat or \"junk\" foods? Yes    Do you have any problems taking medications regularly?  No    Do you have any side effects from medications? none    Needs assistance for the following daily activities: no assistance needed    Which of the following safety concerns are present in your home?  poor lighting     Hearing impairment: Yes, both ears only hears at 1000 and 2000Hz    In the past 6 months, have you been bothered by leaking of urine? yes    Mental Health:    In general, how would you rate your overall mental or emotional health? fair  PHQ-2 Score:      Do you feel safe in your environment? Yes    Do you have a Health Care Directive? no    Additional concerns to address?      Fall risk:  Fallen 2 or more times in the past year?: No  Any fall with injury in the past year?: No    Cognitive Screenin) Repeat 3 items (Leader, Season, Table)    2) Clock draw: NORMAL  3) 3 item recall: Recalls 2 objects   Results: NORMAL clock, 1-2 items recalled: COGNITIVE IMPAIRMENT LESS LIKELY    Mini-CogTM Copyright MIROSLAVA Lux. Licensed by the author for use in [facility]; reprinted with permission (jessica@.[email or web address]). All rights reserved.      Do you have sleep apnea, excessive snoring or daytime drowsiness?: no        PROBLEMS TO ADD ON...  -------------------------------------    Reviewed and " updated as needed this visit by clinical staff  Tobacco  Allergies  Meds         Reviewed and updated as needed this visit by Provider        Social History     Tobacco Use     Smoking status: Never Smoker     Smokeless tobacco: Current User   Substance Use Topics     Alcohol use: Yes     Comment: RARELY                           Current providers sharing in care for this patient include:   Patient Care Team:  Shan Arenas MD as PCP - General (Family Practice)    The following health maintenance items are reviewed in Epic and correct as of today:  Health Maintenance   Topic Date Due     DEPRESSION ACTION PLAN  02/04/1971     PHQ-9 Q6 MONTHS  02/04/1971     ZOSTER IMMUNIZATION (1 of 2) 02/04/2003     EYE EXAM Q1 YEAR  04/14/2015     COLON CANCER SCREEN (SYSTEM ASSIGNED)  11/20/2016     FOOT EXAM Q1 YEAR  04/19/2019     FALL RISK ASSESSMENT  04/19/2019     MICROALBUMIN Q1 YEAR  06/21/2019     A1C Q6 MO  07/30/2019     CREATININE Q1 YEAR  01/30/2020     LIPID MONITORING Q1 YEAR  01/30/2020     TSH W/ FREE T4 REFLEX Q2 YEAR  04/19/2020     ADVANCE DIRECTIVE PLANNING Q5 YRS  04/19/2023     DTAP/TDAP/TD IMMUNIZATION (2 - Td) 06/14/2023     INFLUENZA VACCINE  Completed     PNEUMOCOCCAL IMMUNIZATION 65+ LOW/MEDIUM RISK  Completed     AORTIC ANEURYSM SCREENING (SYSTEM ASSIGNED)  Completed     HEPATITIS C SCREENING  Completed     IPV IMMUNIZATION  Aged Out     MENINGITIS IMMUNIZATION  Aged Out     Patient Active Problem List   Diagnosis     Benign essential hypertension     Mixed hyperlipidemia     Esophageal reflux     Advanced directives, counseling/discussion     Insomnia     Type 2 diabetes mellitus with diabetic autonomic neuropathy, without long-term current use of insulin (H)     Type 2 diabetes mellitus with hyperlipidemia (H)     S/P lumbar laminectomy     MARTELL (obstructive sleep apnea)     Coronary artery disease of native artery of native heart with stable angina pectoris (H)     Polyneuropathy associated  with underlying disease (H)     Past Surgical History:   Procedure Laterality Date     COLONOSCOPY       DISCECTOMY LUMBAR POSTERIOR MICROSCOPIC TWO LEVELS N/A 6/30/2017    Procedure: DISCECTOMY LUMBAR POSTERIOR MICROSCOPIC TWO LEVELS;  L3-4 L4-5 POSTERIOR DECOMPRESSION AND INTERSPINUS FIXATION WITHOUT FUSION;  Surgeon: Ray Perez MD;  Location:  OR     PHACOEMULSIFICATION CLEAR CORNEA WITH STANDARD INTRAOCULAR LENS IMPLANT Right 7/17/2018    Procedure: PHACOEMULSIFICATION CLEAR CORNEA WITH STANDARD INTRAOCULAR LENS IMPLANT;  RIGHT EYE PHACOEMULSIFICATION CLEAR CORNEA WITH STANDARD INTRAOCULAR LENS IMPLANT;  Surgeon: Elsie Kay MD;  [facility]:  [facility]     PHACOEMULSIFICATION CLEAR CORNEA WITH STANDARD INTRAOCULAR LENS IMPLANT Left 7/31/2018    Procedure: PHACOEMULSIFICATION CLEAR CORNEA WITH STANDARD INTRAOCULAR LENS IMPLANT;  COMPLEX LEFT EYE PHACOEMULSIFICATION CLEAR CORNEA WITH STANDARD INTRAOCULAR LENS IMPLANT WITH MALYUGIN RING;  Surgeon: Elsie Kay MD;  Location:  [facility]     WISDOM TEETH         Social History     Tobacco Use     Smoking status: Never Smoker     Smokeless tobacco: Current User   Substance Use Topics     Alcohol use: Yes     Comment: RARELY     Family History   Problem Relation Age of Onset     Diabetes Brother      Obesity Sister      Obesity Brother      Coronary Artery Disease No family hx of      Cerebrovascular Disease No family hx of          [id number]  Diabetes  he  reports no new symptoms.  No significnat or regular episodes of hypo or hyperglycemia  Medication compliance: compliant most of the time  Diabetic diet compliance: compliant most of the time  Diabetic ROS: no polyuria or polydipsia, no chest pain, dyspnea or TIA's, no numbness, tingling or pain in extremities    Home blood sugar monitoring: are performed regularly, Review of patients self blood glucose monitoring shows fasting most always < 130 and post prandial average under 170.  As a direct cause of  their history of diabetes they have the following Diabetic complications: none    Most  recent A1C: Smoking: BP:   The last 5 available A1C values from [facility] reference lab, [facility]:  No components found for: [id number]     Lab Results   Component Value Date    A1C 5.6 01/30/2019    A1C 5.5 04/19/2018    A1C 6.8 06/19/2017    History   Smoking Status     Never Smoker   Smokeless Tobacco     Current User    BP Readings from Last 1 Encounters:   02/05/19 140/72        Kidney studies:  Creatinine   Date Value Ref Range Status   01/30/2019 1.14 0.76 - 1.27 mg/dL Final   04/19/2018 1.15 0.76 - 1.27 mg/dL Final   04/03/2018 1.22 0.70 - 1.30 mg/dL Final   ]    GFR Estimate   Date Value Ref Range Status   04/03/2018 60 (L) >60 mL/min/1.7m2 Final                No components found for: MICORALBUMINLC      GFR Estimate If Black   Date Value Ref Range Status   04/03/2018 72 >60 mL/min/1.7m2 Final     Medication (Note: This includes the hypertensive combination subclass to make sure to show all ACEI/ARB's.)     Angiotensin II Receptor Antagonists Sig    losartan (COZAAR) 100 MG tablet TAKE 1 TABLET BY MOUTH EVERY DAY    Antihypertensive Combinations Sig    lisinopril-hydrochlorothiazide (PRINZIDE/ZESTORETIC) 20-12.5 MG tablet TAKE 1 TABLET BY MOUTH DAILY               Statin and ASA:  Current Outpatient Medications   Medication     aspirin 81 MG EC tablet     blood glucose monitoring (ACCU-CHEK MED PLUS) meter device kit     blood glucose monitoring (ACCU-CHEK MED PLUS) test strip     Calcium Carbonate Antacid (TUMS PO)     carvedilol (COREG) 25 MG tablet     cyclobenzaprine (FLEXERIL) 10 MG tablet     finasteride (PROSCAR) 5 MG tablet     gabapentin (NEURONTIN) 300 MG capsule     lisinopril-hydrochlorothiazide (PRINZIDE/ZESTORETIC) 20-12.5 MG tablet     losartan (COZAAR) 100 MG tablet     metFORMIN (GLUCOPHAGE-XR) 500 MG 24 hr tablet     order for DME     oxyCODONE (ROXICODONE) 5 MG tablet     pantoprazole (PROTONIX) 40 MG  "EC tablet     sildenafil (REVATIO) 20 MG tablet     simvastatin (ZOCOR) 20 MG tablet     sitagliptin (JANUVIA) 100 MG tablet     tiZANidine (ZANAFLEX) 4 MG tablet     valACYclovir (VALTREX) 1000 mg tablet     zolpidem (AMBIEN) 10 MG tablet     No current facility-administered medications for this visit.      Blood presure remains well controlled when checked out of clinic.    Reviewed last 6 BP readings in chart:  BP Readings from Last 6 Encounters:   02/05/19 140/72   11/26/18 106/68   07/31/18 141/82   07/17/18 141/72   07/10/18 122/64   06/21/18 120/70       he has not experienced any significant side effects from medications for hypertension.    NO active cardiac complaints or symptoms with exercise.    Prior of coronary artery disease as listed in medical history.  No current or recent cardiovascaulr symptoms.   No shortness of breath, no episodes of chest pain/pressure, no dyspnea on exertion, no changes in his abiliy to perform physical exertion or tasks.  Takes the same amount of time to perform similar physical tasks.      Significant periph edema.    He complains of urinary hesitancy, weak stream, double and incomplete voiding, intermittent urinary frequency and nocturia times . AUA Bother Score is high.        ROS:  Constitutional, HEENT, cardiovascular, pulmonary, GI, , musculoskeletal, neuro, skin, endocrine and psych systems are negative, except as otherwise noted.    OBJECTIVE:   /72   Pulse 76   Resp 16   Ht 1.778 m (5' 10\")   Wt 101.4 kg (223 lb 9.6 oz)   SpO2 98%   BMI 32.08 kg/m   Estimated body mass index is 32.08 kg/m  as calculated from the following:    Height as of this encounter: 1.778 m (5' 10\").    Weight as of this encounter: 101.4 kg (223 lb 9.6 oz).  EXAM:   GENERAL: healthy, alert and no distress  EYES: Eyes grossly normal to inspection, PERRL and conjunctivae and sclerae normal  HENT: ear canals and TM's normal, nose and mouth without ulcers or lesions  NECK: no " adenopathy, no asymmetry, masses, or scars and thyroid normal to palpation  RESP: lungs clear to auscultation - no rales, rhonchi or wheezes  CV: regular rate and rhythm, normal S1 S2, no S3 or S4, no murmur, click or rub, no peripheral edema and peripheral pulses strong  ABDOMEN: soft, nontender, no hepatosplenomegaly, no masses and bowel sounds normal   (male): normal male genitalia without lesions or urethral discharge, no hernia  RECTAL: normal sphincter tone, no rectal masses and prostate 3+ enlarged, nontender  MS: no gross musculoskeletal defects noted, no edema  SKIN: no suspicious lesions or rashes  NEURO: Normal strength and tone, mentation intact and speech normal  PSYCH: mentation appears normal, affect normal/bright        ASSESSMENT / PLAN:   Diagnoses and all orders for this visit:    MARTELL (obstructive sleep apnea)    Type 2 diabetes mellitus with hyperlipidemia (H)    Coronary artery disease of native artery of native heart with stable angina pectoris (H)    Gastroesophageal reflux disease without esophagitis    Insomnia, unspecified type    Polyneuropathy associated with underlying disease (H)  -     Referral to [facility]    Dysthymia    Peripheral edema  -     order for DME; Equipment being ordered: compression stockings medium  Same size as previous    Benign prostatic hyperplasia with lower urinary tract symptoms, symptom details unspecified  -     finasteride (PROSCAR) 5 MG tablet; Take 1 tablet (5 mg) by mouth daily        End of Life Planning:  Patient currently has an advanced directive: Yes.  Practitioner is supportive of decision.    COUNSELING:  Reviewed preventive health counseling, as reflected in patient instructions       Regular exercise       Healthy diet/nutrition       Vision screening       see DC Sum.    BP Readings from Last 1 Encounters:   02/05/19 140/72     Estimated body mass index is 32.08 kg/m  as calculated from the following:    Height as of this  "encounter: 1.778 m (5' 10\").    Weight as of this encounter: 101.4 kg (223 lb 9.6 oz).           reports that  has never smoked. He uses smokeless tobacco.      Appropriate preventive services were discussed with this patient, including applicable screening as appropriate for cardiovascular disease, diabetes, osteopenia/osteoporosis, and glaucoma.  As appropriate for age/gender, discussed screening for colorectal cancer, prostate cancer, breast cancer, and cervical cancer. Checklist reviewing preventive services available has been given to the patient.    Reviewed patients plan of care and provided an AVS. The Basic Care Plan (routine screening as documented in Health Maintenance) for Syd meets the Care Plan requirement. This Care Plan has been established and reviewed with the Patient.    Counseling Resources:  ATP IV Guidelines  Pooled Cohorts Equation Calculator  Breast Cancer Risk Calculator  FRAX Risk Assessment  ICSI Preventive Guidelines  Dietary Guidelines for Americans, 2010  USDA's MyPlate  ASA Prophylaxis  Lung CA Screening    Shan Arenas MD  [facility]  "

## 2019-02-05 NOTE — PATIENT INSTRUCTIONS
Preventive Health Recommendations:     See your health care provider every year to    Review health changes.     Discuss preventive care.      Review your medicines if your doctor has prescribed any.    Talk with your health care provider about whether you should have a test to screen for prostate cancer (PSA).    Every 3 years, have a diabetes test (fasting glucose). If you are at risk for diabetes, you should have this test more often.    Every 5 years, have a cholesterol test. Have this test more often if you are at risk for high cholesterol or heart disease.     Every 10 years, have a colonoscopy. Or, have a yearly FIT test (stool test). These exams will check for colon cancer.    Talk to with your health care provider about screening for Abdominal Aortic Aneurysm if you have a family history of AAA or have a history of smoking.  Shots:     Get a flu shot each year.     Get a tetanus shot every 10 years.     Talk to your doctor about your pneumonia vaccines. There are now two you should receive - Pneumovax (PPSV 23) and Prevnar (PCV 13).    Talk to your pharmacist about a shingles vaccine.     Talk to your doctor about the hepatitis B vaccine.  Nutrition:     Eat at least 5 servings of fruits and vegetables each day.     Eat whole-grain bread, whole-wheat pasta and brown rice instead of white grains and rice.     Get adequate Calcium and Vitamin D.   Lifestyle    Exercise for at least 150 minutes a week (30 minutes a day, 5 days a week). This will help you control your weight and prevent disease.     Limit alcohol to one drink per day.     No smoking.     Wear sunscreen to prevent skin cancer.     See your dentist every six months for an exam and cleaning.     See your eye doctor every 1 to 2 years to screen for conditions such as glaucoma, macular degeneration and cataracts.    Personalized Prevention Plan  You are due for the preventive services outlined below.  Your care team is available to assist you in  scheduling these services.  If you have already completed any of these items, please share that information with your care team to update in your medical record.    Health Maintenance Due   Topic Date Due     Zoster (Shingles) Vaccine (1 of 2) 02/04/2003     Eye Exam - yearly  04/14/2015     Colon Cancer Screening - every 10 years.  11/20/2016

## 2019-02-11 ENCOUNTER — TRANSFERRED RECORDS (OUTPATIENT)
Dept: FAMILY MEDICINE | Facility: CLINIC | Age: 64
End: 2019-02-11

## 2019-02-17 NOTE — TELEPHONE ENCOUNTER
11/28/18 patient called regarding meds prescribed 11/26 for shingles.   Reports 11/26 at 3pm took 1 tab allan 300mg, 1 tab oxy 5mg and 1 tab valtrex 1000mg. 30 minutes later had onset of N&V which lasted 2 hours. Still weak and pain is terrible. 2 Aleve's no help at all. Eating/drinking/urinating/bowels all ok today. Rash not spreading. Asking which med caused and how to manage pain at this point.   Per Dr. Kierra Layne - encouraged to continue valtrex TID as prescribed. GI upset was likely due to all 3 meds without food. specifically oxycodone without food. Try this med again - 1/2 -whole tab with food. If tolerating and okay pain control, may consider trial of gabapentin at HS as prescribed. Patient agrees and will call us if no improvement.  Kamini Price RN

## 2019-02-26 ENCOUNTER — TRANSFERRED RECORDS (OUTPATIENT)
Dept: FAMILY MEDICINE | Facility: CLINIC | Age: 64
End: 2019-02-26

## 2019-02-28 DIAGNOSIS — G47.33 OSA (OBSTRUCTIVE SLEEP APNEA): ICD-10-CM

## 2019-02-28 RX ORDER — ZOLPIDEM TARTRATE 10 MG/1
TABLET ORAL
Qty: 30 TABLET | Refills: 0 | Status: SHIPPED | OUTPATIENT
Start: 2019-02-28 | End: 2019-04-01

## 2019-03-04 ENCOUNTER — TELEPHONE (OUTPATIENT)
Dept: FAMILY MEDICINE | Facility: CLINIC | Age: 64
End: 2019-03-04

## 2019-03-04 NOTE — TELEPHONE ENCOUNTER
Received fax from [employer] that patient's rx for Zolpidem needs PA. Submitted through [employer].  Received fax back with approval. Approved until 03/03/2021.  Called [employer] to inform of approval.

## 2019-03-07 ENCOUNTER — OFFICE VISIT (OUTPATIENT)
Dept: FAMILY MEDICINE | Facility: CLINIC | Age: 64
End: 2019-03-07

## 2019-03-07 VITALS
DIASTOLIC BLOOD PRESSURE: 80 MMHG | WEIGHT: 223.4 LBS | OXYGEN SATURATION: 98 % | RESPIRATION RATE: 16 BRPM | SYSTOLIC BLOOD PRESSURE: 144 MMHG | BODY MASS INDEX: 32.05 KG/M2 | HEART RATE: 74 BPM

## 2019-03-07 DIAGNOSIS — E11.43 TYPE 2 DIABETES MELLITUS WITH DIABETIC AUTONOMIC NEUROPATHY, WITHOUT LONG-TERM CURRENT USE OF INSULIN (H): ICD-10-CM

## 2019-03-07 DIAGNOSIS — G63 POLYNEUROPATHY ASSOCIATED WITH UNDERLYING DISEASE (H): Primary | ICD-10-CM

## 2019-03-07 PROCEDURE — 99214 OFFICE O/P EST MOD 30 MIN: CPT | Performed by: FAMILY MEDICINE

## 2019-03-08 NOTE — PROGRESS NOTES
Problem(s) Oriented visit        SUBJECTIVE:                                                    Syd Joyce is a 64 year old male who presents to clinic today for the following health issues :      1. Polyneuropathy associated with underlying disease (H)  Note reviewed with patient and wife from [facility] and their hope that PT will be helpful      2. Type 2 diabetes mellitus with diabetic autonomic neuropathy, without long-term current use of insulin (H)  Doing a bit better.    3. Counseling,  Saw a counselor who thought he didn't have depression.         Problem list, Medication list, Allergies, and Medical/Social/Surgical histories reviewed in [facility] and updated as appropriate.   Additional history: as documented    ROS:  General and CV completed and negative except as noted above    Histories:   Patient Active Problem List   Diagnosis     Benign essential hypertension     Mixed hyperlipidemia     Esophageal reflux     Advanced directives, counseling/discussion     Insomnia     Type 2 diabetes mellitus with diabetic autonomic neuropathy, without long-term current use of insulin (H)     Type 2 diabetes mellitus with hyperlipidemia (H)     S/P lumbar laminectomy     MARTELL (obstructive sleep apnea)     Coronary artery disease of native artery of native heart with stable angina pectoris (H)     Polyneuropathy associated with underlying disease (H)     Past Surgical History:   Procedure Laterality Date     COLONOSCOPY       DISCECTOMY LUMBAR POSTERIOR MICROSCOPIC TWO LEVELS N/A 6/30/2017    Procedure: DISCECTOMY LUMBAR POSTERIOR MICROSCOPIC TWO LEVELS;  L3-4 L4-5 POSTERIOR DECOMPRESSION AND INTERSPINUS FIXATION WITHOUT FUSION;  Surgeon: Ray Perez MD;  Location: [facility] OR     PHACOEMULSIFICATION CLEAR CORNEA WITH STANDARD INTRAOCULAR LENS IMPLANT Right 7/17/2018    Procedure: PHACOEMULSIFICATION CLEAR CORNEA WITH STANDARD INTRAOCULAR LENS IMPLANT;  RIGHT EYE PHACOEMULSIFICATION CLEAR CORNEA WITH STANDARD INTRAOCULAR  LENS IMPLANT;  Surgeon: Elsie Kay MD;  Location:  [facility]     PHACOEMULSIFICATION CLEAR CORNEA WITH STANDARD INTRAOCULAR LENS IMPLANT Left 7/31/2018    Procedure: PHACOEMULSIFICATION CLEAR CORNEA WITH STANDARD INTRAOCULAR LENS IMPLANT;  COMPLEX LEFT EYE PHACOEMULSIFICATION CLEAR CORNEA WITH STANDARD INTRAOCULAR LENS IMPLANT WITH MALYUGIN RING;  Surgeon: Elsie Kay MD;  Location:  [facility]     WISDOM TEETH         Social History     Tobacco Use     Smoking status: Never Smoker     Smokeless tobacco: Current User   Substance Use Topics     Alcohol use: Yes     Comment: RARELY     Family History   Problem Relation Age of Onset     Diabetes Brother      Obesity Sister      Obesity Brother      Coronary Artery Disease No family hx of      Cerebrovascular Disease No family hx of            OBJECTIVE:                                                    /80   Pulse 74   Resp 16   Wt 101.3 kg (223 lb 6.4 oz)   SpO2 98%   BMI 32.05 kg/m    Body mass index is 32.05 kg/m .   APPEARANCE: = Relaxed and in no distress  Conj/Eyelids = noninjected and lids and lashes are without inflammation  PERRLA/Irises = Pupils Round Reactive to Light and Irisis without inflammation  Neck = No anterior or posterior adenopathy appreciated.  Thyroid = Not enlarged and no masses felt  Resp effort = Calm regular breathing  Breath Sounds = Good air movement with no rales or rhonchi in any lung fields  Heart Rate, Rhythm, & sounds (no Murm)  = Regular rate and rhythm with no S3, S4, or murmur appreciated.  Carotid Art's = Pulses full and equal and no bruits appreciated  Abdomen = Soft, nontender, no masses, & bowel sounds in all quadrants  Liver/Spleen = Normal span and no splenomegaly noted  Digits and Nails = FROM in all finger joints, no nail dystrophy  Ext (edema) = No pretibial edema noted or elsewhere  Musculsktl =  Strength and ROM of major joints are within normal limits  SKIN = absent significant rashes or lesions    Recent/Remote Memory = Alert and Oriented x 3  Mood/Affect = Cooperative and interested     ASSESSMENT/PLAN:                                                        Syd was seen today for follow up.    Diagnoses and all orders for this visit:    Polyneuropathy associated with underlying disease (H)    Type 2 diabetes mellitus with diabetic autonomic neuropathy, without long-term current use of insulin (H)        FURTHER TESTING:  >25 min spent with patient, greater than 50% spent on discussion/education/planning, etc. About The primary encounter diagnosis was Polyneuropathy associated with underlying disease (H). A diagnosis of Type 2 diabetes mellitus with diabetic autonomic neuropathy, without long-term current use of insulin (H) was also pertinent to this visit.          The following health maintenance items are reviewed in Epic and correct as of today:  Health Maintenance   Topic Date Due     DEPRESSION ACTION PLAN  02/04/1971     ZOSTER IMMUNIZATION (1 of 2) 02/04/2003     EYE EXAM Q1 YEAR  04/14/2015     COLON CANCER SCREEN (SYSTEM ASSIGNED)  11/20/2016     FOOT EXAM Q1 YEAR  04/19/2019     MICROALBUMIN Q1 YEAR  06/21/2019     A1C Q6 MO  07/30/2019     PHQ-9 Q6 MONTHS  08/05/2019     CREATININE Q1 YEAR  01/30/2020     LIPID MONITORING Q1 YEAR  01/30/2020     MEDICARE ANNUAL WELLNESS VISIT  02/05/2020     FALL RISK ASSESSMENT  02/05/2020     TSH W/ FREE T4 REFLEX Q2 YEAR  04/19/2020     ADVANCE DIRECTIVE PLANNING Q5 YRS  04/19/2023     DTAP/TDAP/TD IMMUNIZATION (2 - Td) 06/14/2023     INFLUENZA VACCINE  Completed     PNEUMOCOCCAL IMMUNIZATION 65+ LOW/MEDIUM RISK  Completed     AORTIC ANEURYSM SCREENING (SYSTEM ASSIGNED)  Completed     HEPATITIS C SCREENING  Completed     IPV IMMUNIZATION  Aged Out     MENINGITIS IMMUNIZATION  Aged Out       Shan Arenas MD  [facility]  Family Practice  [facility]  [phone]    For any issues my office # is [phone]

## 2019-03-26 DIAGNOSIS — I10 ESSENTIAL HYPERTENSION WITH GOAL BLOOD PRESSURE LESS THAN 130/80: ICD-10-CM

## 2019-03-26 RX ORDER — LOSARTAN POTASSIUM 100 MG/1
TABLET ORAL
Qty: 90 TABLET | Refills: 0 | Status: SHIPPED | OUTPATIENT
Start: 2019-03-26 | End: 2019-06-22

## 2019-04-01 DIAGNOSIS — G47.33 OSA (OBSTRUCTIVE SLEEP APNEA): ICD-10-CM

## 2019-04-01 RX ORDER — ZOLPIDEM TARTRATE 10 MG/1
TABLET ORAL
Qty: 30 TABLET | Refills: 0 | Status: ON HOLD | OUTPATIENT
Start: 2019-04-01 | End: 2019-04-26

## 2019-04-10 DIAGNOSIS — I10 ESSENTIAL HYPERTENSION WITH GOAL BLOOD PRESSURE LESS THAN 130/80: ICD-10-CM

## 2019-04-10 DIAGNOSIS — Z76.0 ENCOUNTER FOR MEDICATION REFILL: ICD-10-CM

## 2019-04-10 RX ORDER — LISINOPRIL AND HYDROCHLOROTHIAZIDE 12.5; 2 MG/1; MG/1
TABLET ORAL
Qty: 90 TABLET | Refills: 0 | Status: SHIPPED | OUTPATIENT
Start: 2019-04-10 | End: 2019-04-16

## 2019-04-10 RX ORDER — PANTOPRAZOLE SODIUM 40 MG/1
TABLET, DELAYED RELEASE ORAL
Qty: 90 TABLET | Refills: 0 | Status: SHIPPED | OUTPATIENT
Start: 2019-04-10 | End: 2019-07-15

## 2019-04-10 NOTE — TELEPHONE ENCOUNTER
BP Readings from Last 3 Encounters:   03/07/19 144/80   02/05/19 140/72   11/26/18 106/68     Last Comprehensive Metabolic Panel:  Sodium   Date Value Ref Range Status   01/30/2019 137 134 - 144 mmol/L Final     Potassium   Date Value Ref Range Status   01/30/2019 4.5 3.5 - 5.2 mmol/L Final     Chloride   Date Value Ref Range Status   01/30/2019 95 (L) 96 - 106 mmol/L Final     Carbon Dioxide   Date Value Ref Range Status   04/03/2018 30 (H) 23 - 29 mmol/L Final     Anion Gap   Date Value Ref Range Status   04/03/2018 11.1 6 - 17 mmol/L Final     Glucose   Date Value Ref Range Status   01/30/2019 100 (H) 65 - 99 mg/dL Final     Urea Nitrogen   Date Value Ref Range Status   01/30/2019 11 8 - 27 mg/dL Final     BUN/Creatinine Ratio   Date Value Ref Range Status   01/30/2019 10 10 - 24 Final     Creatinine   Date Value Ref Range Status   01/30/2019 1.14 0.76 - 1.27 mg/dL Final     GFR Estimate   Date Value Ref Range Status   04/03/2018 60 (L) >60 mL/min/1.7m2 Final     Calcium   Date Value Ref Range Status   01/30/2019 9.4 8.6 - 10.2 mg/dL Final

## 2019-04-16 ENCOUNTER — OFFICE VISIT (OUTPATIENT)
Dept: FAMILY MEDICINE | Facility: CLINIC | Age: 64
End: 2019-04-16

## 2019-04-16 VITALS
DIASTOLIC BLOOD PRESSURE: 60 MMHG | SYSTOLIC BLOOD PRESSURE: 100 MMHG | BODY MASS INDEX: 30.85 KG/M2 | WEIGHT: 215 LBS | HEART RATE: 72 BPM | TEMPERATURE: 98.7 F | RESPIRATION RATE: 16 BRPM | OXYGEN SATURATION: 96 %

## 2019-04-16 DIAGNOSIS — R19.7 DIARRHEA OF PRESUMED INFECTIOUS ORIGIN: Primary | ICD-10-CM

## 2019-04-16 DIAGNOSIS — I10 BENIGN ESSENTIAL HYPERTENSION: ICD-10-CM

## 2019-04-16 DIAGNOSIS — R35.0 BENIGN PROSTATIC HYPERPLASIA WITH URINARY FREQUENCY: ICD-10-CM

## 2019-04-16 DIAGNOSIS — N40.1 BENIGN PROSTATIC HYPERPLASIA WITH URINARY FREQUENCY: ICD-10-CM

## 2019-04-16 DIAGNOSIS — G47.33 OSA (OBSTRUCTIVE SLEEP APNEA): ICD-10-CM

## 2019-04-16 DIAGNOSIS — G47.00 INSOMNIA, UNSPECIFIED TYPE: ICD-10-CM

## 2019-04-16 DIAGNOSIS — E11.43 TYPE 2 DIABETES MELLITUS WITH DIABETIC AUTONOMIC NEUROPATHY, WITHOUT LONG-TERM CURRENT USE OF INSULIN (H): ICD-10-CM

## 2019-04-16 PROBLEM — E78.5 TYPE 2 DIABETES MELLITUS WITH HYPERLIPIDEMIA (H): Status: RESOLVED | Noted: 2017-06-19 | Resolved: 2019-04-16

## 2019-04-16 PROBLEM — E11.69 TYPE 2 DIABETES MELLITUS WITH HYPERLIPIDEMIA (H): Status: RESOLVED | Noted: 2017-06-19 | Resolved: 2019-04-16

## 2019-04-16 LAB
% GRANULOCYTES: 82.2 % (ref 42.2–75.2)
HCT VFR BLD AUTO: 34.4 % (ref 39–51)
HEMOGLOBIN: 11.9 G/DL (ref 13.4–17.5)
LYMPHOCYTES NFR BLD AUTO: 12.6 % (ref 20.5–51.1)
MCH RBC QN AUTO: 31.8 PG (ref 27–31)
MCHC RBC AUTO-ENTMCNC: 34.7 G/DL (ref 33–37)
MCV RBC AUTO: 91.7 FL (ref 80–100)
MONOCYTES NFR BLD AUTO: 5.2 % (ref 1.7–9.3)
PLATELET # BLD AUTO: 159 K/UL (ref 140–450)
RBC # BLD AUTO: 3.75 X10/CMM (ref 4.2–5.9)
WBC # BLD AUTO: 6.6 X10/CMM (ref 3.8–11)

## 2019-04-16 PROCEDURE — 36415 COLL VENOUS BLD VENIPUNCTURE: CPT | Performed by: FAMILY MEDICINE

## 2019-04-16 PROCEDURE — 99214 OFFICE O/P EST MOD 30 MIN: CPT | Performed by: FAMILY MEDICINE

## 2019-04-16 PROCEDURE — 85025 COMPLETE CBC W/AUTO DIFF WBC: CPT | Performed by: FAMILY MEDICINE

## 2019-04-16 NOTE — PROGRESS NOTES
Diarrhea -     Onset: 2 week(s) ago    Description:   Consistency of stool: watery and loose  Blood in stool: no  Do symptoms wake you at night: no  Normal number of BM's/day: 1  Number of loose stools in past 24 hours: 6    Intensity: severe    History:          Exposure to anyone else with similar symptoms: no        Have you had previous episodes of diarrhea: YES        Recent use of antibiotics: no                 Recent medication-new or changes(Rx or OTC): no        Recent travels: no         Have any tests/studies been done: none    Accompanying Signs & Symptoms:   Fever: no  Nausea or vomiting; YES  Abdominal pain: no  Episodes of constipation: no  Weight loss: no  Is diarrhea associated to dairy products: no  Family History of Crohn's Disease or Ulcerative Colitis: no  Family history of Colon Cancer: no    Therapies tried: None     ROS + for excessive urination, depression, insomnia, daytime fatigue, leg weakness    Has Dm2, last A1c < 6.   Has MARTELL not using CPAP.   Hx of neuropathy and spinal stenosis, with ongoing chronic leg weakness    /60   Pulse 72   Temp 98.7  F (37.1  C) (Temporal)   Resp 16   Wt 97.5 kg (215 lb)   SpO2 96%   BMI 30.85 kg/m      GENERAL: healthy, alert and no distress  EYES: Eyes grossly normal to inspection, PERRL and conjunctivae and sclerae normal  HENT: rhinophyma  NECK: no adenopathy, no asymmetry, masses, or scars and thyroid normal to palpation  RESP: lungs clear to auscultation - no rales, rhonchi or wheezes  CV: regular rate and rhythm, normal S1 S2, no S3 or S4, no murmur, click or rub, no peripheral edema and peripheral pulses strong  MS: no gross musculoskeletal defects noted, no edema  SKIN: no suspicious lesions or rashes  NEURO: Normal strength and tone, mentation intact and speech normal  PSYCH: mentation appears normal, affect normal/bright    ASSESSMENT:  1. Diarrhea of presumed infectious origin  Push fluids   otc antidiarrheals  Stool testing     -  Basic Metabolic Panel (8) ([facility])  - CBC with Diff/Plt ([facility])  - Stool Culture ([facility]); Future  - C difficile Toxins A+B  EIA ([facility]); Future  - Giardia EIA O and P ([facility]); Future    2. Type 2 diabetes mellitus with diabetic autonomic neuropathy, without long-term current use of insulin (H)  Stop januvia    3. MARTELL (obstructive sleep apnea)  Needs CPAP   Will discuss at next visit reviewed PSG    4. Benign essential hypertension  Cut zestoretic    5. Insomnia, unspecified type         ambien ok for now.     6. BPH    Consider adding flomax

## 2019-04-17 LAB
BUN SERPL-MCNC: 13 MG/DL (ref 8–27)
BUN/CREATININE RATIO: 12 (ref 10–24)
CALCIUM SERPL-MCNC: 9 MG/DL (ref 8.6–10.2)
CHLORIDE SERPLBLD-SCNC: 78 MMOL/L (ref 96–106)
CREAT SERPL-MCNC: 1.13 MG/DL (ref 0.76–1.27)
EGFR IF AFRICN AM: 78 ML/MIN/1.73
EGFR IF NONAFRICN AM: 67 ML/MIN/1.73
GLUCOSE SERPL-MCNC: 169 MG/DL (ref 65–99)
POTASSIUM SERPL-SCNC: 4.2 MMOL/L (ref 3.5–5.2)
SODIUM SERPL-SCNC: 121 MMOL/L (ref 134–144)
TOTAL CO2: 31 MMOL/L (ref 20–29)

## 2019-04-22 ENCOUNTER — HOSPITAL ENCOUNTER (INPATIENT)
Facility: CLINIC | Age: 64
LOS: 3 days | Discharge: NURSING, REHAB OR LONG-TERM CARE | DRG: 897 | End: 2019-04-26
Attending: EMERGENCY MEDICINE | Admitting: INTERNAL MEDICINE
Payer: COMMERCIAL

## 2019-04-22 DIAGNOSIS — F10.10 ALCOHOL ABUSE: ICD-10-CM

## 2019-04-22 DIAGNOSIS — S00.83XA CONTUSION OF FACE, INITIAL ENCOUNTER: ICD-10-CM
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
## 2019-04-22 DIAGNOSIS — R41.0 DELIRIUM: ICD-10-CM

## 2019-04-22 DIAGNOSIS — G47.00 INSOMNIA, UNSPECIFIED TYPE: ICD-10-CM

## 2019-04-22 DIAGNOSIS — S01.81XA FACIAL LACERATION, INITIAL ENCOUNTER: ICD-10-CM

## 2019-04-22 DIAGNOSIS — F10.288 ALCOHOL DEPENDENCE WITH OTHER ALCOHOL-INDUCED DISORDER (H): ICD-10-CM

## 2019-04-22 DIAGNOSIS — R19.7 DIARRHEA, UNSPECIFIED TYPE: ICD-10-CM

## 2019-04-22 DIAGNOSIS — F33.1 MODERATE EPISODE OF RECURRENT MAJOR DEPRESSIVE DISORDER (H): Primary | ICD-10-CM

## 2019-04-22 DIAGNOSIS — S09.90XA CLOSED HEAD INJURY, INITIAL ENCOUNTER: ICD-10-CM

## 2019-04-22 PROCEDURE — 99285 EMERGENCY DEPT VISIT HI MDM: CPT | Mod: 25

## 2019-04-22 PROCEDURE — 80320 DRUG SCREEN QUANTALCOHOLS: CPT | Performed by: EMERGENCY MEDICINE

## 2019-04-22 PROCEDURE — 85610 PROTHROMBIN TIME: CPT | Performed by: EMERGENCY MEDICINE

## 2019-04-22 PROCEDURE — 80053 COMPREHEN METABOLIC PANEL: CPT | Performed by: EMERGENCY MEDICINE

## 2019-04-22 PROCEDURE — 85025 COMPLETE CBC W/AUTO DIFF WBC: CPT | Performed by: EMERGENCY MEDICINE

## 2019-04-22 RX ORDER — SODIUM CHLORIDE 9 MG/ML
1000 INJECTION, SOLUTION INTRAVENOUS CONTINUOUS
Status: DISCONTINUED | OUTPATIENT
Start: 2019-04-22 | End: 2019-04-23

## 2019-04-22 RX ORDER — MULTIPLE VITAMINS W/ MINERALS TAB 9MG-400MCG
1 TAB ORAL ONCE
Status: DISCONTINUED | OUTPATIENT
Start: 2019-04-22 | End: 2019-04-23

## 2019-04-22 ASSESSMENT — MIFFLIN-ST. JEOR: SCORE: 1677.57

## 2019-04-23 ENCOUNTER — APPOINTMENT (OUTPATIENT)
Dept: CT IMAGING | Facility: CLINIC | Age: 64
DRG: 897 | End: 2019-04-23
Attending: EMERGENCY MEDICINE
Payer: COMMERCIAL

## 2019-04-23 ENCOUNTER — APPOINTMENT (OUTPATIENT)
Dept: PHYSICAL THERAPY | Facility: CLINIC | Age: 64
DRG: 897 | End: 2019-04-23
Attending: INTERNAL MEDICINE
Payer: COMMERCIAL

## 2019-04-23 ENCOUNTER — APPOINTMENT (OUTPATIENT)
Dept: ULTRASOUND IMAGING | Facility: CLINIC | Age: 64
DRG: 897 | End: 2019-04-23
Attending: INTERNAL MEDICINE
Payer: COMMERCIAL

## 2019-04-23 ENCOUNTER — APPOINTMENT (OUTPATIENT)
Dept: CARDIOLOGY | Facility: CLINIC | Age: 64
DRG: 897 | End: 2019-04-23
Attending: INTERNAL MEDICINE
Payer: COMMERCIAL

## 2019-04-23 PROBLEM — E87.1 HYPONATREMIA: Status: ACTIVE | Noted: 2019-04-23

## 2019-04-23 LAB
ALBUMIN SERPL-MCNC: 3.7 G/DL (ref 3.4–5)
ALP SERPL-CCNC: 135 U/L (ref 40–150)
ALT SERPL W P-5'-P-CCNC: 156 U/L (ref 0–70)
ANION GAP SERPL CALCULATED.3IONS-SCNC: 8 MMOL/L (ref 3–14)
ANION GAP SERPL CALCULATED.3IONS-SCNC: 8 MMOL/L (ref 3–14)
AST SERPL W P-5'-P-CCNC: 103 U/L (ref 0–45)
BASOPHILS # BLD AUTO: 0 10E9/L (ref 0–0.2)
BASOPHILS NFR BLD AUTO: 0.2 %
BILIRUB SERPL-MCNC: 0.7 MG/DL (ref 0.2–1.3)
BUN SERPL-MCNC: 11 MG/DL (ref 7–30)
BUN SERPL-MCNC: 12 MG/DL (ref 7–30)
CALCIUM SERPL-MCNC: 8.6 MG/DL (ref 8.5–10.1)
CALCIUM SERPL-MCNC: 9.1 MG/DL (ref 8.5–10.1)
CHLORIDE SERPL-SCNC: 87 MMOL/L (ref 94–109)
CHLORIDE SERPL-SCNC: 93 MMOL/L (ref 94–109)
CO2 SERPL-SCNC: 27 MMOL/L (ref 20–32)
CO2 SERPL-SCNC: 29 MMOL/L (ref 20–32)
CREAT SERPL-MCNC: 1.47 MG/DL (ref 0.66–1.25)
CREAT SERPL-MCNC: 1.57 MG/DL (ref 0.66–1.25)
DIFFERENTIAL METHOD BLD: ABNORMAL
EOSINOPHIL # BLD AUTO: 0.1 10E9/L (ref 0–0.7)
EOSINOPHIL NFR BLD AUTO: 1.8 %
ERYTHROCYTE [DISTWIDTH] IN BLOOD BY AUTOMATED COUNT: 12.1 % (ref 10–15)
ETHANOL SERPL-MCNC: <0.01 G/DL
GFR SERPL CREATININE-BSD FRML MDRD: 45 ML/MIN/{1.73_M2}
GFR SERPL CREATININE-BSD FRML MDRD: 49 ML/MIN/{1.73_M2}
GLUCOSE BLDC GLUCOMTR-MCNC: 104 MG/DL (ref 70–99)
GLUCOSE BLDC GLUCOMTR-MCNC: 110 MG/DL (ref 70–99)
GLUCOSE BLDC GLUCOMTR-MCNC: 146 MG/DL (ref 70–99)
GLUCOSE BLDC GLUCOMTR-MCNC: 158 MG/DL (ref 70–99)
GLUCOSE SERPL-MCNC: 131 MG/DL (ref 70–99)
GLUCOSE SERPL-MCNC: 226 MG/DL (ref 70–99)
HCT VFR BLD AUTO: 33 % (ref 40–53)
HGB BLD-MCNC: 12 G/DL (ref 13.3–17.7)
IMM GRANULOCYTES # BLD: 0 10E9/L (ref 0–0.4)
IMM GRANULOCYTES NFR BLD: 0.7 %
INR PPP: 0.99 (ref 0.86–1.14)
LYMPHOCYTES # BLD AUTO: 0.9 10E9/L (ref 0.8–5.3)
LYMPHOCYTES NFR BLD AUTO: 14.2 %
MAGNESIUM SERPL-MCNC: 1.5 MG/DL (ref 1.6–2.3)
MAGNESIUM SERPL-MCNC: 2.4 MG/DL (ref 1.6–2.3)
MCH RBC QN AUTO: 32.8 PG (ref 26.5–33)
MCHC RBC AUTO-ENTMCNC: 36.4 G/DL (ref 31.5–36.5)
MCV RBC AUTO: 90 FL (ref 78–100)
MONOCYTES # BLD AUTO: 0.9 10E9/L (ref 0–1.3)
MONOCYTES NFR BLD AUTO: 14.2 %
NEUTROPHILS # BLD AUTO: 4.2 10E9/L (ref 1.6–8.3)
NEUTROPHILS NFR BLD AUTO: 68.9 %
NRBC # BLD AUTO: 0 10*3/UL
NRBC BLD AUTO-RTO: 0 /100
PHOSPHATE SERPL-MCNC: 3.2 MG/DL (ref 2.5–4.5)
PLATELET # BLD AUTO: 136 10E9/L (ref 150–450)
POTASSIUM SERPL-SCNC: 4.1 MMOL/L (ref 3.4–5.3)
POTASSIUM SERPL-SCNC: 4.2 MMOL/L (ref 3.4–5.3)
PROT SERPL-MCNC: 6.9 G/DL (ref 6.8–8.8)
RBC # BLD AUTO: 3.66 10E12/L (ref 4.4–5.9)
SODIUM SERPL-SCNC: 124 MMOL/L (ref 133–144)
SODIUM SERPL-SCNC: 128 MMOL/L (ref 133–144)
SODIUM SERPL-SCNC: 130 MMOL/L (ref 133–144)
WBC # BLD AUTO: 6.1 10E9/L (ref 4–11)

## 2019-04-23 PROCEDURE — 00000146 ZZHCL STATISTIC GLUCOSE BY METER IP

## 2019-04-23 PROCEDURE — 84295 ASSAY OF SERUM SODIUM: CPT | Performed by: INTERNAL MEDICINE

## 2019-04-23 PROCEDURE — 97530 THERAPEUTIC ACTIVITIES: CPT | Mod: GP

## 2019-04-23 PROCEDURE — 80048 BASIC METABOLIC PNL TOTAL CA: CPT | Performed by: INTERNAL MEDICINE

## 2019-04-23 PROCEDURE — 70450 CT HEAD/BRAIN W/O DYE: CPT

## 2019-04-23 PROCEDURE — 36415 COLL VENOUS BLD VENIPUNCTURE: CPT | Performed by: INTERNAL MEDICINE

## 2019-04-23 PROCEDURE — 12000000 ZZH R&B MED SURG/OB

## 2019-04-23 PROCEDURE — 25500064 ZZH RX 255 OP 636: Performed by: INTERNAL MEDICINE

## 2019-04-23 PROCEDURE — 25000128 H RX IP 250 OP 636: Performed by: INTERNAL MEDICINE

## 2019-04-23 PROCEDURE — 83735 ASSAY OF MAGNESIUM: CPT | Performed by: INTERNAL MEDICINE

## 2019-04-23 PROCEDURE — 97161 PT EVAL LOW COMPLEX 20 MIN: CPT | Mod: GP

## 2019-04-23 PROCEDURE — 76705 ECHO EXAM OF ABDOMEN: CPT

## 2019-04-23 PROCEDURE — 99222 1ST HOSP IP/OBS MODERATE 55: CPT | Performed by: PSYCHIATRY & NEUROLOGY

## 2019-04-23 PROCEDURE — 40000264 ECHOCARDIOGRAM COMPLETE

## 2019-04-23 PROCEDURE — 25000132 ZZH RX MED GY IP 250 OP 250 PS 637: Performed by: INTERNAL MEDICINE

## 2019-04-23 PROCEDURE — 70486 CT MAXILLOFACIAL W/O DYE: CPT

## 2019-04-23 PROCEDURE — 84100 ASSAY OF PHOSPHORUS: CPT | Performed by: INTERNAL MEDICINE

## 2019-04-23 PROCEDURE — 99223 1ST HOSP IP/OBS HIGH 75: CPT | Mod: AI | Performed by: INTERNAL MEDICINE

## 2019-04-23 PROCEDURE — 97116 GAIT TRAINING THERAPY: CPT | Mod: GP

## 2019-04-23 PROCEDURE — 25800030 ZZH RX IP 258 OP 636: Performed by: INTERNAL MEDICINE

## 2019-04-23 PROCEDURE — 93306 TTE W/DOPPLER COMPLETE: CPT | Mod: 26 | Performed by: INTERNAL MEDICINE

## 2019-04-23 RX ORDER — ONDANSETRON 2 MG/ML
4 INJECTION INTRAMUSCULAR; INTRAVENOUS EVERY 6 HOURS PRN
Status: DISCONTINUED | OUTPATIENT
Start: 2019-04-23 | End: 2019-04-26 | Stop reason: HOSPADM

## 2019-04-23 RX ORDER — ONDANSETRON 4 MG/1
4 TABLET, ORALLY DISINTEGRATING ORAL EVERY 6 HOURS PRN
Status: DISCONTINUED | OUTPATIENT
Start: 2019-04-23 | End: 2019-04-26 | Stop reason: HOSPADM

## 2019-04-23 RX ORDER — LANOLIN ALCOHOL/MO/W.PET/CERES
100 CREAM (GRAM) TOPICAL DAILY
Status: DISCONTINUED | OUTPATIENT
Start: 2019-04-23 | End: 2019-04-26 | Stop reason: HOSPADM

## 2019-04-23 RX ORDER — NALOXONE HYDROCHLORIDE 0.4 MG/ML
.1-.4 INJECTION, SOLUTION INTRAMUSCULAR; INTRAVENOUS; SUBCUTANEOUS
Status: DISCONTINUED | OUTPATIENT
Start: 2019-04-23 | End: 2019-04-26 | Stop reason: HOSPADM

## 2019-04-23 RX ORDER — PANTOPRAZOLE SODIUM 40 MG/1
40 TABLET, DELAYED RELEASE ORAL DAILY
Status: DISCONTINUED | OUTPATIENT
Start: 2019-04-23 | End: 2019-04-26 | Stop reason: HOSPADM

## 2019-04-23 RX ORDER — FOLIC ACID 1 MG/1
1 TABLET ORAL DAILY
Status: DISCONTINUED | OUTPATIENT
Start: 2019-04-23 | End: 2019-04-26 | Stop reason: HOSPADM

## 2019-04-23 RX ORDER — PROCHLORPERAZINE 25 MG
12.5 SUPPOSITORY, RECTAL RECTAL EVERY 12 HOURS PRN
Status: DISCONTINUED | OUTPATIENT
Start: 2019-04-23 | End: 2019-04-26 | Stop reason: HOSPADM

## 2019-04-23 RX ORDER — CARVEDILOL 25 MG/1
50 TABLET ORAL 2 TIMES DAILY WITH MEALS
Status: DISCONTINUED | OUTPATIENT
Start: 2019-04-23 | End: 2019-04-24

## 2019-04-23 RX ORDER — LORAZEPAM 1 MG/1
1-2 TABLET ORAL EVERY 30 MIN PRN
Status: DISCONTINUED | OUTPATIENT
Start: 2019-04-23 | End: 2019-04-24

## 2019-04-23 RX ORDER — NICOTINE POLACRILEX 4 MG
15-30 LOZENGE BUCCAL
Status: DISCONTINUED | OUTPATIENT
Start: 2019-04-23 | End: 2019-04-26 | Stop reason: HOSPADM

## 2019-04-23 RX ORDER — FINASTERIDE 5 MG/1
5 TABLET, FILM COATED ORAL DAILY
Status: DISCONTINUED | OUTPATIENT
Start: 2019-04-23 | End: 2019-04-26 | Stop reason: HOSPADM

## 2019-04-23 RX ORDER — POTASSIUM CHLORIDE 1500 MG/1
20-40 TABLET, EXTENDED RELEASE ORAL
Status: DISCONTINUED | OUTPATIENT
Start: 2019-04-23 | End: 2019-04-26 | Stop reason: HOSPADM

## 2019-04-23 RX ORDER — ACETAMINOPHEN 325 MG/1
650 TABLET ORAL EVERY 4 HOURS PRN
Status: DISCONTINUED | OUTPATIENT
Start: 2019-04-23 | End: 2019-04-26 | Stop reason: HOSPADM

## 2019-04-23 RX ORDER — POTASSIUM CHLORIDE 29.8 MG/ML
20 INJECTION INTRAVENOUS
Status: DISCONTINUED | OUTPATIENT
Start: 2019-04-23 | End: 2019-04-26 | Stop reason: HOSPADM

## 2019-04-23 RX ORDER — MULTIPLE VITAMINS W/ MINERALS TAB 9MG-400MCG
1 TAB ORAL DAILY
Status: DISCONTINUED | OUTPATIENT
Start: 2019-04-23 | End: 2019-04-26 | Stop reason: HOSPADM

## 2019-04-23 RX ORDER — LABETALOL 20 MG/4 ML (5 MG/ML) INTRAVENOUS SYRINGE
10
Status: DISCONTINUED | OUTPATIENT
Start: 2019-04-23 | End: 2019-04-26 | Stop reason: HOSPADM

## 2019-04-23 RX ORDER — CALCIUM CARBONATE 500 MG/1
500 TABLET, CHEWABLE ORAL 2 TIMES DAILY PRN
Status: DISCONTINUED | OUTPATIENT
Start: 2019-04-23 | End: 2019-04-26 | Stop reason: HOSPADM

## 2019-04-23 RX ORDER — SODIUM CHLORIDE 9 MG/ML
INJECTION, SOLUTION INTRAVENOUS CONTINUOUS
Status: DISCONTINUED | OUTPATIENT
Start: 2019-04-23 | End: 2019-04-25

## 2019-04-23 RX ORDER — LORAZEPAM 2 MG/ML
1-2 INJECTION INTRAMUSCULAR EVERY 30 MIN PRN
Status: DISCONTINUED | OUTPATIENT
Start: 2019-04-23 | End: 2019-04-24

## 2019-04-23 RX ORDER — POTASSIUM CHLORIDE 1.5 G/1.58G
20-40 POWDER, FOR SOLUTION ORAL
Status: DISCONTINUED | OUTPATIENT
Start: 2019-04-23 | End: 2019-04-26 | Stop reason: HOSPADM

## 2019-04-23 RX ORDER — POTASSIUM CL/LIDO/0.9 % NACL 10MEQ/0.1L
10 INTRAVENOUS SOLUTION, PIGGYBACK (ML) INTRAVENOUS
Status: DISCONTINUED | OUTPATIENT
Start: 2019-04-23 | End: 2019-04-26 | Stop reason: HOSPADM

## 2019-04-23 RX ORDER — PROCHLORPERAZINE MALEATE 5 MG
5 TABLET ORAL EVERY 6 HOURS PRN
Status: DISCONTINUED | OUTPATIENT
Start: 2019-04-23 | End: 2019-04-26 | Stop reason: HOSPADM

## 2019-04-23 RX ORDER — ACETAMINOPHEN 650 MG/1
650 SUPPOSITORY RECTAL EVERY 4 HOURS PRN
Status: DISCONTINUED | OUTPATIENT
Start: 2019-04-23 | End: 2019-04-26 | Stop reason: HOSPADM

## 2019-04-23 RX ORDER — DEXTROSE MONOHYDRATE 25 G/50ML
25-50 INJECTION, SOLUTION INTRAVENOUS
Status: DISCONTINUED | OUTPATIENT
Start: 2019-04-23 | End: 2019-04-26 | Stop reason: HOSPADM

## 2019-04-23 RX ORDER — POTASSIUM CHLORIDE 7.45 MG/ML
10 INJECTION INTRAVENOUS
Status: DISCONTINUED | OUTPATIENT
Start: 2019-04-23 | End: 2019-04-26 | Stop reason: HOSPADM

## 2019-04-23 RX ORDER — HYDRALAZINE HYDROCHLORIDE 20 MG/ML
10 INJECTION INTRAMUSCULAR; INTRAVENOUS EVERY 4 HOURS PRN
Status: DISCONTINUED | OUTPATIENT
Start: 2019-04-23 | End: 2019-04-26 | Stop reason: HOSPADM

## 2019-04-23 RX ORDER — MAGNESIUM SULFATE HEPTAHYDRATE 40 MG/ML
4 INJECTION, SOLUTION INTRAVENOUS EVERY 4 HOURS PRN
Status: DISCONTINUED | OUTPATIENT
Start: 2019-04-23 | End: 2019-04-26 | Stop reason: HOSPADM

## 2019-04-23 RX ADMIN — MAGNESIUM SULFATE IN WATER 4 G: 40 INJECTION, SOLUTION INTRAVENOUS at 11:30

## 2019-04-23 RX ADMIN — PANTOPRAZOLE SODIUM 40 MG: 40 TABLET, DELAYED RELEASE ORAL at 14:40

## 2019-04-23 RX ADMIN — LORAZEPAM 1 MG: 1 TABLET ORAL at 02:41

## 2019-04-23 RX ADMIN — MULTIPLE VITAMINS W/ MINERALS TAB 1 TABLET: TAB at 09:39

## 2019-04-23 RX ADMIN — SODIUM CHLORIDE: 9 INJECTION, SOLUTION INTRAVENOUS at 14:39

## 2019-04-23 RX ADMIN — DEXTRAN 70, AND HYPROMELLOSE 2910 2 DROP: 1; 3 SOLUTION/ DROPS OPHTHALMIC at 18:31

## 2019-04-23 RX ADMIN — Medication 100 MG: at 09:39

## 2019-04-23 RX ADMIN — HUMAN ALBUMIN MICROSPHERES AND PERFLUTREN 9 ML: 10; .22 INJECTION, SOLUTION INTRAVENOUS at 11:35

## 2019-04-23 RX ADMIN — FOLIC ACID 1 MG: 1 TABLET ORAL at 09:39

## 2019-04-23 RX ADMIN — DEXTRAN 70, AND HYPROMELLOSE 2910 2 DROP: 1; 3 SOLUTION/ DROPS OPHTHALMIC at 16:40

## 2019-04-23 RX ADMIN — CARVEDILOL 50 MG: 25 TABLET, FILM COATED ORAL at 18:23

## 2019-04-23 RX ADMIN — Medication 2.5 MG: at 20:32

## 2019-04-23 RX ADMIN — FINASTERIDE 5 MG: 5 TABLET, FILM COATED ORAL at 14:40

## 2019-04-23 ASSESSMENT — ACTIVITIES OF DAILY LIVING (ADL)
ADLS_ACUITY_SCORE: 17
ADLS_ACUITY_SCORE: 17
ADLS_ACUITY_SCORE: 15
ADLS_ACUITY_SCORE: 15
ADLS_ACUITY_SCORE: 17

## 2019-04-23 ASSESSMENT — ENCOUNTER SYMPTOMS
BRUISES/BLEEDS EASILY: 1
FATIGUE: 1
WEAKNESS: 1
HEADACHES: 1
BACK PAIN: 0
WOUND: 1
DIARRHEA: 1
NECK PAIN: 0
CONFUSION: 1

## 2019-04-23 NOTE — PHARMACY-ADMISSION MEDICATION HISTORY
Admission medication history interview status for the 4/22/2019  admission is complete. See EPIC admission navigator for prior to admission medications     Medication history source reliability:Good, pt and wife interview, sure scripts, fitz    Actions taken by pharmacist (provider contacted, etc):removed Oxycodone, Sildenafil, Valacyclovir.       Additional medication history information not noted on PTA med list : recently stopped Amlodipine 10mg daily, Gabapentin 300mg, Lisinopril/HCTZ 20/12.5, Januvia 100mg    Medication reconciliation/reorder completed by provider prior to medication history? No    Time spent in this activity: 60 minutes    Prior to Admission medications    Medication Sig Last Dose Taking? Auth Provider   Calcium Carbonate Antacid (TUMS PO) Take 2 chew tab by mouth 2 times daily as needed (GI upset)  prn Yes Reported, Patient   carvedilol (COREG) 25 MG tablet Take 2 tablets (50 mg) by mouth 2 times daily Hold IF heart rate less than 55. Past Week at 2030 Yes Libertad Warner PA   losartan (COZAAR) 100 MG tablet TAKE 1 TABLET BY MOUTH EVERY DAY Past Week at 0730 Yes Shan Arenas MD   metFORMIN (GLUCOPHAGE-XR) 500 MG 24 hr tablet TAKE 2 TABLETS BY MOUTH EVERY MORNING Past Week at 0730 Yes Shan Arenas MD   pantoprazole (PROTONIX) 40 MG EC tablet TAKE 1 TABLET(40 MG) BY MOUTH DAILY Past Week at 0730 Yes Dianne Rivera MD   tiZANidine (ZANAFLEX) 4 MG tablet Take 1 tablet (4 mg) by mouth daily  Patient taking differently: Take 4 mg by mouth daily as needed for muscle spasms  prn Yes Shan Arenas MD   zolpidem (AMBIEN) 10 MG tablet TAKE 1 TABLET BY MOUTH EVERY NIGHT AT BEDTIME Past Week at 2200 Yes Shan Arenas MD                 finasteride (PROSCAR) 5 MG tablet Take 1 tablet (5 mg) by mouth daily not started  Shan Arenas MD Nelson, Kevin Craig, MD [place], Joan L, MD

## 2019-04-23 NOTE — PROGRESS NOTES
[facility]    Hospitalist Progress Note    Date of Service (when I saw the patient): 04/23/2019    Assessment & Plan   Syd Joyce is a 64 year old male with hx of DM2, HTN, HL, and BPH who presented on 4/22/2019 with confusion following a fall, and was admitted for dehydration with hyponatremia and FELIX    Confusional episode, Resolved  Possibly due to alcohol intoxication +/- hyponatremia +/- post-concussive syndrome  - Currently alert and oriented, suspect he is at his baseline mental status    Unwitnessed fall with ecchymosis over left eye and frontal bone contusion  Likely due to alcohol intoxication and severe dehydration, although unable to rule out syncope as patient is unable to recall events surrounding fall. Orthostatics on this admission normal. TTE (4/23) showed LVEF 60-65% with no significant valvular abnormalities  - PT recommending home with OP PT    Hypovolemic hyponatremia and FELIX on CKD stage II, Improved  Sodium on arrival 124, Creatinine 1.57 from baseline 1.1-1.2. Suspect dehydration due to heavy alcohol use and recent diarrheal illness.   - Sodium 128, Creatinine 1.47 today  - Start gentle IV fluids  - Repeat BMP in AM    Alcohol dependence  Longstanding history of heavy alcohol use, drinking 5-6 beers daily. Expressed interest in quitting on admission. Last drink reportedly 2-3 days PTA  - On CIWA protocol with lorazepam available  - On thiamine, folate, MVI  - On K/Mg/Phos replacement protocol  - CD and Psych consults pending    Elevated transaminases  ,  on admission. Tbili and INR normal. Likely due to heavy alcoholic use. Abdominal US on admission showed hepatic steatosis with normal-appearing gallbladder and CBD.   - Repeat LFTs in AM    DM2 with neuropathy  A1c 5.6  - Holding PTA metformin due to FELIX  - Aspart 1 unit per 15 g CHO while hospitalized  - Medium SSI available    Essential hypertension  [PTA: carvedilol 25 mg BID, losartan 100 mg daily]  -  Holding losartan due to FELIX  - Continues on carvedilol  - IV hydralazine PRN for SBP>180    GERD  Chronic and stable on pantoprazole    BPH  Chronic and stable on finasteride    FEN: mod CHO diet  DVT Prophylaxis: Pneumatic Compression Devices  Code Status: Full Code    Disposition: Expected discharge pending ongoing improvement in sodium and renal function, 1-2 days    Kika Wiley    Interval History   Admitted earlier this morning by Dr. Pritchett. Feels tired, but offers no other complaints. Denies cp/sob, dizziness/lightheadedness, or nausea.   - Start NS at 100 ml/h  - Replace Mg    -Data reviewed today: I reviewed all new labs and imaging results over the last 24 hours. I personally reviewed no images or EKG's today.    Physical Exam   Temp: 98.1  F (36.7  C) Temp src: Oral BP: 147/71 Pulse: 71 Heart Rate: 76 Resp: 18 SpO2: 95 % O2 Device: None (Room air)    Vitals:    04/22/19 2305   Weight: 90.7 kg (200 lb)     Vital Signs with Ranges  Temp:  [97.2  F (36.2  C)-98.3  F (36.8  C)] 98.1  F (36.7  C)  Pulse:  [70-74] 71  Heart Rate:  [70-76] 76  Resp:  [16-20] 18  BP: (144-171)/(64-84) 147/71  SpO2:  [95 %-98 %] 95 %  No intake/output data recorded.    Constitutional: Appears comfortable, NAD  Respiratory: Breathing non-labored. Lungs CTAB - no wheezes, crackles, or rhonchi  Cardiovascular: Heart RRR, no m/r/g. No pedal edema  GI: +BS, abd soft/NT  Skin/Integumen: Ecchymosis over left eye  Neuro: Alert and appropriate, SINGLETON  Psych: Calm and cooperative    Medications       carvedilol  50 mg Oral BID     finasteride  5 mg Oral Daily     folic acid  1 mg Oral Daily     insulin aspart   Subcutaneous TID AC     insulin aspart  1-7 Units Subcutaneous TID AC     insulin aspart  1-5 Units Subcutaneous At Bedtime     multivitamin w/minerals  1 tablet Oral Daily     pantoprazole  40 mg Oral Daily     vitamin B1  100 mg Oral Daily     Data   Recent Labs   Lab 04/23/19  1002 04/23/19  0609 04/22/19  2355   WBC  --    --  6.1   HGB  --   --  12.0*   MCV  --   --  90   PLT  --   --  136*   INR  --   --  0.99   * 130* 124*   POTASSIUM 4.2  --  4.1   CHLORIDE 93*  --  87*   CO2 27  --  29   BUN 12  --  11   CR 1.47*  --  1.57*   ANIONGAP 8  --  8   HEAVENLY 8.6  --  9.1   *  --  131*   ALBUMIN  --   --  3.7   PROTTOTAL  --   --  6.9   BILITOTAL  --   --  0.7   ALKPHOS  --   --  135   ALT  --   --  156*   AST  --   --  103*       Recent Results (from the past 24 hour(s))   CT Head w/o Contrast    Narrative    CT HEAD WITHOUT CONTRAST  4/23/2019 12:28 AM     HISTORY: Head trauma.    COMPARISON: None.    TECHNIQUE: Without intravenous contrast, helical sections were  acquired through the brain. Coronal reconstructions were generated.  Radiation dose for this scan was reduced using automated exposure  control, adjustment of the mA and/or kV according to the patient's  size, or iterative reconstruction technique.    FINDINGS: Moderate diffuse cerebral atrophy with ex vacuo prominence  of the cerebral ventricles. Mild periventricular white matter low  attenuation, likely relating to chronic small vessel ischemic disease.  No intra-axial mass, mass effect or midline shift. Normal gray-white  matter differentiation. No visualized acute intra-axial hemorrhage.  The basal cisterns are patent. No extra-axial fluid collection.  Moderate-sized contusion and small hematoma in the scalp overlying the  frontal bone. The visualized portions of the paranasal sinuses and  mastoid air cells are unremarkable.      Impression    IMPRESSION:  1. No evidence of acute intracranial trauma.  2. Moderate-sized contusion and small hematoma in the scalp overlying  the frontal bone.    JULIO CESAR RICKS MD   CT Facial Bones without Contrast    Narrative    CT MAXILLOFACIAL WITHOUT CONTRAST  4/23/2019 12:28 AM     HISTORY: Facial trauma.    COMPARISON: None.    TECHNIQUE: Without intravenous contrast, helical sections were  acquired through the facial bones  including the mandible. Coronal and  sagittal reconstructions were generated. Radiation dose for this scan  was reduced using automated exposure control, adjustment of the mA  and/or kV according to the patient's size, or iterative reconstruction  technique.      Impression    IMPRESSION:  1. No visualized acute fracture of the facial bones.  2. No fluid within the paranasal sinuses or mastoid air cells. Mild  mucosal thickening within bilateral maxillary sinuses.  3. Moderate-sized contusion and small hematoma in the scalp overlying  the frontal bone. There are also contusions of the nasal soft tissues  and soft tissues of the midline lower face.  4. Several very tiny radiodensities in the skin of the anterior aspect  of the face, suspicious for tiny foreign bodies related to the trauma.    JULIO CESAR RICKS MD   US Abdomen Limited    Narrative    ULTRASOUND ABDOMEN LIMITED   4/23/2019 8:34 AM     HISTORY: Elevated LFTs, alcoholism. Evaluate liver parenchyma.    COMPARISON: None.    FINDINGS: There is diffuse increased echogenicity throughout the  liver, suggesting hepatic steatosis. The gallbladder is unremarkable,  without evidence for stones or sludge. No intra- or extrahepatic bile  duct dilatation. Common hepatic duct is normal in diameter. Limited  evaluation of the right kidney is unremarkable. Pancreas is partially  obscured by overlying bowel gas, but appears normal where seen. The  abdominal aorta and IVC are of normal caliber where visualized.      Impression    IMPRESSION: Hepatic steatosis.

## 2019-04-23 NOTE — H&P
[facility]    History and Physical - Hospitalist Service       Date of Admission:  4/22/2019    Assessment & Plan   Syd Joyce is a 64 year-old male admitted on 4/22/2019.      Fall  Likely multifactorial with alcohol intoxication, hyponatremia, hypovolemia contributing. Unable to rule out syncope as patient can not state definitively if he lost consciousness or not. Was able to immediately call out to wife for help after he fell.   - PT consult  - Check orthostatic vital signs  - Echocardiogram   - Telemetry    Hypovolemic hyponatremia  Sodium 124, recently 121 in clinic 4/16. Hypovolemic by history with diarrhea and poor intake.   - Sodium q6H x3  - Expect will improve rapidly with IVF given hypovolemic etiology. Has received 1L in ED. Will hold on additional IVF, recheck sodium at 0600 and resume if appropriate rate of correction. Lower risk of complications from rapid correction based on initial sodium level, though is a chronic alcohol use    Alcohol dependence   Reports drinking 5-6 beers daily, though high alcohol content beers so likely closer to 7-8 standard drinks. Has not gone a day without drinking in years. Ethanol level <0.01 on admission. Expresses interest in quitting.   - High risk for withdrawal. CIWA protocol with lorazepam  - Psychiatry and chemical dependency consulted  - Folate, thiamine, MVI    Confusional episode  Per patient, he felt confused and was not able to state date or location earlier this evening. Possibly secondary to hyponatremia or traumatic brain injury  - Manage hyponatremia as above   - OT consult if not returned to baseline with normalization of sodium     Ecchymosis left eye with frontal bone contusion   Eye mostly closed, but no vision impairment when manually opened.   - Symptomatic management     Acute kidney injury on chronic kidney disease stage II  Creatinine 1.57 on admission, baseline 1.1-1.2. Pre-renal / hypovolemic by history given recent  diarrheal illness. Denies NSAID use.   - IVF managed as above  - Monitor BMP  - Avoid nephrotoxins  - Hold losartan   - Check bladder scan to ensure not retaining    Recent diarrheal illness  Reports 2 weeks of diarrhea, has now resolved with most recent bowel movement formed. Infectious work-up had been ordered as an outpatient, however this is no longer necessary given resolution of diarrhea.  - Monitor for recurrence     Diabetes mellitus, type 2 with neuropathy  HgbA1c 5.6% 1/30/19. Neuropathy has been associated with his diabetes, though alcohol a potential contributor as well.   - Hold oral agents  - Medium sliding scale insulin, aspart 1 unit per 15 grams of carbohydrates     Elevated LFTs  ,  on admission. Tbili and INR normal. AST/ALT normal as recently as 4/22. Potentially secondary to alcohol toxicity versus recent diarrheal illness.   - Monitor LFTs  - RUQ US ordered for baseline     Depression  Co-morbid with alcohol dependence. Does not appear to be on any medications currently.   - Psychiatry consulted as above    Hypertension  - Resume prior to admission carvedilol when medications reconciled by pharmacy   - Hold losartan given FELIX    GERD  - Resume prior to admission pantoprazole when medications reconciled by pharmacy     Dyslipidemia  - Temporarily hold statin due to elevated LFTs    BPH  - Has been prescribed finasteride, but reports he has not yet started       Diet: Regular diet   DVT Prophylaxis: Pneumatic Compression Devices  Quiros Catheter: in place, indication:    Code Status: Full code     Disposition Plan   Expected discharge: Admit to inpatient given hyponatremia and high risk for alcohol withdrawal during treatment. Anticipate greater than or equal to 2 midnights prior to discharge.   Entered: Rocky Pritchett MD 04/23/2019, 1:05 AM     The patient's care was discussed with the Patient and Patient's wife.    Rocky Pritchett MD  [facility]    ______________________________________________________________________    Chief Complaint   Confusion after recent fall    History is obtained from the patient and patient's wife     History of Present Illness   Syd Joyce is a 64 year old male who presents with the above chief complaint.    The patient reports he was consuming alcohol per his usual routine the day prior to presentation. While in the basement, he fell, striking his face and sustaining a black eye.  He was able to immediately call for help from his wife.  He refused to seek care at that time.  He has poor memory for the exact details of the fall, he cannot state definitively if he lost consciousness or not.  He attributes his fall to feeling generally weak as well as his alcohol use.  He drinks 5-6 high alcohol content beers daily.  He has been doing this for several years. He denies any past history of withdrawal, though has not had a prolonged period of interruption from alcohol use.  He has recently had a bout of diarrhea that lasted about 2 weeks.  He saw his primary care clinic for this and had stool studies ordered, though these were not collected.  He also had labs done at that time which demonstrated a sodium of 121.  His diarrhea has now resolved and he has had a small formed bowel movement.  His intake has been generally decreased associated with his diarrhea.  He denies any abdominal pain, current nausea or vomiting.  He denies any recent chest pain or shortness of breath.  He denies any recent fevers or chills.  He denies any NSAID use.    In the Emergency Department, the patient was seen by Dr. Karlo Horton, with whom I discussed the patient's presenting symptoms and emergency department course.  Initial vital signs were a temperature of 98F, HR 70, /62, RR 16, SpO2 98% on room air. Work-up included head imaging without intracranial bleeding and facial imaging revealing contusions without fracture. Hospitalists were  contacted for admission to the hospital.     Review of Systems    Complete 10 point review of systems assessed and negative except as noted in HPI.    Past Medical History    I have reviewed this patient's medical history and updated it with pertinent information if needed.   Past Medical History:   Diagnosis Date     DM2 (diabetes mellitus, type 2) (H)      Esophageal reflux 6/15/2012     HTN (hypertension) 8/12/2011     Leg pain, bilateral      Low back pain      Numbness and tingling     LEGS, R/T LUMBAR STENOSIS     Obesity, unspecified 1/10/2014       Past Surgical History   I have reviewed this patient's surgical history and updated it with pertinent information if needed.  Past Surgical History:   Procedure Laterality Date     COLONOSCOPY       DISCECTOMY LUMBAR POSTERIOR MICROSCOPIC TWO LEVELS N/A 6/30/2017    Procedure: DISCECTOMY LUMBAR POSTERIOR MICROSCOPIC TWO LEVELS;  L3-4 L4-5 POSTERIOR DECOMPRESSION AND INTERSPINUS FIXATION WITHOUT FUSION;  Surgeon: Ray Perez MD;  Location:  OR     PHACOEMULSIFICATION CLEAR CORNEA WITH STANDARD INTRAOCULAR LENS IMPLANT Right 7/17/2018    Procedure: PHACOEMULSIFICATION CLEAR CORNEA WITH STANDARD INTRAOCULAR LENS IMPLANT;  RIGHT EYE PHACOEMULSIFICATION CLEAR CORNEA WITH STANDARD INTRAOCULAR LENS IMPLANT;  Surgeon: Elsie Kay MD;  Location:  [facility]     PHACOEMULSIFICATION CLEAR CORNEA WITH STANDARD INTRAOCULAR LENS IMPLANT Left 7/31/2018    Procedure: PHACOEMULSIFICATION CLEAR CORNEA WITH STANDARD INTRAOCULAR LENS IMPLANT;  COMPLEX LEFT EYE PHACOEMULSIFICATION CLEAR CORNEA WITH STANDARD INTRAOCULAR LENS IMPLANT WITH MALYUGIN RING;  Surgeon: Elsie Kay MD;  Location:  [facility]     WISDOM TEETH           Social History   Never smoker. Currently drinks 5-6 beers daily, and has been doing so for many years. No illicit or IV drug use    . Lives with his wife.     Family History   I have reviewed this patient's family history and updated it with  pertinent information if needed.   Family History   Problem Relation Age of Onset     Diabetes Brother      Obesity Sister      Obesity Brother      Coronary Artery Disease No family hx of      Cerebrovascular Disease No family hx of        Prior to Admission Medications   Prior to Admission Medications   Prescriptions Last Dose Informant Patient Reported? Taking?   Calcium Carbonate Antacid (TUMS PO)  Self Yes No   Sig: Take 2 chew tab by mouth as needed    aspirin 81 MG EC tablet   No No   Sig: Take 1 tablet (81 mg) by mouth daily Start tomorrow morning.   blood glucose monitoring (ACCU-CHEK MED PLUS) meter device kit  Self No No   Sig: Use to test blood sugar 4 to 5 times weekly or as directed.   blood glucose monitoring (ACCU-CHEK MED PLUS) test strip  Self No No   Sig: USE 4 TO 5 TIMES PER WEEK OR AS DIRECTED   carvedilol (COREG) 25 MG tablet   No No   Sig: Take 2 tablets (50 mg) by mouth 2 times daily Hold IF heart rate less than 55.   finasteride (PROSCAR) 5 MG tablet   No No   Sig: Take 1 tablet (5 mg) by mouth daily   losartan (COZAAR) 100 MG tablet   No No   Sig: TAKE 1 TABLET BY MOUTH EVERY DAY   metFORMIN (GLUCOPHAGE-XR) 500 MG 24 hr tablet   No No   Sig: TAKE 2 TABLETS BY MOUTH EVERY MORNING   order for DME   No No   Sig: Equipment being ordered: compression stockings medium  Same size as previous   oxyCODONE (ROXICODONE) 5 MG tablet   No No   Sig: Take 1-2 tablets (5-10 mg) by mouth every 6 hours as needed (shingles pain.)   pantoprazole (PROTONIX) 40 MG EC tablet   No No   Sig: TAKE 1 TABLET(40 MG) BY MOUTH DAILY   sildenafil (REVATIO) 20 MG tablet   No No   Sig: TAKE 2 TO 4 TABLETS BY MOUTH AS NEEDED NEVER USE WITH NITROGLYCERIN, TERAZOSIN, OR DOXAZOSIN. USE AS DIRECTED   simvastatin (ZOCOR) 20 MG tablet   No No   Sig: Take 1 tablet (20 mg) by mouth every evening   tiZANidine (ZANAFLEX) 4 MG tablet   No No   Sig: Take 1 tablet (4 mg) by mouth daily   valACYclovir (VALTREX) 1000 mg tablet   No No    Sig: Take 1 tablet (1,000 mg) by mouth 3 times daily   zolpidem (AMBIEN) 10 MG tablet   No No   Sig: TAKE 1 TABLET BY MOUTH EVERY NIGHT AT BEDTIME      Facility-Administered Medications: None     Allergies   No Known Allergies    Physical Exam   Vital Signs: Temp: 98  F (36.7  C) Temp src: Oral BP: 144/64 Pulse: 70 Heart Rate: 70 Resp: 16 SpO2: 97 % O2 Device: None (Room air)    Weight: 200 lbs 0 oz    Constitutional:  NAD  Eyes: PERRL, EOMI. Ecchymotic left eye.   HENT: Oropharynx clear, MMM  Respiratory: Clear to auscultation bilaterally, good air movement, normal effort   Cardiovascular: RRR, no m/r/g. No peripheral edema.   GI: Soft, non-tender, non-distended. No rebound tenderness or guarding.   Skin: Warm, dry   Neurologic: Alert. Responding to questions appropriately. Moving all extremities equally and on command.   Psychiatric: Normal affect, appropriate      Data   Data reviewed today: I reviewed all medications, new labs and imaging results over the last 24 hours. I personally reviewed no images or EKG's today.    Recent Labs   Lab 04/22/19  2355 04/16/19  1309 04/16/19  1235   WBC 6.1 6.6  --    HGB 12.0* 11.9*  --    MCV 90 91.7  --    * 159  --    INR 0.99  --   --    *  --  121*   POTASSIUM 4.1  --  4.2   CHLORIDE 87*  --  78*   CO2 29  --   --    BUN 11  --  13  12   CR 1.57*  --  1.13   ANIONGAP 8  --   --    HEAVENLY 9.1  --  9.0   *  --  169*   ALBUMIN 3.7  --   --    PROTTOTAL 6.9  --   --    BILITOTAL 0.7  --   --    ALKPHOS 135  --   --    *  --   --    *  --   --        Recent Results (from the past 24 hour(s))   CT Head w/o Contrast    Narrative    CT HEAD WITHOUT CONTRAST  4/23/2019 12:28 AM     HISTORY: Head trauma.    COMPARISON: None.    TECHNIQUE: Without intravenous contrast, helical sections were  acquired through the brain. Coronal reconstructions were generated.  Radiation dose for this scan was reduced using automated exposure  control, adjustment of  the mA and/or kV according to the patient's  size, or iterative reconstruction technique.    FINDINGS: Moderate diffuse cerebral atrophy with ex vacuo prominence  of the cerebral ventricles. Mild periventricular white matter low  attenuation, likely relating to chronic small vessel ischemic disease.  No intra-axial mass, mass effect or midline shift. Normal gray-white  matter differentiation. No visualized acute intra-axial hemorrhage.  The basal cisterns are patent. No extra-axial fluid collection.  Moderate-sized contusion and small hematoma in the scalp overlying the  frontal bone. The visualized portions of the paranasal sinuses and  mastoid air cells are unremarkable.      Impression    IMPRESSION:  1. No evidence of acute intracranial trauma.  2. Moderate-sized contusion and small hematoma in the scalp overlying  the frontal bone.   CT Facial Bones without Contrast    Narrative    CT MAXILLOFACIAL WITHOUT CONTRAST  4/23/2019 12:28 AM     HISTORY: Facial trauma.    COMPARISON: None.    TECHNIQUE: Without intravenous contrast, helical sections were  acquired through the facial bones including the mandible. Coronal and  sagittal reconstructions were generated. Radiation dose for this scan  was reduced using automated exposure control, adjustment of the mA  and/or kV according to the patient's size, or iterative reconstruction  technique.      Impression    IMPRESSION:  1. No visualized acute fracture of the facial bones.  2. No fluid within the paranasal sinuses or mastoid air cells. Mild  mucosal thickening within bilateral maxillary sinuses.  3. Moderate-sized contusion and small hematoma in the scalp overlying  the frontal bone. There is also a contusion in nasal soft tissues and  midline lower face.  4. A few very tiny radiodensities in the region of the skin of the  anterior aspect of the face, suspicious for tiny foreign bodies  related to the trauma.

## 2019-04-23 NOTE — PLAN OF CARE
Pt here with fall and confusion. A&O, but confused at times. VSS. Tele NSR. Generalized bruising over body from fall, as well as L eye contusion and bruising. CIWA 4, 4, and 2. Magnesium at 1.5, replaced and rechecked at 1530 - 2.4.  Mod-carb diet, thin liquids. , 146, 158. Insulin given per protocol, pt also on carb counting. Takes pills whole. Up with A1 and GB/walker. Denies pain. Plan for CD and psych consult, and PT. Discharge pending.

## 2019-04-23 NOTE — PROGRESS NOTES
04/23/19 1021   Quick Adds   Type of Visit Initial PT Evaluation   Living Environment   Lives With spouse   Living Arrangements house   Transportation Anticipated car, drives self   Living Environment Comment full flight to basement where pt sleeps. Walk in shower    Self-Care   Usual Activity Tolerance moderate   Current Activity Tolerance fair   Equipment Currently Used at Home cane, straight   Activity/Exercise/Self-Care Comment Pt reports IND with ADLs at baseline. Had spine surgery 2017. Pt reports he has felt that is BLE have progressively become weaker. Was going to OP PT, stopped going 2 weeks ago. Would like to cont PT    Functional Level Prior   Ambulation 1-->assistive equipment   Transferring 1-->assistive equipment   Toileting 1-->assistive equipment   Fall history within last six months yes   Prior Functional Level Comment Pt unable to report number of falls. Pt is IND ambulating household distances. SEC community. Pt reports his falls have been inside the house, reports legs have given out, pt has not been using SEC when he has had his falls   General Information   Onset of Illness/Injury or Date of Surgery - Date 04/22/19   Referring Physician Rocky Pritchett MD   Patient/Family Goals Statement To get stronger and return home    Pertinent History of Current Problem (include personal factors and/or comorbidities that impact the POC) Syd Joyce is a 64 year old male who presents with head injury above his left eye. Per the wife's report, the patient was drinking beer last night and fell on to the hard basement floor and hit his head. See med chart for further history    Precautions/Limitations fall precautions   Cognitive Status Examination   Orientation orientation to person, place and time   Cognitive Comment At times slow to answer questions   Integumentary/Edema   Integumentary/Edema Comments Bruising above L eye, bruising to upper R arm    Posture    Posture Forward head position   Range  "of Motion (ROM)   ROM Comment BLE WFL   Strength   Strength Comments Pt demonstrates BLE weakness, 3-4/5 strength bilaterally.    Bed Mobility   Bed Mobility Comments Supine > sit with HOB slightly elevated and SBA   Transfer Skills   Transfer Comments STS with FWW and CGA, requires use of BUE push off    Gait   Gait Comments Pt ambulates with FWW and close CGA, downward gaze, short step length, low ft clearance    Balance   Balance Comments Pt demonstrates impaired dynamic standing balance    Sensory Examination   Sensory Perception Comments intact to light touch    General Therapy Interventions   Planned Therapy Interventions balance training;bed mobility training;gait training;neuromuscular re-education;strengthening;transfer training   Clinical Impression   Criteria for Skilled Therapeutic Intervention yes, treatment indicated   PT Diagnosis impaired functional mobility, gait instabilit y   Influenced by the following impairments balance, endurance, strength   Functional limitations due to impairments impaired functional mobility, gait instability, decresed tolerance for activity    Clinical Presentation Stable/Uncomplicated   Clinical Presentation Rationale A x 1, frequent falls, currently mobilizing well    Clinical Decision Making (Complexity) Low complexity   Therapy Frequency` daily   Predicted Duration of Therapy Intervention (days/wks) 1 week   Anticipated Discharge Disposition Home with Outpatient Therapy   Risk & Benefits of therapy have been explained Yes   Patient, Family & other staff in agreement with plan of care Yes   [facility] AM-PAC  \"6 Clicks\" V.2 Basic Mobility Inpatient Short Form   1. Turning from your back to your side while in a flat bed without using bedrails? 3 - A Little   2. Moving from lying on your back to sitting on the side of a flat bed without using bedrails? 3 - A Little   3. Moving to and from a bed to a chair (including a wheelchair)? 3 - A Little   4. Standing up from " a chair using your arms (e.g., wheelchair, or bedside chair)? 3 - A Little   5. To walk in hospital room? 3 - A Little   6. Climbing 3-5 steps with a railing? 3 - A Little   Basic Mobility Raw Score (Score out of 24.Lower scores equate to lower levels of function) 18   Total Evaluation Time   Total Evaluation Time (Minutes) 18

## 2019-04-23 NOTE — PLAN OF CARE
Discharge Planner PT   Patient plan for discharge: home with continued PT   Current status:     Eval complete, treatment indicated. VSS with position change. Pt with hx of frequent falls, uses SEC community. Pt reports he has not been using SEC when he falls, reports legs give out. Of note was going to OP PT, quit going ~ 2 wks ago. Pt demonstrates BLE weakness and impaired dynamic balance    Pt is SBA for ed mobility. STS with FWW and SBA. Pt unable to safely march in place without UE support. Pt ambulated in hallway with CGA and FWW, 200'. Pt declining stair trial. Pt very fatigued this day, declining further exercise 2/2 not sleeping last night. Sit > Supine SBA. Left with alarm on         Barriers to return to prior living situation: A x 1, impaired activity tolerance, fall risk, 12 steps to bedroom   Recommendations for discharge: Home with OP PT   Rationale for recommendations: Predict pt will progress to modified IND with functional mobility, may require SBA for stairs which wife can provide. OP PT to progress BLE strength, balance, gait, endurance          Entered by: Nasrin Desai 04/23/2019 10:37 AM

## 2019-04-23 NOTE — PLAN OF CARE
Pt here with fall. A&O X4, slow to respond. Confused/forgetful at times. PERRLA. Purposeful motor response in all 4 extrem. L eye bruising/edema. CIWA 8 & 6. Ativan PO given x1 w/ per CIWA protocol. VSS ex hypertension. Tele NSR . MOD carb diet, thin liquids. Takes pills whole. Up with Ax2, gb,w-unsteady on feet. Plan for PT, chemical dep. Consult. On seizure precautions, no seizure activity noted. Discharge when medically stable.

## 2019-04-23 NOTE — ED PROVIDER NOTES
History     Chief Complaint:  Head Injury       HPI   Syd Joyce is a 64 year old male who presents with head injury above his left eye. Per the wife's report, the patient was drinking beer last night and fell on to the hard basement floor and hit his head. He was bleeding a significant amount when she arrived at his side after he called out to her. The patient had a laceration above his left eye which is significantly bruised today, this is new. The wife mentions that the patient has fallen three times in the last several weeks. The patient states that he has been drinking daily for many years and last night was no different. He reports that his legs suddenly gave out from under him and he hit the cement floor with his head. Patient does not remember falling. He called out his wife who came to his aid. The patient did not want to come to the emergency department but this evening he approached his wife and said that he felt confused and had a headache. His eye was not as swollen yesterday as it is right now. The patient says that he does not take blood thinners. He denies neck pain or new back pain. Of note, the patient's wife reports that the patient has had diarrhea for the past two and a half weeks and has been feeling weak and fatigued. He has an appointment with his primary physician tomorrow. The patient says that he has tried to quit drinking but has been unsuccessful. He says that he experiences tremors when he does not drink but has never had withdrawal seizures. The patient's tetanus shot is up to date.       Allergies:  No known drug allergies     Medications:    aspirin 81 MG EC tablet  Calcium Carbonate Antacid (TUMS PO)  carvedilol (COREG) 25 MG tablet  finasteride (PROSCAR) 5 MG tablet  losartan (COZAAR) 100 MG tablet  metFORMIN (GLUCOPHAGE-XR) 500 MG 24 hr tablet  oxyCODONE (ROXICODONE) 5 MG tablet  pantoprazole (PROTONIX) 40 MG EC tablet  sildenafil (REVATIO) 20 MG tablet  simvastatin (ZOCOR)  "20 MG tablet  tiZANidine (ZANAFLEX) 4 MG tablet  valACYclovir (VALTREX) 1000 mg tablet  zolpidem (AMBIEN) 10 MG tablet    Past Medical History:    DM2 (diabetes mellitus, type 2)    Esophageal reflux   HTN (hypertension)   Leg pain, bilateral   Low back pain   Numbness and tingling   Obesity, unspecified    Past Surgical History:    Discectomy lumbar posterior two levels     Family History:    History reviewed. No pertinent family history.     Social History:  Smoking status: Never smoker  Alcohol use: Yes  Marital Status:   [2]       Review of Systems   Constitutional: Positive for fatigue.   Gastrointestinal: Positive for diarrhea.   Musculoskeletal: Negative for back pain and neck pain.   Skin: Positive for wound.   Neurological: Positive for weakness and headaches.   Hematological: Bruises/bleeds easily.   Psychiatric/Behavioral: Positive for confusion.   All other systems reviewed and are negative.      Physical Exam     Patient Vitals for the past 24 hrs:   BP Temp Temp src Pulse Heart Rate Resp SpO2 Height Weight   04/23/19 0226 171/84 97.2  F (36.2  C) Oral 74 -- 20 98 % -- --   04/22/19 2315 -- -- -- -- -- -- 97 % -- --   04/22/19 2305 144/64 98  F (36.7  C) Oral 70 70 16 98 % 1.753 m (5' 9\") 90.7 kg (200 lb)   04/22/19 2300 144/64 -- -- -- -- -- -- -- --         Physical Exam   General: Alert and Interactive. Pleasant   Head: No signs of trauma.   Mouth/Throat: Oropharynx is clear and moist.   Eyes: Conjunctivae are normal. Pupils are equal, round, and reactive to light.   Neck: Normal range of motion. No nuchal rigidity.   CV: Normal rate and regular rhythm.    Resp: Effort normal and breath sounds normal. No respiratory distress.   GI: Soft. There is no tenderness or guarding.   MSK: Normal range of motion. no edema.   Neuro: The patient is alert and oriented to person, place, and time.  PERRLA, EOMI, strength in upper/lower extremities normal and symmetrical.   Sensation normal. Speech " normal.  GCS eye subscore is 4. GCS verbal subscore is 5. GCS motor subscore is 6.   Skin: Skin is warm and dry. No rash noted. Healing laceration above his left eyebrow, Dark bruising around his left eye  Psych: normal mood and affect. behavior is normal.       Emergency Department Course     Imaging:  Radiographic findings were communicated with the patient who voiced understanding of the findings.    CT facial bones without contrast  IMPRESSION:  1. No visualized acute fracture of the facial bones.  2. No fluid within the paranasal sinuses or mastoid air cells. Mild mucosal thickening within bilateral maxillary sinuses.  3. Moderate-sized contusion and small hematoma in the scalp overlying the frontal bone. There is also a contusion in nasal soft tissues and  midline lower face.  4. A few very tiny radiodensities in the region of the skin of the anterior aspect of the face, suspicious for tiny foreign bodies related to the trauma  As read by radiology     CT head w/o contrast  IMPRESSION:  1. No evidence of acute intracranial trauma.  2. Moderate-sized contusion and small hematoma in the scalp overlying the frontal bone.  As read by radiology     Laboratory:  Alcohol ethyl: <0.01  INR: 0.99  CMP: Sodium 124, Chloride 87, Glucose 131, Creatinine 1.57, GFR 45, ,   CBC: WBC 6.1, HGB 12.0,     Interventions:  2330: NaCl bolus 1000 ml IV    Emergency Department Course:  Past medical records, nursing notes, and vitals reviewed.  2258: I performed an exam of the patient and obtained history, as documented above.    IV inserted and blood drawn.    The patient was sent for a CT head and CT facial bones while in the emergency department, findings above.    0051: I rechecked the patient. Explained findings to the patient.    Findings and plan explained to the Patient who consents to admission.     0101: Discussed the patient with Dr. Pritchett, who will admit the patient to a inpatient med bed for  "further monitoring, evaluation, and treatment.     Impression & Plan      Medical Decision Making:  This patient is presenting after a fall 24 hours ago that he attributes to stumbling while having been drinking a large amount of beer.  He sustained a laceration to his left eyebrow that was cleaned and lightly approximated with a Steri-Strip.  His tetanus is up-to-date.  CT scan of the head was done to evaluate for possibilities intercerebral hemorrhage, skull fracture or mass.  CT scan of the head and of the midface were negative for acute disease.  Abnormalities were seen in the anterior aspect of his chin but this is some distance from his left upper eyebrow.  Laboratory studies reveal a hyponatremia which is likely contributing to his confusion and instability.    Patient will be likely to enter alcohol withdrawal given his history of daily heavy alcohol intake \"for many years\".  Patient will require inpatient admission for further evaluation and treatment.      Diagnosis:    ICD-10-CM    1. Delirium R41.0    2. Alcohol abuse F10.10    3. Diarrhea, unspecified type R19.7    4. Fall, initial encounter W19.XXXA    5. Closed head injury, initial encounter S09.90XA    6. Contusion of face, initial encounter S00.83XA    7. Facial laceration, initial encounter S01.81XA        Disposition:  Admitted to inpatient med bed    Chris Harper  4/22/2019    EMERGENCY DEPARTMENT    Scribe Disclosure:  I, Chris Harper, am serving as a scribe at 10:58 PM on 4/22/2019 to document services personally performed by Karlo Horton MD based on my observations and the provider's statements to me.          Karlo Horton MD  04/23/19 0446    "

## 2019-04-23 NOTE — ED NOTES
"[facility]  ED Nurse Handoff Report    ED Chief complaint: Head Injury (pt fell after drinking alcohol lastnight, this is 2-3x in past couple of weeks that he has hit his head.  Reports feeling confused today.  Swollen eye and lac to forehead)      ED Diagnosis:   Final diagnoses:   Delirium   Alcohol abuse   Diarrhea, unspecified type   Fall, initial encounter   Closed head injury, initial encounter   Contusion of face, initial encounter   Facial laceration, initial encounter       Code Status:  Admitting MD to assess.      Allergies: No Known Allergies    Activity level - Baseline/Home:  Independent    Activity Level - Current:   Stand with Assist of 2     Needed?: No    Isolation: No  Infection: Not Applicable  Bariatric?: No    Vital Signs:   Vitals:    04/22/19 2300 04/22/19 2305 04/22/19 2315   BP: 144/64 144/64    Pulse:  70    Resp:  16    Temp:  98  F (36.7  C)    TempSrc:  Oral    SpO2:  98% 97%   Weight:  90.7 kg (200 lb)    Height:  1.753 m (5' 9\")        Cardiac Rhythm: ,        Pain level:      Is this patient confused?: No   Does this patient have a guardian?  No         If yes, is there guardianship documents in the Epic \"Code/ACP\" activity?  N/A         Guardian Notified?  N/A  [place] - Suicide Severity Rating Scale Completed?  Yes  If yes, what color did the patient score?  White    Patient Report: Initial Complaint: Head Injury (pt fell after drinking alcohol lastnight, this is 2-3x in past couple of weeks that he has hit his head.  Reports feeling confused today.  Swollen eye and lac to forehead)  Focused Assessment: Patient is alert and oriented x 3, calm and cooperative, slightly tremulous, but denied any other symptoms of alcohol withdrawal. Patient is noted to have several bruises from previous falls including left eye/forhead, left shoulder/upper arm, right upper arm, back and buttocks. Patient denied daily drinking or previous withdrawal symptoms or seizures. "   Tests Performed: labs, head CT  Abnormal Results:   Labs Ordered and Resulted from Time of ED Arrival Up to the Time of Departure from the ED   CBC WITH PLATELETS DIFFERENTIAL - Abnormal; Notable for the following components:       Result Value    RBC Count 3.66 (*)     Hemoglobin 12.0 (*)     Hematocrit 33.0 (*)     Platelet Count 136 (*)     All other components within normal limits   COMPREHENSIVE METABOLIC PANEL - Abnormal; Notable for the following components:    Sodium 124 (*)     Chloride 87 (*)     Glucose 131 (*)     Creatinine 1.57 (*)     GFR Estimate 45 (*)     GFR Estimate If Black 52 (*)      (*)      (*)     All other components within normal limits   ALCOHOL ETHYL   INR   PULSE OXIMETRY NURSING   WOUND CARE   CARDIAC CONTINUOUS MONITORING       Treatments provided: IV fluid    Family Comments: Spouse is at the bedside.     OBS brochure/video discussed/provided to patient/family: Yes              Name of person given brochure if not patient: NA              Relationship to patient: NA    ED Medications:   Medications   0.9% sodium chloride BOLUS (has no administration in time range)     Followed by   sodium chloride 0.9% infusion (has no administration in time range)   multivitamin w/minerals (THERA-VIT-M) tablet 1 tablet (has no administration in time range)       Drips infusing?:  No    For the majority of the shift this patient was Green.   Interventions performed were NA.    Severe Sepsis OR Septic Shock Diagnosis Present: No    To be done/followed up on inpatient unit:  Monitor for S&S of withdrawal from ETOH    ED NURSE PHONE NUMBER: [phone]       '

## 2019-04-23 NOTE — ED TRIAGE NOTES
Pt fell last night and hit head. No loc, no blood thinners. Today wife states he's been in a stupor and came upstairs not knowing what day it was.

## 2019-04-23 NOTE — PROGRESS NOTES
RECEIVING UNIT ED HANDOFF REVIEW    ED Nurse Handoff Report was reviewed by: Lissette Marin on April 23, 2019 at 1:55 AM

## 2019-04-24 ENCOUNTER — APPOINTMENT (OUTPATIENT)
Dept: PHYSICAL THERAPY | Facility: CLINIC | Age: 64
DRG: 897 | End: 2019-04-24
Payer: COMMERCIAL

## 2019-04-24 LAB
ALBUMIN SERPL-MCNC: 3.2 G/DL (ref 3.4–5)
ALP SERPL-CCNC: 113 U/L (ref 40–150)
ALT SERPL W P-5'-P-CCNC: 128 U/L (ref 0–70)
ANION GAP SERPL CALCULATED.3IONS-SCNC: 6 MMOL/L (ref 3–14)
AST SERPL W P-5'-P-CCNC: 85 U/L (ref 0–45)
BILIRUB DIRECT SERPL-MCNC: 0.2 MG/DL (ref 0–0.2)
BILIRUB SERPL-MCNC: 0.6 MG/DL (ref 0.2–1.3)
BUN SERPL-MCNC: 11 MG/DL (ref 7–30)
CALCIUM SERPL-MCNC: 8.1 MG/DL (ref 8.5–10.1)
CHLORIDE SERPL-SCNC: 99 MMOL/L (ref 94–109)
CO2 SERPL-SCNC: 27 MMOL/L (ref 20–32)
CREAT SERPL-MCNC: 1.14 MG/DL (ref 0.66–1.25)
ERYTHROCYTE [DISTWIDTH] IN BLOOD BY AUTOMATED COUNT: 12.2 % (ref 10–15)
GFR SERPL CREATININE-BSD FRML MDRD: 67 ML/MIN/{1.73_M2}
GLUCOSE BLDC GLUCOMTR-MCNC: 107 MG/DL (ref 70–99)
GLUCOSE BLDC GLUCOMTR-MCNC: 146 MG/DL (ref 70–99)
GLUCOSE BLDC GLUCOMTR-MCNC: 149 MG/DL (ref 70–99)
GLUCOSE BLDC GLUCOMTR-MCNC: 247 MG/DL (ref 70–99)
GLUCOSE BLDC GLUCOMTR-MCNC: 98 MG/DL (ref 70–99)
GLUCOSE SERPL-MCNC: 115 MG/DL (ref 70–99)
HCT VFR BLD AUTO: 31.1 % (ref 40–53)
HGB BLD-MCNC: 11 G/DL (ref 13.3–17.7)
MAGNESIUM SERPL-MCNC: 1.8 MG/DL (ref 1.6–2.3)
MCH RBC QN AUTO: 32.5 PG (ref 26.5–33)
MCHC RBC AUTO-ENTMCNC: 35.4 G/DL (ref 31.5–36.5)
MCV RBC AUTO: 92 FL (ref 78–100)
PLATELET # BLD AUTO: 127 10E9/L (ref 150–450)
POTASSIUM SERPL-SCNC: 3.7 MMOL/L (ref 3.4–5.3)
PROT SERPL-MCNC: 6 G/DL (ref 6.8–8.8)
RBC # BLD AUTO: 3.38 10E12/L (ref 4.4–5.9)
SODIUM SERPL-SCNC: 132 MMOL/L (ref 133–144)
WBC # BLD AUTO: 4.8 10E9/L (ref 4–11)

## 2019-04-24 PROCEDURE — 25000128 H RX IP 250 OP 636

## 2019-04-24 PROCEDURE — 85027 COMPLETE CBC AUTOMATED: CPT | Performed by: INTERNAL MEDICINE

## 2019-04-24 PROCEDURE — 99232 SBSQ HOSP IP/OBS MODERATE 35: CPT | Performed by: PSYCHIATRY & NEUROLOGY

## 2019-04-24 PROCEDURE — 12000000 ZZH R&B MED SURG/OB

## 2019-04-24 PROCEDURE — 97116 GAIT TRAINING THERAPY: CPT | Mod: GP | Performed by: PHYSICAL THERAPIST

## 2019-04-24 PROCEDURE — 36415 COLL VENOUS BLD VENIPUNCTURE: CPT | Performed by: INTERNAL MEDICINE

## 2019-04-24 PROCEDURE — 25800030 ZZH RX IP 258 OP 636: Performed by: INTERNAL MEDICINE

## 2019-04-24 PROCEDURE — 25000131 ZZH RX MED GY IP 250 OP 636 PS 637: Performed by: INTERNAL MEDICINE

## 2019-04-24 PROCEDURE — 99233 SBSQ HOSP IP/OBS HIGH 50: CPT | Performed by: INTERNAL MEDICINE

## 2019-04-24 PROCEDURE — 97530 THERAPEUTIC ACTIVITIES: CPT | Mod: GP | Performed by: PHYSICAL THERAPIST

## 2019-04-24 PROCEDURE — 83735 ASSAY OF MAGNESIUM: CPT | Performed by: INTERNAL MEDICINE

## 2019-04-24 PROCEDURE — 25000132 ZZH RX MED GY IP 250 OP 250 PS 637: Performed by: INTERNAL MEDICINE

## 2019-04-24 PROCEDURE — HZ2ZZZZ DETOXIFICATION SERVICES FOR SUBSTANCE ABUSE TREATMENT: ICD-10-PCS | Performed by: INTERNAL MEDICINE

## 2019-04-24 PROCEDURE — 80076 HEPATIC FUNCTION PANEL: CPT | Performed by: INTERNAL MEDICINE

## 2019-04-24 PROCEDURE — 80048 BASIC METABOLIC PNL TOTAL CA: CPT | Performed by: INTERNAL MEDICINE

## 2019-04-24 PROCEDURE — 25000132 ZZH RX MED GY IP 250 OP 250 PS 637: Performed by: PSYCHIATRY & NEUROLOGY

## 2019-04-24 PROCEDURE — 00000146 ZZHCL STATISTIC GLUCOSE BY METER IP

## 2019-04-24 RX ORDER — QUETIAPINE FUMARATE 25 MG/1
25 TABLET, FILM COATED ORAL 3 TIMES DAILY PRN
Status: DISCONTINUED | OUTPATIENT
Start: 2019-04-24 | End: 2019-04-24

## 2019-04-24 RX ORDER — LORAZEPAM 2 MG/ML
1-2 INJECTION INTRAMUSCULAR EVERY 30 MIN PRN
Status: DISCONTINUED | OUTPATIENT
Start: 2019-04-24 | End: 2019-04-26

## 2019-04-24 RX ORDER — HALOPERIDOL 5 MG/ML
2 INJECTION INTRAMUSCULAR EVERY 6 HOURS PRN
Status: DISCONTINUED | OUTPATIENT
Start: 2019-04-24 | End: 2019-04-25

## 2019-04-24 RX ORDER — LORAZEPAM 2 MG/ML
2 INJECTION INTRAMUSCULAR ONCE
Status: DISCONTINUED | OUTPATIENT
Start: 2019-04-24 | End: 2019-04-26 | Stop reason: HOSPADM

## 2019-04-24 RX ORDER — MIRTAZAPINE 15 MG/1
15 TABLET, FILM COATED ORAL AT BEDTIME
Status: DISCONTINUED | OUTPATIENT
Start: 2019-04-24 | End: 2019-04-26 | Stop reason: HOSPADM

## 2019-04-24 RX ORDER — LORAZEPAM 2 MG/ML
1 INJECTION INTRAMUSCULAR ONCE
Status: DISCONTINUED | OUTPATIENT
Start: 2019-04-24 | End: 2019-04-24

## 2019-04-24 RX ORDER — CARVEDILOL 25 MG/1
25 TABLET ORAL 2 TIMES DAILY WITH MEALS
Status: DISCONTINUED | OUTPATIENT
Start: 2019-04-24 | End: 2019-04-25

## 2019-04-24 RX ORDER — LORAZEPAM 2 MG/ML
INJECTION INTRAMUSCULAR
Status: COMPLETED
Start: 2019-04-24 | End: 2019-04-24

## 2019-04-24 RX ORDER — LORAZEPAM 1 MG/1
1-2 TABLET ORAL EVERY 30 MIN PRN
Status: DISCONTINUED | OUTPATIENT
Start: 2019-04-24 | End: 2019-04-26

## 2019-04-24 RX ORDER — QUETIAPINE FUMARATE 25 MG/1
25-50 TABLET, FILM COATED ORAL EVERY 6 HOURS PRN
Status: DISCONTINUED | OUTPATIENT
Start: 2019-04-24 | End: 2019-04-26

## 2019-04-24 RX ORDER — MIRTAZAPINE 7.5 MG/1
7.5 TABLET, FILM COATED ORAL AT BEDTIME
Status: DISCONTINUED | OUTPATIENT
Start: 2019-04-24 | End: 2019-04-24

## 2019-04-24 RX ADMIN — SODIUM CHLORIDE: 9 INJECTION, SOLUTION INTRAVENOUS at 12:17

## 2019-04-24 RX ADMIN — CARVEDILOL 25 MG: 25 TABLET, FILM COATED ORAL at 17:23

## 2019-04-24 RX ADMIN — LORAZEPAM 1 MG: 2 INJECTION INTRAMUSCULAR; INTRAVENOUS at 16:52

## 2019-04-24 RX ADMIN — PANTOPRAZOLE SODIUM 40 MG: 40 TABLET, DELAYED RELEASE ORAL at 08:53

## 2019-04-24 RX ADMIN — INSULIN ASPART 1 UNITS: 100 INJECTION, SOLUTION INTRAVENOUS; SUBCUTANEOUS at 21:23

## 2019-04-24 RX ADMIN — SODIUM CHLORIDE: 9 INJECTION, SOLUTION INTRAVENOUS at 22:43

## 2019-04-24 RX ADMIN — ACETAMINOPHEN 650 MG: 325 TABLET, FILM COATED ORAL at 05:04

## 2019-04-24 RX ADMIN — Medication 1 MG: at 01:05

## 2019-04-24 RX ADMIN — Medication 100 MG: at 08:49

## 2019-04-24 RX ADMIN — FINASTERIDE 5 MG: 5 TABLET, FILM COATED ORAL at 08:49

## 2019-04-24 RX ADMIN — LORAZEPAM 1 MG: 1 TABLET ORAL at 17:23

## 2019-04-24 RX ADMIN — MULTIPLE VITAMINS W/ MINERALS TAB 1 TABLET: TAB at 08:49

## 2019-04-24 RX ADMIN — SODIUM CHLORIDE: 9 INJECTION, SOLUTION INTRAVENOUS at 01:05

## 2019-04-24 RX ADMIN — MIRTAZAPINE 15 MG: 15 TABLET, FILM COATED ORAL at 21:06

## 2019-04-24 RX ADMIN — TIZANIDINE 4 MG: 2 TABLET ORAL at 12:16

## 2019-04-24 RX ADMIN — CARVEDILOL 50 MG: 25 TABLET, FILM COATED ORAL at 08:49

## 2019-04-24 RX ADMIN — LORAZEPAM 1 MG: 2 INJECTION INTRAMUSCULAR at 16:52

## 2019-04-24 RX ADMIN — ACETAMINOPHEN 650 MG: 325 TABLET, FILM COATED ORAL at 09:13

## 2019-04-24 RX ADMIN — FOLIC ACID 1 MG: 1 TABLET ORAL at 08:49

## 2019-04-24 ASSESSMENT — ACTIVITIES OF DAILY LIVING (ADL)
ADLS_ACUITY_SCORE: 20
ADLS_ACUITY_SCORE: 17
ADLS_ACUITY_SCORE: 22

## 2019-04-24 NOTE — CONSULTS
[facility] Psychiatric Consult Progress Note    Interval History:   Pt seen, chart reviewed, case discussed with nursing staff and treating clinicians. I met with Syd and his wife. He expressed a need to deal with his ETOH issues, recognizes need for tx. He has medicare, options will be limited to [facility] or another medicare facility-he expressed interest in [facility] though doubt he would have funding. Sleep and anxiety are concerns, also ongoing weakness, gait issues. He may need TCU. Remeron has been started, slept poorly. We can titrate dose. Denies suicidal or homicidal ideation.      Review of systems:   10 point Review of Systems completed by Dr. Herrera, and is  is negative other than noted in the HPI     Medications:       carvedilol  25 mg Oral BID w/meals     finasteride  5 mg Oral Daily     folic acid  1 mg Oral Daily     insulin aspart  1-7 Units Subcutaneous TID AC     insulin aspart  1-5 Units Subcutaneous At Bedtime     mirtazapine  7.5 mg Oral At Bedtime     multivitamin w/minerals  1 tablet Oral Daily     pantoprazole  40 mg Oral Daily     vitamin B1  100 mg Oral Daily     acetaminophen, acetaminophen, calcium carbonate, glucose **OR** dextrose **OR** glucagon, hydrALAZINE, hypromellose-dextran, labetalol, magnesium sulfate, melatonin, naloxone, ondansetron **OR** ondansetron, potassium chloride, potassium chloride with lidocaine, potassium chloride, potassium chloride, potassium chloride, prochlorperazine **OR** prochlorperazine **OR** prochlorperazine, sodium phosphate, sodium phosphate, sodium phosphate, tiZANidine, zolpidem    Mental Status Examination:     Appearance:  awake, alert, poorly groomed and facial contusion noted  Eye Contact:  good  Speech:  clear, coherent  Language:Normal  Psychomotor Behavior:  no evidence of tardive dyskinesia, dystonia, or tics  Mood:  anxious and sad   Affect:  mood congruent  Thought Process:  logical, linear and goal oriented no  loose associations  Thought Content:  no evidence of suicidal ideation or homicidal ideation and no evidence of psychotic thought  Oriented to:  time, person, and place  Attention Span and Concentration:  fair  Recent and Remote Memory:  intact  Fund of Knowledge: normal  Muscle Strength and Tone: weak  Gait and Station: impaired  Insight:  fair  Judgment:  fair        Labs/Vitals:     Recent Results (from the past 24 hour(s))   Glucose by meter    Collection Time: 04/23/19 12:41 PM   Result Value Ref Range    Glucose 146 (H) 70 - 99 mg/dL   Magnesium    Collection Time: 04/23/19  4:23 PM   Result Value Ref Range    Magnesium 2.4 (H) 1.6 - 2.3 mg/dL   Glucose by meter    Collection Time: 04/23/19  5:04 PM   Result Value Ref Range    Glucose 158 (H) 70 - 99 mg/dL   Glucose by meter    Collection Time: 04/23/19  9:27 PM   Result Value Ref Range    Glucose 104 (H) 70 - 99 mg/dL   Glucose by meter    Collection Time: 04/24/19  2:24 AM   Result Value Ref Range    Glucose 98 70 - 99 mg/dL   Basic metabolic panel    Collection Time: 04/24/19  7:42 AM   Result Value Ref Range    Sodium 132 (L) 133 - 144 mmol/L    Potassium 3.7 3.4 - 5.3 mmol/L    Chloride 99 94 - 109 mmol/L    Carbon Dioxide 27 20 - 32 mmol/L    Anion Gap 6 3 - 14 mmol/L    Glucose 115 (H) 70 - 99 mg/dL    Urea Nitrogen 11 7 - 30 mg/dL    Creatinine 1.14 0.66 - 1.25 mg/dL    GFR Estimate 67 >60 mL/min/[1.73_m2]    GFR Estimate If Black 77 >60 mL/min/[1.73_m2]    Calcium 8.1 (L) 8.5 - 10.1 mg/dL   CBC with platelets    Collection Time: 04/24/19  7:42 AM   Result Value Ref Range    WBC 4.8 4.0 - 11.0 10e9/L    RBC Count 3.38 (L) 4.4 - 5.9 10e12/L    Hemoglobin 11.0 (L) 13.3 - 17.7 g/dL    Hematocrit 31.1 (L) 40.0 - 53.0 %    MCV 92 78 - 100 fl    MCH 32.5 26.5 - 33.0 pg    MCHC 35.4 31.5 - 36.5 g/dL    RDW 12.2 10.0 - 15.0 %    Platelet Count 127 (L) 150 - 450 10e9/L   Hepatic panel    Collection Time: 04/24/19  7:42 AM   Result Value Ref Range    Bilirubin  Direct 0.2 0.0 - 0.2 mg/dL    Bilirubin Total 0.6 0.2 - 1.3 mg/dL    Albumin 3.2 (L) 3.4 - 5.0 g/dL    Protein Total 6.0 (L) 6.8 - 8.8 g/dL    Alkaline Phosphatase 113 40 - 150 U/L     (H) 0 - 70 U/L    AST 85 (H) 0 - 45 U/L   Magnesium    Collection Time: 04/24/19  7:42 AM   Result Value Ref Range    Magnesium 1.8 1.6 - 2.3 mg/dL   Glucose by meter    Collection Time: 04/24/19  7:58 AM   Result Value Ref Range    Glucose 107 (H) 70 - 99 mg/dL     B/P: 160/80, T: 97.6, P: 71, R: 16    Impression:   Syd has a severe etoh use disorder, medical complications, underlying anxiety/sleep issues that may respond to remeron. He desires help with obtaining CD tx      DIagnoses:   1.ETOH use disorder, severe  2.Recent fall, facial injury  3.Metabolic derrangement  4.Unspecified anxiety disorder         Plan:   1. Written information given on medications. Side effects, risks, benefits reviewed.  2. Adjust remeron to 15mg at bedtime, use seroquel 25mg tid prn for anxiety   3. Monitor for withdrawal  4. Explore CD tx options, may need TCU first, hospital based CD program would be ideal-discussed with SW  5. Psych to see in AM      Attestation:  Patient has been seen and evaluated by me,  Jayme Herrera MD

## 2019-04-24 NOTE — PROGRESS NOTES
Fior Khanna LSW      Social Work   Progress Notes   Signed   Date of Service:  4/24/2019 10:15 AM   Creation Time:  4/24/2019 10:15 AM      ..Care Transition Initial Assessment - LOIS     Met with: Patient  And wife, Sarah   Active Problems:    Hyponatremia       DATA  Lives With: spouse   Living Arrangements: house     Description of Support System: Supportive, Involved  Who is your support system?: Children  Support Assessment: Adequate family and caregiver support.  Identified issues/concerns regarding health management: Pt admitted with hyponatremia . He had a fall in his home and is on CIWA protocol for ETOH abuse.  Pt planning to discharge home on antabuse with wife watching him take and he was not able to move independently today with PT so recommendation is now TCU placement.  Pt also wanting to secure in patient chemical dependency placement post rehab as he is seeking assistance with his ETOH dependency and feels he cannot manage this as an out pt. Pt is alone during the day and does not trust his ability to stay away from ETOH. Pt is not independent with mobility yet.   Transportation Anticipated: car, drives self    ASSESSMENT  Cognitive Status:  awake, alert and oriented  Concerns to be addressed: TCU and Chemical dependency evaluation and treatment.   PLAN  SW confining to assist with TCU placement and folloiw up with [facility] assessment and referral to [facility].                 []Hide copied text    [][place] for details      Lois Progress Note  Chart Reviewed, Pt discussed in Interdisciplinary Rounds.   Pt was anticipated to discharge today per rounds.     Intervention:   Pt has CD assessment pending.  Pt was seen by PT today who feels pt is needing assistance and walker for mobility.  Recommendation changed to TCU per PT. Pt stated that he did not feel that his wife would be able to provide adequate assistance at home for him.   [facility] will [facility]  seen inpatient. They are short on counselors to complete assessments at this time.    11:00: CD order for assessment acknowledged [facility] CD service and pt will require assessment by medicare provider.    LOIS discussed assessment by [facility] counselor on a private pay basis since pt and wife very motivated to have assessment done in hospital.  They are in agreement with this and LOIS called and scheduled assessment with Dave at [facility] for 10:00 am on 4/25/19. They have been advised that they need to pay in cash at time if assessment and given the cost of $175.00. Pt and wife agreeable. Pt has never had CD treatment before.  LOIS provided [facility] list for review.     LOIS contacted [facility] in patient CD program regarding pt and intake states to call back once assessment is done tomorrow and they will take referral information then.  indicates that  [facility] admits medicate pt's and they are running about two weeks out for bed availability. They are willing to follow pt at TCU to secure placement for pt.     Team Members notified:   JOHNRN, hospitalist        Plan: LOIS continuing. Pt will need to review [facility] TCU list for referrals   LOIS sent referrals out to 3 chosen TCU's.     Follow up plan: LOIS continuing.     MARLA Beck  [facility]  Phone: [phone]

## 2019-04-24 NOTE — CONSULTS
4/24/2019     CD consult acknowledged.  Per EMR, patient has [employer] which is Medicare Advantage policy and would need to seek chem dep services from a Medicare approved facility for chem dep treatment services. Social work can assist with resource and referral.    Kaci Smith, [facility]  [phone]

## 2019-04-24 NOTE — PROVIDER NOTIFICATION
Per bedside RN pt has become increasingly anxious this afternoon. TORB received from Dr. Herrera for PO seroquil 25 mg TID PRN. Dr. Herrera also recommended observing the pt for signs of ETOH withdrawal contacting the attending MD to restart CIWA if necessary.

## 2019-04-24 NOTE — CONSULTS
Consult Date:  04/24/2019      PSYCHIATRIC CONSULTATION      REQUESTING PHYSICIAN:  Rocky Pritchett MD      REASON FOR CONSULTATION:  Alcohol dependence and depression.      IDENTIFICATION:  Mr. Joyce is a 64-year-old   male with no previous psychiatric history, no prior chemical dependency treatment.  Primary doctor is Dr. Arenas at [facility].      HISTORY OF PRESENT ILLNESS:  Mr. Joyce has  a significant history of problems with depression and alcohol use.  He came to the hospital goes after the drinking last night.  He fell on  the basement floor and has head, bleeding significantly.  When his wife arrived, she called out for the laceration of his left eye, ama thought.  The patient had fallen 3 times in the last several weeks.  He has been drinking daily for many years.  Five drinks a day at least.  Has had multiple floor on the cement floor, where he has hit his head. Patient is able to recall falling. The patient also had diarrhea the past 2-3 weeks, feeling weak and fatigued.  He tried to quit drinking have not been successful.  The patient has been feeling down and depressed, feeling hopeless, helpless, worthless, low energy, poor concentration, poor motivation, not able to enjoy things, unhappy.  He had thoughts that he would be better off being dead.  He never tried to kill himself.  He is able contract no self-harm and identify barriers.  Sleep has been very difficult.  He says he falls asleep, then he wakes up and he is wide awake and cannot get back to sleep.  Started during the day and basically drinks to pass out fall sleep, wakes up after 3 or 4 hours.      PAST PSYCHIATRIC HISTORY:  None.      FAMILY HISTORY:  Brother with alcohol problems.  No mental illness in family.      SOCIAL HISTORY:  He grew up the second of 5 children, grew up with both parents in [place] on a farm, they were hardworking.  Graduated from high school, went to [employer],  graduated with medical technology, working in the field for 25 years.  He has 2 children.  .  Family supportive.      PAST MEDICAL HISTORY:  Diabetes type 2, esophageal reflux, hypertension, leg pain, lower back pain, numbness and tingling, obesity.  Had several surgeries including diskectomy, lumbar posterior and phacoemulsification, cataract surgery.      MEDICAL REVIEW OF SYSTEMS:  A 10-point review of systems completed by Dr. Pritchett.  No new pertinent positives on 04/23/2019.  Reviewed by Dr. Guajardo on 04/23/2019.  No new changes.      PHYSICAL EXAMINATION:   VITAL SIGNS:  Temperature 97.5, blood pressure 150/71, heart rate 76, respirations 18.      MENTAL STATUS EXAM:  The patient was lying in bed.  Appears to be in obvious withdrawal.  Was dressed in hospital attire.  He is cooperative.  He is oriented to person, place, and time.  Eye contact was poor.  Speech was a little bit garbled.  Language normal.  Psychomotor behavior, agitated.  Mood was severely depressed.  Affect severely depressed and anxious.  Thought process goal oriented, no loose associations.  Thought content positive for passive death wish.  Negative for obsessions, compulsions, or psychosis.  Fund of knowledge intact.  Insight intact.  Judgment poor.  Attention span, concentration poor.  Recent and remote memory mildly impaired.  Gait not tested.  Muscle tone intact.      ASSESSMENT:  Mr. Joyce is a 64-year-old   male with history of major depression, recurrent, severe and alcohol use disorder.  No previous treatment.  We will start him on medication Remeron.  I explained side effects, benefits, complications.  He gave verbal consent.  The patient has Medicare.  At this point, we will get a chemical dependency assessment.  Discussed with him going to , get a sponsor.  I also talked to him that when he is withdrawal, stable, was started on medication Antabuse and have his wife watch him take it.  The patient  agreed to this plan.      DIAGNOSES:   1.  Major depression, recurrent, severe.   2.  Alcohol use disorder.      PLAN:   1.  Start Remeron 7.5 mg at night.   2.  Start Antabuse once patient is medically stable at least 2 days after his last drink.   3.  Reconsult Psychiatry..   4.  AA, Abstinence and wife watch him take his Antabuse.         SOUTH BERRIOS MD             D: 2019   T: 2019   MT: ANDRES      Name:     TERE SILVEIRA   MRN:      [id number]        Account:       [id number]   :      1955           Consult Date:  2019      Document: [id number]       cc: Rocky Pritchett MD

## 2019-04-24 NOTE — PROGRESS NOTES
[facility]    Hospitalist Progress Note    Date of Service (when I saw the patient): 04/24/2019    Assessment & Plan   Syd Joyce is a 64 year old male with hx of DM2, HTN, HL, and BPH who presented on 4/22/2019 with confusion following a fall, and was admitted for dehydration with hyponatremia and FELIX    Confusional episode, Resolved  Possibly due to alcohol intoxication +/- hyponatremia +/- post-concussive syndrome. Mental status rapidly improved upon admission.  - Now at baseline mental status    Unwitnessed fall with ecchymosis over left eye and frontal bone contusion  Orthostasis  Likely due to alcohol intoxication and severe dehydration with orthostasis. Patient was unable to recall events surrounding fall so syncope work-up was initiated on admission. TTE (4/23) showed LVEF 60-65% with no significant valvular abnormalities.   - Orthostatics today positive, will follow daily  - Continues on IV fluids  - Decrease PTA carvedilol from 50 mg BID to 25 mg BID  - PT recommending TCU    Essential hypertension  [PTA: carvedilol 50 mg BID, losartan 100 mg daily]  - PTA carvedilol has been decreased to 25 mg BID due to orthostasis  - Holding losartan due to FELIX  - IV hydralazine PRN for SBP>180    Hypovolemic hyponatremia and FELIX on CKD stage II, Improving  Sodium on arrival 124, Creatinine 1.57 from baseline 1.1-1.2. Suspect dehydration due to heavy alcohol use and recent diarrheal illness.   - Na 132, Cr 1.14 today   - Continues on IV fluids  - Repeat BMP in AM    Alcohol dependence  Longstanding history of heavy alcohol use, drinking 5-6 beers daily. Last drink reportedly 2-3 days PTA. No evidence of alcohol withdrawal during his hospitalization. Interested in CD treatment.   - d/c CIWA today, 4/24  - On thiamine, folate, MVI  - On K/Mg/Phos replacement protocol  - CD consult pending  Addendum: Notified by nursing that patient is now having acute anxiety and scoring high on CIWA. Lorazepam  reordered PRN per CIWA protocol. Will also order PRN Seroquel for severe anxiety and Haldol for severe agitation refractory to Seroquel. Keep on current unit for now. If withdrawal symptoms worsen, will need to transfer to ICU for Precedex infusion      Major recurrent depressive disorder with anxiety  Psychiatry was consulted on admission and started the patient on mirtazapine  - He has been started on mirtazapine 7.5 mg qhs  - Start Antabuse at discharge  - Reconsult Psych for follow up per patient/family request    Elevated transaminases, Improved  ,  on admission. Tbili and INR normal. Abdominal US on admission showed hepatic steatosis with normal-appearing gallbladder and CBD. Likely due to heavy alcoholic use.  - LFTs trending downward, will follow periodically    DM2 with neuropathy  A1c 5.6. No longer meets diabetes criteria  Recent Labs   Lab 04/24/19  0758 04/24/19  0742 04/24/19  0224 04/23/19  2127 04/23/19  1704 04/23/19  1241 04/23/19  1002 04/23/19  0732 04/22/19  2355   GLC  --  115*  --   --   --   --  226*  --  131*   *  --  98 104* 158* 146*  --  110*  --      - Holding PTA metformin due to recent FELIX and normal blood sugars  - Medium SSI available    GERD  Chronic and stable on pantoprazole    BPH  Chronic and stable on finasteride    FEN: mod CHO diet  DVT Prophylaxis: Pneumatic Compression Devices  Code Status: Full Code    Disposition: Expected discharge to TCU once orthostatics and sodium normal, and CD consult complete. 1-2 days    Kika Wiley    Interval History   No events overnight. Did worse with PT today - unsteady, weak, and orthostatic. Blood pressure dropped from 160 to 120 systolic with associated dizziness/lightheadedness. Denies cp/sob, dizziness/lightheadedness, or nausea. Did not receive Remeron last night because he was sleeping - will start tonight. Wants Psych to follow up. Also very much wants to Chem Dep.  - Reconsult Psych  - CD consult  pending  - Decrease carvedilol to 25 mg BID  - Continue IV fluids  - Will follow daily orthostatics    -Data reviewed today: I reviewed all new labs and imaging results over the last 24 hours. I personally reviewed no images or EKG's today.    Physical Exam   Temp: 97.6  F (36.4  C) Temp src: Oral BP: 160/80   Heart Rate: 65 Resp: 16 SpO2: 98 % O2 Device: None (Room air)    Vitals:    04/22/19 2305   Weight: 90.7 kg (200 lb)     Vital Signs with Ranges  Temp:  [97.5  F (36.4  C)-98.7  F (37.1  C)] 97.6  F (36.4  C)  Heart Rate:  [65-78] 65  Resp:  [14-18] 16  BP: (125-173)/(71-96) 160/80  SpO2:  [95 %-99 %] 98 %  No intake/output data recorded.    Constitutional: Appears comfortable, NAD  Respiratory: Breathing non-labored. Lungs CTAB - no wheezes, crackles, or rhonchi  Cardiovascular: Heart RRR, no m/r/g. No pedal edema  GI: +BS, abd soft/NT  Skin/Integumen: Ecchymosis over left eye  Neuro: Alert and appropriate, SINGLETON  Psych: Calm and cooperative    Medications     sodium chloride 100 mL/hr at 04/24/19 0858       carvedilol  25 mg Oral BID w/meals     finasteride  5 mg Oral Daily     folic acid  1 mg Oral Daily     insulin aspart  1-7 Units Subcutaneous TID AC     insulin aspart  1-5 Units Subcutaneous At Bedtime     mirtazapine  7.5 mg Oral At Bedtime     multivitamin w/minerals  1 tablet Oral Daily     pantoprazole  40 mg Oral Daily     vitamin B1  100 mg Oral Daily     Data   Recent Labs   Lab 04/24/19  0742 04/23/19  1002 04/23/19  0609 04/22/19  2355   WBC 4.8  --   --  6.1   HGB 11.0*  --   --  12.0*   MCV 92  --   --  90   *  --   --  136*   INR  --   --   --  0.99   * 128* 130* 124*   POTASSIUM 3.7 4.2  --  4.1   CHLORIDE 99 93*  --  87*   CO2 27 27  --  29   BUN 11 12  --  11   CR 1.14 1.47*  --  1.57*   ANIONGAP 6 8  --  8   HEAVENLY 8.1* 8.6  --  9.1   * 226*  --  131*   ALBUMIN 3.2*  --   --  3.7   PROTTOTAL 6.0*  --   --  6.9   BILITOTAL 0.6  --   --  0.7   ALKPHOS 113  --   --  135   ALT  128*  --   --  156*   AST 85*  --   --  103*       No results found for this or any previous visit (from the past 24 hour(s)).

## 2019-04-24 NOTE — PROVIDER NOTIFICATION
"Call placed to Dr. Wiley: \"Patient is having large amounts of anxiety.  Could we get Ativan IV ASAP? scoring very high on CIWA, thank you!\"  "

## 2019-04-24 NOTE — PLAN OF CARE
"A&O x4. Slight weakness to BLE, otherwise CMS intact. CIWA 2-3-1 d/t increased anxiety and slight tremors. Mod-carb-diet. VSS. Makes needs known via call light, occasional incontinence. Seizure precautions, no seizure activity. Difficulty falling asleep, stated PRN Melatonin \"did the trick.\" Discharge plan pending lab results this AM, continue monitoring.  "

## 2019-04-24 NOTE — CONSULTS
Pt seen for psychiatric consult follow-up, see my note. He would benefit from IP CD tx, feels he needs that. He has medicare so [facility] is likely the best resource, the retreat is another self-pay option to consider. I adjusted remeron to help sleep, we will see him tomorrow for f/u. Our SW is looking into this and a private CD  is seeing him tomorrow    Jayme Herrera MD

## 2019-04-24 NOTE — PLAN OF CARE
"PT: Attempted to see pt for PT. Pt politely declining at this time d/t wanting to finish his breakfast but volunteers that he would be willing to work with PT later today.    Second attempt: pt agreeable to PT.    Discharge Planner PT   Patient plan for discharge: reports fearful of returning home, hopeful for rehab   Current status: Pt sitting up in chair upon arrival, agreeable to PT. Pt transfers sit<>stand to FWW with Yue, ambulates 180' with FWW and CGA, requires Yue for balance with ambulation 40' with SEC and pt reaching out for support with other UE. Discussed importance of using FWW for mobility at this time and having one on each level of home; pt reports he only owns one, discussed options to obtain a second walker but pt states \"I'll have to think about that\". Pt requires CGA/Yue to ascend/descend 6 stairs with BUE on one rail; pt reporting feeling increased lightheadedness and weakness, safely got to chair and back to room. Noting orthostatic drop in BP with activity - see VSFS. Pt also appears very anxious. RN updated.  Barriers to return to prior living situation: A x 1, impaired activity tolerance, fall risk, 12 steps to bedroom   Recommendations for discharge: Update to TCU   Rationale for recommendations: Pt does not appear safe to disch home at this time, states he will be home alone during the day, states he stopped going to OP PT stating he was too weak to get there. Pt currently requires Ax1 for all mobility and appears to be at increased risk for falls; would benefit from daily therapies at TCU to progress safety and IND with mobility prior to returning home.        Entered by: Cherelle Pressley 04/24/2019 9:45 AM       "

## 2019-04-25 LAB
ANION GAP SERPL CALCULATED.3IONS-SCNC: 7 MMOL/L (ref 3–14)
BUN SERPL-MCNC: 8 MG/DL (ref 7–30)
CALCIUM SERPL-MCNC: 8.4 MG/DL (ref 8.5–10.1)
CHLORIDE SERPL-SCNC: 102 MMOL/L (ref 94–109)
CO2 SERPL-SCNC: 27 MMOL/L (ref 20–32)
CREAT SERPL-MCNC: 1.1 MG/DL (ref 0.66–1.25)
GFR SERPL CREATININE-BSD FRML MDRD: 69 ML/MIN/{1.73_M2}
GLUCOSE BLDC GLUCOMTR-MCNC: 105 MG/DL (ref 70–99)
GLUCOSE BLDC GLUCOMTR-MCNC: 150 MG/DL (ref 70–99)
GLUCOSE BLDC GLUCOMTR-MCNC: 185 MG/DL (ref 70–99)
GLUCOSE BLDC GLUCOMTR-MCNC: 228 MG/DL (ref 70–99)
GLUCOSE SERPL-MCNC: 118 MG/DL (ref 70–99)
POTASSIUM SERPL-SCNC: 3.5 MMOL/L (ref 3.4–5.3)
SODIUM SERPL-SCNC: 136 MMOL/L (ref 133–144)

## 2019-04-25 PROCEDURE — 25000132 ZZH RX MED GY IP 250 OP 250 PS 637: Performed by: PSYCHIATRY & NEUROLOGY

## 2019-04-25 PROCEDURE — 99232 SBSQ HOSP IP/OBS MODERATE 35: CPT | Performed by: PSYCHIATRY & NEUROLOGY

## 2019-04-25 PROCEDURE — 25800030 ZZH RX IP 258 OP 636: Performed by: INTERNAL MEDICINE

## 2019-04-25 PROCEDURE — 00000146 ZZHCL STATISTIC GLUCOSE BY METER IP

## 2019-04-25 PROCEDURE — 25000128 H RX IP 250 OP 636: Performed by: INTERNAL MEDICINE

## 2019-04-25 PROCEDURE — 80048 BASIC METABOLIC PNL TOTAL CA: CPT | Performed by: INTERNAL MEDICINE

## 2019-04-25 PROCEDURE — 12000000 ZZH R&B MED SURG/OB

## 2019-04-25 PROCEDURE — 36415 COLL VENOUS BLD VENIPUNCTURE: CPT | Performed by: INTERNAL MEDICINE

## 2019-04-25 PROCEDURE — 25000132 ZZH RX MED GY IP 250 OP 250 PS 637: Performed by: INTERNAL MEDICINE

## 2019-04-25 PROCEDURE — 99233 SBSQ HOSP IP/OBS HIGH 50: CPT | Performed by: INTERNAL MEDICINE

## 2019-04-25 RX ORDER — CARVEDILOL 25 MG/1
25 TABLET ORAL ONCE
Status: COMPLETED | OUTPATIENT
Start: 2019-04-25 | End: 2019-04-25

## 2019-04-25 RX ORDER — CARVEDILOL 25 MG/1
50 TABLET ORAL 2 TIMES DAILY WITH MEALS
Status: DISCONTINUED | OUTPATIENT
Start: 2019-04-25 | End: 2019-04-26 | Stop reason: HOSPADM

## 2019-04-25 RX ADMIN — Medication 2.5 MG: at 22:53

## 2019-04-25 RX ADMIN — TIZANIDINE 4 MG: 2 TABLET ORAL at 15:59

## 2019-04-25 RX ADMIN — LORAZEPAM 1 MG: 1 TABLET ORAL at 08:15

## 2019-04-25 RX ADMIN — Medication 100 MG: at 08:15

## 2019-04-25 RX ADMIN — HYDRALAZINE HYDROCHLORIDE 10 MG: 20 INJECTION INTRAMUSCULAR; INTRAVENOUS at 14:09

## 2019-04-25 RX ADMIN — FOLIC ACID 1 MG: 1 TABLET ORAL at 08:15

## 2019-04-25 RX ADMIN — CARVEDILOL 50 MG: 25 TABLET, FILM COATED ORAL at 18:47

## 2019-04-25 RX ADMIN — LORAZEPAM 1 MG: 1 TABLET ORAL at 01:52

## 2019-04-25 RX ADMIN — INSULIN ASPART 1 UNITS: 100 INJECTION, SOLUTION INTRAVENOUS; SUBCUTANEOUS at 21:28

## 2019-04-25 RX ADMIN — CARVEDILOL 25 MG: 25 TABLET, FILM COATED ORAL at 08:14

## 2019-04-25 RX ADMIN — FINASTERIDE 5 MG: 5 TABLET, FILM COATED ORAL at 08:14

## 2019-04-25 RX ADMIN — CARVEDILOL 25 MG: 25 TABLET, FILM COATED ORAL at 09:37

## 2019-04-25 RX ADMIN — ACETAMINOPHEN 650 MG: 325 TABLET, FILM COATED ORAL at 08:15

## 2019-04-25 RX ADMIN — MULTIPLE VITAMINS W/ MINERALS TAB 1 TABLET: TAB at 08:15

## 2019-04-25 RX ADMIN — MIRTAZAPINE 15 MG: 15 TABLET, FILM COATED ORAL at 21:29

## 2019-04-25 RX ADMIN — PANTOPRAZOLE SODIUM 40 MG: 40 TABLET, DELAYED RELEASE ORAL at 08:15

## 2019-04-25 RX ADMIN — SODIUM CHLORIDE: 9 INJECTION, SOLUTION INTRAVENOUS at 08:17

## 2019-04-25 ASSESSMENT — ACTIVITIES OF DAILY LIVING (ADL)
ADLS_ACUITY_SCORE: 23
ADLS_ACUITY_SCORE: 23
ADLS_ACUITY_SCORE: 22
ADLS_ACUITY_SCORE: 23

## 2019-04-25 NOTE — CONSULTS
[facility] Psychiatric Consult Progress Note      Interval History:   Pt seen, care reviewed with treatment team.  The preceding notes were reviewed and appreciated.  Patient reports that he remains very anxious but denies previous history of anxiety.  He states he is quite unsure of what the future holds for him and these reportedly is driving his anxiety.  He denies entertaining any self-harm thoughts plans or intent.  He reports future orientation.  He reports that he is unsure what he slept last night but thinks he did.  He appears quite anxious and confused suggesting active withdrawal.   Review of systems:   The Review of Systems is negative other than noted in the HPI     Medications:       carvedilol  50 mg Oral BID w/meals     finasteride  5 mg Oral Daily     folic acid  1 mg Oral Daily     insulin aspart  1-7 Units Subcutaneous TID AC     insulin aspart  1-5 Units Subcutaneous At Bedtime     LORazepam  2 mg Intravenous Once     mirtazapine  15 mg Oral At Bedtime     multivitamin w/minerals  1 tablet Oral Daily     pantoprazole  40 mg Oral Daily     vitamin B1  100 mg Oral Daily     acetaminophen, acetaminophen, sore throat lozenge, calcium carbonate, glucose **OR** dextrose **OR** glucagon, hydrALAZINE, hypromellose-dextran, labetalol, LORazepam **OR** LORazepam, magnesium sulfate, melatonin, naloxone, ondansetron **OR** ondansetron, potassium chloride, potassium chloride with lidocaine, potassium chloride, potassium chloride, potassium chloride, prochlorperazine **OR** prochlorperazine **OR** prochlorperazine, QUEtiapine, sodium phosphate, sodium phosphate, sodium phosphate, tiZANidine, zolpidem      Mental Status Examination:     Appearance:  awake, alert and appeared older than stated age  Eye Contact:  intense  Speech:  clear, coherent and rambling  Psychomotor Behavior:  no evidence of tardive dyskinesia, dystonia, or tics and fidgeting  Mood:  anxious  Affect:  intensity is heightened and  restricted range  Thought Process:  logical and linear no loose associations  Thought Content:  no evidence of suicidal ideation or homicidal ideation and no evidence of psychotic thought  Oriented to:  Person and place but not to situation  Attention Span and Concentration:  poor  Recent and Remote Memory:  poor  Fund of Knowledge: low-normal  Muscle Strength and Tone: normal  Gait and Station: Not assessed  Insight:  limited  Judgment:  poor        Labs/vitals:     Recent Results (from the past 24 hour(s))   Glucose by meter    Collection Time: 04/24/19  5:20 PM   Result Value Ref Range    Glucose 146 (H) 70 - 99 mg/dL   Glucose by meter    Collection Time: 04/24/19  9:03 PM   Result Value Ref Range    Glucose 247 (H) 70 - 99 mg/dL   Glucose by meter    Collection Time: 04/25/19  2:07 AM   Result Value Ref Range    Glucose 105 (H) 70 - 99 mg/dL   Basic metabolic panel    Collection Time: 04/25/19  7:44 AM   Result Value Ref Range    Sodium 136 133 - 144 mmol/L    Potassium 3.5 3.4 - 5.3 mmol/L    Chloride 102 94 - 109 mmol/L    Carbon Dioxide 27 20 - 32 mmol/L    Anion Gap 7 3 - 14 mmol/L    Glucose 118 (H) 70 - 99 mg/dL    Urea Nitrogen 8 7 - 30 mg/dL    Creatinine 1.10 0.66 - 1.25 mg/dL    GFR Estimate 69 >60 mL/min/[1.73_m2]    GFR Estimate If Black 81 >60 mL/min/[1.73_m2]    Calcium 8.4 (L) 8.5 - 10.1 mg/dL   Glucose by meter    Collection Time: 04/25/19 11:57 AM   Result Value Ref Range    Glucose 185 (H) 70 - 99 mg/dL     B/P: 163/77, T: 97.6, P: 82, R: 17    Impression:   Syd has a severe etoh use disorder, medical complications, underlying anxiety/sleep issues that may respond to remeron. He desires help with obtaining chemical health treatment but is currently in active withdrawal and struggling with his cognition.      DIagnoses:   1. Alcohol use disorder  2. Unspecified anxiety disorder  3. Major depressive disorder, recurrent, moderate  4. Essential hypertension  5. Type 2 diabetes with  neuropathy           Plan:   1. Written information given on medications. Side effects, risks, benefits reviewed.  2.  Maintain current medications without changes.  He will benefit from transitioning to CD treatment was medically stable.  3.  Continue medical management as you are.        Attestation:  Patient has been seen and evaluated by me,  Harrison Erickson MD

## 2019-04-25 NOTE — PLAN OF CARE
DATE & TIME: 4/25/2019 [id number]  ORIENTATION: disoriented to situation.   BEHAVIOR & AGGRESSION TOOL COLOR: calm, green   CIWA SCORE: 9/3/0, ativan 1 mg oral given once  ABNL VS/O2: HTN, PRN hydralazine IV given once for BP of 184/91, BP improved to 163/77  MOBILITY: Ax1 with GB/WK.   PAIN MANAGMENT: prn tylenol given once for HA, effective.   DIET: mod cho, good appetite.   BOWEL/BLADDER: INC of bladder.   ABNL LAB/BG: /181, covered with sliding scale insulin.   DRAIN/DEVICES: PIV on left arm. IVF discontinued.   TELEMETRY RHYTHM: NSR.   SKIN: intact, except for facial bruising and swelling from fall prior to admission.   TESTS/PROCEDURES: na   D/C DAY/GOALS/PLACE: pending, will go to TCU then possible [facility] inpatient treatment,  following. CD counselor here for Rule 25 meeting this am with wife present.  OTHER IMPORTANT INFO:

## 2019-04-25 NOTE — PLAN OF CARE
DATE & TIME: 4/25 0530  ORIENTATION: Disoriented to situation, time, forgetful  BEHAVIOR & AGGRESSION TOOL COLOR: Green  CIWA SCORE: 10, 1, 1 mg po ativan given  ABNL VS/O2: VSS on RA, BP elevated  MOBILITY: Up with 1A GB and walker, ambulated to bathroom/in room  PAIN MANAGMENT: denies  DIET: Mod carb  BOWEL/BLADDER: large incontinent urine in bed, linen change.  ABNL LAB/BG:   DRAIN/DEVICES: PIV IVF   TELEMETRY RHYTHM: NSR  SKIN: Bruising/swelling to bilat eyes, contusion and steri-strips to L eyebrow  TESTS/PROCEDURES: NA  D/C DAY/GOALS/PLACE: Pending,pt wants inpatient treatment  OTHER IMPORTANT INFO: CD consulted

## 2019-04-25 NOTE — PROGRESS NOTES
[facility]    Hospitalist Progress Note    Date of Service (when I saw the patient): 04/25/2019    Assessment & Plan   Syd Joyce is a 64 year old male with hx of DM2, HTN, HL, and BPH who presented on 4/22/2019 with confusion following a fall, and was admitted for dehydration with hyponatremia and FELIX. Hospital course has been complicated by acute alcohol withdrawal    Acute alcohol withdrawal  Alcohol dependence  Longstanding history of heavy alcohol use, drinking 5-6 beers daily. Last drink reportedly 2-3 days PTA. Interested in CD treatment, but CD consult deferred due to insurance. Patient will need to seek treatment from a Medicare-approved facility, but needs TCU first. Developed acute alcohol withdrawal on 4/24.  - On CIWA protocol with lorazepam. Seroquel 25-50 mg q6h prn for anxiety  - On thiamine, folate, MVI  - On K/Mg/Phos replacement protocol    Major recurrent depressive disorder with anxiety  Psychiatry was consulted on admission and started the patient on mirtazapine  - Continues on mirtazapine 15 mg qhs  - Psychiatry recommending Seroquel 25 mg TID prn for anxiety (but on increased dose while in withdrawal)   - Start Antabuse at discharge    Essential hypertension  [PTA: carvedilol 50 mg BID, losartan 100 mg daily]  - BPs uncontrolled, resume PTA carvedilol today, 4/26  - Holding losartan due to FELIX  - IV hydralazine PRN for SBP>180    DM2 with neuropathy  A1c 5.6. No longer meets diabetes criteria  Recent Labs   Lab 04/25/19  0744 04/25/19  0207 04/24/19  2103 04/24/19  1720 04/24/19  1213 04/24/19  0758 04/24/19  0742 04/24/19  0224  04/23/19  1002  04/22/19  2355   *  --   --   --   --   --  115*  --   --  226*  --  131*   BGM  --  105* 247* 146* 149* 107*  --  98   < >  --    < >  --     < > = values in this interval not displayed.     - Holding PTA metformin due to recent FELIX and normal blood sugars  - Medium SSI available    Elevated transaminases, Improved  ALT  156,  on admission. Tbili and INR normal. Abdominal US on admission showed hepatic steatosis with normal-appearing gallbladder and CBD. Likely due to heavy alcoholic use. LFTs trended downward with alcohol cessation.     Confusional episode, Resolved  Possibly due to alcohol intoxication +/- hyponatremia +/- post-concussive syndrome. Mental status rapidly improved upon admission.  - Now at baseline mental status    Unwitnessed fall with ecchymosis over left eye and frontal bone contusion  Orthostasis  Likely due to alcohol intoxication and severe dehydration with orthostasis. Orthostatics positive during admission and resolved with IV fluids. Patient was unable to recall events surrounding fall so syncope work-up was initiated on admission. TTE (4/23) showed LVEF 60-65% with no significant valvular abnormalities.   - PT recommending TCU    Hypovolemic hyponatremia and FELIX on CKD stage II, Resolved  Sodium on arrival 124, Creatinine 1.57 from baseline 1.1-1.2. Suspect dehydration due to heavy alcohol use and recent diarrheal illness. Resolved with IV fluids  - Na 132, Cr 1.14 today   - Continues on IV fluids  - Repeat BMP in AM    GERD  Chronic and stable on pantoprazole    BPH  Chronic and stable on finasteride    FEN: mod CHO diet  DVT Prophylaxis: Pneumatic Compression Devices  Code Status: Full Code    Disposition: Expected discharge to TCU once withdrawal resolves, likely over the weekend    [facility]    Interval History   No events overnight. Went into acute alcohol withdrawal yesterday evening and is now receiving lorazepam per [facility] protocol. Feels better today. Reports headache, but offers no other complaints  - Increase carvedilol to 50 mg BID  - d/c IV fluids  - d/c orthostatics    -Data reviewed today: I reviewed all new labs and imaging results over the last 24 hours. I personally reviewed no images or EKG's today.    Physical Exam   Temp: 97.6  F (36.4  C) Temp src: Oral BP: (!) 175/98  Pulse: 78 Heart Rate: 65 Resp: 16 SpO2: 96 % O2 Device: None (Room air)    Vitals:    04/22/19 2305   Weight: 90.7 kg (200 lb)     Vital Signs with Ranges  Temp:  [97.6  F (36.4  C)-98.6  F (37  C)] 97.6  F (36.4  C)  Pulse:  [60-78] 78  Heart Rate:  [56-80] 65  Resp:  [16] 16  BP: (143-194)/(77-99) 175/98  SpO2:  [96 %-100 %] 96 %  I/O last 3 completed shifts:  In: 1081 [P.O.:240; I.V.:841]  Out: 700 [Urine:700]    Constitutional: Appears comfortable, NAD  Respiratory: Breathing non-labored. Lungs CTAB - no wheezes, crackles, or rhonchi  Cardiovascular: Heart borderline tachycardic and regular. No pedal edema  GI: +BS, abd soft/NT  Skin/Integumen: Ecchymosis over left eye  Neuro: Alert and appropriate, SINGLETON  Psych: Calm and cooperative    Medications     sodium chloride 100 mL/hr at 04/25/19 0817       carvedilol  50 mg Oral BID w/meals     finasteride  5 mg Oral Daily     folic acid  1 mg Oral Daily     insulin aspart  1-7 Units Subcutaneous TID AC     insulin aspart  1-5 Units Subcutaneous At Bedtime     LORazepam  2 mg Intravenous Once     mirtazapine  15 mg Oral At Bedtime     multivitamin w/minerals  1 tablet Oral Daily     pantoprazole  40 mg Oral Daily     vitamin B1  100 mg Oral Daily     Data   Recent Labs   Lab 04/25/19  0744 04/24/19  0742 04/23/19  1002  04/22/19  2355   WBC  --  4.8  --   --  6.1   HGB  --  11.0*  --   --  12.0*   MCV  --  92  --   --  90   PLT  --  127*  --   --  136*   INR  --   --   --   --  0.99    132* 128*   < > 124*   POTASSIUM 3.5 3.7 4.2  --  4.1   CHLORIDE 102 99 93*  --  87*   CO2 27 27 27  --  29   BUN 8 11 12  --  11   CR 1.10 1.14 1.47*  --  1.57*   ANIONGAP 7 6 8  --  8   HEAVENLY 8.4* 8.1* 8.6  --  9.1   * 115* 226*  --  131*   ALBUMIN  --  3.2*  --   --  3.7   PROTTOTAL  --  6.0*  --   --  6.9   BILITOTAL  --  0.6  --   --  0.7   ALKPHOS  --  113  --   --  135   ALT  --  128*  --   --  156*   AST  --  85*  --   --  103*    < > = values in this interval not  displayed.       No results found for this or any previous visit (from the past 24 hour(s)).

## 2019-04-25 NOTE — PLAN OF CARE
"Neuro exam intact, A&Ox4, CIWA 4 this AM, discontinued by MD.  Called to room by KIMMY this afternoon, patient very anxious, crying, stating, \"I don't know how I'm going to get through the next 5 minutes.\" Startles when writer sits next to him.  CIWA 20 at that time, Ativan 1mg IV administered, decreased to 8, Ativan 1mg po administered, decreased to 6.  Sodium level improving at 132.  Orthostatic with PT this AM, Coreg decreased to 25mg.  Plan for discharge to TCU once CIWA and Na stable.  "

## 2019-04-25 NOTE — PLAN OF CARE
PT: Session attempted. BP elevated and outside of parameters; see VSFS; RN notified. Holding PT at this time.

## 2019-04-25 NOTE — PLAN OF CARE
Transfer    S- Transfer to [facility]- from 71.    B- DM2, GERD, HTN, ETOH use 6-7 beers/day    A- Brief systems assessment: Brusing/swelling to bilat eyes, contusion to L eye with steri-strips, CIWA 6, , Up with 1A Gb and walker.    R- Transfer to 64 per physician orders. Continue to monitor pt and update physician as needed.     Code status: Full Code  Skin: Brusing/swelling, contusion to L eye with steri-strips  Fall Risk: Yes- Department fall risk interventions implemented.  Isolation: None  Patient belongings: glasses at bedside, clothes/shoes in closet  Medication drips upon transfer: IVF    DATE & TIME: 4/24/19 evening  ORIENTATION: A/Ox4  BEHAVIOR & AGGRESSION TOOL COLOR: Green  CIWA SCORE: 3/0(sleeping), was 20-8-6 on prior floor, received 1mg IV ativan and then 1mg PO ativan. Pt verbalizes mild anxiety (improved from prior to medication)  ABNL VS/O2: VSS on RA, BP improved 150s/70s. OBP daily, was positive AM 4/24.   MOBILITY: Up with 1A GB and walker, ambulated to bathroom/in room  PAIN MANAGMENT: denies  DIET: Mod carb  BOWEL/BLADDER: Urinal/bathroom, last BM yesterday per pt  ABNL LAB/BG: /247 covered appropriately, , AST 85, Na 132  DRAIN/DEVICES: PIV IVF   TELEMETRY RHYTHM: NSR  SKIN: Bruising/swelling to bilat eyes, contusion and steri-strips to L eyebrow  TESTS/PROCEDURES: NA  D/C DAY/GOALS/PLACE: Pending, PT recommending TCU  OTHER IMPORTANT INFO: CD assessment at 10:00am on 4/25, SW involved and has contacted [facility] CD program who will assess intake once CD assessment is complete. Psych following, will start on antabuse at discharge and started on Remeron this evening.

## 2019-04-25 NOTE — PROGRESS NOTES
SW:  D:  SUDS planning  Patient and wife met with [facility] .  [facility] can make a referral to [facility].  Pt/wife also considering [facility] and [facility].  Will contact wife to determine if she needs writer to assist with referrals to these programs.    TCU planing:  Dr Wiley anticipates discharge on 4/27.  Patient has been declined by [facility] and [facility].  Will follow up on Friday with more referrals.

## 2019-04-26 ENCOUNTER — APPOINTMENT (OUTPATIENT)
Dept: PHYSICAL THERAPY | Facility: CLINIC | Age: 64
DRG: 897 | End: 2019-04-26
Payer: COMMERCIAL

## 2019-04-26 VITALS
BODY MASS INDEX: 29.62 KG/M2 | DIASTOLIC BLOOD PRESSURE: 92 MMHG | HEART RATE: 73 BPM | SYSTOLIC BLOOD PRESSURE: 150 MMHG | WEIGHT: 200 LBS | HEIGHT: 69 IN | OXYGEN SATURATION: 97 % | TEMPERATURE: 97.2 F | RESPIRATION RATE: 18 BRPM

## 2019-04-26 LAB
GLUCOSE BLDC GLUCOMTR-MCNC: 102 MG/DL (ref 70–99)
GLUCOSE BLDC GLUCOMTR-MCNC: 126 MG/DL (ref 70–99)

## 2019-04-26 PROCEDURE — 97530 THERAPEUTIC ACTIVITIES: CPT | Mod: GP

## 2019-04-26 PROCEDURE — 99239 HOSP IP/OBS DSCHRG MGMT >30: CPT | Performed by: INTERNAL MEDICINE

## 2019-04-26 PROCEDURE — 00000146 ZZHCL STATISTIC GLUCOSE BY METER IP

## 2019-04-26 PROCEDURE — 25000132 ZZH RX MED GY IP 250 OP 250 PS 637: Performed by: INTERNAL MEDICINE

## 2019-04-26 PROCEDURE — 97116 GAIT TRAINING THERAPY: CPT | Mod: GP

## 2019-04-26 RX ORDER — QUETIAPINE FUMARATE 25 MG/1
25 TABLET, FILM COATED ORAL 3 TIMES DAILY PRN
Status: DISCONTINUED | OUTPATIENT
Start: 2019-04-26 | End: 2019-04-26 | Stop reason: HOSPADM

## 2019-04-26 RX ORDER — DISULFIRAM 250 MG/1
250 TABLET ORAL DAILY
DISCHARGE
Start: 2019-04-26 | End: 2019-07-03

## 2019-04-26 RX ORDER — MIRTAZAPINE 15 MG/1
15 TABLET, FILM COATED ORAL AT BEDTIME
DISCHARGE
Start: 2019-04-26 | End: 2019-07-03

## 2019-04-26 RX ORDER — QUETIAPINE FUMARATE 25 MG/1
25 TABLET, FILM COATED ORAL 3 TIMES DAILY PRN
DISCHARGE
Start: 2019-04-26 | End: 2019-07-03

## 2019-04-26 RX ADMIN — Medication 100 MG: at 09:25

## 2019-04-26 RX ADMIN — PANTOPRAZOLE SODIUM 40 MG: 40 TABLET, DELAYED RELEASE ORAL at 09:25

## 2019-04-26 RX ADMIN — TIZANIDINE 4 MG: 2 TABLET ORAL at 12:40

## 2019-04-26 RX ADMIN — ACETAMINOPHEN 650 MG: 325 TABLET, FILM COATED ORAL at 09:25

## 2019-04-26 RX ADMIN — MULTIPLE VITAMINS W/ MINERALS TAB 1 TABLET: TAB at 09:25

## 2019-04-26 RX ADMIN — FOLIC ACID 1 MG: 1 TABLET ORAL at 09:25

## 2019-04-26 RX ADMIN — CARVEDILOL 50 MG: 25 TABLET, FILM COATED ORAL at 09:25

## 2019-04-26 RX ADMIN — FINASTERIDE 5 MG: 5 TABLET, FILM COATED ORAL at 09:25

## 2019-04-26 ASSESSMENT — ACTIVITIES OF DAILY LIVING (ADL)
ADLS_ACUITY_SCORE: 23
ADLS_ACUITY_SCORE: 22
ADLS_ACUITY_SCORE: 22
ADLS_ACUITY_SCORE: 23
ADLS_ACUITY_SCORE: 23

## 2019-04-26 NOTE — PROGRESS NOTES
SW:  TCU placement:  Spoke with patient's wife regarding availability of the three TCU's she chose for patient.  Of the three [facility] has accepted patient and received authorization from [facility].    Informed wife that as of yesterday, Dr Wiley was anticipating a Saturday discharge.  Plan  Will finalize plan with wife/patient and [facility] TCU once discharge date is set.    SUDS planning.  Wife explains she does not want patient to enter [facility]'s program.  She explains the [facility] who completed the assessment with patient is making calls to [facility] and [facility] to determine private pay cost.  The current plan is that patient will enter a residential program upon discharge from the TCU.

## 2019-04-26 NOTE — PLAN OF CARE
DATE & TIME: 4/25/2019 evening  ORIENTATION: A/O but confused about alcohol withdrawal, anxious about situation needs a lot of reassurance, tearful at times about situation  BEHAVIOR & AGGRESSION TOOL COLOR: calm, green  CIWA SCORE: 2/2 for anxiety  ABNL VS/O2:  VSS on RA  MOBILITY: Ax1 with GB/WK.   PAIN MANAGMENT: Xanaflex x1 low back   DIET: mod cho, good appetite.   BOWEL/BLADDER: INC of bladder at times  ABNL LAB/BG: /228, covered with sliding scale insulin.   DRAIN/DEVICES: PIV on left arm, SL  TELEMETRY RHYTHM: NSR.   SKIN: intact, except for facial bruising and swelling from fall prior to admission, brusing  TESTS/PROCEDURES: na   D/C DAY/GOALS/PLACE: pending, will go to TCU then possible [facility] inpatient treatment,  following. CD counselor here for Rule 25 meeting this am with wife present.  OTHER IMPORTANT INFO: ambien for sleep at 2253 as he was having trouble sleeping with all the noise in the room

## 2019-04-26 NOTE — PROGRESS NOTES
SW:  D:  Patient is discharging today to [facility] and [facility].  Wife will transport.  Wife has address of facility.  Orders have been faxed via DOD.  Awaiting to hear from the TCU regarding the time they will be prepared to accept patient.     1300 Update:  Wife will be here at 1430 to review AVS and then transport patient at 1500.  Orders were re-faxed to requested number of [phone].    PA approved.  Effective date: 4/26/19  PA reference #: [id number]  Pt. notified: patient and spouse

## 2019-04-26 NOTE — DISCHARGE SUMMARY
[facility]    Discharge Summary  Hospitalist    Date of Admission:  4/22/2019  Date of Discharge:  4/26/2019  Discharging Provider: Kika Wiley  Date of Service (when I saw the patient): 04/26/19    Discharge Diagnoses     1. Acute alcohol withdrawal   2. Alcohol dependence  3. Major recurrent depressive disorder with anxiety  4. Insomnia  5. Essential hypertension  6. DM2 with neuropathy  7. Elevated transaminases  8. Confusional episode  9. Unwitnessed fall with ecchymosis over left eye and frontal bone contusion  10. Orthostasis  11. Hypovolemic hyponatremia  12. FELIX on CKD stage II  13. GERD  14. BPH    History of Present Illness   Please see H&P by Dr. Pritchett on 4/23/2019    Hospital Course   Syd Joyce is a 64 year old male with hx of DM2, HTN, HL, and BPH who presented on 4/22/2019 with confusion following a fall, and was admitted for dehydration with hyponatremia and FELIX. Hospital course has been complicated by acute alcohol withdrawal, which has been treated. He will be discharged to TCU to get stronger before he pursues residential CD treatment.  The following problems were addressed during his hospitalization:    Acute alcohol withdrawal, Improving to resolved  Alcohol dependence  Longstanding history of heavy alcohol use, drinking 5-6 beers daily. Last drink reportedly 2-3 days PTA. Interested in CD treatment, but CD consult deferred due to insurance. Developed acute alcohol withdrawal on 4/24. He was treated with lorazepam per [facility] protocol with improvement. He was treated with thiamine, folate, and a multivitamin, and electrolytes were replaced per protocol.  - Patient will seek treatment from a Medicare-approved facility following TCU stay.    Major recurrent depressive disorder with anxiety  Psychiatry was consulted on admission and started the patient on mirtazapine and Seroquel  - He will be discharged on mirtazapine 15 mg qhs, Seroquel 25 mg TID prn for anxiety   -  He will start disulfiram at discharge     Insomnia  PTA Ambien has been discontinued since starting multiple medications for depression/anxiety  - He will be discharged on melatonin  3 mg PRN     Essential hypertension  [PTA: carvedilol 50 mg BID, losartan 100 mg daily]  - Continues on PTA meds at discharge     DM2 with neuropathy  A1c 5.6. No longer meets diabetes criteria. PTA metformin was initially held due to FELIX. This will be resumed at discharge     Elevated transaminases, Improved  ,  on admission. Tbili and INR normal. Abdominal US on admission showed hepatic steatosis with normal-appearing gallbladder and CBD. Likely due to heavy alcoholic use. LFTs trended downward with alcohol cessation.      Confusional episode, Resolved  Possibly due to alcohol intoxication +/- hyponatremia +/- post-concussive syndrome. Mental status rapidly improved upon admission.  - Now at baseline mental status     Unwitnessed fall with ecchymosis over left eye and frontal bone contusion  Orthostasis  Likely due to alcohol intoxication and severe dehydration with orthostasis. Orthostatics positive during admission and resolved with IV fluids. Patient was unable to recall events surrounding fall so syncope work-up was initiated on admission. TTE (4/23) showed LVEF 60-65% with no significant valvular abnormalities.   - PT recommending TCU     Hypovolemic hyponatremia and FELIX on CKD stage II, Resolved  Sodium on arrival 124, Creatinine 1.57 from baseline 1.1-1.2. Suspect dehydration due to heavy alcohol use and recent diarrheal illness. Resolved with IV fluids     GERD  Chronic and stable on pantoprazole     BPH  Chronic and stable on finasteride    Kika Wiley    Significant Results and Procedures   As noted above    Pending Results   None    Code Status   Full Code       Primary Care Physician   Shan Arenas    Physical Exam   Temp: 97.2  F (36.2  C) Temp src: Oral BP: (!) 150/92 Pulse: 73 Heart Rate: 80  Resp: 18 SpO2: 97 % O2 Device: None (Room air)    Vitals:    04/22/19 2305   Weight: 90.7 kg (200 lb)     Vital Signs with Ranges  Temp:  [97.2  F (36.2  C)-99.6  F (37.6  C)] 97.2  F (36.2  C)  Pulse:  [66-82] 73  Heart Rate:  [79-89] 80  Resp:  [17-18] 18  BP: (144-184)/(74-92) 150/92  SpO2:  [96 %-98 %] 97 %  I/O last 3 completed shifts:  In: 2643 [P.O.:1240; I.V.:1403]  Out: -     Constitutional: Appears comfortable, NAD  HEENT: Healing ecchymoses over eyes  Respiratory: Breathing non-labored. Lungs CTAB - no wheezes/crackles/rhonchi  Cardiovascular: Heart RRR, no m/r/g. No pedal edema.   GI: +BS. Abd soft/NT  Musculoskeletal: Normal muscle bulk and tone  Neurologic: Alert and appropriate. SINGLETON  Psychiatric: Calm and cooperative    Discharge Disposition   Discharged to short-term care facility  Condition at discharge: Satisfactory    Consultations This Hospital Stay   1. CHEMICAL DEPENDENCY IP CONSULT  2. PSYCHIATRY IP CONSULT  3. PHYSICAL THERAPY ADULT IP CONSULT  4. OCCUPATIONAL THERAPY ADULT IP CONSULT    Time Spent on this Encounter   IKika, personally saw the patient today and spent 40 minutes discharging this patient.    Discharge Orders      General info for SNF    Length of Stay Estimate: Short Term Care: Estimated # of Days <30  Condition at Discharge: Improving  Level of care:skilled   Rehabilitation Potential: Excellent  Admission H&P remains valid and up-to-date: Yes  Recent Chemotherapy: N/A  Use Nursing Home Standing Orders: Yes     Mantoux instructions    Give two-step Mantoux (PPD) Per Facility Policy Yes     Reason for your hospital stay    Fall due to severe dehydration from chronic alcohol use. You were also treated for alcohol withdrawal during your hospitalization     Glucose monitor nursing POCT    Before meals and at bedtime     Follow Up and recommended labs and tests    Follow up with transitional care physician.  The following labs/tests are recommended: BMP within 1  week.     Activity - Up with nursing assistance     Physical Therapy Adult Consult    Evaluate and treat as clinically indicated.    Reason:  Generalized weakness, deconditioning     Occupational Therapy Adult Consult    Evaluate and treat as clinically indicated.    Reason: Generalized weakness, deconditioning     Fall precautions     Advance Diet as Tolerated    Follow this diet upon discharge:  Moderate Consistent CHO Diet     Discharge Medications   Current Discharge Medication List      START taking these medications    Details   disulfiram (ANTABUSE) 250 MG tablet Take 1 tablet (250 mg) by mouth daily    Associated Diagnoses: Alcohol dependence with other alcohol-induced disorder (H)      melatonin 1 MG TABS tablet Take 3 tablets (3 mg) by mouth nightly as needed for sleep    Associated Diagnoses: Insomnia, unspecified type      mirtazapine (REMERON) 15 MG tablet Take 1 tablet (15 mg) by mouth At Bedtime    Associated Diagnoses: Moderate episode of recurrent major depressive disorder (H)      QUEtiapine (SEROQUEL) 25 MG tablet Take 1 tablet (25 mg) by mouth 3 times daily as needed (anxiety)    Associated Diagnoses: Moderate episode of recurrent major depressive disorder (H)         CONTINUE these medications which have NOT CHANGED    Details   Calcium Carbonate Antacid (TUMS PO) Take 2 chew tab by mouth 2 times daily as needed (GI upset)       carvedilol (COREG) 25 MG tablet Take 2 tablets (50 mg) by mouth 2 times daily Hold IF heart rate less than 55.  Qty: 360 tablet, Refills: 4      losartan (COZAAR) 100 MG tablet TAKE 1 TABLET BY MOUTH EVERY DAY  Qty: 90 tablet, Refills: 0    Associated Diagnoses: Essential hypertension with goal blood pressure less than 130/80      metFORMIN (GLUCOPHAGE-XR) 500 MG 24 hr tablet TAKE 2 TABLETS BY MOUTH EVERY MORNING  Qty: 180 tablet, Refills: 3    Associated Diagnoses: Type 2 diabetes mellitus with diabetic autonomic neuropathy, without long-term current use of insulin (H)       pantoprazole (PROTONIX) 40 MG EC tablet TAKE 1 TABLET(40 MG) BY MOUTH DAILY  Qty: 90 tablet, Refills: 0    Associated Diagnoses: Encounter for medication refill      tiZANidine (ZANAFLEX) 4 MG tablet Take 1 tablet (4 mg) by mouth daily  Qty: 90 tablet, Refills: 1    Comments: Pharmacy-To replace the Cyclobenzaprine not covered by insurance.  Associated Diagnoses: S/P lumbar laminectomy      blood glucose monitoring (ACCU-CHEK MED PLUS) meter device kit Use to test blood sugar 4 to 5 times weekly or as directed.  Qty: 1 kit, Refills: 0    Associated Diagnoses: Refill clinic medication management patient      blood glucose monitoring (ACCU-CHEK MED PLUS) test strip USE 4 TO 5 TIMES PER WEEK OR AS DIRECTED  Qty: 150 each, Refills: 3    Associated Diagnoses: Refill clinic medication management patient      finasteride (PROSCAR) 5 MG tablet Take 1 tablet (5 mg) by mouth daily  Qty: 90 tablet, Refills: 3    Associated Diagnoses: Benign prostatic hyperplasia with lower urinary tract symptoms, symptom details unspecified      order for DME Equipment being ordered: compression stockings medium  Same size as previous  Qty: 1 Product, Refills: 1    Associated Diagnoses: Peripheral edema         STOP taking these medications       zolpidem (AMBIEN) 10 MG tablet Comments:   Reason for Stopping:             Allergies   No Known Allergies  Data   Most Recent 3 CBC's:  Recent Labs   Lab Test 04/24/19  0742 04/22/19  2355 04/16/19  1309   WBC 4.8 6.1 6.6   HGB 11.0* 12.0* 11.9*   MCV 92 90 91.7   * 136* 159      Most Recent 3 BMP's:  Recent Labs   Lab Test 04/25/19  0744 04/24/19  0742 04/23/19  1002    132* 128*   POTASSIUM 3.5 3.7 4.2   CHLORIDE 102 99 93*   CO2 27 27 27   BUN 8 11 12   CR 1.10 1.14 1.47*   ANIONGAP 7 6 8   HEAVENLY 8.4* 8.1* 8.6   * 115* 226*     Most Recent 2 LFT's:  Recent Labs   Lab Test 04/24/19  0742 04/22/19  2355   AST 85* 103*   * 156*   ALKPHOS 113 135   BILITOTAL 0.6 0.7      Most Recent INR's and Anticoagulation Dosing History:  Anticoagulation Dose History     Recent Dosing and Labs Latest Ref Rng & Units 2/21/2018 4/22/2019    INR 0.86 - 1.14 0.97 0.99        Most Recent 3 Troponin's:  Recent Labs   Lab Test 03/27/15  1950   TROPI <0.015  The 99th percentile for upper reference range is 0.045 ug/L.  Troponin values in   the range of 0.045 - 0.120 ug/L may be associated with risks of adverse   clinical events.   Effective 7/30/2014, the reference range for this assay has changed to reflect   new instrumentation/methodology.       Most Recent Cholesterol Panel:  Recent Labs   Lab Test 01/30/19  0920   CHOL 141   LDL 43   HDL 78   TRIG 98     Most Recent 6 Bacteria Isolates From Any Culture (See EPIC Reports for Culture Details):No lab results found.  Most Recent TSH, T4 and A1c Labs:  Recent Labs   Lab Test 01/30/19  0910  01/22/18  1122   T4  --   --  1.29   A1C 5.6   < >  --     < > = values in this interval not displayed.     Results for orders placed or performed during the hospital encounter of 04/22/19   CT Head w/o Contrast    Narrative    CT HEAD WITHOUT CONTRAST  4/23/2019 12:28 AM     HISTORY: Head trauma.    COMPARISON: None.    TECHNIQUE: Without intravenous contrast, helical sections were  acquired through the brain. Coronal reconstructions were generated.  Radiation dose for this scan was reduced using automated exposure  control, adjustment of the mA and/or kV according to the patient's  size, or iterative reconstruction technique.    FINDINGS: Moderate diffuse cerebral atrophy with ex vacuo prominence  of the cerebral ventricles. Mild periventricular white matter low  attenuation, likely relating to chronic small vessel ischemic disease.  No intra-axial mass, mass effect or midline shift. Normal gray-white  matter differentiation. No visualized acute intra-axial hemorrhage.  The basal cisterns are patent. No extra-axial fluid collection.  Moderate-sized contusion and  small hematoma in the scalp overlying the  frontal bone. The visualized portions of the paranasal sinuses and  mastoid air cells are unremarkable.      Impression    IMPRESSION:  1. No evidence of acute intracranial trauma.  2. Moderate-sized contusion and small hematoma in the scalp overlying  the frontal bone.    JULIO CESAR RICKS MD   CT Facial Bones without Contrast    Narrative    CT MAXILLOFACIAL WITHOUT CONTRAST  4/23/2019 12:28 AM     HISTORY: Facial trauma.    COMPARISON: None.    TECHNIQUE: Without intravenous contrast, helical sections were  acquired through the facial bones including the mandible. Coronal and  sagittal reconstructions were generated. Radiation dose for this scan  was reduced using automated exposure control, adjustment of the mA  and/or kV according to the patient's size, or iterative reconstruction  technique.      Impression    IMPRESSION:  1. No visualized acute fracture of the facial bones.  2. No fluid within the paranasal sinuses or mastoid air cells. Mild  mucosal thickening within bilateral maxillary sinuses.  3. Moderate-sized contusion and small hematoma in the scalp overlying  the frontal bone. There are also contusions of the nasal soft tissues  and soft tissues of the midline lower face.  4. Several very tiny radiodensities in the skin of the anterior aspect  of the face, suspicious for tiny foreign bodies related to the trauma.    JULIO CESAR RICKS MD   US Abdomen Limited    Narrative    ULTRASOUND ABDOMEN LIMITED   4/23/2019 8:34 AM     HISTORY: Elevated LFTs, alcoholism. Evaluate liver parenchyma.    COMPARISON: None.    FINDINGS: There is diffuse increased echogenicity throughout the  liver, suggesting hepatic steatosis. The gallbladder is unremarkable,  without evidence for stones or sludge. No intra- or extrahepatic bile  duct dilatation. Common hepatic duct is normal in diameter. Limited  evaluation of the right kidney is unremarkable. Pancreas is partially  obscured by  overlying bowel gas, but appears normal where seen. The  abdominal aorta and IVC are of normal caliber where visualized.      Impression    IMPRESSION: Hepatic steatosis.    NAHUN BHATIA DO

## 2019-04-26 NOTE — PROGRESS NOTES
[facility]    Hospitalist Progress Note    Date of Service (when I saw the patient): 04/26/2019    Assessment & Plan   Syd Joyce is a 64 year old male with hx of DM2, HTN, HL, and BPH who presented on 4/22/2019 with confusion following a fall, and was admitted for dehydration with hyponatremia and FELIX. Hospital course has been complicated by acute alcohol withdrawal    Acute alcohol withdrawal, Improving to resolved  Alcohol dependence  Longstanding history of heavy alcohol use, drinking 5-6 beers daily. Last drink reportedly 2-3 days PTA. Interested in CD treatment, but CD consult deferred due to insurance. Patient will seek treatment from a Medicare-approved facility following TCU stay. Developed acute alcohol withdrawal on 4/24. He was treated with lorazepam per [facility] protocol with improvement.  - Has not received lorazepam for ~24 hours now  - d/c [facility] protocol  - On thiamine, folate, MVI  - On K/Mg/Phos replacement protocol    Major recurrent depressive disorder with anxiety  Psychiatry was consulted on admission and started the patient on mirtazapine and Seroquel  - Continues on mirtazapine 15 mg qhs, Seroquel 25 mg TID prn for anxiety   - He will start disulfiram at discharge    Insomnia  PTA Ambien has been discontinued since starting multiple medications for depression/anxiety  - Melatonin PRN    Essential hypertension  [PTA: carvedilol 50 mg BID, losartan 100 mg daily]  - Continues on PTA carvedilol  - Holding losartan due to recent FELIX. Resume at discharge  - IV hydralazine PRN for SBP>180    DM2 with neuropathy  A1c 5.6. No longer meets diabetes criteria  Recent Labs   Lab 04/26/19  0901 04/26/19  0214 04/25/19  2109 04/25/19  1758 04/25/19  1157 04/25/19  0744 04/25/19  0207  04/24/19  0742  04/23/19  1002  04/22/19  2355   GLC  --   --   --   --   --  118*  --   --  115*  --  226*  --  131*   * 102* 228* 150* 185*  --  105*   < >  --    < >  --    < >  --     < > = values  in this interval not displayed.     - Holding PTA metformin due to recent FELIX. Resume at discharge  - Medium SSI available    Elevated transaminases, Improved  ,  on admission. Tbili and INR normal. Abdominal US on admission showed hepatic steatosis with normal-appearing gallbladder and CBD. Likely due to heavy alcoholic use. LFTs trended downward with alcohol cessation.     Confusional episode, Resolved  Possibly due to alcohol intoxication +/- hyponatremia +/- post-concussive syndrome. Mental status rapidly improved upon admission.  - Now at baseline mental status    Unwitnessed fall with ecchymosis over left eye and frontal bone contusion  Orthostasis  Likely due to alcohol intoxication and severe dehydration with orthostasis. Orthostatics positive during admission and resolved with IV fluids. Patient was unable to recall events surrounding fall so syncope work-up was initiated on admission. TTE (4/23) showed LVEF 60-65% with no significant valvular abnormalities.   - PT recommending TCU    Hypovolemic hyponatremia and FELIX on CKD stage II, Resolved  Sodium on arrival 124, Creatinine 1.57 from baseline 1.1-1.2. Suspect dehydration due to heavy alcohol use and recent diarrheal illness. Resolved with IV fluids    GERD  Chronic and stable on pantoprazole    BPH  Chronic and stable on finasteride    FEN: mod CHO diet  DVT Prophylaxis: Pneumatic Compression Devices  Code Status: Full Code    Disposition: Expected discharge to TCU once bed available    Kika Wiley    Interval History   No events overnight. Feels better today. Denies cp/sob, nausea. Awaiting TCU bed. Placement limited by use of Ambien in addition to other psych meds.  - d/c Ambien. Use melatonin prn for sleep  - TCU discharge orders completed    -Data reviewed today: I reviewed all new labs and imaging results over the last 24 hours. I personally reviewed no images or EKG's today.    Physical Exam   Temp: 97.2  F (36.2  C) Temp src:  Oral BP: (!) 150/92 Pulse: 73 Heart Rate: 80 Resp: 18 SpO2: 96 % O2 Device: None (Room air)    Vitals:    04/22/19 2305   Weight: 90.7 kg (200 lb)     Vital Signs with Ranges  Temp:  [97.2  F (36.2  C)-99.6  F (37.6  C)] 97.2  F (36.2  C)  Pulse:  [66-82] 73  Heart Rate:  [79-89] 80  Resp:  [17-18] 18  BP: (144-184)/(74-92) 150/92  SpO2:  [96 %-98 %] 96 %  I/O last 3 completed shifts:  In: 2643 [P.O.:1240; I.V.:1403]  Out: -     Constitutional: Appears comfortable, NAD  Respiratory: Breathing non-labored. Lungs CTAB - no wheezes, crackles, or rhonchi  Cardiovascular: Heart borderline tachycardic and regular. No pedal edema  GI: +BS, abd soft/NT  Skin/Integumen: Ecchymosis over left eye  Neuro: Alert and appropriate, SINGLETON  Psych: Calm and cooperative    Medications       carvedilol  50 mg Oral BID w/meals     finasteride  5 mg Oral Daily     folic acid  1 mg Oral Daily     insulin aspart  1-7 Units Subcutaneous TID AC     insulin aspart  1-5 Units Subcutaneous At Bedtime     LORazepam  2 mg Intravenous Once     mirtazapine  15 mg Oral At Bedtime     multivitamin w/minerals  1 tablet Oral Daily     pantoprazole  40 mg Oral Daily     vitamin B1  100 mg Oral Daily     Data   Recent Labs   Lab 04/25/19  0744 04/24/19  0742 04/23/19  1002  04/22/19  2355   WBC  --  4.8  --   --  6.1   HGB  --  11.0*  --   --  12.0*   MCV  --  92  --   --  90   PLT  --  127*  --   --  136*   INR  --   --   --   --  0.99    132* 128*   < > 124*   POTASSIUM 3.5 3.7 4.2  --  4.1   CHLORIDE 102 99 93*  --  87*   CO2 27 27 27  --  29   BUN 8 11 12  --  11   CR 1.10 1.14 1.47*  --  1.57*   ANIONGAP 7 6 8  --  8   HEAVENLY 8.4* 8.1* 8.6  --  9.1   * 115* 226*  --  131*   ALBUMIN  --  3.2*  --   --  3.7   PROTTOTAL  --  6.0*  --   --  6.9   BILITOTAL  --  0.6  --   --  0.7   ALKPHOS  --  113  --   --  135   ALT  --  128*  --   --  156*   AST  --  85*  --   --  103*    < > = values in this interval not displayed.       No results found for  this or any previous visit (from the past 24 hour(s)).

## 2019-04-26 NOTE — PLAN OF CARE
Discharge Planner PT   Patient plan for discharge: none stated, per notes pt hopeful for rehab  Current status: Pt BP within parameters this AM. Supine to sit mod IND with railing and elevated HOB. Sit to stand with CGA to close SBA from EOB, chair and toilet with cues for hand placement-good carry through with reps. IND with pericares in sitting. Cues for safety with turning in small space of bathroom requires CGA to close SBA with FWW. Amb 200ft with CGA initially, able to progress with close SBA. Trialed amb without assistive device and demos decreased step length, hands in high guard position-requires CGA. Stair negotiation with cues for bilateral hand hold on right railing-min A up/down 12 steps with step to pattern. Pt encouraged to amb with nursing 3-4x/day in the halls with FWW.  Barriers to return to prior living situation: A x 1, impaired activity tolerance, fall risk, 12 steps to bedroom   Recommendations for discharge:TCU   Rationale for recommendations: Pt does not appear safe to disch home at this time, states he will be home alone during the day, states he stopped going to OP PT stating he was too weak to get there. Pt currently requires Ax1 for all mobility and appears to be at increased risk for falls; would benefit from daily therapies at TCU to progress safety and IND with mobility prior to returning home.            Entered by: Yoon Breen 04/26/2019 9:26 AM

## 2019-04-26 NOTE — PROGRESS NOTES
Discharge    Patient discharged to SNF via Own transportation  with wife  Care plan note: vss, alert x4, CIWA 0/0. Tele NSR. SBA x1 with WK/GB. Pt discharged to TCU with wife in stable condition at 1510. Discharge info given to wife and pt. Both verbalized understanding.    Listed belongings gathered and returned to patient. Yes  Care Plan and Patient education resolved: Yes  Prescriptions if needed, hard copies sent with patient  NA  Home and hospital acquired medications returned to patient: NA  Medication Bin checked and emptied on discharge Yes  Follow up appointment made for patient: No

## 2019-04-26 NOTE — PLAN OF CARE
DATE & TIME: 4/26, 0530  ORIENTATION: Disoriented to time/situation.   BEHAVIOR & AGGRESSION TOOL COLOR: green  CIWA SCORE: 6,2  ABNL VS/O2:  VSS on RA  MOBILITY: Ax1 with GB/WK.   PAIN MANAGMENT: no c/o pain   DIET: mod carb  BOWEL/BLADDER: Inc of bladder at times  ABNL LAB/BG:    DRAIN/DEVICES: PIV on left arm, SL  TELEMETRY RHYTHM: NSR.   SKIN: intact, except for facial bruising and swelling from fall prior to admission, brusing  TESTS/PROCEDURES: na   D/C DAY/GOALS/PLACE: pending, tcu v inpatient treatment  OTHER IMPORTANT INFO:

## 2019-04-27 NOTE — PLAN OF CARE
Physical Therapy Discharge Summary    Reason for therapy discharge:    Discharged to transitional care facility.    Progress towards therapy goal(s). See goals on Care Plan in [facility] electronic health record for goal details.  Goals not met.  Barriers to achieving goals:   discharge from facility.    Therapy recommendation(s):    Continued therapy is recommended.  Rationale/Recommendations:  to progress his safety and IND with mobility prior to returning home. .

## 2019-05-03 ENCOUNTER — RECORDS - HEALTHEAST (OUTPATIENT)
Dept: LAB | Facility: CLINIC | Age: 64
End: 2019-05-03

## 2019-05-03 ENCOUNTER — PATIENT OUTREACH (OUTPATIENT)
Dept: CARE COORDINATION | Facility: CLINIC | Age: 64
End: 2019-05-03

## 2019-05-03 DIAGNOSIS — F10.939 ALCOHOL WITHDRAWAL (H): Primary | ICD-10-CM

## 2019-05-03 PROBLEM — F10.20 ALCOHOL DEPENDENCE (H): Status: ACTIVE | Noted: 2019-05-03

## 2019-05-03 LAB
ANION GAP SERPL CALCULATED.3IONS-SCNC: 12 MMOL/L (ref 5–18)
BUN SERPL-MCNC: 15 MG/DL (ref 8–22)
CALCIUM SERPL-MCNC: 9.5 MG/DL (ref 8.5–10.5)
CHLORIDE BLD-SCNC: 105 MMOL/L (ref 98–107)
CO2 SERPL-SCNC: 25 MMOL/L (ref 22–31)
CREAT SERPL-MCNC: 1.16 MG/DL (ref 0.7–1.3)
GFR SERPL CREATININE-BSD FRML MDRD: >60 ML/MIN/1.73M2
GLUCOSE BLD-MCNC: 125 MG/DL (ref 70–125)
POTASSIUM BLD-SCNC: 3.8 MMOL/L (ref 3.5–5)
SODIUM SERPL-SCNC: 142 MMOL/L (ref 136–145)

## 2019-05-03 ASSESSMENT — ACTIVITIES OF DAILY LIVING (ADL): DEPENDENT_IADLS:: INDEPENDENT

## 2019-05-03 NOTE — PROGRESS NOTES
"Clinic Care Coordination Contact    Data: Received information that patient's wife had called the [facility] stating that patient is in a TCU and would like to move to a different one.    Per chart review, patient was hospitalized at [facility] from 4/22/19--4/26/19 with diagnoses of:    1. Acute alcohol withdrawal   2. Alcohol dependence  3. Major recurrent depressive disorder with anxiety  4. Insomnia  5. Essential hypertension  6. DM2 with neuropathy  7. Elevated transaminases  8. Confusional episode  9. Unwitnessed fall with ecchymosis over left eye and frontal bone contusion  10. Orthostasis  11. Hypovolemic hyponatremia  12. FELIX on CKD stage II  13. GERD  14. BPH    On 4/26/19, patient went from [facility] to [facility]. Per chart review, the hospital  charted on 4/26/19 (in part): \"Wife explains she does not want patient to enter [facility]'s program.  She explains the [facility] who completed the assessment with patient is making calls to [facility] to determine private pay cost.  The current plan is that patient will enter a residential program upon discharge from the TCU.\"    Writer called patient's wife this morning and left a voice message with call back information.    Plan: Await patient's wife's return call.      Dulce Jones, [facility] Care Coordination  Clinic: [facility]  Email: [email or web address]  Tele: [phone]        Addendum  5/3/19  4:30 pm  Data: Since above [facility] documentation, patient's wife called writer. Wife said that patient is currently in [facility] and is now in agreement with staying there for the duration of his rehab. Patient's wife said that the current plan is that patient will remain in TCU for about one more week and then will move to [facility] (inpatient). Wife is supportive of this plan. Wife " said that patient has been assessed by the [facility], and pt/wife have been in contact with [facility].    Wife had two questions. Wife said that patient was prescribed Antabuse in the hospital, and [facility] will need a note from the physician that patient can be taken off the Antabuse. Writer suggested that wife talk with the medical team who cares for patient in the TCU about this.    Wife said that patient underwent a sleep study some time ago, and a CPap was recommended. Patient was not interested in obtaining a CPap at that time but now would like to get one. Writer suggested that wife contact [facility] at [phone] to get guidance on the next steps for patient to obtain a Cpap.    Plan: Patient is currently in TCU with plans to move directly from TCU into inpatient Chemical Dependency treatment in about one week. [facility] will continue to follow at this time.      MARLA Gross  [facility] Care Coordination  [facility]  Email: [email or web address]  Tele: [phone]

## 2019-05-06 ENCOUNTER — RECORDS - HEALTHEAST (OUTPATIENT)
Dept: LAB | Facility: CLINIC | Age: 64
End: 2019-05-06

## 2019-05-07 LAB
ANION GAP SERPL CALCULATED.3IONS-SCNC: 11 MMOL/L (ref 5–18)
BUN SERPL-MCNC: 17 MG/DL (ref 8–22)
CALCIUM SERPL-MCNC: 9.3 MG/DL (ref 8.5–10.5)
CHLORIDE BLD-SCNC: 102 MMOL/L (ref 98–107)
CO2 SERPL-SCNC: 28 MMOL/L (ref 22–31)
CREAT SERPL-MCNC: 1.1 MG/DL (ref 0.7–1.3)
GFR SERPL CREATININE-BSD FRML MDRD: >60 ML/MIN/1.73M2
GLUCOSE BLD-MCNC: 144 MG/DL (ref 70–125)
POTASSIUM BLD-SCNC: 3.3 MMOL/L (ref 3.5–5)
SODIUM SERPL-SCNC: 141 MMOL/L (ref 136–145)

## 2019-05-14 ENCOUNTER — MEDICAL CORRESPONDENCE (OUTPATIENT)
Dept: FAMILY MEDICINE | Facility: CLINIC | Age: 64
End: 2019-05-14

## 2019-05-27 NOTE — TELEPHONE ENCOUNTER
metFORMIN (GLUCOPHAGE-XR) 500 MG 24 hr tablet; carvedilol (COREG) 12.5 MG tablet; LAST OV: 10/20/2016  BMP: 5/26/2017  HbA1C: 10/17/2016 @ 5.5   
no known mental health issues.

## 2019-06-20 DIAGNOSIS — G47.33 OSA (OBSTRUCTIVE SLEEP APNEA): Primary | ICD-10-CM

## 2019-06-20 RX ORDER — ZOLPIDEM TARTRATE 10 MG/1
TABLET ORAL
Qty: 30 TABLET | Refills: 0 | Status: SHIPPED | OUTPATIENT
Start: 2019-06-20 | End: 2019-07-16

## 2019-06-21 DIAGNOSIS — Z98.890 S/P LUMBAR LAMINECTOMY: ICD-10-CM

## 2019-06-22 DIAGNOSIS — I10 ESSENTIAL HYPERTENSION WITH GOAL BLOOD PRESSURE LESS THAN 130/80: ICD-10-CM

## 2019-06-23 RX ORDER — LOSARTAN POTASSIUM 100 MG/1
TABLET ORAL
Qty: 90 TABLET | Refills: 0 | Status: SHIPPED | OUTPATIENT
Start: 2019-06-23 | End: 2019-11-12

## 2019-06-24 DIAGNOSIS — E78.00 HYPERCHOLESTEREMIA: ICD-10-CM

## 2019-06-24 RX ORDER — SIMVASTATIN 10 MG
TABLET ORAL
Qty: 90 TABLET | Refills: 0 | Status: SHIPPED | OUTPATIENT
Start: 2019-06-24 | End: 2019-07-03

## 2019-06-25 DIAGNOSIS — E11.43 TYPE 2 DIABETES MELLITUS WITH DIABETIC AUTONOMIC NEUROPATHY, WITHOUT LONG-TERM CURRENT USE OF INSULIN (H): Primary | ICD-10-CM

## 2019-06-25 PROCEDURE — 36415 COLL VENOUS BLD VENIPUNCTURE: CPT | Performed by: FAMILY MEDICINE

## 2019-06-25 NOTE — NURSING NOTE
Patient presenting today for fasting labs for upcoming appointment on 07/03/2019 with Dr Seymour.  Tania Almonte MA on 6/25/2019 at 9:23 AM

## 2019-06-26 DIAGNOSIS — E11.43 TYPE 2 DIABETES MELLITUS WITH DIABETIC AUTONOMIC NEUROPATHY, WITHOUT LONG-TERM CURRENT USE OF INSULIN (H): ICD-10-CM

## 2019-06-27 LAB
ALBUMIN SERPL-MCNC: 4 G/DL (ref 3.6–4.8)
ALBUMIN/GLOB SERPL: 2 {RATIO} (ref 1.2–2.2)
ALP SERPL-CCNC: 149 IU/L (ref 39–117)
ALT SERPL-CCNC: 24 IU/L (ref 0–44)
AST SERPL-CCNC: 20 IU/L (ref 0–40)
BILIRUB SERPL-MCNC: 0.5 MG/DL (ref 0–1.2)
BUN SERPL-MCNC: 19 MG/DL (ref 8–27)
BUN/CREATININE RATIO: 15 (ref 10–24)
CALCIUM SERPL-MCNC: 9.1 MG/DL (ref 8.6–10.2)
CHLORIDE SERPLBLD-SCNC: 101 MMOL/L (ref 96–106)
CREAT SERPL-MCNC: 1.26 MG/DL (ref 0.76–1.27)
EGFR IF AFRICN AM: 68 ML/MIN/1.73
EGFR IF NONAFRICN AM: 59 ML/MIN/1.73
GLOBULIN, TOTAL: 2 G/DL (ref 1.5–4.5)
GLUCOSE SERPL-MCNC: 238 MG/DL (ref 65–99)
HBA1C MFR BLD: 8.7 % (ref 4.8–5.6)
POTASSIUM SERPL-SCNC: 4.3 MMOL/L (ref 3.5–5.2)
PROT SERPL-MCNC: 6 G/DL (ref 6–8.5)
SODIUM SERPL-SCNC: 140 MMOL/L (ref 134–144)
TOTAL CO2: 28 MMOL/L (ref 20–29)

## 2019-07-03 ENCOUNTER — OFFICE VISIT (OUTPATIENT)
Dept: FAMILY MEDICINE | Facility: CLINIC | Age: 64
End: 2019-07-03

## 2019-07-03 VITALS
TEMPERATURE: 98.4 F | OXYGEN SATURATION: 96 % | DIASTOLIC BLOOD PRESSURE: 62 MMHG | SYSTOLIC BLOOD PRESSURE: 94 MMHG | WEIGHT: 210 LBS | HEART RATE: 76 BPM | BODY MASS INDEX: 31.01 KG/M2 | RESPIRATION RATE: 16 BRPM

## 2019-07-03 DIAGNOSIS — E11.43 TYPE 2 DIABETES MELLITUS WITH DIABETIC AUTONOMIC NEUROPATHY, WITHOUT LONG-TERM CURRENT USE OF INSULIN (H): Primary | ICD-10-CM

## 2019-07-03 DIAGNOSIS — E78.00 HYPERCHOLESTEREMIA: ICD-10-CM

## 2019-07-03 DIAGNOSIS — I25.118 CORONARY ARTERY DISEASE OF NATIVE ARTERY OF NATIVE HEART WITH STABLE ANGINA PECTORIS (H): ICD-10-CM

## 2019-07-03 PROBLEM — E87.1 HYPONATREMIA: Status: RESOLVED | Noted: 2019-04-23 | Resolved: 2019-07-03

## 2019-07-03 PROCEDURE — 99214 OFFICE O/P EST MOD 30 MIN: CPT | Performed by: FAMILY MEDICINE

## 2019-07-03 RX ORDER — GLIPIZIDE 10 MG/1
10 TABLET ORAL
Qty: 180 TABLET | Refills: 0 | Status: SHIPPED | OUTPATIENT
Start: 2019-07-03 | End: 2019-10-10

## 2019-07-03 RX ORDER — SIMVASTATIN 20 MG
20 TABLET ORAL AT BEDTIME
Qty: 90 TABLET | Refills: 3 | Status: SHIPPED | OUTPATIENT
Start: 2019-07-03 | End: 2020-07-21

## 2019-07-03 NOTE — PROGRESS NOTES
SUBJECTIVE:  Syd Joyce presents today for follow up of DIABETES MELLITUS, CAD, Elevated cholesterol .       Patient concerns: None    Patient glucose self monitoring: before meals (vary times), once daily.   Blood glucose averages: 160 this morning, and as high as 300. Varies  Symptoms of low blood sugar (hypoglycemia:sweating, shaky, weak, dizzy, blurred vision, confusion)? None  Problems taking medications regularly? No  Side effects? No  What are you doing for exercise outside of work or your daily activities? Walk    Health maintenance reviewed and appropriate orders placed?  Yes    PHQ-2  Over the last two weeks- Have you been bothered by little interest or pleasure in doing things?  No  Over the last two weeks- Have you been been feeling down, depressed, or hopeless?  No    BP:   94/62, Pt states he has been feeling more tired    BP Readings from Last 3 Encounters:   07/03/19 94/62   04/26/19 (!) 150/92   04/16/19 100/60       Lab Results   Component Value Date    A1C 8.7 06/26/2019    A1C 5.6 01/30/2019    A1C 5.5 04/19/2018       Recent Labs   Lab Test 01/30/19  0920 04/03/18  1123   CHOL 141 140   HDL 78 80   LDL 43 44   TRIG 98 78       Wt Readings from Last 3 Encounters:   04/22/19 90.7 kg (200 lb)   04/16/19 97.5 kg (215 lb)   03/07/19 101.3 kg (223 lb 6.4 oz)       Current Outpatient Medications   Medication Sig Dispense Refill     amLODIPine (NORVASC) 10 MG tablet TAKE 1 TABLET(10 MG) BY MOUTH DAILY 90 tablet 0     blood glucose monitoring (ACCU-CHEK MED PLUS) meter device kit Use to test blood sugar 4 to 5 times weekly or as directed. 1 kit 0     blood glucose monitoring (ACCU-CHEK MED PLUS) test strip USE 4 TO 5 TIMES PER WEEK OR AS DIRECTED 150 each 3     Calcium Carbonate Antacid (TUMS PO) Take 2 chew tab by mouth 2 times daily as needed (GI upset)        carvedilol (COREG) 25 MG tablet Take 2 tablets (50 mg) by mouth 2 times daily Hold IF heart rate less than 55. 360 tablet 4      disulfiram (ANTABUSE) 250 MG tablet Take 1 tablet (250 mg) by mouth daily       finasteride (PROSCAR) 5 MG tablet Take 1 tablet (5 mg) by mouth daily 90 tablet 3     JANUVIA 100 MG tablet TAKE 1 TABLET(100 MG) BY MOUTH DAILY 90 tablet 0     losartan (COZAAR) 100 MG tablet TAKE 1 TABLET BY MOUTH EVERY DAY 90 tablet 0     melatonin 1 MG TABS tablet Take 3 tablets (3 mg) by mouth nightly as needed for sleep       metFORMIN (GLUCOPHAGE-XR) 500 MG 24 hr tablet TAKE 2 TABLETS BY MOUTH EVERY MORNING 180 tablet 3     mirtazapine (REMERON) 15 MG tablet Take 1 tablet (15 mg) by mouth At Bedtime       order for DME Equipment being ordered: compression stockings medium  Same size as previous 1 Product 1     pantoprazole (PROTONIX) 40 MG EC tablet TAKE 1 TABLET(40 MG) BY MOUTH DAILY 90 tablet 0     QUEtiapine (SEROQUEL) 25 MG tablet Take 1 tablet (25 mg) by mouth 3 times daily as needed (anxiety)       simvastatin (ZOCOR) 10 MG tablet TAKE 1 TABLET BY MOUTH EVERY NIGHT AT BEDTIME 90 tablet 0     tiZANidine (ZANAFLEX) 4 MG tablet TAKE 1 TABLET BY MOUTH DAILY 90 tablet 0     tiZANidine (ZANAFLEX) 4 MG tablet Take 1 tablet (4 mg) by mouth daily as needed for muscle spasms 90 tablet 0     zolpidem (AMBIEN) 10 MG tablet TAKE 1 TABLET BY MOUTH EVERY DAY AT BEDTIME 30 tablet 0       Histories reviewed and updated in Epic.    REVIEW OF SYSTEMS:  C: NEGATIVE for fatigue, unexpected change in weight  E: NEGATIVE for acute vision problems or changes  R: NEGATIVE for significant cough or shortness of breath  CV: NEGATIVE for chest pain, palpitations or new or worsening peripheral edema  P: NEGATIVE for changes in mood or affect    EXAM:  Vitals: BP 94/62   Pulse 76   Temp 98.4  F (36.9  C) (Temporal)   Resp 16   Wt 95.3 kg (210 lb)   SpO2 96%   BMI 31.01 kg/m    BMIE= Body mass index is 31.01 kg/m .  GENERAL APPEARANCE: alert and no acute distress  PSYCH: mentation appears normal and affect normal/bright  RESP: lungs clear to  auscultation - no rales, rhonchi or wheezes  CV: regular rate and rhythm, normal S1 S2, no S3 or S4 and no murmur, click or rub -  EXT: no cyanosis or edema in lower extremities  SKIN: no venous stasis changes  Diabetic Foot Screen:  Any complaints of increased pain or numbness ? No  Is there a foot ulcer now or a history of foot ulcer? No  Does the foot have an abnormal shape? No  Are the nails thick, too long or ingrown? No  Are there any redness or open areas? No         Sensation Testing done at all points on the diagram with monofilament     Right Foot: Sensation Normal at all points  Left Foot: Sensation Normal at all points     Risk Category: 0- No loss of protective sensation  Performed by Osmani Seymour MD        ASSESSMENT/PLAN:  (E11.43) Type 2 diabetes mellitus with diabetic autonomic neuropathy, without long-term current use of insulin (H)  (primary encounter diagnosis)  Comment: cw current meds  Plan: glipiZIDE (GLUCOTROL) 10 MG tablet, DIABETES         EDUCATOR REFERRAL            (E78.00) Hypercholesteremia  Comment: cw current meds  Plan: simvastatin (ZOCOR) 20 MG tablet            (I25.118) Coronary artery disease of native artery of native heart with stable angina pectoris (H)  Comment: cw current meds  Plan: labs on chart

## 2019-07-15 DIAGNOSIS — E11.43 TYPE 2 DIABETES MELLITUS WITH DIABETIC AUTONOMIC NEUROPATHY, WITHOUT LONG-TERM CURRENT USE OF INSULIN (H): ICD-10-CM

## 2019-07-15 DIAGNOSIS — Z76.0 ENCOUNTER FOR MEDICATION REFILL: ICD-10-CM

## 2019-07-15 RX ORDER — PANTOPRAZOLE SODIUM 40 MG/1
TABLET, DELAYED RELEASE ORAL
Qty: 90 TABLET | Refills: 0 | Status: SHIPPED | OUTPATIENT
Start: 2019-07-15 | End: 2019-10-16

## 2019-07-15 RX ORDER — METFORMIN HCL 500 MG
TABLET, EXTENDED RELEASE 24 HR ORAL
Qty: 180 TABLET | Refills: 0 | Status: SHIPPED | OUTPATIENT
Start: 2019-07-15 | End: 2019-10-16

## 2019-07-16 ENCOUNTER — PATIENT OUTREACH (OUTPATIENT)
Dept: CARE COORDINATION | Facility: CLINIC | Age: 64
End: 2019-07-16

## 2019-07-16 DIAGNOSIS — G47.33 OSA (OBSTRUCTIVE SLEEP APNEA): ICD-10-CM

## 2019-07-16 RX ORDER — ZOLPIDEM TARTRATE 10 MG/1
TABLET ORAL
Qty: 30 TABLET | Refills: 0 | Status: SHIPPED | OUTPATIENT
Start: 2019-07-16 | End: 2019-08-16

## 2019-07-17 NOTE — PROGRESS NOTES
Clinic Care Coordination Contact  Outreach/Follow up    Data: Writer called patient today for follow up. Patient said that he (pt) completed the 30-day Chemical Dependency treatment program at [facility] and felt that the program was helpful. Patient said that he (pt) remains sober. Patient goes to an outpatient day program through [facility] once per week. Patient said that he(pt) has not gone to AA meetings as yet but is thinking about it and knows that there are AA meetings near where patient lives in [place].     At this time, patient said that he is sober and doing well. Patient did not identify any need for other community resources. Writer reviewed the availability of Care Coordination in the [facility] with patient. Patient expressed understanding.    Plan: Patient has completed inpatient Chemical Dependency treatment and states that he is involved with outpatient follow up. [facility] plans no further outreach at this time but remains available if community resources questions/concerns arise in the future.    MARLA Gross  [facility] Care Coordination  Clinics: [facility] and [facility]   Email: [email or web address]  Tele: [phone]           No significant past surgical history

## 2019-08-02 ENCOUNTER — OFFICE VISIT (OUTPATIENT)
Dept: FAMILY MEDICINE | Facility: CLINIC | Age: 64
End: 2019-08-02

## 2019-08-02 VITALS
OXYGEN SATURATION: 98 % | WEIGHT: 215 LBS | DIASTOLIC BLOOD PRESSURE: 70 MMHG | BODY MASS INDEX: 31.75 KG/M2 | SYSTOLIC BLOOD PRESSURE: 128 MMHG | HEART RATE: 75 BPM

## 2019-08-02 DIAGNOSIS — E11.43 TYPE 2 DIABETES MELLITUS WITH DIABETIC AUTONOMIC NEUROPATHY, WITHOUT LONG-TERM CURRENT USE OF INSULIN (H): Primary | ICD-10-CM

## 2019-08-02 DIAGNOSIS — I25.118 CORONARY ARTERY DISEASE OF NATIVE ARTERY OF NATIVE HEART WITH STABLE ANGINA PECTORIS (H): ICD-10-CM

## 2019-08-02 DIAGNOSIS — F10.21 ALCOHOL DEPENDENCE IN REMISSION (H): ICD-10-CM

## 2019-08-02 PROCEDURE — 99214 OFFICE O/P EST MOD 30 MIN: CPT | Performed by: FAMILY MEDICINE

## 2019-08-02 RX ORDER — CARVEDILOL 25 MG/1
50 TABLET ORAL 2 TIMES DAILY
Qty: 360 TABLET | Refills: 3 | Status: SHIPPED | OUTPATIENT
Start: 2019-08-02 | End: 2020-08-03

## 2019-08-06 ENCOUNTER — ALLIED HEALTH/NURSE VISIT (OUTPATIENT)
Dept: EDUCATION SERVICES | Facility: CLINIC | Age: 64
End: 2019-08-06
Payer: COMMERCIAL

## 2019-08-06 DIAGNOSIS — E11.43 TYPE 2 DIABETES MELLITUS WITH DIABETIC AUTONOMIC NEUROPATHY, WITHOUT LONG-TERM CURRENT USE OF INSULIN (H): Primary | ICD-10-CM

## 2019-08-06 PROCEDURE — G0108 DIAB MANAGE TRN  PER INDIV: HCPCS

## 2019-08-06 NOTE — PATIENT INSTRUCTIONS
[place] [facility]:  For Your Diabetes Education and Nutrition Appointments Call:  [phone]   For Diabetes Education or Nutrition Related Questions:   Phone: [phone]  E-mail: [email or web address]  Fax: [phone]   If you need a medication refill please contact your pharmacy. Please allow 3 business days for your refills to be completed.

## 2019-08-06 NOTE — PROGRESS NOTES
"Diabetes Self-Management Education & Support    Diabetes Education Self Management & Training    SUBJECTIVE/OBJECTIVE:  Presents for: Individual review  Accompanied by: Spouse  Diabetes education in the past 24mo: (P) No  Diabetes type: (P) Type 2  Date of diagnosis: ~20 years ago   Disease course: (P) Improving  How confident are you filling out medical forms by yourself:: Extremely  Transportation concerns: No  Cultural Influences/Ethnic Background:  American    Diabetes Symptoms & Complications  Blurred vision: (P) No  Fatigue: (P) No  Foot ulcerations: (P) No  Polydipsia: (P) No  Polyphagia: (P) No  Polyuria: (P) No  Visual change: (P) No  Weakness: (P) No  Weight loss: (P) No  Slow healing wounds: (P) No  Weight trend: (P) Fluctuating minimally  Autonomic neuropathy: Yes  CVA: No  Heart disease: Yes  Nephropathy: No  Peripheral neuropathy: Yes  Peripheral Vascular Disease: No  Retinopathy: No  Sexual dysfunction: (P) Yes    Patient Problem List and Family Medical History reviewed for relevant medical history, current medical status, and diabetes risk factors.    Vitals:  There were no vitals taken for this visit.  Estimated body mass index is 31.75 kg/m  as calculated from the following:    Height as of 4/22/19: 1.753 m (5' 9\").    Weight as of 8/2/19: 97.5 kg (215 lb).   Last 3 BP:   BP Readings from Last 3 Encounters:   08/02/19 128/70   07/03/19 94/62   04/26/19 (!) 150/92       History   Smoking Status     Never Smoker   Smokeless Tobacco     Current User       Labs:  Lab Results   Component Value Date    A1C 8.7 06/26/2019     Lab Results   Component Value Date     06/26/2019     Lab Results   Component Value Date    LDL 43 01/30/2019     HDL Cholesterol   Date Value Ref Range Status   01/30/2019 78 >39 mg/dL Final   ]  GFR Estimate   Date Value Ref Range Status   04/25/2019 69 >60 mL/min/[1.73_m2] Final     Comment:     Non  GFR Calc  Starting 12/18/2018, serum creatinine based " estimated GFR (eGFR) will be   calculated using the Chronic Kidney Disease Epidemiology Collaboration   (CKD-EPI) equation.       GFR Estimate If Black   Date Value Ref Range Status   04/25/2019 81 >60 mL/min/[1.73_m2] Final     Comment:      GFR Calc  Starting 12/18/2018, serum creatinine based estimated GFR (eGFR) will be   calculated using the Chronic Kidney Disease Epidemiology Collaboration   (CKD-EPI) equation.       Lab Results   Component Value Date    CR 1.26 06/26/2019     No results found for: MICROALBUMIN    Healthy Eating  Healthy Eating Assessed Today: Yes  Cultural/Congregation diet restrictions?: (P) No  Patient on a regular basis: Eats 3 meals a day  Meal planning: (P) None  Meals include: (P) Breakfast, Lunch, Dinner, Snacks  Beverages: (P) Water, Coffee, Milk  Has patient met with a dietitian in the past?: (P) No        Being Active  Being Active Assessed Today: Yes  Exercise:: Currently not exercising  How intense was your typical exercise? : Light (like stretching or slow walking)(occasional walks but no consistent schedule )  Barrier to exercise: (P) None    Monitoring  Monitoring Assessed Today: Yes  Did patient bring glucose meter to appointment? : Yes  Blood Glucose Meter: (P) Accu-check  Home Glucose (Sugar) Monitoring: (P) 1-2 times per day  Blood glucose trend: (P) Fluctuating minimally  Low Glucose Range (mg/dL): (P)   High Glucose Range (mg/dL): (P) 130-140  Overall Range (mg/dL): (P) 110-130              Taking Medications  Diabetes Medication(s)     Biguanides       metFORMIN (GLUCOPHAGE-XR) 500 MG 24 hr tablet    TAKE 2 TABLETS BY MOUTH EVERY MORNING    Sulfonylureas       glipiZIDE (GLUCOTROL) 10 MG tablet    Take 1 tablet (10 mg) by mouth 2 times daily (before meals)          Taking Medication Assessed Today: Yes  Current Treatments: (P) Oral Agent (dual therapy)  Problems taking diabetes medications regularly?: No  Diabetes medication side effects?:  No  Treatment Compliance: All of the time    Problem Solving  Problem Solving Assessed Today: Yes  Hypoglycemia Frequency: (P) Rarely  Hypoglycemia Treatment: (P) Other food  Patient carries a carbohydrate source: (P) No  Medical alert: (P) No  Severe weather/disaster plan for diabetes management?: (P) No  DKA prevention plan?: (P) No  Sick day plan for diabetes management?: (P) Yes    Hypoglycemia symptoms  Confusion: (P) No  Dizziness or Light-Headedness: (P) Yes  Headaches: (P) No  Hunger: (P) No  Mood changes: (P) No  Nervousness/Anxiety: (P) No  Sleepiness: (P) No  Speech difficulty: (P) No  Sweats: (P) Yes  Tremors: (P) No    Hypoglycemia Complications  Blackouts: (P) No  Hospitalization: (P) No  Nocturnal hypoglycemia: (P) No  Required assistance: (P) No  Required glucagon injection: (P) No  Seizures: (P) No    Reducing Risks  Reducing Risks Assessed Today: Yes  Diabetes Risks: Age over 45 years, Sedentary Lifestyle  CAD Risks: (P) Diabetes Mellitus, Obesity, Sedentary lifestyle  Has dilated eye exam at least once a year?: Yes(March 2019 )  Sees dentist every 6 months?: (P) No  Sees podiatrist (foot doctor)?: (P) No    Healthy Coping  Healthy Coping Assessed Today: Yes  Emotional response to diabetes: Ready to learn, Concern for health and well-being  Informal Support system:: (P) Children, Spouse  Stage of change: PREPARATION (Decided to change - considering how)  Difficulty affording diabetes management supplies?: (P) No  Support resources: None  Patient Activation Measure Survey Score:  No flowsheet data found.    ASSESSMENT:  Patient comes in with concern due to high A1C, states his A1Cs in the past have been in the high 5's until recently. He hasn't had diabetes education since around the time he was diagnoses, ~20 years ago. He doesn't remember any diet education from then. He feels his meter is inaccurate and he needs help with this - does not have lancets or strips for his old AccuChek Dannielle meter. He  also has experienced some symptoms of low blood sugar recently. When he had symptoms this week, his blood sugar check showed he was 84 and he complained of symptoms of sweatiness & dizziness. He has never been taught about low blood sugar in the past or how to treat this.     Patient's most recent   Lab Results   Component Value Date    A1C 8.7 06/26/2019    is not meeting goal of <7.0    INTERVENTION:   Diabetes knowledge and skills assessment:     Patient is knowledgeable in diabetes management concepts related to: Taking Medication    Patient needs further education on the following diabetes management concepts: Healthy Eating, Being Active, Monitoring, Taking Medication, Problem Solving, Reducing Risks and Healthy Coping    Based on learning assessment above, most appropriate setting for further diabetes education would be: Group class or Individual setting.    Education provided today on:  AADE Self-Care Behaviors:  Healthy Eating: carbohydrate counting, consistency in amount, composition, and timing of food intake, weight reduction, portion control, plate planning method and label reading  Being Active: relationship to blood glucose, describe appropriate activity program and precautions to take  Monitoring: purpose, proper technique, log and interpret results, individual blood glucose targets, frequency of monitoring and use of glucose control solution  Problem Solving: low blood glucose - causes, signs/symptoms, treatment and prevention and carrying a carbohydrate source at all times  Patient was instructed on Accu-Chek Guide meter and was able to provide an accurate return demonstration. Patient's blood glucose reading today was 245 mg/dL.    Opportunities for ongoing education and support in diabetes-self management were discussed.    Pt verbalized understanding of concepts discussed and recommendations provided today.       Education Materials Provided:  BG Log Sheet, Carbohydrate Counting, Hypoglycemia  Signs and Symptoms and My Plate Planner    PLAN:  See Patient Instructions for co-developed, patient-stated behavior change goals.  AVS printed and provided to patient today. See Follow-Up section for recommended follow-up.    Clara Watkins RD, LD   Time Spent: 60 minutes  Encounter Type: Individual    Any diabetes medication dose changes were made via the CDE Protocol and Collaborative Practice Agreement with the patient's referring provider. A copy of this encounter was shared with the provider.

## 2019-08-07 RX ORDER — LANCETS
EACH MISCELLANEOUS
Qty: 102 EACH | Refills: 3 | Status: SHIPPED | OUTPATIENT
Start: 2019-08-07 | End: 2020-07-09

## 2019-08-07 NOTE — PROGRESS NOTES
Syd is here today in follow up for his diabetes and CAD   He takes BP meds, as statin, metformin and we added glipizide during our last visit.   His sugars show levels in the 110s-130s fasting.   He is tolerating this ok.   No particular lows.   Denies chest pain or shortness of breath.   His last A1c was 8.7.  He has stopped drinking and feels very good about this step in his health    ROS otherwise negative including sleep, neuro, CV, skin or GI     /70   Pulse 75   Wt 97.5 kg (215 lb)   SpO2 98%   BMI 31.75 kg/m      GENERAL: healthy, alert and no distress  EYES: Eyes grossly normal to inspection, PERRL and conjunctivae and sclerae normal  NECK: no adenopathy, no asymmetry, masses, or scars and thyroid normal to palpation  RESP: lungs clear to auscultation - no rales, rhonchi or wheezes  CV: regular rate and rhythm, normal S1 S2,  MS: no gross musculoskeletal defects noted, no edema  SKIN: no suspicious lesions or rashes  NEURO: Normal strength and tone, mentation intact and speech normal  PSYCH: mentation appears normal, affect normal/bright    ASSESSMENT:  1. Type 2 diabetes mellitus with diabetic autonomic neuropathy, without long-term current use of insulin (H)  Due for last in early october  - Hemoglobin A1C ([facility]); Future    2. Coronary artery disease of native artery of native heart with stable angina pectoris (H)  Renewed med  - carvedilol (COREG) 25 MG tablet; Take 2 tablets (50 mg) by mouth 2 times daily Hold IF heart rate less than 55.  Dispense: 360 tablet; Refill: 3     3. EtOH dependence  Applaud his sobriety   Must maintain

## 2019-08-16 DIAGNOSIS — G47.33 OSA (OBSTRUCTIVE SLEEP APNEA): ICD-10-CM

## 2019-08-18 RX ORDER — ZOLPIDEM TARTRATE 10 MG/1
TABLET ORAL
Qty: 30 TABLET | Refills: 1 | Status: SHIPPED | OUTPATIENT
Start: 2019-08-18 | End: 2019-10-16

## 2019-09-02 ENCOUNTER — HOSPITAL ENCOUNTER (EMERGENCY)
Facility: CLINIC | Age: 64
Discharge: HOME | End: 2019-09-02
Attending: EMERGENCY MEDICINE | Admitting: EMERGENCY MEDICINE
Payer: COMMERCIAL

## 2019-09-02 ENCOUNTER — APPOINTMENT (OUTPATIENT)
Dept: CT IMAGING | Facility: CLINIC | Age: 64
End: 2019-09-02
Attending: EMERGENCY MEDICINE
Payer: COMMERCIAL

## 2019-09-02 VITALS
WEIGHT: 215 LBS | BODY MASS INDEX: 31.84 KG/M2 | RESPIRATION RATE: 20 BRPM | HEIGHT: 69 IN | HEART RATE: 69 BPM | DIASTOLIC BLOOD PRESSURE: 99 MMHG | TEMPERATURE: 98.6 F | OXYGEN SATURATION: 99 % | SYSTOLIC BLOOD PRESSURE: 179 MMHG

## 2019-09-02 DIAGNOSIS — K57.92 DIVERTICULITIS: ICD-10-CM

## 2019-09-02 LAB
ALBUMIN SERPL-MCNC: 3.8 G/DL (ref 3.4–5)
ALP SERPL-CCNC: 148 U/L (ref 40–150)
ALT SERPL W P-5'-P-CCNC: 33 U/L (ref 0–70)
ANION GAP SERPL CALCULATED.3IONS-SCNC: 3 MMOL/L (ref 3–14)
AST SERPL W P-5'-P-CCNC: 22 U/L (ref 0–45)
BASOPHILS # BLD AUTO: 0 10E9/L (ref 0–0.2)
BASOPHILS NFR BLD AUTO: 0.1 %
BILIRUB DIRECT SERPL-MCNC: 0.2 MG/DL (ref 0–0.2)
BILIRUB SERPL-MCNC: 0.8 MG/DL (ref 0.2–1.3)
BUN SERPL-MCNC: 15 MG/DL (ref 7–30)
CALCIUM SERPL-MCNC: 9.8 MG/DL (ref 8.5–10.1)
CHLORIDE SERPL-SCNC: 105 MMOL/L (ref 94–109)
CO2 BLDCOV-SCNC: 28 MMOL/L (ref 21–28)
CO2 SERPL-SCNC: 32 MMOL/L (ref 20–32)
CREAT SERPL-MCNC: 1.02 MG/DL (ref 0.66–1.25)
DIFFERENTIAL METHOD BLD: ABNORMAL
EOSINOPHIL # BLD AUTO: 0.2 10E9/L (ref 0–0.7)
EOSINOPHIL NFR BLD AUTO: 1.4 %
ERYTHROCYTE [DISTWIDTH] IN BLOOD BY AUTOMATED COUNT: 12.5 % (ref 10–15)
GFR SERPL CREATININE-BSD FRML MDRD: 76 ML/MIN/{1.73_M2}
GLUCOSE SERPL-MCNC: 173 MG/DL (ref 70–99)
HCT VFR BLD AUTO: 40.6 % (ref 40–53)
HGB BLD-MCNC: 14 G/DL (ref 13.3–17.7)
IMM GRANULOCYTES # BLD: 0.1 10E9/L (ref 0–0.4)
IMM GRANULOCYTES NFR BLD: 0.4 %
LACTATE BLD-SCNC: 0.9 MMOL/L (ref 0.7–2.1)
LIPASE SERPL-CCNC: 80 U/L (ref 73–393)
LYMPHOCYTES # BLD AUTO: 1.2 10E9/L (ref 0.8–5.3)
LYMPHOCYTES NFR BLD AUTO: 8.6 %
MCH RBC QN AUTO: 30.9 PG (ref 26.5–33)
MCHC RBC AUTO-ENTMCNC: 34.5 G/DL (ref 31.5–36.5)
MCV RBC AUTO: 90 FL (ref 78–100)
MONOCYTES # BLD AUTO: 0.9 10E9/L (ref 0–1.3)
MONOCYTES NFR BLD AUTO: 6.6 %
NEUTROPHILS # BLD AUTO: 11.3 10E9/L (ref 1.6–8.3)
NEUTROPHILS NFR BLD AUTO: 82.9 %
NRBC # BLD AUTO: 0 10*3/UL
NRBC BLD AUTO-RTO: 0 /100
PCO2 BLDV: 43 MM HG (ref 40–50)
PH BLDV: 7.42 PH (ref 7.32–7.43)
PLATELET # BLD AUTO: 220 10E9/L (ref 150–450)
PO2 BLDV: 27 MM HG (ref 25–47)
POTASSIUM SERPL-SCNC: 3.9 MMOL/L (ref 3.4–5.3)
PROT SERPL-MCNC: 7.4 G/DL (ref 6.8–8.8)
RBC # BLD AUTO: 4.53 10E12/L (ref 4.4–5.9)
SAO2 % BLDV FROM PO2: 50 %
SODIUM SERPL-SCNC: 140 MMOL/L (ref 133–144)
WBC # BLD AUTO: 13.7 10E9/L (ref 4–11)

## 2019-09-02 PROCEDURE — 93005 ELECTROCARDIOGRAM TRACING: CPT

## 2019-09-02 PROCEDURE — 96375 TX/PRO/DX INJ NEW DRUG ADDON: CPT

## 2019-09-02 PROCEDURE — 96376 TX/PRO/DX INJ SAME DRUG ADON: CPT

## 2019-09-02 PROCEDURE — 83605 ASSAY OF LACTIC ACID: CPT

## 2019-09-02 PROCEDURE — 25000128 H RX IP 250 OP 636: Performed by: EMERGENCY MEDICINE

## 2019-09-02 PROCEDURE — 96374 THER/PROPH/DIAG INJ IV PUSH: CPT

## 2019-09-02 PROCEDURE — 82803 BLOOD GASES ANY COMBINATION: CPT

## 2019-09-02 PROCEDURE — 74177 CT ABD & PELVIS W/CONTRAST: CPT

## 2019-09-02 PROCEDURE — 85025 COMPLETE CBC W/AUTO DIFF WBC: CPT | Performed by: EMERGENCY MEDICINE

## 2019-09-02 PROCEDURE — 83690 ASSAY OF LIPASE: CPT | Performed by: EMERGENCY MEDICINE

## 2019-09-02 PROCEDURE — 99285 EMERGENCY DEPT VISIT HI MDM: CPT | Mod: 25

## 2019-09-02 PROCEDURE — 80048 BASIC METABOLIC PNL TOTAL CA: CPT | Performed by: EMERGENCY MEDICINE

## 2019-09-02 PROCEDURE — 80076 HEPATIC FUNCTION PANEL: CPT | Performed by: EMERGENCY MEDICINE

## 2019-09-02 PROCEDURE — 96361 HYDRATE IV INFUSION ADD-ON: CPT

## 2019-09-02 PROCEDURE — 25000125 ZZHC RX 250: Performed by: EMERGENCY MEDICINE

## 2019-09-02 RX ORDER — METRONIDAZOLE 500 MG/1
500 TABLET ORAL 3 TIMES DAILY
Qty: 30 TABLET | Refills: 0 | Status: SHIPPED | OUTPATIENT
Start: 2019-09-02 | End: 2019-09-02

## 2019-09-02 RX ORDER — HYDROCODONE BITARTRATE AND ACETAMINOPHEN 5; 325 MG/1; MG/1
1 TABLET ORAL EVERY 6 HOURS PRN
Qty: 10 TABLET | Refills: 0 | Status: SHIPPED | OUTPATIENT
Start: 2019-09-02 | End: 2019-09-05

## 2019-09-02 RX ORDER — IBUPROFEN 600 MG/1
600 TABLET, FILM COATED ORAL EVERY 8 HOURS PRN
Qty: 30 TABLET | Refills: 0 | Status: SHIPPED | OUTPATIENT
Start: 2019-09-02 | End: 2020-03-26

## 2019-09-02 RX ORDER — KETOROLAC TROMETHAMINE 15 MG/ML
15 INJECTION, SOLUTION INTRAMUSCULAR; INTRAVENOUS ONCE
Status: COMPLETED | OUTPATIENT
Start: 2019-09-02 | End: 2019-09-02

## 2019-09-02 RX ORDER — CIPROFLOXACIN 500 MG/1
500 TABLET, FILM COATED ORAL 2 TIMES DAILY
Qty: 20 TABLET | Refills: 0 | Status: SHIPPED | OUTPATIENT
Start: 2019-09-02 | End: 2019-09-12

## 2019-09-02 RX ORDER — METRONIDAZOLE 500 MG/1
500 TABLET ORAL 3 TIMES DAILY
Qty: 30 TABLET | Refills: 0 | Status: SHIPPED | OUTPATIENT
Start: 2019-09-02 | End: 2019-09-12

## 2019-09-02 RX ORDER — ONDANSETRON 2 MG/ML
4 INJECTION INTRAMUSCULAR; INTRAVENOUS ONCE
Status: COMPLETED | OUTPATIENT
Start: 2019-09-02 | End: 2019-09-02

## 2019-09-02 RX ORDER — HYDROMORPHONE HYDROCHLORIDE 1 MG/ML
0.5 INJECTION, SOLUTION INTRAMUSCULAR; INTRAVENOUS; SUBCUTANEOUS ONCE
Status: COMPLETED | OUTPATIENT
Start: 2019-09-02 | End: 2019-09-02

## 2019-09-02 RX ORDER — HYDROMORPHONE HYDROCHLORIDE 1 MG/ML
1 INJECTION, SOLUTION INTRAMUSCULAR; INTRAVENOUS; SUBCUTANEOUS ONCE
Status: COMPLETED | OUTPATIENT
Start: 2019-09-02 | End: 2019-09-02

## 2019-09-02 RX ORDER — ONDANSETRON 4 MG/1
4 TABLET, ORALLY DISINTEGRATING ORAL EVERY 8 HOURS PRN
Qty: 10 TABLET | Refills: 0 | Status: SHIPPED | OUTPATIENT
Start: 2019-09-02 | End: 2020-03-26

## 2019-09-02 RX ORDER — IOPAMIDOL 755 MG/ML
109 INJECTION, SOLUTION INTRAVASCULAR ONCE
Status: COMPLETED | OUTPATIENT
Start: 2019-09-02 | End: 2019-09-02

## 2019-09-02 RX ADMIN — SODIUM CHLORIDE 73 ML: 9 INJECTION, SOLUTION INTRAVENOUS at 10:33

## 2019-09-02 RX ADMIN — KETOROLAC TROMETHAMINE 15 MG: 15 INJECTION, SOLUTION INTRAMUSCULAR; INTRAVENOUS at 09:34

## 2019-09-02 RX ADMIN — ONDANSETRON 4 MG: 2 INJECTION INTRAMUSCULAR; INTRAVENOUS at 09:34

## 2019-09-02 RX ADMIN — HYDROMORPHONE HYDROCHLORIDE 0.5 MG: 1 INJECTION, SOLUTION INTRAMUSCULAR; INTRAVENOUS; SUBCUTANEOUS at 09:34

## 2019-09-02 RX ADMIN — IOPAMIDOL 109 ML: 755 INJECTION, SOLUTION INTRAVENOUS at 10:37

## 2019-09-02 RX ADMIN — HYDROMORPHONE HYDROCHLORIDE 1 MG: 10 INJECTION, SOLUTION INTRAMUSCULAR; INTRAVENOUS; SUBCUTANEOUS at 10:16

## 2019-09-02 RX ADMIN — SODIUM CHLORIDE 1000 ML: 9 INJECTION, SOLUTION INTRAVENOUS at 09:32

## 2019-09-02 ASSESSMENT — MIFFLIN-ST. JEOR: SCORE: 1745.61

## 2019-09-02 ASSESSMENT — ENCOUNTER SYMPTOMS
ABDOMINAL PAIN: 1
VOMITING: 1
NAUSEA: 1

## 2019-09-02 NOTE — ED PROVIDER NOTES
"  History     Chief Complaint:  Nausea and Vomiting; Abdominal Pain     The history is provided by the patient.      Syd Joyce is a 64 year old male, with history notable for DM II and hypertension, who presents with concerns for three-hours of \"waves of nausea and vomiting.\" He also complains of associated sharp epigastric/low abdominal pain that radiates to his chest. He details he did not eat out yesterday and reports family history of diverticulitis. Patient denies any diarrhea, prior abdominal surgeries, recent medication changes, or close sick contact    Allergies:  No Known Drug Allergies    Medications:    Proscar  Glipizide  Cozaar  Metformin  Protonix  Zocor  Zanaflex  Ambien    Past Medical History:    DM II  Reflux  Hypertension  Bilateral leg pain  MARTELL  Low back pain  Obesity  Alcohol dependence     Past Surgical History:    Discectomy  [place] teeth    Family History:    DM  Obesity    Social History:  Patient is a retired pathologist.  Never Smoker  Smokeless tobacco: Current user  Alcohol Use: Positive  Marital Status:       Review of Systems   Cardiovascular: Positive for chest pain.   Gastrointestinal: Positive for abdominal pain, nausea and vomiting.   All other systems reviewed and are negative.    Physical Exam     Patient Vitals for the past 24 hrs:   BP Temp Temp src Pulse Resp SpO2 Height Weight   09/02/19 1000 (!) 179/99 -- -- 69 -- 99 % -- --   09/02/19 0930 (!) 183/127 98.6  F (37  C) Oral 70 20 98 % 1.753 m (5' 9\") 97.5 kg (215 lb)     Physical Exam  General: Resting uncomfortably on the gurney, patient is moaning and holding his abdomen  Head:  The scalp, face, and head appear normal  Eyes:  The pupils are equal, round, and reactive to light    There is no nystagmus    Extraocular muscles are intact    Conjunctivae and sclerae are normal  ENT:    The nose is normal    Pinnae are normal    The oropharynx is normal    Uvula is in the midline  Neck:  Normal range of " motion    There is no rigidity noted    There is no midline cervical spine pain/tenderness    Trachea is in the midline    No mass is detected  CV:  Regular rate and underlying rhythm     Normal S1/S2, no S3/S4    No pathological murmur detected  Resp:  Lungs are clear    There is no tachypnea    Non-labored    No rales    No wheezing   GI:  Abdomen is soft, there is no rigidity    There is mild pain in the left lower quadrant    The upper abdomen quadrants are non-tender    The right lower quadrant is without pain    No distension    No tympani    No rebound tenderness     Non-surgical without peritoneal features  :  Normal penis, no inguinal hernias  MS:  Normal muscular tone    Symmetric motor strength    No major joint effusions    No asymmetric leg swelling, no calf tenderness  Skin:  No rash or acute skin lesions noted  Neuro: Speech is normal and fluent  Psych:  Awake. Alert.      Normal affect.  Appropriate interactions.    Emergency Department Course   ECG:  ECG taken at 0938, ECG read at 0946  Normal sinus rhythm  Normal ECG  Rate 64 bpm. AK interval 154 ms. QRS duration 76 ms. QT/QTc 410/422 ms. P-R-T axes 51 41 71.  No changes compared with prior ECG on 18.     Imaging:  CT Abdomen Pelvis w Contrast  Extensive sigmoid diverticulosis with pericolonic fat stranding along the proximal sigmoid colon, finding consistent with acute diverticulitis. No evidence of colonic perforation or pericolonic abscess.  Reading per radiology    Laboratory:  CBC: WBC 13.7 (H), HGB 14.0,   BMP: glc 173 (H) o/w WNL (Creatinine 1.02)  Lipase: 80  Hepatic panel: WNL  ISTAT VB.42/43/27/28; Lactic Acid (Resulted: 1020): 0.9    Interventions:  0932: NS 1L IV Bolus   0934: Toradol 15 mg IV  0934: Dilaudid 0.5 mg IV  0934: Zofran 4 mg IV  1016: Dilaudid 1 mg IV    Emergency Department Course:  Nursing notes and vitals reviewed.  925 I performed an exam of the patient as documented above.   0932 IV was inserted and  blood was drawn for laboratory testing, results above.  0938 EKG obtained in the ED, see results above.   1009 Patient rechecked and updated.   1021 The patient was sent for a CT while in the emergency department, results above.   1133 Patient reassessed and updated on work-up thus far. I personally reviewed the imaging and laboratory results with the patient and answered all related questions prior to discharge, anticipatory guidance given.     Impression & Plan      Medical Decision Making:  Syd Joyce is a 64 year old male who presents to the emergency department today for evaluation of rachele pain.  The patient had left lower quadrant pain leukocytosis nausea and vomiting.  He does have extensive sigmoid diverticulosis with acute diverticulitis involving the distal descending colon and sigmoid region.  No perforation or free air or abscess.  The natural history of diverticulitis was reviewed with the patient and his wife.  I reviewed the risk factors of ciprofloxacin and Flagyl.  We are going to proceed with antibiotic therapy pain medications for home use antiemetics as needed.  He will follow-up with gastroenterology for his routine colonoscopy and if he is having recurrent bouts the patient will follow-up with colorectal surgery for consideration of elective surgery when he is well    Diagnosis:    ICD-10-CM   1. Diverticulitis K57.92     Disposition: Home    Discharge Medications:  HYDROcodone-acetaminophen 5-325 MG tablet  Commonly known as:  NORCO      Dose:  1 tablet  Take 1 tablet by mouth every 6 hours as needed for severe pain  Quantity:  10 tablet  Refills:  0     ibuprofen 600 MG tablet  Commonly known as:  ADVIL/MOTRIN      Dose:  600 mg  Take 1 tablet (600 mg) by mouth every 8 hours as needed for moderate pain  Quantity:  30 tablet  Refills:  0     metroNIDAZOLE 500 MG tablet  Commonly known as:  FLAGYL      Dose:  500 mg  Take 1 tablet (500 mg) by mouth 3 times daily for 10 days  Quantity:   30 tablet  Refills:  0     ondansetron 4 MG ODT tab  Commonly known as:  ZOFRAN ODT      Dose:  4 mg  Take 1 tablet (4 mg) by mouth every 8 hours as needed for nausea  Quantity:  10 tablet  Refills:  0       Scribe Disclosure:  Marquis PINTO, am serving as a scribe at 9:23 AM on 9/2/2019 to document services personally performed by Parth Magaña MD based on my observations and the provider's statements to me.     EMERGENCY DEPARTMENT       Parth Magaña MD  09/02/19 7713

## 2019-09-02 NOTE — DISCHARGE INSTRUCTIONS
Your antibiotic prescription:    I have prescribed a Flouroquinolone type antibiotic (i.e. Cipro/Levaquin).  Potential side effects from this medication include common reactions such as nausea, vomiting, diarrhea, rash, and more serious reactions such as allergic reactions, and tendonitis/tendinopathy. Tendons problems are a rare but known side effect, including tendon inflammation and rupture.  The heel tendon (achilles) has been involved most often, according to the medical literature.   Please watch very carefully for the development of pain in any tendon and stop the medication immediately if pain arises and then speak with a doctor so that an alternative medication can be prescribed for your infection.

## 2019-09-09 DIAGNOSIS — N52.9 ERECTILE DYSFUNCTION, UNSPECIFIED ERECTILE DYSFUNCTION TYPE: Primary | ICD-10-CM

## 2019-09-09 LAB — INTERPRETATION ECG - MUSE: NORMAL

## 2019-09-09 NOTE — TELEPHONE ENCOUNTER
Refill medication Revatio    LOV 08/02/2019  Labs 06/26/2019    Tania Almonte MA on 9/9/2019 at 4:15 PM

## 2019-09-16 ENCOUNTER — TRANSFERRED RECORDS (OUTPATIENT)
Dept: FAMILY MEDICINE | Facility: CLINIC | Age: 64
End: 2019-09-16

## 2019-09-16 RX ORDER — SILDENAFIL CITRATE 20 MG/1
TABLET ORAL
Qty: 90 TABLET | Refills: 0 | Status: SHIPPED | OUTPATIENT
Start: 2019-09-16 | End: 2019-10-25

## 2019-09-19 DIAGNOSIS — Z98.890 S/P LUMBAR LAMINECTOMY: ICD-10-CM

## 2019-09-23 ENCOUNTER — TELEPHONE (OUTPATIENT)
Dept: FAMILY MEDICINE | Facility: CLINIC | Age: 64
End: 2019-09-23

## 2019-09-23 NOTE — TELEPHONE ENCOUNTER
Received fax from [employer] requesting PA for Sildenafil.  Submitted through covermymeds.  Will wait for response.

## 2019-09-23 NOTE — TELEPHONE ENCOUNTER
PA for Sildenafil was denied.  Medicare will not cover this medication.  Called patient and informed him.  Told him about goodrx and coupon he can print out to get medication cheaper.

## 2019-09-24 ENCOUNTER — ALLIED HEALTH/NURSE VISIT (OUTPATIENT)
Dept: EDUCATION SERVICES | Facility: CLINIC | Age: 64
End: 2019-09-24
Payer: COMMERCIAL

## 2019-09-24 VITALS — BODY MASS INDEX: 31.59 KG/M2 | WEIGHT: 213.9 LBS

## 2019-09-24 DIAGNOSIS — E11.43 TYPE 2 DIABETES MELLITUS WITH DIABETIC AUTONOMIC NEUROPATHY, WITHOUT LONG-TERM CURRENT USE OF INSULIN (H): Primary | ICD-10-CM

## 2019-09-24 PROCEDURE — G0108 DIAB MANAGE TRN  PER INDIV: HCPCS

## 2019-09-24 NOTE — PROGRESS NOTES
"Diabetes Self-Management Education & Support    Diabetes Education Self Management & Training    SUBJECTIVE/OBJECTIVE:  Presents for: Follow-up  Accompanied by: Spouse  Diabetes education in the past 24mo: No  Focus of Visit: Healthy Eating, Self-care behavioral goal setting  Diabetes type: Type 2  Date of diagnosis: ~20 years ago   Disease course: Improving  How confident are you filling out medical forms by yourself:: Extremely  Diabetes management related comments/concerns: Just wanting to check in on things   Transportation concerns: No  Cultural Influences/Ethnic Background:  American    Diabetes Symptoms & Complications  Blurred vision: No  Fatigue: No  Foot ulcerations: No  Polydipsia: No  Polyphagia: No  Polyuria: No  Visual change: No  Weakness: No  Weight loss: No  Slow healing wounds: No  Weight trend: Fluctuating minimally  Autonomic neuropathy: Yes  CVA: No  Heart disease: Yes  Nephropathy: No  Peripheral neuropathy: Yes  Peripheral Vascular Disease: No  Retinopathy: No  Sexual dysfunction: Yes    Patient Problem List and Family Medical History reviewed for relevant medical history, current medical status, and diabetes risk factors.    Vitals:  Wt 97 kg (213 lb 14.4 oz)   BMI 31.59 kg/m    Estimated body mass index is 31.59 kg/m  as calculated from the following:    Height as of 9/2/19: 1.753 m (5' 9\").    Weight as of this encounter: 97 kg (213 lb 14.4 oz).   Last 3 BP:   BP Readings from Last 3 Encounters:   09/02/19 (!) 179/99   08/02/19 128/70   07/03/19 94/62       History   Smoking Status     Never Smoker   Smokeless Tobacco     Current User       Labs:  Lab Results   Component Value Date    A1C 8.7 06/26/2019     Lab Results   Component Value Date     09/02/2019     Lab Results   Component Value Date    LDL 43 01/30/2019     HDL Cholesterol   Date Value Ref Range Status   01/30/2019 78 >39 mg/dL Final   ]  GFR Estimate   Date Value Ref Range Status   09/02/2019 76 >60 mL/min/[1.73_m2] " Final     Comment:     Non  GFR Calc  Starting 2018, serum creatinine based estimated GFR (eGFR) will be   calculated using the Chronic Kidney Disease Epidemiology Collaboration   (CKD-EPI) equation.       GFR Estimate If Black   Date Value Ref Range Status   2019 88 >60 mL/min/[1.73_m2] Final     Comment:      GFR Calc  Starting 2018, serum creatinine based estimated GFR (eGFR) will be   calculated using the Chronic Kidney Disease Epidemiology Collaboration   (CKD-EPI) equation.       Lab Results   Component Value Date    CR 1.02 2019     No results found for: MICROALBUMIN    Healthy Eating  Healthy Eating Assessed Today: Yes  Cultural/Episcopalian diet restrictions?: No  Patient on a regular basis: Counts carbohydrates, Eats 3 meals a day  Meal planning: Smaller portions, Carbohydrate counting  Meals include: Breakfast, Lunch, Dinner, Snacks  Other: being mindful of portions of carbohydrate containing foods, counting carb choices, using diabetic cookbook for recipes   Beverages: Water, Coffee, Milk  Has patient met with a dietitian in the past?: No    Being Active  Being Active Assessed Today: Yes  Exercise:: Yes  Days per week of moderate to strenuous exercise (like a brisk walk): 2  On average, minutes per day of exercise at this level: 20  How intense was your typical exercise? : Light (like stretching or slow walking)(Plus yard work )  Exercise Minutes per Week: 40  Barrier to exercise: None    Monitoring  Monitoring Assessed Today: Yes  Did patient bring glucose meter to appointment? : Yes  Blood Glucose Meter: Accu-check  Home Glucose (Sugar) Monitorin-2 times per day  Blood glucose trend: Fluctuating minimally  Low Glucose Range (mg/dL):   High Glucose Range (mg/dL): 130-140  Overall Range (mg/dL): 110-130    Patient's blood sugar log in meter shows well controlled blood sugars - generally 110-140 in AM with majority in goal <130 mg/dL    Taking  Medications  Diabetes Medication(s)     Biguanides       metFORMIN (GLUCOPHAGE-XR) 500 MG 24 hr tablet    TAKE 2 TABLETS BY MOUTH EVERY MORNING    Sulfonylureas       glipiZIDE (GLUCOTROL) 10 MG tablet    Take 1 tablet (10 mg) by mouth 2 times daily (before meals)          Taking Medication Assessed Today: Yes  Current Treatments: Oral Agent (dual therapy)  Problems taking diabetes medications regularly?: No  Diabetes medication side effects?: No  Treatment Compliance: All of the time    Problem Solving  Problem Solving Assessed Today: Yes  Hypoglycemia Frequency: Rarely  Hypoglycemia Treatment: Other food  Patient carries a carbohydrate source: No  Medical alert: No  Severe weather/disaster plan for diabetes management?: No  DKA prevention plan?: No    Hypoglycemia symptoms  Confusion: No  Dizziness or Light-Headedness: Yes  Headaches: No  Hunger: No  Mood changes: No  Nervousness/Anxiety: No  Sleepiness: No  Speech difficulty: No  Sweats: Yes  Tremors: No    Hypoglycemia Complications  Blackouts: No  Hospitalization: No  Nocturnal hypoglycemia: No  Required assistance: No  Required glucagon injection: No  Seizures: No    Reducing Risks  Reducing Risks Assessed Today: Yes  Diabetes Risks: Age over 45 years, Sedentary Lifestyle  CAD Risks: Diabetes Mellitus, Obesity, Sedentary lifestyle, Male sex  Has dilated eye exam at least once a year?: Yes(March 2019 )  Sees dentist every 6 months?: No  Sees podiatrist (foot doctor)?: No    Healthy Coping  Healthy Coping Assessed Today: Yes  Emotional response to diabetes: Ready to learn, Concern for health and well-being  Informal Support system:: Children, Spouse  Stage of change: PREPARATION (Decided to change - considering how)  Difficulty affording diabetes management supplies?: No  Support resources: None  Patient Activation Measure Survey Score:  No flowsheet data found.    ASSESSMENT:  Patient feels that things are going well. He felt that previous diet education was  very helpful to support him making improved diet choices. He is trying to monitor portions of carbohydrates at meals & snacks. He purchased a diabetic cookbook that provides nutrition information and has been using this to determine recipes to make. He still occasionally has a scoop of ice cream at night. His weight continues to decrease steadily. He did have one low blood sugar that he associated with a later lunch and after mowing the lawn. He is aware of how to treat a low blood sugar.     Patient's most recent   Lab Results   Component Value Date    A1C 8.7 06/26/2019    is not meeting goal of <7.0    INTERVENTION:   Diabetes knowledge and skills assessment:     Patient is knowledgeable in diabetes management concepts related to: Healthy Eating, Being Active, Monitoring, Taking Medication and Problem Solving    Patient needs further education on the following diabetes management concepts: Healthy Eating and Problem Solving    Based on learning assessment above, most appropriate setting for further diabetes education would be: Group class or Individual setting.    Education provided today on:  AADE Self-Care Behaviors:  Healthy Eating: patient requesting info regarding diverticulitis/diverticulosis - provided both low fiber & high fiber diet info for times when GI symptoms flare vs normal diet, encouraged continued use of diabetic cookbook, portion control and carb counting as appropriate  Problem Solving: low blood glucose - causes, signs/symptoms, treatment and prevention    Opportunities for ongoing education and support in diabetes-self management were discussed.    Pt verbalized understanding of concepts discussed and recommendations provided today.       Education Materials Provided:  [employer] Low Fiber & High Fiber MNT handouts     PLAN:  See Patient Instructions for co-developed, patient-stated behavior change goals.  AVS printed and provided to patient today. See Follow-Up section for recommended  follow-up.  Recommend return to PCP for Hgb A1C recheck and follow up as needed.     Clara Watkins RD, LD, CDE   Time Spent: 30 minutes  Encounter Type: Individual    Any diabetes medication dose changes were made via the CDE Protocol and Collaborative Practice Agreement with the patient's referring provider. A copy of this encounter was shared with the provider.

## 2019-09-24 NOTE — PATIENT INSTRUCTIONS
Keep up the great work!!     Bring blood glucose meter and logbook with you to all doctor and follow-up appointments.    Diabetes Education Telephone Visit Follow-up:    We realize your time is valuable and your health is important! We offer a convenient Telephone Visit follow up! It s a quick way to check in for a medication dose adjustment without having to come back to clinic as soon.    Telephone Visits are often covered by insurance. Please check with your insurance plan to see if this type of visit is covered. If not, the cost is less expensive than an office visit:      Up to 10 minutes (Code 90183): $30    11-20 minutes (Code 33547): $59    More than 20 minutes (Code 77097): $85    Talk with your Diabetes Educator if you want to learn more.      [facility]:  For Your Diabetes Education and Nutrition Appointments Call:  [phone]   For Diabetes Education or Nutrition Related Questions:   Phone: [phone]  E-mail: [email or web address]  Fax: [phone]   If you need a medication refill please contact your pharmacy. Please allow 3 business days for your refills to be completed.

## 2019-09-28 ENCOUNTER — HEALTH MAINTENANCE LETTER (OUTPATIENT)
Age: 64
End: 2019-09-28

## 2019-10-08 DIAGNOSIS — E11.43 TYPE 2 DIABETES MELLITUS WITH DIABETIC AUTONOMIC NEUROPATHY, WITHOUT LONG-TERM CURRENT USE OF INSULIN (H): ICD-10-CM

## 2019-10-10 RX ORDER — GLIPIZIDE 10 MG/1
10 TABLET ORAL
Qty: 180 TABLET | Refills: 0 | Status: SHIPPED | OUTPATIENT
Start: 2019-10-10 | End: 2020-03-26

## 2019-10-16 DIAGNOSIS — G47.33 OSA (OBSTRUCTIVE SLEEP APNEA): ICD-10-CM

## 2019-10-16 DIAGNOSIS — Z76.0 ENCOUNTER FOR MEDICATION REFILL: ICD-10-CM

## 2019-10-16 DIAGNOSIS — E11.43 TYPE 2 DIABETES MELLITUS WITH DIABETIC AUTONOMIC NEUROPATHY, WITHOUT LONG-TERM CURRENT USE OF INSULIN (H): ICD-10-CM

## 2019-10-16 RX ORDER — METFORMIN HCL 500 MG
TABLET, EXTENDED RELEASE 24 HR ORAL
Qty: 180 TABLET | Refills: 0 | Status: SHIPPED | OUTPATIENT
Start: 2019-10-16 | End: 2020-02-25

## 2019-10-16 RX ORDER — ZOLPIDEM TARTRATE 10 MG/1
TABLET ORAL
Qty: 30 TABLET | Refills: 1 | Status: SHIPPED | OUTPATIENT
Start: 2019-10-16 | End: 2019-12-16

## 2019-10-16 RX ORDER — PANTOPRAZOLE SODIUM 40 MG/1
40 TABLET, DELAYED RELEASE ORAL DAILY
Qty: 90 TABLET | Refills: 0 | Status: SHIPPED | OUTPATIENT
Start: 2019-10-16 | End: 2020-02-25

## 2019-10-16 NOTE — TELEPHONE ENCOUNTER
Pantoprazole,Metformin,Zolpidem. LOV 8/2/19. Last A1c 6/26/19  Hemoglobin A1C 8.7  High   4.8 - 5.6 % Final 06/26/2019 11:23 AM 01

## 2019-10-25 DIAGNOSIS — N52.9 ERECTILE DYSFUNCTION, UNSPECIFIED ERECTILE DYSFUNCTION TYPE: ICD-10-CM

## 2019-10-28 RX ORDER — SILDENAFIL CITRATE 20 MG/1
TABLET ORAL
Qty: 90 TABLET | Refills: 0 | Status: SHIPPED | OUTPATIENT
Start: 2019-10-28 | End: 2020-08-03

## 2019-11-12 DIAGNOSIS — I10 ESSENTIAL HYPERTENSION WITH GOAL BLOOD PRESSURE LESS THAN 130/80: ICD-10-CM

## 2019-11-13 RX ORDER — LOSARTAN POTASSIUM 100 MG/1
TABLET ORAL
Qty: 90 TABLET | Refills: 0 | Status: SHIPPED | OUTPATIENT
Start: 2019-11-13 | End: 2020-02-16

## 2019-12-16 DIAGNOSIS — G47.33 OSA (OBSTRUCTIVE SLEEP APNEA): ICD-10-CM

## 2019-12-16 DIAGNOSIS — Z98.890 S/P LUMBAR LAMINECTOMY: ICD-10-CM

## 2019-12-17 RX ORDER — ZOLPIDEM TARTRATE 10 MG/1
TABLET ORAL
Qty: 30 TABLET | Refills: 1 | Status: SHIPPED | OUTPATIENT
Start: 2019-12-17 | End: 2020-02-12

## 2020-02-12 DIAGNOSIS — G47.33 OSA (OBSTRUCTIVE SLEEP APNEA): ICD-10-CM

## 2020-02-12 RX ORDER — ZOLPIDEM TARTRATE 10 MG/1
TABLET ORAL
Qty: 30 TABLET | Refills: 1 | Status: SHIPPED | OUTPATIENT
Start: 2020-02-12 | End: 2020-04-10

## 2020-02-13 DIAGNOSIS — I10 ESSENTIAL HYPERTENSION WITH GOAL BLOOD PRESSURE LESS THAN 130/80: ICD-10-CM

## 2020-02-13 NOTE — TELEPHONE ENCOUNTER
losartan (COZAAR) 100 MG tablet    LOV-08/02/2019  LAST LABS-09/02/2019    Last Comprehensive Metabolic Panel:  Sodium   Date Value Ref Range Status   09/02/2019 140 133 - 144 mmol/L Final     Potassium   Date Value Ref Range Status   09/02/2019 3.9 3.4 - 5.3 mmol/L Final     Chloride   Date Value Ref Range Status   09/02/2019 105 94 - 109 mmol/L Final     Carbon Dioxide   Date Value Ref Range Status   09/02/2019 32 20 - 32 mmol/L Final     Anion Gap   Date Value Ref Range Status   09/02/2019 3 3 - 14 mmol/L Final     Glucose   Date Value Ref Range Status   09/02/2019 173 (H) 70 - 99 mg/dL Final     Urea Nitrogen   Date Value Ref Range Status   09/02/2019 15 7 - 30 mg/dL Final     Creatinine   Date Value Ref Range Status   09/02/2019 1.02 0.66 - 1.25 mg/dL Final     GFR Estimate   Date Value Ref Range Status   09/02/2019 76 >60 mL/min/[1.73_m2] Final     Comment:     Non  GFR Calc  Starting 12/18/2018, serum creatinine based estimated GFR (eGFR) will be   calculated using the Chronic Kidney Disease Epidemiology Collaboration   (CKD-EPI) equation.       Calcium   Date Value Ref Range Status   09/02/2019 9.8 8.5 - 10.1 mg/dL Final     Lab Results   Component Value Date    AST 22 09/02/2019     Lab Results   Component Value Date    ALT 33 09/02/2019     No results found for: BILICONJ   Lab Results   Component Value Date    BILITOTAL 0.8 09/02/2019     Lab Results   Component Value Date    ALBUMIN 3.8 09/02/2019     Lab Results   Component Value Date    PROTTOTAL 7.4 09/02/2019      Lab Results   Component Value Date    ALKPHOS 148 09/02/2019     Lipase   Date Value Ref Range Status   09/02/2019 80 73 - 393 U/L Final

## 2020-02-16 RX ORDER — LOSARTAN POTASSIUM 100 MG/1
TABLET ORAL
Qty: 90 TABLET | Refills: 0 | Status: SHIPPED | OUTPATIENT
Start: 2020-02-16 | End: 2020-05-21

## 2020-02-25 DIAGNOSIS — E11.43 TYPE 2 DIABETES MELLITUS WITH DIABETIC AUTONOMIC NEUROPATHY, WITHOUT LONG-TERM CURRENT USE OF INSULIN (H): ICD-10-CM

## 2020-02-25 DIAGNOSIS — Z76.0 ENCOUNTER FOR MEDICATION REFILL: ICD-10-CM

## 2020-02-25 RX ORDER — PANTOPRAZOLE SODIUM 40 MG/1
TABLET, DELAYED RELEASE ORAL
Qty: 90 TABLET | Refills: 0 | Status: SHIPPED | OUTPATIENT
Start: 2020-02-25 | End: 2020-05-26

## 2020-02-25 RX ORDER — METFORMIN HCL 500 MG
TABLET, EXTENDED RELEASE 24 HR ORAL
Qty: 180 TABLET | Refills: 0 | Status: SHIPPED | OUTPATIENT
Start: 2020-02-25 | End: 2020-05-26

## 2020-03-15 ENCOUNTER — HEALTH MAINTENANCE LETTER (OUTPATIENT)
Age: 65
End: 2020-03-15

## 2020-03-16 DIAGNOSIS — Z98.890 S/P LUMBAR LAMINECTOMY: ICD-10-CM

## 2020-03-26 ENCOUNTER — VIRTUAL VISIT (OUTPATIENT)
Dept: FAMILY MEDICINE | Facility: CLINIC | Age: 65
End: 2020-03-26

## 2020-03-26 DIAGNOSIS — F10.21 ALCOHOL DEPENDENCE IN REMISSION (H): ICD-10-CM

## 2020-03-26 DIAGNOSIS — R35.0 BENIGN PROSTATIC HYPERPLASIA WITH URINARY FREQUENCY: ICD-10-CM

## 2020-03-26 DIAGNOSIS — N40.1 BENIGN PROSTATIC HYPERPLASIA WITH URINARY FREQUENCY: ICD-10-CM

## 2020-03-26 DIAGNOSIS — I10 BENIGN ESSENTIAL HYPERTENSION: ICD-10-CM

## 2020-03-26 DIAGNOSIS — E11.43 TYPE 2 DIABETES MELLITUS WITH DIABETIC AUTONOMIC NEUROPATHY, WITHOUT LONG-TERM CURRENT USE OF INSULIN (H): Primary | ICD-10-CM

## 2020-03-26 PROBLEM — G47.33 OSA (OBSTRUCTIVE SLEEP APNEA): Status: RESOLVED | Noted: 2018-06-21 | Resolved: 2020-03-26

## 2020-03-26 PROCEDURE — 99214 OFFICE O/P EST MOD 30 MIN: CPT | Mod: GT | Performed by: FAMILY MEDICINE

## 2020-03-26 RX ORDER — GLIPIZIDE 10 MG/1
10 TABLET ORAL
Qty: 180 TABLET | Refills: 3 | Status: SHIPPED | OUTPATIENT
Start: 2020-03-26 | End: 2021-04-08

## 2020-03-26 NOTE — LETTER
My Depression Action Plan  Name: Syd Joyce   Date of Birth 1955  Date: 3/26/2020    My doctor: Osmani Seymour   My clinic: [facility] [address] [place] [address] [phone]          GREEN    ZONE   Good Control    What it looks like:     Things are going generally well. You have normal ups and downs. You may even feel depressed from time to time, but bad moods usually last less than a day.   What you need to do:  1. Continue to care for yourself (see self care plan)  2. Check your depression survival kit and update it as needed  3. Follow your physician s recommendations including any medication.  4. Do not stop taking medication unless you consult with your physician first.           YELLOW         ZONE Getting Worse    What it looks like:     Depression is starting to interfere with your life.     It may be hard to get out of bed; you may be starting to isolate yourself from others.    Symptoms of depression are starting to last most all day and this has happened for several days.     You may have suicidal thoughts but they are not constant.   What you need to do:     1. Call your care team. Your response to treatment will improve if you keep your care team informed of your progress. Yellow periods are signs an adjustment may need to be made.     2. Continue your self-care.  Just get dressed and ready for the day.  Don't give yourself time to talk yourself out of it.    3. Talk to someone in your support network.    4. Open up your Depression Self-Care Plan/Wellness Kit.           RED    ZONE Medical Alert - Get Help    What it looks like:     Depression is seriously interfering with your life.     You may experience these or other symptoms: You can t get out of bed most days, can t work or engage in other necessary activities, you have trouble taking care of basic hygiene, or basic responsibilities, thoughts of suicide or death that will not go away,  self-injurious behavior.     What you need to do:  1. Call your care team and request a same-day appointment. If they are not available (weekends or after hours) call your local crisis line, emergency room or 911.            Depression Self-Care Plan / Wellness Kit    Self-Care for Depression  Here s the deal. Your body and mind are really not as separate as most people think.  What you do and think affects how you feel and how you feel influences what you do and think. This means if you do things that people who feel good do, it will help you feel better.  Sometimes this is all it takes.  There is also a place for medication and therapy depending on how severe your depression is, so be sure to consult with your medical provider and/ or Behavioral Health Consultant if your symptoms are worsening or not improving.     In order to better manage my stress, I will:    Exercise  Get some form of exercise, every day. This will help reduce pain and release endorphins, the  feel good  chemicals in your brain. This is almost as good as taking antidepressants!  This is not the same as joining a gym and then never going! (they count on that by the way ) It can be as simple as just going for a walk or doing some gardening, anything that will get you moving.      Hygiene   Maintain good hygiene (get out of bed in the morning, make your bed, brush your teeth, take a shower, and get dressed like you were going to work, even if you are unemployed).  If your clothes don't fit try to get ones that do.    Diet  Strive to eat foods that are good for me, drink plenty of water, and avoid excessive sugar, caffeine, alcohol, and other mood-altering substances.  Some foods that are helpful in depression are: complex carbohydrates, B vitamins, flaxseed, fish or fish oil, fresh fruits and vegetables.    Psychotherapy  Agree to participate in Individual Therapy (if recommended).    Medication  If prescribed medications, I agree to take them.   Missing doses can result in serious side effects.  I understand that drinking alcohol, or other illicit drug use, may cause potential side effects.  I will not stop my medication abruptly without first discussing it with my provider.    Staying Connected With Others  Stay in touch with my friends, family members, and my primary care provider/team.    Use your imagination  Be creative.  We all have a creative side; it doesn t matter if it s oil painting, sand castles, or mud pies! This will also kick up the endorphins.    Witness Beauty  (AKA stop and smell the roses) Take a look outside, even in mid-winter. Notice colors, textures. Watch the squirrels and birds.     Service to others  Be of service to others.  There is always someone else in need.  By helping others we can  get out of ourselves  and remember the really important things.  This also provides opportunities for practicing all the other parts of the program.    Humor  Laugh and be silly!  Adjust your TV habits for less news and crime-drama and more comedy.    Control your stress  Try breathing deep, massage therapy, biofeedback, and meditation. Find time to relax each day.     Crisis Text Line  http://www.crisistextline.org    The Crisis Text Line serves anyone, in any type of crisis, providing access to free, 24/7 support and information via the medium people already use and trust:    Here's how it works:  1.  Text 880-674 from anywhere in the [place], anytime, about any type of crisis.  2.  A live, trained Crisis Counselor receives the text and responds quickly.  3.  The volunteer Crisis Counselor will help you move from a 'hot moment to a cool moment'.    My support system    Clinic Contact:  Phone number:    Contact 1:  Phone number:    Contact 2:  Phone number:    Yazidism/:  Phone number:    Therapist:  Phone number:    Local crisis center:    Phone number:    Other community support:  Phone number:

## 2020-03-26 NOTE — PROGRESS NOTES
Problem(s) Oriented visit        SUBJECTIVE:                                                    Syd Joyce is a 65 year old male who presents to clinic today for the following health issues :    1. Type 2 diabetes mellitus with diabetic autonomic neuropathy, without long-term current use of insulin (H)  [id number]  Diabetes  he  reports no new symptoms.  No significnat or regular episodes of hypo or hyperglycemia  Medication compliance: compliant most of the time  Diabetic diet compliance: compliant most of the time  Diabetic ROS: no polyuria or polydipsia, no chest pain, dyspnea or TIA's, no numbness, tingling or pain in extremities    Home blood sugar monitoring: are performed regularly, Review of patients self blood glucose monitoring shows fasting most always < 130 and post prandial average under 170.  As a direct cause of their history of diabetes they have the following Diabetic complications: peripheral neuropathy    Most  recent A1C: Smoking: BP:   The last 5 available A1C values from [facility] reference lab, [facility]:  No components found for: [id number]     Lab Results   Component Value Date    A1C 8.7 06/26/2019    A1C 5.6 01/30/2019    A1C 5.5 04/19/2018    History   Smoking Status     Never Smoker   Smokeless Tobacco     Current User    BP Readings from Last 1 Encounters:   09/02/19 (!) 179/99        Kidney studies:  Creatinine   Date Value Ref Range Status   09/02/2019 1.02 0.66 - 1.25 mg/dL Final   06/26/2019 1.26 0.76 - 1.27 mg/dL Final   04/25/2019 1.10 0.66 - 1.25 mg/dL Final   ]    GFR Estimate   Date Value Ref Range Status   09/02/2019 76 >60 mL/min/[1.73_m2] Final     Comment:     Non  GFR Calc  Starting 12/18/2018, serum creatinine based estimated GFR (eGFR) will be   calculated using the Chronic Kidney Disease Epidemiology Collaboration   (CKD-EPI) equation.                  No components found for: MICORALBUMINLC      GFR Estimate If Black   Date Value Ref Range Status    09/02/2019 88 >60 mL/min/[1.73_m2] Final     Comment:      GFR Calc  Starting 12/18/2018, serum creatinine based estimated GFR (eGFR) will be   calculated using the Chronic Kidney Disease Epidemiology Collaboration   (CKD-EPI) equation.       Medication (Note: This includes the hypertensive combination subclass to make sure to show all ACEI/ARB's.)     Angiotensin II Receptor Antagonists       losartan (COZAAR) 100 MG tablet    TAKE 1 TABLET BY MOUTH EVERY DAY               Statin and ASA:  Current Outpatient Medications   Medication     blood glucose (ACCU-CHEK GUIDE) test strip     blood glucose monitoring (ACCU-CHEK MED PLUS) meter device kit     blood glucose monitoring (ACCU-CHEK MED PLUS) test strip     blood glucose monitoring (ACCU-CHEK FASTCLIX) lancets     carvedilol (COREG) 25 MG tablet     finasteride (PROSCAR) 5 MG tablet     glipiZIDE (GLUCOTROL) 10 MG tablet     losartan (COZAAR) 100 MG tablet     metFORMIN (GLUCOPHAGE-XR) 500 MG 24 hr tablet     pantoprazole (PROTONIX) 40 MG EC tablet     sildenafil (REVATIO) 20 MG tablet     simvastatin (ZOCOR) 20 MG tablet     tiZANidine (ZANAFLEX) 4 MG tablet     zolpidem (AMBIEN) 10 MG tablet     No current facility-administered medications for this visit.        - Comp. Metabolic Panel (14) ([facility]); Future  - CBC with Diff/Plt ([facility]); Future  - Hemoglobin A1C ([facility]); Future  - glipiZIDE (GLUCOTROL) 10 MG tablet; Take 1 tablet (10 mg) by mouth 2 times daily (before meals)  Dispense: 180 tablet; Refill: 3    2. Benign essential hypertension  Blood presure remains well controlled when checked out of clinic.  bp has not been followed  Reviewed last 6 BP readings in chart:  BP Readings from Last 6 Encounters:   09/02/19 (!) 179/99   08/02/19 128/70   07/03/19 94/62   04/26/19 (!) 150/92   04/16/19 100/60   03/07/19 144/80       he has not experienced any significant side effects from medications for hypertension.    NO active cardiac  complaints or symptoms with exercise.      3. Alcohol dependence in remission (H)  Has now been in sobriety for 7** years!    4. Benign prostatic hyperplasia with urinary frequency  The patient denies dysuria, urinary frequency, nocturia more than once per night, or urinary hesitancy.    - PSA Serum ([facility]); Future       Problem list, Medication list, Allergies, and Medical/Social/Surgical histories reviewed in [facility] and updated as appropriate.   Additional history: as documented    ROS:  General and Resp. completed and negative except as noted above    Histories:   Patient Active Problem List   Diagnosis     Benign essential hypertension     Mixed hyperlipidemia     Esophageal reflux     Advanced directives, counseling/discussion     Insomnia     Type 2 diabetes mellitus with diabetic autonomic neuropathy, without long-term current use of insulin (H)     S/P lumbar laminectomy     Coronary artery disease of native artery of native heart with stable angina pectoris (H)     Polyneuropathy associated with underlying disease (H)     Benign prostatic hyperplasia with urinary frequency     Alcohol dependence in remission (H)     Past Surgical History:   Procedure Laterality Date     COLONOSCOPY       DISCECTOMY LUMBAR POSTERIOR MICROSCOPIC TWO LEVELS N/A 6/30/2017    Procedure: DISCECTOMY LUMBAR POSTERIOR MICROSCOPIC TWO LEVELS;  L3-4 L4-5 POSTERIOR DECOMPRESSION AND INTERSPINUS FIXATION WITHOUT FUSION;  Surgeon: Ray Perez MD;  Location:  OR     PHACOEMULSIFICATION CLEAR CORNEA WITH STANDARD INTRAOCULAR LENS IMPLANT Right 7/17/2018    Procedure: PHACOEMULSIFICATION CLEAR CORNEA WITH STANDARD INTRAOCULAR LENS IMPLANT;  RIGHT EYE PHACOEMULSIFICATION CLEAR CORNEA WITH STANDARD INTRAOCULAR LENS IMPLANT;  Surgeon: Elsie Kay MD;  Location:  [facility]     PHACOEMULSIFICATION CLEAR CORNEA WITH STANDARD INTRAOCULAR LENS IMPLANT Left 7/31/2018    Procedure: PHACOEMULSIFICATION CLEAR CORNEA WITH STANDARD INTRAOCULAR  LENS IMPLANT;  COMPLEX LEFT EYE PHACOEMULSIFICATION CLEAR CORNEA WITH STANDARD INTRAOCULAR LENS IMPLANT WITH MALYUGIN RING;  Surgeon: Elsie Kay MD;  Location:  [facility]     WISDOM TEETH         Social History     Tobacco Use     Smoking status: Never Smoker     Smokeless tobacco: Current User   Substance Use Topics     Alcohol use: Yes     Comment: RARELY     Family History   Problem Relation Age of Onset     Diabetes Brother      Obesity Sister      Obesity Brother      Coronary Artery Disease No family hx of      Cerebrovascular Disease No family hx of            OBJECTIVE:                                                    Wt 89.8 kg (198 lb)   BMI 29.24 kg/m    Body mass index is 29.24 kg/m .   APPEARANCE: = Relaxed and in no distress  Lips/Teeth/Gums = No lesions seen, good dentition, and gums seem healthy  Resp effort = Calm regular breathing  Recent/Remote Memory = Alert and Oriented x 3  Mood/Affect = Cooperative and interested       ASSESSMENT/PLAN:                                                        Syd was seen today for recheck medication.    Diagnoses and all orders for this visit:    Type 2 diabetes mellitus with diabetic autonomic neuropathy, without long-term current use of insulin (H)  Discussed importance in compliance in all areas of diabetic control including diet, routine BS checks, absolute medication compliance, laboratory monitoring, and attending regular follow up appointments as ordered.  Failure to comply with instructions regarding diabetes will lead to a greater chance of poor diabetic control and therefore a greater chance of diabetes related complications such as CAD, CVA, PVD, and retinopathy/neuropathy/nephropathy.  Based on level of diabetes control: testing frequency ONE TIME PER DAY    -     Comp. Metabolic Panel (14) ([facility]); Future  -     CBC with Diff/Plt ([facility]); Future  -     Hemoglobin A1C ([facility]); Future  -     glipiZIDE (GLUCOTROL) 10 MG tablet; Take 1 tablet  (10 mg) by mouth 2 times daily (before meals)    Benign essential hypertension  Discussed current hypertension treatment guidelines, including indications for treatment and treatment options.  Discussed the importance for aggressive management of HTN to prevent vascular complications later.  Recommended lower fat, lower carbohydrate, and lower sodium (<2000 mg)diet.  Discussed required intervals for follow up on HTN, lab studies.  Recommened pt. follow their blood pressures outside the clinic to ensure that BPs are remaining within guidelines, and to contact me if the readings are not within guidelines on a regular basis so we can adjust treatment as needed.    Alcohol dependence in remission (H)    Benign prostatic hyperplasia with urinary frequency  -     PSA Serum ([facility]); Future    Other orders  -     DEPRESSION ACTION PLAN (DAP)        Regular exercise  There are no Patient Instructions on file for this visit.    The following health maintenance items are reviewed in Epic and correct as of today:  Health Maintenance   Topic Date Due     DEPRESSION ACTION PLAN  1955     ZOSTER IMMUNIZATION (1 of 2) 02/04/2003     EYE EXAM  06/11/2013     COLORECTAL CANCER SCREENING  11/20/2016     MICROALBUMIN  06/21/2019     PHQ-9  08/05/2019     INFLUENZA VACCINE (1) 09/01/2019     A1C  12/26/2019     LIPID  01/30/2020     MEDICARE ANNUAL WELLNESS VISIT  02/05/2020     DIABETIC FOOT EXAM  07/03/2020     FALL RISK ASSESSMENT  07/16/2020     BMP  09/02/2020     ADVANCE CARE PLANNING  04/19/2023     DTAP/TDAP/TD IMMUNIZATION (2 - Td) 06/14/2023     HEPATITIS C SCREENING  Completed     PNEUMOCOCCAL IMMUNIZATION 65+ LOW/MEDIUM RISK  Completed     AORTIC ANEURYSM SCREENING (SYSTEM ASSIGNED)  Completed     IPV IMMUNIZATION  Aged Out     MENINGITIS IMMUNIZATION  Aged Out       Shan Arenas MD  [facility]  Family Practice  [facility]  [phone]    For any issues my office # is [phone]

## 2020-04-10 DIAGNOSIS — G47.33 OSA (OBSTRUCTIVE SLEEP APNEA): ICD-10-CM

## 2020-04-10 RX ORDER — ZOLPIDEM TARTRATE 10 MG/1
TABLET ORAL
Qty: 30 TABLET | Refills: 1 | Status: SHIPPED | OUTPATIENT
Start: 2020-04-10 | End: 2020-06-09

## 2020-05-15 NOTE — NURSING NOTE
"Chief Complaint   Patient presents with     Sleep Problem     \"Possible sleep apnea.\"       Initial /82  Pulse 90  Resp 16  Ht 1.753 m (5' 9\")  Wt 99.8 kg (220 lb)  SpO2 99%  BMI 32.49 kg/m2 Estimated body mass index is 32.49 kg/(m^2) as calculated from the following:    Height as of this encounter: 1.753 m (5' 9\").    Weight as of this encounter: 99.8 kg (220 lb).  Medication Reconciliation: complete     ESS 1  Neck 45cm  Martha Hercules    "
obese/soft

## 2020-05-21 DIAGNOSIS — I10 ESSENTIAL HYPERTENSION WITH GOAL BLOOD PRESSURE LESS THAN 130/80: ICD-10-CM

## 2020-05-21 RX ORDER — LOSARTAN POTASSIUM 100 MG/1
TABLET ORAL
Qty: 90 TABLET | Refills: 0 | Status: SHIPPED | OUTPATIENT
Start: 2020-05-21 | End: 2020-08-21

## 2020-05-26 DIAGNOSIS — E11.43 TYPE 2 DIABETES MELLITUS WITH DIABETIC AUTONOMIC NEUROPATHY, WITHOUT LONG-TERM CURRENT USE OF INSULIN (H): ICD-10-CM

## 2020-05-26 DIAGNOSIS — Z76.0 ENCOUNTER FOR MEDICATION REFILL: ICD-10-CM

## 2020-05-26 RX ORDER — METFORMIN HCL 500 MG
TABLET, EXTENDED RELEASE 24 HR ORAL
Qty: 180 TABLET | Refills: 0 | Status: SHIPPED | OUTPATIENT
Start: 2020-05-26 | End: 2020-09-01

## 2020-05-26 RX ORDER — PANTOPRAZOLE SODIUM 40 MG/1
TABLET, DELAYED RELEASE ORAL
Qty: 90 TABLET | Refills: 0 | Status: SHIPPED | OUTPATIENT
Start: 2020-05-26 | End: 2020-09-08

## 2020-05-26 NOTE — TELEPHONE ENCOUNTER
pantoprazole (PROTONIX) 40 MG EC tablet   metFORMIN (GLUCOPHAGE-XR) 500 MG 24 hr tablet     LOV-3/26/2020  LAST LAB-9/2/2019    Last Comprehensive Metabolic Panel:  Sodium   Date Value Ref Range Status   09/02/2019 140 133 - 144 mmol/L Final     Potassium   Date Value Ref Range Status   09/02/2019 3.9 3.4 - 5.3 mmol/L Final     Chloride   Date Value Ref Range Status   09/02/2019 105 94 - 109 mmol/L Final     Carbon Dioxide   Date Value Ref Range Status   09/02/2019 32 20 - 32 mmol/L Final     Anion Gap   Date Value Ref Range Status   09/02/2019 3 3 - 14 mmol/L Final     Glucose   Date Value Ref Range Status   09/02/2019 173 (H) 70 - 99 mg/dL Final     Urea Nitrogen   Date Value Ref Range Status   09/02/2019 15 7 - 30 mg/dL Final     Creatinine   Date Value Ref Range Status   09/02/2019 1.02 0.66 - 1.25 mg/dL Final     GFR Estimate   Date Value Ref Range Status   09/02/2019 76 >60 mL/min/[1.73_m2] Final     Comment:     Non  GFR Calc  Starting 12/18/2018, serum creatinine based estimated GFR (eGFR) will be   calculated using the Chronic Kidney Disease Epidemiology Collaboration   (CKD-EPI) equation.       Calcium   Date Value Ref Range Status   09/02/2019 9.8 8.5 - 10.1 mg/dL Final     Last Comprehensive Metabolic Panel:  Sodium   Date Value Ref Range Status   09/02/2019 140 133 - 144 mmol/L Final     Potassium   Date Value Ref Range Status   09/02/2019 3.9 3.4 - 5.3 mmol/L Final     Chloride   Date Value Ref Range Status   09/02/2019 105 94 - 109 mmol/L Final     Carbon Dioxide   Date Value Ref Range Status   09/02/2019 32 20 - 32 mmol/L Final     Anion Gap   Date Value Ref Range Status   09/02/2019 3 3 - 14 mmol/L Final     Glucose   Date Value Ref Range Status   09/02/2019 173 (H) 70 - 99 mg/dL Final     Urea Nitrogen   Date Value Ref Range Status   09/02/2019 15 7 - 30 mg/dL Final     Creatinine   Date Value Ref Range Status   09/02/2019 1.02 0.66 - 1.25 mg/dL Final     GFR Estimate   Date Value  Ref Range Status   09/02/2019 76 >60 mL/min/[1.73_m2] Final     Comment:     Non  GFR Calc  Starting 12/18/2018, serum creatinine based estimated GFR (eGFR) will be   calculated using the Chronic Kidney Disease Epidemiology Collaboration   (CKD-EPI) equation.       Calcium   Date Value Ref Range Status   09/02/2019 9.8 8.5 - 10.1 mg/dL Final     Bilirubin Total   Date Value Ref Range Status   09/02/2019 0.8 0.2 - 1.3 mg/dL Final     Alkaline Phosphatase   Date Value Ref Range Status   09/02/2019 148 40 - 150 U/L Final     ALT   Date Value Ref Range Status   09/02/2019 33 0 - 70 U/L Final     AST   Date Value Ref Range Status   09/02/2019 22 0 - 45 U/L Final             Hemoglobin A1C   Date Value Ref Range Status   06/26/2019 8.7 (H) 4.8 - 5.6 % Final     Comment:              Prediabetes: 5.7 - 6.4           Diabetes: >6.4           Glycemic control for adults with diabetes: <7.0

## 2020-06-09 DIAGNOSIS — Z98.890 S/P LUMBAR LAMINECTOMY: ICD-10-CM

## 2020-06-09 DIAGNOSIS — G47.33 OSA (OBSTRUCTIVE SLEEP APNEA): ICD-10-CM

## 2020-06-09 RX ORDER — ZOLPIDEM TARTRATE 10 MG/1
TABLET ORAL
Qty: 30 TABLET | Refills: 0 | Status: SHIPPED | OUTPATIENT
Start: 2020-06-09 | End: 2020-07-08

## 2020-06-09 NOTE — TELEPHONE ENCOUNTER
zolpidem (AMBIEN) 10 MG tablet   tiZANidine (ZANAFLEX) 4 MG tablet     LOV-3/26/2020  LAST LAB-9/2/2019    Last Comprehensive Metabolic Panel:  Sodium   Date Value Ref Range Status   09/02/2019 140 133 - 144 mmol/L Final     Potassium   Date Value Ref Range Status   09/02/2019 3.9 3.4 - 5.3 mmol/L Final     Chloride   Date Value Ref Range Status   09/02/2019 105 94 - 109 mmol/L Final     Carbon Dioxide   Date Value Ref Range Status   09/02/2019 32 20 - 32 mmol/L Final     Anion Gap   Date Value Ref Range Status   09/02/2019 3 3 - 14 mmol/L Final     Glucose   Date Value Ref Range Status   09/02/2019 173 (H) 70 - 99 mg/dL Final     Urea Nitrogen   Date Value Ref Range Status   09/02/2019 15 7 - 30 mg/dL Final     Creatinine   Date Value Ref Range Status   09/02/2019 1.02 0.66 - 1.25 mg/dL Final     GFR Estimate   Date Value Ref Range Status   09/02/2019 76 >60 mL/min/[1.73_m2] Final     Comment:     Non  GFR Calc  Starting 12/18/2018, serum creatinine based estimated GFR (eGFR) will be   calculated using the Chronic Kidney Disease Epidemiology Collaboration   (CKD-EPI) equation.       Calcium   Date Value Ref Range Status   09/02/2019 9.8 8.5 - 10.1 mg/dL Final

## 2020-07-01 ENCOUNTER — MYC REFILL (OUTPATIENT)
Dept: FAMILY MEDICINE | Facility: CLINIC | Age: 65
End: 2020-07-01

## 2020-07-01 DIAGNOSIS — E11.43 TYPE 2 DIABETES MELLITUS WITH DIABETIC AUTONOMIC NEUROPATHY, WITHOUT LONG-TERM CURRENT USE OF INSULIN (H): ICD-10-CM

## 2020-07-01 DIAGNOSIS — E78.00 HYPERCHOLESTEREMIA: Primary | ICD-10-CM

## 2020-07-01 RX ORDER — BLOOD SUGAR DIAGNOSTIC
STRIP MISCELLANEOUS
Qty: 100 EACH | Refills: 11 | Status: SHIPPED | OUTPATIENT
Start: 2020-07-01 | End: 2020-07-09

## 2020-07-01 NOTE — TELEPHONE ENCOUNTER
Per Dr. Arenas prescription for test strips sent to pharmacy. Patient is scheduled for labs 7/20/20 and office visit 7/27/20. Tashia Briceno

## 2020-07-08 ENCOUNTER — MYC MEDICAL ADVICE (OUTPATIENT)
Dept: FAMILY MEDICINE | Facility: CLINIC | Age: 65
End: 2020-07-08

## 2020-07-08 DIAGNOSIS — G47.33 OSA (OBSTRUCTIVE SLEEP APNEA): ICD-10-CM

## 2020-07-08 RX ORDER — ZOLPIDEM TARTRATE 10 MG/1
TABLET ORAL
Qty: 30 TABLET | Refills: 0 | Status: SHIPPED | OUTPATIENT
Start: 2020-07-08 | End: 2020-08-10

## 2020-07-09 ENCOUNTER — MYC MEDICAL ADVICE (OUTPATIENT)
Dept: FAMILY MEDICINE | Facility: CLINIC | Age: 65
End: 2020-07-09

## 2020-07-09 DIAGNOSIS — E11.9 TYPE 2 DIABETES MELLITUS (H): Primary | ICD-10-CM

## 2020-07-09 RX ORDER — BLOOD-GLUCOSE METER
EACH MISCELLANEOUS
Qty: 1 KIT | Refills: 0 | Status: SHIPPED | OUTPATIENT
Start: 2020-07-09

## 2020-07-09 NOTE — TELEPHONE ENCOUNTER
Patient sent renettat that his insurance needs new glucose meter and lancets for One Touch that is now covered under his insurance.  Sent new prescription to pharmacy.

## 2020-07-20 DIAGNOSIS — R35.0 BENIGN PROSTATIC HYPERPLASIA WITH URINARY FREQUENCY: ICD-10-CM

## 2020-07-20 DIAGNOSIS — E78.00 HYPERCHOLESTEREMIA: ICD-10-CM

## 2020-07-20 DIAGNOSIS — N40.1 BENIGN PROSTATIC HYPERPLASIA WITH URINARY FREQUENCY: ICD-10-CM

## 2020-07-20 DIAGNOSIS — E11.43 TYPE 2 DIABETES MELLITUS WITH DIABETIC AUTONOMIC NEUROPATHY, WITHOUT LONG-TERM CURRENT USE OF INSULIN (H): ICD-10-CM

## 2020-07-20 LAB
% GRANULOCYTES: 79.8 % (ref 42.2–75.2)
HCT VFR BLD AUTO: 41.4 % (ref 39–51)
HEMOGLOBIN: 14.4 G/DL (ref 13.4–17.5)
LYMPHOCYTES NFR BLD AUTO: 15.1 % (ref 20.5–51.1)
MCH RBC QN AUTO: 30.4 PG (ref 27–31)
MCHC RBC AUTO-ENTMCNC: 34.9 G/DL (ref 33–37)
MCV RBC AUTO: 87.3 FL (ref 80–100)
MONOCYTES NFR BLD AUTO: 5.1 % (ref 1.7–9.3)
PLATELET # BLD AUTO: 214 K/UL (ref 140–450)
RBC # BLD AUTO: 4.75 X10/CMM (ref 4.2–5.9)
WBC # BLD AUTO: 8.3 X10/CMM (ref 3.8–11)

## 2020-07-20 PROCEDURE — 82570 ASSAY OF URINE CREATININE: CPT | Performed by: FAMILY MEDICINE

## 2020-07-20 PROCEDURE — 36415 COLL VENOUS BLD VENIPUNCTURE: CPT | Performed by: FAMILY MEDICINE

## 2020-07-20 PROCEDURE — 82044 UR ALBUMIN SEMIQUANTITATIVE: CPT | Performed by: FAMILY MEDICINE

## 2020-07-20 PROCEDURE — 85025 COMPLETE CBC W/AUTO DIFF WBC: CPT | Performed by: FAMILY MEDICINE

## 2020-07-20 NOTE — PROGRESS NOTES
has 8/3 DM med ck Dagoberto - a1c, cbc, psa, comp, lipid -MA  in open orders  Rylie Clark MA July 20, 2020 11:32 AM

## 2020-07-21 DIAGNOSIS — E78.00 HYPERCHOLESTEREMIA: ICD-10-CM

## 2020-07-21 LAB
ALBUMIN SERPL-MCNC: 4.1 G/DL (ref 3.8–4.8)
ALBUMIN/GLOB SERPL: 2 {RATIO} (ref 1.2–2.2)
ALP SERPL-CCNC: 159 IU/L (ref 39–117)
ALT SERPL-CCNC: 29 IU/L (ref 0–44)
AST SERPL-CCNC: 24 IU/L (ref 0–40)
BILIRUB SERPL-MCNC: 0.5 MG/DL (ref 0–1.2)
BUN SERPL-MCNC: 17 MG/DL (ref 8–27)
BUN/CREATININE RATIO: 15 (ref 10–24)
CALCIUM SERPL-MCNC: 9.1 MG/DL (ref 8.6–10.2)
CHLORIDE SERPLBLD-SCNC: 101 MMOL/L (ref 96–106)
CHOLEST SERPL-MCNC: 126 MG/DL (ref 100–199)
CREAT SERPL-MCNC: 1.14 MG/DL (ref 0.76–1.27)
EGFR IF AFRICN AM: 77 ML/MIN/1.73
EGFR IF NONAFRICN AM: 66 ML/MIN/1.73
GLOBULIN, TOTAL: 2.1 G/DL (ref 1.5–4.5)
GLUCOSE SERPL-MCNC: 167 MG/DL (ref 65–99)
HBA1C MFR BLD: 7.3 % (ref 4.8–5.6)
HDLC SERPL-MCNC: 43 MG/DL
LDL/HDL RATIO: 1.5 RATIO (ref 0–3.6)
LDLC SERPL CALC-MCNC: 64 MG/DL (ref 0–99)
POTASSIUM SERPL-SCNC: 4.4 MMOL/L (ref 3.5–5.2)
PROT SERPL-MCNC: 6.2 G/DL (ref 6–8.5)
PSA NG/ML: 1.9 NG/ML (ref 0–4)
SODIUM SERPL-SCNC: 141 MMOL/L (ref 134–144)
TOTAL CO2: 26 MMOL/L (ref 20–29)
TRIGL SERPL-MCNC: 94 MG/DL (ref 0–149)
VLDLC SERPL CALC-MCNC: 19 MG/DL (ref 5–40)

## 2020-07-21 RX ORDER — SIMVASTATIN 20 MG
20 TABLET ORAL AT BEDTIME
Qty: 90 TABLET | Refills: 3 | Status: SHIPPED | OUTPATIENT
Start: 2020-07-21 | End: 2021-04-26

## 2020-07-21 NOTE — TELEPHONE ENCOUNTER
Simvastatin. Next appt 8/3/20. Labs done yesterday.     Cholesterol   Date Value Ref Range Status   07/20/2020 126 100 - 199 mg/dL Final   01/30/2019 141 100 - 199 mg/dL Final     HDL Cholesterol   Date Value Ref Range Status   07/20/2020 43 >39 mg/dL Final   01/30/2019 78 >39 mg/dL Final     LDL Cholesterol Calculated   Date Value Ref Range Status   07/20/2020 64 0 - 99 mg/dL Final   01/30/2019 43 0 - 99 mg/dL Final     Triglycerides   Date Value Ref Range Status   07/20/2020 94 0 - 149 mg/dL Final   01/30/2019 98 0 - 149 mg/dL Final     No results found for: CHOLHDREZAO

## 2020-07-22 LAB
A/C RATIO MG/G: ABNORMAL MG/G
ALBUMIN (URINE) MG/L: 30 MG/L
INTERPRETATION: ABNORMAL
URINE CREATININE MG/DL - QUEST: 50 MG/DL

## 2020-07-24 NOTE — RESULT ENCOUNTER NOTE
Dear Syd,  Here is a copy of your labs, we will discuss them at your upcoming visit.  Shan Arenas MD

## 2020-08-03 ENCOUNTER — OFFICE VISIT (OUTPATIENT)
Dept: FAMILY MEDICINE | Facility: CLINIC | Age: 65
End: 2020-08-03

## 2020-08-03 VITALS
SYSTOLIC BLOOD PRESSURE: 167 MMHG | RESPIRATION RATE: 16 BRPM | HEART RATE: 73 BPM | HEIGHT: 69 IN | OXYGEN SATURATION: 97 % | WEIGHT: 227 LBS | DIASTOLIC BLOOD PRESSURE: 87 MMHG | TEMPERATURE: 98.7 F | BODY MASS INDEX: 33.62 KG/M2

## 2020-08-03 DIAGNOSIS — F10.21 ALCOHOL DEPENDENCE IN REMISSION (H): ICD-10-CM

## 2020-08-03 DIAGNOSIS — E11.43 TYPE 2 DIABETES MELLITUS WITH DIABETIC AUTONOMIC NEUROPATHY, WITHOUT LONG-TERM CURRENT USE OF INSULIN (H): Primary | ICD-10-CM

## 2020-08-03 DIAGNOSIS — I25.118 CORONARY ARTERY DISEASE OF NATIVE ARTERY OF NATIVE HEART WITH STABLE ANGINA PECTORIS (H): ICD-10-CM

## 2020-08-03 DIAGNOSIS — G63 POLYNEUROPATHY ASSOCIATED WITH UNDERLYING DISEASE (H): ICD-10-CM

## 2020-08-03 DIAGNOSIS — N52.9 ERECTILE DYSFUNCTION, UNSPECIFIED ERECTILE DYSFUNCTION TYPE: ICD-10-CM

## 2020-08-03 DIAGNOSIS — F33.1 MAJOR DEPRESSIVE DISORDER, RECURRENT, MODERATE (H): ICD-10-CM

## 2020-08-03 PROCEDURE — 99214 OFFICE O/P EST MOD 30 MIN: CPT | Performed by: FAMILY MEDICINE

## 2020-08-03 PROCEDURE — 93050 ART PRESSURE WAVEFORM ANALYS: CPT | Performed by: FAMILY MEDICINE

## 2020-08-03 PROCEDURE — 99207 C FOOT EXAM  NO CHARGE: CPT | Performed by: FAMILY MEDICINE

## 2020-08-03 RX ORDER — CARVEDILOL 25 MG/1
50 TABLET ORAL 2 TIMES DAILY
Qty: 360 TABLET | Refills: 3 | Status: SHIPPED | OUTPATIENT
Start: 2020-08-03 | End: 2021-04-26

## 2020-08-03 RX ORDER — SILDENAFIL CITRATE 20 MG/1
TABLET ORAL
Qty: 90 TABLET | Refills: 0 | Status: SHIPPED | OUTPATIENT
Start: 2020-08-03 | End: 2021-04-26

## 2020-08-03 ASSESSMENT — PATIENT HEALTH QUESTIONNAIRE - PHQ9: SUM OF ALL RESPONSES TO PHQ QUESTIONS 1-9: 1

## 2020-08-03 ASSESSMENT — MIFFLIN-ST. JEOR: SCORE: 1795.05

## 2020-08-03 NOTE — PROGRESS NOTES
Problem(s) Oriented visit        SUBJECTIVE:                                                    Syd Joyce is a 65 year old male who presents to clinic today for the following health issues :      1. Type 2 diabetes mellitus with diabetic autonomic neuropathy, without long-term current use of insulin (H)  [id number]  Diabetes  he  reports no new symptoms.  No significnat or regular episodes of hypo or hyperglycemia  Medication compliance: compliant most of the time  Diabetic diet compliance: peripheral neuropathyompliant most of the time  Diabetic ROS: no polyuria or polydipsia, no chest pain, dyspnea or TIA's, no numbness, tingling or pain in extremities    Home blood sugar monitoring: are performed regularly, Review of patients self blood glucose monitoring shows fasting most always < 130 and post prandial average under 170.  As a direct cause of their history of diabetes they have the following Diabetic complications: none and peripheral neuropathy    Most  recent A1C: Smoking: BP:   The last 5 available A1C values from [facility], [facility]:  No components found for: [id number]     Lab Results   Component Value Date    A1C 7.3 07/20/2020    A1C 8.7 06/26/2019    A1C 5.6 01/30/2019    History   Smoking Status     Never Smoker   Smokeless Tobacco     Current User    BP Readings from Last 1 Encounters:   08/03/20 (!) 167/87        Kidney studies:  Creatinine   Date Value Ref Range Status   07/20/2020 1.14 0.76 - 1.27 mg/dL Final   09/02/2019 1.02 0.66 - 1.25 mg/dL Final   06/26/2019 1.26 0.76 - 1.27 mg/dL Final   ]    GFR Estimate   Date Value Ref Range Status   09/02/2019 76 >60 mL/min/[1.73_m2] Final     Comment:     Non  GFR Calc  Starting 12/18/2018, serum creatinine based estimated GFR (eGFR) will be   calculated using the Chronic Kidney Disease Epidemiology Collaboration   (CKD-EPI) equation.                  No components found for: MICORALBUMINLC      GFR Estimate If Black   Date  Value Ref Range Status   09/02/2019 88 >60 mL/min/[1.73_m2] Final     Comment:      GFR Calc  Starting 12/18/2018, serum creatinine based estimated GFR (eGFR) will be   calculated using the Chronic Kidney Disease Epidemiology Collaboration   (CKD-EPI) equation.       Medication (Note: This includes the hypertensive combination subclass to make sure to show all ACEI/ARB's.)     Angiotensin II Receptor Antagonists       losartan (COZAAR) 100 MG tablet    TAKE 1 TABLET BY MOUTH EVERY DAY               Statin and ASA:  Current Outpatient Medications   Medication     aspirin (ASA) 81 MG EC tablet     blood glucose (NO BRAND SPECIFIED) lancets standard     blood glucose (NO BRAND SPECIFIED) test strip     blood glucose monitoring (ONE TOUCH ULTRA 2) meter device kit     carvedilol (COREG) 25 MG tablet     finasteride (PROSCAR) 5 MG tablet     glipiZIDE (GLUCOTROL) 10 MG tablet     losartan (COZAAR) 100 MG tablet     metFORMIN (GLUCOPHAGE-XR) 500 MG 24 hr tablet     pantoprazole (PROTONIX) 40 MG EC tablet     sildenafil (REVATIO) 20 MG tablet     simvastatin (ZOCOR) 20 MG tablet     tiZANidine (ZANAFLEX) 4 MG tablet     zolpidem (AMBIEN) 10 MG tablet     No current facility-administered medications for this visit.        - C ART PRESS WAVEFORM ANALYS CENTRAL ART PRESSURE  - C FOOT EXAM  NO CHARGE  - aspirin (ASA) 81 MG EC tablet; Take 1 tablet (81 mg) by mouth daily  Dispense:      2. Polyneuropathy associated with underlying disease (H)  Takes good care of feet.    3. Major depressive disorder, recurrent, moderate (H)  Depression:  Has known history of depression.  Has been on medication for this.  The patient does not report any significant side effects of this medication.  The prior symptoms leading to the original diagnosis and decision to start medication therapy are better.     Appetite is stable.  Sleeping patterns are stable.  No reported thoughts of suicide or homicide.    Last 3 PHQ9  results:  PHQ-9 SCORE 2/5/2019 8/3/2020   PHQ-9 Total Score 7 1         4. Coronary artery disease of native artery of native heart with stable angina pectoris (H)  Prior of coronary artery disease as listed in medical history.  No current or recent cardiovascaulr symptoms.   No shortness of breath, no episodes of chest pain/pressure, no dyspnea on exertion, no changes in his abiliy to perform physical exertion or tasks.  Takes the same amount of time to perform similar physical tasks.    - carvedilol (COREG) 25 MG tablet; Take 2 tablets (50 mg) by mouth 2 times daily Hold IF heart rate less than 55.  Dispense: 360 tablet; Refill: 3    5. Sobriety since June 2019!  Sobriety continues!      6. Erectile dysfunction, unspecified erectile dysfunction type  Taking Viagra as needed for erectile dysfunction.  He is satisfied with the current dose and effect.  He understands the contraindication with the use of any Nitrates.  He has not experienced any significant side effects of this medication. He would like to continue on it.    - sildenafil (REVATIO) 20 MG tablet; TAKE 2 TO 4 TABLETS BY MOUTH AS NEEDED, NEVER USE WITH NTG, DOXASOSIN, OR TERAZOSIN  Dispense: 90 tablet; Refill: 0       Problem list, Medication list, Allergies, and Medical/Social/Surgical histories reviewed in [facility] and updated as appropriate.   Additional history: as documented    ROS:  General and Resp. completed and negative except as noted above    Histories:   Patient Active Problem List   Diagnosis     Benign essential hypertension     Mixed hyperlipidemia     Esophageal reflux     Advanced directives, counseling/discussion     Insomnia     Type 2 diabetes mellitus with diabetic autonomic neuropathy, without long-term current use of insulin (H)     S/P lumbar laminectomy     Coronary artery disease of native artery of native heart with stable angina pectoris (H)     Polyneuropathy associated with underlying disease (H)     Benign prostatic hyperplasia  "with urinary frequency     Alcohol dependence in remission (H)     Major depressive disorder, recurrent, moderate (H)     Past Surgical History:   Procedure Laterality Date     COLONOSCOPY       DISCECTOMY LUMBAR POSTERIOR MICROSCOPIC TWO LEVELS N/A 6/30/2017    Procedure: DISCECTOMY LUMBAR POSTERIOR MICROSCOPIC TWO LEVELS;  L3-4 L4-5 POSTERIOR DECOMPRESSION AND INTERSPINUS FIXATION WITHOUT FUSION;  Surgeon: Ray Perez MD;  Location:  OR     PHACOEMULSIFICATION CLEAR CORNEA WITH STANDARD INTRAOCULAR LENS IMPLANT Right 7/17/2018    Procedure: PHACOEMULSIFICATION CLEAR CORNEA WITH STANDARD INTRAOCULAR LENS IMPLANT;  RIGHT EYE PHACOEMULSIFICATION CLEAR CORNEA WITH STANDARD INTRAOCULAR LENS IMPLANT;  Surgeon: Elsie Kay MD;  Location:  [facility]     PHACOEMULSIFICATION CLEAR CORNEA WITH STANDARD INTRAOCULAR LENS IMPLANT Left 7/31/2018    Procedure: PHACOEMULSIFICATION CLEAR CORNEA WITH STANDARD INTRAOCULAR LENS IMPLANT;  COMPLEX LEFT EYE PHACOEMULSIFICATION CLEAR CORNEA WITH STANDARD INTRAOCULAR LENS IMPLANT WITH MALYUGIN RING;  Surgeon: Elsie Kay MD;  Location:  [facility]     WISDOM TEETH         Social History     Tobacco Use     Smoking status: Never Smoker     Smokeless tobacco: Current User   Substance Use Topics     Alcohol use: Yes     Comment: RARELY     Family History   Problem Relation Age of Onset     Diabetes Brother      Obesity Sister      Obesity Brother      Coronary Artery Disease No family hx of      Cerebrovascular Disease No family hx of            OBJECTIVE:                                                    BP (!) 167/87   Pulse 73   Temp 98.7  F (37.1  C) (Skin)   Resp 16   Ht 1.753 m (5' 9\")   Wt 103 kg (227 lb)   SpO2 97%   BMI 33.52 kg/m    Body mass index is 33.52 kg/m .   APPEARANCE: = Relaxed and in no distress  Conj/Eyelids = noninjected and lids and lashes are without inflammation  PERRLA/Irises = Pupils Round Reactive to Light and Irisis without inflammation  Neck " = No anterior or posterior adenopathy appreciated.  Thyroid = Not enlarged and no masses felt  Resp effort = Calm regular breathing  Breath Sounds = Good air movement with no rales or rhonchi in any lung fields  Heart Rate, Rhythm, & sounds (no Murm)  = Regular rate and rhythm with no S3, S4, or murmur appreciated.  Carotid Art's = Pulses full and equal and no bruits appreciated  Abdomen = Soft, nontender, no masses, & bowel sounds in all quadrants  Liver/Spleen = Normal span and no splenomegaly noted  Digits and Nails = FROM in all finger joints, no nail dystrophy  Ext (edema) = No pretibial edema noted or elsewhere  Musculsktl =  Strength and ROM of major joints are within normal limits  SKIN = absent significant rashes or lesions   Recent/Remote Memory = Alert and Oriented x 3  Mood/Affect = Cooperative and interested     ASSESSMENT/PLAN:                                                        Syd was seen today for diabetes.    Diagnoses and all orders for this visit:    Type 2 diabetes mellitus with diabetic autonomic neuropathy, without long-term current use of insulin (H)  Continue weight loss!    -     C ART PRESS WAVEFORM ANALYS CENTRAL ART PRESSURE  -     C FOOT EXAM  NO CHARGE  -     aspirin (ASA) 81 MG EC tablet; Take 1 tablet (81 mg) by mouth daily    Polyneuropathy associated with underlying disease (H)  Biggest pain he has......    Major depressive disorder, recurrent, moderate (H)  .doing well, cpm    Coronary artery disease of native artery of native heart with stable angina pectoris (H)  -     carvedilol (COREG) 25 MG tablet; Take 2 tablets (50 mg) by mouth 2 times daily Hold IF heart rate less than 55.    Sobriety since June 2019!    Erectile dysfunction, unspecified erectile dysfunction type  -     sildenafil (REVATIO) 20 MG tablet; TAKE 2 TO 4 TABLETS BY MOUTH AS NEEDED, NEVER USE WITH NTG, DOXASOSIN, OR TERAZOSIN          See Patient Instructions  There are no Patient Instructions on file for  this visit.    The following health maintenance items are reviewed in Epic and correct as of today:  Health Maintenance   Topic Date Due     HEPATITIS B IMMUNIZATION (1 of 3 - Risk 3-dose series) 02/04/1972     ZOSTER IMMUNIZATION (1 of 2) 02/04/2003     COLORECTAL CANCER SCREENING  11/20/2016     MEDICARE ANNUAL WELLNESS VISIT  02/05/2020     DIABETIC FOOT EXAM  07/03/2020     FALL RISK ASSESSMENT  07/16/2020     INFLUENZA VACCINE (1) 09/01/2020     EYE EXAM  09/16/2020     A1C  01/20/2021     BMP  07/20/2021     LIPID  07/20/2021     MICROALBUMIN  07/20/2021     ADVANCE CARE PLANNING  04/19/2023     DTAP/TDAP/TD IMMUNIZATION (2 - Td) 06/14/2023     HEPATITIS C SCREENING  Completed     DEPRESSION ACTION PLAN  Completed     PNEUMOCOCCAL IMMUNIZATION 65+ LOW/MEDIUM RISK  Completed     AORTIC ANEURYSM SCREENING (SYSTEM ASSIGNED)  Completed     IPV IMMUNIZATION  Aged Out     MENINGITIS IMMUNIZATION  Aged Out       Shan Arenas MD  [facility]  Family Practice  [facility]  [phone]    For any issues my office # is [phone]

## 2020-08-10 DIAGNOSIS — G47.33 OSA (OBSTRUCTIVE SLEEP APNEA): ICD-10-CM

## 2020-08-10 RX ORDER — ZOLPIDEM TARTRATE 10 MG/1
TABLET ORAL
Qty: 30 TABLET | Refills: 1 | Status: SHIPPED | OUTPATIENT
Start: 2020-08-10 | End: 2020-10-07

## 2020-08-21 DIAGNOSIS — I10 ESSENTIAL HYPERTENSION WITH GOAL BLOOD PRESSURE LESS THAN 130/80: ICD-10-CM

## 2020-08-21 RX ORDER — LOSARTAN POTASSIUM 100 MG/1
TABLET ORAL
Qty: 90 TABLET | Refills: 0 | Status: SHIPPED | OUTPATIENT
Start: 2020-08-21 | End: 2020-11-19

## 2020-08-31 DIAGNOSIS — E11.43 TYPE 2 DIABETES MELLITUS WITH DIABETIC AUTONOMIC NEUROPATHY, WITHOUT LONG-TERM CURRENT USE OF INSULIN (H): ICD-10-CM

## 2020-09-01 RX ORDER — METFORMIN HCL 500 MG
TABLET, EXTENDED RELEASE 24 HR ORAL
Qty: 180 TABLET | Refills: 0 | Status: SHIPPED | OUTPATIENT
Start: 2020-09-01 | End: 2020-11-30

## 2020-09-08 DIAGNOSIS — Z76.0 ENCOUNTER FOR MEDICATION REFILL: ICD-10-CM

## 2020-09-08 DIAGNOSIS — Z98.890 S/P LUMBAR LAMINECTOMY: ICD-10-CM

## 2020-09-08 RX ORDER — PANTOPRAZOLE SODIUM 40 MG/1
TABLET, DELAYED RELEASE ORAL
Qty: 90 TABLET | Refills: 0 | Status: SHIPPED | OUTPATIENT
Start: 2020-09-08 | End: 2020-12-04

## 2020-09-18 LAB — RETINOPATHY: NEGATIVE

## 2020-10-15 VITALS — TEMPERATURE: 97.9 F

## 2020-10-15 DIAGNOSIS — Z23 FLU VACCINE NEED: Primary | ICD-10-CM

## 2020-10-15 PROCEDURE — 90674 CCIIV4 VAC NO PRSV 0.5 ML IM: CPT | Performed by: FAMILY MEDICINE

## 2020-10-15 PROCEDURE — G0008 ADMIN INFLUENZA VIRUS VAC: HCPCS | Performed by: FAMILY MEDICINE

## 2020-11-06 DIAGNOSIS — G47.33 OSA (OBSTRUCTIVE SLEEP APNEA): ICD-10-CM

## 2020-11-06 RX ORDER — ZOLPIDEM TARTRATE 10 MG/1
TABLET ORAL
Qty: 30 TABLET | Refills: 3 | Status: SHIPPED | OUTPATIENT
Start: 2020-11-06 | End: 2021-03-03

## 2020-11-19 DIAGNOSIS — I10 ESSENTIAL HYPERTENSION WITH GOAL BLOOD PRESSURE LESS THAN 130/80: ICD-10-CM

## 2020-11-19 RX ORDER — LOSARTAN POTASSIUM 100 MG/1
100 TABLET ORAL DAILY
Qty: 90 TABLET | Refills: 0 | Status: SHIPPED | OUTPATIENT
Start: 2020-11-19 | End: 2021-02-19

## 2020-11-19 NOTE — TELEPHONE ENCOUNTER
Losartan  LOV 8/3/2020    BP Readings from Last 3 Encounters:   08/03/20 (!) 167/87   09/02/19 (!) 179/99   08/02/19 128/70

## 2020-11-21 NOTE — PROGRESS NOTES
[facility]  Neurosurgery followup:    HPI: 2 weeks s/p L3-5 posterior decompression and Coflex interspinous fixation without fusion. States he is doing well. He is here today for staple removal.  Exam:  Constitutional:  Alert, well nourished, NAD.  HEENT: Normocephalic, atraumatic.   Pulm:  Without shortness of breath   CV:  No pitting edema of BLE.      Neurological:  Awake  Alert  Oriented x 3  Motor exam:    Able to spontaneously move L/E bilaterally  Sensation intact throughout all L/E dermatomes     Incision: Staples removed today and steris placed without incident by Rosalia Rabago MA.   A/P: 2 weeks s/p L3-5 posterior decompression and Coflex interspinous fixation without fusion.  - followup in 4 weeks with xray prior   - Call the clinic at [phone] for increasing redness, swelling or pus draining from the incision, increased pain or any other questions and concerns.      Dariana Lopez PA-C  [facility]  [address] [address]    Tel [phone]  Pager [phone]    
21-Nov-2020 18:13

## 2020-11-30 DIAGNOSIS — E11.43 TYPE 2 DIABETES MELLITUS WITH DIABETIC AUTONOMIC NEUROPATHY, WITHOUT LONG-TERM CURRENT USE OF INSULIN (H): ICD-10-CM

## 2020-11-30 RX ORDER — METFORMIN HCL 500 MG
TABLET, EXTENDED RELEASE 24 HR ORAL
Qty: 180 TABLET | Refills: 0 | Status: SHIPPED | OUTPATIENT
Start: 2020-11-30 | End: 2021-02-25

## 2020-11-30 NOTE — TELEPHONE ENCOUNTER
Metformin  LOV 8/3/20    Hemoglobin A1C   Date Value Ref Range Status   07/20/2020 7.3 (H) 4.8 - 5.6 % Final     Comment:              Prediabetes: 5.7 - 6.4           Diabetes: >6.4           Glycemic control for adults with diabetes: <7.0     06/26/2019 8.7 (H) 4.8 - 5.6 % Final     Comment:              Prediabetes: 5.7 - 6.4           Diabetes: >6.4           Glycemic control for adults with diabetes: <7.0     01/30/2019 5.6 4.8 - 5.6 % Final     Comment:              Prediabetes: 5.7 - 6.4           Diabetes: >6.4           Glycemic control for adults with diabetes: <7.0

## 2020-12-04 DIAGNOSIS — Z98.890 S/P LUMBAR LAMINECTOMY: ICD-10-CM

## 2020-12-04 DIAGNOSIS — Z76.0 ENCOUNTER FOR MEDICATION REFILL: ICD-10-CM

## 2020-12-04 RX ORDER — PANTOPRAZOLE SODIUM 40 MG/1
40 TABLET, DELAYED RELEASE ORAL DAILY
Qty: 90 TABLET | Refills: 0 | Status: SHIPPED | OUTPATIENT
Start: 2020-12-04 | End: 2021-03-03

## 2021-02-12 DIAGNOSIS — N40.1 BENIGN PROSTATIC HYPERPLASIA WITH LOWER URINARY TRACT SYMPTOMS, SYMPTOM DETAILS UNSPECIFIED: ICD-10-CM

## 2021-02-12 RX ORDER — FINASTERIDE 5 MG/1
5 TABLET, FILM COATED ORAL DAILY
Qty: 90 TABLET | Refills: 1 | Status: SHIPPED | OUTPATIENT
Start: 2021-02-12 | End: 2021-04-26

## 2021-02-12 NOTE — TELEPHONE ENCOUNTER
Finasteride 5 mg     Last OV -8/3/20    Last labs 7/20/20     Ref Range & Units 6mo ago 2yr ago     PSA NG/ML 0.0 - 4.0 ng/mL 1.9  0.9 CM

## 2021-02-19 DIAGNOSIS — I10 ESSENTIAL HYPERTENSION WITH GOAL BLOOD PRESSURE LESS THAN 130/80: ICD-10-CM

## 2021-02-19 NOTE — TELEPHONE ENCOUNTER
losartan (COZAAR) 100 MG    Last OV - 8/3/20  Last labs 7/20/21    BP Readings from Last 3 Encounters:   08/03/20 (!) 167/87   09/02/19 (!) 179/99   08/02/19 128/70     Last Comprehensive Metabolic Panel:  Sodium   Date Value Ref Range Status   07/20/2020 141 134 - 144 mmol/L Final     Potassium   Date Value Ref Range Status   07/20/2020 4.4 3.5 - 5.2 mmol/L Final     Chloride   Date Value Ref Range Status   07/20/2020 101 96 - 106 mmol/L Final     Carbon Dioxide   Date Value Ref Range Status   09/02/2019 32 20 - 32 mmol/L Final     Anion Gap   Date Value Ref Range Status   09/02/2019 3 3 - 14 mmol/L Final     Glucose   Date Value Ref Range Status   07/20/2020 167 (H) 65 - 99 mg/dL Final     Urea Nitrogen   Date Value Ref Range Status   07/20/2020 17 8 - 27 mg/dL Final     BUN/Creatinine Ratio   Date Value Ref Range Status   07/20/2020 15 10 - 24 Final     Creatinine   Date Value Ref Range Status   07/20/2020 1.14 0.76 - 1.27 mg/dL Final     GFR Estimate   Date Value Ref Range Status   09/02/2019 76 >60 mL/min/[1.73_m2] Final     Comment:     Non  GFR Calc  Starting 12/18/2018, serum creatinine based estimated GFR (eGFR) will be   calculated using the Chronic Kidney Disease Epidemiology Collaboration   (CKD-EPI) equation.       Calcium   Date Value Ref Range Status   07/20/2020 9.1 8.6 - 10.2 mg/dL Final

## 2021-02-22 RX ORDER — LOSARTAN POTASSIUM 100 MG/1
TABLET ORAL
Qty: 90 TABLET | Refills: 0 | Status: SHIPPED | OUTPATIENT
Start: 2021-02-22 | End: 2021-04-26

## 2021-02-25 DIAGNOSIS — E11.43 TYPE 2 DIABETES MELLITUS WITH DIABETIC AUTONOMIC NEUROPATHY, WITHOUT LONG-TERM CURRENT USE OF INSULIN (H): ICD-10-CM

## 2021-02-25 RX ORDER — METFORMIN HCL 500 MG
TABLET, EXTENDED RELEASE 24 HR ORAL
Qty: 180 TABLET | Refills: 0 | Status: SHIPPED | OUTPATIENT
Start: 2021-02-25 | End: 2021-04-26 | Stop reason: DRUGHIGH

## 2021-02-25 NOTE — TELEPHONE ENCOUNTER
metFORMIN (GLUCOPHAGE-XR) 500 MG 24 hr    Last OV -3/26/20  Last labs 7/20/20  Patient informed to make DM med check appt soon  States he will call back  Lab Results   Component Value Date    A1C 7.3 07/20/2020    A1C 8.7 06/26/2019    A1C 5.6 01/30/2019    A1C 5.5 04/19/2018    A1C 6.8 06/19/2017

## 2021-03-03 DIAGNOSIS — G47.33 OSA (OBSTRUCTIVE SLEEP APNEA): ICD-10-CM

## 2021-03-03 DIAGNOSIS — Z76.0 ENCOUNTER FOR MEDICATION REFILL: ICD-10-CM

## 2021-03-03 RX ORDER — PANTOPRAZOLE SODIUM 40 MG/1
TABLET, DELAYED RELEASE ORAL
Qty: 90 TABLET | Refills: 0 | Status: SHIPPED | OUTPATIENT
Start: 2021-03-03 | End: 2021-04-26

## 2021-03-03 RX ORDER — ZOLPIDEM TARTRATE 10 MG/1
TABLET ORAL
Qty: 30 TABLET | Refills: 0 | Status: SHIPPED | OUTPATIENT
Start: 2021-03-03 | End: 2021-04-05

## 2021-03-03 NOTE — TELEPHONE ENCOUNTER
Ambien refilled 1 month.  Protonix refilled.    PDMP Review       Value Time User    State PDMP site checked  Yes 3/3/2021 10:40 AM Arelis Ryan APRN CNP Jennifer Wilson, APRN CNP on 3/3/2021 at 10:40 AM

## 2021-03-09 ENCOUNTER — IMMUNIZATION (OUTPATIENT)
Dept: NURSING | Facility: CLINIC | Age: 66
End: 2021-03-09
Payer: COMMERCIAL

## 2021-03-09 PROCEDURE — 0011A PR COVID VAC MODERNA 100 MCG/0.5 ML IM: CPT

## 2021-03-09 PROCEDURE — 91301 PR COVID VAC MODERNA 100 MCG/0.5 ML IM: CPT

## 2021-03-12 DIAGNOSIS — Z98.890 S/P LUMBAR LAMINECTOMY: ICD-10-CM

## 2021-03-13 ENCOUNTER — HEALTH MAINTENANCE LETTER (OUTPATIENT)
Age: 66
End: 2021-03-13

## 2021-04-05 DIAGNOSIS — G47.33 OSA (OBSTRUCTIVE SLEEP APNEA): ICD-10-CM

## 2021-04-06 ENCOUNTER — IMMUNIZATION (OUTPATIENT)
Dept: NURSING | Facility: CLINIC | Age: 66
End: 2021-04-06
Attending: INTERNAL MEDICINE
Payer: COMMERCIAL

## 2021-04-06 PROCEDURE — 0012A PR COVID VAC MODERNA 100 MCG/0.5 ML IM: CPT

## 2021-04-06 PROCEDURE — 91301 PR COVID VAC MODERNA 100 MCG/0.5 ML IM: CPT

## 2021-04-06 RX ORDER — ZOLPIDEM TARTRATE 10 MG/1
TABLET ORAL
Qty: 30 TABLET | Refills: 0 | Status: SHIPPED | OUTPATIENT
Start: 2021-04-06 | End: 2021-04-26

## 2021-04-08 DIAGNOSIS — E11.43 TYPE 2 DIABETES MELLITUS WITH DIABETIC AUTONOMIC NEUROPATHY, WITHOUT LONG-TERM CURRENT USE OF INSULIN (H): ICD-10-CM

## 2021-04-08 RX ORDER — GLIPIZIDE 10 MG/1
TABLET ORAL
Qty: 180 TABLET | Refills: 3 | Status: SHIPPED | OUTPATIENT
Start: 2021-04-08 | End: 2021-04-26

## 2021-04-19 DIAGNOSIS — E11.43 TYPE 2 DIABETES MELLITUS WITH DIABETIC AUTONOMIC NEUROPATHY, WITHOUT LONG-TERM CURRENT USE OF INSULIN (H): Primary | ICD-10-CM

## 2021-04-19 LAB — HBA1C MFR BLD: 8.4 % (ref 4–6)

## 2021-04-19 PROCEDURE — 83036 HEMOGLOBIN GLYCOSYLATED A1C: CPT | Performed by: FAMILY MEDICINE

## 2021-04-19 PROCEDURE — 36415 COLL VENOUS BLD VENIPUNCTURE: CPT | Performed by: FAMILY MEDICINE

## 2021-04-19 NOTE — LETTER
[facility] [address] [place]  [address]  Phone: [phone]    April 21, 2021      Syd Joyce  [address] [address]              Dear Syd,      Here is a copy of your labs, we will discuss them at your upcoming visit.       Sincerely,     Shan Arenas M.D./duo    Results for orders placed or performed in visit on 04/19/21   Hemoglobin A1C ([facility])     Status: Abnormal   Result Value Ref Range    Hemoglobin A1C 8.4 (A) 4.0 - 6.0 %

## 2021-04-26 ENCOUNTER — OFFICE VISIT (OUTPATIENT)
Dept: FAMILY MEDICINE | Facility: CLINIC | Age: 66
End: 2021-04-26

## 2021-04-26 VITALS
OXYGEN SATURATION: 97 % | SYSTOLIC BLOOD PRESSURE: 180 MMHG | HEART RATE: 66 BPM | HEIGHT: 70 IN | WEIGHT: 231 LBS | RESPIRATION RATE: 16 BRPM | DIASTOLIC BLOOD PRESSURE: 92 MMHG | TEMPERATURE: 97.9 F | BODY MASS INDEX: 33.07 KG/M2

## 2021-04-26 DIAGNOSIS — Z98.890 S/P LUMBAR LAMINECTOMY: ICD-10-CM

## 2021-04-26 DIAGNOSIS — I10 ESSENTIAL HYPERTENSION: ICD-10-CM

## 2021-04-26 DIAGNOSIS — E78.00 HYPERCHOLESTEREMIA: ICD-10-CM

## 2021-04-26 DIAGNOSIS — G47.33 OSA (OBSTRUCTIVE SLEEP APNEA): ICD-10-CM

## 2021-04-26 DIAGNOSIS — R35.0 BENIGN PROSTATIC HYPERPLASIA WITH URINARY FREQUENCY: ICD-10-CM

## 2021-04-26 DIAGNOSIS — K21.00 GASTROESOPHAGEAL REFLUX DISEASE WITH ESOPHAGITIS, UNSPECIFIED WHETHER HEMORRHAGE: ICD-10-CM

## 2021-04-26 DIAGNOSIS — F10.21 ALCOHOL DEPENDENCE IN REMISSION (H): ICD-10-CM

## 2021-04-26 DIAGNOSIS — G47.00 INSOMNIA, UNSPECIFIED TYPE: ICD-10-CM

## 2021-04-26 DIAGNOSIS — N40.1 BENIGN PROSTATIC HYPERPLASIA WITH URINARY FREQUENCY: ICD-10-CM

## 2021-04-26 DIAGNOSIS — I25.118 CORONARY ARTERY DISEASE OF NATIVE ARTERY OF NATIVE HEART WITH STABLE ANGINA PECTORIS (H): ICD-10-CM

## 2021-04-26 DIAGNOSIS — Z86.0100 HISTORY OF COLONIC POLYPS: ICD-10-CM

## 2021-04-26 DIAGNOSIS — G63 POLYNEUROPATHY ASSOCIATED WITH UNDERLYING DISEASE (H): ICD-10-CM

## 2021-04-26 DIAGNOSIS — N52.9 ERECTILE DYSFUNCTION, UNSPECIFIED ERECTILE DYSFUNCTION TYPE: ICD-10-CM

## 2021-04-26 DIAGNOSIS — E11.43 TYPE 2 DIABETES MELLITUS WITH DIABETIC AUTONOMIC NEUROPATHY, WITHOUT LONG-TERM CURRENT USE OF INSULIN (H): Primary | ICD-10-CM

## 2021-04-26 PROBLEM — F33.1 MAJOR DEPRESSIVE DISORDER, RECURRENT, MODERATE (H): Status: RESOLVED | Noted: 2020-08-03 | Resolved: 2021-04-26

## 2021-04-26 PROCEDURE — 93050 ART PRESSURE WAVEFORM ANALYS: CPT | Performed by: NURSE PRACTITIONER

## 2021-04-26 PROCEDURE — 99214 OFFICE O/P EST MOD 30 MIN: CPT | Performed by: NURSE PRACTITIONER

## 2021-04-26 RX ORDER — LOSARTAN POTASSIUM 100 MG/1
100 TABLET ORAL DAILY
Qty: 90 TABLET | Refills: 3 | Status: SHIPPED | OUTPATIENT
Start: 2021-04-26 | End: 2022-05-23

## 2021-04-26 RX ORDER — FINASTERIDE 5 MG/1
5 TABLET, FILM COATED ORAL DAILY
Qty: 90 TABLET | Refills: 3 | Status: SHIPPED | OUTPATIENT
Start: 2021-04-26 | End: 2022-02-01

## 2021-04-26 RX ORDER — LANCETS 33 GAUGE
EACH MISCELLANEOUS
COMMUNITY
Start: 2020-07-09 | End: 2022-08-31

## 2021-04-26 RX ORDER — PANTOPRAZOLE SODIUM 40 MG/1
40 TABLET, DELAYED RELEASE ORAL DAILY
Qty: 90 TABLET | Refills: 3 | Status: SHIPPED | OUTPATIENT
Start: 2021-04-26 | End: 2022-06-07

## 2021-04-26 RX ORDER — SILDENAFIL CITRATE 20 MG/1
TABLET ORAL
Qty: 30 TABLET | Refills: 11 | Status: SHIPPED | OUTPATIENT
Start: 2021-04-26 | End: 2023-02-13

## 2021-04-26 RX ORDER — METFORMIN HCL 500 MG
2000 TABLET, EXTENDED RELEASE 24 HR ORAL DAILY
Qty: 360 TABLET | Refills: 1 | Status: SHIPPED | OUTPATIENT
Start: 2021-04-26 | End: 2021-05-06

## 2021-04-26 RX ORDER — ZOLPIDEM TARTRATE 10 MG/1
TABLET ORAL
Qty: 30 TABLET | Refills: 2 | Status: SHIPPED | OUTPATIENT
Start: 2021-04-26 | End: 2021-08-03

## 2021-04-26 RX ORDER — GLIPIZIDE 10 MG/1
10 TABLET ORAL
Qty: 90 TABLET | Refills: 1 | Status: SHIPPED | OUTPATIENT
Start: 2021-04-26 | End: 2021-09-20

## 2021-04-26 RX ORDER — CARVEDILOL 25 MG/1
50 TABLET ORAL 2 TIMES DAILY
Qty: 360 TABLET | Refills: 3 | Status: SHIPPED | OUTPATIENT
Start: 2021-04-26 | End: 2022-06-12

## 2021-04-26 RX ORDER — CHLORTHALIDONE 25 MG/1
25 TABLET ORAL DAILY
Qty: 90 TABLET | Refills: 1 | Status: SHIPPED | OUTPATIENT
Start: 2021-04-26 | End: 2021-05-06

## 2021-04-26 RX ORDER — SIMVASTATIN 20 MG
20 TABLET ORAL AT BEDTIME
Qty: 90 TABLET | Refills: 3 | Status: SHIPPED | OUTPATIENT
Start: 2021-04-26 | End: 2022-06-27

## 2021-04-26 ASSESSMENT — MIFFLIN-ST. JEOR: SCORE: 1824.06

## 2021-04-26 ASSESSMENT — PATIENT HEALTH QUESTIONNAIRE - PHQ9: SUM OF ALL RESPONSES TO PHQ QUESTIONS 1-9: 0

## 2021-04-26 NOTE — PROGRESS NOTES
"Problem(s) Oriented visit        SUBJECTIVE:                                                    Syd Joyce is a 66 year old male who presents to clinic today for the following health issues :     Type 2 diabetes - uncontrolled. Diagnosed about 23 years ago (1998). Currently taking Metformin 1000 mg daily & Glipizide 10 mg BID. No side effects. Rare hypoglycemia. Fasting glucose readings around 200. Wants to get his sugar down. Has peripheral neuropathy since lumbar spine surgery but diabetes has worsened this.   Lab Results   Component Value Date    A1C 8.4 04/19/2021    A1C 7.3 07/20/2020    A1C 8.7 06/26/2019    A1C 5.6 01/30/2019    A1C 5.5 04/19/2018     Hypertension - uncontrolled. Currently taking Losartan 100 mg daily & Carvedilol 50 mg BID without side effects. Has old blood pressure monitor at home (10+ years) but hasn't used. Denies chest pain, pressure, palpitations, shortness of breath, headaches, ankle swelling.  BP Readings from Last 3 Encounters:   04/26/21 (!) 180/92   08/03/20 (!) 167/87   09/02/19 (!) 179/99     Hypercholesteremia - Taking Simvastatin 20 mg daily without myalgias.  Recent Labs   Lab Test 07/20/20  0915 01/30/19  0920   CHOL 126 141   HDL 43 78   LDL 64 43   TRIG 94 98     Coronary artery disease - Taking baby aspirin, statin.     BPH - Taking Finasteride 5 mg daily.    ED - Uses Sildenafil as needed. No use of nitrates.    MARTELL - using Cpap & Ambien 10 mg at bedtime    Sober from alcohol.        Problem list, Medication list, Allergies, and Medical/Social/Surgical histories reviewed in [facility] and updated as appropriate.   Additional history: as documented    ROS:  10 point ROS completed and negative except noted above, including Gen, HEENT, CV, Resp, GI, , MS, Neurologic, Psych    OBJECTIVE:                                                    BP (!) 180/92 (BP Location: Right arm)   Pulse 66   Temp 97.9  F (36.6  C)   Resp 16   Ht 1.778 m (5' 10\")   Wt 104.8 kg (231 lb)   " "SpO2 97%   BMI 33.15 kg/m    Body mass index is 33.15 kg/m .   GENERAL: Adult male in no acute distress  EYES: Eyes grossly normal to inspection  RESP: lungs clear to auscultation - no rales, rhonchi or wheezes  CV: regular rates and rhythm, normal S1 S2, no S3 or S4, no murmur, click or rub and no peripheral edema  MS: extremities normal- no gross deformities noted  SKIN: no suspicious lesions or rashes  NEURO: Normal strength and tone, mentation intact and speech normal  PSYCH: affect normal/bright     ASSESSMENT/PLAN:                                                        BMI:   Estimated body mass index is 33.15 kg/m  as calculated from the following:    Height as of this encounter: 1.778 m (5' 10\").    Weight as of this encounter: 104.8 kg (231 lb).   Weight management plan: Discussed healthy diet and exercise guidelines      Diagnoses and all orders for this visit:    Type 2 diabetes mellitus with diabetic autonomic neuropathy, without long-term current use of insulin (H)  -     canagliflozin (INVOKANA) 100 MG tablet; Take 1 tablet (100 mg) by mouth every morning (before breakfast)  -     glipiZIDE (GLUCOTROL) 10 MG tablet; Take 1 tablet (10 mg) by mouth daily (with dinner)  -     metFORMIN (GLUCOPHAGE-XR) 500 MG 24 hr tablet; Take 4 tablets (2,000 mg) by mouth daily  Uncontrolled. A1C increased from 7.3% - 8.4% in past 9 months. Today we: increased Metformin to 2000 mg daily, added Invokana 100 mg once daily (will order different medication per insurance formulary). Will titrate as tolerated. Decreasing Glipizide to once daily at dinner with instructions to stop completely if experiencing hypoglycemia. Follow-up in clinic in 1 month.    Essential hypertension  -     CO ART PRESS WAVEFORM ANALYS CENTRAL ART PRESSURE  -     chlorthalidone (HYGROTON) 25 MG tablet; Take 1 tablet (25 mg) by mouth daily  -     losartan (COZAAR) 100 MG tablet; Take 1 tablet (100 mg) by mouth daily  Uncontrolled. Adding " Chlorthalidone 25 mg daily. Continue Losartan 100 mg daily & Carvedilol 50 mg BID. Recommend getting home BP monitor. Follow-up one month    Hypercholesteremia  -     simvastatin (ZOCOR) 20 MG tablet; Take 1 tablet (20 mg) by mouth At Bedtime  Continue statin without changes    Coronary artery disease of native artery of native heart with stable angina pectoris (H)  -     carvedilol (COREG) 25 MG tablet; Take 2 tablets (50 mg) by mouth 2 times daily Hold IF heart rate less than 55.    Gastroesophageal reflux disease with esophagitis, unspecified whether hemorrhage  -     pantoprazole (PROTONIX) 40 MG EC tablet; Take 1 tablet (40 mg) by mouth daily    Erectile dysfunction, unspecified erectile dysfunction type  -     sildenafil (REVATIO) 20 MG tablet; TAKE 2 TO 4 TABLETS BY MOUTH AS NEEDED, NEVER USE WITH NTG, DOXASOSIN, OR TERAZOSIN    Benign prostatic hyperplasia with urinary frequency  -     finasteride (PROSCAR) 5 MG tablet; Take 1 tablet (5 mg) by mouth daily    MARTELL (obstructive sleep apnea)    Insomnia, unspecified type  -     zolpidem (AMBIEN) 10 MG tablet; TAKE 1 TABLET BY MOUTH EVERY DAY AT BEDTIME    Polyneuropathy associated with underlying disease (H)    S/P lumbar laminectomy  -     tiZANidine (ZANAFLEX) 4 MG tablet; Take 1 tablet (4 mg) by mouth daily    Alcohol dependence in remission (H)    History of colonic polyps  -     GASTROENTEROLOGY ADULT REF PROCEDURE ONLY; Future        See Patient Instructions  Patient Instructions   For blood pressure  - Continue Losartan & Carvedilol  - ADDING Chlorthalidone 1 pill (25 mg) once daily in the morning. Will make you pee more.   - Plan on fasting blood work next month. Will check electrolytes & kidney function at that time  - Get home BP monitor - Omron arm cuff around $30-40  Goal blood pressure < 130/80, at least want < 140/90    For diabetes  - Increase Metformin to 4 pills once daily. Let me know if having diarrhea.  - Stop morning dose of Glipizide once  you start new medication.   - Invokana (may change due to insurance coverage) 1 pill once daily in the morning    I will call you in 2 weeks to check in but please either call or message sooner if having problems with anything      STACY Garcia CNP  [facility]  Family Practice  [facility]  [phone]    For any issues my office # is [phone]

## 2021-04-26 NOTE — PATIENT INSTRUCTIONS
For blood pressure  - Continue Losartan & Carvedilol  - ADDING Chlorthalidone 1 pill (25 mg) once daily in the morning. Will make you pee more.   - Plan on fasting blood work next month. Will check electrolytes & kidney function at that time  - Get home BP monitor - Omron arm cuff around $30-40  Goal blood pressure < 130/80, at least want < 140/90    For diabetes  - Increase Metformin to 4 pills once daily. Let me know if having diarrhea.  - Stop morning dose of Glipizide once you start new medication.   - Invokana (may change due to insurance coverage) 1 pill once daily in the morning    I will call you in 2 weeks to check in but please either call or message sooner if having problems with anything

## 2021-04-30 DIAGNOSIS — E11.43 TYPE 2 DIABETES MELLITUS WITH DIABETIC AUTONOMIC NEUROPATHY, WITHOUT LONG-TERM CURRENT USE OF INSULIN (H): ICD-10-CM

## 2021-04-30 NOTE — TELEPHONE ENCOUNTER
invokana--was already approved on 4/26/21--was sent to local pharmacy.  [facility]

## 2021-05-05 DIAGNOSIS — I10 ESSENTIAL HYPERTENSION: ICD-10-CM

## 2021-05-05 DIAGNOSIS — E11.43 TYPE 2 DIABETES MELLITUS WITH DIABETIC AUTONOMIC NEUROPATHY, WITHOUT LONG-TERM CURRENT USE OF INSULIN (H): ICD-10-CM

## 2021-05-06 RX ORDER — METFORMIN HCL 500 MG
2000 TABLET, EXTENDED RELEASE 24 HR ORAL DAILY
Qty: 360 TABLET | Refills: 1 | Status: SHIPPED | OUTPATIENT
Start: 2021-05-06 | End: 2021-05-21

## 2021-05-06 RX ORDER — CHLORTHALIDONE 25 MG/1
25 TABLET ORAL DAILY
Qty: 90 TABLET | Refills: 1 | Status: SHIPPED | OUTPATIENT
Start: 2021-05-06 | End: 2021-05-21

## 2021-05-08 ENCOUNTER — HEALTH MAINTENANCE LETTER (OUTPATIENT)
Age: 66
End: 2021-05-08

## 2021-05-21 RX ORDER — CHLORTHALIDONE 25 MG/1
25 TABLET ORAL DAILY
Qty: 90 TABLET | Refills: 3 | COMMUNITY
Start: 2021-05-21 | End: 2022-06-27

## 2021-05-21 RX ORDER — METFORMIN HCL 500 MG
2000 TABLET, EXTENDED RELEASE 24 HR ORAL DAILY
Qty: 360 TABLET | Refills: 3 | COMMUNITY
Start: 2021-05-21 | End: 2022-06-16

## 2021-05-21 NOTE — TELEPHONE ENCOUNTER
Received fax from [facility] for clarification on metformin and chlorthalidone.  Forms filled out and faxed back to pharmacy and epic updated.

## 2021-08-02 DIAGNOSIS — G47.00 INSOMNIA, UNSPECIFIED TYPE: ICD-10-CM

## 2021-08-03 RX ORDER — ZOLPIDEM TARTRATE 10 MG/1
TABLET ORAL
Qty: 30 TABLET | Refills: 2 | Status: SHIPPED | OUTPATIENT
Start: 2021-08-03 | End: 2021-10-28

## 2021-09-10 DIAGNOSIS — E78.00 HYPERCHOLESTEREMIA: ICD-10-CM

## 2021-09-10 DIAGNOSIS — I10 ESSENTIAL HYPERTENSION: ICD-10-CM

## 2021-09-10 DIAGNOSIS — E11.43 TYPE 2 DIABETES MELLITUS WITH DIABETIC AUTONOMIC NEUROPATHY, WITHOUT LONG-TERM CURRENT USE OF INSULIN (H): Primary | ICD-10-CM

## 2021-09-14 DIAGNOSIS — E11.43 TYPE 2 DIABETES MELLITUS WITH DIABETIC AUTONOMIC NEUROPATHY, WITHOUT LONG-TERM CURRENT USE OF INSULIN (H): ICD-10-CM

## 2021-09-14 DIAGNOSIS — I10 ESSENTIAL HYPERTENSION: ICD-10-CM

## 2021-09-14 DIAGNOSIS — E78.00 HYPERCHOLESTEREMIA: ICD-10-CM

## 2021-09-14 LAB
% GRANULOCYTES: 79.3 % (ref 42.2–75.2)
HBA1C MFR BLD: 8.3 % (ref 4–6)
HCT VFR BLD AUTO: 40.3 % (ref 39–51)
HEMOGLOBIN: 14.2 G/DL (ref 13.4–17.5)
LYMPHOCYTES NFR BLD AUTO: 16.3 % (ref 20.5–51.1)
MCH RBC QN AUTO: 29.6 PG (ref 27–31)
MCHC RBC AUTO-ENTMCNC: 35.2 G/DL (ref 33–37)
MCV RBC AUTO: 83.9 FL (ref 80–100)
MONOCYTES NFR BLD AUTO: 4.4 % (ref 1.7–9.3)
PLATELET # BLD AUTO: 215 K/UL (ref 140–450)
RBC # BLD AUTO: 4.79 X10/CMM (ref 4.2–5.9)
WBC # BLD AUTO: 8.4 X10/CMM (ref 3.8–11)

## 2021-09-14 PROCEDURE — 36415 COLL VENOUS BLD VENIPUNCTURE: CPT | Performed by: NURSE PRACTITIONER

## 2021-09-14 PROCEDURE — 82044 UR ALBUMIN SEMIQUANTITATIVE: CPT | Performed by: NURSE PRACTITIONER

## 2021-09-14 PROCEDURE — 83036 HEMOGLOBIN GLYCOSYLATED A1C: CPT | Performed by: NURSE PRACTITIONER

## 2021-09-14 PROCEDURE — 85025 COMPLETE CBC W/AUTO DIFF WBC: CPT | Performed by: NURSE PRACTITIONER

## 2021-09-15 LAB
A/C RATIO MG/G: ABNORMAL MG/G
ALBUMIN (URINE) MG/L: 80 MG/L
ALBUMIN SERPL-MCNC: 4.3 G/DL (ref 3.8–4.8)
ALBUMIN/GLOB SERPL: 2 {RATIO} (ref 1.2–2.2)
ALP SERPL-CCNC: 174 IU/L (ref 44–121)
ALT SERPL-CCNC: 21 IU/L (ref 0–44)
AST SERPL-CCNC: 15 IU/L (ref 0–40)
BILIRUB SERPL-MCNC: 0.4 MG/DL (ref 0–1.2)
BUN SERPL-MCNC: 18 MG/DL (ref 8–27)
BUN/CREATININE RATIO: 16 (ref 10–24)
CALCIUM SERPL-MCNC: 8.8 MG/DL (ref 8.6–10.2)
CHLORIDE SERPLBLD-SCNC: 103 MMOL/L (ref 96–106)
CHOLEST SERPL-MCNC: 136 MG/DL (ref 100–199)
CREAT SERPL-MCNC: 1.15 MG/DL (ref 0.76–1.27)
EGFR IF AFRICN AM: 75 ML/MIN/1.73
EGFR IF NONAFRICN AM: 65 ML/MIN/1.73
GLOBULIN, TOTAL: 2.2 G/DL (ref 1.5–4.5)
GLUCOSE SERPL-MCNC: 203 MG/DL (ref 65–99)
HDLC SERPL-MCNC: 45 MG/DL
INTERPRETATION: ABNORMAL
LDL/HDL RATIO: 1.6 RATIO (ref 0–3.6)
LDLC SERPL CALC-MCNC: 72 MG/DL (ref 0–99)
POTASSIUM SERPL-SCNC: 4.6 MMOL/L (ref 3.5–5.2)
PROT SERPL-MCNC: 6.5 G/DL (ref 6–8.5)
SODIUM SERPL-SCNC: 142 MMOL/L (ref 134–144)
SPECIMEN STATUS REPORT: NORMAL
TOTAL CO2: 25 MMOL/L (ref 20–29)
TRIGL SERPL-MCNC: 104 MG/DL (ref 0–149)
URINE CREATININE MG/DL - QUEST: 50 MG/DL
VLDLC SERPL CALC-MCNC: 19 MG/DL (ref 5–40)

## 2021-09-15 NOTE — NURSING NOTE
Arelis Ryan, STACY CNP sent to [facility]  Can GGT be added on to labs?     Thanks!   Nevin       Called [facility] today to add on GGT #[id number] DX R74.8  They will fax verification of specimen if quantity sufficient to add  Rylie Clark MA September 15, 2021 10:37 AM

## 2021-09-18 ENCOUNTER — TRANSFERRED RECORDS (OUTPATIENT)
Dept: FAMILY MEDICINE | Facility: CLINIC | Age: 66
End: 2021-09-18

## 2021-09-20 ENCOUNTER — OFFICE VISIT (OUTPATIENT)
Dept: FAMILY MEDICINE | Facility: CLINIC | Age: 66
End: 2021-09-20

## 2021-09-20 VITALS
WEIGHT: 220.8 LBS | BODY MASS INDEX: 31.68 KG/M2 | SYSTOLIC BLOOD PRESSURE: 122 MMHG | HEART RATE: 69 BPM | TEMPERATURE: 97.8 F | OXYGEN SATURATION: 97 % | DIASTOLIC BLOOD PRESSURE: 70 MMHG

## 2021-09-20 DIAGNOSIS — E11.43 TYPE 2 DIABETES MELLITUS WITH DIABETIC AUTONOMIC NEUROPATHY, WITHOUT LONG-TERM CURRENT USE OF INSULIN (H): Primary | ICD-10-CM

## 2021-09-20 DIAGNOSIS — G63 POLYNEUROPATHY ASSOCIATED WITH UNDERLYING DISEASE (H): ICD-10-CM

## 2021-09-20 DIAGNOSIS — Z12.11 SPECIAL SCREENING FOR MALIGNANT NEOPLASMS, COLON: ICD-10-CM

## 2021-09-20 DIAGNOSIS — I10 ESSENTIAL HYPERTENSION: ICD-10-CM

## 2021-09-20 LAB
GTT: 24 IU/L (ref 0–65)
SPECIMEN STATUS REPORT: NORMAL

## 2021-09-20 PROCEDURE — 99214 OFFICE O/P EST MOD 30 MIN: CPT | Performed by: NURSE PRACTITIONER

## 2021-09-20 PROCEDURE — 99207 PR FOOT EXAM NO CHARGE: CPT | Performed by: NURSE PRACTITIONER

## 2021-09-20 NOTE — PROGRESS NOTES
Problem(s) Oriented visit        SUBJECTIVE:                                                    Syd Joyce is a 66 year old male who presents to clinic today for the following health issues :    Hypertension - well controlled with Carvedilol, Losartan, Chlorthalidone. Admits to not always taking diuretic if he is going to have a busy day since the frequent urination bothers him. Home blood pressure <130/70-80. Denies chest pain, pressure, palpitations, shortness of breath.  BP Readings from Last 3 Encounters:   09/20/21 122/70   04/26/21 (!) 180/92   08/03/20 (!) 167/87     Type 2 diabetes - Taking Metformin 2000 mg daily & Canagliflozin 100 mg daily. Fasting glucose 190-200. Due for eye exam in Feb, MARIA DEL ROSARIO signed for records. No diabetic retinopathy. Done at [facility]. Polyneuropathy symptoms are stable, no instability or falls.   Lab Results   Component Value Date    A1C 8.3 09/14/2021    A1C 8.4 04/19/2021    A1C 7.3 07/20/2020    A1C 8.7 06/26/2019    A1C 5.6 01/30/2019     Problem list, Medication list, Allergies, and Medical/Social/Surgical histories reviewed in EPIC and updated as appropriate.   Additional history: as documented    ROS:  5 point ROS completed and negative except noted above, including Gen, CV, Resp, MS, Neuro    OBJECTIVE:                                                    /70   Pulse 69   Temp 97.8  F (36.6  C)   Wt 100.2 kg (220 lb 12.8 oz)   SpO2 97%   BMI 31.68 kg/m    Body mass index is 31.68 kg/m .   GENERAL: healthy, alert and no distress  RESP: lungs clear to auscultation - no rales, rhonchi or wheezes  CV: regular rate and rhythm, normal S1 S2, no S3 or S4, no murmur, click or rub, no peripheral edema and peripheral pulses strong  SKIN: no suspicious lesions or rashes  PSYCH: mentation appears normal, affect normal/bright     ASSESSMENT/PLAN:                                                        BMI:   Estimated body mass index is 31.68 kg/m  as calculated  "from the following:    Height as of 4/26/21: 1.778 m (5' 10\").    Weight as of this encounter: 100.2 kg (220 lb 12.8 oz).   Weight management plan: Discussed healthy diet and exercise guidelines      Syd was seen today for diabetes and colonoscopy.    Diagnoses and all orders for this visit:    Type 2 diabetes mellitus with diabetic autonomic neuropathy, without long-term current use of insulin (H)  -     FOOT EXAM  -     Med Therapy Management Referral  -     canagliflozin (INVOKANA) 300 MG tablet; Take 1 tablet (300 mg) by mouth every morning (before breakfast)  Uncontrolled diabetes. Increasing Invokana dose to 300 mg daily. Continue checking blood sugars daily. Plan to follow-up with ZANDER Kirk 3-4 weeks after medication increase. MARIA DEL ROSARIO signed for eye exam records.    Polyneuropathy associated with underlying disease (H)  Stable, safety precautions discussed    Essential hypertension  Well controlled with current medications. Continue to check home BP with goal < 130/80.    Special screening for malignant neoplasms, colon  -     Adult Gastro Ref - Procedure Only; Future        See Patient Instructions  Patient Instructions   New dose for Invokana - 300 mg once daily    Schedule with Yelena Grey RPH 3-4 weeks after starting new dose. You will get a call to schedule    See me on or after 12/15 for labs      STACY Garcia CNP  [facility]  Family Practice  [facility]  [phone]    For any issues my office # is [phone]      "

## 2021-09-20 NOTE — PATIENT INSTRUCTIONS
New dose for Invokana - 300 mg once daily    Schedule with Yelena Grey RPH 3-4 weeks after starting new dose. You will get a call to schedule    See me on or after 12/15 for labs

## 2021-09-27 ENCOUNTER — OFFICE VISIT (OUTPATIENT)
Dept: PHARMACY | Facility: PHYSICIAN GROUP | Age: 66
End: 2021-09-27
Payer: COMMERCIAL

## 2021-09-27 DIAGNOSIS — N40.1 BENIGN PROSTATIC HYPERPLASIA WITH URINARY FREQUENCY: ICD-10-CM

## 2021-09-27 DIAGNOSIS — R12 HEARTBURN: ICD-10-CM

## 2021-09-27 DIAGNOSIS — E11.43 TYPE 2 DIABETES MELLITUS WITH DIABETIC AUTONOMIC NEUROPATHY, WITHOUT LONG-TERM CURRENT USE OF INSULIN (H): Primary | ICD-10-CM

## 2021-09-27 DIAGNOSIS — G47.00 INSOMNIA, UNSPECIFIED TYPE: ICD-10-CM

## 2021-09-27 DIAGNOSIS — R35.0 BENIGN PROSTATIC HYPERPLASIA WITH URINARY FREQUENCY: ICD-10-CM

## 2021-09-27 DIAGNOSIS — I10 ESSENTIAL HYPERTENSION: ICD-10-CM

## 2021-09-27 DIAGNOSIS — I25.118 CORONARY ARTERY DISEASE OF NATIVE ARTERY OF NATIVE HEART WITH STABLE ANGINA PECTORIS (H): ICD-10-CM

## 2021-09-27 DIAGNOSIS — E78.00 HYPERCHOLESTEREMIA: ICD-10-CM

## 2021-09-27 DIAGNOSIS — M54.9 BACK PAIN, UNSPECIFIED BACK LOCATION, UNSPECIFIED BACK PAIN LATERALITY, UNSPECIFIED CHRONICITY: ICD-10-CM

## 2021-09-27 PROCEDURE — 99607 MTMS BY PHARM ADDL 15 MIN: CPT | Performed by: PHARMACIST

## 2021-09-27 PROCEDURE — 99605 MTMS BY PHARM NP 15 MIN: CPT | Performed by: PHARMACIST

## 2021-09-27 NOTE — PROGRESS NOTES
Medication Therapy Management (MTM) Encounter    ASSESSMENT:                            Medication Adherence/Access: No issues identified    Type 2 Diabetes: Patient is not meeting A1c goal of < 7%. Self monitoring of blood glucose is not at goal of fasting  mg/dL. Patient would benefit from increase to Invokana 300mg daily and recheck BMP in 1 month. May need to add additional therapies to get sugars to goal. Since stopping glipizide and starting Invokana the sugars have stuck in the same range, so needing to control prandial spikes without increasing hypoglycemia.      Hypertension: Patient is not meeting blood pressure goal of < 140/90mmHg in office today, but home monitoring has been at goal. Will adjust SGLT2 noted above and see if further weight loss and diuretic effects will help bring down BP as well.     Hyperlipidemia: Stable.    GERD: Stable.    BPH: last checked in 2018, could consider recheck of PSA with next lab draw.     Insomnia: Discussed risks of using zolpidem, however prefers to continue as is given efficacy and not having side effects.     Back pain: Stable.    PLAN:                            1. Increase Invoakana 300mg once daily    2. Keep checking sugars once daily fasting- if not getting under 200 in 2 weeks call me.    3. Consider rechecking PSA with next lab draw- deferring to PCP.     Follow-up: Return in about 4 weeks (around 10/25/2021) for MTM visit in clinic, Lab Work.    SUBJECTIVE/OBJECTIVE:                          Syd Joyce is a 66 year old male coming in for an initial visit. He was referred to me from STACY Garcia CNP.     Reason for visit: diabetes uncontrolled.    Allergies/ADRs: Reviewed in chart  Past Medical History: Reviewed in chart  Tobacco: He reports that he has never smoked. He uses smokeless tobacco.  Alcohol: not currently using    Medication Adherence/Access: no issues reported    Type 2 Diabetes:  Currently taking metformin 2000mg daily and  Invokana 100mg (getting 300mg daily in the mail, but hasn't come yet so still taking 100mg daily). Patient is not experiencing side effects. Had been on glipizide in the past and decreased when invokana was added, but was removed fully from med list on 9/20 at PCP visit.   Blood sugar monitoring: fasting, Ranges (patient reported):   217 - 7 day avg  206- 14 day avg  Symptoms of low blood sugar? none  Symptoms of high blood sugar? none  Eye exam: up to date  Foot exam: up to date  Diet/Exercise: has not been doing formal exercise, does work in the yard. Not watching diet closely, but willing to modify.   Aspirin: Taking 81mg daily and denies side effects  Statin: Yes: simvastatin   ACEi/ARB: Yes: losartan.     Lab Results   Component Value Date    A1C 8.3 09/14/2021    A1C 8.4 04/19/2021    A1C 7.3 07/20/2020    A1C 8.7 06/26/2019    A1C 5.6 01/30/2019       Hypertension/CAD: Current medications include carvedilol 50mg twice daily, losartan 100mg daily, .  Patient does self-monitor blood pressure. Home BP monitoring in range of 120's systolic over 80's diastolic and HR in the 60s.  Patient reports no current medication side effects.    BP Readings from Last 3 Encounters:   09/27/21 (!) 148/70   09/20/21 122/70   04/26/21 (!) 180/92       Hyperlipidemia: Current therapy includes simvastatin 20mg daily.  Patient reports no significant myalgias or other side effects.    Recent Labs   Lab Test 09/14/21  0835 07/20/20  0915   CHOL 136 126   HDL 45 43   LDL 72 64   TRIG 104 94     GERD: Current medications include: Protonix (pantoprazole) 40mg once daily. Pt reports no current symptoms.  Patient feels that current regimen is effective and has noticed when tried off in the past. No hx of stomach bleed.     BPH: Currently taking finasteride 5mg daily, deals with urgency. No other symptoms, denies side effects.     Insomnia: Current medications include: zolpidem. Patient reports works well and no side effects. Melatonin  sometimes to supplement for falling asleep.  Denies any abnormal sleep behaviors. Feels rested when he wakes up.     Back pain: daily tizanidine 4mg daily in the afternoon, tolerating well and works well to keep the pain controlled and stay functional.      Today's Vitals: BP (!) 148/70   Pulse 73   Wt 221 lb (100.2 kg)   BMI 31.71 kg/m    ----------------    I spent 60 minutes with this patient today. An extra 15 minutes were spent creating a medication action plan for the patient. All changes were made via collaborative practice agreement with STACY Garcia CNP. A copy of the visit note was provided to the patient's primary care provider.    The patient was given a summary of these recommendations.     Yelena Grey, Pharm.D, [facility]  Medication Therapy Management Pharmacist  [phone]       Medication Therapy Recommendations  Type 2 diabetes mellitus with diabetic autonomic neuropathy, without long-term current use of insulin (H)    Current Medication: canagliflozin (INVOKANA) 300 MG tablet   Rationale: Does not understand instructions - Adherence - Adherence   Recommendation: Provide Adherence Intervention - increase to 300mg daily - will recheck BMP in 1 month   Status: Patient Agreed - Adherence/Education

## 2021-09-27 NOTE — PATIENT INSTRUCTIONS
Recommendations from today's MTM visit:                                                    MTM (medication therapy management) is a service provided by a clinical pharmacist designed to help you get the most of out of your medicines.   Today we reviewed what your medicines are for, how to know if they are working, that your medicines are safe and how to make your medicine regimen as easy as possible.      1. Increase Invoakana 300mg once daily    2. Keep checking sugars once daily fasting- if not getting under 200 in 2 weeks call me.      Follow-up: Return in about 4 weeks (around 10/25/2021) for MTM visit in clinic, Lab Work.    It was great to speak with you today.  I value your experience and would be very thankful for your time with providing feedback on our clinic survey. You may receive a survey via email or text message in the next few days.     To schedule another MTM appointment, please call the clinic directly or you may call the MTM scheduling line at [phone] or toll-free at [phone].     My Clinical Pharmacist's contact information:                                                      Please feel free to contact me with any questions or concerns you have.      Yelena Grey, Pharm.D, BCACP  Medication Therapy Management Pharmacist  [phone]

## 2021-09-27 NOTE — LETTER
"        Date: 2021    Syd Joyce  [address]  [facility] [address]    Dear Mr. Joyce,    Thank you for talking with me on 2021 about your health and medications. Medicare s MTM (Medication Therapy Management) program helps you understand your medications and use them safely.      This letter includes an action plan (Medication Action Plan) and medication list (Personal Medication List). The action plan has steps you should take to help you get the best results from your medications. The medication list will help you keep track of your medications and how to use them the right way.       Have your action plan and medication list with you when you talk with your doctors, pharmacists, and other healthcare providers in your care team.     Ask your doctors, pharmacists, and other healthcare providers to update the action plan and medication list at every visit.     Take your medication list with you if you go to the hospital or emergency room.     Give a copy of the action plan and medication list to your family or caregivers.     If you want to talk about this letter or any of the papers with it, please call   [phone].We look forward to working with you, your doctors, and other healthcare providers to help you stay healthy through the [facility] MTM program.    Sincerely,  [facility], located within [facility]    Enclosed: Medication Action Plan and Personal Medication List    MEDICATION ACTION PLAN FOR Syd Joyce,  1955     This action plan will help you get the best results from your medications if you:   1. Read \"What we talked about.\"   2. Take the steps listed in the \"What I need to do\" boxes.   3. Fill in \"What I did and when I did it.\"   4. Fill in \"My follow-up plan\" and \"Questions I want to ask.\"     Have this action plan with you when you talk with your doctors, pharmacists, and other healthcare providers in your care team. Share this with your family or caregivers too.  DATE PREPARED: " 2021  What we talked about: Invokana                                                  What I need to do: increase dose to 300mg daily - new prescription is for 1 tablet once daily in the morning.        What I did and when I did it:                                              What we talked about: Testing blood sugars                                                  What I need to do: If you continue to see sugars staying over 200mg/dl after increasing Invokana call me.        What I did and when I did it:                                                 My follow-up plan:                 Questions I want to ask:              If you have any questions about your action plan, call Yelena Grey [facility], Phone: [phone] , Monday-Friday 8-4:30pm.           PERSONAL MEDICATION LIST FOR Syd JoycePHILLIP 1955     This medication list was made for you after we talked. We also used information from your doctor's chart.      Use blank rows to add new medications. Then fill in the dates you started using them.    Cross out medications when you no longer use them. Then write the date and why you stopped using them.    Ask your doctors, pharmacists, and other healthcare providers to update this list at every visit. Keep this list up-to-date with:       Prescription medications    Over the counter drugs     Herbals    Vitamins    Minerals      If you go to the hospital or emergency room, take this list with you. Share this with your family or caregivers too.     DATE PREPARED: 2021  Allergies or side effects: Patient has no known allergies.     Medication:  ASPIRIN 81 MG PO TBEC      How I use it:  Take 1 tablet (81 mg) by mouth daily      Why I use it: Type 2 diabetes     Prescriber:  STACY Serrano CNP      Date I started using it:       Date I stopped using it:         Why I stopped using it:            Medication:  CANAGLIFLOZIN 300 MG PO TABS      How I use it:  Take 1 tablet (300 mg) by mouth  every morning (before breakfast)           Prescriber:  STACY Serrano CNP      Date I started using it:       Date I stopped using it:         Why I stopped using it:            Medication:  CARVEDILOL 25 MG PO TABS      How I use it:  Take 2 tablets (50 mg) by mouth 2 times daily Hold IF heart rate less than 55.      Why I use it: Coronary artery disease     Prescriber:  STACY Serrano CNP      Date I started using it:       Date I stopped using it:         Why I stopped using it:            Medication:  CHLORTHALIDONE 25 MG PO TABS      How I use it:  Take 1 tablet (25 mg) by mouth daily      Why I use it: Essential hypertension    Prescriber:  STACY Serrano CNP      Date I started using it:       Date I stopped using it:         Why I stopped using it:            Medication:  FINASTERIDE 5 MG PO TABS      How I use it:  Take 1 tablet (5 mg) by mouth daily      Why I use it: Benign prostatic hyperplasia with urinary frequency    Prescriber:  STACY Serrano CNP      Date I started using it:       Date I stopped using it:         Why I stopped using it:            Medication:  GLUCOSE BLOOD VI STRP      How I use it:  Use to test blood sugar 2 times daily or as directed.      Why I use it: Type 2 diabetes    Prescriber:  STACY Serrano CNP      Date I started using it:       Date I stopped using it:         Why I stopped using it:            Medication:  LOSARTAN POTASSIUM 100 MG PO TABS      How I use it:  Take 1 tablet (100 mg) by mouth daily      Why I use it: Essential hypertension    Prescriber:  STACY Serrano CNP      Date I started using it:       Date I stopped using it:         Why I stopped using it:            Medication:  METFORMIN HCL  MG PO TB24      How I use it:  Take 4 tablets (2,000 mg) by mouth daily      Why I use it: Type 2 diabetes     Prescriber:  STACY Serrano CNP      Date I started using it:       Date I stopped using  it:         Why I stopped using it:            Medication:  PANTOPRAZOLE SODIUM 40 MG PO TBEC      How I use it:  Take 1 tablet (40 mg) by mouth daily      Why I use it: Heartburn    Prescriber:  STACY Serrano CNP      Date I started using it:       Date I stopped using it:         Why I stopped using it:            Medication:  SILDENAFIL CITRATE 20 MG PO TABS      How I use it:  TAKE 2 TO 4 TABLETS BY MOUTH AS NEEDED, NEVER USE WITH NTG, DOXASOSIN, OR TERAZOSIN      Why I use it: Erectile dysfunction    Prescriber:  STACY Serrano CNP      Date I started using it:       Date I stopped using it:         Why I stopped using it:            Medication:  SIMVASTATIN 20 MG PO TABS      How I use it:  Take 1 tablet (20 mg) by mouth At Bedtime      Why I use it: Hypercholesteremia    Prescriber:  STACY Serrano CNP      Date I started using it:       Date I stopped using it:         Why I stopped using it:            Medication:  TIZANIDINE HCL 4 MG PO TABS      How I use it:  Take 1 tablet (4 mg) by mouth daily      Why I use it: Back spams    Prescriber:  STACY Serrano CNP      Date I started using it:       Date I stopped using it:         Why I stopped using it:            Medication:  ZOLPIDEM TARTRATE 10 MG PO TABS      How I use it:  TAKE 1 TABLET BY MOUTH EVERY DAY AT BEDTIME      Why I use it: Insomnia    Prescriber:  STACY Serrano CNP      Date I started using it:       Date I stopped using it:         Why I stopped using it:            Medication:         How I use it:         Why I use it:      Prescriber:         Date I started using it:       Date I stopped using it:         Why I stopped using it:            Medication:         How I use it:         Why I use it:      Prescriber:         Date I started using it:       Date I stopped using it:         Why I stopped using it:            Medication:         How I use it:         Why I use it:      Prescriber:          Date I started using it:       Date I stopped using it:         Why I stopped using it:              Other Information:     If you have any questions about your medication list, call Yelena Grey RPH, Phone: [phone] , Monday-Friday 8-4:30pm.    According to the Paperwork Reduction Act of 1995, no persons are required to respond to a collection of information unless it displays a valid OMB control number. The valid OMB number for this information collection is 9370-0023. The time required to complete this information collection is estimated to average 40 minutes per response, including the time to review instructions, searching existing data resources, gather the data needed, and complete and review the information collection. If you have any comments concerning the accuracy of the time estimate(s) or suggestions for improving this form, please write to: CMS, Attn: JAMARI Reports Clearance Officer, [address] [place] [id number].

## 2021-09-28 VITALS
HEART RATE: 73 BPM | DIASTOLIC BLOOD PRESSURE: 70 MMHG | WEIGHT: 221 LBS | BODY MASS INDEX: 31.71 KG/M2 | SYSTOLIC BLOOD PRESSURE: 148 MMHG

## 2021-10-12 ENCOUNTER — TELEPHONE (OUTPATIENT)
Dept: FAMILY MEDICINE | Facility: CLINIC | Age: 66
End: 2021-10-12

## 2021-10-23 ENCOUNTER — HEALTH MAINTENANCE LETTER (OUTPATIENT)
Age: 66
End: 2021-10-23

## 2021-10-28 DIAGNOSIS — G47.00 INSOMNIA, UNSPECIFIED TYPE: ICD-10-CM

## 2021-10-28 RX ORDER — ZOLPIDEM TARTRATE 10 MG/1
TABLET ORAL
Qty: 30 TABLET | Refills: 3 | Status: SHIPPED | OUTPATIENT
Start: 2021-10-28 | End: 2022-02-25

## 2021-11-22 DIAGNOSIS — Z23 NEEDS FLU SHOT: Primary | ICD-10-CM

## 2021-11-22 PROCEDURE — G0008 ADMIN INFLUENZA VIRUS VAC: HCPCS | Performed by: FAMILY MEDICINE

## 2021-11-22 PROCEDURE — 90694 VACC AIIV4 NO PRSRV 0.5ML IM: CPT | Performed by: FAMILY MEDICINE

## 2022-01-11 ENCOUNTER — OFFICE VISIT (OUTPATIENT)
Dept: PHARMACY | Facility: PHYSICIAN GROUP | Age: 67
End: 2022-01-11
Payer: COMMERCIAL

## 2022-01-11 VITALS — SYSTOLIC BLOOD PRESSURE: 120 MMHG | DIASTOLIC BLOOD PRESSURE: 70 MMHG

## 2022-01-11 DIAGNOSIS — R12 HEARTBURN: ICD-10-CM

## 2022-01-11 DIAGNOSIS — N40.1 BENIGN PROSTATIC HYPERPLASIA WITH URINARY FREQUENCY: ICD-10-CM

## 2022-01-11 DIAGNOSIS — M54.9 BACK PAIN, UNSPECIFIED BACK LOCATION, UNSPECIFIED BACK PAIN LATERALITY, UNSPECIFIED CHRONICITY: ICD-10-CM

## 2022-01-11 DIAGNOSIS — I25.118 CORONARY ARTERY DISEASE OF NATIVE ARTERY OF NATIVE HEART WITH STABLE ANGINA PECTORIS (H): ICD-10-CM

## 2022-01-11 DIAGNOSIS — G47.00 INSOMNIA, UNSPECIFIED TYPE: ICD-10-CM

## 2022-01-11 DIAGNOSIS — I10 ESSENTIAL HYPERTENSION: ICD-10-CM

## 2022-01-11 DIAGNOSIS — R35.0 BENIGN PROSTATIC HYPERPLASIA WITH URINARY FREQUENCY: ICD-10-CM

## 2022-01-11 DIAGNOSIS — E78.00 HYPERCHOLESTEREMIA: ICD-10-CM

## 2022-01-11 DIAGNOSIS — E11.43 TYPE 2 DIABETES MELLITUS WITH DIABETIC AUTONOMIC NEUROPATHY, WITHOUT LONG-TERM CURRENT USE OF INSULIN (H): Primary | ICD-10-CM

## 2022-01-11 LAB — HBA1C MFR BLD: 9.4 % (ref 4–6)

## 2022-01-11 PROCEDURE — 83036 HEMOGLOBIN GLYCOSYLATED A1C: CPT | Performed by: FAMILY MEDICINE

## 2022-01-11 PROCEDURE — 99607 MTMS BY PHARM ADDL 15 MIN: CPT | Performed by: PHARMACIST

## 2022-01-11 PROCEDURE — 99605 MTMS BY PHARM NP 15 MIN: CPT | Performed by: PHARMACIST

## 2022-01-11 PROCEDURE — 36415 COLL VENOUS BLD VENIPUNCTURE: CPT | Performed by: FAMILY MEDICINE

## 2022-01-11 RX ORDER — DULAGLUTIDE 0.75 MG/.5ML
0.75 INJECTION, SOLUTION SUBCUTANEOUS
COMMUNITY
End: 2022-02-01 | Stop reason: DRUGHIGH

## 2022-01-11 NOTE — LETTER
January 11, 2022  Syd JENNINGS Isiah  [address] [place] [facility] [address]    Dear Mr. Joyce, [facility]        Thank you for talking with me on Jan 11, 2022 about your health and medications. As a follow-up to our conversation, I have included two documents:      1. Your Recommended To-Do List has steps you should take to get the best results from your medications.  2. Your Medication List will help you keep track of your medications and how to take them.    If you want to talk about these documents, please call Yelena Grey RPH at phone: [phone], Monday-Friday 8-4:30pm.    I look forward to working with you and your doctors to make sure your medications work well for you.    Sincerely,    Yelena Grey RPH

## 2022-01-11 NOTE — PATIENT INSTRUCTIONS
Recommendations from today's MTM visit:                                                       1. Trulicity 0.75mg weekly started today, next shot Saturday 1/15, okay to hold until Sunday/Monday if you have nausea and need to wait a few days.     2. Check income= limit for Trulicity program is $69,680 for household of 2.      Follow-up: Return in 3 weeks (on 2/1/2022) for MTM visit in clinic-11:30am.    It was great to speak with you today.  I value your experience and would be very thankful for your time with providing feedback on our clinic survey. You may receive a survey via email or text message in the next few days.     To schedule another MTM appointment, please call the clinic directly or you may call the MTM scheduling line at [phone] or toll-free at [phone].     My Clinical Pharmacist's contact information:                                                      Please feel free to contact me with any questions or concerns you have.      Yelena Grey, Pharm.D, BCACP  Medication Therapy Management Pharmacist  [phone]

## 2022-01-11 NOTE — LETTER
_  Medication List        Prepared on: 1/11/2022     Bring your Medication List when you go to the doctor, hospital, or   emergency room. And, share it with your family or caregivers.     Note any changes to how you take your medications.  Cross out medications when you no longer use them.    Medication How I take it Why I use it Prescriber   aspirin (ASA) 81 MG EC tablet Take 1 tablet (81 mg) by mouth daily Type 2 diabetes Shan Arenas MD   canagliflozin (INVOKANA) 300 MG tablet Take 1 tablet (300 mg) by mouth every morning (before breakfast) Type 2 diabetes STACY Serrano CNP   carvedilol (COREG) 25 MG tablet Take 2 tablets (50 mg) by mouth 2 times daily Hold IF heart rate less than 55. Coronary artery disease STACY Serrano CNP   chlorthalidone (HYGROTON) 25 MG tablet Take 1 tablet (25 mg) by mouth daily Essential Hypertension Shan Arenas MD   dulaglutide (TRULICITY) 0.75 MG/0.5ML pen Inject 0.75 mg Subcutaneous every 7 days Diabetes Shan Arenas MD   finasteride (PROSCAR) 5 MG tablet Take 1 tablet (5 mg) by mouth daily Urinary symptoms STACY Serrano CNP   losartan (COZAAR) 100 MG tablet Take 1 tablet (100 mg) by mouth daily Essential Hypertension STACY Serrano CNP   metFORMIN (GLUCOPHAGE-XR) 500 MG 24 hr tablet Take 4 tablets (2,000 mg) by mouth daily Type 2 diabetes Shan Arenas MD   pantoprazole (PROTONIX) 40 MG EC tablet Take 1 tablet (40 mg) by mouth daily Heartburn STACY Serrano CNP   sildenafil (REVATIO) 20 MG tablet TAKE 2 TO 4 TABLETS BY MOUTH AS NEEDED, NEVER USE WITH NTG, DOXASOSIN, OR TERAZOSIN Erectile dysfunction STACY Serrano CNP   simvastatin (ZOCOR) 20 MG tablet Take 1 tablet (20 mg) by mouth At Bedtime Cholesterol STACY Serrano CNP   tiZANidine (ZANAFLEX) 4 MG tablet Take 1 tablet (4 mg) by mouth daily Back spasms STACY Serrano CNP   zolpidem (AMBIEN) 10 MG tablet TAKE 1 TABLET BY MOUTH  EVERY DAY AT BEDTIME Insomnia Shan Arenas MD         Add new medications, over-the-counter drugs, herbals, vitamins, or  minerals in the blank rows below.    Medication How I take it Why I use it Prescriber                          Allergies:      No Known Allergies        Side effects I have had:              Other Information:             My notes and questions:

## 2022-01-11 NOTE — LETTER
"Recommended To-Do List      Prepared on: 1/11/2022     You can get the best results from your medications by completing the items on this \"To-Do List.\"      Bring your To-Do List when you go to your doctor. And, share it with your family or caregivers.    My To-Do List:  What we talked about: What I should do:   A new medication that may be of benefit to you    Start taking Trulicity 0.75mg weekly          What we talked about: What I should do:                   "

## 2022-01-11 NOTE — PROGRESS NOTES
Medication Therapy Management (MTM) Encounter    ASSESSMENT:                            Medication Adherence/Access: See below for considerations    Type 2 Diabetes: Patient is not meeting A1c goal of < 7%. Self monitoring of blood glucose is not at goal of fasting  mg/dL. Patient would benefit from addition of GLP1 agonist to help with weight loss and blood sugar control while also offering added CV protection. Reviewed MOA, side effects, administration, titration and storage today. He gave himself first shot in office today without issue. Plans to give shots on Saturdays, is slightly concerned about the coverage gap and cost of meds given his SGLT2 inhibitor as well, may qualify for assistance from [employer], but will need to check home income records.    Hypertension/CAD: Stable. BP at goal of <140/90mmHg.     Hyperlipidemia: Not on high intensity statin at this time, due to focus on getting sugars down, not adjusted today but consider switching to atorvastatin in the future.     GERD: Stable. Continue to monitor with new medication.     BPH: Consider future removal of this medication if not needed.      Insomnia: aware of risks, finds medication beneficial without side effects.     Back pain: stable.    PLAN:                            1. Trulicity 0.75mg weekly started today, next shot Saturday 1/15, okay to hold until Sunday/Monday if you have nausea and need to wait a few days. (sample for 4 weeks provided today)    2. Check income= limit for Trulicity program is $69,680 for household of 2. If meeting this, will fill out application next month at office visit.     Future - consider increasing statin intensity.       Follow-up: Return in 3 weeks (on 2/1/2022) for MTM visit in clinic-11:30am.    SUBJECTIVE/OBJECTIVE:                          Syd Joyce is a 66 year old male coming in for a follow-up visit. He was referred to me from .  Today's visit is a follow-up MTM visit from  9/27/2021     Reason for visit: first visit of this year.    Allergies/ADRs: Reviewed in chart  Past Medical History: Reviewed in chart  Tobacco: He reports that he has never smoked. He uses smokeless tobacco.  Alcohol: not currently using    Medication Adherence/Access: no issues reported    Type 2 Diabetes:  Currently taking metformin 2000mg daily and Invokana 300mg daily.   Blood sugar monitoring: fasting, Ranges (patient reported):   237 - 7 day avg  Symptoms of low blood sugar? none  Symptoms of high blood sugar? none  Eye exam: up to date  Foot exam: up to date  Diet/Exercise: has not been doing formal exercise, does work in the yard. Not watching diet closely, but willing to modify.   Aspirin: Taking 81mg daily and denies side effects  Statin: Yes: simvastatin   ACEi/ARB: Yes: losartan.   Lab Results   Component Value Date    A1C 8.3 09/14/2021    A1C 8.4 04/19/2021    A1C 7.3 07/20/2020    A1C 8.7 06/26/2019    A1C 5.6 01/30/2019       Hypertension/CAD: Current medications include carvedilol 50mg twice daily, losartan 100mg daily, chlorthalidone 25mg daily  Patient does self-monitor blood pressure. Home BP monitoring in range of 110's systolic over 70's diastolic and HR in the 60s.  Patient reports no current medication side effects.  BP Readings from Last 3 Encounters:   01/11/22 120/70   09/27/21 (!) 148/70   09/20/21 122/70     Hyperlipidemia: Current therapy includes simvastatin 20mg daily.  Patient reports no significant myalgias or other side effects.    Recent Labs   Lab Test 09/14/21  0835 07/20/20  0915   CHOL 136 126   HDL 45 43   LDL 72 64   TRIG 104 94     GERD: Current medications include: Protonix (pantoprazole) 40mg once daily. Pt reports no current symptoms.  Patient feels that current regimen is effective and has noticed when tried off in the past. No hx of stomach bleed.     BPH: Currently taking finasteride 5mg daily, deals with urgency. No other symptoms, denies side effects. Not sure  that he needs this.     Insomnia: Current medications include: zolpidem 10mg daily. Patient reports works well and no side effects. Melatonin sometimes to supplement for falling asleep.  Denies any abnormal sleep behaviors. Feels rested when he wakes up.     Back pain: daily tizanidine 4mg daily in the afternoon, tolerating well and works well to keep the pain controlled and stay functional.      Today's Vitals: /70   ----------------      I spent 40 minutes with this patient today (an extra 15 minutes was spent creating the Medication Action Plan). All changes were made via collaborative practice agreement with Shan Arenas MD. A copy of the visit note was provided to the patient's primary care provider.    The patient was sent via [employer] a summary of these recommendations.     Yelena Grey, Pharm.D, [facility]  Medication Therapy Management Pharmacist  [phone]       Medication Therapy Recommendations  Type 2 diabetes mellitus with diabetic autonomic neuropathy, without long-term current use of insulin (H)    Current Medication: canagliflozin (INVOKANA) 300 MG tablet   Rationale: Synergistic therapy - Needs additional medication therapy - Indication   Recommendation: Start Medication - Trulicity 0.75 MG/0.5ML Sopn - start weekly 0.75mg Trulicity   Status: Accepted per CPA

## 2022-01-12 LAB
ALBUMIN SERPL-MCNC: 4.2 G/DL (ref 3.8–4.8)
ALBUMIN/GLOB SERPL: 2.3 {RATIO} (ref 1.2–2.2)
ALP SERPL-CCNC: 177 IU/L (ref 44–121)
ALT SERPL-CCNC: 21 IU/L (ref 0–44)
AST SERPL-CCNC: 19 IU/L (ref 0–40)
BILIRUB SERPL-MCNC: 0.3 MG/DL (ref 0–1.2)
BUN SERPL-MCNC: 26 MG/DL (ref 8–27)
BUN/CREATININE RATIO: 24 (ref 10–24)
CALCIUM SERPL-MCNC: 9.2 MG/DL (ref 8.6–10.2)
CHLORIDE SERPLBLD-SCNC: 102 MMOL/L (ref 96–106)
CREAT SERPL-MCNC: 1.07 MG/DL (ref 0.76–1.27)
EGFR IF AFRICN AM: 82 ML/MIN/1.73
EGFR IF NONAFRICN AM: 71 ML/MIN/1.73
GLOBULIN, TOTAL: 1.8 G/DL (ref 1.5–4.5)
GLUCOSE SERPL-MCNC: 228 MG/DL (ref 65–99)
POTASSIUM SERPL-SCNC: 4.3 MMOL/L (ref 3.5–5.2)
PROT SERPL-MCNC: 6 G/DL (ref 6–8.5)
SODIUM SERPL-SCNC: 141 MMOL/L (ref 134–144)
TOTAL CO2: 26 MMOL/L (ref 20–29)

## 2022-01-20 ENCOUNTER — MYC MEDICAL ADVICE (OUTPATIENT)
Dept: FAMILY MEDICINE | Facility: CLINIC | Age: 67
End: 2022-01-20

## 2022-01-21 DIAGNOSIS — E11.43 TYPE 2 DIABETES MELLITUS WITH DIABETIC AUTONOMIC NEUROPATHY, WITHOUT LONG-TERM CURRENT USE OF INSULIN (H): ICD-10-CM

## 2022-01-21 RX ORDER — BLOOD SUGAR DIAGNOSTIC
STRIP MISCELLANEOUS
Qty: 100 STRIP | Refills: 11 | Status: SHIPPED | OUTPATIENT
Start: 2022-01-21 | End: 2023-08-24

## 2022-02-01 ENCOUNTER — OFFICE VISIT (OUTPATIENT)
Dept: PHARMACY | Facility: PHYSICIAN GROUP | Age: 67
End: 2022-02-01
Payer: COMMERCIAL

## 2022-02-01 VITALS — BODY MASS INDEX: 31.28 KG/M2 | WEIGHT: 218 LBS | DIASTOLIC BLOOD PRESSURE: 78 MMHG | SYSTOLIC BLOOD PRESSURE: 128 MMHG

## 2022-02-01 DIAGNOSIS — I10 ESSENTIAL HYPERTENSION: ICD-10-CM

## 2022-02-01 DIAGNOSIS — N40.1 BENIGN PROSTATIC HYPERPLASIA WITH URINARY FREQUENCY: ICD-10-CM

## 2022-02-01 DIAGNOSIS — E11.43 TYPE 2 DIABETES MELLITUS WITH DIABETIC AUTONOMIC NEUROPATHY, WITHOUT LONG-TERM CURRENT USE OF INSULIN (H): Primary | ICD-10-CM

## 2022-02-01 DIAGNOSIS — R35.0 BENIGN PROSTATIC HYPERPLASIA WITH URINARY FREQUENCY: ICD-10-CM

## 2022-02-01 PROCEDURE — 99606 MTMS BY PHARM EST 15 MIN: CPT | Performed by: PHARMACIST

## 2022-02-01 PROCEDURE — 99607 MTMS BY PHARM ADDL 15 MIN: CPT | Performed by: PHARMACIST

## 2022-02-01 RX ORDER — DULAGLUTIDE 1.5 MG/.5ML
1.5 INJECTION, SOLUTION SUBCUTANEOUS
Qty: 6 ML | Refills: 3 | Status: SHIPPED | OUTPATIENT
Start: 2022-02-01 | End: 2023-01-12

## 2022-02-01 NOTE — PROGRESS NOTES
Medication Therapy Management (MTM) Encounter    ASSESSMENT:                            Medication Adherence/Access: See below for considerations    Type 2 Diabetes: Patient is not meeting A1c goal of < 7%. Self monitoring of blood glucose is not at goal of fasting  mg/dL. Patient would benefit from increasing the dose of Trulicity to maximize benefits as he is tolerating well and seen great results. Goal to keep costs down would be to cut out the Invokana and continue on higher doses of Trulicity as long as able to tolerate. Reviewed stages of coverage and with 2 weeks of samples to increase dose he could fill first 3 month prescription on 2/22 -5/22 paying deductible and copay, then 5/22-8/22 pay 3 month copay, then 8/22 would likely move into coverage gap and need to pay the higher % cost until end of year as he would not his catastrophic coverage. If the Invokana is filled then the gap would be reached sooner increasing overall out of pocket costs for him. Does not meet income limit for patient assistance for Trulicity. Willing to work on getting to single agent GLP1 agonist with goal to optimize therapy while keeping cost down as much as possible.     Hypertension/CAD: Stable. Would like to keep GLP1 agonist for diabetes management given added CV benefits for him, but need to be able to keep costs down.     BPH: previously discussed in detail and he is wanting to stop the finasteride today and monitor.     PLAN:                            1. Increase trulicity to 1.5mg weekly next week, has 2 weeks of samples, then filling prescription with mail order.     2. Use up Invokana then okay to stop- has about 3 weeks left.     3. Okay to stop finasteride. Continue to monitor symptoms.     4. 3 month supply sent to mail order for the Trulicity 1.5mg dose.     Follow-up: Return in about 3 weeks (around 2/22/2022).    SUBJECTIVE/OBJECTIVE:                          Syd Joyce is a 66 year old male coming in for  a follow-up visit.  Today's visit is a follow-up MTM visit from 1/11     Reason for visit: diabetes follow up, wants to review medication costs and plan to keep costs down.     Allergies/ADRs: Reviewed in chart  Past Medical History: Reviewed in chart  Tobacco: He reports that he has never smoked. He uses smokeless tobacco.  Alcohol: not currently using      Medication Adherence/Access: brought in breakdown on his insurance of pricing with Trulicity costs in different stages of part D coverage. Has not filled Trulicity or Invokana in 2022 yet.      Type 2 Diabetes:  Currently taking metformin 2000mg daily, Invokana 300mg daily and Trulicity 0.75mg weekly (started 1/11)- tolerating well without side effects.   Blood sugar monitoring: fasting, Ranges (patient reported):   149, 145, 138, 164, 144, 146, 151, 166, 163, 154, 174, 167, - all prior readings were in 200s.    Symptoms of low blood sugar? none  Symptoms of high blood sugar? none  Eye exam: up to date  Foot exam: up to date  Diet/Exercise: has not been doing formal exercise, does work in the yard. Not watching diet closely, but willing to modify.   Aspirin: Taking 81mg daily and denies side effects  Statin: Yes: simvastatin   ACEi/ARB: Yes: losartan.   Lab Results   Component Value Date    A1C 9.4 01/11/2022    A1C 8.3 09/14/2021    A1C 8.4 04/19/2021    A1C 7.3 07/20/2020    A1C 8.7 06/26/2019       Hypertension/CAD: Current medications include carvedilol 50mg twice daily, losartan 100mg daily, chlorthalidone 25mg daily  Patient does self-monitor blood pressure. Home BP monitoring in range of 110's systolic over 70's diastolic and HR in the 60s.  Patient reports no current medication side effects.  BP Readings from Last 3 Encounters:   02/01/22 128/78   01/11/22 120/70   09/27/21 (!) 148/70     BPH: Currently taking finasteride 5mg daily, deals with urgency. No other symptoms, denies side effects. Not sure that he needs this.     Today's Vitals: /78    Wt 218 lb (98.9 kg)   BMI 31.28 kg/m    ----------------    I spent 25 minutes with this patient today. All changes were made via collaborative practice agreement with Shan Arenas MD. A copy of the visit note was provided to the patient's provider(s).    The patient was given a summary of these recommendations.     Yelena Grey, Pharm.D, [facility]  Medication Therapy Management Pharmacist  [phone]       Medication Therapy Recommendations  Benign prostatic hyperplasia with urinary frequency    Current Medication: finasteride (PROSCAR) 5 MG tablet (Discontinued)   Rationale: Patient prefers not to take - Adherence - Adherence   Recommendation: Discontinue Medication - stop medication   Status: Accepted per CPA         Type 2 diabetes mellitus with diabetic autonomic neuropathy, without long-term current use of insulin (H)    Current Medication: canagliflozin (INVOKANA) 300 MG tablet (Discontinued)   Rationale: Cannot afford medication product - Cost - Adherence   Recommendation: Change Medication - Trulicity 1.5 MG/0.5ML Sopn - stopping Invokana and increasing Trulicity to maximize effects wiht one cost medication   Status: Accepted per CPA          Current Medication: dulaglutide (TRULICITY) 0.75 MG/0.5ML pen (Discontinued)   Rationale: Dose too low - Dosage too low - Effectiveness   Recommendation: Increase Dose - Trulicity 1.5 MG/0.5ML Sopn - increase dose   Status: Accepted per CPA

## 2022-02-01 NOTE — PATIENT INSTRUCTIONS
Recommendations from today's MTM visit:                                                       1. Increase trulicity to 1.5mg weekly next week.    2. Use up Invokana then okay to stop    3. Okay to stop finasteride. Continue to monitor symptoms.     4. 3 month supply sent to mail order for the Trulicity 1.5mg dose.     Follow-up: Return in about 3 weeks (around 2/22/2022).- 2:30 on 2/22    It was great to speak with you today.  I value your experience and would be very thankful for your time with providing feedback on our clinic survey. You may receive a survey via email or text message in the next few days.     To schedule another MTM appointment, please call the clinic directly or you may call the MTM scheduling line at [phone] or toll-free at [phone].     My Clinical Pharmacist's contact information:                                                      Please feel free to contact me with any questions or concerns you have.      Yelena Grey, Pharm.D, BCACP  Medication Therapy Management Pharmacist  [phone]

## 2022-02-01 NOTE — Clinical Note
FYI on my visit today- seeing much better numbers- hoping to keep it going if he can afford to stay with it.

## 2022-02-03 ENCOUNTER — TRANSFERRED RECORDS (OUTPATIENT)
Dept: FAMILY MEDICINE | Facility: CLINIC | Age: 67
End: 2022-02-03

## 2022-02-03 LAB — RETINOPATHY: NEGATIVE

## 2022-02-17 PROBLEM — G47.33 OSA (OBSTRUCTIVE SLEEP APNEA): Status: RESOLVED | Noted: 2018-06-21 | Resolved: 2021-09-20

## 2022-02-22 ENCOUNTER — OFFICE VISIT (OUTPATIENT)
Dept: PHARMACY | Facility: PHYSICIAN GROUP | Age: 67
End: 2022-02-22
Payer: COMMERCIAL

## 2022-02-22 VITALS
DIASTOLIC BLOOD PRESSURE: 72 MMHG | BODY MASS INDEX: 32 KG/M2 | HEART RATE: 83 BPM | OXYGEN SATURATION: 97 % | WEIGHT: 223 LBS | SYSTOLIC BLOOD PRESSURE: 124 MMHG

## 2022-02-22 DIAGNOSIS — I25.118 CORONARY ARTERY DISEASE OF NATIVE ARTERY OF NATIVE HEART WITH STABLE ANGINA PECTORIS (H): ICD-10-CM

## 2022-02-22 DIAGNOSIS — E11.43 TYPE 2 DIABETES MELLITUS WITH DIABETIC AUTONOMIC NEUROPATHY, WITHOUT LONG-TERM CURRENT USE OF INSULIN (H): Primary | ICD-10-CM

## 2022-02-22 DIAGNOSIS — I10 ESSENTIAL HYPERTENSION: ICD-10-CM

## 2022-02-22 DIAGNOSIS — N40.1 BENIGN PROSTATIC HYPERPLASIA WITH URINARY FREQUENCY: ICD-10-CM

## 2022-02-22 DIAGNOSIS — R35.0 BENIGN PROSTATIC HYPERPLASIA WITH URINARY FREQUENCY: ICD-10-CM

## 2022-02-22 PROCEDURE — 99606 MTMS BY PHARM EST 15 MIN: CPT | Performed by: PHARMACIST

## 2022-02-22 ASSESSMENT — ANXIETY QUESTIONNAIRES
3. WORRYING TOO MUCH ABOUT DIFFERENT THINGS: NOT AT ALL
2. NOT BEING ABLE TO STOP OR CONTROL WORRYING: NOT AT ALL
GAD7 TOTAL SCORE: 0
IF YOU CHECKED OFF ANY PROBLEMS ON THIS QUESTIONNAIRE, HOW DIFFICULT HAVE THESE PROBLEMS MADE IT FOR YOU TO DO YOUR WORK, TAKE CARE OF THINGS AT HOME, OR GET ALONG WITH OTHER PEOPLE: NOT DIFFICULT AT ALL
1. FEELING NERVOUS, ANXIOUS, OR ON EDGE: NOT AT ALL
6. BECOMING EASILY ANNOYED OR IRRITABLE: NOT AT ALL
7. FEELING AFRAID AS IF SOMETHING AWFUL MIGHT HAPPEN: NOT AT ALL
5. BEING SO RESTLESS THAT IT IS HARD TO SIT STILL: NOT AT ALL

## 2022-02-22 ASSESSMENT — PATIENT HEALTH QUESTIONNAIRE - PHQ9
5. POOR APPETITE OR OVEREATING: NOT AT ALL
SUM OF ALL RESPONSES TO PHQ QUESTIONS 1-9: 0

## 2022-02-22 NOTE — PROGRESS NOTES
Medication Therapy Management (MTM) Encounter    ASSESSMENT:                            Medication Adherence/Access: No issues identified    Type 2 Diabetes: Patient is not meeting A1c goal of < 7%. Self monitoring of blood glucose is not at goal of fasting  mg/dL. Patient would benefit from continuing with dose increase of Trulicity and then stopping Invokana while monitoring blood sugars. Did not seem to get much initial response with SGLT2 inhibitor,so need to make sure sugars don't become more uncontrolled. Additionally the time to benefit with dose increase in Trulicity will likely take a few more weeks, so should continue to see improvement from that change.    Hypertension/CAD: Stable. Blood pressure at goal of <140/90mmHg.     BPH: Stable.     PLAN:                            1. April 11th or around then for diabetes recheck and update refills.     2. Continue Trulicity 1.5mg weekly and okay to stop the Invokana, if sugars are trending over 140mg/dl in the morning please call me.     Follow-up: Return in about 7 weeks (around 4/11/2022) for Primary Care Provider, MTM visit in clinic.    SUBJECTIVE/OBJECTIVE:                          Syd Joyce is a 67 year old male coming in for a follow-up visit.  Today's visit is a follow-up MTM visit from 2/1     Reason for visit: diabetes Follow up.    Allergies/ADRs: Reviewed in chart  Past Medical History: Reviewed in chart  Tobacco: He reports that he has never smoked. He uses smokeless tobacco.  Alcohol: not currently using    Medication Adherence/Access: insurance offers better coverage for 90 day mail order, working to be only on one brand name med to extend time to coverage gap and overall spend costs.     Type 2 Diabetes:  Currently taking metformin 2000mg daily, Invokana 300mg daily (using up remaining, 2 weeks left) and Trulicity 1.5mg weekly (3 so far with increase)- tolerating well without side effects.   Blood sugar monitoring: fasting, Ranges  (patient reported): 136, 123, 127, 160, 141, 118, 127, 131, 137  Symptoms of low blood sugar? none  Symptoms of high blood sugar? none  Eye exam: up to date  Foot exam: up to date  Diet/Exercise: has not been doing formal exercise, does work in the yard. Not watching diet closely, but willing to modify.   Aspirin: Taking 81mg daily and denies side effects  Statin: Yes: simvastatin   ACEi/ARB: Yes: losartan.   Lab Results   Component Value Date    A1C 9.4 01/11/2022    A1C 8.3 09/14/2021    A1C 8.4 04/19/2021    A1C 7.3 07/20/2020    A1C 8.7 06/26/2019       Hypertension/CAD: Current medications include carvedilol 50mg twice daily, losartan 100mg daily, chlorthalidone 25mg daily  Patient does self-monitor blood pressure. Home BP monitoring in range of 110's systolic over 70's diastolic and HR in the 60s.  Patient reports no current medication side effects.  BP Readings from Last 3 Encounters:   02/01/22 128/78   01/11/22 120/70   09/27/21 (!) 148/70       BPH: Currently taking no medication anymore- had stopped finasteride 5mg daily, deals with urgency. No other symptoms, denies side effects. Not sure that he needs this.     Today's Vitals: /72   Pulse 83   Wt 223 lb (101.2 kg)   SpO2 97%   BMI 32.00 kg/m     ----------------    I spent 20 minutes with this patient today. All changes were made via collaborative practice agreement with STACY Garica CNP. A copy of the visit note was provided to the patient's provider(s).    The patient was given a summary of these recommendations.     Yelena Grey, Pharm.D, [facility]  Medication Therapy Management Pharmacist  [phone]       Medication Therapy Recommendations  No medication therapy recommendations to display

## 2022-02-22 NOTE — Clinical Note
Carlyn will be seeing us after 4/11 for diabetes recheck.     Yelena Grey, Pharm.D, [facility]  Medication Therapy Management Pharmacist  [phone]

## 2022-02-22 NOTE — PATIENT INSTRUCTIONS
Recommendations from today's MTM visit:                                                         1. April 11th or around then for diabetes recheck and update refills.     2. Continue Trulicity 1.5mg weekly and okay to stop the Invokana, if sugars are trending over 140mg/dl in the morning please call me.     Follow-up: Return in about 7 weeks (around 4/11/2022) for Primary Care Provider, MTM visit in clinic.    It was great to speak with you today.  I value your experience and would be very thankful for your time with providing feedback on our clinic survey. You may receive a survey via email or text message in the next few days.     To schedule another MTM appointment, please call the clinic directly or you may call the MTM scheduling line at [phone] or toll-free at [phone].     My Clinical Pharmacist's contact information:                                                      Please feel free to contact me with any questions or concerns you have.      Yelena Grey, Pharm.D, [facility]  Medication Therapy Management Pharmacist  [phone]

## 2022-02-23 ASSESSMENT — ANXIETY QUESTIONNAIRES: GAD7 TOTAL SCORE: 0

## 2022-02-25 DIAGNOSIS — G47.00 INSOMNIA, UNSPECIFIED TYPE: ICD-10-CM

## 2022-02-25 RX ORDER — ZOLPIDEM TARTRATE 10 MG/1
TABLET ORAL
Qty: 30 TABLET | Refills: 1 | Status: SHIPPED | OUTPATIENT
Start: 2022-02-25 | End: 2022-04-25

## 2022-04-25 DIAGNOSIS — G47.00 INSOMNIA, UNSPECIFIED TYPE: ICD-10-CM

## 2022-04-25 RX ORDER — ZOLPIDEM TARTRATE 10 MG/1
TABLET ORAL
Qty: 30 TABLET | Refills: 0 | Status: SHIPPED | OUTPATIENT
Start: 2022-04-25 | End: 2022-05-26

## 2022-05-11 DIAGNOSIS — Z98.890 S/P LUMBAR LAMINECTOMY: ICD-10-CM

## 2022-05-11 NOTE — TELEPHONE ENCOUNTER
Tizanidine  LOV 4/26/21  S/P lumbar laminectomy  -     tiZANidine (ZANAFLEX) 4 MG tablet; Take 1 tablet (4 mg) by mouth daily

## 2022-05-20 DIAGNOSIS — I10 ESSENTIAL HYPERTENSION: ICD-10-CM

## 2022-05-20 NOTE — TELEPHONE ENCOUNTER
Losartan. Last addressed 9/20/21.     BP Readings from Last 3 Encounters:   02/22/22 124/72   02/01/22 128/78   01/11/22 120/70           Essential hypertension  Well controlled with current medications. Continue to check home BP with goal < 130/80.

## 2022-05-23 RX ORDER — LOSARTAN POTASSIUM 100 MG/1
100 TABLET ORAL DAILY
Qty: 90 TABLET | Refills: 3 | Status: SHIPPED | OUTPATIENT
Start: 2022-05-23 | End: 2023-05-17

## 2022-05-26 DIAGNOSIS — G47.00 INSOMNIA, UNSPECIFIED TYPE: ICD-10-CM

## 2022-05-26 RX ORDER — ZOLPIDEM TARTRATE 10 MG/1
TABLET ORAL
Qty: 30 TABLET | Refills: 0 | Status: SHIPPED | OUTPATIENT
Start: 2022-05-26 | End: 2022-06-27

## 2022-06-04 ENCOUNTER — HEALTH MAINTENANCE LETTER (OUTPATIENT)
Age: 67
End: 2022-06-04

## 2022-06-07 DIAGNOSIS — K21.00 GASTROESOPHAGEAL REFLUX DISEASE WITH ESOPHAGITIS, UNSPECIFIED WHETHER HEMORRHAGE: ICD-10-CM

## 2022-06-07 RX ORDER — PANTOPRAZOLE SODIUM 40 MG/1
40 TABLET, DELAYED RELEASE ORAL DAILY
Qty: 90 TABLET | Refills: 3 | Status: SHIPPED | OUTPATIENT
Start: 2022-06-07 | End: 2023-05-26

## 2022-06-10 DIAGNOSIS — I25.118 CORONARY ARTERY DISEASE OF NATIVE ARTERY OF NATIVE HEART WITH STABLE ANGINA PECTORIS (H): ICD-10-CM

## 2022-06-10 NOTE — TELEPHONE ENCOUNTER
Carvedilol 25 mg    LOV 2/22/22 with ZANDER Kirk  Last labs 1/11/22    No future appt  Spoke with patient to return to clinic for DM med check-agreed to set up  soon    BP Readings from Last 3 Encounters:   02/22/22 124/72   02/01/22 128/78   01/11/22 120/70     Lab Results   Component Value Date    A1C 9.4 01/11/2022    A1C 8.3 09/14/2021    A1C 8.4 04/19/2021    A1C 7.3 07/20/2020    A1C 8.7 06/26/2019     Last Comprehensive Metabolic Panel:  Sodium   Date Value Ref Range Status   01/11/2022 141 134 - 144 mmol/L Final     Potassium   Date Value Ref Range Status   01/11/2022 4.3 3.5 - 5.2 mmol/L Final     Chloride   Date Value Ref Range Status   01/11/2022 102 96 - 106 mmol/L Final     Carbon Dioxide   Date Value Ref Range Status   09/02/2019 32 20 - 32 mmol/L Final     Anion Gap   Date Value Ref Range Status   09/02/2019 3 3 - 14 mmol/L Final     Glucose   Date Value Ref Range Status   01/11/2022 228 (H) 65 - 99 mg/dL Final     Urea Nitrogen   Date Value Ref Range Status   01/11/2022 26 8 - 27 mg/dL Final     BUN/Creatinine Ratio   Date Value Ref Range Status   01/11/2022 24 10 - 24 Final     Creatinine   Date Value Ref Range Status   01/11/2022 1.07 0.76 - 1.27 mg/dL Final     GFR Estimate   Date Value Ref Range Status   09/02/2019 76 >60 mL/min/[1.73_m2] Final     Comment:     Non  GFR Calc  Starting 12/18/2018, serum creatinine based estimated GFR (eGFR) will be   calculated using the Chronic Kidney Disease Epidemiology Collaboration   (CKD-EPI) equation.       Calcium   Date Value Ref Range Status   01/11/2022 9.2 8.6 - 10.2 mg/dL Final

## 2022-06-12 RX ORDER — CARVEDILOL 25 MG/1
50 TABLET ORAL 2 TIMES DAILY
Qty: 360 TABLET | Refills: 3 | Status: SHIPPED | OUTPATIENT
Start: 2022-06-12 | End: 2023-06-26

## 2022-06-16 DIAGNOSIS — Z12.5 SCREENING PSA (PROSTATE SPECIFIC ANTIGEN): ICD-10-CM

## 2022-06-16 DIAGNOSIS — E11.43 TYPE 2 DIABETES MELLITUS WITH DIABETIC AUTONOMIC NEUROPATHY, WITHOUT LONG-TERM CURRENT USE OF INSULIN (H): ICD-10-CM

## 2022-06-16 DIAGNOSIS — E78.00 HYPERCHOLESTEREMIA: ICD-10-CM

## 2022-06-16 DIAGNOSIS — E11.43 TYPE 2 DIABETES MELLITUS WITH DIABETIC AUTONOMIC NEUROPATHY, WITHOUT LONG-TERM CURRENT USE OF INSULIN (H): Primary | ICD-10-CM

## 2022-06-16 DIAGNOSIS — I10 ESSENTIAL HYPERTENSION: ICD-10-CM

## 2022-06-16 DIAGNOSIS — I25.118 CORONARY ARTERY DISEASE OF NATIVE ARTERY OF NATIVE HEART WITH STABLE ANGINA PECTORIS (H): ICD-10-CM

## 2022-06-16 RX ORDER — METFORMIN HCL 500 MG
2000 TABLET, EXTENDED RELEASE 24 HR ORAL DAILY
Qty: 360 TABLET | Refills: 0 | Status: SHIPPED | OUTPATIENT
Start: 2022-06-16 | End: 2022-09-22

## 2022-06-16 NOTE — TELEPHONE ENCOUNTER
Metformin er 500 mg     LOV 2/22/22  Last labs 1/11/22    Next appt is 6/20/22    Lab Results   Component Value Date    A1C 9.4 01/11/2022    A1C 8.3 09/14/2021    A1C 8.4 04/19/2021    A1C 7.3 07/20/2020    A1C 8.7 06/26/2019

## 2022-06-20 DIAGNOSIS — E78.00 HYPERCHOLESTEREMIA: ICD-10-CM

## 2022-06-20 DIAGNOSIS — I25.118 CORONARY ARTERY DISEASE OF NATIVE ARTERY OF NATIVE HEART WITH STABLE ANGINA PECTORIS (H): ICD-10-CM

## 2022-06-20 DIAGNOSIS — E11.43 TYPE 2 DIABETES MELLITUS WITH DIABETIC AUTONOMIC NEUROPATHY, WITHOUT LONG-TERM CURRENT USE OF INSULIN (H): ICD-10-CM

## 2022-06-20 DIAGNOSIS — Z12.5 SCREENING PSA (PROSTATE SPECIFIC ANTIGEN): ICD-10-CM

## 2022-06-20 DIAGNOSIS — I10 ESSENTIAL HYPERTENSION: ICD-10-CM

## 2022-06-20 LAB
CHOL/HDL RATIO (RMG): 2.4 MG/DL (ref 0–4.5)
CHOLESTEROL: 109 MG/DL (ref 100–199)
HBA1C MFR BLD: 6.5 % (ref 4–6)
HDL (RMG): 45 MG/DL (ref 40–?)
LDL CALCULATED (RMG): 49 MG/DL (ref 0–130)
TRIGLYCERIDES (RMG): 70 MG/DL (ref 0–149)

## 2022-06-20 PROCEDURE — 82044 UR ALBUMIN SEMIQUANTITATIVE: CPT | Performed by: NURSE PRACTITIONER

## 2022-06-20 PROCEDURE — 36415 COLL VENOUS BLD VENIPUNCTURE: CPT | Performed by: NURSE PRACTITIONER

## 2022-06-20 PROCEDURE — 83036 HEMOGLOBIN GLYCOSYLATED A1C: CPT | Performed by: NURSE PRACTITIONER

## 2022-06-20 PROCEDURE — 80061 LIPID PANEL: CPT | Mod: QW | Performed by: NURSE PRACTITIONER

## 2022-06-21 LAB
ALBUMIN SERPL-MCNC: 4.3 G/DL (ref 3.8–4.8)
ALBUMIN/GLOB SERPL: 2.2 {RATIO} (ref 1.2–2.2)
ALP SERPL-CCNC: 169 IU/L (ref 44–121)
ALT SERPL-CCNC: 27 IU/L (ref 0–44)
AST SERPL-CCNC: 23 IU/L (ref 0–40)
BILIRUB SERPL-MCNC: 0.3 MG/DL (ref 0–1.2)
BUN SERPL-MCNC: 11 MG/DL (ref 8–27)
BUN/CREATININE RATIO: 10 (ref 10–24)
CALCIUM SERPL-MCNC: 9.1 MG/DL (ref 8.6–10.2)
CHLORIDE SERPLBLD-SCNC: 104 MMOL/L (ref 96–106)
CREAT SERPL-MCNC: 1.06 MG/DL (ref 0.76–1.27)
EGFR: 76 ML/MIN/1.73
GLOBULIN, TOTAL: 2 G/DL (ref 1.5–4.5)
GLUCOSE SERPL-MCNC: 157 MG/DL (ref 65–99)
POTASSIUM SERPL-SCNC: 4.3 MMOL/L (ref 3.5–5.2)
PROT SERPL-MCNC: 6.3 G/DL (ref 6–8.5)
PSA NG/ML: 2 NG/ML (ref 0–4)
SODIUM SERPL-SCNC: 141 MMOL/L (ref 134–144)
TOTAL CO2: 25 MMOL/L (ref 20–29)

## 2022-06-22 LAB
A/C RATIO MG/G: >300 MG/G
ALBUMIN (URINE) MG/L: 30 MG/L
INTERPRETATION: ABNORMAL
URINE CREATININE MG/DL - QUEST: 50 MG/DL

## 2022-06-22 ASSESSMENT — ENCOUNTER SYMPTOMS
CONSTIPATION: 0
FREQUENCY: 1
NERVOUS/ANXIOUS: 0
PALPITATIONS: 0
WEAKNESS: 0
SORE THROAT: 0
ARTHRALGIAS: 0
COUGH: 0
HEMATURIA: 0
PARESTHESIAS: 0
SHORTNESS OF BREATH: 0
HEADACHES: 0
DYSURIA: 0
NAUSEA: 0
DIARRHEA: 0
MYALGIAS: 0
JOINT SWELLING: 0
ABDOMINAL PAIN: 0
HEMATOCHEZIA: 0
HEARTBURN: 0
FEVER: 0
CHILLS: 0
DIZZINESS: 0
EYE PAIN: 0

## 2022-06-22 ASSESSMENT — ACTIVITIES OF DAILY LIVING (ADL): CURRENT_FUNCTION: NO ASSISTANCE NEEDED

## 2022-06-27 ENCOUNTER — OFFICE VISIT (OUTPATIENT)
Dept: PHARMACY | Facility: PHYSICIAN GROUP | Age: 67
End: 2022-06-27
Payer: COMMERCIAL

## 2022-06-27 ENCOUNTER — OFFICE VISIT (OUTPATIENT)
Dept: FAMILY MEDICINE | Facility: CLINIC | Age: 67
End: 2022-06-27

## 2022-06-27 VITALS
OXYGEN SATURATION: 97 % | HEIGHT: 70 IN | WEIGHT: 222.13 LBS | BODY MASS INDEX: 31.8 KG/M2 | SYSTOLIC BLOOD PRESSURE: 164 MMHG | HEART RATE: 61 BPM | DIASTOLIC BLOOD PRESSURE: 86 MMHG

## 2022-06-27 VITALS
WEIGHT: 222.13 LBS | RESPIRATION RATE: 16 BRPM | OXYGEN SATURATION: 97 % | HEART RATE: 61 BPM | SYSTOLIC BLOOD PRESSURE: 164 MMHG | DIASTOLIC BLOOD PRESSURE: 86 MMHG | BODY MASS INDEX: 31.87 KG/M2

## 2022-06-27 DIAGNOSIS — R80.9 MICROALBUMINURIA DUE TO TYPE 2 DIABETES MELLITUS (H): ICD-10-CM

## 2022-06-27 DIAGNOSIS — I25.118 CORONARY ARTERY DISEASE OF NATIVE ARTERY OF NATIVE HEART WITH STABLE ANGINA PECTORIS (H): ICD-10-CM

## 2022-06-27 DIAGNOSIS — E11.29 MICROALBUMINURIA DUE TO TYPE 2 DIABETES MELLITUS (H): ICD-10-CM

## 2022-06-27 DIAGNOSIS — Z00.00 ENCOUNTER FOR MEDICARE ANNUAL WELLNESS EXAM: Primary | ICD-10-CM

## 2022-06-27 DIAGNOSIS — G47.00 INSOMNIA, UNSPECIFIED TYPE: ICD-10-CM

## 2022-06-27 DIAGNOSIS — N52.1 ERECTILE DYSFUNCTION DUE TO DISEASES CLASSIFIED ELSEWHERE: ICD-10-CM

## 2022-06-27 DIAGNOSIS — G63 POLYNEUROPATHY ASSOCIATED WITH UNDERLYING DISEASE (H): ICD-10-CM

## 2022-06-27 DIAGNOSIS — R35.0 BENIGN PROSTATIC HYPERPLASIA WITH URINARY FREQUENCY: ICD-10-CM

## 2022-06-27 DIAGNOSIS — E11.43 TYPE 2 DIABETES MELLITUS WITH DIABETIC AUTONOMIC NEUROPATHY, WITHOUT LONG-TERM CURRENT USE OF INSULIN (H): ICD-10-CM

## 2022-06-27 DIAGNOSIS — N40.1 BENIGN PROSTATIC HYPERPLASIA WITH URINARY FREQUENCY: ICD-10-CM

## 2022-06-27 DIAGNOSIS — I10 ESSENTIAL HYPERTENSION: ICD-10-CM

## 2022-06-27 DIAGNOSIS — F10.21 ALCOHOL DEPENDENCE IN REMISSION (H): ICD-10-CM

## 2022-06-27 DIAGNOSIS — E78.00 HYPERCHOLESTEREMIA: ICD-10-CM

## 2022-06-27 DIAGNOSIS — E11.43 TYPE 2 DIABETES MELLITUS WITH DIABETIC AUTONOMIC NEUROPATHY, WITHOUT LONG-TERM CURRENT USE OF INSULIN (H): Primary | ICD-10-CM

## 2022-06-27 DIAGNOSIS — K21.00 GASTROESOPHAGEAL REFLUX DISEASE WITH ESOPHAGITIS, UNSPECIFIED WHETHER HEMORRHAGE: ICD-10-CM

## 2022-06-27 PROBLEM — Z86.0100 HISTORY OF COLONIC POLYPS: Status: RESOLVED | Noted: 2021-04-26 | Resolved: 2022-06-27

## 2022-06-27 PROBLEM — Z98.890 S/P LUMBAR LAMINECTOMY: Status: RESOLVED | Noted: 2017-06-30 | Resolved: 2022-06-27

## 2022-06-27 LAB
BACTERIA URINE: NORMAL
BILIRUB UR QL STRIP: 0
BLOOD URINE DIP: 0
CASTS/LPF: NORMAL
COLOR UR: YELLOW
CRYSTAL URINE: NORMAL
EPITHELIAL CELLS - QUEST: NORMAL
GLUCOSE UR STRIP-MCNC: 0 MG/DL
KETONES UR QL STRIP: 0
LEUKOCYTE ESTERASE URINE DIP: 0
MUCOUS URINE: NORMAL
NITRITE UR QL STRIP: NORMAL
PH UR STRIP: 6.5 PH (ref 5–9)
PROT UR QL: 0 MG/DL (ref ?–0.01)
RBC URINE: NORMAL
SP GR UR STRIP: 1.01 (ref 1–1.02)
UROBILINOGEN UR QL STRIP: 0.2 EU/DL (ref 0.2–1)
WBC URINE: NORMAL

## 2022-06-27 PROCEDURE — 99214 OFFICE O/P EST MOD 30 MIN: CPT | Mod: 25 | Performed by: NURSE PRACTITIONER

## 2022-06-27 PROCEDURE — 81003 URINALYSIS AUTO W/O SCOPE: CPT | Performed by: NURSE PRACTITIONER

## 2022-06-27 PROCEDURE — 99606 MTMS BY PHARM EST 15 MIN: CPT | Performed by: PHARMACIST

## 2022-06-27 PROCEDURE — G0439 PPPS, SUBSEQ VISIT: HCPCS | Performed by: NURSE PRACTITIONER

## 2022-06-27 RX ORDER — ZOLPIDEM TARTRATE 10 MG/1
TABLET ORAL
Qty: 90 TABLET | Refills: 1 | Status: SHIPPED | OUTPATIENT
Start: 2022-06-27 | End: 2022-12-29

## 2022-06-27 RX ORDER — CHLORTHALIDONE 25 MG/1
25 TABLET ORAL DAILY
Qty: 90 TABLET | Refills: 3 | Status: SHIPPED | OUTPATIENT
Start: 2022-06-27 | End: 2023-09-06

## 2022-06-27 RX ORDER — SIMVASTATIN 20 MG
20 TABLET ORAL AT BEDTIME
Qty: 90 TABLET | Refills: 3 | Status: SHIPPED | OUTPATIENT
Start: 2022-06-27 | End: 2023-07-20

## 2022-06-27 RX ORDER — TAMSULOSIN HYDROCHLORIDE 0.4 MG/1
0.4 CAPSULE ORAL DAILY
Qty: 90 CAPSULE | Refills: 3 | Status: SHIPPED | OUTPATIENT
Start: 2022-06-27 | End: 2023-06-26

## 2022-06-27 NOTE — PROGRESS NOTES
"  SUBJECTIVE:   Syd Joyce is a 67 year old male who presents for Preventive Visit.    Are you in the first 12 months of your Medicare Part B coverage?  No    Physical Health:  Answers for HPI/ROS submitted by the patient on 6/22/2022  In general, how would you rate your overall physical health?: good  Frequency of exercise:: 2-3 days/week  Do you usually eat at least 4 servings of fruit and vegetables a day, include whole grains & fiber, and avoid regularly eating high fat or \"junk\" foods? : No  Taking medications regularly:: Yes  Medication side effects:: None  Activities of Daily Living: no assistance needed  Home safety: no safety concerns identified  Hearing Impairment:: difficulty following a conversation in a noisy restaurant or crowded room  In the past 6 months, have you been bothered by leaking of urine?: No  In general, how would you rate your overall mental or emotional health?: good  Duration of exercise:: 15-30 minutes    CAD with stable angina, hypertension - BP elevated in clinic today. Home  systolic this morning. Usually 130's/80's. Taking Losartan 100 mg daily, chlorthalidone 25 mg daily, carvedilol 50 mg BID.    BP Readings from Last 3 Encounters:   06/27/22 (!) 164/86   06/27/22 (!) 164/86   02/22/22 124/72   Diabetes well controlled with metformin 2000 mg daily, dulaglutide 1.5 mg weekly. Taking ARB, statin, baby aspirin. Up to date with eye exam. Does have polyneuropathy, erectile dysfunction and microalbuminuria likely secondary to DM and HTN.  Lab Results   Component Value Date    A1C 6.5 06/20/2022    A1C 9.4 01/11/2022    A1C 8.3 09/14/2021    A1C 8.4 04/19/2021    A1C 7.3 07/20/2020     GERD stable taking Pantoprazole 40 mg daily    Hypercholesteremia - taking Simvastatin 20 mg daily without side effects.    Insomnia - takes Ambien 10 mg every night. No side effects.    Ongoing sobriety from alcohol since 2016    Do you feel safe in your environment? Yes    Have you ever done " Advance Care Planning? (For example, a Health Directive, POLST, or a discussion with a medical provider or your loved ones about your wishes): Yes, advance care planning is on file.      Fall risk:None  Fallen 2 or more times in the past year?: No  Any fall with injury in the past year?: No    Cognitive Screenin) Repeat 3 items (Leader, Season, Table)    2) Clock draw: NORMAL  3) 3 item recall: Recalls 2 objects   Results: NORMAL clock, 1-2 items recalled: COGNITIVE IMPAIRMENT LESS LIKELY    Mini-CogTM Copyright MIROSLAVA Lux. Licensed by the author for use in [facility]; reprinted with permission (jessica@[facility]). All rights reserved.      Do you have sleep apnea, excessive snoring or daytime drowsiness?: no            Reviewed and updated as needed this visit by clinical staff   Tobacco  Allergies  Meds  Problems  Med Hx  Surg Hx  Fam Hx  Soc   Hx          Reviewed and updated as needed this visit by Provider   Tobacco  Allergies  Meds  Problems  Med Hx  Surg Hx  Fam Hx  Soc   Hx         Social History     Tobacco Use     Smoking status: Never Smoker     Smokeless tobacco: Current User   Substance Use Topics     Alcohol use: Not Currently     Comment: 2022 - sober 6 years                           Current providers sharing in care for this patient include:   Patient Care Team:  Shan Arenas MD as PCP - General (Family Practice)  Clara Watkins RD as Diabetes Educator (Dietitian, Registered)  Arelis Ryan APRN CNP as Assigned PCP  Yelena Grey RPH as Pharmacist (Pharmacist)  Yelena Grey RPH as Assigned MTM Pharmacist    The following health maintenance items are reviewed in Epic and correct as of today:  Health Maintenance   Topic Date Due     ZOSTER IMMUNIZATION (1 of 2) Never done     COVID-19 Vaccine (4 - Booster for Moderna series) 2022     DIABETIC FOOT EXAM  2022     A1C  2022     DTAP/TDAP/TD IMMUNIZATION (2 - Td or Tdap) 2023  "    BMP  06/20/2023     LIPID  06/20/2023     MICROALBUMIN  06/20/2023     MEDICARE ANNUAL WELLNESS VISIT  06/27/2023     FALL RISK ASSESSMENT  06/27/2023     EYE EXAM  02/03/2024     ADVANCE CARE PLANNING  06/27/2027     COLORECTAL CANCER SCREENING  11/11/2031     HEPATITIS C SCREENING  Completed     DEPRESSION ACTION PLAN  Completed     INFLUENZA VACCINE  Completed     Pneumococcal Vaccine: 65+ Years  Completed     AORTIC ANEURYSM SCREENING (SYSTEM ASSIGNED)  Completed     IPV IMMUNIZATION  Aged Out     MENINGITIS IMMUNIZATION  Aged Out     Lab work is in process      ROS:  Constitutional, HEENT, cardiovascular, pulmonary, GI, , musculoskeletal, neuro, skin, endocrine and psych systems are negative, except as otherwise noted.    OBJECTIVE:   BP (!) 164/86   Pulse 61   Ht 1.778 m (5' 10\")   Wt 100.8 kg (222 lb 2 oz)   SpO2 97%   BMI 31.87 kg/m   Estimated body mass index is 31.87 kg/m  as calculated from the following:    Height as of this encounter: 1.778 m (5' 10\").    Weight as of this encounter: 100.8 kg (222 lb 2 oz).  EXAM:   GENERAL: healthy, alert and no distress  EYES: Eyes grossly normal to inspection, PERRL and conjunctivae and sclerae normal  HENT: ear canals and TM's normal, nose and mouth without ulcers or lesions  NECK: no adenopathy, no asymmetry, masses, or scars and thyroid normal to palpation  RESP: lungs clear to auscultation - no rales, rhonchi or wheezes  CV: regular rate and rhythm, normal S1 S2, no S3 or S4, no murmur, click or rub, no peripheral edema and peripheral pulses strong  ABDOMEN: soft, nontender, no hepatosplenomegaly, no masses and bowel sounds normal  MS: extremities normal- no gross deformities noted  SKIN: no suspicious lesions or rashes  NEURO: Normal strength and tone and mentation intact  PSYCH: normal affect & mood    ASSESSMENT / PLAN:   Syd was seen today for physical.    Diagnoses and all orders for this visit:    Encounter for Medicare annual wellness " exam  Age-appropriate preventative health maintenance along with diet, exercise and healthy weight discussed.    Type 2 diabetes mellitus with diabetic autonomic neuropathy, without long-term current use of insulin (H)  Meeting A1C goal of <7.0. Continue current regimen with Metformin & Trulicity. Discussed importance in compliance in all areas of diabetic control including diet, routine BS checks, absolute medication compliance, laboratory monitoring, and attending regular follow up appointments as ordered.  Failure to comply with instructions regarding diabetes will lead to a greater chance of poor diabetic control and therefore a greater chance of diabetes related complications such as CAD, CVA, PVD, and retinopathy/neuropathy/nephropathy. Based on level of diabetes control: Glucose testing Indicated. Return to the clinic in every 3 months.    Polyneuropathy associated with underlying disease (H)  No progression of symptoms. Not on medication for this    Microalbuminuria due to type 2 diabetes mellitus (H)  Diabetes well controlled. Working on blood pressure management.    Coronary artery disease of native artery of native heart with stable angina pectoris (H)    Alcohol dependence in remission (H)    Essential hypertension  -     chlorthalidone (HYGROTON) 25 MG tablet; Take 1 tablet (25 mg) by mouth daily  Reviewed current HTN management. Blood pressure is uncontrolled in clinic with reports of normal BP readings at home. No changes today to medication but patient to call in 2 weeks with home readings. Goal BP <140/90. We today managed prescriptions with refills ensured to ensure availabilty of current medications. Reviewed lifestyle modifications. Reviewed side effects of medications, alarm signs and symptoms, and when to seek further care.    Hypercholesteremia  -     simvastatin (ZOCOR) 20 MG tablet; Take 1 tablet (20 mg) by mouth At Bedtime  Well controlled. Continue Simvastatin without changes    Benign  "prostatic hyperplasia with urinary frequency  -     Urinalysis ([facility])  -     tamsulosin (FLOMAX) 0.4 MG capsule; Take 1 capsule (0.4 mg) by mouth daily  UA normal today. Try Tamsulosin. Has tried Finasteride in the past. If no improvement then refer to Urology    Erectile dysfunction due to diseases classified elsewhere  Has taken Sildenafil in the past. Does not want refill at this time    Gastroesophageal reflux disease with esophagitis, unspecified whether hemorrhage  Continue Pantoprazole without changes    Insomnia, unspecified type  -     zolpidem (AMBIEN) 10 MG tablet; TAKE 1 TABLET BY MOUTH EVERY DAY AT BEDTIME  PDMP Review       Value Time User    State PDMP site checked  Yes 6/27/2022 11:00 AM Arelis Ryan APRN CNP      Safety precautions reviewed. Sleep hygiene discussed      Patient has been advised of split billing requirements and indicates understanding: Yes    COUNSELING:  Reviewed preventive health counseling, as reflected in patient instructions  Special attention given to:       Regular exercise       Healthy diet/nutrition       Vision screening       Hearing screening       Dental care       Bladder control       Fall risk prevention       Colon cancer screening       Prostate cancer screening    Estimated body mass index is 31.87 kg/m  as calculated from the following:    Height as of this encounter: 1.778 m (5' 10\").    Weight as of this encounter: 100.8 kg (222 lb 2 oz).    Weight management plan: Discussed healthy diet and exercise guidelines    He reports that he has never smoked. He uses smokeless tobacco.    Appropriate preventive services were discussed with this patient, including applicable screening as appropriate for cardiovascular disease, diabetes, osteopenia/osteoporosis, and glaucoma.  As appropriate for age/gender, discussed screening for colorectal cancer, prostate cancer, breast cancer, and cervical cancer. Checklist reviewing preventive services available has been given " to the patient.    Reviewed patients plan of care and provided an AVS. The Basic Care Plan (routine screening as documented in Health Maintenance) for Syd meets the Care Plan requirement. This Care Plan has been established and reviewed with the Patient.    STACY Garcia CNP  [facility]

## 2022-06-27 NOTE — PROGRESS NOTES
Medication Therapy Management (MTM) Encounter    ASSESSMENT:                            Medication Adherence/Access: No issues identified    Type 2 Diabetes: Patient is meeting A1c goal of < 7%.    Hypertension/CAD: not meeting goal in office, but home blood pressures have been running at goal and often has increased readings in clinic. Recommend ongoing outside BP checks and provide BP log via [employer] in 2 weeks to ensure doses do not need to be adjusted further.     BPH: trial of alpha blocker started by STACY Garcia CNP   today.     PLAN:                            1. Send blood pressure readings in 2 weeks via [employer].     2. Start taking tamsulosin 0.4mg once daily at bedtime.     3. Continue with Trulicity 1.5mg weekly    4. Recheck in Diabetes in 3 months with STACY Garcia CNP    Follow-up: Return in about 3 months (around 9/27/2022) for Primary Care Provider, MTM as needed. .    SUBJECTIVE/OBJECTIVE:                          Syd Joyce is a 67 year old male coming in for a follow-up visit.  Today's visit is a follow-up MTM visit from 2/22     Reason for visit: diabetes follow up, saw STACY Garcia CNP prior to MTM today.     Allergies/ADRs: Reviewed in chart  Past Medical History: Reviewed in chart  Tobacco: He reports that he has never smoked. He uses smokeless tobacco.  Alcohol: not currently using    Medication Adherence/Access: no issues reported    Type 2 Diabetes:  Currently taking metformin 2000mg daily and Trulicity 1.5mg weekly.   Blood sugar monitoring: fasting, Ranges (patient reported): 115-120s  Symptoms of low blood sugar? none  Symptoms of high blood sugar? none  Eye exam: up to date  Foot exam: up to date  Diet/Exercise: has not been doing formal exercise, does work in the yard.   Aspirin: Taking 81mg daily and denies side effects  Statin: Yes: simvastatin   ACEi/ARB: Yes: losartan.   Lab Results   Component Value Date    A1C 6.5 06/20/2022    A1C 9.4 01/11/2022     A1C 8.3 09/14/2021    A1C 8.4 04/19/2021    A1C 7.3 07/20/2020       Hypertension/CAD: Current medications include carvedilol 50mg twice daily, losartan 100mg daily, chlorthalidone 25mg daily  Patient does self-monitor blood pressure. Home BP monitoring in range of 130s/80s.  Patient reports no current medication side effects.  BP Readings from Last 3 Encounters:   06/27/22 (!) 164/86   06/27/22 (!) 164/86   02/22/22 124/72       BPH: Currently taking no medication anymore- PCP will try tamsulosin to see if this helped.   Previously stopped finasteride 5mg daily, deals with urgency. No other symptoms, denies side effects. Not sure that he needs this.     Today's Vitals: BP (!) 164/86   Pulse 61   Resp 16   Wt 222 lb 2 oz (100.8 kg)   SpO2 97%   BMI 31.87 kg/m    ----------------      I spent 20 minutes with this patient today. All changes were made via collaborative practice agreement with STACY Garcia CNP. A copy of the visit note was provided to the patient's provider(s).    The patient was given a summary of these recommendations.     Yelena Grey, Pharm.D, [facility]  Medication Therapy Management Pharmacist  [phone]       Medication Therapy Recommendations  No medication therapy recommendations to display

## 2022-06-27 NOTE — PATIENT INSTRUCTIONS
Check insurance for Shingrix (shingles) vaccine coverage. Can get at pharmacy like [facility], [facility], etc if desired.    Start Tamsulosin 1 tablet once daily    Preventive Health Recommendations:     See your health care provider every year to  Review health changes.   Discuss preventive care.    Review your medicines if your doctor has prescribed any.    Talk with your health care provider about whether you should have a test to screen for prostate cancer (PSA).  Every 3 years, have a diabetes test (fasting glucose). If you are at risk for diabetes, you should have this test more often.  Every 5 years, have a cholesterol test. Have this test more often if you are at risk for high cholesterol or heart disease.   Every 10 years, have a colonoscopy. Or, have a yearly FIT test (stool test). These exams will check for colon cancer.  Talk to with your health care provider about screening for Abdominal Aortic Aneurysm if you have a family history of AAA or have a history of smoking.    Shots:   Get a flu shot each year.   Get a tetanus shot every 10 years.   Talk to your doctor about your pneumonia vaccines. There are now two you should receive - Pneumovax (PPSV 23) and Prevnar (PCV 13).   Talk to your pharmacist about a shingles vaccine.   Talk to your doctor about the hepatitis B vaccine.  Nutrition:   Eat at least 5 servings of fruits and vegetables each day.   Eat whole-grain bread, whole-wheat pasta and brown rice instead of white grains and rice.   Get adequate Calcium and Vitamin D.   Lifestyle  Exercise for at least 150 minutes a week (30 minutes a day, 5 days a week). This will help you control your weight and prevent disease.   Limit alcohol to one drink per day.   No smoking.   Wear sunscreen to prevent skin cancer.  See your dentist every six months for an exam and cleaning.  See your eye doctor every 1 to 2 years to screen for conditions such as glaucoma, macular degeneration, cataracts, etc.    Personalized  Prevention Plan  You are due for the preventive services outlined below.  Your care team is available to assist you in scheduling these services.  If you have already completed any of these items, please share that information with your care team to update in your medical record.  Health Maintenance Due   Topic Date Due    Zoster (Shingles) Vaccine (1 of 2) Never done    COVID-19 Vaccine (4 - Booster for Moderna series) 03/02/2022

## 2022-06-27 NOTE — PATIENT INSTRUCTIONS
"Recommendations from today's MTM visit:                                                        1. Send blood pressure readings in 2 weeks via [employer].     2. Start taking tamsulosin 0.4mg once daily at bedtime.     3. Continue with Trulicity 1.5mg weekly    4. Recheck in Diabetes in 3 months with STACY Garcia CNP    Follow-up: Return in about 3 months (around 9/27/2022) for Primary Care Provider, MTM as needed. .    It was great speaking with you today.  I value your experience and would be very thankful for your time in providing feedback in our clinic survey. In the next few days, you may receive an email or text message from [employer] with a link to a survey related to your  clinical pharmacist.\"        My Clinical Pharmacist's contact information:                                                      Please feel free to contact me with any questions or concerns you have.      Yelena Grey, Pharm.D, [facility]  Medication Therapy Management Pharmacist  [phone]     "

## 2022-08-25 ENCOUNTER — OFFICE VISIT (OUTPATIENT)
Dept: FAMILY MEDICINE | Facility: CLINIC | Age: 67
End: 2022-08-25

## 2022-08-25 VITALS
HEART RATE: 78 BPM | RESPIRATION RATE: 16 BRPM | OXYGEN SATURATION: 98 % | WEIGHT: 222.25 LBS | DIASTOLIC BLOOD PRESSURE: 96 MMHG | SYSTOLIC BLOOD PRESSURE: 172 MMHG | BODY MASS INDEX: 31.89 KG/M2

## 2022-08-25 DIAGNOSIS — I10 ESSENTIAL HYPERTENSION: ICD-10-CM

## 2022-08-25 DIAGNOSIS — M54.41 ACUTE BILATERAL LOW BACK PAIN WITH BILATERAL SCIATICA: Primary | ICD-10-CM

## 2022-08-25 DIAGNOSIS — M54.42 ACUTE BILATERAL LOW BACK PAIN WITH BILATERAL SCIATICA: Primary | ICD-10-CM

## 2022-08-25 PROCEDURE — 99214 OFFICE O/P EST MOD 30 MIN: CPT | Performed by: NURSE PRACTITIONER

## 2022-08-25 RX ORDER — NAPROXEN 500 MG/1
500 TABLET ORAL 2 TIMES DAILY WITH MEALS
Qty: 28 TABLET | Refills: 0 | Status: SHIPPED | OUTPATIENT
Start: 2022-08-25 | End: 2022-09-08

## 2022-08-25 RX ORDER — HYDROCODONE BITARTRATE AND ACETAMINOPHEN 5; 325 MG/1; MG/1
1 TABLET ORAL EVERY 6 HOURS PRN
Qty: 18 TABLET | Refills: 0 | Status: SHIPPED | OUTPATIENT
Start: 2022-08-25 | End: 2022-08-31

## 2022-08-25 NOTE — PROGRESS NOTES
Problem(s) Oriented visit        SUBJECTIVE:                                                    Syd Joyce is a 67 year old male who presents to clinic today for the following health issues :    Back pain worsening for past month. Went on vacation last week with family to [place]. Last couple days pain has been unbearable. Located bilateral lower back with radiation down both legs. History of laminectomy L3-L4, L4-L5 in 6/2017. No trauma/injury preceding current pain. Worst in the morning after laying in bed and when bending over. Shooting pain down both legs. Tingling in legs. No saddle anesthesia or incontinence of bowel/bladder. Taking Aleve 2 tabs 2 times daily without much relief.     Problem list, Medication list, Allergies, and Medical/Social/Surgical histories reviewed in EPIC and updated as appropriate.   Additional history: as documented    ROS:  7 point ROS completed and negative except noted above, including Gen, CV, Resp, GI, , MS, Neuro    OBJECTIVE:                                                    BP (!) 172/96   Pulse 78   Resp 16   Wt 100.8 kg (222 lb 4 oz)   SpO2 98%   BMI 31.89 kg/m    Body mass index is 31.89 kg/m .   GENERAL: Adult male appears to be in pain  CV: normal rate, no edema  MS: Impaired movement/mobility due to pain  NEURO: Normal strength and tone, sensory exam grossly normal and mentation intact  PSYCH: normal affect & mood     ASSESSMENT/PLAN:                                                      Syd was seen today for back pain.    Diagnoses and all orders for this visit:    Acute bilateral low back pain with bilateral sciatica  -     HYDROcodone-acetaminophen (NORCO) 5-325 MG tablet; Take 1 tablet by mouth every 6 hours as needed for pain  -     MR Lumbar Spine w/o & w Contrast; Future  -     naproxen (NAPROSYN) 500 MG tablet; Take 1 tablet (500 mg) by mouth 2 times daily (with meals) for 14 days  -     XR Lumbar/Sacral Transfor Inj Bilateral; Future  Severe  acute, worsening pain of lower back with radiculopathy. History of lumbar spine surgery. Decision to use Naproxen 500 mg BID instead of prednisone course due to patient having diabetes. Ordered MRI of lumbar spine with and without contrast due to current symptoms and surgery history. Rx for Norco to use prn for severe pain sent to pharmacy. Safety instructions reviewed with patient as well as potential for constipation and how to help with this. Epidural steroid injection also ordered.    Essential hypertension  Blood pressure elevated at this visit, which he feels is due to pain. Will recheck at future visit. Patient has home BP monitor, which he will monitor with as well    See Patient Instructions  There are no Patient Instructions on file for this visit.    STACY Garcia CNP  [facility]  Family Practice  [facility]  [phone]    For any issues my office # is [phone]

## 2022-08-30 ENCOUNTER — MYC MEDICAL ADVICE (OUTPATIENT)
Dept: FAMILY MEDICINE | Facility: CLINIC | Age: 67
End: 2022-08-30

## 2022-08-31 ENCOUNTER — APPOINTMENT (OUTPATIENT)
Dept: MRI IMAGING | Facility: CLINIC | Age: 67
DRG: 519 | End: 2022-08-31
Attending: PHYSICIAN ASSISTANT
Payer: COMMERCIAL

## 2022-08-31 ENCOUNTER — HOSPITAL ENCOUNTER (INPATIENT)
Facility: CLINIC | Age: 67
LOS: 2 days | Discharge: HOME | DRG: 519 | End: 2022-09-03
Attending: PHYSICIAN ASSISTANT | Admitting: STUDENT IN AN ORGANIZED HEALTH CARE EDUCATION/TRAINING PROGRAM
Payer: COMMERCIAL

## 2022-08-31 ENCOUNTER — APPOINTMENT (OUTPATIENT)
Dept: GENERAL RADIOLOGY | Facility: CLINIC | Age: 67
DRG: 519 | End: 2022-08-31
Attending: PHYSICIAN ASSISTANT
Payer: COMMERCIAL

## 2022-08-31 DIAGNOSIS — M54.41 ACUTE BILATERAL LOW BACK PAIN WITH BILATERAL SCIATICA: ICD-10-CM

## 2022-08-31 DIAGNOSIS — G83.4 CAUDA EQUINA SYNDROME (H): Primary | ICD-10-CM

## 2022-08-31 DIAGNOSIS — M54.42 ACUTE BILATERAL LOW BACK PAIN WITH BILATERAL SCIATICA: ICD-10-CM

## 2022-08-31 LAB
ALBUMIN UR-MCNC: NEGATIVE MG/DL
ANION GAP SERPL CALCULATED.3IONS-SCNC: 5 MMOL/L (ref 3–14)
APPEARANCE UR: CLEAR
BILIRUB UR QL STRIP: NEGATIVE
BUN SERPL-MCNC: 16 MG/DL (ref 7–30)
CALCIUM SERPL-MCNC: 8.9 MG/DL (ref 8.5–10.1)
CHLORIDE BLD-SCNC: 105 MMOL/L (ref 94–109)
CO2 SERPL-SCNC: 28 MMOL/L (ref 20–32)
COLOR UR AUTO: NORMAL
CREAT SERPL-MCNC: 1.07 MG/DL (ref 0.66–1.25)
CRP SERPL-MCNC: 34.6 MG/L (ref 0–8)
ERYTHROCYTE [DISTWIDTH] IN BLOOD BY AUTOMATED COUNT: 13 % (ref 10–15)
GFR SERPL CREATININE-BSD FRML MDRD: 75 ML/MIN/1.73M2
GLUCOSE BLD-MCNC: 115 MG/DL (ref 70–99)
GLUCOSE BLDC GLUCOMTR-MCNC: 121 MG/DL (ref 70–99)
GLUCOSE BLDC GLUCOMTR-MCNC: 133 MG/DL (ref 70–99)
GLUCOSE UR STRIP-MCNC: NEGATIVE MG/DL
HCT VFR BLD AUTO: 41 % (ref 40–53)
HGB BLD-MCNC: 13.8 G/DL (ref 13.3–17.7)
HGB UR QL STRIP: NEGATIVE
KETONES UR STRIP-MCNC: NEGATIVE MG/DL
LEUKOCYTE ESTERASE UR QL STRIP: NEGATIVE
MCH RBC QN AUTO: 29.7 PG (ref 26.5–33)
MCHC RBC AUTO-ENTMCNC: 33.7 G/DL (ref 31.5–36.5)
MCV RBC AUTO: 88 FL (ref 78–100)
NITRATE UR QL: NEGATIVE
PH UR STRIP: 6.5 [PH] (ref 5–7)
PLATELET # BLD AUTO: 209 10E3/UL (ref 150–450)
POTASSIUM BLD-SCNC: 3.9 MMOL/L (ref 3.4–5.3)
RBC # BLD AUTO: 4.65 10E6/UL (ref 4.4–5.9)
RBC URINE: 0 /HPF
SARS-COV-2 RNA RESP QL NAA+PROBE: NEGATIVE
SODIUM SERPL-SCNC: 138 MMOL/L (ref 133–144)
SP GR UR STRIP: 1.01 (ref 1–1.03)
SQUAMOUS EPITHELIAL: <1 /HPF
UROBILINOGEN UR STRIP-MCNC: NORMAL MG/DL
WBC # BLD AUTO: 10.2 10E3/UL (ref 4–11)
WBC URINE: <1 /HPF

## 2022-08-31 PROCEDURE — 250N000013 HC RX MED GY IP 250 OP 250 PS 637: Performed by: STUDENT IN AN ORGANIZED HEALTH CARE EDUCATION/TRAINING PROGRAM

## 2022-08-31 PROCEDURE — 86140 C-REACTIVE PROTEIN: CPT | Performed by: PHYSICIAN ASSISTANT

## 2022-08-31 PROCEDURE — 96374 THER/PROPH/DIAG INJ IV PUSH: CPT

## 2022-08-31 PROCEDURE — 82962 GLUCOSE BLOOD TEST: CPT

## 2022-08-31 PROCEDURE — 250N000011 HC RX IP 250 OP 636: Performed by: PHYSICIAN ASSISTANT

## 2022-08-31 PROCEDURE — G0378 HOSPITAL OBSERVATION PER HR: HCPCS

## 2022-08-31 PROCEDURE — 250N000013 HC RX MED GY IP 250 OP 250 PS 637: Performed by: PHYSICIAN ASSISTANT

## 2022-08-31 PROCEDURE — 99285 EMERGENCY DEPT VISIT HI MDM: CPT | Mod: 25

## 2022-08-31 PROCEDURE — 81001 URINALYSIS AUTO W/SCOPE: CPT | Performed by: PHYSICIAN ASSISTANT

## 2022-08-31 PROCEDURE — A9585 GADOBUTROL INJECTION: HCPCS | Performed by: STUDENT IN AN ORGANIZED HEALTH CARE EDUCATION/TRAINING PROGRAM

## 2022-08-31 PROCEDURE — 96376 TX/PRO/DX INJ SAME DRUG ADON: CPT

## 2022-08-31 PROCEDURE — 72158 MRI LUMBAR SPINE W/O & W/DYE: CPT

## 2022-08-31 PROCEDURE — 96375 TX/PRO/DX INJ NEW DRUG ADDON: CPT

## 2022-08-31 PROCEDURE — C9803 HOPD COVID-19 SPEC COLLECT: HCPCS

## 2022-08-31 PROCEDURE — 255N000002 HC RX 255 OP 636: Performed by: STUDENT IN AN ORGANIZED HEALTH CARE EDUCATION/TRAINING PROGRAM

## 2022-08-31 PROCEDURE — 85027 COMPLETE CBC AUTOMATED: CPT | Performed by: PHYSICIAN ASSISTANT

## 2022-08-31 PROCEDURE — 72100 X-RAY EXAM L-S SPINE 2/3 VWS: CPT

## 2022-08-31 PROCEDURE — 99223 1ST HOSP IP/OBS HIGH 75: CPT | Mod: AI | Performed by: PHYSICIAN ASSISTANT

## 2022-08-31 PROCEDURE — U0003 INFECTIOUS AGENT DETECTION BY NUCLEIC ACID (DNA OR RNA); SEVERE ACUTE RESPIRATORY SYNDROME CORONAVIRUS 2 (SARS-COV-2) (CORONAVIRUS DISEASE [COVID-19]), AMPLIFIED PROBE TECHNIQUE, MAKING USE OF HIGH THROUGHPUT TECHNOLOGIES AS DESCRIBED BY CMS-2020-01-R: HCPCS | Performed by: PHYSICIAN ASSISTANT

## 2022-08-31 PROCEDURE — 80048 BASIC METABOLIC PNL TOTAL CA: CPT | Performed by: PHYSICIAN ASSISTANT

## 2022-08-31 PROCEDURE — 36415 COLL VENOUS BLD VENIPUNCTURE: CPT | Performed by: PHYSICIAN ASSISTANT

## 2022-08-31 RX ORDER — OXYCODONE HYDROCHLORIDE 5 MG/1
5 TABLET ORAL EVERY 4 HOURS PRN
Status: DISCONTINUED | OUTPATIENT
Start: 2022-08-31 | End: 2022-08-31

## 2022-08-31 RX ORDER — ONDANSETRON 4 MG/1
4 TABLET, ORALLY DISINTEGRATING ORAL EVERY 6 HOURS PRN
Status: DISCONTINUED | OUTPATIENT
Start: 2022-08-31 | End: 2022-09-03 | Stop reason: HOSPADM

## 2022-08-31 RX ORDER — LOSARTAN POTASSIUM 100 MG/1
100 TABLET ORAL DAILY
Status: DISCONTINUED | OUTPATIENT
Start: 2022-09-01 | End: 2022-09-03 | Stop reason: HOSPADM

## 2022-08-31 RX ORDER — POLYETHYLENE GLYCOL 3350 17 G/17G
17 POWDER, FOR SOLUTION ORAL DAILY PRN
Status: DISCONTINUED | OUTPATIENT
Start: 2022-08-31 | End: 2022-09-03 | Stop reason: HOSPADM

## 2022-08-31 RX ORDER — NALOXONE HYDROCHLORIDE 0.4 MG/ML
0.4 INJECTION, SOLUTION INTRAMUSCULAR; INTRAVENOUS; SUBCUTANEOUS
Status: DISCONTINUED | OUTPATIENT
Start: 2022-08-31 | End: 2022-09-03 | Stop reason: HOSPADM

## 2022-08-31 RX ORDER — NALOXONE HYDROCHLORIDE 0.4 MG/ML
0.2 INJECTION, SOLUTION INTRAMUSCULAR; INTRAVENOUS; SUBCUTANEOUS
Status: DISCONTINUED | OUTPATIENT
Start: 2022-08-31 | End: 2022-09-02

## 2022-08-31 RX ORDER — TAMSULOSIN HYDROCHLORIDE 0.4 MG/1
0.4 CAPSULE ORAL DAILY
Status: DISCONTINUED | OUTPATIENT
Start: 2022-09-01 | End: 2022-09-03 | Stop reason: HOSPADM

## 2022-08-31 RX ORDER — OXYCODONE HYDROCHLORIDE 5 MG/1
5-10 TABLET ORAL EVERY 4 HOURS PRN
Status: DISCONTINUED | OUTPATIENT
Start: 2022-08-31 | End: 2022-09-03 | Stop reason: HOSPADM

## 2022-08-31 RX ORDER — AMOXICILLIN 250 MG
1 CAPSULE ORAL 2 TIMES DAILY PRN
Status: DISCONTINUED | OUTPATIENT
Start: 2022-08-31 | End: 2022-09-03 | Stop reason: HOSPADM

## 2022-08-31 RX ORDER — LIDOCAINE 40 MG/G
CREAM TOPICAL
Status: DISCONTINUED | OUTPATIENT
Start: 2022-08-31 | End: 2022-09-02

## 2022-08-31 RX ORDER — PANTOPRAZOLE SODIUM 40 MG/1
40 TABLET, DELAYED RELEASE ORAL DAILY
Status: DISCONTINUED | OUTPATIENT
Start: 2022-09-01 | End: 2022-09-03 | Stop reason: HOSPADM

## 2022-08-31 RX ORDER — KETOROLAC TROMETHAMINE 15 MG/ML
15 INJECTION, SOLUTION INTRAMUSCULAR; INTRAVENOUS EVERY 6 HOURS PRN
Status: DISCONTINUED | OUTPATIENT
Start: 2022-08-31 | End: 2022-08-31

## 2022-08-31 RX ORDER — GADOBUTROL 604.72 MG/ML
10 INJECTION INTRAVENOUS ONCE
Status: COMPLETED | OUTPATIENT
Start: 2022-08-31 | End: 2022-08-31

## 2022-08-31 RX ORDER — AMOXICILLIN 250 MG
2 CAPSULE ORAL 2 TIMES DAILY PRN
Status: DISCONTINUED | OUTPATIENT
Start: 2022-08-31 | End: 2022-09-02

## 2022-08-31 RX ORDER — ZOLPIDEM TARTRATE 5 MG/1
5 TABLET ORAL
Status: DISCONTINUED | OUTPATIENT
Start: 2022-08-31 | End: 2022-09-02

## 2022-08-31 RX ORDER — HYDROMORPHONE HYDROCHLORIDE 1 MG/ML
0.5 INJECTION, SOLUTION INTRAMUSCULAR; INTRAVENOUS; SUBCUTANEOUS
Status: COMPLETED | OUTPATIENT
Start: 2022-08-31 | End: 2022-08-31

## 2022-08-31 RX ORDER — HYDROMORPHONE HYDROCHLORIDE 1 MG/ML
.3-.5 INJECTION, SOLUTION INTRAMUSCULAR; INTRAVENOUS; SUBCUTANEOUS
Status: DISCONTINUED | OUTPATIENT
Start: 2022-08-31 | End: 2022-09-03 | Stop reason: HOSPADM

## 2022-08-31 RX ORDER — CYCLOBENZAPRINE HCL 10 MG
10 TABLET ORAL 3 TIMES DAILY
Status: DISCONTINUED | OUTPATIENT
Start: 2022-08-31 | End: 2022-09-03 | Stop reason: HOSPADM

## 2022-08-31 RX ORDER — ACETAMINOPHEN 325 MG/1
975 TABLET ORAL EVERY 8 HOURS
Status: DISCONTINUED | OUTPATIENT
Start: 2022-08-31 | End: 2022-09-03 | Stop reason: HOSPADM

## 2022-08-31 RX ORDER — NICOTINE POLACRILEX 4 MG
15-30 LOZENGE BUCCAL
Status: DISCONTINUED | OUTPATIENT
Start: 2022-08-31 | End: 2022-09-03 | Stop reason: HOSPADM

## 2022-08-31 RX ORDER — DIAZEPAM 5 MG
5 TABLET ORAL EVERY 6 HOURS PRN
Status: DISCONTINUED | OUTPATIENT
Start: 2022-08-31 | End: 2022-08-31

## 2022-08-31 RX ORDER — DEXTROSE MONOHYDRATE 25 G/50ML
25-50 INJECTION, SOLUTION INTRAVENOUS
Status: DISCONTINUED | OUTPATIENT
Start: 2022-08-31 | End: 2022-09-03 | Stop reason: HOSPADM

## 2022-08-31 RX ORDER — CARVEDILOL 25 MG/1
50 TABLET ORAL 2 TIMES DAILY
Status: DISCONTINUED | OUTPATIENT
Start: 2022-08-31 | End: 2022-09-03 | Stop reason: HOSPADM

## 2022-08-31 RX ORDER — HYDRALAZINE HYDROCHLORIDE 20 MG/ML
10 INJECTION INTRAMUSCULAR; INTRAVENOUS EVERY 4 HOURS PRN
Status: DISCONTINUED | OUTPATIENT
Start: 2022-08-31 | End: 2022-08-31

## 2022-08-31 RX ORDER — ONDANSETRON 2 MG/ML
4 INJECTION INTRAMUSCULAR; INTRAVENOUS EVERY 6 HOURS PRN
Status: DISCONTINUED | OUTPATIENT
Start: 2022-08-31 | End: 2022-09-02

## 2022-08-31 RX ORDER — CYCLOBENZAPRINE HCL 10 MG
10 TABLET ORAL 3 TIMES DAILY
Status: DISCONTINUED | OUTPATIENT
Start: 2022-08-31 | End: 2022-08-31

## 2022-08-31 RX ORDER — CHLORTHALIDONE 25 MG/1
25 TABLET ORAL DAILY
Status: DISCONTINUED | OUTPATIENT
Start: 2022-09-01 | End: 2022-09-03 | Stop reason: HOSPADM

## 2022-08-31 RX ORDER — NALOXONE HYDROCHLORIDE 0.4 MG/ML
0.2 INJECTION, SOLUTION INTRAMUSCULAR; INTRAVENOUS; SUBCUTANEOUS
Status: DISCONTINUED | OUTPATIENT
Start: 2022-08-31 | End: 2022-09-03 | Stop reason: HOSPADM

## 2022-08-31 RX ADMIN — CYCLOBENZAPRINE 10 MG: 10 TABLET, FILM COATED ORAL at 12:42

## 2022-08-31 RX ADMIN — CYCLOBENZAPRINE 10 MG: 10 TABLET, FILM COATED ORAL at 19:43

## 2022-08-31 RX ADMIN — GADOBUTROL 10 ML: 604.72 INJECTION INTRAVENOUS at 21:10

## 2022-08-31 RX ADMIN — KETOROLAC TROMETHAMINE 15 MG: 15 INJECTION, SOLUTION INTRAMUSCULAR; INTRAVENOUS at 13:51

## 2022-08-31 RX ADMIN — CARVEDILOL 50 MG: 25 TABLET, FILM COATED ORAL at 19:44

## 2022-08-31 RX ADMIN — OXYCODONE HYDROCHLORIDE 5 MG: 5 TABLET ORAL at 18:12

## 2022-08-31 RX ADMIN — ACETAMINOPHEN 975 MG: 325 TABLET ORAL at 23:43

## 2022-08-31 RX ADMIN — DIAZEPAM 5 MG: 5 TABLET ORAL at 13:51

## 2022-08-31 RX ADMIN — HYDROMORPHONE HYDROCHLORIDE 0.5 MG: 1 INJECTION, SOLUTION INTRAMUSCULAR; INTRAVENOUS; SUBCUTANEOUS at 20:24

## 2022-08-31 RX ADMIN — ZOLPIDEM TARTRATE 5 MG: 5 TABLET ORAL at 22:05

## 2022-08-31 RX ADMIN — HYDROMORPHONE HYDROCHLORIDE 0.5 MG: 1 INJECTION, SOLUTION INTRAMUSCULAR; INTRAVENOUS; SUBCUTANEOUS at 10:48

## 2022-08-31 RX ADMIN — ACETAMINOPHEN 975 MG: 325 TABLET ORAL at 17:09

## 2022-08-31 RX ADMIN — HYDROMORPHONE HYDROCHLORIDE 0.5 MG: 1 INJECTION, SOLUTION INTRAMUSCULAR; INTRAVENOUS; SUBCUTANEOUS at 16:52

## 2022-08-31 ASSESSMENT — ENCOUNTER SYMPTOMS
NAUSEA: 0
NUMBNESS: 0
CHILLS: 0
BACK PAIN: 1
FEVER: 0
ABDOMINAL PAIN: 0
GASTROINTESTINAL NEGATIVE: 1
VOMITING: 0
DYSURIA: 0
HEMATURIA: 0

## 2022-08-31 ASSESSMENT — ACTIVITIES OF DAILY LIVING (ADL)
ADLS_ACUITY_SCORE: 38
ADLS_ACUITY_SCORE: 37
ADLS_ACUITY_SCORE: 38
ADLS_ACUITY_SCORE: 37
ADLS_ACUITY_SCORE: 37

## 2022-08-31 NOTE — ED NOTES
Bed: ED12  Expected date:   Expected time:   Means of arrival:   Comments:  [facility] [place] [phone] - [phone] M back pain eta [phone]

## 2022-08-31 NOTE — ED PROVIDER NOTES
History   Chief Complaint:  Back Pain       The history is provided by the patient.      Syd Joyce is a 67 year old male with history of spinal stenosis, controlled type II diabetes and GERD who presents with lower back pain. Patient reports he had surgery in 2017 due to a ruptured L5 and spinal stenosis, which had no post-op complications. This episode of back pain onset about a week ago, and presents as a pain in his buttocks that shoots down his legs, bilaterally. States the pain is worse in his right leg. Laying down prone and getting up exacerbate the pain. Endorses tingling in buttocks and legs, as well as his right foot. Denies numbness to the groin, stool or urine incontinence, dysuria, hematuria, abdominal pain, nausea, vomiting, fever, chills or any GI symptoms. No falls or trauma. Patient was given Norco and Naproxen on 8/25 after evaluation by PCP, but remarks they provide no relief. Patient has had one laminectomy. Patient has MRI scheduled in a week per PCP. Checks sugars on a regular basis. Last dose of narcotics at 0630 this morning.     Review of Systems   Constitutional: Negative for chills and fever.   Gastrointestinal: Negative.  Negative for abdominal pain, nausea and vomiting.   Genitourinary: Negative for dysuria and hematuria.   Musculoskeletal: Positive for back pain.        + Buttocks pain   Neurological: Negative for numbness.        + tingling   All other systems reviewed and are negative.    Allergies:  No Known Allergies    Medications:  aspirin (ASA) 81 MG EC tablet  carvedilol (COREG) 25 MG tablet  chlorthalidone (HYGROTON) 25 MG tablet  dulaglutide (TRULICITY) 1.5 MG/0.5ML pen  losartan (COZAAR) 100 MG tablet  metFORMIN (GLUCOPHAGE XR) 500 MG 24 hr tablet  naproxen (NAPROSYN) 500 MG tablet  pantoprazole (PROTONIX) 40 MG EC tablet  sildenafil (REVATIO) 20 MG tablet  simvastatin (ZOCOR) 20 MG tablet  tamsulosin (FLOMAX) 0.4 MG capsule  tiZANidine (ZANAFLEX) 4 MG  "tablet  zolpidem (AMBIEN) 10 MG tablet    Past Medical History:     Hypertension  Type II diabetes  CAD  Polyneuropathy   Alcohol dependence   GERD  Hypercholesteremia   Diverticular disease of colon  Spinal stenosis      Past Surgical History:    Colonoscopy   Discectomy lumbar posterior microscopic two levels   Phacoemulsification clear cornea (x2)  Hawks teeth     Social History:  The patient presents to the ED with spouse  PCP: Shan Arenas     Physical Exam     Patient Vitals for the past 24 hrs:   BP Temp Temp src Pulse Resp SpO2 Height Weight   08/31/22 1051 (!) 174/91 -- -- 85 16 96 % -- --   08/31/22 1000 (!) 199/95 98.7  F (37.1  C) Oral 83 16 99 % 1.798 m (5' 10.8\") 95.3 kg (210 lb)       Physical Exam  Constitutional: Pleasant. Cooperative.   Eyes: Pupils equally round and reactive  HENT: Head is normal in appearance. Oropharynx is normal with moist mucus membranes.  Cardiovascular: Regular rate and rhythm and without murmurs.  Respiratory: Normal respiratory effort, lungs are clear bilaterally.  Musculoskeletal: No midline spinal tenderness. Mild TTP to bilateral buttucks. 5/5 plantarflexion at bilateral ankles. Unable to SLR to left due to pain, pain with right SLR, but able to do so.  Skin: Normal, without rash.  Neurologic: Cranial nerves grossly intact, normal cognition, no focal deficits. Alert and oriented x 3. Sensation to light touch intact in bilateral lower extremities.   Psychiatric: Normal affect.  Nursing notes and vital signs reviewed.    Emergency Department Course     Imaging:  Lumbar spine XR, 2-3 views   Final Result   IMPRESSION: No fracture is identified. Interspinous devices at L3-4   and L4-5. Mild-to-moderate degenerative change. Slight levoconvex   curvature. Vascular calcifications.      YESSICA LOPEZ MD            SYSTEM ID:  [id number]           Laboratory:  Labs Ordered and Resulted from Time of ED Arrival to Time of ED Departure   ROUTINE UA WITH MICROSCOPIC REFLEX " TO CULTURE - Normal       Result Value    Color Urine Light Yellow      Appearance Urine Clear      Glucose Urine Negative      Bilirubin Urine Negative      Ketones Urine Negative      Specific Gravity Urine 1.011      Blood Urine Negative      pH Urine 6.5      Protein Albumin Urine Negative      Urobilinogen Urine Normal      Nitrite Urine Negative      Leukocyte Esterase Urine Negative      RBC Urine 0      WBC Urine <1      Squamous Epithelials Urine <1     COVID-19 VIRUS (CORONAVIRUS) BY PCR - Normal    SARS CoV2 PCR Negative          Emergency Department Course:    Reviewed:  I reviewed nursing notes, vitals, past medical history and Care Everywhere    Assessments/Consults:  ED Course as of 08/31/22 1314   Wed Aug 31, 2022   1024 I obtained history and examined the patient.    1125 I rechecked the patient and explained findings.    1200 I spoke to Dr. Rey, hospitalist, who accepts the patient for admission.      Interventions:  Medications   cyclobenzaprine (FLEXERIL) tablet 10 mg (10 mg Oral Given 8/31/22 1242)   HYDROmorphone (PF) (DILAUDID) injection 0.5 mg (0.5 mg Intravenous Given 8/31/22 1048)     Disposition:  The patient was admitted to the hospital under the care of Dr. Rey.     Impression & Plan     CMS Diagnoses: None    Medical Decision Making:  Syd Joyce is a 67 year old male who presents to the ED for evaluation of back pain.  Patient has a history of spinal stenosis and has had history of surgical intervention to his back as well.  Patient notes onset of low back pain that began a week ago.  No falls or trauma.  No red flag signs or symptoms.  Went to his PCP and was provided prescription for NSAIDs and narcotics and had an MRI scheduled.  Pain has persisted despite interventions described, prompting presentation to ED.  See HPI as above for additional details.  Vitals and physical exam as above.  Differential was broad and included cauda equina, kidney stone, pyelonephritis,  muscular, fracture, shingles, amongst others.  Work-up obtained as above.  Again, no red flag signs or symptoms to suggest a cauda equina, I do not feel that emergent MRI is indicated at this time.  No urinary symptoms to suggest for kidney stone or pyelonephritis.  X-ray without evidence for bony abnormality.  No skin changes to suggest for shingles.  I suspect disc related pathology at this time.  Patient has intractable pain despite pain medication provided here in the ED.  Patient does not feel safe at home given inability to control pain and move about.  Felt admission was indicated.  Discussed the case with hospitalist, who agreed to admit the patient.  All questions answered.  Patient admitted in stable condition.    Covid-19  Syd Joyce was evaluated during a global COVID-19 pandemic, which necessitated consideration that the patient might be at risk for infection with the SARS-CoV-2 virus that causes COVID-19.   Applicable protocols for evaluation were followed during the patient's care.   COVID-19 was considered as part of the patient's evaluation. The plan for testing is:  a test was obtained during this visit.    Diagnosis:    ICD-10-CM    1. Acute bilateral low back pain with bilateral sciatica  M54.42     M54.41        Scribe Disclosure:  I, LEONORA HAMILTON, am serving as a scribe at 10:24 AM on 8/31/2022 to document services personally performed by Paddy Foster PA-C based on my observations and the provider's statements to me.     This record was created at least in part using electronic voice recognition software, so please excuse any typographical errors.       Paddy Foster PA-C  08/31/22 4285

## 2022-08-31 NOTE — PROGRESS NOTES
RECEIVING UNIT ED HANDOFF REVIEW    ED Nurse Handoff Report was reviewed by: Patricia Talamantes, RN on August 31, 2022 at 3:31 PM

## 2022-08-31 NOTE — ED TRIAGE NOTES
Triage Assessment     Row Name 08/31/22 1011       Triage Assessment (Adult)    Airway WDL WDL       Respiratory WDL    Respiratory WDL WDL       Skin Circulation/Temperature WDL    Skin Circulation/Temperature WDL WDL       Cardiac WDL    Cardiac WDL WDL       Peripheral/Neurovascular WDL    Peripheral Neurovascular WDL WDL       Cognitive/Neuro/Behavioral WDL    Cognitive/Neuro/Behavioral WDL WDL            Pt BIBA for eval of low back pain worsening over last 2 days. Pt states the pain radiates down both legs R>L. Pt is unable to walk due to pain. Denies recent injury. HX of L5 surgery. Medics gave Fentanyl 100mcg w/o relief.

## 2022-08-31 NOTE — TELEPHONE ENCOUNTER
Spoke with patient this morning. Recommend ER evaluation. Patient will have wife drive him there. Report called to [facility].    STACY Garcia CNP on 8/31/2022 at 9:15 AM

## 2022-08-31 NOTE — ED NOTES
"[facility]  ED Nurse Handoff Report    ED Chief complaint: Back Pain      ED Diagnosis:   Final diagnoses:   Acute bilateral low back pain with bilateral sciatica       Code Status: Full Code    Allergies: No Known Allergies    Patient Story: Pt being admitted for severe back pain with sciatica. Pt is unable to walk or stand. Pt given pain meds w/o much relief.  Focused Assessment:  yes    Treatments and/or interventions provided: See MAR.  Patient's response to treatments and/or interventions: Still having pain. Meds given.    To be done/followed up on inpatient unit:  Check for pain response to last meds given.    Does this patient have any cognitive concerns?: none    Activity level - Baseline/Home:  Independent.  Activity Level - Current:   Unable to walk at this time.    Patient's Preferred language: English   Needed?: No    Isolation: None  Infection: Not Applicable  Patient tested for COVID 19 prior to admission: YES  Bariatric?: No    Vital Signs:   Vitals:    08/31/22 1000 08/31/22 1051   BP: (!) 199/95 (!) 174/91   Pulse: 83 85   Resp: 16 16   Temp: 98.7  F (37.1  C)    TempSrc: Oral    SpO2: 99% 96%   Weight: 95.3 kg (210 lb)    Height: 1.798 m (5' 10.8\")        Cardiac Rhythm:     Was the PSS-3 completed:   Yes  What interventions are required if any?  none             Family Comments: Wife present today.  OBS brochure/video discussed/provided to patient/family: Yes              Name of person given brochure if not patient:               Relationship to patient:    For the majority of the shift this patient's behavior was Green.   Behavioral interventions performed were none.    ED NURSE PHONE NUMBER: [phone]         "

## 2022-08-31 NOTE — H&P
[facility]    History and Physical  Hospitalist       Date of Admission:  8/31/2022  Date of Service (when I saw the patient): 08/31/22    Assessment & Plan   Syd Joyce is a very pleasant 67 year old male with a past medical history of hypertension, type 2 diabetes, GERD, obesity, lumbar laminectomy in 2017, and some chronic numbness/tingling to both legs who presented with acute onset lumbar back pain.  He is registered to observation for pain control and further imaging, as well as orthopedic spine evaluation.    Acute lumbar back pain with bilateral radiculopathy  Lumbar back pain occurred acutely approximately 1 week ago with no provocative injury.  Patient states he was lying prone at the onset, had severe lumbar back pain with shooting pains down both legs.  Pain persisted, he is unable to ambulate.  No saddle anesthesia, bowel or bladder incontinence.  Was seen outpatient in clinic, prescribed Norco and naproxen without relief.  Outpatient MRI scheduled but not yet completed.  Here, vital signs are notable for hypertension, but otherwise stable; Lumbar x-ray completed that shows no evidence for any bony abnormality.  IV Dilaudid given without improvement of symptoms.  Patient was registered observation.  -- Ordered MRI lumbar spine with and without contrast  -- Orthospine consult has been placed, consider GARETH pending MRI results  -- Pain control:    --Tylenol, oxycodone 5 mg prn, IV Dilaudid prn for severe pain   -- Flexeril, Valium and Toradol are being trialed as well.   -- Ice/heat as tolerated.  Consider lidocaine patch  -- PT consulted    Hypertension  Accelerated due to pain.  BP up to 199/95 on admission.  -- IV hydralazine as needed for SBP greater than 180  -- Resume PTA losartan 100 mg daily, Coreg 50 mg twice daily, chlorthalidone 25 mg daily with hold parameters.    CAD  Hyperlipidemia  Patient had coronary angiogram in 2018 showing 50% calcified proximal LAD  "stenosis, without any other significant coronary artery disease.  Risk factor modification and medical management recommended.  Patient currently denies any chest pain.  Echo from 2019 shows EF 60-65%.  --Hold PTA aspirin 81 mg in case procedure is needed  -- Resume PTA losartan, Coreg  -- Hold statin while observation status, resume at discharge    Type 2 diabetes mellitus  A1c 6.5%.  PTA regimen with metformin 1000 mg twice daily, Trulicity 1.5 mg subcutaneous q. 7 days on Saturdays  -- Hold PTA metformin and Trulicity, plan to resume at discharge  -- Ordered moderate dose sign scale insulin  -- Moderate CHO diet    BPH  --Patient denies any issues with changes in his urination such as increased retention or incontinence.   -- Continue PTA Flomax    GERD  -- Resume PTA PPI      Diet: Moderate Consistent Carb (60 g CHO per Meal) Diet    DVT Prophylaxis: Pneumatic Compression Devices  Quiros Catheter: Not present  Central Lines: None  Cardiac Monitoring: None  Code Status: Full Code    Disposition Plan      --Patient is registered to observation bed.  Likely discharge in the next 24-48 hours pending adequate pain control, MRI results, and orthopedic spine surgery evaluation.  PT to evaluate.       The patient's care was discussed with Dr. Rey of the hospitalist service.  He is in agreement with my assessment and plan of care.    DEONDRE Tiwari  Hospitalist Service  [facility]  Securely message with the Vocera Web Console (learn more here)  Text page via "[employer] Paging/Directory         Clinically Significant Risk Factors Present on Admission                 # Hypertension: home medication list includes antihypertensive(s)    # Overweight: Estimated body mass index is 29.45 kg/m  as calculated from the following:    Height as of this encounter: 1.798 m (5' 10.8\").    Weight as of this encounter: 95.3 kg (210 lb).          DEONDRE Tiwari    Primary Care Physician "   Dr. Shan Arenas    Chief Complaint   Severe back pain with associated bilateral leg pain    History is obtained from the patient.    History of Present Illness   Syd Joyce is a very pleasant 67 year old male with a past medical history of hypertension, type 2 diabetes, GERD, obesity, lumbar laminectomy in 2017, and some chronic numbness/tingling to both legs who presented with acute onset lumbar back pain.  Patient states that he had been lying prone about a week ago when he had the sudden onset of low back pain that radiated down both legs to the level of the calf, as well as his right foot.  Pain is slightly worse in the right lower extremity versus left.  He had difficulty getting up and ambulating due to his severe pain.  He denies any recent fall, twisting, lifting, or other mechanism of injury.  He does have some tingling in the buttocks and legs, although states that he does have some bilateral foot numbness/tingling at baseline.  He states he required a lumbar surgery in the past due to a compression fracture and resulting spinal stenosis, but his pain was not as severe at that time.  This was in approximately 2017.  He denies any incontinence of bowel or bladder, dysuria, hematuria, abdominal pain, nausea, vomiting, diarrhea, fever, chills, flulike symptoms, headache, lightheadedness, chest pain or shortness of breath.    Patient saw his primary care provider as outpatient when the pain began approximately a week ago, and was prescribed Norco and naproxen but did not have any relief from this.  He also was scheduled for an MRI next week.    In the ED, patient was evaluated by Paddy Foster PA-C, with vital signs showing elevated BP of 199/95, which came down slightly on repeat to 174/91.  He is afebrile with temperature 98.7.  Heart rate in the 80s.  Satting in the 90s on room air.  COVID-19 screening negative.  Urinalysis unremarkable.  No other labs to review.  Lumbar x-ray completed that  shows no evidence for any bony abnormality.  Patient was given IV Dilaudid x1 without improvement of symptoms.  Given patient's ongoing severe pain, he is registered to observation for further evaluation.    Past Medical History    I have reviewed this patient's medical history and updated it with pertinent information if needed.   Past Medical History:   Diagnosis Date     DM2 (diabetes mellitus, type 2) (H)      Esophageal reflux 6/15/2012     History of colonic polyps 4/26/2021     HTN (hypertension) 8/12/2011     Leg pain, bilateral      Low back pain      Numbness and tingling     LEGS, R/T LUMBAR STENOSIS     Obesity, unspecified 1/10/2014     S/P lumbar laminectomy 6/30/2017       Past Surgical History   I have reviewed this patient's surgical history and updated it with pertinent information if needed.  Past Surgical History:   Procedure Laterality Date     COLONOSCOPY       DISCECTOMY LUMBAR POSTERIOR MICROSCOPIC TWO LEVELS N/A 6/30/2017    Procedure: DISCECTOMY LUMBAR POSTERIOR MICROSCOPIC TWO LEVELS;  L3-4 L4-5 POSTERIOR DECOMPRESSION AND INTERSPINUS FIXATION WITHOUT FUSION;  Surgeon: Ray Perez MD;  Location:  OR     PHACOEMULSIFICATION CLEAR CORNEA WITH STANDARD INTRAOCULAR LENS IMPLANT Right 7/17/2018    Procedure: PHACOEMULSIFICATION CLEAR CORNEA WITH STANDARD INTRAOCULAR LENS IMPLANT;  RIGHT EYE PHACOEMULSIFICATION CLEAR CORNEA WITH STANDARD INTRAOCULAR LENS IMPLANT;  Surgeon: Elsie Kay MD;  Location:  [facility]     PHACOEMULSIFICATION CLEAR CORNEA WITH STANDARD INTRAOCULAR LENS IMPLANT Left 7/31/2018    Procedure: PHACOEMULSIFICATION CLEAR CORNEA WITH STANDARD INTRAOCULAR LENS IMPLANT;  COMPLEX LEFT EYE PHACOEMULSIFICATION CLEAR CORNEA WITH STANDARD INTRAOCULAR LENS IMPLANT WITH MALYUGIN RING;  Surgeon: Elsie Kay MD;  Location:  [facility]     WISDOM TEETH         Social History   I have reviewed this patient's social history and updated it with pertinent information if needed.      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   Family History   Problem Relation Age of Onset     No Known Problems Mother      Obesity Sister      Diabetes Brother      Obesity Brother      Coronary Artery Disease No family hx of      Cerebrovascular Disease No family hx of        Medications   Prior to Admission Medications   Prescriptions Last Dose Informant Patient Reported? Taking?   ONETOUCH ULTRA test strip Unknown at Unknown time  No Yes   Sig: USE TO TEST BLOOD SUGAR TWICE DAILY OR AS DIRECTED   aspirin (ASA) 81 MG EC tablet 8/31/2022 at AM  No Yes   Sig: Take 1 tablet (81 mg) by mouth daily   blood glucose (NO BRAND SPECIFIED) lancets standard Unknown at Unknown time  No Yes   Sig: Use to test blood sugar 2 times daily or as directed.   blood glucose monitoring (ONE TOUCH ULTRA 2) meter device kit Unknown at Unknown time  No Yes   Sig: Use to test blood sugar 2 imes daily or as directed.   carvedilol (COREG) 25 MG tablet 8/31/2022 at AM  No Yes   Sig: Take 2 tablets (50 mg) by mouth 2 times daily Hold IF heart rate less than 55.   chlorthalidone (HYGROTON) 25 MG tablet 8/31/2022 at AM  No Yes   Sig: Take 1 tablet (25 mg) by mouth daily   dulaglutide (TRULICITY) 1.5 MG/0.5ML pen Past Week at Unknown time  No Yes   Sig: Inject 1.5 mg Subcutaneous every 7 days   Patient taking differently: Inject 1.5 mg Subcutaneous every 7 days Saturdays   losartan (COZAAR) 100 MG tablet 8/31/2022 at AM  No Yes   Sig: Take 1 tablet (100 mg) by mouth daily   metFORMIN (GLUCOPHAGE XR) 500 MG 24 hr tablet 8/31/2022 at AM  No Yes   Sig: Take 4 tablets (2,000 mg) by mouth daily   Patient taking differently: Take 1,000 mg by mouth 2 times daily (with  meals)   naproxen (NAPROSYN) 500 MG tablet 8/31/2022 at AM  No Yes   Sig: Take 1 tablet (500 mg) by mouth 2 times daily (with meals) for 14 days   pantoprazole (PROTONIX) 40 MG EC tablet 8/31/2022 at AM  No Yes   Sig: Take 1 tablet (40 mg) by mouth daily   sildenafil (REVATIO) 20 MG tablet Unknown at prn  No Yes   Sig: TAKE 2 TO 4 TABLETS BY MOUTH AS NEEDED, NEVER USE WITH NTG, DOXASOSIN, OR TERAZOSIN   simvastatin (ZOCOR) 20 MG tablet 8/30/2022 at HS  No Yes   Sig: Take 1 tablet (20 mg) by mouth At Bedtime   tamsulosin (FLOMAX) 0.4 MG capsule 8/31/2022 at AM  No Yes   Sig: Take 1 capsule (0.4 mg) by mouth daily   tiZANidine (ZANAFLEX) 4 MG tablet 8/30/2022 at PM  No Yes   Sig: Take 1 tablet (4 mg) by mouth daily   zolpidem (AMBIEN) 10 MG tablet 8/30/2022 at HS  No Yes   Sig: TAKE 1 TABLET BY MOUTH EVERY DAY AT BEDTIME      Facility-Administered Medications: None     Allergies   No Known Allergies    Review of Systems   The 10 point Review of Systems is negative other than noted in the HPI.    Physical Exam   Temp: 98.7  F (37.1  C) Temp src: Oral BP: (!) 174/91 Pulse: 85   Resp: 16 SpO2: 96 % O2 Device: None (Room air)    Vital Signs with Ranges  Temp:  [98.7  F (37.1  C)] 98.7  F (37.1  C)  Pulse:  [83-85] 85  Resp:  [16] 16  BP: (174-199)/(91-95) 174/91  SpO2:  [96 %-99 %] 96 %  210 lbs 0 oz    Constitutional: Adult male, lying on the gurney awake, alert, cooperative.  In obvious physical discomfort, but not in cardiopulmonary distress.  ENT: Normocephalic, without obvious abnormality, atraumatic, oropharynx with moist mucus membranes.  Eyes pupils are equal, round; extra occular movements intact.  Normal sclera.    Neck: Supple, symmetrical  Pulmonary: No increased work of breathing, good air exchange, clear to auscultation bilaterally, no crackles or wheezing.  Cardiovascular: Regular rate and rhythm, normal S1 and S2, and no murmur noted.  GI: Normal bowel sounds, soft, non-distended, non-tender.     Skin/Integumen: Warm, dry, no rashes on exposed skin.  Neuro: CN II-XII grossly intact.  Speech normal.  No facial droop.  Upper extremities with normal strength bilaterally.  Unable to raise bilateral lower extremities secondary to pain, able to flex both feet.  Sensation intact in all 4 extremities.  Psych:  Alert and oriented to self, place, date, situation. Normal affect.  Extremities: No lower extremity edema noted, and distal pulses are palpable in all 4 ext.      Data   Data reviewed today:  I personally reviewed all labs and imaging results from the last 24 hrs.    Results for orders placed or performed during the hospital encounter of 08/31/22 (from the past 24 hour(s))   Asymptomatic COVID-19 Virus (Coronavirus) by PCR Nasopharyngeal    Specimen: Nasopharyngeal; Swab   Result Value Ref Range    SARS CoV2 PCR Negative Negative    Narrative    Testing was performed using the Xpert Xpress SARS-CoV-2 Assay on the   Cepheid Gene-Xpert Instrument Systems. Additional information about   this Emergency Use Authorization (EUA) assay can be found via the Lab   Guide. This test should be ordered for the detection of SARS-CoV-2 in   individuals who meet SARS-CoV-2 clinical and/or epidemiological   criteria. Test performance is unknown in asymptomatic patients. This   test is for in vitro diagnostic use under the FDA EUA for   laboratories certified under CLIA to perform high complexity testing.   This test has not been FDA cleared or approved. A negative result   does not rule out the presence of PCR inhibitors in the specimen or   target RNA in concentration below the limit of detection for the   assay. The possibility of a false negative should be considered if   the patient's recent exposure or clinical presentation suggests   COVID-19. This test was validated by the [facility]. This laboratory is certified under the Clinical Laboratory Improvement Amendments of 1988 (CLIA-88)  as qualified to perform high complexity laboratory testing.     UA with Microscopic reflex to Culture    Specimen: Urine, Midstream   Result Value Ref Range    Color Urine Light Yellow Colorless, Straw, Light Yellow, Yellow    Appearance Urine Clear Clear    Glucose Urine Negative Negative mg/dL    Bilirubin Urine Negative Negative    Ketones Urine Negative Negative mg/dL    Specific Gravity Urine 1.011 1.003 - 1.035    Blood Urine Negative Negative    pH Urine 6.5 5.0 - 7.0    Protein Albumin Urine Negative Negative mg/dL    Urobilinogen Urine Normal Normal, 2.0 mg/dL    Nitrite Urine Negative Negative    Leukocyte Esterase Urine Negative Negative    RBC Urine 0 <=2 /HPF    WBC Urine <1 <=5 /HPF    Squamous Epithelials Urine <1 <=1 /HPF    Narrative    Urine Culture not indicated   Lumbar spine XR, 2-3 views    Narrative    LUMBAR SPINE 2/3 VIEWS   8/31/2022 12:34 PM     HISTORY: Atraumatic low back pain.    COMPARISON: X-rays 12/20/2017      Impression    IMPRESSION: No fracture is identified. Interspinous devices at L3-4  and L4-5. Mild-to-moderate degenerative change. Slight levoconvex  curvature. Vascular calcifications.    YESSICA LOPEZ MD         SYSTEM ID:  [id number]

## 2022-08-31 NOTE — ED TRIAGE NOTES
Triage Assessment     Row Name 08/31/22 1011       Triage Assessment (Adult)    Airway WDL WDL       Respiratory WDL    Respiratory WDL WDL       Skin Circulation/Temperature WDL    Skin Circulation/Temperature WDL WDL       Cardiac WDL    Cardiac WDL WDL       Peripheral/Neurovascular WDL    Peripheral Neurovascular WDL WDL       Cognitive/Neuro/Behavioral WDL    Cognitive/Neuro/Behavioral WDL WDL

## 2022-08-31 NOTE — PHARMACY-ADMISSION MEDICATION HISTORY
Pharmacy Medication History  Admission medication history interview status for the 8/31/2022  admission is complete. See EPIC admission navigator for prior to admission medications     Location of Interview: Patient room  Medication history sources: Patient, Surescripts and Care Everywhere    Significant changes made to the medication list:  Changed: Metformin    In the past week, patient estimated taking medication this percent of the time: greater than 90%      Time spent in this activity: 20 minutes    Prior to Admission medications    Medication Sig Last Dose Taking? Auth Provider Long Term End Date   aspirin (ASA) 81 MG EC tablet Take 1 tablet (81 mg) by mouth daily 8/31/2022 at AM Yes Shan Arenas MD No    blood glucose (NO BRAND SPECIFIED) lancets standard Use to test blood sugar 2 times daily or as directed. Unknown at Unknown time Yes Shan Arenas MD     blood glucose monitoring (ONE TOUCH ULTRA 2) meter device kit Use to test blood sugar 2 imes daily or as directed. Unknown at Unknown time Yes Shan Arenas MD     carvedilol (COREG) 25 MG tablet Take 2 tablets (50 mg) by mouth 2 times daily Hold IF heart rate less than 55. 8/31/2022 at AM Yes Shan Arenas MD Yes    chlorthalidone (HYGROTON) 25 MG tablet Take 1 tablet (25 mg) by mouth daily 8/31/2022 at AM Yes Arelis Ryan APRN CNP Yes    dulaglutide (TRULICITY) 1.5 MG/0.5ML pen Inject 1.5 mg Subcutaneous every 7 days  Patient taking differently: Inject 1.5 mg Subcutaneous every 7 days Saturdays Past Week at Unknown time Yes Shan Arenas MD     losartan (COZAAR) 100 MG tablet Take 1 tablet (100 mg) by mouth daily 8/31/2022 at AM Yes Shan Arenas MD Yes    metFORMIN (GLUCOPHAGE XR) 500 MG 24 hr tablet Take 4 tablets (2,000 mg) by mouth daily  Patient taking differently: Take 1,000 mg by mouth 2 times daily (with meals) 8/31/2022 at AM Yes Shan Arenas MD Yes    naproxen (NAPROSYN) 500 MG tablet  Take 1 tablet (500 mg) by mouth 2 times daily (with meals) for 14 days 8/31/2022 at AM Yes Arelis Ryan APRN CNP  9/8/22   ONETOUCH ULTRA test strip USE TO TEST BLOOD SUGAR TWICE DAILY OR AS DIRECTED Unknown at Unknown time Yes Arelis Ryan APRN CNP     pantoprazole (PROTONIX) 40 MG EC tablet Take 1 tablet (40 mg) by mouth daily 8/31/2022 at AM Yes Shan Arenas MD     sildenafil (REVATIO) 20 MG tablet TAKE 2 TO 4 TABLETS BY MOUTH AS NEEDED, NEVER USE WITH NTG, DOXASOSIN, OR TERAZOSIN Unknown at prn Yes Arelis Ryan APRN CNP Yes    simvastatin (ZOCOR) 20 MG tablet Take 1 tablet (20 mg) by mouth At Bedtime 8/30/2022 at HS Yes Arelis Ryan APRN CNP Yes    tamsulosin (FLOMAX) 0.4 MG capsule Take 1 capsule (0.4 mg) by mouth daily 8/31/2022 at AM Yes Arelis Ryan APRN CNP     tiZANidine (ZANAFLEX) 4 MG tablet Take 1 tablet (4 mg) by mouth daily 8/30/2022 at PM Yes Shan Arenas MD     zolpidem (AMBIEN) 10 MG tablet TAKE 1 TABLET BY MOUTH EVERY DAY AT BEDTIME 8/30/2022 at HS Yes Arelis Ryan APRN CNP No        The information provided in this note is only as accurate as the sources available at the time of update(s)

## 2022-08-31 NOTE — ED PROVIDER NOTES
ED ATTENDING PHYSICIAN NOTE:   I evaluated this patient in conjunction with Paddy Foster PA-C  I have participated in the care of the patient and personally performed key elements of the history, exam, and medical decision making.      HPI:   Syd Joyce is a 67 year old male with history of spinal stenosis, controlled type 2 diabetes mellitus, and GERD who presents with low back pain persisting since onset 1 week ago. The patient reports radiating pain from his buttock down to his bilateral legs (right>left) and endorses accompanying tingling, worsened in his right foot. He does have history of back surgery in 2017 for a ruptured L5 disc and spinal stenosis (no post-op complications). He has other history of laminectomy. The patient denies numbness to the groin or urinary or bowel incontinence. He is followed by his primary care provider for his back problems and has upcoming scheduled MRI in 1 week.     EXAM:   Gen:  Mild painful distress, non-toxic appearing  Abd: Soft, non-tender, non-distended  Neuro:  Strength and sensation grossly intact thru BLE  CV: 2+ DP/PT pulses in BLE     MEDICAL DECISION MAKING/ASSESSMENT AND PLAN:    67-year-old male with acute exacerbation of chronic low back pain.  No red flags to suggest cord compression, cauda equina, infection, malignancy.  He has no neurodeficits on exam.  He is unable to go home due to the severity of his pain.  So we will admit him for pain control and defer advanced imaging to inpatient team.     DIAGNOSIS:     ICD-10-CM    1. Acute bilateral low back pain with bilateral sciatica  M54.42     M54.41        DISPOSITION:   Admit     8/31/2022  [facility] EMERGENCY DEPT     Tomer Beckett MD  08/31/22 3407

## 2022-09-01 ENCOUNTER — ANESTHESIA EVENT (OUTPATIENT)
Dept: SURGERY | Facility: CLINIC | Age: 67
DRG: 519 | End: 2022-09-01
Payer: COMMERCIAL

## 2022-09-01 ENCOUNTER — ANESTHESIA (OUTPATIENT)
Dept: SURGERY | Facility: CLINIC | Age: 67
DRG: 519 | End: 2022-09-01
Payer: COMMERCIAL

## 2022-09-01 LAB
ANION GAP SERPL CALCULATED.3IONS-SCNC: 8 MMOL/L (ref 3–14)
BUN SERPL-MCNC: 18 MG/DL (ref 7–30)
CALCIUM SERPL-MCNC: 9 MG/DL (ref 8.5–10.1)
CHLORIDE BLD-SCNC: 106 MMOL/L (ref 94–109)
CO2 SERPL-SCNC: 26 MMOL/L (ref 20–32)
CREAT SERPL-MCNC: 1.02 MG/DL (ref 0.66–1.25)
ERYTHROCYTE [DISTWIDTH] IN BLOOD BY AUTOMATED COUNT: 13.1 % (ref 10–15)
GFR SERPL CREATININE-BSD FRML MDRD: 80 ML/MIN/1.73M2
GLUCOSE BLD-MCNC: 131 MG/DL (ref 70–99)
GLUCOSE BLDC GLUCOMTR-MCNC: 111 MG/DL (ref 70–99)
GLUCOSE BLDC GLUCOMTR-MCNC: 131 MG/DL (ref 70–99)
GLUCOSE BLDC GLUCOMTR-MCNC: 95 MG/DL (ref 70–99)
HCT VFR BLD AUTO: 43.5 % (ref 40–53)
HGB BLD-MCNC: 14.3 G/DL (ref 13.3–17.7)
MCH RBC QN AUTO: 29.2 PG (ref 26.5–33)
MCHC RBC AUTO-ENTMCNC: 32.9 G/DL (ref 31.5–36.5)
MCV RBC AUTO: 89 FL (ref 78–100)
PLATELET # BLD AUTO: 200 10E3/UL (ref 150–450)
POTASSIUM BLD-SCNC: 3.8 MMOL/L (ref 3.4–5.3)
RBC # BLD AUTO: 4.9 10E6/UL (ref 4.4–5.9)
SODIUM SERPL-SCNC: 140 MMOL/L (ref 133–144)
WBC # BLD AUTO: 8.1 10E3/UL (ref 4–11)

## 2022-09-01 PROCEDURE — 999N000141 HC STATISTIC PRE-PROCEDURE NURSING ASSESSMENT: Performed by: ORTHOPAEDIC SURGERY

## 2022-09-01 PROCEDURE — 272N000001 HC OR GENERAL SUPPLY STERILE: Performed by: ORTHOPAEDIC SURGERY

## 2022-09-01 PROCEDURE — 80048 BASIC METABOLIC PNL TOTAL CA: CPT | Performed by: PHYSICIAN ASSISTANT

## 2022-09-01 PROCEDURE — 250N000013 HC RX MED GY IP 250 OP 250 PS 637: Performed by: PHYSICIAN ASSISTANT

## 2022-09-01 PROCEDURE — 96376 TX/PRO/DX INJ SAME DRUG ADON: CPT

## 2022-09-01 PROCEDURE — 370N000017 HC ANESTHESIA TECHNICAL FEE, PER MIN: Performed by: ORTHOPAEDIC SURGERY

## 2022-09-01 PROCEDURE — 0SB20ZZ EXCISION OF LUMBAR VERTEBRAL DISC, OPEN APPROACH: ICD-10-PCS | Performed by: ORTHOPAEDIC SURGERY

## 2022-09-01 PROCEDURE — 710N000009 HC RECOVERY PHASE 1, LEVEL 1, PER MIN: Performed by: ORTHOPAEDIC SURGERY

## 2022-09-01 PROCEDURE — 120N000001 HC R&B MED SURG/OB

## 2022-09-01 PROCEDURE — 250N000011 HC RX IP 250 OP 636: Performed by: PHYSICIAN ASSISTANT

## 2022-09-01 PROCEDURE — 93010 ELECTROCARDIOGRAM REPORT: CPT | Performed by: INTERNAL MEDICINE

## 2022-09-01 PROCEDURE — 250N000009 HC RX 250: Performed by: ORTHOPAEDIC SURGERY

## 2022-09-01 PROCEDURE — G0378 HOSPITAL OBSERVATION PER HR: HCPCS

## 2022-09-01 PROCEDURE — 01NB0ZZ RELEASE LUMBAR NERVE, OPEN APPROACH: ICD-10-PCS | Performed by: ORTHOPAEDIC SURGERY

## 2022-09-01 PROCEDURE — 00NY0ZZ RELEASE LUMBAR SPINAL CORD, OPEN APPROACH: ICD-10-PCS | Performed by: ORTHOPAEDIC SURGERY

## 2022-09-01 PROCEDURE — 85027 COMPLETE CBC AUTOMATED: CPT | Performed by: PHYSICIAN ASSISTANT

## 2022-09-01 PROCEDURE — 258N000003 HC RX IP 258 OP 636: Performed by: ANESTHESIOLOGY

## 2022-09-01 PROCEDURE — 250N000025 HC SEVOFLURANE, PER MIN: Performed by: ORTHOPAEDIC SURGERY

## 2022-09-01 PROCEDURE — 250N000009 HC RX 250: Performed by: NURSE ANESTHETIST, CERTIFIED REGISTERED

## 2022-09-01 PROCEDURE — 250N000013 HC RX MED GY IP 250 OP 250 PS 637: Performed by: STUDENT IN AN ORGANIZED HEALTH CARE EDUCATION/TRAINING PROGRAM

## 2022-09-01 PROCEDURE — 36415 COLL VENOUS BLD VENIPUNCTURE: CPT | Performed by: PHYSICIAN ASSISTANT

## 2022-09-01 PROCEDURE — 93005 ELECTROCARDIOGRAM TRACING: CPT

## 2022-09-01 PROCEDURE — 99233 SBSQ HOSP IP/OBS HIGH 50: CPT | Mod: FS | Performed by: PHYSICIAN ASSISTANT

## 2022-09-01 PROCEDURE — 250N000011 HC RX IP 250 OP 636: Performed by: NURSE ANESTHETIST, CERTIFIED REGISTERED

## 2022-09-01 PROCEDURE — 250N000013 HC RX MED GY IP 250 OP 250 PS 637: Performed by: INTERNAL MEDICINE

## 2022-09-01 PROCEDURE — 250N000011 HC RX IP 250 OP 636: Performed by: ANESTHESIOLOGY

## 2022-09-01 PROCEDURE — 360N000077 HC SURGERY LEVEL 4, PER MIN: Performed by: ORTHOPAEDIC SURGERY

## 2022-09-01 PROCEDURE — 258N000003 HC RX IP 258 OP 636: Performed by: NURSE ANESTHETIST, CERTIFIED REGISTERED

## 2022-09-01 PROCEDURE — 0QP004Z REMOVAL OF INTERNAL FIXATION DEVICE FROM LUMBAR VERTEBRA, OPEN APPROACH: ICD-10-PCS | Performed by: ORTHOPAEDIC SURGERY

## 2022-09-01 PROCEDURE — 250N000011 HC RX IP 250 OP 636: Performed by: ORTHOPAEDIC SURGERY

## 2022-09-01 PROCEDURE — 82962 GLUCOSE BLOOD TEST: CPT

## 2022-09-01 RX ORDER — BUPIVACAINE HYDROCHLORIDE AND EPINEPHRINE 2.5; 5 MG/ML; UG/ML
INJECTION, SOLUTION INFILTRATION; PERINEURAL PRN
Status: DISCONTINUED | OUTPATIENT
Start: 2022-09-01 | End: 2022-09-02 | Stop reason: HOSPADM

## 2022-09-01 RX ORDER — PROPOFOL 10 MG/ML
INJECTION, EMULSION INTRAVENOUS PRN
Status: DISCONTINUED | OUTPATIENT
Start: 2022-09-01 | End: 2022-09-02

## 2022-09-01 RX ORDER — KETAMINE HYDROCHLORIDE 10 MG/ML
INJECTION INTRAMUSCULAR; INTRAVENOUS PRN
Status: DISCONTINUED | OUTPATIENT
Start: 2022-09-01 | End: 2022-09-02

## 2022-09-01 RX ORDER — CEFAZOLIN SODIUM 1 G/3ML
1 INJECTION, POWDER, FOR SOLUTION INTRAMUSCULAR; INTRAVENOUS
Status: DISCONTINUED | OUTPATIENT
Start: 2022-09-01 | End: 2022-09-02

## 2022-09-01 RX ORDER — LIDOCAINE HYDROCHLORIDE 20 MG/ML
INJECTION, SOLUTION INFILTRATION; PERINEURAL PRN
Status: DISCONTINUED | OUTPATIENT
Start: 2022-09-01 | End: 2022-09-02

## 2022-09-01 RX ORDER — KETOROLAC TROMETHAMINE 15 MG/ML
15 INJECTION, SOLUTION INTRAMUSCULAR; INTRAVENOUS EVERY 6 HOURS PRN
Status: DISCONTINUED | OUTPATIENT
Start: 2022-09-01 | End: 2022-09-02

## 2022-09-01 RX ORDER — HYDROMORPHONE HYDROCHLORIDE 1 MG/ML
INJECTION, SOLUTION INTRAMUSCULAR; INTRAVENOUS; SUBCUTANEOUS PRN
Status: DISCONTINUED | OUTPATIENT
Start: 2022-09-01 | End: 2022-09-02

## 2022-09-01 RX ORDER — FENTANYL CITRATE 50 UG/ML
INJECTION, SOLUTION INTRAMUSCULAR; INTRAVENOUS PRN
Status: DISCONTINUED | OUTPATIENT
Start: 2022-09-01 | End: 2022-09-02

## 2022-09-01 RX ORDER — POLYETHYLENE GLYCOL 3350 17 G/17G
17 POWDER, FOR SOLUTION ORAL DAILY
Status: DISCONTINUED | OUTPATIENT
Start: 2022-09-02 | End: 2022-09-02

## 2022-09-01 RX ORDER — ONDANSETRON 2 MG/ML
INJECTION INTRAMUSCULAR; INTRAVENOUS PRN
Status: DISCONTINUED | OUTPATIENT
Start: 2022-09-01 | End: 2022-09-02

## 2022-09-01 RX ORDER — BETAMETHASONE SODIUM PHOSPHATE AND BETAMETHASONE ACETATE 3; 3 MG/ML; MG/ML
INJECTION, SUSPENSION INTRA-ARTICULAR; INTRALESIONAL; INTRAMUSCULAR; SOFT TISSUE PRN
Status: DISCONTINUED | OUTPATIENT
Start: 2022-09-01 | End: 2022-09-02 | Stop reason: HOSPADM

## 2022-09-01 RX ORDER — SODIUM CHLORIDE, SODIUM LACTATE, POTASSIUM CHLORIDE, CALCIUM CHLORIDE 600; 310; 30; 20 MG/100ML; MG/100ML; MG/100ML; MG/100ML
INJECTION, SOLUTION INTRAVENOUS CONTINUOUS
Status: DISCONTINUED | OUTPATIENT
Start: 2022-09-01 | End: 2022-09-02 | Stop reason: HOSPADM

## 2022-09-01 RX ORDER — KETOROLAC TROMETHAMINE 30 MG/ML
INJECTION, SOLUTION INTRAMUSCULAR; INTRAVENOUS PRN
Status: DISCONTINUED | OUTPATIENT
Start: 2022-09-01 | End: 2022-09-02

## 2022-09-01 RX ORDER — LIDOCAINE 4 G/G
1 PATCH TOPICAL
Status: DISCONTINUED | OUTPATIENT
Start: 2022-09-01 | End: 2022-09-02

## 2022-09-01 RX ORDER — AMOXICILLIN 250 MG
1 CAPSULE ORAL 2 TIMES DAILY
Status: DISCONTINUED | OUTPATIENT
Start: 2022-09-01 | End: 2022-09-03 | Stop reason: HOSPADM

## 2022-09-01 RX ORDER — FENTANYL CITRATE 50 UG/ML
25 INJECTION, SOLUTION INTRAMUSCULAR; INTRAVENOUS ONCE
Status: COMPLETED | OUTPATIENT
Start: 2022-09-01 | End: 2022-09-01

## 2022-09-01 RX ORDER — HYDRALAZINE HYDROCHLORIDE 20 MG/ML
10 INJECTION INTRAMUSCULAR; INTRAVENOUS EVERY 4 HOURS PRN
Status: DISCONTINUED | OUTPATIENT
Start: 2022-09-01 | End: 2022-09-03 | Stop reason: HOSPADM

## 2022-09-01 RX ADMIN — OXYCODONE HYDROCHLORIDE 10 MG: 5 TABLET ORAL at 12:52

## 2022-09-01 RX ADMIN — PHENYLEPHRINE HYDROCHLORIDE 100 MCG: 10 INJECTION INTRAVENOUS at 21:32

## 2022-09-01 RX ADMIN — KETOROLAC TROMETHAMINE 30 MG: 30 INJECTION, SOLUTION INTRAMUSCULAR at 23:45

## 2022-09-01 RX ADMIN — POLYETHYLENE GLYCOL 3350 17 G: 17 POWDER, FOR SOLUTION ORAL at 08:08

## 2022-09-01 RX ADMIN — TAMSULOSIN HYDROCHLORIDE 0.4 MG: 0.4 CAPSULE ORAL at 08:09

## 2022-09-01 RX ADMIN — PHENYLEPHRINE HYDROCHLORIDE 100 MCG: 10 INJECTION INTRAVENOUS at 21:23

## 2022-09-01 RX ADMIN — OXYCODONE HYDROCHLORIDE 10 MG: 5 TABLET ORAL at 18:35

## 2022-09-01 RX ADMIN — Medication 20 MG: at 22:32

## 2022-09-01 RX ADMIN — PANTOPRAZOLE SODIUM 40 MG: 40 TABLET, DELAYED RELEASE ORAL at 08:09

## 2022-09-01 RX ADMIN — CARVEDILOL 50 MG: 25 TABLET, FILM COATED ORAL at 08:09

## 2022-09-01 RX ADMIN — SODIUM CHLORIDE, POTASSIUM CHLORIDE, SODIUM LACTATE AND CALCIUM CHLORIDE: 600; 310; 30; 20 INJECTION, SOLUTION INTRAVENOUS at 19:59

## 2022-09-01 RX ADMIN — CYCLOBENZAPRINE 10 MG: 10 TABLET, FILM COATED ORAL at 08:09

## 2022-09-01 RX ADMIN — HYDROMORPHONE HYDROCHLORIDE 0.5 MCG: 1 INJECTION, SOLUTION INTRAMUSCULAR; INTRAVENOUS; SUBCUTANEOUS at 22:24

## 2022-09-01 RX ADMIN — PHENYLEPHRINE HYDROCHLORIDE 150 MCG: 10 INJECTION INTRAVENOUS at 21:46

## 2022-09-01 RX ADMIN — FENTANYL CITRATE 50 MCG: 50 INJECTION, SOLUTION INTRAMUSCULAR; INTRAVENOUS at 21:14

## 2022-09-01 RX ADMIN — OXYCODONE HYDROCHLORIDE 10 MG: 5 TABLET ORAL at 08:09

## 2022-09-01 RX ADMIN — FENTANYL CITRATE 25 MCG: 50 INJECTION, SOLUTION INTRAMUSCULAR; INTRAVENOUS at 19:50

## 2022-09-01 RX ADMIN — PHENYLEPHRINE HYDROCHLORIDE 100 MCG: 10 INJECTION INTRAVENOUS at 21:20

## 2022-09-01 RX ADMIN — ACETAMINOPHEN 975 MG: 325 TABLET ORAL at 17:22

## 2022-09-01 RX ADMIN — PHENYLEPHRINE HYDROCHLORIDE 50 MCG: 10 INJECTION INTRAVENOUS at 21:28

## 2022-09-01 RX ADMIN — PHENYLEPHRINE HYDROCHLORIDE 0.7 MCG/KG/MIN: 10 INJECTION INTRAVENOUS at 20:39

## 2022-09-01 RX ADMIN — PHENYLEPHRINE HYDROCHLORIDE 150 MCG: 10 INJECTION INTRAVENOUS at 21:34

## 2022-09-01 RX ADMIN — Medication 0.5 UNITS: at 21:51

## 2022-09-01 RX ADMIN — DEXMEDETOMIDINE HYDROCHLORIDE 12 MCG: 100 INJECTION, SOLUTION INTRAVENOUS at 22:26

## 2022-09-01 RX ADMIN — ACETAMINOPHEN 975 MG: 325 TABLET ORAL at 09:18

## 2022-09-01 RX ADMIN — SODIUM CHLORIDE, POTASSIUM CHLORIDE, SODIUM LACTATE AND CALCIUM CHLORIDE: 600; 310; 30; 20 INJECTION, SOLUTION INTRAVENOUS at 22:29

## 2022-09-01 RX ADMIN — Medication 50 MG: at 21:23

## 2022-09-01 RX ADMIN — DEXMEDETOMIDINE HYDROCHLORIDE 20 MCG: 100 INJECTION, SOLUTION INTRAVENOUS at 21:33

## 2022-09-01 RX ADMIN — ONDANSETRON 4 MG: 2 INJECTION INTRAMUSCULAR; INTRAVENOUS at 23:28

## 2022-09-01 RX ADMIN — LIDOCAINE HYDROCHLORIDE 80 MG: 20 INJECTION, SOLUTION INFILTRATION; PERINEURAL at 21:14

## 2022-09-01 RX ADMIN — CYCLOBENZAPRINE 10 MG: 10 TABLET, FILM COATED ORAL at 15:35

## 2022-09-01 RX ADMIN — ROCURONIUM BROMIDE 20 MG: 50 INJECTION, SOLUTION INTRAVENOUS at 22:20

## 2022-09-01 RX ADMIN — ROCURONIUM BROMIDE 50 MG: 50 INJECTION, SOLUTION INTRAVENOUS at 21:14

## 2022-09-01 RX ADMIN — LIDOCAINE 1 PATCH: 560 PATCH PERCUTANEOUS; TOPICAL; TRANSDERMAL at 08:08

## 2022-09-01 RX ADMIN — PHENYLEPHRINE HYDROCHLORIDE 100 MCG: 10 INJECTION INTRAVENOUS at 21:26

## 2022-09-01 RX ADMIN — FENTANYL CITRATE 25 MCG: 50 INJECTION, SOLUTION INTRAMUSCULAR; INTRAVENOUS at 20:00

## 2022-09-01 RX ADMIN — CHLORTHALIDONE 25 MG: 25 TABLET ORAL at 08:09

## 2022-09-01 RX ADMIN — MIDAZOLAM 2 MG: 1 INJECTION INTRAMUSCULAR; INTRAVENOUS at 21:08

## 2022-09-01 RX ADMIN — FENTANYL CITRATE 50 MCG: 50 INJECTION, SOLUTION INTRAMUSCULAR; INTRAVENOUS at 21:08

## 2022-09-01 RX ADMIN — PHENYLEPHRINE HYDROCHLORIDE 150 MCG: 10 INJECTION INTRAVENOUS at 21:37

## 2022-09-01 RX ADMIN — CARVEDILOL 50 MG: 25 TABLET, FILM COATED ORAL at 19:42

## 2022-09-01 RX ADMIN — PROPOFOL 200 MG: 10 INJECTION, EMULSION INTRAVENOUS at 21:14

## 2022-09-01 RX ADMIN — PHENYLEPHRINE HYDROCHLORIDE 100 MCG: 10 INJECTION INTRAVENOUS at 21:40

## 2022-09-01 RX ADMIN — HYDROMORPHONE HYDROCHLORIDE 0.5 MG: 1 INJECTION, SOLUTION INTRAMUSCULAR; INTRAVENOUS; SUBCUTANEOUS at 06:15

## 2022-09-01 RX ADMIN — KETOROLAC TROMETHAMINE 15 MG: 15 INJECTION, SOLUTION INTRAMUSCULAR; INTRAVENOUS at 15:35

## 2022-09-01 RX ADMIN — PHENYLEPHRINE HYDROCHLORIDE 0.5 MCG/KG/MIN: 10 INJECTION INTRAVENOUS at 21:40

## 2022-09-01 RX ADMIN — SENNOSIDES AND DOCUSATE SODIUM 2 TABLET: 50; 8.6 TABLET ORAL at 08:09

## 2022-09-01 RX ADMIN — LOSARTAN POTASSIUM 100 MG: 100 TABLET, FILM COATED ORAL at 08:09

## 2022-09-01 RX ADMIN — ROCURONIUM BROMIDE 20 MG: 50 INJECTION, SOLUTION INTRAVENOUS at 22:40

## 2022-09-01 ASSESSMENT — ACTIVITIES OF DAILY LIVING (ADL)
ADLS_ACUITY_SCORE: 38
DEPENDENT_IADLS:: INDEPENDENT
ADLS_ACUITY_SCORE: 38

## 2022-09-01 ASSESSMENT — LIFESTYLE VARIABLES: TOBACCO_USE: 0

## 2022-09-01 NOTE — PLAN OF CARE
Goal Outcome Evaluation:    Plan of Care Reviewed With: patient   Overall Patient Progress: no change    Orientation/Cognitive: A/O x4  Observation Goals (Met/ Not Met): in progress   Mobility Level/Assist Equipment: patient apprehensive to move dt pain, not OOB this shift   Fall Risk (Y/N): Yes   Behavior Concerns: none  Pain Management: Scheduled Tylenol, PRN: Dilaudid   Tele/VS/O2: VSS on RA, except c/o pain, improved w/ intake of pain meds   ABNL Lab/BG: no labs this shift   Diet: mod carb  Bowel/Bladder: continent, using urinal at bedside.   Skin Concerns: none  Drains/Devices: PIV NS flushed and locked   Tests/Procedures for next shift: PT, Ortho spine consult  Anticipated DC date & active delays: 1-2 days pending adequate pain control, ortho spine sx consult outcomes, PT evaluation  Patient Stated Goal for Today: pain control     Goal Outcome Evaluation:     Plan of Care Reviewed With: patient      Overall Patient Progress: no change        Observation goals  PRIOR TO DISCHARGE        Comments:   -diagnostic tests and consults completed and resulted :partially met, pending consults   -vital signs normal or at patient baseline : partially met, VSS on RA except pain   -adequate pain control on oral analgesics :unmet   -returns to baseline functional status :unmet   Nurse to notify provider when observation goals have been met and patient is ready for discharge.

## 2022-09-01 NOTE — PLAN OF CARE
Goal Outcome Evaluation:    Plan of Care Reviewed With: patient     Overall Patient Progress: no change         Observation goals  PRIOR TO DISCHARGE        Comments:   -diagnostic tests and consults completed and resulted :partially met, pending consults   -vital signs normal or at patient baseline : partially met, VSS on RA except pain   -adequate pain control on oral analgesics :unmet   -returns to baseline functional status :unmet   Nurse to notify provider when observation goals have been met and patient is ready for discharge.

## 2022-09-01 NOTE — PROGRESS NOTES
[facility]    Hospitalist Progress Note    Assessment & Plan   Syd Joyce is a 67 year old male who was admitted on 8/31/2022.     Past medical history significant for hypertension, type 2 diabetes, Diabetic Neuropathy, GERD, obesity, BPH, History of lumbar laminectomy in 2017, and some chronic numbness/tingling to both legs who was registered to short-stay/obs for acute lumbar back pain.      Patient presented with acute onset lumbar back pain with radiation down both legs (level of calves and right foot).  Pain worse on right side.  This has limited his ability to ambulate.  He was seen by his PCP ~ 1 week ago when the pain began.  He was prescribed Norco and Naproxen (but did not obtain much relief) and was also scheduled for an outpatient Lumbar MRI.      In the ED, the patient was evaluated by Paddy Foster PA-C.  Vital signs showed elevated BP of 199/95, which came down slightly on repeat to 174/91.  He was afebrile with temperature 98.7.  Heart rate in the 80's.  COVID-19 screening negative.  Urinalysis unremarkable.  No other labs to review.  Lumbar x-ray completed that showed no evidence for any bony abnormality.  Patient was given IV Dilaudid x1 without improvement of symptoms.      L4-5 recurrent disc herniation with central and left severe cauda equina compression  Acute lumbar back pain with bilateral radiculopathy  Lumbar back pain occurred acutely approximately 1 week ago with no provocative injury.  Patient states he was lying prone at the onset, had severe lumbar back pain with shooting pains down both legs.  Pain persisted, he is unable to ambulate.  No saddle anesthesia, bowel or bladder incontinence.  Was seen outpatient in clinic, prescribed Norco and naproxen without relief.  Outpatient MRI scheduled but not yet completed.  Here, vital signs are notable for hypertension, but otherwise stable; Lumbar x-ray completed that shows no evidence for any bony abnormality.   IV Dilaudid given without improvement of symptoms.  Patient was registered observation.  *MRI Lumbar spine: Interval placement L3-5 interspinous spacers, progressed L4-5 subligamentous and left foraminal disc herniation with severe spinal canal stenosis and severe left neural foraminal stenosis, progressed severe right L3-4 lateral recess stenosis with persistent mild-moderate right neural foraminal stenosis.    *Inflammatory CRP elevated at 34.6.    *Received IV Toradol 15 mg and PO valium 5 mg in the ED (was ordered by Admitting Hospitalist).    -- Orthospine consult appreciated:   --Plan to proceed to the OR for revision of lumbar discectomy/decompression at L4-5.    - NPO.    **Per Revised Cardiac Risk  Index (Claude Criteria) he is at moderate risk to proceed with this intermediate risk surgery.     --Patient without chest pain or shortness of breath.  He has had no issues with previous surgery.     --EKG obtained:  NSR without ischemic changes but noted T-wave flattening in V1.   --Patient stated he has been holding his PTA ASA and took it 3 days ago.     --Requested UR screening for likely inpatient status.    -- Analgesic management:   --Scheduled Tylenol 975 mg every 8 hours.   --PRN PO oxycodone 5-10 mg every 4 hours.     --PRN IV Dilaudid 0.3-0.5 mg every 2 hours.     --Flexeril 10 mg TID.     --Added PRN IV Toradol 15 mg every 6 hours (9/1).   --Started Lidoderm patch 9/1.     --Ice PRN.    -- PT consult requested.    -- Bowel medications scheduled and PRN.      Hypertension  Accelerated due to pain.  BP up to 199/95 on admission.  -- IV hydralazine as needed for SBP greater than 180.  -- Resumed on PTA losartan 100 mg daily, Coreg 50 mg twice daily, chlorthalidone 25 mg daily with hold parameters.    CAD  Hyperlipidemia  Patient had coronary angiogram in 2018 showing 50% calcified proximal LAD stenosis, without any other significant coronary artery disease.  Risk factor modification and medical management  "recommended.  Patient currently denies any chest pain.  Echo from 2019 shows EF 60-65%.  -- Hold PTA aspirin 81 mg in case procedure is needed.  -- Resumed on PTA losartan, Coreg.  -- Hold PTA statin while observation status, resume at discharge.    Type 2 diabetes mellitus  Diabetic Neuropathy  A1c 6.5% (6/2022).  PTA regimen with metformin 1000 mg twice daily, Trulicity 1.5 mg subcutaneous every 7 days on Saturdays.  -- Hold PTA metformin and Trulicity, plan to resume at discharge.  -- Ordered moderate dose sign scale insulin.  -- Moderate CHO diet.  -- Glucose checks and hypoglycemic protocol.      BPH  Patient denies any issues with changes in his urination such as increased retention or incontinence.   -- Continued on PTA Flomax 0.4 mg/d.      GERD  -- Resumed on PTA PPI.    Clinically Significant Risk Factors Present on Admission                 # Hypertension: home medication list includes antihypertensive(s)    # Overweight: Estimated body mass index is 29.45 kg/m  as calculated from the following:    Height as of this encounter: 1.798 m (5' 10.8\").    Weight as of this encounter: 95.3 kg (210 lb).          Diet: Moderate Consistent Carb (60 g CHO per Meal) Diet     DVT Prophylaxis: Pneumatic Compression Devices   Quiros Catheter: Not present  Central Lines: None  Cardiac Monitoring: None  Code Status: Full Code      Disposition Plan    Unclear at this time.  Current plan is to proceed with surgical revision.  Patient continues to have significant pain despite IV dilaudid.  Will ask UR to assess for inpatient status.         The patient's care was discussed with the Bedside Nurse, Patient and Charge Nurse.      The patient has been discussed with Dr. Ferguson, who agrees with the assessment and plan at this time.    Alber Méndez PA-C  [facility]  Securely message with the Vocera Web Console (learn more here)  Text page via [employer] Paging/Directory      Interval History " "  Patient was resting in bed upon arrival.  He stated his back pain was still present at 7/10 severity and worsens to 10/10 with any movement.  He also has bilateral leg pain.  He otherwise denied fever, chills, chest pain, shortness of breath or abdominal pain.  When asked he said he last took his ASA 3 days ago.      We reviewed overall plan to proceed with surgery.  He denied any issues with previous surgeries.  He denied any chest pain or shortness of breath when walking (block or upstairs) but mentioned his feet are numb at baseline which makes walking difficult.      -Data reviewed today: I reviewed all new labs and imaging results over the last 24 hours. I personally reviewed no images or EKG's today.    Physical Exam   BP (!) 183/107 (BP Location: Left arm, Patient Position: Semi-Hannon's, Cuff Size: Adult Regular)   Pulse 84   Temp 97.9  F (36.6  C) (Oral)   Resp 18   Ht 1.798 m (5' 10.8\")   Wt 95.3 kg (210 lb)   SpO2 97%   BMI 29.45 kg/m        Constitutional: Awake, alert, cooperative.  He was in no distress but this changed when he attempted to move (grimaced and had severe pain).    ENT: Normocephalic, without obvious abnormality, atraumatic, oral pharynx with moist mucus membranes, tonsils without erythema or exudates.  Neck: Supple, symmetrical, trachea midline, no adenopathy.  Pulmonary: No increased work of breathing, fair air exchange, clear to auscultation bilaterally, no crackles or wheezing.  Cardiovascular: Regular rate and rhythm, normal S1 and S2, no S3 or S4, and no murmur noted.  GI: Normal bowel sounds, soft, non-distended, non-tender.  Skin/Integumen: Visualized skin appeared clear.  Neuro: CN II-XII grossly intact.  Psych:  Alert and oriented x 3. Normal affect.  Extremities: No lower extremity edema noted, and calves are non-TTP bilaterally.       Medications       acetaminophen  975 mg Oral Q8H     carvedilol  50 mg Oral BID     ceFAZolin  1 g Intravenous Pre-Op/Pre-procedure x " 1 dose     chlorthalidone  25 mg Oral Daily     cyclobenzaprine  10 mg Oral TID     insulin aspart  1-7 Units Subcutaneous TID AC     insulin aspart  1-5 Units Subcutaneous At Bedtime     lidocaine  1 patch Transdermal Q24H     lidocaine   Transdermal Q8H TADEO     losartan  100 mg Oral Daily     pantoprazole  40 mg Oral Daily     [START ON 9/2/2022] polyethylene glycol  17 g Oral Daily     senna-docusate  1 tablet Oral BID     sodium chloride (PF)  3 mL Intracatheter Q8H     tamsulosin  0.4 mg Oral Daily       Data   Recent Labs   Lab 09/01/22  0836 09/01/22  0624 09/01/22  0200 08/31/22  1712 08/31/22  1414   WBC  --  8.1  --   --  10.2   HGB  --  14.3  --   --  13.8   MCV  --  89  --   --  88   PLT  --  200  --   --  209   NA  --  140  --   --  138   POTASSIUM  --  3.8  --   --  3.9   CHLORIDE  --  106  --   --  105   CO2  --  26  --   --  28   BUN  --  18  --   --  16   CR  --  1.02  --   --  1.07   ANIONGAP  --  8  --   --  5   HEAVENLY  --  9.0  --   --  8.9   * 131* 131*   < > 115*    < > = values in this interval not displayed.       Recent Results (from the past 24 hour(s))   Lumbar spine XR, 2-3 views    Narrative    LUMBAR SPINE 2/3 VIEWS   8/31/2022 12:34 PM     HISTORY: Atraumatic low back pain.    COMPARISON: X-rays 12/20/2017      Impression    IMPRESSION: No fracture is identified. Interspinous devices at L3-4  and L4-5. Mild-to-moderate degenerative change. Slight levoconvex  curvature. Vascular calcifications.    YESSICA LOPEZ MD         SYSTEM ID:  [id number]   MR Lumbar Spine w/o & w Contrast    Narrative    EXAM: MR LUMBAR SPINE W/O and W CONTRAST  LOCATION: [facility]  DATE/TIME: 8/31/2022 9:37 PM    INDICATION: Bilateral lower extremity radicular pain  COMPARISON: Lumbar spine MRI 05/26/2017  CONTRAST: gadobutrol (GADAVIST) injection 10 mL  TECHNIQUE: Routine Lumbar Spine MRI without and with IV contrast.    FINDINGS:   Nomenclature is based on 5 lumbar type vertebral  bodies. Normal vertebral body heights, alignment and marrow signal. Interval placement of L3-L4 and L4-L5 interspinous spacers. Normal distal spinal cord and cauda equina with conus medullaris at L1. No   extraspinal abnormality. Unremarkable visualized bony pelvis.    T12-L1: Normal disc height and signal. No herniation. Normal facets. No spinal canal or neural foraminal stenosis.     L1-L2: Normal disc height and signal. No herniation. Normal facets. No spinal canal or neural foraminal stenosis.    L2-L3: Mild disc height and T2 signal loss. Mild posterior annular bulge. Mild bilateral facet arthropathy and thickening of the ligamenta flava. Mild spinal canal stenosis. No neural foraminal stenosis.     L3-L4: Mild disc height and T2 signal loss. Mild circumferential disc osteophyte complex. Mild bilateral facet arthropathy and thickening of the ligamenta flava. Severe right lateral recess stenosis. Mild central spinal canal stenosis. Mild to moderate   right neural foraminal stenosis. Mild left neural foraminal stenosis.    L4-L5: Mild disc height loss. Moderate disc T2 signal loss. Large subligamentous and left foraminal disc herniation. Mild bilateral facet arthropathy. Severe spinal canal stenosis. Mild right neural foraminal stenosis. Severe left neural foraminal   stenosis.    L5-S1: Normal disc height. Moderate disc T2 signal loss. Mild circumferential disc osteophyte complex. Mild bilateral facet arthropathy and thickening of the ligamenta flava. No spinal canal stenosis. Mild bilateral neural foraminal stenoses.      Impression    IMPRESSION:  1.  Interval placement of L3-L4 and L4-L5 interspinous spacers.  2.  Progressed L4-L5 subligamentous and left foraminal disc herniation with severe spinal canal stenosis and severe left neural foraminal stenosis.  3.  Progressed severe right L3-L4 lateral recess stenosis with persistent mild to moderate right neural foraminal stenosis.

## 2022-09-01 NOTE — CONSULTS
Care Management Initial Consult    General Information  Assessment completed with: Eddie Estes  Type of CM/SW Visit: Initial Assessment    Primary Care Provider verified and updated as needed: Yes   Readmission within the last 30 days: no previous admission in last 30 days      Reason for Consult: discharge planning  Advance Care Planning:            Communication Assessment  Patient's communication style: spoken language (English or Bilingual)    Hearing Difficulty or Deaf: no   Wear Glasses or Blind: no    Cognitive  Cognitive/Neuro/Behavioral: WDL                      Living Environment:   People in home: spouse (and dog, small breed)     Current living Arrangements: house      Able to return to prior arrangements:         Family/Social Support:  Care provided by:    Provides care for:    Marital Status:              Description of Support System:           Current Resources:   Patient receiving home care services: No     Community Resources: None  Equipment currently used at home: cane, straight  Supplies currently used at home: None    Employment/Financial:  Employment Status: retired        Financial Concerns: No concerns identified           Lifestyle & Psychosocial Needs:  Social Determinants of Health     Tobacco Use: Low Risk      Smoking Tobacco Use: Never Smoker     Smokeless Tobacco Use: Never Used   Alcohol Use: Not on file   Financial Resource Strain: Not on file   Food Insecurity: Not on file   Transportation Needs: Not on file   Physical Activity: Not on file   Stress: Not on file   Social Connections: Not on file   Intimate Partner Violence: Not on file   Depression: Not at risk     PHQ-2 Score: 0   Housing Stability: Not on file       Functional Status:  Prior to admission patient needed assistance:   Dependent ADLs:: Ambulation-cane  Dependent IADLs:: Independent  Assesssment of Functional Status: Not at baseline with mobility    Mental Health Status:  Mental Health Status: No Current  Concerns       Chemical Dependency Status:      None noted          Values/Beliefs:  Spiritual, Cultural Beliefs, Spiritism Practices, Values that affect care: no               Additional Information:  Met with patient. He is hoping to get the surgery soon. Will follow for any discharge planning.    Светлана Wiely RN

## 2022-09-01 NOTE — PROGRESS NOTES
Orthopaedic Spine Consult - Dictation to follow     Impression:  Syd Joyce is a 67-year-old man with the following diagnoses:    1.  Status post L4-5 decompression.  2.  Recurrent disc herniation central and left at the L4-5 level with severe cauda equina compression.    Recommendations:  Revision discectomy/decompression at L4-5 bilaterally.  Consent obtained.  Keep patient n.p.o., trying to get one of my partners do this this afternoon if they cannot do it I will do it this evening.  Pneumatic compression stockings.  Hospitalist please clear patient for surgery.

## 2022-09-01 NOTE — PLAN OF CARE
Goal Outcome Evaluation:    Plan of Care Reviewed With: patient     Orientation/Cognitive: AxOx4  Observation Goals (Met/ Not Met): Inpatient  Mobility Level/Assist Equipment: Not OOB this shift   Fall Risk (Y/N): Yes   Behavior Concerns: Green  Pain Management: Scheduled Tylenol. Lidocaine patch to lower back. PRN Oxycodone and Flexeril given  Tele/VS/O2: VSS on RA  ABNL Lab/BG: MRI findings; , 95  Diet: Mod Carb  Bowel/Bladder: Continent, using urinal. No BM this shift, bowel regiment given  Skin Concerns: None  Drains/Devices: PIV SL, PCD's in place  Tests/Procedures for next shift: Discectomy this evening   Anticipated DC date & active delays: TBD  Patient Stated Goal for Today: Pain control

## 2022-09-01 NOTE — PROGRESS NOTES
PRIMARY DIAGNOSIS: ACUTE PAIN  OUTPATIENT/OBSERVATION GOALS TO BE MET BEFORE DISCHARGE:  1. Pain Status: Improved but still requiring IV narcotics.    2. Return to near baseline physical activity: No    3. Cleared for discharge by consultants (if involved): No    Discharge Planner Nurse   Safe discharge environment identified: No  Barriers to discharge: Yes       Entered by: Patricia Talamantes RN 08/31/2022    Please review provider order for any additional goals.   Nurse to notify provider when observation goals have been met and patient is ready for discharge.

## 2022-09-01 NOTE — UTILIZATION REVIEW
Admission Status; Secondary Review Determination    Under the authority of the Utilization Management Committee, the utilization review process indicated a secondary review on the above patient. The review outcome is based on review of the medical records, discussions with staff, and applying clinical experience noted on the date of the review.    (x) Inpatient Status Appropriate - This patient's medical care is consistent with medical management for inpatient care and reasonable inpatient medical practice.    RATIONALE FOR DETERMINATION:67-year-old male with history of spinal stenosis, type 2 diabetes who presented to the hospital with bilateral lower extremity paresthesias and pain from the buttocks down both legs.  Patient has history of discectomy/decompression L4-5 in 2017.  With persistent worsening pain to the point of patient unable to ambulate patient presented to the hospital required admission for pain control and evaluation.  Imaging reveals significant recurrent disc herniation central and left at the L4-5 level with severe cauda equina compression.  Patient will require ongoing hospitalization with urgent decompression appropriate for inpatient care.    At the time of admission with the information available to the attending physician more than 2 nights Hospital complex care was anticipated, based on patient risk of adverse outcome if treated as outpatient and complex care required. Inpatient admission is appropriate based on the Medicare guidelines.    This document was produced using voice recognition software    The information on this document is developed by the utilization review team in order for the business office to ensure compliance. This only denotes the appropriateness of proper admission status and does not reflect the quality of care rendered.    The definitions of Inpatient Status and Observation Status used in making the determination above are those provided in the CMS Coverage  Manual, Chapter 1 and Chapter 6, section 70.4.    Sincerely,    Carlos Martinez MD  Utilization Review  Physician Advisor  [facility].

## 2022-09-01 NOTE — PROGRESS NOTES
Orientation/Cognitive: A/O x4  Observation Goals (Met/ Not Met): Not met  Mobility Level/Assist Equipment: lift for now, unable to move due to poor pain control, ind at baseline  Fall Risk (Y/N): Y  Behavior Concerns: none  Pain Management: IV dilaudid, Oxycodone, flexeril   Tele/VS/O2: vitally stable ex HTN which improves with pain contorl  ABNL Lab/BG: see results  Diet: mod carb  Bowel/Bladder: continent, using urinal at bedside.   Skin Concerns: none  Drains/Devices: PIV SL  Tests/Procedures for next shift: PT, Ortho spine consult  Anticipated DC date & active delays: 1-2 days pending workup   Patient Stated Goal for Today: pain control

## 2022-09-01 NOTE — PROGRESS NOTES
MD Notification    Notified Person: MD    Notified Person Name: Chandra Sharif     Notification Date/Time: 8:45 PM 08/31/22    Notification Interaction: Amcom page    Purpose of Notification:  patient has received two doses IV pain medication for 10/10 pain, reduces pain to 7/10, also tried oral oxycodone which did not improve pain. please adjust oral dosing for more adequate pain control.  [id number]     Orders Received:    Comments:

## 2022-09-01 NOTE — ANESTHESIA PREPROCEDURE EVALUATION
Anesthesia Pre-Procedure Evaluation    Patient: Syd Joyce   MRN: [id number] : 1955        Procedure : Procedure(s):  REVISION DISCECTOMY, SPINE, LUMBAR, LEVEL 4-5          Past Medical History:   Diagnosis Date     DM2 (diabetes mellitus, type 2) (H)      Esophageal reflux 6/15/2012     History of colonic polyps 2021     HTN (hypertension) 2011     Leg pain, bilateral      Low back pain      Numbness and tingling     LEGS, R/T LUMBAR STENOSIS     Obesity, unspecified 1/10/2014     S/P lumbar laminectomy 2017      Past Surgical History:   Procedure Laterality Date     COLONOSCOPY       DISCECTOMY LUMBAR POSTERIOR MICROSCOPIC TWO LEVELS N/A 2017    Procedure: DISCECTOMY LUMBAR POSTERIOR MICROSCOPIC TWO LEVELS;  L3-4 L4-5 POSTERIOR DECOMPRESSION AND INTERSPINUS FIXATION WITHOUT FUSION;  Surgeon: Ray Perez MD;  Location:  OR     PHACOEMULSIFICATION CLEAR CORNEA WITH STANDARD INTRAOCULAR LENS IMPLANT Right 2018    Procedure: PHACOEMULSIFICATION CLEAR CORNEA WITH STANDARD INTRAOCULAR LENS IMPLANT;  RIGHT EYE PHACOEMULSIFICATION CLEAR CORNEA WITH STANDARD INTRAOCULAR LENS IMPLANT;  Surgeon: Elsie Kay MD;  Location:  [facility]     PHACOEMULSIFICATION CLEAR CORNEA WITH STANDARD INTRAOCULAR LENS IMPLANT Left 2018    Procedure: PHACOEMULSIFICATION CLEAR CORNEA WITH STANDARD INTRAOCULAR LENS IMPLANT;  COMPLEX LEFT EYE PHACOEMULSIFICATION CLEAR CORNEA WITH STANDARD INTRAOCULAR LENS IMPLANT WITH MALYUGIN RING;  Surgeon: Elsie Kay MD;  Location:  [facility]     WISDOM TEETH        No Known Allergies   Social History     Tobacco Use     Smoking status: Never Smoker     Smokeless tobacco: Never Used   Substance Use Topics     Alcohol use: Not Currently     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     No history of anesthetic complications       ROS/MED  HX  ENT/Pulmonary:  - neg pulmonary ROS  (-) tobacco use, asthma and sleep apnea   Neurologic:     (+) peripheral neuropathy,     Cardiovascular:     (+) Dyslipidemia hypertension--CAD angina---    METS/Exercise Tolerance:     Hematologic:       Musculoskeletal: Comment: Recurrent disc herniation      GI/Hepatic:     (+) GERD, Asymptomatic on medication,     Renal/Genitourinary:       Endo:     (+) type II DM, Diabetic complications: nephropathy neuropathy.     Psychiatric/Substance Use:     (+) alcohol abuse     Infectious Disease:       Malignancy:       Other:            Physical Exam    Airway        Mallampati: II   TM distance: > 3 FB   Neck ROM: full   Mouth opening: > 3 cm    Respiratory Devices and Support         Dental  no notable dental history         Cardiovascular   cardiovascular exam normal          Pulmonary   pulmonary exam normal                OUTSIDE LABS:  CBC:   Lab Results   Component Value Date    WBC 8.1 09/01/2022    WBC 10.2 08/31/2022    HGB 14.3 09/01/2022    HGB 13.8 08/31/2022    HCT 43.5 09/01/2022    HCT 41.0 08/31/2022     09/01/2022     08/31/2022     BMP:   Lab Results   Component Value Date     09/01/2022     08/31/2022    POTASSIUM 3.8 09/01/2022    POTASSIUM 3.9 08/31/2022    CHLORIDE 106 09/01/2022    CHLORIDE 105 08/31/2022    CO2 26 09/01/2022    CO2 28 08/31/2022    BUN 18 09/01/2022    BUN 16 08/31/2022    CR 1.02 09/01/2022    CR 1.07 08/31/2022    GLC 95 09/01/2022     (H) 09/01/2022     COAGS:   Lab Results   Component Value Date    PTT 28 02/21/2018    INR 0.99 04/22/2019     POC:   Lab Results   Component Value Date     (H) 04/26/2019     HEPATIC:   Lab Results   Component Value Date    ALBUMIN 4.3 06/20/2022    PROTTOTAL 6.3 06/20/2022    ALT 27 06/20/2022    AST 23 06/20/2022    ALKPHOS 169 (H) 06/20/2022    BILITOTAL 0.3 06/20/2022     OTHER:   Lab Results   Component Value Date    LACT 0.9 09/02/2019    A1C 6.5 (A)  06/20/2022    HEAVENLY 9.0 09/01/2022    PHOS 3.2 04/23/2019    MAG 1.8 04/24/2019    LIPASE 80 09/02/2019    T4 1.29 01/22/2018    CRP 34.6 (H) 08/31/2022       Anesthesia Plan    ASA Status:  3   NPO Status:  NPO Appropriate    Anesthesia Type: General.     - Airway: ETT   Induction: Intravenous, Propofol.   Maintenance: Balanced.   Techniques and Equipment:     - Lines/Monitors: 2nd IV     Consents    Anesthesia Plan(s) and associated risks, benefits, and realistic alternatives discussed. Questions answered and patient/representative(s) expressed understanding.    - Discussed:     - Discussed with:  Patient         Postoperative Care    Pain management: IV analgesics.   PONV prophylaxis: Ondansetron (or other 5HT-3), Background Propofol Infusion     Comments:                Ale Soliman MD

## 2022-09-02 ENCOUNTER — APPOINTMENT (OUTPATIENT)
Dept: PHYSICAL THERAPY | Facility: CLINIC | Age: 67
DRG: 519 | End: 2022-09-02
Attending: PHYSICIAN ASSISTANT
Payer: COMMERCIAL

## 2022-09-02 LAB
ANION GAP SERPL CALCULATED.3IONS-SCNC: 6 MMOL/L (ref 3–14)
BUN SERPL-MCNC: 24 MG/DL (ref 7–30)
CALCIUM SERPL-MCNC: 8.8 MG/DL (ref 8.5–10.1)
CHLORIDE BLD-SCNC: 100 MMOL/L (ref 94–109)
CO2 SERPL-SCNC: 26 MMOL/L (ref 20–32)
CREAT SERPL-MCNC: 1.17 MG/DL (ref 0.66–1.25)
ERYTHROCYTE [DISTWIDTH] IN BLOOD BY AUTOMATED COUNT: 12.6 % (ref 10–15)
GFR SERPL CREATININE-BSD FRML MDRD: 67 ML/MIN/1.73M2
GLUCOSE BLD-MCNC: 267 MG/DL (ref 70–99)
GLUCOSE BLDC GLUCOMTR-MCNC: 123 MG/DL (ref 70–99)
GLUCOSE BLDC GLUCOMTR-MCNC: 170 MG/DL (ref 70–99)
GLUCOSE BLDC GLUCOMTR-MCNC: 207 MG/DL (ref 70–99)
GLUCOSE BLDC GLUCOMTR-MCNC: 248 MG/DL (ref 70–99)
GLUCOSE BLDC GLUCOMTR-MCNC: 250 MG/DL (ref 70–99)
GLUCOSE BLDC GLUCOMTR-MCNC: 276 MG/DL (ref 70–99)
HCT VFR BLD AUTO: 41.9 % (ref 40–53)
HGB BLD-MCNC: 14 G/DL (ref 13.3–17.7)
HGB BLD-MCNC: 9.3 G/DL (ref 13.3–17.7)
MCH RBC QN AUTO: 30 PG (ref 26.5–33)
MCHC RBC AUTO-ENTMCNC: 33.4 G/DL (ref 31.5–36.5)
MCV RBC AUTO: 90 FL (ref 78–100)
PLATELET # BLD AUTO: 231 10E3/UL (ref 150–450)
POTASSIUM BLD-SCNC: 4.5 MMOL/L (ref 3.4–5.3)
RBC # BLD AUTO: 4.67 10E6/UL (ref 4.4–5.9)
SODIUM SERPL-SCNC: 132 MMOL/L (ref 133–144)
WBC # BLD AUTO: 13.9 10E3/UL (ref 4–11)

## 2022-09-02 PROCEDURE — 250N000013 HC RX MED GY IP 250 OP 250 PS 637: Performed by: ORTHOPAEDIC SURGERY

## 2022-09-02 PROCEDURE — 97161 PT EVAL LOW COMPLEX 20 MIN: CPT | Mod: GP

## 2022-09-02 PROCEDURE — 258N000003 HC RX IP 258 OP 636: Performed by: ORTHOPAEDIC SURGERY

## 2022-09-02 PROCEDURE — 99232 SBSQ HOSP IP/OBS MODERATE 35: CPT | Performed by: PHYSICIAN ASSISTANT

## 2022-09-02 PROCEDURE — 85018 HEMOGLOBIN: CPT | Performed by: HOSPITALIST

## 2022-09-02 PROCEDURE — 80048 BASIC METABOLIC PNL TOTAL CA: CPT | Performed by: HOSPITALIST

## 2022-09-02 PROCEDURE — 97530 THERAPEUTIC ACTIVITIES: CPT | Mod: GP

## 2022-09-02 PROCEDURE — 36415 COLL VENOUS BLD VENIPUNCTURE: CPT | Performed by: HOSPITALIST

## 2022-09-02 PROCEDURE — 250N000012 HC RX MED GY IP 250 OP 636 PS 637: Performed by: ORTHOPAEDIC SURGERY

## 2022-09-02 PROCEDURE — 120N000001 HC R&B MED SURG/OB

## 2022-09-02 PROCEDURE — 97116 GAIT TRAINING THERAPY: CPT | Mod: GP

## 2022-09-02 RX ORDER — SODIUM CHLORIDE 9 MG/ML
INJECTION, SOLUTION INTRAVENOUS CONTINUOUS
Status: DISCONTINUED | OUTPATIENT
Start: 2022-09-02 | End: 2022-09-03 | Stop reason: HOSPADM

## 2022-09-02 RX ORDER — LABETALOL HYDROCHLORIDE 5 MG/ML
10 INJECTION, SOLUTION INTRAVENOUS
Status: DISCONTINUED | OUTPATIENT
Start: 2022-09-02 | End: 2022-09-02 | Stop reason: HOSPADM

## 2022-09-02 RX ORDER — ONDANSETRON 4 MG/1
4 TABLET, ORALLY DISINTEGRATING ORAL EVERY 30 MIN PRN
Status: DISCONTINUED | OUTPATIENT
Start: 2022-09-02 | End: 2022-09-02 | Stop reason: HOSPADM

## 2022-09-02 RX ORDER — ACETAMINOPHEN 325 MG/1
650 TABLET ORAL EVERY 6 HOURS PRN
COMMUNITY
Start: 2022-09-02 | End: 2023-09-08

## 2022-09-02 RX ORDER — SODIUM CHLORIDE, SODIUM LACTATE, POTASSIUM CHLORIDE, CALCIUM CHLORIDE 600; 310; 30; 20 MG/100ML; MG/100ML; MG/100ML; MG/100ML
INJECTION, SOLUTION INTRAVENOUS CONTINUOUS
Status: DISCONTINUED | OUTPATIENT
Start: 2022-09-02 | End: 2022-09-02 | Stop reason: HOSPADM

## 2022-09-02 RX ORDER — ONDANSETRON 2 MG/ML
4 INJECTION INTRAMUSCULAR; INTRAVENOUS EVERY 30 MIN PRN
Status: DISCONTINUED | OUTPATIENT
Start: 2022-09-02 | End: 2022-09-02 | Stop reason: HOSPADM

## 2022-09-02 RX ORDER — OXYCODONE HYDROCHLORIDE 5 MG/1
5 TABLET ORAL EVERY 4 HOURS PRN
Status: DISCONTINUED | OUTPATIENT
Start: 2022-09-02 | End: 2022-09-02 | Stop reason: HOSPADM

## 2022-09-02 RX ORDER — HYDRALAZINE HYDROCHLORIDE 20 MG/ML
2.5-5 INJECTION INTRAMUSCULAR; INTRAVENOUS EVERY 10 MIN PRN
Status: DISCONTINUED | OUTPATIENT
Start: 2022-09-02 | End: 2022-09-02 | Stop reason: HOSPADM

## 2022-09-02 RX ORDER — BISACODYL 10 MG
10 SUPPOSITORY, RECTAL RECTAL DAILY PRN
Status: DISCONTINUED | OUTPATIENT
Start: 2022-09-02 | End: 2022-09-02

## 2022-09-02 RX ORDER — FENTANYL CITRATE 0.05 MG/ML
25 INJECTION, SOLUTION INTRAMUSCULAR; INTRAVENOUS EVERY 5 MIN PRN
Status: DISCONTINUED | OUTPATIENT
Start: 2022-09-02 | End: 2022-09-02 | Stop reason: HOSPADM

## 2022-09-02 RX ORDER — HYDROMORPHONE HCL IN WATER/PF 6 MG/30 ML
0.4 PATIENT CONTROLLED ANALGESIA SYRINGE INTRAVENOUS EVERY 5 MIN PRN
Status: DISCONTINUED | OUTPATIENT
Start: 2022-09-02 | End: 2022-09-02 | Stop reason: HOSPADM

## 2022-09-02 RX ORDER — OXYCODONE HYDROCHLORIDE 5 MG/1
5-10 TABLET ORAL EVERY 6 HOURS PRN
Qty: 10 TABLET | Refills: 0 | Status: SHIPPED | OUTPATIENT
Start: 2022-09-02 | End: 2023-07-26

## 2022-09-02 RX ORDER — BISACODYL 10 MG
10 SUPPOSITORY, RECTAL RECTAL DAILY PRN
Status: DISCONTINUED | OUTPATIENT
Start: 2022-09-02 | End: 2022-09-03 | Stop reason: HOSPADM

## 2022-09-02 RX ADMIN — CARVEDILOL 50 MG: 25 TABLET, FILM COATED ORAL at 20:08

## 2022-09-02 RX ADMIN — SENNOSIDES AND DOCUSATE SODIUM 1 TABLET: 50; 8.6 TABLET ORAL at 09:07

## 2022-09-02 RX ADMIN — OXYCODONE HYDROCHLORIDE 10 MG: 5 TABLET ORAL at 20:16

## 2022-09-02 RX ADMIN — CARVEDILOL 50 MG: 25 TABLET, FILM COATED ORAL at 09:08

## 2022-09-02 RX ADMIN — CHLORTHALIDONE 25 MG: 25 TABLET ORAL at 09:07

## 2022-09-02 RX ADMIN — CYCLOBENZAPRINE 10 MG: 10 TABLET, FILM COATED ORAL at 13:39

## 2022-09-02 RX ADMIN — ACETAMINOPHEN 975 MG: 325 TABLET ORAL at 09:08

## 2022-09-02 RX ADMIN — POLYETHYLENE GLYCOL 3350 17 G: 17 POWDER, FOR SOLUTION ORAL at 09:08

## 2022-09-02 RX ADMIN — ACETAMINOPHEN 975 MG: 325 TABLET ORAL at 17:29

## 2022-09-02 RX ADMIN — INSULIN ASPART 2 UNITS: 100 INJECTION, SOLUTION INTRAVENOUS; SUBCUTANEOUS at 13:38

## 2022-09-02 RX ADMIN — INSULIN ASPART 3 UNITS: 100 INJECTION, SOLUTION INTRAVENOUS; SUBCUTANEOUS at 17:29

## 2022-09-02 RX ADMIN — SENNOSIDES AND DOCUSATE SODIUM 1 TABLET: 50; 8.6 TABLET ORAL at 20:08

## 2022-09-02 RX ADMIN — CYCLOBENZAPRINE 10 MG: 10 TABLET, FILM COATED ORAL at 20:08

## 2022-09-02 RX ADMIN — LOSARTAN POTASSIUM 100 MG: 100 TABLET, FILM COATED ORAL at 09:07

## 2022-09-02 RX ADMIN — PANTOPRAZOLE SODIUM 40 MG: 40 TABLET, DELAYED RELEASE ORAL at 09:07

## 2022-09-02 RX ADMIN — OXYCODONE HYDROCHLORIDE 10 MG: 5 TABLET ORAL at 06:16

## 2022-09-02 RX ADMIN — TAMSULOSIN HYDROCHLORIDE 0.4 MG: 0.4 CAPSULE ORAL at 09:07

## 2022-09-02 RX ADMIN — BISACODYL 10 MG: 10 SUPPOSITORY RECTAL at 18:50

## 2022-09-02 RX ADMIN — ACETAMINOPHEN 975 MG: 325 TABLET ORAL at 01:26

## 2022-09-02 RX ADMIN — SODIUM CHLORIDE: 9 INJECTION, SOLUTION INTRAVENOUS at 01:53

## 2022-09-02 RX ADMIN — OXYCODONE HYDROCHLORIDE 10 MG: 5 TABLET ORAL at 13:38

## 2022-09-02 RX ADMIN — OXYCODONE HYDROCHLORIDE 10 MG: 5 TABLET ORAL at 02:02

## 2022-09-02 RX ADMIN — CYCLOBENZAPRINE 10 MG: 10 TABLET, FILM COATED ORAL at 09:07

## 2022-09-02 ASSESSMENT — ACTIVITIES OF DAILY LIVING (ADL)
ADLS_ACUITY_SCORE: 38
ADLS_ACUITY_SCORE: 36
ADLS_ACUITY_SCORE: 37
ADLS_ACUITY_SCORE: 36
ADLS_ACUITY_SCORE: 37
ADLS_ACUITY_SCORE: 35
ADLS_ACUITY_SCORE: 37
ADLS_ACUITY_SCORE: 36
ADLS_ACUITY_SCORE: 37
ADLS_ACUITY_SCORE: 35

## 2022-09-02 NOTE — PROVIDER NOTIFICATION
MD Notification    Notified Person: MD    Notified Person Name: Dr. Miller    Notification Date/Time: 0140 9/2/22    Notification Interaction: telephone    Purpose of Notification: no IV fluids ordered Post-op    Orders Received: telephone order for NS @ 100 mL/hr    Comments:

## 2022-09-02 NOTE — PROGRESS NOTES
09/02/22 0945   Quick Adds   Type of Visit Initial PT Evaluation   Living Environment   People in Home spouse   Current Living Arrangements house   Home Accessibility stairs to enter home;stairs within home   Number of Stairs, Main Entrance 2   Stair Railings, Main Entrance railing on left side (ascending)   Number of Stairs, Within Home, Primary other (see comments)  (Does not need to navigate)   Transportation Anticipated car, drives self;family or friend will provide   Living Environment Comments Spouse can assist at home   Self-Care   Usual Activity Tolerance good   Current Activity Tolerance moderate   Regular Exercise No   Equipment Currently Used at Home cane, straight;walker, rolling   Fall history within last six months no   Activity/Exercise/Self-Care Comment Pt has access to cane, walker and shower chair at home   General Information   Onset of Illness/Injury or Date of Surgery 09/01/22   Referring Physician Daniel Miller MD   Patient/Family Therapy Goals Statement (PT) to return home with spouse   Pertinent History of Current Problem (include personal factors and/or comorbidities that impact the POC) Pt is 66 yo male POD #1 L4-5 revision discectomy. PMH, per hospitalist, includes hypertension, type 2 diabetes, Diabetic Neuropathy, GERD, obesity, BPH, History of lumbar laminectomy in 2017, and some chronic numbness/tingling to both legs.   Existing Precautions/Restrictions fall;spinal   General Observations Pt seated on toilet in BR upon arrival of therapist, agreeable to PT.   Cognition   Affect/Mental Status (Cognition) WFL   Orientation Status (Cognition) oriented x 4   Follows Commands (Cognition) WFL   Pain Assessment   Patient Currently in Pain   (pain 3/10 in low back sitting at rest)   Integumentary/Edema   Integumentary/Edema Comments Bandage over incision   Posture    Posture Forward head position;Kyphosis   Posture Comments Forward flexed posture while standing at walker   Range of  Motion (ROM)   ROM Comment Not formally assessed but WFL BLE.   Strength (Manual Muscle Testing)   Strength Comments Not formally assessed but grossly 3/5 as pt could perform LAQ with BLE.   Bed Mobility   Comment, (Bed Mobility) NT; pt not in bed at all during session   Transfers   Comment, (Transfers) Pt performed sit<>stand with FWW and CGA.   Gait/Stairs (Locomotion)   Distance in Feet (Required for LE Total Joints) 10'x2, 5'x2   Comment, (Gait/Stairs) Pt ambulated 10' with FWW and CGA.   Balance   Balance Comments NT; noted good seated and standing balance   Sensory Examination   Sensory Perception Comments Numbness in B feet; pt noted this is chronic.   Clinical Impression   Criteria for Skilled Therapeutic Intervention Yes, treatment indicated   PT Diagnosis (PT) Difficulty with functional mobility   Influenced by the following impairments Pain, spinal precautions, decreased activity tolerance   Functional limitations due to impairments Difficulty with functional mobility requiring AD and assist.   Clinical Presentation (PT Evaluation Complexity) Stable/Uncomplicated   Clinical Presentation Rationale Based on current presentation, PMH, and social support.   Clinical Decision Making (Complexity) low complexity   Planned Therapy Interventions (PT) bed mobility training;gait training;stair training;transfer training   Risk & Benefits of therapy have been explained patient   PT Discharge Planning   PT Discharge Recommendation (DC Rec) home with assist   PT Rationale for DC Rec Pt currently below baseline with all functional mobility but anticipate pt will continue to progress independence. Pt would benefit from assist at home with ambulation and stair management.   Plan of Care Review   Plan of Care Reviewed With patient   Total Evaluation Time   Total Evaluation Time (Minutes) 10   Physical Therapy Goals   PT Frequency Daily   PT Predicted Duration/Target Date for Goal Attainment 09/03/22   PT Goals Bed  Mobility;Transfers;Gait;Stairs   PT: Bed Mobility Supervision/stand-by assist;Within precautions;Supine to/from sit   PT: Transfers Supervision/stand-by assist;Sit to/from stand;Within precautions;Assistive device   PT: Gait Supervision/stand-by assist;100 feet;Within precautions;Rolling walker  (FWW)   PT: Stairs 2 stairs;Within precautions;Supervision/stand-by assist

## 2022-09-02 NOTE — PROVIDER NOTIFICATION
MD Notification    Notified Person: MD    Notified Person Name: Woo Eason    Notification Date/Time: 9/2/22@ 1618    Notification Interaction: Web page  Purpose of Notification: pt in Rm 2402 Hbg is 14.0    Orders Received:    Comments:

## 2022-09-02 NOTE — PROGRESS NOTES
Reason for admission: Recurrent disk herniation bilaterally at the L4-L5 level with severe spinal stenosis and cauda equina compression  Surgical Procedure/s: Hardware removal of Coflex devices at the L3-L4 and L4-L5 levels.  Revision L4 laminectomy, revision diskectomy bilaterally at L4-L5 and decompression of bilateral L4 and L5 nerves.  POD#: 1  Mental Status: x4  Activity: Ax1 GB Walker  Diet: Mod CHO diet.  Pain: Controlled with tylenol and ocycodone  Urination: Uses the bathroom. Urinating without difficulty.  Tele/Restraints/Iso: NA  LDA: PIV SL  Expected D/C Date: Pending BM, Pending HGB.  Other Info: Status post diskectomy in the past. Surgical dressing 75% saturated this morning. Writer put a new dressing: ABD pad followed by tegaderm. On Moderate sliding scale insulin. Hx of Hypertension, CAD, Hyperlipidemia, Type 2 diabetes mellitus, BPH, GERD. Hgb significant dropped from 14.3 to 9.3.

## 2022-09-02 NOTE — ANESTHESIA POSTPROCEDURE EVALUATION
Patient: Syd Joyce    Procedure: Procedure(s):  REVISION DISCECTOMY, SPINE, LUMBAR, LEVEL 4-5       Anesthesia Type:  General    Note:  Disposition: Inpatient   Postop Pain Control: Uneventful            Sign Out: Well controlled pain   PONV: No   Neuro/Psych: Uneventful            Sign Out: Acceptable/Baseline neuro status   Airway/Respiratory: Uneventful            Sign Out: Acceptable/Baseline resp. status   CV/Hemodynamics: Uneventful            Sign Out: Acceptable CV status; No obvious hypovolemia; No obvious fluid overload   Other NRE: NONE   DID A NON-ROUTINE EVENT OCCUR? No           Last vitals:  Vitals Value Taken Time   /80 09/02/22 0013   Temp     Pulse 90 09/02/22 0019   Resp 13 09/02/22 0019   SpO2 97 % 09/02/22 0019   Vitals shown include unvalidated device data.    Electronically Signed By: Ale Soliman MD  September 2, 2022  12:20 AM

## 2022-09-02 NOTE — OR NURSING
Paper consent filled out on inpatient floor by Dr. Miller, patient and witness morning of 9/1/22. Verified signature and consent.

## 2022-09-02 NOTE — ANESTHESIA PROCEDURE NOTES
Airway       Patient location during procedure: OR       Procedure Start/Stop Times: 9/1/2022 9:26 PM  Staff -        Anesthesiologist:  Ale Soliman MD       CRNA: Naz Bucio APRN CRNA       Performed By: CRNA  Consent for Airway        Urgency: elective  Indications and Patient Condition       Indications for airway management: mari-procedural       Induction type:intravenous       Mask difficulty assessment: 1 - vent by mask    Final Airway Details       Final airway type: endotracheal airway       Successful airway: ETT - single  Endotracheal Airway Details        ETT size (mm): 8.0       Cuffed: yes       Successful intubation technique: direct laryngoscopy       DL Blade Type: Gay 2       Grade View of Cords: 1       Adjucts: stylet       Position: Right       Measured from: lips       Secured at (cm): 24       Bite block used: None    Post intubation assessment        Placement verified by: capnometry, equal breath sounds and chest rise        Number of attempts at approach: 1       Secured with: pink tape       Ease of procedure: easy       Dentition: Intact and Unchanged    Medication(s) Administered   Medication Administration Time: 9/1/2022 9:26 PM

## 2022-09-02 NOTE — OP NOTE
Procedure Date: 09/01/2022    PREOPERATIVE DIAGNOSIS:  Retained hardware, recurrent disk herniation bilaterally at the L4-L5 level with severe spinal stenosis and cauda equina compression, status post diskectomy in the past.    POSTOPERATIVE DIAGNOSIS:  Retained hardware, recurrent disk herniation bilaterally at the L4-L5 and level with severe spinal stenosis and cauda equina compression, status post diskectomy in the past.    PROCEDURE:  Hardware removal of Coflex devices at the L3-L4 and L4-L5 levels.  Revision L4 laminectomy, revision diskectomy bilaterally at L4-L5 and decompression of bilateral L4 and L5 nerves.    SURGEON:  Daniel Miller MD    ANESTHESIA:  General with an endotracheal tube.    INDICATIONS FOR PROCEDURE:  Mr. Joyce is a 67-year-old man who is status post lumbar decompression, diskectomy and placement of Coflex devices at the L3-L4 and L4-L5 levels in the distant past.  He did well following that surgery until approximately a week ago when he went on a hike while on vacation, had some back pain after that.  He woke up the following morning with severe back and bilateral leg pain and had difficulty ambulating because of severe pain. He suffered at home for several days and eventually came to the hospital and was admitted for pain control.  MRI scan at that time and plain x-ray showed the Coflex devices in the interlaminar space at L3-L4, L4-L5 with large disc herniation centrally and somewhat to the left side at the L4-L5 level causing severe central stenosis and cauda equina compression.  His preoperative physical examination revealed 4/5 strength globally in his lower extremities and numbness bilaterally globally in his lower extremities.  He did not have difficulty voiding preoperatively.  Surgical decompression was recommended. His procedure is as follows.    DESCRIPTION OF PROCEDURE:  After informed consent was obtained from the patient and satisfactory general anesthesia achieved, the  patient was turned to the prone position on the Ann frame.  Care was taken to pad all bony prominences.  Back was prepped and draped in sterile fashion.  After appropriate patient and site identification, his old surgical scar was used as a guide for a new incision that was approximately 4 cm in length.  Marcaine 0.25% with epinephrine was infiltrated into the wound edges to aid with hemostasis and postoperative pain control.  Subcutaneous dissection continued with the Bovie down to lumbar fascia.  Lumbar fascia was divided over the L3, L4 and L5 spinous processes.  The Coflex devices at the L3-4 and L4-5 levels were covered with bone in the interlaminar space and were quite difficult to remove, requiring much burring of bone and rongeuring nerve, eventually removed, the majority of the L4 spinous process was intact. No localization film was needed as levels were confirmed from the implants. The L4 lamina was thinned first with a rongeur, then with the Midas drill. Entry was gained into the spinal canal at the L4-L5 interspace where there was marked scar tissue in the interlaminar space.  This was gently teased off of the lamina bilaterally.  Under the remaining lamina there was less scar tissue.  Lateral recess decompressions were performed bilaterally.  There was a very large disk herniation under the dura, which was mobilized bilaterally in order to remove it. It was ultimately removed from the left side in 1 very large piece and then multiple small additional fragments.  There was a large annular defect into the L4-L5 disk space, which was irrigated with antibiotic solution and additional fragments rinsed out.  It did not appear that there was any remaining disk in the L4-L5 disk space. After the decompression, a Crawford elevator could be swept under the dura and out along the path of the L4 and L5 nerves bilaterally and there was no evidence of any further tethering material.  Hemostasis was achieved with  bipolar cautery. The wound was irrigated with copious amounts of antibiotic solution.  A small amount of betamethasone was drizzled over the exposed dura and the exposed dura was covered with thrombin-soaked Gelfoam.  The lumbar fascia was then reapproximated with multiple figure-of-eight sutures of #1 Vicryl, skin closed with multiple subcutaneous sutures of 2-0 Vicryl and a running subcuticular 3-0 Vicryl.  Sterile Steri-Strip and dressing were applied.  The patient was turned from the Ann frame, awakened, extubated, and left the operating room in good condition.    ESTIMATED BLOOD LOSS:  20 mL    REPLACEMENT:  1500 mL crystalloid.    COMPLICATIONS:  There were no complications.    Total time for the procedure from skin to skin was 120 minutes.    Daniel Miller MD        D: 2022   T: 2022   MT: [id number]    Name:     TERE SILVEIRA  MRN:      [id number]        Account:        [id number]   :      1955           Procedure Date: 2022     Document: [id number]

## 2022-09-02 NOTE — BRIEF OP NOTE
[facility]    Brief Operative Note    Pre-operative diagnosis: Retained hardware, Recurrent herniation of lumbar disc [M51.26]  Post-operative diagnosis Same as pre-operative diagnosis    Procedure: Removal of segmental hardware at L3-4 and L4-5, revision decompression/discectomy at L4-5          Revision decompression of bilateral L4 and L5 nerves.   Surgeon: Surgeon(s) and Role:     * Daniel Miller MD - Primary  Anesthesia: General   Estimated Blood Loss: 20cc    Drains: None  Specimens: * No specimens in log *  Findings:   see dictation.  Complications: None.  Implants:   Implant Name Type Inv. Item Serial No.  Lot No. LRB No. Used Action   COFLEX INTERLAMINAR, 10MM     [id number] N/A 1 Explanted   COFLEX INTERLAMINAR, 10MM     [id number] N/A 1 Explanted

## 2022-09-02 NOTE — PROGRESS NOTES
[facility]    Medicine Progress Note - Hospitalist Service    Date of Admission:  8/31/2022    Assessment & Plan        Syd Joyce is a 67 year old male who was admitted on 8/31/2022.      Past medical history significant for hypertension, type 2 diabetes, Diabetic Neuropathy, GERD, obesity, BPH, History of lumbar laminectomy in 2017, and some chronic numbness/tingling to both legs who was registered to short-stay/obs for acute lumbar back pain.       Patient presented with acute onset lumbar back pain with radiation down both legs (level of calves and right foot).  Pain worse on right side.  This has limited his ability to ambulate.  He was seen by his PCP ~ 1 week ago when the pain began.  He was prescribed Norco and Naproxen (but did not obtain much relief) and was also scheduled for an outpatient Lumbar MRI.       In the ED, the patient was evaluated by Paddy Foster PA-C.  Vital signs showed elevated BP of 199/95, which came down slightly on repeat to 174/91.  He was afebrile with temperature 98.7.  Heart rate in the 80's.  COVID-19 screening negative.  Urinalysis unremarkable.  No other labs to review.  Lumbar x-ray completed that showed no evidence for any bony abnormality.  Patient was given IV Dilaudid x1 without improvement of symptoms.       L4-5 recurrent disc herniation with central and left severe cauda equina compression  Acute lumbar back pain with bilateral radiculopathy  Lumbar back pain occurred acutely approximately 1 week ago with no provocative injury.  Patient states he was lying prone at the onset, had severe lumbar back pain with shooting pains down both legs.  Pain persisted, he is unable to ambulate.  No saddle anesthesia, bowel or bladder incontinence.  Was seen outpatient in clinic, prescribed Norco and naproxen without relief.  Outpatient MRI scheduled but not yet completed.  Here, vital signs are notable for hypertension, but otherwise stable; Lumbar x-ray  completed that shows no evidence for any bony abnormality.  IV Dilaudid given without improvement of symptoms.  Patient was registered observation.  *MRI Lumbar spine: Interval placement L3-5 interspinous spacers, progressed L4-5 subligamentous and left foraminal disc herniation with severe spinal canal stenosis and severe left neural foraminal stenosis, progressed severe right L3-4 lateral recess stenosis with persistent mild-moderate right neural foraminal stenosis.    *Inflammatory CRP elevated at 34.6.    *Received IV Toradol 15 mg and PO valium 5 mg in the ED (was ordered by Admitting Hospitalist).    -- Orthospine consult appreciated:               --S/p Hardware removal of Coflex devices at the L3-L4 and L4-L5 levels.  Revision L4 laminectomy, revision diskectomy bilaterally at L4-L5 and decompression of bilateral L4 and L5 nerves. discectomy/decompression at L4-5.    -- Analgesic management:               --Scheduled Tylenol 975 mg every 8 hours.               --PRN PO oxycodone 5-10 mg every 4 hours.                 --PRN IV Dilaudid 0.3-0.5 mg every 2 hours.                 --Flexeril 10 mg TID.                 --PRN IV Toradol 15 mg every 6 hours (9/1).               --Started Lidoderm patch 9/1.                 --Ice PRN.    -- PT consult requested.    -- Bowel medications scheduled and PRN.      Acute anemia  Component of acute blood loss from surgery, however patient's EBL was minimal.  He had saturated multiple wound dressings per RN report.  --Hemoglobin preop went from 13.8--14.3, down to 9.3 today.  --Monitor hemoglobin, will recheck later today, and full CBC in a.m.  --Monitor output from surgical site.  Orthopedic surgery, Dr. Miller has been made aware by RN.  --Hold discharge today to monitor hemoglobin     Hypertension  Accelerated due to pain.  BP up to 199/95 on admission.  Now trending in the 140s-150s systolic.  -- IV hydralazine as needed for SBP >180.  -- Resumed on PTA losartan 100 mg  daily, Coreg 50 mg twice daily, chlorthalidone 25 mg daily with hold parameters.     CAD  Hyperlipidemia  Patient had coronary angiogram in 2018 showing 50% calcified proximal LAD stenosis, without any other significant coronary artery disease.  Risk factor modification and medical management recommended.  Patient currently denies any chest pain.  Echo from 2019 shows EF 60-65%.  -- Hold PTA aspirin 81 mg in case procedure is needed.  -- Resumed on PTA losartan, Coreg.  -- Hold PTA statin while observation status, resume at discharge.     Type 2 diabetes mellitus  Diabetic Neuropathy  A1c 6.5% (6/2022).  PTA regimen with metformin 1000 mg twice daily, Trulicity 1.5 mg subcutaneous every 7 days on Saturdays.  -- Hold PTA metformin and Trulicity, plan to resume at discharge.  -- Ordered moderate dose sign scale insulin.  -- Moderate CHO diet.  -- Glucose checks and hypoglycemic protocol.       BPH  Patient denies any issues with changes in his urination such as increased retention or incontinence.   -- Continued on PTA Flomax 0.4 mg/d.       GERD  -- Resumed on PTA PPI.       Diet: Regular Diet Adult    DVT Prophylaxis: Pneumatic Compression Devices  Quiros Catheter: Not present  Central Lines: None  Cardiac Monitoring: None  Code Status: Full Code      Disposition Plan      Expected Discharge Date: 09/02/2022,  3:00 PM      Discharge Comments: To surgery today.  4N transfer after.        The patient's care was discussed with the Bedside Nurse and Patient.    This patient was discussed with Dr. Schulz of the hospitalist service.  He is in agreement my assessment and plan of care.    DEONDRE Tiwari  Hospitalist Service  [facility]  Securely message with the Vocera Web Console (learn more here)  Text page via [employer] Paging/Directory         Clinically Significant Risk Factors Present on Admission                 # Hypertension: home medication list includes antihypertensive(s)    #  "Overweight: Estimated body mass index is 29.45 kg/m  as calculated from the following:    Height as of this encounter: 1.798 m (5' 10.8\").    Weight as of this encounter: 95.3 kg (210 lb).        ______________________________________________________________________    Interval History   Patient doing well.  Sitting up in recliner on my arrival.  He reports his pain is mild on medications.  He is ambulating with PT.  Denies any fever, chest pain, shortness of breath, abdominal pain, nausea, vomiting, diarrhea.  Per RN he had multiple saturated dressings in the surgical site, but most recent dressing seems to be dry.    Data reviewed today: I reviewed all medications, new labs and imaging results over the last 24 hours.    Physical Exam   Vital Signs: Temp: 97.4  F (36.3  C) Temp src: Oral BP: (!) 148/87 Pulse: 81   Resp: 18 SpO2: 94 % O2 Device: None (Room air) Oxygen Delivery: 1 LPM  Weight: 210 lbs 0 oz    Constitutional: Awake, alert, cooperative.  Sitting up in the chair in no apparent distress.  ENT: Normocephalic, without obvious abnormality, atraumatic, oropharynx with MMM  Pulmonary: No increased work of breathing, fair air exchange, clear to auscultation bilaterally, no crackles or wheezing.  Cardiovascular: Regular rate and rhythm, normal S1 and S2, and no murmur noted.  GI: Normal bowel sounds, soft, non-distended, non-tender.  Skin/Integumen: Visualized skin without rash.  Neuro: CN II-XII grossly intact.  Psych:  Alert and oriented x 3. Normal affect.  Extremities: No lower extremity edema noted, and calves are non-TTP bilaterally.     Data   Recent Labs   Lab 09/02/22  0047 09/01/22  1721 09/01/22  0836 09/01/22  0624 08/31/22  1712 08/31/22  1414   WBC  --   --   --  8.1  --  10.2   HGB  --   --   --  14.3  --  13.8   MCV  --   --   --  89  --  88   PLT  --   --   --  200  --  209   NA  --   --   --  140  --  138   POTASSIUM  --   --   --  3.8  --  3.9   CHLORIDE  --   --   --  106  --  105   CO2  --  "  --   --  26  --  28   BUN  --   --   --  18  --  16   CR  --   --   --  1.02  --  1.07   ANIONGAP  --   --   --  8  --  5   HEAVENLY  --   --   --  9.0  --  8.9   * 95 111* 131*   < > 115*    < > = values in this interval not displayed.     No results found for this or any previous visit (from the past 24 hour(s)).  Medications     sodium chloride 100 mL/hr at 09/02/22 0153       acetaminophen  975 mg Oral Q8H     carvedilol  50 mg Oral BID     chlorthalidone  25 mg Oral Daily     cyclobenzaprine  10 mg Oral TID     insulin aspart  1-7 Units Subcutaneous TID AC     insulin aspart  1-5 Units Subcutaneous At Bedtime     losartan  100 mg Oral Daily     pantoprazole  40 mg Oral Daily     senna-docusate  1 tablet Oral BID     sodium chloride (PF)  3 mL Intracatheter Q8H     tamsulosin  0.4 mg Oral Daily

## 2022-09-02 NOTE — PLAN OF CARE
Shift Note: 0100-0730  POD 1 revision decompression/discectomy at L4-5. VSS on RA, Capno WNL. Pt A&Ox4, calm & cooperative. CMS intact. Incision to lower back, dressing reinforced this am. Ice applied intermittently. PRN oxycodone given. Voiding adequately, no BM or passing gas. Tolerating regular diet, ate jello shortly after arriving to floor. Denies nausea. PIV infusing NS @ 100 mL/hr. Up Ax1 GB/W. Ambulated to bathroom this morning. Plan for PT consult today.

## 2022-09-02 NOTE — PROGRESS NOTES
A/O x4.VSS on RA. PIV SL. Hgb check result came back okay.MD notified. Up with 1/WLK/Gb. Back dressing CDI. ,insulin given. PRN Ducolax supp given foe constipation. Pt may discharge tomorrow.

## 2022-09-02 NOTE — ANESTHESIA CARE TRANSFER NOTE
Patient: Syd Joyce    Procedure: Procedure(s):  REVISION DISCECTOMY, SPINE, LUMBAR, LEVEL 4-5       Diagnosis: Recurrent herniation of lumbar disc [M51.26]  Diagnosis Additional Information: No value filed.    Anesthesia Type:   General     Note:    Oropharynx: oropharynx clear of all foreign objects and spontaneously breathing  Level of Consciousness: drowsy  Oxygen Supplementation: face mask  Level of Supplemental Oxygen (L/min / FiO2): 6  Independent Airway: airway patency satisfactory and stable  Dentition: dentition unchanged  Vital Signs Stable: post-procedure vital signs reviewed and stable  Report to RN Given: handoff report given  Patient transferred to: PACU    Handoff Report: Identifed the Patient, Identified the Reponsible Provider, Reviewed the pertinent medical history, Discussed the surgical course, Reviewed Intra-OP anesthesia mangement and issues during anesthesia, Set expectations for post-procedure period and Allowed opportunity for questions and acknowledgement of understanding      Vitals:  Vitals Value Taken Time   /80 09/02/22 0013   Temp     Pulse 85 09/02/22 0015   Resp 0 09/02/22 0015   SpO2 96 % 09/02/22 0015   Vitals shown include unvalidated device data.    Electronically Signed By: STACY Baumann CRNA  September 2, 2022  12:16 AM

## 2022-09-02 NOTE — PROGRESS NOTES
History:   Minimal complaints. Pre-op symptoms gone.  Tolerating liq. diet without difficulty.  no BM since admission,  Ambulating to BR with assistance last night..  Vitals:   B/P: 142/81, T: 97.4, P: 81, R: 18       Lab Results   Component Value Date     09/01/2022     08/31/2022     06/20/2022     01/11/2022     09/14/2021    Lab Results   Component Value Date    CHLORIDE 106 09/01/2022    CHLORIDE 105 08/31/2022    CHLORIDE 104 06/20/2022    CHLORIDE 102 01/11/2022    CHLORIDE 103 09/14/2021    Lab Results   Component Value Date    BUN 18 09/01/2022    BUN 16 08/31/2022    BUN 11 06/20/2022    BUN 10 06/20/2022    BUN 26 01/11/2022    BUN 24 01/11/2022    BUN 18 09/14/2021    BUN 16 09/14/2021      Lab Results   Component Value Date    POTASSIUM 3.8 09/01/2022    POTASSIUM 3.9 08/31/2022    POTASSIUM 4.3 06/20/2022    POTASSIUM 4.3 01/11/2022    POTASSIUM 4.6 09/14/2021    Lab Results   Component Value Date    CO2 26 09/01/2022    CO2 28 08/31/2022    CO2 32 09/02/2019    CO2 28 05/07/2019    CO2 27 04/25/2019    CO2 27 04/24/2019    Lab Results   Component Value Date    CR 1.02 09/01/2022    CR 1.07 08/31/2022    CR 1.06 06/20/2022    CR 1.07 01/11/2022    CR 1.15 09/14/2021        Lab Results   Component Value Date    WBC 8.1 09/01/2022    WBC 10.2 08/31/2022    WBC 8.4 09/14/2021    HGB 14.3 09/01/2022    HGB 13.8 08/31/2022    HGB 14.2 09/14/2021    HCT 43.5 09/01/2022    HCT 41.0 08/31/2022    HCT 40.3 09/14/2021    MCV 89 09/01/2022    MCV 88 08/31/2022    MCV 83.9 09/14/2021     09/01/2022     08/31/2022     09/14/2021         Intake/Output Summary (Last 24 hours) at 9/2/2022 0711  Last data filed at 9/2/2022 0555  Gross per 24 hour   Intake 1940 ml   Output 1450 ml   Net 490 ml      Results for orders placed or performed during the hospital encounter of 08/31/22 (from the past 24 hour(s))   Glucose by meter   Result Value Ref Range    GLUCOSE BY METER  POCT 111 (H) 70 - 99 mg/dL   EKG 12-lead, tracing only   Result Value Ref Range    Systolic Blood Pressure  mmHg    Diastolic Blood Pressure  mmHg    Ventricular Rate 73 BPM    Atrial Rate 73 BPM    OR Interval 150 ms    QRS Duration 76 ms     ms    QTc 392 ms    P Axis 17 degrees    R AXIS 38 degrees    T Axis 68 degrees    Interpretation ECG       Sinus rhythm  Anterior infarct , age undetermined  Abnormal ECG  When compared with ECG of 02-SEP-2019 09:28,  No significant change was found     Glucose by meter   Result Value Ref Range    GLUCOSE BY METER POCT 95 70 - 99 mg/dL   Glucose by meter   Result Value Ref Range    GLUCOSE BY METER POCT 123 (H) 70 - 99 mg/dL     Dressing:   Dry and intact. Bloody, reinforced  Neuro:   Motor: 4/5 in bilateral TA as preop. o/w 5/5 in LEs, much improved from preop.   Sensation: still with some paresthesias but much improved from preop.  Abdomen:  Distended and tympanitic, non tender  Extremities:   No swelling or calf tenderness  A/P:   Stable POD # 1  Mobilize.  Can be be discharged after BM, independent ambulating, comfortable on oral meds.   F/u in 4 weeks.

## 2022-09-02 NOTE — H&P
Admitted: 08/31/2022    CHIEF COMPLAINT:  I was asked by Dr. Woo Eason to evaluate Mr. Silveira for his low back and bilateral leg pain, numbness and weakness.    HISTORY OBTAINED FROM MR. SILVEIRA:  Mr. Silveira is a 67-year-old man who has a history of lumbar decompression and fusion and stabilization of the L3 to L4, L4 to L5 levels with Coflex devices in the distant past.  He did well following that surgery until approximately a week ago, he went on a hike while on vacation and had developed back pain the following morning, he awoke with severe back and bilateral leg pain, numbness and weakness in his legs.  The symptoms persisted and he was brought to the ER at [facility] yesterday and admitted for pain control.  I was consulted for management. An MRI of the lumbar spine showed a large recurrent central disk herniation at the L3 to L4 and L4 to L5 levels with Coflex devices in the interlaminar spaces at these levels.    PAST MEDICAL HISTORY:  Significant for the lumbar surgery, hyperlipidemia, type 2 diabetes, BPH and GERD.    SOCIAL HISTORY:  , lives with his wife.  He is retired pathology assistant.    PHYSICAL EXAMINATION:    VITAL SIGNS:  Showed a pulse of 84, blood pressure 183/107, respirations 18, oxygen saturation 97% on room air.  GENERAL:  When I entered the room, Mr. Silveira was sitting in bed in a semi-Hannon position.  As long as he was not moving, he was not in much distress.  He was pleasant, cooperative with the exam.  Alert and oriented x 3.    MUSCULOSKELETAL: Examination of his back shows a well-healed surgical scar extending from L3 to L5 in the midline.  He was tender with palpation over the scar and the paraspinal muscles bilaterally.    NEUROLOGIC: motor strength was globally 4/5 in his lower extremities.  Sensation paresthetic to light touch throughout his lower extremities.  His reflexes were symmetrically absent.  Tension signs were positive.  In fact, even strength testing  caused a reproduction of his back and leg pain, which was worse on the left than the right side.    IMAGING STUDIES:  Anteroposterior and lateral of the lumbar spine, which were done on 08/31/2018 showed Coflex devices in the interlaminar space at the L3 to L4 and L4 to L5 levels.  They were in good position.  He had moderate disk degeneration at multiple levels and a subtle scoliosis convexity to the left with the apex at the L2 to L3 level.  His bone density was excellent.  There was no instability.    An MRI of the lumbar spine was reviewed also from 08/31/2018.  It showed a loss of signal intensity from L2 to the sacrum.  There was a very large central disk herniation at the L4 to L5 level, completely filling the spinal canal, severely compressing the cauda equina.  This appeared to be somewhat larger on the left side.  There was artifact from the Coflex devices in the interlaminar spaces at L3 to L4 and L4 to L5.    LABORATORY DATA:   Admission lab values:  WBC 8100, hemoglobin 14.3, hematocrit 43.5, red cell indices normal platelets 200,000.  Sodium 140, potassium 3.8, chloride 106, carbon dioxide 28, urea nitrogen 18, creatinine 1.02, calcium 9.0, glucose 131.    IMPRESSION:  Syd Joyce is a 67-year-old man with the following diagnoses:  1.  Status post L4 to L5 decompression and stabilization with Coflex devices at the L3 to L4 and L4 to L5 interlaminar spaces.  2.  Recurrent disk herniation central and left at the L4 to L5 level.  3.  Severe cauda equina compression with pain and weakness and numbness bilaterally.    RECOMMENDATIONS:  Because of his large disk herniation, severe compression along with leg weakness, I am recommending a hardware removal, revision diskectomy and decompression at L5 bilaterally.  I will need to remove the majority of the L4 lamina and both Coflex devices to accomplish this.  I explained the procedure to the patient showing him his imaging studies.  He understands the  surgery, complications and risks and benefits of the procedure and understands the risks include but not be limited to the risk of recurrent herniation, infection, nerve, vascular or visceral damage, post-laminectomy instability, spinal fluid leak, the risk of anesthesia, which could be up to including death.  He would like to accept these risks and proceed with the procedure.  The surgery will be done this evening.    Daniel Miller MD        D: 2022   T: 2022   MT: [id number]    Name:     CHITORASHAADTERE  MRN:      [id number]        Account:     [id number]   :      1955           Admitted:    2022       Document: [id number]

## 2022-09-03 ENCOUNTER — MYC MEDICAL ADVICE (OUTPATIENT)
Dept: FAMILY MEDICINE | Facility: CLINIC | Age: 67
End: 2022-09-03

## 2022-09-03 VITALS
SYSTOLIC BLOOD PRESSURE: 167 MMHG | HEART RATE: 83 BPM | RESPIRATION RATE: 16 BRPM | BODY MASS INDEX: 29.4 KG/M2 | TEMPERATURE: 97.8 F | HEIGHT: 71 IN | DIASTOLIC BLOOD PRESSURE: 83 MMHG | OXYGEN SATURATION: 97 % | WEIGHT: 210 LBS

## 2022-09-03 LAB
ANION GAP SERPL CALCULATED.3IONS-SCNC: 6 MMOL/L (ref 3–14)
BUN SERPL-MCNC: 23 MG/DL (ref 7–30)
CALCIUM SERPL-MCNC: 9 MG/DL (ref 8.5–10.1)
CHLORIDE BLD-SCNC: 102 MMOL/L (ref 94–109)
CO2 SERPL-SCNC: 26 MMOL/L (ref 20–32)
CREAT SERPL-MCNC: 1.05 MG/DL (ref 0.66–1.25)
ERYTHROCYTE [DISTWIDTH] IN BLOOD BY AUTOMATED COUNT: 12.7 % (ref 10–15)
GFR SERPL CREATININE-BSD FRML MDRD: 77 ML/MIN/1.73M2
GLUCOSE BLD-MCNC: 229 MG/DL (ref 70–99)
GLUCOSE BLD-MCNC: 229 MG/DL (ref 70–99)
GLUCOSE BLDC GLUCOMTR-MCNC: 200 MG/DL (ref 70–99)
GLUCOSE BLDC GLUCOMTR-MCNC: 214 MG/DL (ref 70–99)
GLUCOSE BLDC GLUCOMTR-MCNC: 265 MG/DL (ref 70–99)
HCT VFR BLD AUTO: 37.7 % (ref 40–53)
HGB BLD-MCNC: 12.8 G/DL (ref 13.3–17.7)
MCH RBC QN AUTO: 29.5 PG (ref 26.5–33)
MCHC RBC AUTO-ENTMCNC: 34 G/DL (ref 31.5–36.5)
MCV RBC AUTO: 87 FL (ref 78–100)
PLATELET # BLD AUTO: 229 10E3/UL (ref 150–450)
POTASSIUM BLD-SCNC: 4.2 MMOL/L (ref 3.4–5.3)
RBC # BLD AUTO: 4.34 10E6/UL (ref 4.4–5.9)
SODIUM SERPL-SCNC: 134 MMOL/L (ref 133–144)
WBC # BLD AUTO: 14.1 10E3/UL (ref 4–11)

## 2022-09-03 PROCEDURE — 82374 ASSAY BLOOD CARBON DIOXIDE: CPT | Performed by: PHYSICIAN ASSISTANT

## 2022-09-03 PROCEDURE — 250N000013 HC RX MED GY IP 250 OP 250 PS 637: Performed by: ORTHOPAEDIC SURGERY

## 2022-09-03 PROCEDURE — 85027 COMPLETE CBC AUTOMATED: CPT | Performed by: PHYSICIAN ASSISTANT

## 2022-09-03 PROCEDURE — 36415 COLL VENOUS BLD VENIPUNCTURE: CPT | Performed by: PHYSICIAN ASSISTANT

## 2022-09-03 PROCEDURE — 99239 HOSP IP/OBS DSCHRG MGMT >30: CPT | Performed by: PHYSICIAN ASSISTANT

## 2022-09-03 RX ORDER — AMOXICILLIN 250 MG
1 CAPSULE ORAL 2 TIMES DAILY PRN
Qty: 30 TABLET | Refills: 0 | Status: SHIPPED | OUTPATIENT
Start: 2022-09-03 | End: 2023-07-26

## 2022-09-03 RX ADMIN — ACETAMINOPHEN 975 MG: 325 TABLET ORAL at 08:06

## 2022-09-03 RX ADMIN — ACETAMINOPHEN 975 MG: 325 TABLET ORAL at 01:25

## 2022-09-03 RX ADMIN — SENNOSIDES AND DOCUSATE SODIUM 1 TABLET: 50; 8.6 TABLET ORAL at 08:05

## 2022-09-03 RX ADMIN — OXYCODONE HYDROCHLORIDE 5 MG: 5 TABLET ORAL at 06:22

## 2022-09-03 RX ADMIN — PANTOPRAZOLE SODIUM 40 MG: 40 TABLET, DELAYED RELEASE ORAL at 08:06

## 2022-09-03 RX ADMIN — TAMSULOSIN HYDROCHLORIDE 0.4 MG: 0.4 CAPSULE ORAL at 08:05

## 2022-09-03 RX ADMIN — CHLORTHALIDONE 25 MG: 25 TABLET ORAL at 08:06

## 2022-09-03 RX ADMIN — CARVEDILOL 50 MG: 25 TABLET, FILM COATED ORAL at 08:06

## 2022-09-03 RX ADMIN — LOSARTAN POTASSIUM 100 MG: 100 TABLET, FILM COATED ORAL at 08:06

## 2022-09-03 RX ADMIN — CYCLOBENZAPRINE 10 MG: 10 TABLET, FILM COATED ORAL at 08:06

## 2022-09-03 RX ADMIN — INSULIN ASPART 2 UNITS: 100 INJECTION, SOLUTION INTRAVENOUS; SUBCUTANEOUS at 07:43

## 2022-09-03 ASSESSMENT — ACTIVITIES OF DAILY LIVING (ADL)
ADLS_ACUITY_SCORE: 37
ADLS_ACUITY_SCORE: 33
ADLS_ACUITY_SCORE: 37
ADLS_ACUITY_SCORE: 33

## 2022-09-03 NOTE — PROGRESS NOTES
"History:   Minimal complaints. Pre-op symptoms gone.  Tolerating liq. diet without difficulty.  BM yesterday.  Ambulating to BR with assistance last night..  Vitals:   BP (!) 149/75 (BP Location: Right arm)   Pulse 79   Temp 97.9  F (36.6  C) (Oral)   Resp 16   Ht 1.798 m (5' 10.8\")   Wt 95.3 kg (210 lb)   SpO2 95%   BMI 29.45 kg/m       Dressing:   Dry and intact. Bloody, reinforced  Neuro:   Motor: 4/5 in bilateral TA as preop. o/w 5/5 in LEs, much improved from preop.   Sensation: still with some paresthesias but much improved from preop.  Abdomen:  soft, non tender  Extremities:   No swelling or calf tenderness  A/P:   Stable POD # 2  Mobilize.  Can be be discharged today  F/u in 4 weeks, discontinue orders completed.      Kjerstin Foss PA-C [facility]    "

## 2022-09-03 NOTE — PLAN OF CARE
Goal Outcome Evaluation:    Pt is A&Ox4, up with assist of 1 using gait belt and walker, voiding adequately via urinal.  VSS on RA, pain managed with PRN Oxycodone and scheduled Tylenol. Dressing CDI, Tele NSR.

## 2022-09-03 NOTE — DISCHARGE SUMMARY
"[facility]  Hospitalist Discharge Summary      Date of Admission:  8/31/2022  Date of Discharge:  9/3/2022  Discharging Provider: JoAnna K. Barthell, PA-C  Discharge Service: Hospitalist Service    Discharge Diagnoses   Severe cauda equina compression syndrome 2/2 recurrent L4-L5 central and left sided disc herniation with central s/p prior L3-L4 and L4-L5 hardware removal and revision L4-L5 discectomy / decompression (9/2/2022).  Acute anemia.  CAD.  Hypertension.  Hyperlipidemia.  Type 2 diabetes mellitus with neuropathy.  BPH.  GERD.  Follow-ups Needed After Discharge   Follow-up Appointments     Follow-up and recommended labs and tests      Please call to schedule follow up with Shan Allan to occur   within 7-10 days. Inform this is a \"hospital follow up visit\" to help get   in during recommended time. OK to see a colleague of primary if needed.    Follow up with Dr. Miller as recommended. A liaison with [facility]   Orthopedics should call to arrange follow up. If you do not hear from them   in the next in 24-48 business hours, call [phone].           Discharge Disposition   Discharged to home  Condition at discharge: Stable    Hospital Course   Syd Joyce is a 67 year old male admitted on 8/31/2022. PMHx obesity, GERD, BPH, HTN, DM2 with neuropathy, lumbar laminectomy 2017 with some chronic numbness / tingling who presented with acute lumbar back pain x 1 week found to have L4-5 recurrent disc herniation with central and left severe cauda equina compression s/p hardware removal and revision L4-L5 laminectomy, discectomy, decompression (9/2/2022).       Acute lumbar back pain with bilateral radiculopathy 2/2 L4-5 recurrent disc herniation with central and left severe cauda equina compression s/p hardware removal and revision L4-L5 decompression / discectomy (9/2/2022).  No provoking injury. Lying prone at the onset, had severe lumbar back pain with shooting pains " down both legs, R>L. Pain prohibiting ambulatation. No red flag sxs.  * Lumbar x-ray negative for bony abnormality. CRP 34.6.  * MRI lumbar spine: Interval placement L3-5 interspinous spacers, progressed L4-5 subligamentous and left foraminal disc herniation with severe spinal canal stenosis and severe left neural foraminal stenosis, progressed severe right L3-4 lateral recess stenosis with persistent mild-moderate right neural foraminal stenosis.    - Appreciate ortho spine consult. S/p L3-L4 and L4-L5 hardware removal, revision L4-L5 decompression / discectomy, and revision decompression bilat L4-L5 nerves (9/2/2022). Dramatic improvement in pain and paresthesias post-operatively.  - Follow-up with ortho spine in 1 month.  - Discharge pain / bowel regimen per ortho spine: PRN APAP, PRN oxycodone, PTA PRN Zanaflex. Senokot-s.     Acute anemia.  Component of acute blood loss from surgery, however patient's EBL was minimal.  He had saturated multiple wound dressings per RN report.  - Hemoglobin preop went from 13.8--14.3, down to 9.3. Likely dilutional component as received 1500ml crystalloid intra-op. Hgb 12.8 day of discharge.  - Monitor output from surgical site.  Orthopedic surgery, Dr. Miller has been made aware by RN.     CAD.  Hypertension.  Hyperlipidemia.  Accelerated due to pain.  BP up to 199/95 on admission.  Now trending in the 140s-150s systolic.  [PTA: ASA 81mg, simvastatin 20mg QHS, losartan 100mg, Coreg 50mg BID, chlorthalidone 25mg]  - Cont PTA regimen.   - IV hydralazine as needed for SBP >180.  - Resume ASA at discharge per ortho spine.     Type 2 diabetes mellitus with neuropathy. A1C 6.5% (6/2022).  Elevated BG post operatively and likely related to betamethasone 12mg received during hospitalization.  [PTA: metformin 1000mg BID, Trulicity 1.5mg SC every 7 days on Saturdays].  - Resume PTA regimen at at discharge.     BPH.  Patient denies any issues with changes in his urination such as increased  retention or incontinence.   - Continue Flomax.     GERD.  - PTA PPI.    Consultations This Hospital Stay   SPINE SURGERY ADULT IP CONSULT  CARE MANAGEMENT / SOCIAL WORK IP CONSULT  PHYSICAL THERAPY ADULT IP CONSULT  PHYSICAL THERAPY ADULT IP CONSULT    Code Status   Full Code    Time Spent on this Encounter   I, JoAnna K. Barthell, PA-C, personally saw the patient today and spent greater than 30 minutes discharging this patient.       This patient was discussed with Dr. Schulz of the Hospitalist Service who agrees with current plans as outlined above.    JoAnna K. Barthell, PA-C  [facility] SPINE  [address] [address]  Phone: [phone]  Fax: [phone]  ______________________________________________________________________    Physical Exam   Temp: 97.8  F (36.6  C) Temp src: Oral BP: (!) 167/83 Pulse: 83   Resp: 16 SpO2: 97 % O2 Device: None (Room air)    Vitals:    08/31/22 1000   Weight: 95.3 kg (210 lb)   Constitutional: Appears stated age, no acute distress.  Respiratory: Breath sounds CTA. No increased work of breathing.  Cardiovascular: RRR, no rub or murmur. No peripheral edema.  GI: Soft, non-tender, non-distended.  Skin: Warm, dry, no rashes or lesions.  MSK: Surgical dressing c/d/i. Moving all 4 extremities.    Primary Care Physician   Shan Arenas    Discharge Orders      Physical Therapy Referral      Wound care and dressings    Keep wound covered for 2 days and dry for five days.  May remove steri-strips ten days after surgery.     Wound care and dressings    Keep wound covered for 2 days and dry for five days.  May remove steri-strips ten days after surgery.     Reason for your hospital stay    Further evaluation and management of acute back pain with radicular symptoms found to have spinal cord compression related to a herniated disc.     Follow-up and recommended labs and tests    Please call to schedule follow up with Shan Allan  "[place] to occur within 7-10 days. Inform this is a \"hospital follow up visit\" to help get in during recommended time. OK to see a colleague of primary if needed.    Follow up with Dr. Miller as recommended. A liaison with [facility] should call to arrange follow up. If you do not hear from them in the next in 24-48 business hours, call [phone].     Activity    Your activity upon discharge: activity as tolerated     Discharge Instructions    - Tylenol 1000mg every 8 hours as needed for pain.  - Flexeril 1 tablet every 8 hours as needed. May feel drowsy on this medication. Do not drive or make important decisions while taking.  - Oxycodone is available for severe pain (8-10/10). May feel drowsy on this medication. Do not drive or make important decisions while taking. This is a constipating medication; if no bowel movement after 2 days while taking recommend over-the-counter Senokot-s (or Miralax) until bowel movements regular.    - Gentle activity like leisurely walk and stretching. Outpatient PT referral placed.     Diet    Follow this diet upon discharge: Orders Placed This Encounter      Moderate Consistent Carb (60 g CHO per Meal) Diet         Significant Results and Procedures   Most Recent 3 CBC's:Recent Labs   Lab Test 09/03/22  0807 09/02/22  1515 09/02/22  0901 09/01/22  0624   WBC 14.1* 13.9*  --  8.1   HGB 12.8* 14.0 9.3* 14.3   MCV 87 90  --  89    231  --  200     Most Recent 3 BMP's:Recent Labs   Lab Test 09/03/22  1118 09/03/22  0807 09/03/22  0625 09/02/22  1646 09/02/22  1515 09/01/22  0836 09/01/22  0624   NA  --  134  --   --  132*  --  140   POTASSIUM  --  4.2  --   --  4.5  --  3.8   CHLORIDE  --  102  --   --  100  --  106   CO2  --  26  --   --  26  --  26   BUN  --  23  --   --  24  --  18   CR  --  1.05  --   --  1.17  --  1.02   ANIONGAP  --  6  --   --  6  --  8   HEAVENLY  --  9.0  --   --  8.8  --  9.0   * 229*  229* 200*   < > 267*   < > 131*    < > = values in " this interval not displayed.     Most Recent 2 LFT's:Recent Labs   Lab Test 06/20/22  0840 01/11/22  1526   AST 23 19   ALT 27 21   ALKPHOS 169* 177*   BILITOTAL 0.3 0.3     Most Recent Urinalysis:Recent Labs   Lab Test 08/31/22  1054 06/27/22  1139   COLOR Light Yellow Yellow   APPEARANCE Clear  --    URINEGLC Negative  --    URINEBILI Negative  --    URINEKETONE Negative  --    SG 1.011 1.015   UBLD Negative  --    URINEPH 6.5 6.5   PROTEIN Negative  --    UROBILINOGEN  --  0.2   NITRITE Negative Neg   LEUKEST Negative  --    RBCU 0  --    WBCU <1  --      Most Recent ESR & CRP:Recent Labs   Lab Test 08/31/22  1414   CRP 34.6*   ,   Results for orders placed or performed during the hospital encounter of 08/31/22   Lumbar spine XR, 2-3 views    Narrative    LUMBAR SPINE 2/3 VIEWS   8/31/2022 12:34 PM     HISTORY: Atraumatic low back pain.    COMPARISON: X-rays 12/20/2017      Impression    IMPRESSION: No fracture is identified. Interspinous devices at L3-4  and L4-5. Mild-to-moderate degenerative change. Slight levoconvex  curvature. Vascular calcifications.    YESSICA LOPEZ MD         SYSTEM ID:  [id number]   MR Lumbar Spine w/o & w Contrast    Narrative    EXAM: MR LUMBAR SPINE W/O and W CONTRAST  LOCATION: [facility]  DATE/TIME: 8/31/2022 9:37 PM    INDICATION: Bilateral lower extremity radicular pain  COMPARISON: Lumbar spine MRI 05/26/2017  CONTRAST: gadobutrol (GADAVIST) injection 10 mL  TECHNIQUE: Routine Lumbar Spine MRI without and with IV contrast.    FINDINGS:   Nomenclature is based on 5 lumbar type vertebral bodies. Normal vertebral body heights, alignment and marrow signal. Interval placement of L3-L4 and L4-L5 interspinous spacers. Normal distal spinal cord and cauda equina with conus medullaris at L1. No   extraspinal abnormality. Unremarkable visualized bony pelvis.    T12-L1: Normal disc height and signal. No herniation. Normal facets. No spinal canal or neural foraminal  stenosis.     L1-L2: Normal disc height and signal. No herniation. Normal facets. No spinal canal or neural foraminal stenosis.    L2-L3: Mild disc height and T2 signal loss. Mild posterior annular bulge. Mild bilateral facet arthropathy and thickening of the ligamenta flava. Mild spinal canal stenosis. No neural foraminal stenosis.     L3-L4: Mild disc height and T2 signal loss. Mild circumferential disc osteophyte complex. Mild bilateral facet arthropathy and thickening of the ligamenta flava. Severe right lateral recess stenosis. Mild central spinal canal stenosis. Mild to moderate   right neural foraminal stenosis. Mild left neural foraminal stenosis.    L4-L5: Mild disc height loss. Moderate disc T2 signal loss. Large subligamentous and left foraminal disc herniation. Mild bilateral facet arthropathy. Severe spinal canal stenosis. Mild right neural foraminal stenosis. Severe left neural foraminal   stenosis.    L5-S1: Normal disc height. Moderate disc T2 signal loss. Mild circumferential disc osteophyte complex. Mild bilateral facet arthropathy and thickening of the ligamenta flava. No spinal canal stenosis. Mild bilateral neural foraminal stenoses.      Impression    IMPRESSION:  1.  Interval placement of L3-L4 and L4-L5 interspinous spacers.  2.  Progressed L4-L5 subligamentous and left foraminal disc herniation with severe spinal canal stenosis and severe left neural foraminal stenosis.  3.  Progressed severe right L3-L4 lateral recess stenosis with persistent mild to moderate right neural foraminal stenosis.       Discharge Medications   Current Discharge Medication List      START taking these medications    Details   acetaminophen (TYLENOL) 325 MG tablet Take 2 tablets (650 mg) by mouth every 6 hours as needed for mild pain    Associated Diagnoses: Acute bilateral low back pain with bilateral sciatica      oxyCODONE (ROXICODONE) 5 MG tablet Take 1-2 tablets (5-10 mg) by mouth every 6 hours as needed for  moderate to severe pain  Qty: 10 tablet, Refills: 0    Associated Diagnoses: Acute bilateral low back pain with bilateral sciatica      senna-docusate (SENOKOT-S/PERICOLACE) 8.6-50 MG tablet Take 1 tablet by mouth 2 times daily as needed for constipation  Qty: 30 tablet, Refills: 0    Associated Diagnoses: Acute bilateral low back pain with bilateral sciatica         CONTINUE these medications which have NOT CHANGED    Details   aspirin (ASA) 81 MG EC tablet Take 1 tablet (81 mg) by mouth daily  Qty:      Associated Diagnoses: Type 2 diabetes mellitus with diabetic autonomic neuropathy, without long-term current use of insulin (H)      blood glucose (NO BRAND SPECIFIED) lancets standard Use to test blood sugar 2 times daily or as directed.  Qty: 180 each, Refills: 3    Comments: Patient needs one touch ultra for insurance to cover.  Associated Diagnoses: Type 2 diabetes mellitus (H)      blood glucose monitoring (ONE TOUCH ULTRA 2) meter device kit Use to test blood sugar 2 imes daily or as directed.  Qty: 1 kit, Refills: 0    Comments: Can use what ever meter is covered by insurance.  Associated Diagnoses: Type 2 diabetes mellitus (H)      carvedilol (COREG) 25 MG tablet Take 2 tablets (50 mg) by mouth 2 times daily Hold IF heart rate less than 55.  Qty: 360 tablet, Refills: 3    Associated Diagnoses: Coronary artery disease of native artery of native heart with stable angina pectoris (H)      chlorthalidone (HYGROTON) 25 MG tablet Take 1 tablet (25 mg) by mouth daily  Qty: 90 tablet, Refills: 3    Associated Diagnoses: Essential hypertension      dulaglutide (TRULICITY) 1.5 MG/0.5ML pen Inject 1.5 mg Subcutaneous every 7 days  Qty: 6 mL, Refills: 3    Comments: Dose increase-  Associated Diagnoses: Type 2 diabetes mellitus with diabetic autonomic neuropathy, without long-term current use of insulin (H)      losartan (COZAAR) 100 MG tablet Take 1 tablet (100 mg) by mouth daily  Qty: 90 tablet, Refills: 3     Associated Diagnoses: Essential hypertension      metFORMIN (GLUCOPHAGE XR) 500 MG 24 hr tablet Take 4 tablets (2,000 mg) by mouth daily  Qty: 360 tablet, Refills: 0    Associated Diagnoses: Type 2 diabetes mellitus with diabetic autonomic neuropathy, without long-term current use of insulin (H)      naproxen (NAPROSYN) 500 MG tablet Take 1 tablet (500 mg) by mouth 2 times daily (with meals) for 14 days  Qty: 28 tablet, Refills: 0    Associated Diagnoses: Acute bilateral low back pain with bilateral sciatica      ONETOUCH ULTRA test strip USE TO TEST BLOOD SUGAR TWICE DAILY OR AS DIRECTED  Qty: 100 strip, Refills: 11    Associated Diagnoses: Type 2 diabetes mellitus with diabetic autonomic neuropathy, without long-term current use of insulin (H)      pantoprazole (PROTONIX) 40 MG EC tablet Take 1 tablet (40 mg) by mouth daily  Qty: 90 tablet, Refills: 3    Associated Diagnoses: Gastroesophageal reflux disease with esophagitis, unspecified whether hemorrhage      sildenafil (REVATIO) 20 MG tablet TAKE 2 TO 4 TABLETS BY MOUTH AS NEEDED, NEVER USE WITH NTG, DOXASOSIN, OR TERAZOSIN  Qty: 30 tablet, Refills: 11    Associated Diagnoses: Erectile dysfunction, unspecified erectile dysfunction type      simvastatin (ZOCOR) 20 MG tablet Take 1 tablet (20 mg) by mouth At Bedtime  Qty: 90 tablet, Refills: 3    Associated Diagnoses: Hypercholesteremia      tamsulosin (FLOMAX) 0.4 MG capsule Take 1 capsule (0.4 mg) by mouth daily  Qty: 90 capsule, Refills: 3    Associated Diagnoses: Benign prostatic hyperplasia with urinary frequency      tiZANidine (ZANAFLEX) 4 MG tablet Take 1 tablet (4 mg) by mouth daily  Qty: 90 tablet, Refills: 3    Associated Diagnoses: S/P lumbar laminectomy      zolpidem (AMBIEN) 10 MG tablet TAKE 1 TABLET BY MOUTH EVERY DAY AT BEDTIME  Qty: 90 tablet, Refills: 1    Associated Diagnoses: Insomnia, unspecified type           Allergies   No Known Allergies

## 2022-09-03 NOTE — PLAN OF CARE
Physical Therapy Discharge Summary    Reason for therapy discharge:    Discharged to home.    Progress towards therapy goal(s). See goals on Care Plan in [facility] electronic health record for goal details.  Goals not met.  Barriers to achieving goals:   discharge from facility.    Therapy recommendation(s):    No further therapy is recommended.

## 2022-09-03 NOTE — PLAN OF CARE
Goal Outcome Evaluation:    Orientation/Cognitive: A&Ox4  Observation Goals (Met/ Not Met): in progress   Mobility Level/Assist Equipment: A-1 GBW  Fall Risk (Y/N): Yes   Behavior Concerns: none  Pain Management:  PRN Oxycodone 10 mg  given, pain 8/10 incisional. Dressing CDI  Tele/VS/O2: VSS on RA  ABNL Lab/BG: , 2 units insulin given, Hgb 14.0  Diet: mod carb  Bowel/Bladder: Continent, using urinal at bedside. Pt had a suppository, effective: L BM.   Skin Concerns: none  Drains/Devices: PIV NS flushed and locked   Tests/Procedures for next shift: None  Anticipated DC date & active delays: Possibility to discharge 09/02/2022  Patient Stated Goal for Today: Needed to have a BM.

## 2022-09-03 NOTE — PROGRESS NOTES
A/O x4.VSS on RA. PIV removed. Up with 1/WLK/Gb. Back dressing changed.  and 265. Pt refused to eat lunch. Insulin not given. Voids well in the BR. Pain managed with Ice pack. discharge instructions given to pt with wife at bedside. discharge papers, meds and belongings sent home with pt.

## 2022-09-04 LAB
ATRIAL RATE - MUSE: 73 BPM
DIASTOLIC BLOOD PRESSURE - MUSE: NORMAL MMHG
INTERPRETATION ECG - MUSE: NORMAL
P AXIS - MUSE: 17 DEGREES
PR INTERVAL - MUSE: 150 MS
QRS DURATION - MUSE: 76 MS
QT - MUSE: 356 MS
QTC - MUSE: 392 MS
R AXIS - MUSE: 38 DEGREES
SYSTOLIC BLOOD PRESSURE - MUSE: NORMAL MMHG
T AXIS - MUSE: 68 DEGREES
VENTRICULAR RATE- MUSE: 73 BPM

## 2022-09-12 ENCOUNTER — TELEPHONE (OUTPATIENT)
Dept: FAMILY MEDICINE | Facility: CLINIC | Age: 67
End: 2022-09-12

## 2022-09-12 ENCOUNTER — OFFICE VISIT (OUTPATIENT)
Dept: FAMILY MEDICINE | Facility: CLINIC | Age: 67
End: 2022-09-12

## 2022-09-12 VITALS
DIASTOLIC BLOOD PRESSURE: 82 MMHG | WEIGHT: 217.38 LBS | RESPIRATION RATE: 16 BRPM | HEART RATE: 86 BPM | SYSTOLIC BLOOD PRESSURE: 156 MMHG | BODY MASS INDEX: 30.49 KG/M2 | OXYGEN SATURATION: 98 %

## 2022-09-12 DIAGNOSIS — Z98.890 HISTORY OF LUMBAR LAMINECTOMY FOR SPINAL CORD DECOMPRESSION: ICD-10-CM

## 2022-09-12 DIAGNOSIS — Z09 HOSPITAL DISCHARGE FOLLOW-UP: Primary | ICD-10-CM

## 2022-09-12 DIAGNOSIS — M54.41 ACUTE BILATERAL LOW BACK PAIN WITH BILATERAL SCIATICA: ICD-10-CM

## 2022-09-12 DIAGNOSIS — M54.42 ACUTE BILATERAL LOW BACK PAIN WITH BILATERAL SCIATICA: ICD-10-CM

## 2022-09-12 PROCEDURE — 99495 TRANSJ CARE MGMT MOD F2F 14D: CPT | Mod: 25 | Performed by: NURSE PRACTITIONER

## 2022-09-12 RX ORDER — CYCLOBENZAPRINE HCL 10 MG
10 TABLET ORAL 3 TIMES DAILY PRN
Qty: 60 TABLET | Refills: 1 | Status: SHIPPED | OUTPATIENT
Start: 2022-09-12 | End: 2022-11-01

## 2022-09-12 NOTE — PATIENT INSTRUCTIONS
Tylenol 1000 mg up to 3 times daily as needed for pain    Take Cyclobenzaprine 1 tab at bedtime as needed for muscle spasms and sleep. Can take up to 3 times daily. Can cause drowsiness or sedation and careful with driving.      Call Dr. Miller's office about area leaking on incision

## 2022-09-12 NOTE — TELEPHONE ENCOUNTER
Prior authorization done via CoverMyMeds for cyclobenzaprine 10mg, approved 8/13/22-9/12/2023. Pharmacy notified of approval. Tashia Briceno

## 2022-09-12 NOTE — PROGRESS NOTES
Hospital Follow-up Visit:    Hospital/Nursing Home/IP Rehab Facility: [facility]  Date of Admission: 8/31/22  Date of Discharge: 9/3/22  Reason(s) for Admission: Severe cauda equina compression syndrome 2/2 recurrent L4-L5 central and left sided disc herniation with central s/p prior L3-L4 and L4-L5 hardware removal and revision L4-L5 discectomy / decompression (9/2/2022).  Acute anemia.  CAD.  Hypertension.  Hyperlipidemia.  Type 2 diabetes mellitus with neuropathy.  BPH.  GERD.    Was your hospitalization related to COVID-19? No   Problems taking medications regularly:  None  Medication changes since discharge: Added pain meds and stool softener.   Problems adhering to non-medication therapy:  None    Summary of hospitalization:  [facility] discharge summary reviewed  Diagnostic Tests/Treatments reviewed.  Follow up needed: none  Other Healthcare Providers Involved in Patient s Care:         Specialist appointment - [facility] - spine  Update since discharge: improved.   Post Medication Reconciliation Status: Discharge medications reconciled and changed, see notes/orders      Plan of care communicated with patient    Problem(s) Oriented visit        SUBJECTIVE:                                                    Syd Joyce is a 67 year old male who presents to clinic today for the following health issues :        Problem list, Medication list, Allergies, and Medical/Social/Surgical histories reviewed in EPIC and updated as appropriate.   Additional history: as documented    ROS:  7 point ROS completed and negative except noted above, including Gen, CV, Resp, GI, , MS, Neuro    OBJECTIVE:                                                    BP (!) 156/82   Pulse 86   Resp 16   Wt 98.6 kg (217 lb 6 oz)   SpO2 98%   BMI 30.49 kg/m    Body mass index is 30.49 kg/m .   GENERAL: Adult male, alert, pleasant, no acute distress  RESP: calm regular breathing, no cough  CV: normal  rate  MS: decreased mobility, using cane for ambulation. Right lower extremity weakness  SKIN: vertical incision mid lower back with small area of leaking serosanguinous drainage  NEURO: decreased sensation of bilateral lower extremities  PSYCH: normal affect & mood     ASSESSMENT/PLAN:                                                      Syd was seen today for hospital f/u.    Diagnoses and all orders for this visit:    Hospital discharge follow-up    Acute bilateral low back pain with bilateral sciatica  -     cyclobenzaprine (FLEXERIL) 10 MG tablet; Take 1 tablet (10 mg) by mouth 3 times daily as needed for muscle spasms  -     Physical Therapy Referral; Future    History of lumbar laminectomy for spinal cord decompression  -     Physical Therapy Referral; Future    Patient not using oxycodone since not helping with pain much. Would like refill of Flexeril since that was more helpful with leg and back pain when he was in the hospital. Also okay to increase Tylenol to extra strength 1000 mg up to 3 times daily. Referral for physical therapy to be faxed to [facility]. Steri-strips applied to leaking area of incision on back. Patient will call Dr. Miller (ortho) to see if they want him to follow-up sooner.     See Patient Instructions  Patient Instructions   Tylenol 1000 mg up to 3 times daily as needed for pain    Take Cyclobenzaprine 1 tab at bedtime as needed for muscle spasms and sleep. Can take up to 3 times daily. Can cause drowsiness or sedation and careful with driving.      Call Dr. Miller's office about area leaking on incision        STACY Garcia CNP  [facility]  Family Practice  [facility]  [phone]    For any issues my office # is [phone]

## 2022-09-22 DIAGNOSIS — E11.43 TYPE 2 DIABETES MELLITUS WITH DIABETIC AUTONOMIC NEUROPATHY, WITHOUT LONG-TERM CURRENT USE OF INSULIN (H): ICD-10-CM

## 2022-09-22 RX ORDER — METFORMIN HCL 500 MG
1000 TABLET, EXTENDED RELEASE 24 HR ORAL 2 TIMES DAILY WITH MEALS
Qty: 360 TABLET | Refills: 1 | Status: SHIPPED | OUTPATIENT
Start: 2022-09-22 | End: 2023-01-23

## 2022-09-28 ENCOUNTER — TRANSFERRED RECORDS (OUTPATIENT)
Dept: FAMILY MEDICINE | Facility: CLINIC | Age: 67
End: 2022-09-28

## 2022-10-09 ENCOUNTER — HEALTH MAINTENANCE LETTER (OUTPATIENT)
Age: 67
End: 2022-10-09

## 2022-10-17 DIAGNOSIS — Z23 NEEDS FLU SHOT: Primary | ICD-10-CM

## 2022-10-17 PROCEDURE — 90694 VACC AIIV4 NO PRSRV 0.5ML IM: CPT | Performed by: FAMILY MEDICINE

## 2022-10-17 PROCEDURE — G0008 ADMIN INFLUENZA VIRUS VAC: HCPCS | Performed by: FAMILY MEDICINE

## 2022-10-31 DIAGNOSIS — M54.42 ACUTE BILATERAL LOW BACK PAIN WITH BILATERAL SCIATICA: ICD-10-CM

## 2022-10-31 DIAGNOSIS — M54.41 ACUTE BILATERAL LOW BACK PAIN WITH BILATERAL SCIATICA: ICD-10-CM

## 2022-11-01 RX ORDER — CYCLOBENZAPRINE HCL 10 MG
10 TABLET ORAL 3 TIMES DAILY PRN
Qty: 60 TABLET | Refills: 1 | Status: SHIPPED | OUTPATIENT
Start: 2022-11-01 | End: 2022-12-26

## 2022-11-30 ENCOUNTER — TRANSFERRED RECORDS (OUTPATIENT)
Dept: FAMILY MEDICINE | Facility: CLINIC | Age: 67
End: 2022-11-30

## 2022-12-26 DIAGNOSIS — M54.42 ACUTE BILATERAL LOW BACK PAIN WITH BILATERAL SCIATICA: ICD-10-CM

## 2022-12-26 DIAGNOSIS — M54.41 ACUTE BILATERAL LOW BACK PAIN WITH BILATERAL SCIATICA: ICD-10-CM

## 2022-12-26 RX ORDER — CYCLOBENZAPRINE HCL 10 MG
TABLET ORAL
Qty: 60 TABLET | Refills: 1 | Status: SHIPPED | OUTPATIENT
Start: 2022-12-26 | End: 2023-01-23

## 2022-12-28 DIAGNOSIS — G47.00 INSOMNIA, UNSPECIFIED TYPE: ICD-10-CM

## 2022-12-28 NOTE — TELEPHONE ENCOUNTER
Controlled Substance Refill Request for ZOLPIDEM    Last refill: 9/24/22 - 90 TABS    Last clinic visit: 9/12/22  LAST RELATED VISIT: 6/27/22     Next appt: NONE    Controlled substance agreement on file: No.    Documentation in problem list reviewed:  Yes    Processing:  Rx to be electronically transmitted to pharmacy by provider

## 2022-12-29 RX ORDER — ZOLPIDEM TARTRATE 10 MG/1
TABLET ORAL
Qty: 90 TABLET | Refills: 1 | Status: SHIPPED | OUTPATIENT
Start: 2022-12-29 | End: 2023-06-26

## 2023-01-12 DIAGNOSIS — E11.43 TYPE 2 DIABETES MELLITUS WITH DIABETIC AUTONOMIC NEUROPATHY, WITHOUT LONG-TERM CURRENT USE OF INSULIN (H): ICD-10-CM

## 2023-01-12 RX ORDER — DULAGLUTIDE 1.5 MG/.5ML
INJECTION, SOLUTION SUBCUTANEOUS
Qty: 6 ML | Refills: 1 | Status: SHIPPED | OUTPATIENT
Start: 2023-01-12 | End: 2023-06-26

## 2023-01-12 NOTE — TELEPHONE ENCOUNTER
Aaronity. LOV 9/12/22 unrelated.  Last addressed 6/27/22.  No upcoming appt scheduled but due for med check per note.    Type 2 diabetes mellitus with diabetic autonomic neuropathy, without long-term current use of insulin (H)  Meeting A1C goal of <7.0. Continue current regimen with Metformin & Trulicity. Discussed importance in compliance in all areas of diabetic control including diet, routine BS checks, absolute medication compliance, laboratory monitoring, and attending regular follow up appointments as ordered.  Failure to comply with instructions regarding diabetes will lead to a greater chance of poor diabetic control and therefore a greater chance of diabetes related complications such as CAD, CVA, PVD, and retinopathy/neuropathy/nephropathy. Based on level of diabetes control: Glucose testing Indicated. Return to the clinic in every 3 months.    Hemoglobin A1C   Date Value Ref Range Status   06/20/2022 6.5 (A) 4.0 - 6.0 % Final

## 2023-01-23 ENCOUNTER — OFFICE VISIT (OUTPATIENT)
Dept: FAMILY MEDICINE | Facility: CLINIC | Age: 68
End: 2023-01-23

## 2023-01-23 VITALS
BODY MASS INDEX: 31.78 KG/M2 | SYSTOLIC BLOOD PRESSURE: 130 MMHG | DIASTOLIC BLOOD PRESSURE: 80 MMHG | WEIGHT: 226.6 LBS | HEART RATE: 87 BPM | OXYGEN SATURATION: 100 %

## 2023-01-23 DIAGNOSIS — G63 POLYNEUROPATHY ASSOCIATED WITH UNDERLYING DISEASE (H): ICD-10-CM

## 2023-01-23 DIAGNOSIS — F10.21 ALCOHOL DEPENDENCE IN REMISSION (H): ICD-10-CM

## 2023-01-23 DIAGNOSIS — J96.11 CHRONIC RESPIRATORY FAILURE WITH HYPOXIA (H): ICD-10-CM

## 2023-01-23 DIAGNOSIS — E78.00 HYPERCHOLESTEREMIA: ICD-10-CM

## 2023-01-23 DIAGNOSIS — I25.118 CORONARY ARTERY DISEASE OF NATIVE ARTERY OF NATIVE HEART WITH STABLE ANGINA PECTORIS (H): ICD-10-CM

## 2023-01-23 DIAGNOSIS — I10 ESSENTIAL HYPERTENSION: ICD-10-CM

## 2023-01-23 DIAGNOSIS — E11.43 TYPE 2 DIABETES MELLITUS WITH DIABETIC AUTONOMIC NEUROPATHY, WITHOUT LONG-TERM CURRENT USE OF INSULIN (H): Primary | ICD-10-CM

## 2023-01-23 PROBLEM — M54.42 ACUTE BILATERAL LOW BACK PAIN WITH BILATERAL SCIATICA: Status: RESOLVED | Noted: 2022-08-31 | Resolved: 2023-01-23

## 2023-01-23 PROBLEM — M54.41 ACUTE BILATERAL LOW BACK PAIN WITH BILATERAL SCIATICA: Status: RESOLVED | Noted: 2022-08-31 | Resolved: 2023-01-23

## 2023-01-23 LAB — HBA1C MFR BLD: 6.9 % (ref 4–6)

## 2023-01-23 PROCEDURE — 99207 PR FOOT EXAM NO CHARGE: CPT | Performed by: NURSE PRACTITIONER

## 2023-01-23 PROCEDURE — 99214 OFFICE O/P EST MOD 30 MIN: CPT | Performed by: NURSE PRACTITIONER

## 2023-01-23 PROCEDURE — 36415 COLL VENOUS BLD VENIPUNCTURE: CPT | Performed by: NURSE PRACTITIONER

## 2023-01-23 PROCEDURE — 83036 HEMOGLOBIN GLYCOSYLATED A1C: CPT | Performed by: NURSE PRACTITIONER

## 2023-01-23 RX ORDER — METFORMIN HCL 500 MG
1000 TABLET, EXTENDED RELEASE 24 HR ORAL 2 TIMES DAILY WITH MEALS
Qty: 360 TABLET | Refills: 1 | Status: SHIPPED | OUTPATIENT
Start: 2023-01-23 | End: 2023-09-11

## 2023-01-23 ASSESSMENT — ANXIETY QUESTIONNAIRES
6. BECOMING EASILY ANNOYED OR IRRITABLE: NOT AT ALL
7. FEELING AFRAID AS IF SOMETHING AWFUL MIGHT HAPPEN: NOT AT ALL
GAD7 TOTAL SCORE: 0
1. FEELING NERVOUS, ANXIOUS, OR ON EDGE: NOT AT ALL
4. TROUBLE RELAXING: NOT AT ALL
3. WORRYING TOO MUCH ABOUT DIFFERENT THINGS: NOT AT ALL
5. BEING SO RESTLESS THAT IT IS HARD TO SIT STILL: NOT AT ALL
2. NOT BEING ABLE TO STOP OR CONTROL WORRYING: NOT AT ALL
GAD7 TOTAL SCORE: 0

## 2023-01-23 ASSESSMENT — PATIENT HEALTH QUESTIONNAIRE - PHQ9: SUM OF ALL RESPONSES TO PHQ QUESTIONS 1-9: 0

## 2023-01-23 NOTE — PROGRESS NOTES
Answers for HPI/ROS submitted by the patient on 1/17/2023  Frequency of checking blood sugars:: a few times a week  What time of day are you checking your blood sugars : before meals  Have you had any blood sugars above 200?: No  Have you had any blood sugars below 70?: No  Hypoglycemia symptoms:: shakiness  Diabetic concerns:: none  Paraesthesia present:: numbness in feet  How many servings of fruits and vegetables do you eat daily?: 0-1  On average, how many sweetened beverages do you drink each day (Examples: soda, juice, sweet tea, etc.  Do NOT count diet or artificially sweetened beverages)?: 0  How many minutes a day do you exercise enough to make your heart beat faster?: 9 or less  How many days a week do you exercise enough to make your heart beat faster?: 3 or less  How many days per week do you miss taking your medication?: 0    Lab Results   Component Value Date    A1C 6.5 06/20/2022    A1C 9.4 01/11/2022    A1C 8.3 09/14/2021    A1C 8.4 04/19/2021    A1C 7.3 07/20/2020     Recent Labs   Lab Test 09/14/21  0835 07/20/20  0915   CHOL 136 126   HDL 45 43   LDL 72 64   TRIG 104 94     Problem(s) Oriented visit        SUBJECTIVE:                                                    Syd Joyce is a 67 year old male who presents to clinic today for the following health issues :    Recently saw neurologist to discuss neuropathy. Going for labs and EMG testing next week. Using cane for balance.    CAD - taking simvastatin, baby aspirin. Denies recent chest pain, pressure, palpitations.    Diabetes - controlled with Metformin 1000 mg BID & Trulicity 1.5 mg weekly. BG around 140 fasting in the morning lately.    Hypertension - elevated in clinic but meeting goal when checked at home. Taking Carvedilol 50 mg BID, Chlorthalidone 25 mg daily & Losartan 100 mg daily. -135/70 at home  BP Readings from Last 3 Encounters:   01/23/23 130/80   09/12/22 (!) 156/82   09/03/22 (!) 167/83     Problem list, Medication  "list, Allergies, and Medical/Social/Surgical histories reviewed in [facility] and updated as appropriate.   Additional history: as documented    ROS:  10 point ROS completed and negative except noted above, including Gen, HEENT, CV, Resp, GI, , MS, Neurologic, Psych    OBJECTIVE:                                                    /80   Pulse 87   Wt 102.8 kg (226 lb 9.6 oz)   SpO2 100%   BMI 31.78 kg/m    Body mass index is 31.78 kg/m .   GENERAL: healthy, alert and no distress  RESP: lungs clear to auscultation - no rales, rhonchi or wheezes  CV: regular rates and rhythm, normal S1 S2, no S3 or S4 and no murmur, click or rub  MS: using cane for balance, normal strength  NEURO: neuropathy of bilateral feet  PSYCH: normal affect & mood     ASSESSMENT/PLAN:                                                      BMI:   Estimated body mass index is 31.78 kg/m  as calculated from the following:    Height as of 8/31/22: 1.798 m (5' 10.8\").    Weight as of this encounter: 102.8 kg (226 lb 9.6 oz).   Weight management plan: Discussed healthy diet and exercise guidelines      Syd was seen today for diabetes.    Diagnoses and all orders for this visit:    Type 2 diabetes mellitus with diabetic autonomic neuropathy, without long-term current use of insulin (H)  -     FOOT EXAM  -     Hemoglobin A1C ([facility])  -     VENOUS COLLECTION  -     metFORMIN (GLUCOPHAGE XR) 500 MG 24 hr tablet; Take 2 tablets (1,000 mg) by mouth 2 times daily (with meals)  -     Comp. Metabolic Panel (14) ([facility])  Meeting A1C goal of <7.0. Continue current regimen. Discussed importance in compliance in all areas of diabetic control including diet, routine BS checks, absolute medication compliance, laboratory monitoring, and attending regular follow up appointments as ordered.  Failure to comply with instructions regarding diabetes will lead to a greater chance of poor diabetic control and therefore a greater chance of diabetes related complications " "such as CAD, CVA, PVD, and retinopathy/neuropathy/nephropathy. Based on level of diabetes control: Glucose testing Indicated daily. Return to the clinic in every 6 months.    Polyneuropathy associated with underlying disease (H)  -     FOOT EXAM  Seeing neurology for further testing, evaluation. Likely multifactorial due to diabetes, history of alcohol dependence, back issues    Alcohol dependence in remission (H)  Sober almost 7 years    Coronary artery disease of native artery of native heart with stable angina pectoris (H)  No recent chest pain. Continue statin & aspirin    Essential hypertension  -     Comp. Metabolic Panel (14) ([facility])  Home BP meeting goal of < 140/90. No changes to medications made today. Checking labs     Hypercholesteremia  Continue statin without changes    Chronic respiratory failure with hypoxia (H)  sleep study done 8/2011 with evidence of \"significant hypoxeia\". Declined trial of Cpap.    STACY Garcia CNP  [facility]  Family Practice  [facility]  [phone]    For any issues my office # is [phone]      "

## 2023-01-25 LAB
ALBUMIN SERPL-MCNC: 4.8 G/DL (ref 3.8–4.8)
ALBUMIN/GLOB SERPL: 2.8 {RATIO} (ref 1.2–2.2)
ALP SERPL-CCNC: 179 IU/L (ref 44–121)
ALT SERPL-CCNC: 25 IU/L (ref 0–44)
AST SERPL-CCNC: 23 IU/L (ref 0–40)
BILIRUB SERPL-MCNC: 0.3 MG/DL (ref 0–1.2)
BUN SERPL-MCNC: 20 MG/DL (ref 8–27)
BUN/CREATININE RATIO: 18 (ref 10–24)
CALCIUM SERPL-MCNC: 9.6 MG/DL (ref 8.6–10.2)
CHLORIDE SERPLBLD-SCNC: 92 MMOL/L (ref 96–106)
CREAT SERPL-MCNC: 1.09 MG/DL (ref 0.76–1.27)
EGFR: 73 ML/MIN/1.73
GLOBULIN, TOTAL: 1.7 G/DL (ref 1.5–4.5)
GLUCOSE SERPL-MCNC: 162 MG/DL (ref 70–99)
POTASSIUM SERPL-SCNC: 4.3 MMOL/L (ref 3.5–5.2)
PROT SERPL-MCNC: 6.5 G/DL (ref 6–8.5)
SODIUM SERPL-SCNC: 135 MMOL/L (ref 134–144)
TOTAL CO2: 25 MMOL/L (ref 20–29)

## 2023-02-13 DIAGNOSIS — N52.9 ERECTILE DYSFUNCTION, UNSPECIFIED ERECTILE DYSFUNCTION TYPE: ICD-10-CM

## 2023-02-13 RX ORDER — SILDENAFIL CITRATE 20 MG/1
TABLET ORAL
Qty: 30 TABLET | Refills: 11 | Status: SHIPPED | OUTPATIENT
Start: 2023-02-13 | End: 2024-06-12

## 2023-02-28 DIAGNOSIS — M54.42 ACUTE BILATERAL LOW BACK PAIN WITH BILATERAL SCIATICA: ICD-10-CM

## 2023-02-28 DIAGNOSIS — M54.41 ACUTE BILATERAL LOW BACK PAIN WITH BILATERAL SCIATICA: ICD-10-CM

## 2023-02-28 RX ORDER — CYCLOBENZAPRINE HCL 10 MG
TABLET ORAL
Qty: 60 TABLET | Refills: 1 | Status: SHIPPED | OUTPATIENT
Start: 2023-02-28 | End: 2023-05-03

## 2023-03-30 DIAGNOSIS — M54.42 ACUTE BILATERAL LOW BACK PAIN WITH BILATERAL SCIATICA: Primary | ICD-10-CM

## 2023-03-30 DIAGNOSIS — M54.41 ACUTE BILATERAL LOW BACK PAIN WITH BILATERAL SCIATICA: Primary | ICD-10-CM

## 2023-05-02 DIAGNOSIS — M54.41 ACUTE BILATERAL LOW BACK PAIN WITH BILATERAL SCIATICA: ICD-10-CM

## 2023-05-02 DIAGNOSIS — M54.42 ACUTE BILATERAL LOW BACK PAIN WITH BILATERAL SCIATICA: ICD-10-CM

## 2023-05-02 NOTE — TELEPHONE ENCOUNTER
Cyclobenzaprine   LOV 1/23/23  Last addressed 9/12/22  Acute bilateral low back pain with bilateral sciatica  -     cyclobenzaprine (FLEXERIL) 10 MG tablet; Take 1 tablet (10 mg) by mouth 3 times daily as needed for muscle spasms  -     Physical Therapy Referral; Future  Last fill 2/28/23

## 2023-05-03 RX ORDER — CYCLOBENZAPRINE HCL 10 MG
TABLET ORAL
Qty: 60 TABLET | Refills: 1 | Status: SHIPPED | OUTPATIENT
Start: 2023-05-03 | End: 2023-07-05

## 2023-05-17 DIAGNOSIS — I10 ESSENTIAL HYPERTENSION: ICD-10-CM

## 2023-05-17 RX ORDER — LOSARTAN POTASSIUM 100 MG/1
TABLET ORAL
Qty: 90 TABLET | Refills: 3 | Status: SHIPPED | OUTPATIENT
Start: 2023-05-17 | End: 2024-05-02

## 2023-05-25 DIAGNOSIS — K21.00 GASTROESOPHAGEAL REFLUX DISEASE WITH ESOPHAGITIS, UNSPECIFIED WHETHER HEMORRHAGE: ICD-10-CM

## 2023-05-26 RX ORDER — PANTOPRAZOLE SODIUM 40 MG/1
TABLET, DELAYED RELEASE ORAL
Qty: 90 TABLET | Refills: 3 | Status: SHIPPED | OUTPATIENT
Start: 2023-05-26 | End: 2024-06-12

## 2023-06-23 DIAGNOSIS — N40.1 BENIGN PROSTATIC HYPERPLASIA WITH URINARY FREQUENCY: ICD-10-CM

## 2023-06-23 DIAGNOSIS — R35.0 BENIGN PROSTATIC HYPERPLASIA WITH URINARY FREQUENCY: ICD-10-CM

## 2023-06-23 NOTE — TELEPHONE ENCOUNTER
Tamsulosin   LOV 1/23/23  BP Readings from Last 3 Encounters:   01/23/23 130/80   09/12/22 (!) 156/82   09/03/22 (!) 167/83     Next appointment 7/26/23

## 2023-06-24 DIAGNOSIS — I25.118 CORONARY ARTERY DISEASE OF NATIVE ARTERY OF NATIVE HEART WITH STABLE ANGINA PECTORIS (H): ICD-10-CM

## 2023-06-26 DIAGNOSIS — G47.00 INSOMNIA, UNSPECIFIED TYPE: ICD-10-CM

## 2023-06-26 DIAGNOSIS — E11.43 TYPE 2 DIABETES MELLITUS WITH DIABETIC AUTONOMIC NEUROPATHY, WITHOUT LONG-TERM CURRENT USE OF INSULIN (H): ICD-10-CM

## 2023-06-26 RX ORDER — CARVEDILOL 25 MG/1
TABLET ORAL
Qty: 360 TABLET | Refills: 0 | Status: SHIPPED | OUTPATIENT
Start: 2023-06-26 | End: 2023-09-11

## 2023-06-26 RX ORDER — TAMSULOSIN HYDROCHLORIDE 0.4 MG/1
0.4 CAPSULE ORAL DAILY
Qty: 30 CAPSULE | Refills: 0 | Status: SHIPPED | OUTPATIENT
Start: 2023-06-26 | End: 2023-06-26

## 2023-06-26 RX ORDER — ZOLPIDEM TARTRATE 10 MG/1
TABLET ORAL
Qty: 90 TABLET | Refills: 0 | Status: SHIPPED | OUTPATIENT
Start: 2023-06-26 | End: 2023-07-26

## 2023-06-26 NOTE — TELEPHONE ENCOUNTER
TAMSULOSIN     LOV: 1/23/23  NEXT APPT: 7/26/23    *REQUEST RECEIVED FROM PHARMACY FOR 90-DAY SUPPLY. 30-DAY SUPPLY SENT 6/26/23. PER MYRIAM PT NEEDS APPT, SCHEDULED FOR 7/26/23.

## 2023-06-26 NOTE — TELEPHONE ENCOUNTER
TRULICITY     LOV: 1/23/23  NEXT APPT: 7/26/23    LAST FILLED: 1/12/23    *PATIENT OVERDUE FOR VISIT, SCHEDULED FOR 7/26/23    Lab Results   Component Value Date    A1C 6.9 01/23/2023    A1C 6.5 06/20/2022    A1C 9.4 01/11/2022    A1C 8.3 09/14/2021    A1C 8.4 04/19/2021

## 2023-06-26 NOTE — TELEPHONE ENCOUNTER
zolpidem (AMBIEN) 10 MG    LOV 1/23/23  Last labs  1/23/23    Has appt 7/26/23 with MD scheduled  Needs CSA update

## 2023-06-26 NOTE — TELEPHONE ENCOUNTER
CARVEDILOL     LOV: 1/23/23    BP Readings from Last 3 Encounters:   01/23/23 130/80   09/12/22 (!) 156/82   09/03/22 (!) 167/83     Coronary artery disease of native artery of native heart with stable angina pectoris (H)  No recent chest pain. Continue statin & aspirin     Essential hypertension  -     Comp. Metabolic Panel (14) ([facility])  Home BP meeting goal of < 140/90. No changes to medications made today. Checking labs

## 2023-06-28 RX ORDER — DULAGLUTIDE 1.5 MG/.5ML
1.5 INJECTION, SOLUTION SUBCUTANEOUS
Qty: 2 ML | Refills: 0 | Status: SHIPPED | OUTPATIENT
Start: 2023-06-28 | End: 2023-08-07

## 2023-06-29 RX ORDER — TAMSULOSIN HYDROCHLORIDE 0.4 MG/1
0.4 CAPSULE ORAL DAILY
Qty: 90 CAPSULE | Refills: 0 | Status: SHIPPED | OUTPATIENT
Start: 2023-06-29 | End: 2023-09-11

## 2023-07-05 DIAGNOSIS — M54.42 ACUTE BILATERAL LOW BACK PAIN WITH BILATERAL SCIATICA: ICD-10-CM

## 2023-07-05 DIAGNOSIS — M54.41 ACUTE BILATERAL LOW BACK PAIN WITH BILATERAL SCIATICA: ICD-10-CM

## 2023-07-05 NOTE — TELEPHONE ENCOUNTER
Cyclobenzaprine.  LOV 1/23/23 unrelated.  Last addressed 9/12/22.  Upcoming appt 7/26/23.    Acute bilateral low back pain with bilateral sciatica  -     cyclobenzaprine (FLEXERIL) 10 MG tablet; Take 1 tablet (10 mg) by mouth 3 times daily as needed for muscle spasms

## 2023-07-10 RX ORDER — CYCLOBENZAPRINE HCL 10 MG
10 TABLET ORAL 3 TIMES DAILY PRN
Qty: 60 TABLET | Refills: 1 | Status: SHIPPED | OUTPATIENT
Start: 2023-07-10 | End: 2023-09-11

## 2023-07-19 ASSESSMENT — ACTIVITIES OF DAILY LIVING (ADL): CURRENT_FUNCTION: NO ASSISTANCE NEEDED

## 2023-07-19 ASSESSMENT — ENCOUNTER SYMPTOMS
MYALGIAS: 1
WEAKNESS: 1
SHORTNESS OF BREATH: 0
CHILLS: 0
HEMATOCHEZIA: 0
FREQUENCY: 0
DIZZINESS: 0
EYE PAIN: 0
COUGH: 0
DIARRHEA: 0
HEADACHES: 0
FEVER: 0
NERVOUS/ANXIOUS: 0
DYSURIA: 0
JOINT SWELLING: 0
HEARTBURN: 0
PARESTHESIAS: 0
PALPITATIONS: 0
NAUSEA: 0
ARTHRALGIAS: 0
CONSTIPATION: 0
ABDOMINAL PAIN: 0
HEMATURIA: 0
SORE THROAT: 0

## 2023-07-19 NOTE — PATIENT INSTRUCTIONS
Patient Education   Personalized Prevention Plan  You are due for the preventive services outlined below.  Your care team is available to assist you in scheduling these services.  If you have already completed any of these items, please share that information with your care team to update in your medical record.  Health Maintenance Due   Topic Date Due    Zoster (Shingles) Vaccine (1 of 2) Never done    COVID-19 Vaccine (5 - Moderna series) 03/14/2023    Diptheria Tetanus Pertussis (DTAP/TDAP/TD) Vaccine (2 - Td or Tdap) 06/14/2023    Cholesterol Lab  06/20/2023    Kidney Microalbumin Urine Test  06/20/2023    FALL RISK ASSESSMENT  06/27/2023    A1C Lab  07/23/2023    Depression Assessment  07/23/2023      STACY Reeves CNP on 7/26/2023 at 1:24 PM

## 2023-07-19 NOTE — PROGRESS NOTES
"SUBJECTIVE:   Syd Joyce is a 68 year old male who presents for Preventive Visit.    Patient has been advised of split billing requirements and indicates understanding: Yes  Are you in the first 12 months of your Medicare Part B coverage?  No    Hypertension: 130s/80s at home blood pressure readings, no chest pain SOB, BREWSTER, peripheral edema or orthopnea. Compliant taking bp medications    Type 2 diabetes with neuropathy: patient interested in discussing medication to help with neuropathy - describes as he is 'walking on sponges' uses cane   Has not take his trulicity for 3 weeks as he ran out, his HgbA1c is 7.3, goal is < 7%  Patient takes fasting glucose in morning and he has consistently been 140s (this was even while taking trulicity)    Hypercholesteremia: stable on simvastatin and dietary changes - limited exercise due to neuropathy and weakness to RLE due to lower back issues (hx laminectomy)    Answers submitted by the patient for this visit:  Annual Preventive Visit (Submitted on 7/19/2023)  Chief Complaint: Annual Exam:  In general, how would you rate your overall physical health?: good  Frequency of exercise:: 2-3 days/week  Do you usually eat at least 4 servings of fruit and vegetables a day, include whole grains & fiber, and avoid regularly eating high fat or \"junk\" foods? : Yes  Taking medications regularly:: Yes  Medication side effects:: None  Activities of Daily Living: no assistance needed  Home safety: no safety concerns identified  Hearing Impairment:: difficulty following a conversation in a noisy restaurant or crowded room  In the past 6 months, have you been bothered by leaking of urine?: No  abdominal pain: No  Blood in stool: No  Blood in urine: No  chest pain: No  chills: No  congestion: No  constipation: No  cough: No  diarrhea: No  dizziness: No  ear pain: No  eye pain: No  nervous/anxious: No  fever: No  frequency: No  genital sores: No  headaches: No  hearing loss: No  heartburn: " No  arthralgias: No  joint swelling: No  peripheral edema: No  mood changes: No  myalgias: Yes  nausea: No  dysuria: No  palpitations: No  Skin sensation changes: No  sore throat: No  urgency: No  rash: No  shortness of breath: No  visual disturbance: No  weakness: Yes  impotence: No  penile discharge: No  In general, how would you rate your overall mental or emotional health?: good  Additional concerns today:: No    Chief Complaint: Annual Exam:  Duration of exercise:: Less than 15 minutes    Hearing Screening:  Right Ear  4000Hz: fail  2000Hz: pass  1000Hz: pass  500Hz: pass    Left Ear  4000Hz: fail  2000Hz: pass  1000Hz: pass  500Hz: pass  Fall risk:  Fallen 2 or more times in the past year?: No  Any fall with injury in the past year?: No    Cognitive Screenin) Repeat 3 items (Leader, Season, Table)    2) Clock draw: NORMAL  3) 3 item recall: Recalls 3 objects  Results: 3 items recalled: COGNITIVE IMPAIRMENT LESS LIKELY    Mini-CogTM Copyright MIROSLAVA Lux. Licensed by the author for use in [facility] "[employer]"; reprinted with permission (jessica@[facility]). All rights reserved.      Reviewed and updated as needed this visit by clinical staff   Tobacco  Allergies  Meds  Problems           Reviewed and updated as needed this visit by Provider     Meds  Problems            Social History     Tobacco Use    Smoking status: Never    Smokeless tobacco: Never   Substance Use Topics    Alcohol use: Not Currently     Comment: 2022 - sober 6 years             2023     4:16 PM   Alcohol Use   Prescreen: >3 drinks/day or >7 drinks/week? No     Do you have a current opioid prescription? No  Do you use any other controlled substances or medications that are not prescribed by a provider? None                      Current providers sharing in care for this patient include:   Patient Care Team:  Cony Ordoñez APRN CNP as PCP - General (Nurse Practitioner Primary Care)  Clara Watkins RD as Diabetes Educator  "(Dietitian, Registered)  Yelena Grey RPH as Assigned MTM Pharmacist    The following health maintenance items are reviewed in Epic and correct as of today:  Health Maintenance   Topic Date Due    ZOSTER IMMUNIZATION (1 of 2) Never done    COVID-19 Vaccine (5 - Moderna series) 03/14/2023    DTAP/TDAP/TD IMMUNIZATION (2 - Td or Tdap) 06/14/2023    INFLUENZA VACCINE (1) 09/01/2023    BMP  01/23/2024    DIABETIC FOOT EXAM  01/23/2024    A1C  01/26/2024    PHQ-9  01/26/2024    EYE EXAM  02/03/2024    MEDICARE ANNUAL WELLNESS VISIT  07/26/2024    LIPID  07/26/2024    MICROALBUMIN  07/26/2024    FALL RISK ASSESSMENT  07/26/2024    ADVANCE CARE PLANNING  06/29/2027    COLORECTAL CANCER SCREENING  11/11/2031    HEPATITIS C SCREENING  Completed    DEPRESSION ACTION PLAN  Completed    Pneumococcal Vaccine: 65+ Years  Completed    AORTIC ANEURYSM SCREENING (SYSTEM ASSIGNED)  Completed    IPV IMMUNIZATION  Aged Out    MENINGITIS IMMUNIZATION  Aged Out     Lab work is in process  Due for Tdap - needs to get from pharmacy d/t insurance   Last colonoscopy: 11/2022 - colorectal polyp removed - patient will obtain records from [facility] as I do not see this in chart  Also had eye exam last year and had no retinopathy       135/85 average blood pressure reading at home      ROS:  Constitutional, HEENT, cardiovascular, pulmonary, GI, , musculoskeletal, neuro, skin, endocrine and psych systems are negative, except as otherwise noted.    OBJECTIVE:   BP (!) 150/89   Pulse 79   Ht 1.772 m (5' 9.75\")   Wt 103.5 kg (228 lb 3.2 oz)   SpO2 99%   BMI 32.98 kg/m   Estimated body mass index is 32.98 kg/m  as calculated from the following:    Height as of this encounter: 1.772 m (5' 9.75\").    Weight as of this encounter: 103.5 kg (228 lb 3.2 oz).  EXAM:   GENERAL: healthy, alert and no distress  EYES: Eyes grossly normal to inspection, PERRL and conjunctivae and sclerae normal  HENT: ear canals and TM's normal, nose and mouth without " ulcers or lesions  NECK: no adenopathy, no asymmetry, masses, or scars and thyroid normal to palpation  RESP: lungs clear to auscultation - no rales, rhonchi or wheezes  CV: regular rate and rhythm, normal S1 S2, no S3 or S4, no murmur, click or rub, no peripheral edema and peripheral pulses strong  ABDOMEN: soft, nontender, no hepatosplenomegaly, no masses and bowel sounds normal  MS: no gross musculoskeletal defects noted, no edema  SKIN: no suspicious lesions or rashes  NEURO: Normal strength and tone, mentation intact and speech normal  PSYCH: mentation appears normal, affect normal/bright  LYMPH: no cervical, supraclavicular, axillary, or inguinal adenopathy    Patient missed his trulicity dose for 3 weeks       Diagnostic Test Results:  Labs reviewed in Epic  Results for orders placed or performed in visit on 07/26/23 (from the past 24 hour(s))   Hemoglobin A1C (RMG)   Result Value Ref Range    Hemoglobin A1C 7.3 (A) 4.0 - 6.0 %   Lipid Profile ([facility])   Result Value Ref Range    Cholesterol 116 100 - 199 mg/dL    HDL 40 40 mg/dL    Triglycerides 120 0 - 149 mg/dL    LDL CALCULATED ([facility]) 51 0 - 130 mg/dL    Patient Fasting? No    Microalbumin ([facility])   Result Value Ref Range    Albumin mg/L 10 0 - 10    Urine Creatinine Mg/Dl 50 0 - 300    A/C Ratio mg/g <30     Interpretation normal        ASSESSMENT / PLAN:   (Z00.00) Encounter for Medicare annual wellness exam  (primary encounter diagnosis)  Plan: VENOUS COLLECTION        -discussed sleep health, diet and exercise, diabetes care, fall prevention     (E11.43) Type 2 diabetes mellitus with diabetic autonomic neuropathy, without long-term current use of insulin (H)  Plan: Hemoglobin A1C (RMG), Microalbumin ([facility]),         VENOUS COLLECTION        -elevated HgbA1c today and given fasting glucose has been about 140 even while on trulicity, I will add in farxiga and patient to return in 1 month to review at home readings   -Patient verbalized understanding and is  agreeable to the discussed plan of care.  -Red flags that warrant emergent evaluation discussed    (E78.00) Hypercholesteremia  Plan: Lipid Profile ([facility]), VENOUS COLLECTION        -stable on simvastatin    (N52.1) Erectile dysfunction due to diseases classified elsewhere  Plan:   -stable on sildenafil - no need for refill right now    (G47.00) Insomnia, unspecified type  Plan: traZODone (DESYREL) 50 MG tablet  -switching from ambien to trazadone given patient has never tried trazadone and high risk profile with ambien  -Education about adverse effects of prescription medication discussed  -Red flags that warrant emergent evaluation discussed  -Patient verbalized understanding and is agreeable to the discussed plan of care.  -follow up in 1 month to address if helping or not - can do this virtually if he would like     (I10) Essential hypertension  Plan:   -stable home readings, suspect high in clinic due to being at doctor office, continue at home readings and if persists to be higher than 140/90 patient should return for further blood pressure management visit    (G63) Polyneuropathy associated with underlying disease (H)  Plan: gabapentin (NEURONTIN) 300 MG capsule  -patient reports the neuropathy in his feet is bothering him and he would like to trial gabapentin   -Education about adverse effects of prescription medication discussed  -Red flags that warrant emergent evaluation discussed  -Patient verbalized understanding and is agreeable to the discussed plan of care.      Patient has been advised of split billing requirements and indicates understanding: Yes    COUNSELING:  Reviewed preventive health counseling, as reflected in patient instructions       Regular exercise       Healthy diet/nutrition       Vision screening       Hearing screening       Dental care       Fall risk prevention       Alcohol Use        Colon cancer screening       Prostate cancer screening    Estimated body mass index is 32.98 kg/m   "as calculated from the following:    Height as of this encounter: 1.772 m (5' 9.75\").    Weight as of this encounter: 103.5 kg (228 lb 3.2 oz).    Weight management plan: Discussed healthy diet and exercise guidelines    He reports that he has never smoked. He has never used smokeless tobacco.    I have reviewed Opioid Use Disorder and Substance Use Disorder risk factors and made any needed referrals.   Appropriate preventive services were discussed with this patient, including applicable screening as appropriate for cardiovascular disease, diabetes, osteopenia/osteoporosis, and glaucoma.  As appropriate for age/gender, discussed screening for colorectal cancer, prostate cancer, breast cancer, and cervical cancer. Checklist reviewing preventive services available has been given to the patient.    Reviewed patients plan of care and provided an AVS. The Intermediate Care Plan ( asthma action plan, low back pain action plan, and migraine action plan) for Syd meets the Care Plan requirement. This Care Plan has been established and reviewed with the Patient.    Counseling Resources:  ATP IV Guidelines  Pooled Cohorts Equation Calculator  Breast Cancer Risk Calculator  BRCA-Related Cancer Risk Assessment: FHS-7 Tool  FRAX Risk Assessment  ICSI Preventive Guidelines  Dietary Guidelines for Americans, 2010  USDA's MyPlate  ASA Prophylaxis  Lung CA Screening    STACY Reeves [facility]  "

## 2023-07-20 DIAGNOSIS — E78.00 HYPERCHOLESTEREMIA: ICD-10-CM

## 2023-07-20 NOTE — TELEPHONE ENCOUNTER
Simvastatin 20 mg     LOV: 1/23/23    Hypercholesteremia  Continue statin without changes    Recent Labs   Lab Test 06/20/22  0902 09/14/21  0835   CHOL 109 136   HDL 45 45   LDL 49 72   TRIG 70 104     Next appt: 7/26/23

## 2023-07-23 RX ORDER — SIMVASTATIN 20 MG
20 TABLET ORAL AT BEDTIME
Qty: 90 TABLET | Refills: 3 | Status: SHIPPED | OUTPATIENT
Start: 2023-07-23 | End: 2024-03-26

## 2023-07-26 ENCOUNTER — OFFICE VISIT (OUTPATIENT)
Dept: FAMILY MEDICINE | Facility: CLINIC | Age: 68
End: 2023-07-26

## 2023-07-26 VITALS
OXYGEN SATURATION: 99 % | HEART RATE: 79 BPM | WEIGHT: 228.2 LBS | DIASTOLIC BLOOD PRESSURE: 89 MMHG | HEIGHT: 70 IN | SYSTOLIC BLOOD PRESSURE: 150 MMHG | BODY MASS INDEX: 32.67 KG/M2

## 2023-07-26 DIAGNOSIS — E11.43 TYPE 2 DIABETES MELLITUS WITH DIABETIC AUTONOMIC NEUROPATHY, WITHOUT LONG-TERM CURRENT USE OF INSULIN (H): ICD-10-CM

## 2023-07-26 DIAGNOSIS — E78.00 HYPERCHOLESTEREMIA: ICD-10-CM

## 2023-07-26 DIAGNOSIS — N52.1 ERECTILE DYSFUNCTION DUE TO DISEASES CLASSIFIED ELSEWHERE: ICD-10-CM

## 2023-07-26 DIAGNOSIS — I10 ESSENTIAL HYPERTENSION: ICD-10-CM

## 2023-07-26 DIAGNOSIS — G63 POLYNEUROPATHY ASSOCIATED WITH UNDERLYING DISEASE (H): ICD-10-CM

## 2023-07-26 DIAGNOSIS — G47.00 INSOMNIA, UNSPECIFIED TYPE: ICD-10-CM

## 2023-07-26 DIAGNOSIS — Z00.00 ENCOUNTER FOR MEDICARE ANNUAL WELLNESS EXAM: Primary | ICD-10-CM

## 2023-07-26 LAB
A/C RATIO (RMG): <30
ALBUMIN- RMG: 10 (ref 0–10)
CHOLESTEROL: 116 MG/DL (ref 100–199)
FASTING?: NO
HBA1C MFR BLD: 7.3 % (ref 4–6)
HDL (RMG): 40 MG/DL (ref 40–?)
INTERPRETATION: NORMAL
LDL CALCULATED (RMG): 51 MG/DL (ref 0–130)
TRIGLYCERIDES (RMG): 120 MG/DL (ref 0–149)
URINE CREATININE - RMG: 50 (ref 0–300)

## 2023-07-26 PROCEDURE — 82044 UR ALBUMIN SEMIQUANTITATIVE: CPT

## 2023-07-26 PROCEDURE — 36415 COLL VENOUS BLD VENIPUNCTURE: CPT

## 2023-07-26 PROCEDURE — 80061 LIPID PANEL: CPT | Mod: QW

## 2023-07-26 PROCEDURE — 99214 OFFICE O/P EST MOD 30 MIN: CPT | Mod: 25

## 2023-07-26 PROCEDURE — 83036 HEMOGLOBIN GLYCOSYLATED A1C: CPT

## 2023-07-26 PROCEDURE — G0439 PPPS, SUBSEQ VISIT: HCPCS

## 2023-07-26 RX ORDER — GABAPENTIN 300 MG/1
300 CAPSULE ORAL 2 TIMES DAILY PRN
Qty: 90 CAPSULE | Refills: 0 | Status: SHIPPED | OUTPATIENT
Start: 2023-07-26 | End: 2023-09-06

## 2023-07-26 RX ORDER — DAPAGLIFLOZIN 5 MG/1
5 TABLET, FILM COATED ORAL DAILY
Qty: 90 TABLET | Refills: 1 | Status: SHIPPED | OUTPATIENT
Start: 2023-07-26 | End: 2023-07-27

## 2023-07-26 RX ORDER — TRAZODONE HYDROCHLORIDE 50 MG/1
50-100 TABLET, FILM COATED ORAL AT BEDTIME
Qty: 60 TABLET | Refills: 0 | Status: SHIPPED | OUTPATIENT
Start: 2023-07-26 | End: 2023-09-11

## 2023-07-26 ASSESSMENT — PATIENT HEALTH QUESTIONNAIRE - PHQ9: SUM OF ALL RESPONSES TO PHQ QUESTIONS 1-9: 0

## 2023-07-26 NOTE — LETTER
[facility]  07/26/23  Patient: Syd Joyce  YOB: 1955  Medical Record Number: [id number]                                                                                  Non-Opioid Controlled Substance Agreement    This is an agreement between you and your provider regarding safe and appropriate use of controlled substances prescribed by your care team. Controlled substances are?medicines that can cause physical and mental dependence (abuse).     There are strict laws about having and using these medicines. We here at [facility] are  committed to working with you in your efforts to get better. To support you in this work, we'll help you schedule regular office appointments for medicine refills. If we must cancel or change your appointment for any reason, we'll make sure you have enough medicine to last until your next appointment.     As a Provider, I will:   Listen carefully to your concerns while treating you with respect.   Recommend a treatment plan that I believe is in your best interest and may involve therapies other than medicine.    Talk with you often about the possible benefits and the risk of harm of any medicine that we prescribe for you.  Assess the safety of this medicine and check how well it works.    Provide a plan on how to taper (discontinue or go off) using this medicine if the decision is made to stop its use.      ::  As a Patient, I understand controlled substances:     Are prescribed by my care provider to help me function or work and manage my condition(s).?  Are strong medicines and can cause serious side effects.     Need to be taken exactly as prescribed.?Combining controlled substances with certain medicines or chemicals (such as illegal drugs, alcohol, sedatives, sleeping pills, and benzodiazepines) can be dangerous or even fatal.? If I stop taking my medicines suddenly, I may have severe withdrawal symptoms.     The risks, benefits, and side  effects of these medicine(s) were explained to me. I agree that:    I will take part in other treatments as advised by my care team. This may be psychiatry or counseling, physical therapy, behavioral therapy, group treatment or a referral to specialist.    I will keep all my appointments and understand this is part of the monitoring of controlled substances.?My care team may require an office visit for EVERY controlled substance refill. If I miss appointments or don t follow instructions, my care team may stop my medicine    I will take my medicines as prescribed. I will not change the dose or schedule unless my care team tells me to. There will be no refills if I run out early.      I may be asked to come to the clinic and complete a urine drug test or complete a pill count. If I don t give a urine sample or participate in a pill count, the care team may stop my medicine.    I will only receive controlled substance prescriptions from this clinic. If I am treated by another provider, I will tell them that I am taking controlled substances and that I have a treatment agreement with this provider. I will inform my [facility] care team within one business day if I am given a prescription for any controlled substance by another healthcare provider. My [facility] care team can contact other providers and pharmacists about my use of any medicines.    It is up to me to make sure that I don't run out of my medicines on weekends or holidays.?If my care team is willing to refill my prescription without a visit, I must request refills only during office hours. Refills may take up to 3 business days to process. I will use one pharmacy to fill all my controlled substance prescriptions. I will notify the clinic about any changes to my insurance or medicine availability.    I am responsible for my prescriptions. If the medicine/prescription is lost, stolen or destroyed, it will not be replaced.?I also agree not to  share controlled substance medicines with anyone.     I am aware I should not use any illegal or recreational drugs. I agree not to drink alcohol unless my care team says I can.     If I enroll in the [place] Medical Cannabis program, I will tell my care team before my next refill.    I will tell my care team right away if I become pregnant, have a new medical problem treated outside of my regular clinic, or have a change in my medicines.     I understand that this medicine can affect my thinking, judgment and reaction time.? Alcohol and drugs affect the brain and body, which can affect the safety of my driving. Being under the influence of alcohol or drugs can affect my decision-making, behaviors, personal safety and the safety of others. Driving while impaired (DWI) can occur if a person is driving, operating or in physical control of a car, motorcycle, boat, snowmobile, ATV, motorbike, off-road vehicle or any other motor vehicle ([place] Statute 169A.20). I understand the risk if I choose to drive or operate any vehicle or machinery.    I understand that if I do not follow any of the conditions above, my prescriptions or treatment may be stopped or changed.   I agree that my provider, clinic care team and pharmacy may work with any city, state or federal law enforcement agency that investigates the misuse, sale or other diversion of my controlled medicine. I will allow my provider to discuss my care with, or share a copy of, this agreement with any other treating provider, pharmacy or emergency room where I receive care.     I have read this agreement and have asked questions about anything I did not understand.    ________________________________________________________  Patient Signature - Syd Joyce     ___________________                   Date     ________________________________________________________  Provider Signature - STACY Reeves CNP       ___________________                   Date      ________________________________________________________  Witness Signature (required if provider not present while patient signing)          ___________________                   Date

## 2023-07-27 ENCOUNTER — MYC MEDICAL ADVICE (OUTPATIENT)
Dept: FAMILY MEDICINE | Facility: CLINIC | Age: 68
End: 2023-07-27

## 2023-07-27 DIAGNOSIS — E11.43 TYPE 2 DIABETES MELLITUS WITH DIABETIC AUTONOMIC NEUROPATHY, WITHOUT LONG-TERM CURRENT USE OF INSULIN (H): Primary | ICD-10-CM

## 2023-07-27 NOTE — TELEPHONE ENCOUNTER
Per Cony Ordoñez, CNP prescription changed to Jardiance, prescription sent to pharmacy. Tashia Briceno

## 2023-08-07 DIAGNOSIS — E11.43 TYPE 2 DIABETES MELLITUS WITH DIABETIC AUTONOMIC NEUROPATHY, WITHOUT LONG-TERM CURRENT USE OF INSULIN (H): ICD-10-CM

## 2023-08-07 RX ORDER — DULAGLUTIDE 1.5 MG/.5ML
1.5 INJECTION, SOLUTION SUBCUTANEOUS
Qty: 2 ML | Refills: 0 | Status: SHIPPED | OUTPATIENT
Start: 2023-08-07 | End: 2023-08-29

## 2023-08-07 NOTE — TELEPHONE ENCOUNTER
TRULICITY     LOV: 7/26/23  NEXT APPT: none- due around 8/26/23    (E11.43) Type 2 diabetes mellitus with diabetic autonomic neuropathy, without long-term current use of insulin (H)  Plan: Hemoglobin A1C ([facility]), Microalbumin ([facility]),         VENOUS COLLECTION        -elevated HgbA1c today and given fasting glucose has been about 140 even while on trulicity, I will add in farxiga and patient to return in 1 month to review at home readings   -Patient verbalized understanding and is agreeable to the discussed plan of care.  -Red flags that warrant emergent evaluation discussed    Hemoglobin A1C   Date Value Ref Range Status   07/26/2023 7.3 (A) 4.0 - 6.0 % Final

## 2023-08-24 DIAGNOSIS — E11.43 TYPE 2 DIABETES MELLITUS WITH DIABETIC AUTONOMIC NEUROPATHY, WITHOUT LONG-TERM CURRENT USE OF INSULIN (H): ICD-10-CM

## 2023-08-24 RX ORDER — BLOOD SUGAR DIAGNOSTIC
STRIP MISCELLANEOUS
Qty: 100 STRIP | Refills: 11 | Status: SHIPPED | OUTPATIENT
Start: 2023-08-24

## 2023-08-24 NOTE — TELEPHONE ENCOUNTER
Med: One Touch Ultra Blue Test Strips     LOV (related): 7/26/23    Last Lab: 7/26/23    Due for F/U around: 8/26/23 (1 mos f/u per MD)    Next Appt: None     Lab Results   Component Value Date    A1C 7.3 07/26/2023    A1C 6.9 01/23/2023    A1C 6.5 06/20/2022    A1C 9.4 01/11/2022    A1C 8.3 09/14/2021

## 2023-08-29 DIAGNOSIS — E11.43 TYPE 2 DIABETES MELLITUS WITH DIABETIC AUTONOMIC NEUROPATHY, WITHOUT LONG-TERM CURRENT USE OF INSULIN (H): ICD-10-CM

## 2023-08-29 NOTE — TELEPHONE ENCOUNTER
Med: Trulicity    LOV (related): 7/26/23    Last Lab: 7/26/23      Due for F/U around: return in 1 month      Next Appt: 9/11/23 with Dagoberto        Lab Results   Component Value Date    A1C 7.3 07/26/2023    A1C 6.9 01/23/2023    A1C 6.5 06/20/2022    A1C 9.4 01/11/2022    A1C 8.3 09/14/2021

## 2023-08-30 RX ORDER — DULAGLUTIDE 1.5 MG/.5ML
1.5 INJECTION, SOLUTION SUBCUTANEOUS
Qty: 2 ML | Refills: 0 | Status: SHIPPED | OUTPATIENT
Start: 2023-08-30 | End: 2023-09-11

## 2023-09-06 DIAGNOSIS — G63 POLYNEUROPATHY ASSOCIATED WITH UNDERLYING DISEASE (H): ICD-10-CM

## 2023-09-06 DIAGNOSIS — I10 ESSENTIAL HYPERTENSION: ICD-10-CM

## 2023-09-06 RX ORDER — CHLORTHALIDONE 25 MG/1
25 TABLET ORAL DAILY
Qty: 90 TABLET | Refills: 3 | Status: SHIPPED | OUTPATIENT
Start: 2023-09-06

## 2023-09-06 RX ORDER — GABAPENTIN 300 MG/1
300 CAPSULE ORAL 2 TIMES DAILY PRN
Qty: 90 CAPSULE | Refills: 0 | Status: SHIPPED | OUTPATIENT
Start: 2023-09-06 | End: 2023-10-30

## 2023-09-06 NOTE — TELEPHONE ENCOUNTER
Med: Chlorthalidone   Gabapentin      LOV (related): 7/26/23    Last Lab: 1/23/23      Due for F/U around:   Return in about 53 weeks (around 7/31/2024) for Annual Wellness Visit.    Next Appt: 9/11/23 With Cony          BP Readings from Last 3 Encounters:   07/26/23 (!) 150/89   01/23/23 130/80   09/12/22 (!) 156/82       Last Comprehensive Metabolic Panel:  Lab Results   Component Value Date     01/23/2023    POTASSIUM 4.3 01/23/2023    CHLORIDE 92 (L) 01/23/2023    CO2 26 09/03/2022    ANIONGAP 6 09/03/2022     (H) 01/23/2023    BUN 20 01/23/2023    BUN 18 01/23/2023    CR 1.09 01/23/2023    GFRESTIMATED 77 09/03/2022    HEAVENLY 9.6 01/23/2023

## 2023-09-11 ENCOUNTER — OFFICE VISIT (OUTPATIENT)
Dept: FAMILY MEDICINE | Facility: CLINIC | Age: 68
End: 2023-09-11

## 2023-09-11 VITALS
SYSTOLIC BLOOD PRESSURE: 134 MMHG | HEART RATE: 81 BPM | OXYGEN SATURATION: 100 % | DIASTOLIC BLOOD PRESSURE: 76 MMHG | BODY MASS INDEX: 33.09 KG/M2 | WEIGHT: 229 LBS

## 2023-09-11 DIAGNOSIS — E11.43 TYPE 2 DIABETES MELLITUS WITH DIABETIC AUTONOMIC NEUROPATHY, WITHOUT LONG-TERM CURRENT USE OF INSULIN (H): Primary | ICD-10-CM

## 2023-09-11 DIAGNOSIS — R35.0 BENIGN PROSTATIC HYPERPLASIA WITH URINARY FREQUENCY: ICD-10-CM

## 2023-09-11 DIAGNOSIS — N40.1 BENIGN PROSTATIC HYPERPLASIA WITH URINARY FREQUENCY: ICD-10-CM

## 2023-09-11 DIAGNOSIS — I10 ESSENTIAL HYPERTENSION: ICD-10-CM

## 2023-09-11 DIAGNOSIS — M54.42 ACUTE BILATERAL LOW BACK PAIN WITH BILATERAL SCIATICA: ICD-10-CM

## 2023-09-11 DIAGNOSIS — G47.00 INSOMNIA, UNSPECIFIED TYPE: ICD-10-CM

## 2023-09-11 DIAGNOSIS — I25.118 CORONARY ARTERY DISEASE OF NATIVE ARTERY OF NATIVE HEART WITH STABLE ANGINA PECTORIS (H): ICD-10-CM

## 2023-09-11 DIAGNOSIS — M54.41 ACUTE BILATERAL LOW BACK PAIN WITH BILATERAL SCIATICA: ICD-10-CM

## 2023-09-11 PROCEDURE — 99214 OFFICE O/P EST MOD 30 MIN: CPT

## 2023-09-11 RX ORDER — METFORMIN HCL 500 MG
1000 TABLET, EXTENDED RELEASE 24 HR ORAL 2 TIMES DAILY WITH MEALS
Qty: 360 TABLET | Refills: 1 | Status: SHIPPED | OUTPATIENT
Start: 2023-09-11 | End: 2024-04-03

## 2023-09-11 RX ORDER — TRAZODONE HYDROCHLORIDE 50 MG/1
50-100 TABLET, FILM COATED ORAL AT BEDTIME
Qty: 60 TABLET | Refills: 0 | Status: SHIPPED | OUTPATIENT
Start: 2023-09-11 | End: 2023-10-09

## 2023-09-11 RX ORDER — GLIPIZIDE 10 MG/1
10 TABLET, FILM COATED, EXTENDED RELEASE ORAL DAILY
Qty: 30 TABLET | Refills: 0 | Status: SHIPPED | OUTPATIENT
Start: 2023-09-11 | End: 2023-09-21

## 2023-09-11 RX ORDER — TAMSULOSIN HYDROCHLORIDE 0.4 MG/1
0.4 CAPSULE ORAL DAILY
Qty: 90 CAPSULE | Refills: 0 | Status: SHIPPED | OUTPATIENT
Start: 2023-09-11 | End: 2023-12-07

## 2023-09-11 RX ORDER — DULAGLUTIDE 1.5 MG/.5ML
1.5 INJECTION, SOLUTION SUBCUTANEOUS
Qty: 2 ML | Refills: 0 | Status: SHIPPED | OUTPATIENT
Start: 2023-09-11 | End: 2023-10-10

## 2023-09-11 RX ORDER — CARVEDILOL 25 MG/1
TABLET ORAL
Qty: 360 TABLET | Refills: 0 | Status: SHIPPED | OUTPATIENT
Start: 2023-09-11 | End: 2023-12-07

## 2023-09-11 RX ORDER — CYCLOBENZAPRINE HCL 10 MG
10 TABLET ORAL 3 TIMES DAILY PRN
Qty: 60 TABLET | Refills: 1 | Status: SHIPPED | OUTPATIENT
Start: 2023-09-11 | End: 2024-01-10

## 2023-09-11 NOTE — PROGRESS NOTES
Assessment & Plan     Type 2 diabetes mellitus with diabetic autonomic neuropathy, without long-term current use of insulin (H)  -after review of chart, patient was taken off of glipizide to be put on invokana and then off invokana to be put on trulicity due to financial reason, given elevated fasting blood glucose and affordability of meds will put patient back on glipizide  -Education about adverse effects of prescription medication discussed  -Red flags that warrant emergent evaluation discussed  -Patient verbalized understanding and is agreeable to the discussed plan of care.  - dulaglutide (TRULICITY) 1.5 MG/0.5ML pen  Dispense: 2 mL; Refill: 0  - metFORMIN (GLUCOPHAGE XR) 500 MG 24 hr tablet  Dispense: 360 tablet; Refill: 1  - glipiZIDE (GLUCOTROL XL) 10 MG 24 hr tablet  Dispense: 30 tablet; Refill: 0    Coronary artery disease of native artery of native heart with stable angina pectoris (H)  -stable on carvedilol  - carvedilol (COREG) 25 MG tablet  Dispense: 360 tablet; Refill: 0    Acute bilateral low back pain with bilateral sciatica  -takes PRN, had work up with back doctor and this was best interventions  - cyclobenzaprine (FLEXERIL) 10 MG tablet  Dispense: 60 tablet; Refill: 1    Benign prostatic hyperplasia with urinary frequency  -stable on tamsulosin  - tamsulosin (FLOMAX) 0.4 MG capsule  Dispense: 90 capsule; Refill: 0    Insomnia, unspecified type  -patient reports he is sleeping well on trazadone, refill provided, no side effects  - traZODone (DESYREL) 50 MG tablet  Dispense: 60 tablet; Refill: 0    Essential hypertension  -given good blood pressure reading in clinic will maintain patient on current regimen, he will be following up with us in a couple months for HgbA1c and can recheck blood pressure, he should also bring in home blood pressure cuff for calibration, reviewed how to take home bp readings  -Education about adverse effects of prescription medication discussed  -Patient verbalized  "understanding and is agreeable to the discussed plan of care.  -Red flags that warrant emergent evaluation discussed        Ordering of each unique test  Prescription drug management         Work on weight loss  Regular exercise  See Patient Instructions    Return in about 7 weeks (around 10/30/2023) for Follow up.    STACY Reeves CNP  [facility]    Adrian Morgan is a 68 year old, presenting for the following health issues:  RECHECK (Glucose at home - between 140-150 in AM's fasting -- talk about another med for this/Trazodone for sleep really helps), Flu Shot (Will get today), Refill Request (Stated \"I think they all need to be refilled\" -- I went thru list and did what looks like should be due), and Hypertension (150-155/80 average at home BP readings)    History of Present Illness       Diabetes:   He presents for follow up of diabetes.  He is checking home blood glucose one time daily.   He checks blood glucose before meals.  Blood glucose is never over 200 and never under 70. He is aware of hypoglycemia symptoms including shakiness.   He is concerned about other.   He is having numbness in feet.            He eats 2-3 servings of fruits and vegetables daily.He consumes 0 sweetened beverage(s) daily.He exercises with enough effort to increase his heart rate 10 to 19 minutes per day.  He exercises with enough effort to increase his heart rate 4 days per week.   He is taking medications regularly.         Patient reporting fasting blood glucose in the morning is 140-150, cannot afford jardiance or invokana or faxiga and trulicity so has not been taking the SGLT2-inhibitor. He needs an option that is more affordable. He has used glipizide in the past and tolerated well, he was seeing KEYON Kirk for MTM visits and she had switched him from glipizide to invokana, but patient could not afford this.    Patient having improvement in neuropathy of feet on gabapentin    Patient is sleeping well " on trazadone 50mg, no side effects noted.       Review of Systems   Constitutional, HEENT, cardiovascular, pulmonary, gi and gu systems are negative, except as otherwise noted.      Objective    /76   Pulse 81   Wt 103.9 kg (229 lb)   SpO2 100%   BMI 33.09 kg/m    Body mass index is 33.09 kg/m .  Physical Exam   GENERAL: healthy, alert and no distress  EYES: Eyes grossly normal to inspection, PERRL and conjunctivae and sclerae normal  NECK: no adenopathy, no asymmetry, masses, or scars and thyroid normal to palpation  RESP: lungs clear to auscultation - no rales, rhonchi or wheezes  CV: regular rate and rhythm, normal S1 S2, no S3 or S4, no murmur, click or rub, no peripheral edema and peripheral pulses strong  MS: no gross musculoskeletal defects noted, no edema  SKIN: no suspicious lesions or rashes  LYMPH: no cervical, supraclavicular, axillary, or inguinal adenopathy    No results found for this or any previous visit (from the past 24 hour(s)).

## 2023-09-21 ENCOUNTER — MYC MEDICAL ADVICE (OUTPATIENT)
Dept: FAMILY MEDICINE | Facility: CLINIC | Age: 68
End: 2023-09-21

## 2023-09-21 DIAGNOSIS — E11.43 TYPE 2 DIABETES MELLITUS WITH DIABETIC AUTONOMIC NEUROPATHY, WITHOUT LONG-TERM CURRENT USE OF INSULIN (H): ICD-10-CM

## 2023-09-21 RX ORDER — GLIPIZIDE 10 MG/1
10 TABLET, FILM COATED, EXTENDED RELEASE ORAL DAILY
Qty: 90 TABLET | Refills: 3 | Status: SHIPPED | OUTPATIENT
Start: 2023-09-21 | End: 2023-12-07

## 2023-09-21 NOTE — TELEPHONE ENCOUNTER
Patient sent [employer] message stating pharmacy requiring 90 day rx of glipizide XL 10mg every day rather than #30.   Last related visit: 9/11/23 with Cony Ordoñez NP with plan to restart this medication. Rx for #30 tabs sent then.   Plan: routed request to Cony Ordoñez NP for review.

## 2023-10-09 DIAGNOSIS — G47.00 INSOMNIA, UNSPECIFIED TYPE: ICD-10-CM

## 2023-10-09 RX ORDER — TRAZODONE HYDROCHLORIDE 50 MG/1
50-100 TABLET, FILM COATED ORAL AT BEDTIME
Qty: 60 TABLET | Refills: 0 | Status: SHIPPED | OUTPATIENT
Start: 2023-10-09 | End: 2023-11-06

## 2023-10-09 NOTE — TELEPHONE ENCOUNTER
Med: Trazodone 50 mg    LOV (related): 9/11/23    Due for F/U around: 10/30/23     Next Appt: None

## 2023-10-10 DIAGNOSIS — E11.43 TYPE 2 DIABETES MELLITUS WITH DIABETIC AUTONOMIC NEUROPATHY, WITHOUT LONG-TERM CURRENT USE OF INSULIN (H): ICD-10-CM

## 2023-10-10 NOTE — CONFIDENTIAL NOTE
Med: TRULICITY  *REQUESTING 90 DAY SUPPLY    LOV (related): 9/11/23    Last Lab: 7/26/23      Due for F/U around: 12/2023    Next Appt: NONE        Lab Results   Component Value Date    A1C 7.3 07/26/2023    A1C 6.9 01/23/2023    A1C 6.5 06/20/2022    A1C 9.4 01/11/2022    A1C 8.3 09/14/2021

## 2023-10-11 RX ORDER — DULAGLUTIDE 1.5 MG/.5ML
1.5 INJECTION, SOLUTION SUBCUTANEOUS
Qty: 7 ML | Refills: 0 | Status: SHIPPED | OUTPATIENT
Start: 2023-10-11 | End: 2024-02-14

## 2023-10-30 DIAGNOSIS — G63 POLYNEUROPATHY ASSOCIATED WITH UNDERLYING DISEASE (H): ICD-10-CM

## 2023-10-30 RX ORDER — GABAPENTIN 300 MG/1
300 CAPSULE ORAL 2 TIMES DAILY PRN
Qty: 90 CAPSULE | Refills: 0 | Status: SHIPPED | OUTPATIENT
Start: 2023-10-30 | End: 2023-12-15

## 2023-10-30 NOTE — CONFIDENTIAL NOTE
Med: GABAPENTIN    LOV (related): 7/26/23 - CPX      Due for F/U around: 1/2024 FOR DM CHECK    Next Appt: NONE

## 2023-11-06 DIAGNOSIS — G47.00 INSOMNIA, UNSPECIFIED TYPE: ICD-10-CM

## 2023-11-06 RX ORDER — TRAZODONE HYDROCHLORIDE 50 MG/1
50-100 TABLET, FILM COATED ORAL AT BEDTIME
Qty: 60 TABLET | Refills: 0 | Status: SHIPPED | OUTPATIENT
Start: 2023-11-06 | End: 2023-12-07

## 2023-11-06 NOTE — CONFIDENTIAL NOTE
Med: Trazodone 50 mg     LOV (related): 9/11/23     Due for F/U around: 10/30/23      Next Appt: None

## 2023-11-29 ENCOUNTER — OFFICE VISIT (OUTPATIENT)
Dept: FAMILY MEDICINE | Facility: CLINIC | Age: 68
End: 2023-11-29

## 2023-11-29 VITALS
OXYGEN SATURATION: 99 % | WEIGHT: 234 LBS | SYSTOLIC BLOOD PRESSURE: 152 MMHG | BODY MASS INDEX: 33.82 KG/M2 | HEART RATE: 79 BPM | DIASTOLIC BLOOD PRESSURE: 75 MMHG

## 2023-11-29 DIAGNOSIS — I10 ESSENTIAL HYPERTENSION: ICD-10-CM

## 2023-11-29 DIAGNOSIS — E11.43 TYPE 2 DIABETES MELLITUS WITH DIABETIC AUTONOMIC NEUROPATHY, WITHOUT LONG-TERM CURRENT USE OF INSULIN (H): Primary | ICD-10-CM

## 2023-11-29 DIAGNOSIS — Z23 NEED FOR INFLUENZA VACCINATION: ICD-10-CM

## 2023-11-29 LAB
HBA1C MFR BLD: 6.4 % (ref 4–6)
POTASSIUM SERPL-SCNC: 4.1 MMOL/L (ref 3.4–5.3)

## 2023-11-29 PROCEDURE — 84244 ASSAY OF RENIN: CPT

## 2023-11-29 PROCEDURE — 83036 HEMOGLOBIN GLYCOSYLATED A1C: CPT

## 2023-11-29 PROCEDURE — 36415 COLL VENOUS BLD VENIPUNCTURE: CPT

## 2023-11-29 PROCEDURE — 84132 ASSAY OF SERUM POTASSIUM: CPT

## 2023-11-29 PROCEDURE — G0008 ADMIN INFLUENZA VIRUS VAC: HCPCS | Mod: 59

## 2023-11-29 PROCEDURE — 82088 ASSAY OF ALDOSTERONE: CPT

## 2023-11-29 PROCEDURE — 99214 OFFICE O/P EST MOD 30 MIN: CPT | Mod: 25

## 2023-11-29 PROCEDURE — 90694 VACC AIIV4 NO PRSRV 0.5ML IM: CPT

## 2023-11-29 NOTE — PROGRESS NOTES
Assessment & Plan     Type 2 diabetes mellitus with diabetic autonomic neuropathy, without long-term current use of insulin (H)  - Patient is meeting A1C goal of <7.0. Continue current regimen with GLP-1: Type/Dose/Timing: dulaglutide weekly and Oral Medication: Type/Dose/Timing: metformin and glipizide  . Discussed importance in compliance in all areas of diabetic control including diet, routine BS checks, absolute medication compliance, laboratory monitoring, and attending regular follow up appointments as ordered.  Failure to comply with instructions regarding diabetes will lead to a greater chance of poor diabetic control and therefore a greater chance of diabetes related complications such as CAD, CVA, PVD, and retinopathy/neuropathy/nephropathy. Based on level of diabetes control: Glucose testing Indicated  PRN. Return to the clinic in every 6 months.   - Hemoglobin A1C ([facility])  - VENOUS COLLECTION  - Aldosterone  - Renin activity  - Aldosterone Renin Ratio  - Potassium  - Aldosterone Renin Ratio  - Potassium    Need for influenza vaccination  - INFLUENZA VACCINE 65+ (FLUAD)    Essential hypertension  -given on 3 blood pressure medications and has never had aldosterone/renin lab testing will do today as he has had elevated blood pressure the past couple visits  -Patient verbalized understanding and is agreeable to the discussed plan of care.    - Aldosterone  - Renin activity  - Aldosterone Renin Ratio  - Potassium  - Aldosterone Renin Ratio  - Potassium         Work on weight loss  Regular exercise    No follow-ups on file.    STACY Reeves CNP  [facility]    Adrian Morgan is a 68 year old, presenting for the following health issues:  Diabetes (Follow-up)    History of Present Illness       Reason for visit:  A1C check    He eats 2-3 servings of fruits and vegetables daily.He consumes 0 sweetened beverage(s) daily.He exercises with enough effort to increase his heart rate 10 to 19  minutes per day.  He exercises with enough effort to increase his heart rate 4 days per week.   He is taking medications regularly.         Diabetes Follow-up    How often are you checking your blood sugar? One time daily  What time of day are you checking your blood sugars (select all that apply)?  Before meals and as needed  Have you had any blood sugars above 200?  No  Have you had any blood sugars below 70?  No  What symptoms do you notice when your blood sugar is low?  Shaky  What concerns do you have today about your diabetes? None   Do you have any of these symptoms? (Select all that apply)  Numbness in feet and Burning in feet      BP Readings from Last 2 Encounters:   11/29/23 (!) 152/75   09/11/23 134/76     Hemoglobin A1C (%)   Date Value   11/29/2023 6.4 (A)   07/26/2023 7.3 (A)     LDL Cholesterol Calculated (mg/dL)   Date Value   09/14/2021 72   07/20/2020 64     LDL CALCULATED ([facility])   Date Value   07/26/2023 51 mg/dL   06/20/2022 49 mg/dl             Hypertension Follow-up    Do you check your blood pressure regularly outside of the clinic? No   Are you following a low salt diet? Yes  Are your blood pressures ever more than 140 on the top number (systolic) OR more   than 90 on the bottom number (diastolic), for example 140/90? Yes - rarely at home        Review of Systems   Constitutional, HEENT, cardiovascular, pulmonary, gi and gu systems are negative, except as otherwise noted.      Objective    BP (!) 152/75   Pulse 79   Wt 106.1 kg (234 lb)   SpO2 99%   BMI 33.82 kg/m    Body mass index is 33.82 kg/m .  Physical Exam   GENERAL: healthy, alert and no distress  EYES: Eyes grossly normal to inspection, PERRL and conjunctivae and sclerae normal  NECK: no adenopathy, no asymmetry, masses, or scars and thyroid normal to palpation  RESP: lungs clear to auscultation - no rales, rhonchi or wheezes  CV: regular rate and rhythm, normal S1 S2, no S3 or S4, no murmur, click or rub, no peripheral edema and  peripheral pulses strong  MS: no gross musculoskeletal defects noted, no edema    Results for orders placed or performed in visit on 11/29/23 (from the past 24 hour(s))   Hemoglobin A1C ([facility])   Result Value Ref Range    Hemoglobin A1C 6.4 (A) 4.0 - 6.0 %   Aldosterone Renin Ratio    Narrative    The following orders were created for panel order Aldosterone Renin Ratio.  Procedure                               Abnormality         Status                     ---------                               -----------         ------                     Aldosterone[[id number]]                                      In process                 Renin activity[[id number]]                                   In process                 Aldosterone Renin Ratio[[id number]]                          In process                   Please view results for these tests on the individual orders.

## 2023-12-01 LAB — ALDOST SERPL-MCNC: 18.5 NG/DL (ref 0–31)

## 2023-12-03 LAB — RENIN PLAS-CCNC: 1 NG/ML/HR

## 2023-12-04 LAB — ALDOST/RENIN PLAS-RTO: 18.5 {RATIO} (ref 0–25)

## 2023-12-07 DIAGNOSIS — G47.00 INSOMNIA, UNSPECIFIED TYPE: ICD-10-CM

## 2023-12-07 DIAGNOSIS — I25.118 CORONARY ARTERY DISEASE OF NATIVE ARTERY OF NATIVE HEART WITH STABLE ANGINA PECTORIS (H): ICD-10-CM

## 2023-12-07 DIAGNOSIS — N40.1 BENIGN PROSTATIC HYPERPLASIA WITH URINARY FREQUENCY: ICD-10-CM

## 2023-12-07 DIAGNOSIS — R35.0 BENIGN PROSTATIC HYPERPLASIA WITH URINARY FREQUENCY: ICD-10-CM

## 2023-12-07 DIAGNOSIS — E11.43 TYPE 2 DIABETES MELLITUS WITH DIABETIC AUTONOMIC NEUROPATHY, WITHOUT LONG-TERM CURRENT USE OF INSULIN (H): ICD-10-CM

## 2023-12-07 RX ORDER — TAMSULOSIN HYDROCHLORIDE 0.4 MG/1
0.4 CAPSULE ORAL DAILY
Qty: 90 CAPSULE | Refills: 1 | Status: SHIPPED | OUTPATIENT
Start: 2023-12-07

## 2023-12-07 RX ORDER — TRAZODONE HYDROCHLORIDE 50 MG/1
50-100 TABLET, FILM COATED ORAL AT BEDTIME
Qty: 60 TABLET | Refills: 0 | Status: SHIPPED | OUTPATIENT
Start: 2023-12-07 | End: 2024-01-03

## 2023-12-07 RX ORDER — GLIPIZIDE 10 MG/1
10 TABLET, FILM COATED, EXTENDED RELEASE ORAL DAILY
Qty: 90 TABLET | Refills: 1 | Status: SHIPPED | OUTPATIENT
Start: 2023-12-07 | End: 2024-07-24

## 2023-12-07 RX ORDER — CARVEDILOL 25 MG/1
TABLET ORAL
Qty: 360 TABLET | Refills: 1 | Status: SHIPPED | OUTPATIENT
Start: 2023-12-07 | End: 2024-06-13

## 2023-12-07 NOTE — CONFIDENTIAL NOTE
Med: TRAZODONE, TAMSULOSIN, CARVEDILOL, GLIPIZIDE    LOV (related): 11/29/23    Last Lab: 11/29/23      Due for F/U around: 5/2024 FOR DM CHECK    Next Appt: NONE    BP Readings from Last 3 Encounters:   11/29/23 (!) 152/75   09/11/23 134/76   07/26/23 (!) 150/89     Last Comprehensive Metabolic Panel:  Lab Results   Component Value Date     01/23/2023    POTASSIUM 4.1 11/29/2023    CHLORIDE 92 (L) 01/23/2023    CO2 26 09/03/2022    ANIONGAP 6 09/03/2022     (H) 01/23/2023    BUN 20 01/23/2023    BUN 18 01/23/2023    CR 1.09 01/23/2023    GFRESTIMATED 77 09/03/2022    HEAVENLY 9.6 01/23/2023       Lab Results   Component Value Date    A1C 6.4 11/29/2023    A1C 7.3 07/26/2023    A1C 6.9 01/23/2023    A1C 6.5 06/20/2022    A1C 9.4 01/11/2022

## 2023-12-15 DIAGNOSIS — G63 POLYNEUROPATHY ASSOCIATED WITH UNDERLYING DISEASE (H): ICD-10-CM

## 2023-12-15 RX ORDER — GABAPENTIN 300 MG/1
300 CAPSULE ORAL 2 TIMES DAILY PRN
Qty: 90 CAPSULE | Refills: 0 | Status: SHIPPED | OUTPATIENT
Start: 2023-12-15 | End: 2024-01-26

## 2024-01-03 DIAGNOSIS — G47.00 INSOMNIA, UNSPECIFIED TYPE: ICD-10-CM

## 2024-01-03 NOTE — TELEPHONE ENCOUNTER
Med: trazodone    LOV (related): 9/11/23 with Cony Ordoñez NP       Due for F/U around: not specified, stable on current med    Next Appt: none

## 2024-01-04 RX ORDER — TRAZODONE HYDROCHLORIDE 50 MG/1
50-100 TABLET, FILM COATED ORAL AT BEDTIME
Qty: 60 TABLET | Refills: 0 | Status: SHIPPED | OUTPATIENT
Start: 2024-01-04 | End: 2024-02-07

## 2024-01-10 ENCOUNTER — MYC MEDICAL ADVICE (OUTPATIENT)
Dept: FAMILY MEDICINE | Facility: CLINIC | Age: 69
End: 2024-01-10

## 2024-01-10 DIAGNOSIS — M54.42 ACUTE BILATERAL LOW BACK PAIN WITH BILATERAL SCIATICA: ICD-10-CM

## 2024-01-10 DIAGNOSIS — M54.41 ACUTE BILATERAL LOW BACK PAIN WITH BILATERAL SCIATICA: ICD-10-CM

## 2024-01-10 RX ORDER — CYCLOBENZAPRINE HCL 10 MG
10 TABLET ORAL 3 TIMES DAILY PRN
Qty: 60 TABLET | Refills: 1 | Status: SHIPPED | OUTPATIENT
Start: 2024-01-10 | End: 2024-03-15

## 2024-01-18 ENCOUNTER — PATIENT OUTREACH (OUTPATIENT)
Dept: GASTROENTEROLOGY | Facility: CLINIC | Age: 69
End: 2024-01-18
Payer: COMMERCIAL

## 2024-01-26 DIAGNOSIS — G63 POLYNEUROPATHY ASSOCIATED WITH UNDERLYING DISEASE (H): ICD-10-CM

## 2024-01-26 RX ORDER — GABAPENTIN 300 MG/1
300 CAPSULE ORAL 2 TIMES DAILY PRN
Qty: 180 CAPSULE | Refills: 0 | Status: SHIPPED | OUTPATIENT
Start: 2024-01-26 | End: 2024-04-22

## 2024-01-26 NOTE — TELEPHONE ENCOUNTER
Med: Gabapentin      LOV (related): 7/26/23      Due for F/U around: 6 months 1/26/24    Next Appt: None

## 2024-02-07 ENCOUNTER — TRANSFERRED RECORDS (OUTPATIENT)
Dept: FAMILY MEDICINE | Facility: CLINIC | Age: 69
End: 2024-02-07

## 2024-02-07 DIAGNOSIS — G47.00 INSOMNIA, UNSPECIFIED TYPE: ICD-10-CM

## 2024-02-07 LAB — RETINOPATHY: NEGATIVE

## 2024-02-07 NOTE — TELEPHONE ENCOUNTER
Med: Trazodone 50 mg    LOV (related): 9/11/23      Due for F/U around: 5/29/24 (for DM check)    Next Appt: None scheduled

## 2024-02-08 RX ORDER — TRAZODONE HYDROCHLORIDE 50 MG/1
50-100 TABLET, FILM COATED ORAL AT BEDTIME
Qty: 60 TABLET | Refills: 0 | Status: SHIPPED | OUTPATIENT
Start: 2024-02-08 | End: 2024-03-07

## 2024-02-13 ENCOUNTER — MYC MEDICAL ADVICE (OUTPATIENT)
Dept: FAMILY MEDICINE | Facility: CLINIC | Age: 69
End: 2024-02-13

## 2024-02-13 DIAGNOSIS — E11.43 TYPE 2 DIABETES MELLITUS WITH DIABETIC AUTONOMIC NEUROPATHY, WITHOUT LONG-TERM CURRENT USE OF INSULIN (H): ICD-10-CM

## 2024-03-07 DIAGNOSIS — G47.00 INSOMNIA, UNSPECIFIED TYPE: ICD-10-CM

## 2024-03-07 RX ORDER — TRAZODONE HYDROCHLORIDE 50 MG/1
50-100 TABLET, FILM COATED ORAL AT BEDTIME
Qty: 60 TABLET | Refills: 0 | Status: SHIPPED | OUTPATIENT
Start: 2024-03-07 | End: 2024-04-10

## 2024-03-07 NOTE — TELEPHONE ENCOUNTER
Med: traZODone (DESYREL) 50 MG table     LOV (related): 9/11/23      Due for F/U around: April 2024 for DM    Next Appt: 4/24/24

## 2024-03-13 DIAGNOSIS — M54.41 ACUTE BILATERAL LOW BACK PAIN WITH BILATERAL SCIATICA: ICD-10-CM

## 2024-03-13 DIAGNOSIS — M54.42 ACUTE BILATERAL LOW BACK PAIN WITH BILATERAL SCIATICA: ICD-10-CM

## 2024-03-13 NOTE — TELEPHONE ENCOUNTER
Med: tiZANidine (ZANAFLEX) 4 MG tablet     LOV (related): 11/29/23      Due for F/U around: April 2024    Next Appt: 4/24/24 for DM med check

## 2024-03-14 DIAGNOSIS — M54.41 ACUTE BILATERAL LOW BACK PAIN WITH BILATERAL SCIATICA: ICD-10-CM

## 2024-03-14 DIAGNOSIS — M54.42 ACUTE BILATERAL LOW BACK PAIN WITH BILATERAL SCIATICA: ICD-10-CM

## 2024-03-15 RX ORDER — CYCLOBENZAPRINE HCL 10 MG
10 TABLET ORAL 3 TIMES DAILY PRN
Qty: 60 TABLET | Refills: 1 | Status: SHIPPED | OUTPATIENT
Start: 2024-03-15 | End: 2024-05-31

## 2024-03-15 NOTE — TELEPHONE ENCOUNTER
Med: Cyclobenzaprine       LOV (related): 7/26/23      Due for F/U around: Not Specified       Next Appt: 4/24/24 With Cony

## 2024-03-26 DIAGNOSIS — E78.00 HYPERCHOLESTEREMIA: ICD-10-CM

## 2024-03-26 RX ORDER — SIMVASTATIN 20 MG
20 TABLET ORAL AT BEDTIME
Qty: 90 TABLET | Refills: 3 | Status: SHIPPED | OUTPATIENT
Start: 2024-03-26

## 2024-03-26 NOTE — TELEPHONE ENCOUNTER
"Med: Simvastatin     LOV (related): 7/26/23    Last Lab: 7/26/23        Next Appt: 4/24/24 ,With Cony            Cholesterol   Date Value Ref Range Status   07/26/2023 116 100 - 199 mg/dL Final   06/20/2022 109 100 - 199 mg/dl Final   09/14/2021 136 100 - 199 mg/dL Final   07/20/2020 126 100 - 199 mg/dL Final     HDL Cholesterol   Date Value Ref Range Status   09/14/2021 45 >39 mg/dL Final   07/20/2020 43 >39 mg/dL Final     HDL   Date Value Ref Range Status   07/26/2023 40 40 mg/dL Final   06/20/2022 45 40 mg/dl Final     LDL Cholesterol Calculated   Date Value Ref Range Status   09/14/2021 72 0 - 99 mg/dL Final   07/20/2020 64 0 - 99 mg/dL Final     LDL CALCULATED ([facility])   Date Value Ref Range Status   07/26/2023 51 0 - 130 mg/dL Final   06/20/2022 49 0 - 130 mg/dl Final     Triglycerides   Date Value Ref Range Status   07/26/2023 120 0 - 149 mg/dL Final   06/20/2022 70 0 - 149 mg/dl Final   09/14/2021 104 0 - 149 mg/dL Final   07/20/2020 94 0 - 149 mg/dL Final     No results found for: \"CHOLHDLRATIO\"     "

## 2024-04-01 DIAGNOSIS — E11.43 TYPE 2 DIABETES MELLITUS WITH DIABETIC AUTONOMIC NEUROPATHY, WITHOUT LONG-TERM CURRENT USE OF INSULIN (H): ICD-10-CM

## 2024-04-01 NOTE — TELEPHONE ENCOUNTER
Med: Metformin       LOV (related): 11/19/23    Last Lab: 11/29/23      Due for F/U around: 6 months for DM check     Next Appt: 4/24/24 (DM CHECK ) With Cony          Lab Results   Component Value Date    A1C 6.4 11/29/2023    A1C 7.3 07/26/2023    A1C 6.9 01/23/2023    A1C 6.5 06/20/2022    A1C 9.4 01/11/2022

## 2024-04-03 RX ORDER — METFORMIN HCL 500 MG
1000 TABLET, EXTENDED RELEASE 24 HR ORAL 2 TIMES DAILY WITH MEALS
Qty: 360 TABLET | Refills: 1 | Status: SHIPPED | OUTPATIENT
Start: 2024-04-03 | End: 2024-10-02

## 2024-04-09 DIAGNOSIS — G47.00 INSOMNIA, UNSPECIFIED TYPE: ICD-10-CM

## 2024-04-09 NOTE — CONFIDENTIAL NOTE
Med: TRAZODONE    LOV (related): 9/11/23      Due for F/U around: 5/2024 FOR DM CHECK    Next Appt: 4/24/24

## 2024-04-10 RX ORDER — TRAZODONE HYDROCHLORIDE 50 MG/1
50-100 TABLET, FILM COATED ORAL AT BEDTIME
Qty: 60 TABLET | Refills: 0 | Status: SHIPPED | OUTPATIENT
Start: 2024-04-10 | End: 2024-05-09

## 2024-04-22 DIAGNOSIS — G63 POLYNEUROPATHY ASSOCIATED WITH UNDERLYING DISEASE (H): ICD-10-CM

## 2024-04-22 RX ORDER — GABAPENTIN 300 MG/1
CAPSULE ORAL
Qty: 180 CAPSULE | Refills: 0 | Status: SHIPPED | OUTPATIENT
Start: 2024-04-22 | End: 2024-07-24

## 2024-04-22 NOTE — TELEPHONE ENCOUNTER
Med: : gabapentin (NEURONTIN) 300 MG capsule     LOV (related): 9/11/23      Due for F/U around: 4/24/24 for DM    Next Appt: 4/24/24

## 2024-04-24 ENCOUNTER — OFFICE VISIT (OUTPATIENT)
Dept: FAMILY MEDICINE | Facility: CLINIC | Age: 69
End: 2024-04-24

## 2024-04-24 VITALS
BODY MASS INDEX: 33.93 KG/M2 | HEART RATE: 89 BPM | HEIGHT: 70 IN | SYSTOLIC BLOOD PRESSURE: 125 MMHG | WEIGHT: 237 LBS | OXYGEN SATURATION: 98 % | DIASTOLIC BLOOD PRESSURE: 88 MMHG

## 2024-04-24 DIAGNOSIS — Z23 IMMUNIZATION DUE: ICD-10-CM

## 2024-04-24 DIAGNOSIS — E11.43 TYPE 2 DIABETES MELLITUS WITH DIABETIC AUTONOMIC NEUROPATHY, WITHOUT LONG-TERM CURRENT USE OF INSULIN (H): Primary | ICD-10-CM

## 2024-04-24 DIAGNOSIS — R39.15 URINARY URGENCY: ICD-10-CM

## 2024-04-24 DIAGNOSIS — N39.44 URINARY INCONTINENCE, NOCTURNAL ENURESIS: ICD-10-CM

## 2024-04-24 LAB — HBA1C MFR BLD: 6.6 % (ref 4–6)

## 2024-04-24 PROCEDURE — G0103 PSA SCREENING: HCPCS | Mod: 90

## 2024-04-24 PROCEDURE — 99214 OFFICE O/P EST MOD 30 MIN: CPT | Mod: 25

## 2024-04-24 PROCEDURE — 83036 HEMOGLOBIN GLYCOSYLATED A1C: CPT

## 2024-04-24 PROCEDURE — 90480 ADMN SARSCOV2 VAC 1/ONLY CMP: CPT

## 2024-04-24 PROCEDURE — 36415 COLL VENOUS BLD VENIPUNCTURE: CPT

## 2024-04-24 NOTE — PROGRESS NOTES
"SUBJECTIVE:    Syd Joyce, is a 69 year old male presenting for the below:     1. Follow up of diabetes mellitus type 2.    Last HgbA1c (11/29/2023): 6.4    Glycemic control medications: trulicity, glipizide, metformin  Home BG monitoring: none  Blood pressure: does have hypertension.   Cholesterol:  Control   good  Goal LDL <  130 . is taking statin.   Tobacco abuse: absent  Taking ASA 81 mg daily: taking  Annual eye exam: 2/7/2024      2.) Urinary incontinence - more prominent at night. Has started December 2023. Takes evening medication at 2130 - just drinks enough to get pills down and has a glass of milk with dinner at 1800 - otherwise minimal fluids in the evening. Taking chlorthalidone and flomax in the morning. 2cups of coffee in the morning and stops drinking this around 0900 - only drinking water during the day no soda etc. 3-4 days per week and notices episode 1 time per night. Urge incontinence minimally during the day.       OBJECTIVE:  Vitals:    04/24/24 1003   BP: 125/88   Pulse: 89   SpO2: 98%   Weight: 107.5 kg (237 lb)   Height: 1.765 m (5' 9.5\")    Body mass index is 34.5 kg/m .  General: no acute distress, cooperative with exam.  Eyes: no injection or drainage.  Lungs: clear to auscultation bilaterally, normal respiratory effort.  Heart: regular rate, normal S1 and S2, no murmurs.   Neuro: grossly intact   Psych: mental status normal, mood and affect appropriate.      ASSESSMENT / PLAN:      Eddie was seen today for diabetes.    Diagnoses and all orders for this visit:    Type 2 diabetes mellitus with diabetic autonomic neuropathy, without long-term current use of insulin (H)  -     Hemoglobin A1C ([facility])  -     VENOUS COLLECTION  - Stable, no changes, at goal for HgbA1c, we will recheck at physical in July 2024    Immunization due  -     [place] ADMIN COVID VACCINE, IM    Urinary urgency  -     Prostate Specific Antigen Screen; Future  -  Hold tamsulosin for 1-2 weeks, if no improvement then " consider urology referral, could be VILLEGAS related     Urinary incontinence, nocturnal enuresis  -     Prostate Specific Antigen Screen; Future    Other orders  -     COVID-19 12+ (2023-24) (MODERNA)

## 2024-04-25 LAB — PSA SERPL DL<=0.01 NG/ML-MCNC: 1.21 NG/ML (ref 0–6.5)

## 2024-05-02 DIAGNOSIS — I10 ESSENTIAL HYPERTENSION: ICD-10-CM

## 2024-05-02 RX ORDER — LOSARTAN POTASSIUM 100 MG/1
TABLET ORAL
Qty: 90 TABLET | Refills: 3 | Status: SHIPPED | OUTPATIENT
Start: 2024-05-02

## 2024-05-02 NOTE — TELEPHONE ENCOUNTER
Med: Losartan       LOV (related): 11/29/23    Last Lab: 4/24/24      Due for F/U around: due for cpx soon 7/2024    Next Appt: No current future appointments scheduled         BP Readings from Last 3 Encounters:   04/24/24 125/88   11/29/23 (!) 152/75   09/11/23 134/76       Last Comprehensive Metabolic Panel:  Lab Results   Component Value Date     01/23/2023    POTASSIUM 4.1 11/29/2023    CHLORIDE 92 (L) 01/23/2023    CO2 26 09/03/2022    ANIONGAP 6 09/03/2022     (H) 01/23/2023    BUN 20 01/23/2023    BUN 18 01/23/2023    CR 1.09 01/23/2023    GFRESTIMATED 77 09/03/2022    HEAVENLY 9.6 01/23/2023

## 2024-05-02 NOTE — TELEPHONE ENCOUNTER
5/2/24 sent message to Cony to verify 3 month or 6 month follow up on diabetic check.  A1c was 6.6.

## 2024-05-02 NOTE — TELEPHONE ENCOUNTER
Cony Ordoñez APRN CNP [place], Jamie  Either way, I wanted him to be on an annual schedule for his physical which was done last year in July, just trying to get him on a solid 6 month rotation.

## 2024-05-09 DIAGNOSIS — G47.00 INSOMNIA, UNSPECIFIED TYPE: ICD-10-CM

## 2024-05-09 RX ORDER — TRAZODONE HYDROCHLORIDE 50 MG/1
50-100 TABLET, FILM COATED ORAL AT BEDTIME
Qty: 60 TABLET | Refills: 0 | Status: SHIPPED | OUTPATIENT
Start: 2024-05-09 | End: 2024-06-05

## 2024-05-09 NOTE — TELEPHONE ENCOUNTER
Med: trazodone     LOV (related): 9/11/23      Due for F/U around: 7/2024 due for cpx     Next Appt: 7/29/24 (cpx) With Cony

## 2024-05-10 ENCOUNTER — MYC MEDICAL ADVICE (OUTPATIENT)
Dept: FAMILY MEDICINE | Facility: CLINIC | Age: 69
End: 2024-05-10

## 2024-05-10 DIAGNOSIS — E11.43 TYPE 2 DIABETES MELLITUS WITH DIABETIC AUTONOMIC NEUROPATHY, WITHOUT LONG-TERM CURRENT USE OF INSULIN (H): ICD-10-CM

## 2024-05-13 NOTE — TELEPHONE ENCOUNTER
MyChart refill request for Trulicity 1.5mg. Last office visit 4/24/24, labs done:  Lab Results   Component Value Date    A1C 6.6 04/24/2024    A1C 6.4 11/29/2023    A1C 7.3 07/26/2023    A1C 6.9 01/23/2023    A1C 6.5 06/20/2022   Refill routed to Cony Ordoñez CNP for review. Tashia Briceno

## 2024-05-30 DIAGNOSIS — M54.42 ACUTE BILATERAL LOW BACK PAIN WITH BILATERAL SCIATICA: ICD-10-CM

## 2024-05-30 DIAGNOSIS — M54.41 ACUTE BILATERAL LOW BACK PAIN WITH BILATERAL SCIATICA: ICD-10-CM

## 2024-05-30 NOTE — TELEPHONE ENCOUNTER
Med: cyclobenzaprine (FLEXERIL) 10 MG tablet     LOV (related): 9/11/23      Due for F/U around: July 2024 for DM/med check    Next Appt: 7/29/24

## 2024-05-31 RX ORDER — CYCLOBENZAPRINE HCL 10 MG
10 TABLET ORAL 3 TIMES DAILY PRN
Qty: 60 TABLET | Refills: 1 | Status: SHIPPED | OUTPATIENT
Start: 2024-05-31 | End: 2024-08-07

## 2024-06-04 DIAGNOSIS — G47.00 INSOMNIA, UNSPECIFIED TYPE: ICD-10-CM

## 2024-06-04 NOTE — TELEPHONE ENCOUNTER
Med: trazodone     LOV (related): 9/11/23      Due for F/U around:  due for CPX    Last CPX 7/26/23    Next Appt: 7/29/24 (cpx/dm/med check ) With Cony

## 2024-06-05 RX ORDER — TRAZODONE HYDROCHLORIDE 50 MG/1
50-100 TABLET, FILM COATED ORAL AT BEDTIME
Qty: 60 TABLET | Refills: 0 | Status: SHIPPED | OUTPATIENT
Start: 2024-06-05 | End: 2024-07-03

## 2024-06-11 DIAGNOSIS — K21.00 GASTROESOPHAGEAL REFLUX DISEASE WITH ESOPHAGITIS, UNSPECIFIED WHETHER HEMORRHAGE: ICD-10-CM

## 2024-06-11 NOTE — TELEPHONE ENCOUNTER
Med: Pantoprazole      LOV (related): 7/26/23-cpx      Due for F/U around: 1 year for CPX and dm in July 2024      Next Appt: 7/29/24 (cpx/dm) With Cony

## 2024-06-12 DIAGNOSIS — N52.9 ERECTILE DYSFUNCTION, UNSPECIFIED ERECTILE DYSFUNCTION TYPE: ICD-10-CM

## 2024-06-12 RX ORDER — PANTOPRAZOLE SODIUM 40 MG/1
TABLET, DELAYED RELEASE ORAL
Qty: 90 TABLET | Refills: 3 | Status: SHIPPED | OUTPATIENT
Start: 2024-06-12

## 2024-06-12 RX ORDER — SILDENAFIL CITRATE 20 MG/1
TABLET ORAL
Qty: 30 TABLET | Refills: 11 | Status: SHIPPED | OUTPATIENT
Start: 2024-06-12

## 2024-06-12 NOTE — TELEPHONE ENCOUNTER
Med: Sildenafil       LOV (related): 7/26/23-cpx      Due for F/U around: 1 year for CPX      Next Appt: 7/29/24 (cpx) With Cony

## 2024-06-13 DIAGNOSIS — I25.118 CORONARY ARTERY DISEASE OF NATIVE ARTERY OF NATIVE HEART WITH STABLE ANGINA PECTORIS (H): ICD-10-CM

## 2024-06-13 RX ORDER — CARVEDILOL 25 MG/1
TABLET ORAL
Qty: 360 TABLET | Refills: 1 | Status: SHIPPED | OUTPATIENT
Start: 2024-06-13

## 2024-06-13 NOTE — TELEPHONE ENCOUNTER
Med: Carvedilol      LOV (related): 9/11/23    Last Lab: 11/29/23      Due for F/U around:  due for CPX  Last CPX 7/26/23      Next Appt: 7/29/24 (cpx/dm) With Cony          BP Readings from Last 3 Encounters:   04/24/24 125/88   11/29/23 (!) 152/75   09/11/23 134/76       Last Comprehensive Metabolic Panel:  Lab Results   Component Value Date     01/23/2023    POTASSIUM 4.1 11/29/2023    CHLORIDE 92 (L) 01/23/2023    CO2 26 09/03/2022    ANIONGAP 6 09/03/2022     (H) 01/23/2023    BUN 20 01/23/2023    BUN 18 01/23/2023    CR 1.09 01/23/2023    GFRESTIMATED 77 09/03/2022    HEAVENLY 9.6 01/23/2023

## 2024-07-03 DIAGNOSIS — G47.00 INSOMNIA, UNSPECIFIED TYPE: ICD-10-CM

## 2024-07-03 RX ORDER — TRAZODONE HYDROCHLORIDE 50 MG/1
50-100 TABLET, FILM COATED ORAL AT BEDTIME
Qty: 60 TABLET | Refills: 0 | Status: SHIPPED | OUTPATIENT
Start: 2024-07-03 | End: 2024-07-31

## 2024-07-03 NOTE — TELEPHONE ENCOUNTER
Med: traZODone (DESYREL) 50 MG tablet     LOV (related): 9/11/23      Due for F/U around: 7/2024 with DM visit    Next Appt: 7/29/24

## 2024-07-22 DIAGNOSIS — E11.43 TYPE 2 DIABETES MELLITUS WITH DIABETIC AUTONOMIC NEUROPATHY, WITHOUT LONG-TERM CURRENT USE OF INSULIN (H): ICD-10-CM

## 2024-07-22 DIAGNOSIS — G63 POLYNEUROPATHY ASSOCIATED WITH UNDERLYING DISEASE (H): ICD-10-CM

## 2024-07-23 NOTE — TELEPHONE ENCOUNTER
Med: Glipizide and Gabapentin    LOV (related): 04/24/2024    Last Lab: 4/24/2024      Due for F/U around: July 29, 2024    Next Appt: 8/12/2024        Lab Results   Component Value Date    A1C 6.6 04/24/2024    A1C 6.4 11/29/2023    A1C 7.3 07/26/2023    A1C 6.9 01/23/2023    A1C 6.5 06/20/2022

## 2024-07-24 RX ORDER — GABAPENTIN 300 MG/1
CAPSULE ORAL
Qty: 180 CAPSULE | Refills: 1 | Status: SHIPPED | OUTPATIENT
Start: 2024-07-24 | End: 2024-08-12

## 2024-07-24 RX ORDER — GLIPIZIDE 10 MG/1
10 TABLET, FILM COATED, EXTENDED RELEASE ORAL DAILY
Qty: 30 TABLET | Refills: 2 | Status: SHIPPED | OUTPATIENT
Start: 2024-07-24 | End: 2024-08-14 | Stop reason: ALTCHOICE

## 2024-07-30 DIAGNOSIS — G47.00 INSOMNIA, UNSPECIFIED TYPE: ICD-10-CM

## 2024-07-30 NOTE — CONFIDENTIAL NOTE
Med: TRAZODONE    LOV (related): 9/11/23      Due for F/U around: 8/2024 FOR CPX    Next Appt: 8/12/24

## 2024-07-31 RX ORDER — TRAZODONE HYDROCHLORIDE 50 MG/1
50-100 TABLET, FILM COATED ORAL AT BEDTIME
Qty: 60 TABLET | Refills: 0 | Status: SHIPPED | OUTPATIENT
Start: 2024-07-31 | End: 2024-08-28

## 2024-08-02 ENCOUNTER — MYC MEDICAL ADVICE (OUTPATIENT)
Dept: FAMILY MEDICINE | Facility: CLINIC | Age: 69
End: 2024-08-02

## 2024-08-02 DIAGNOSIS — E11.43 TYPE 2 DIABETES MELLITUS WITH DIABETIC AUTONOMIC NEUROPATHY, WITHOUT LONG-TERM CURRENT USE OF INSULIN (H): ICD-10-CM

## 2024-08-02 NOTE — TELEPHONE ENCOUNTER
[facility]. Patient last office visit 4/24/24 for DM2 check, patient is scheduled for CPX 8/12/24. Refill sent to pharmacy. Tashia Briceno   Lab Results   Component Value Date    A1C 6.6 04/24/2024    A1C 6.4 11/29/2023    A1C 7.3 07/26/2023    A1C 6.9 01/23/2023    A1C 6.5 06/20/2022

## 2024-08-05 DIAGNOSIS — M54.41 ACUTE BILATERAL LOW BACK PAIN WITH BILATERAL SCIATICA: ICD-10-CM

## 2024-08-05 DIAGNOSIS — M54.42 ACUTE BILATERAL LOW BACK PAIN WITH BILATERAL SCIATICA: ICD-10-CM

## 2024-08-05 NOTE — TELEPHONE ENCOUNTER
Med: cyclobenzaprine (FLEXERIL) 10 MG tablet     LOV (related): 7/26/23 AWV      Due for F/U around: due    Next Appt: 8/12/24 with MD

## 2024-08-07 RX ORDER — CYCLOBENZAPRINE HCL 10 MG
10 TABLET ORAL 3 TIMES DAILY PRN
Qty: 60 TABLET | Refills: 1 | Status: SHIPPED | OUTPATIENT
Start: 2024-08-07 | End: 2024-09-25

## 2024-08-07 SDOH — HEALTH STABILITY: PHYSICAL HEALTH: ON AVERAGE, HOW MANY DAYS PER WEEK DO YOU ENGAGE IN MODERATE TO STRENUOUS EXERCISE (LIKE A BRISK WALK)?: 0 DAYS

## 2024-08-07 SDOH — HEALTH STABILITY: PHYSICAL HEALTH: ON AVERAGE, HOW MANY MINUTES DO YOU ENGAGE IN EXERCISE AT THIS LEVEL?: 0 MIN

## 2024-08-07 ASSESSMENT — SOCIAL DETERMINANTS OF HEALTH (SDOH): HOW OFTEN DO YOU GET TOGETHER WITH FRIENDS OR RELATIVES?: MORE THAN THREE TIMES A WEEK

## 2024-08-09 NOTE — PROGRESS NOTES
Preventive Care Visit  [facility]  STACY Reeves CNP, Nurse Practitioner Primary Care  Aug 12, 2024      Assessment & Plan     Encounter for Medicare annual wellness exam  -Preventive health topics discussed including adequate exercise and healthy diet. Return to clinic in one year for preventative exam or sooner with any other concerns. Other issues discussed as noted below.   -discussed to get 2nd opinion for nerve pain, tooth health can be highly genetic related despite his upkeep with going to dentist regularly    Type 2 diabetes mellitus with diabetic autonomic neuropathy, without long-term current use of insulin (H)  - Patient is meeting A1C goal of <7.0. Continue current regimen. Discussed importance in compliance in all areas of diabetic control including diet, routine BS checks, absolute medication compliance, laboratory monitoring, and attending regular follow up appointments as ordered.  Failure to comply with instructions regarding diabetes will lead to a greater chance of poor diabetic control and therefore a greater chance of diabetes related complications such as CAD, CVA, PVD, and retinopathy/neuropathy/nephropathy. Based on level of diabetes control: Glucose testing Not indicated - but may motivate patient more to have shauna sensor given neuropathy status, will send in order for shauna. Return to the clinic in every 3 months.    - Hemoglobin A1C ([facility])  - VENOUS COLLECTION  - HI FOOT EXAM NO CHARGE  - Microalbumin ([facility])    Essential hypertension  - stable, no new issues, elevated today but typically stable   - Comprehensive metabolic panel  - Comprehensive metabolic panel    Hypercholesteremia  - stable, no changes   - Lipid Profile ([facility])    Screening for prostate cancer  - Prostate Specific Antigen Screen  - Prostate Specific Antigen Screen    Polyneuropathy associated with underlying disease (H24)  - will increase gabapentin and patient to get 2nd opinion about neuropathy care -  "did educate patient not a lot we can do once damage is done, could consider pregabalin, his diabetes is well controlled but could consider switching GLP-1 for more weight loss component, will discuss with pharmacist on this   - gabapentin (NEURONTIN) 300 MG capsule  Dispense: 360 capsule; Refill: 1    Chronic respiratory failure with hypoxia (H)  - sleep study done 8/2011 with evidence of \"significant hypoxeia\". Declined trial of Cpap. - Does still have mild daytime sleepiness however refusing interventions at this time    Alcohol dependence in remission (H)  - congratulated on sobriety, no urges     Coronary artery disease of native artery of native heart with stable angina pectoris (H24)  - stable on aspirin and statin    Encounter for hearing examination following failed hearing screening  - Adult Audiology  Referral    Benign prostatic hyperplasia with urinary frequency  - suspect incontinence could be related will refer to urology for further work up   - Adult Urology  Referral    Urge incontinence of urine  -suspect incontinence could be related will refer to urology for further work up   - Adult Urology  Referral      Patient has been advised of split billing requirements and indicates understanding: Yes        BMI  Estimated body mass index is 33.62 kg/m  as calculated from the following:    Height as of this encounter: 1.784 m (5' 10.25\").    Weight as of this encounter: 107 kg (236 lb).   Weight management plan: Discussed healthy diet and exercise guidelines    Counseling  Appropriate preventive services were addressed with this patient via screening, questionnaire, or discussion as appropriate for fall prevention, nutrition, physical activity, Tobacco-use cessation, weight loss and cognition.  Checklist reviewing preventive services available has been given to the patient.  Reviewed patient's diet, addressing concerns and/or questions.   Information on urinary incontinence and " treatment options given to patient.       See Patient Instructions    No follow-ups on file.    Adrian Morgan is a 69 year old, presenting for the following:  Physical (Fasting), Health Maintenance (Colonoscopy: UTD - 11/2021/PSA: denies fhx prostate cancer/Vaccines: Tdap, Covid, RSV, Zoster due), and Diabetes (Microalbumin and foot exam due/A1c: last in 04/2024 (6.6%)/Eye exam: UTD - 02/2024)          Health Care Directive  Patient does not have a Health Care Directive or Living Will: did not discuss     HPI  1.) incontinence: began start of this year - happening mostly at night - gradually if patient cannot get to the bathroom quick enough he has incontinence. He has to wear briefs.  Stream feels slower.  Denies dysuria and hematuria. Patient held flomax and incontinence did not improve     2.) depression: wife believes patient is depressed  - patient does not really agree.      3.) weight gain: no changes to diet - less exercise due to his nerve pain.    Wt Readings from Last 4 Encounters:   08/12/24 107 kg (236 lb)   04/24/24 107.5 kg (237 lb)   11/29/23 106.1 kg (234 lb)   09/11/23 103.9 kg (229 lb)     4.) polyneuropathy: taking gabapentin which does give patient relief. Feels like this is getting worse.  Followed with [facility].  Last saw them 3-4 years ago.  Did EMG which showed deficits.  Issues cutting toenails due to decreased ROM.                8/7/2024   General Health   How would you rate your overall physical health? (!) FAIR   Feel stress (tense, anxious, or unable to sleep) Not at all            8/7/2024   Nutrition   Diet: Regular (no restrictions)            8/7/2024   Exercise   Days per week of moderate/strenous exercise 0 days   Average minutes spent exercising at this level 0 min      (!) EXERCISE CONCERN      8/7/2024   Social Factors   Frequency of gathering with friends or relatives More than three times a week   Worry food won't last until get money to buy more No   Food not  last or not have enough money for food? No   Do you have housing? (Housing is defined as stable permanent housing and does not include staying ouside in a car, in a tent, in an abandoned building, in an overnight shelter, or couch-surfing.) Yes   Are you worried about losing your housing? No   Lack of transportation? No   Unable to get utilities (heat,electricity)? No            8/7/2024   Fall Risk   Fallen 2 or more times in the past year? No   Trouble with walking or balance? Yes              8/7/2024   Activities of Daily Living- Home Safety   Needs help with the following daily activites None of the above   Safety concerns in the home None of the above            8/7/2024   Dental   Dentist two times every year? Yes            8/7/2024   Hearing Screening   Hearing concerns? None of the above   Left ear:  500Hz  Pass  1000Hz  Pass  2000Hz  Pass  4000Hz  Fail    Right ear:  500Hz  Pass  1000Hz  Pass  2000Hz  Pass  4000Hz  Fail         8/7/2024   Driving Risk Screening   Patient/family members have concerns about driving No            8/7/2024   General Alertness/Fatigue Screening   Have you been more tired than usual lately? No            8/7/2024   Urinary Incontinence Screening   Bothered by leaking urine in past 6 months Yes            8/7/2024   TB Screening   Were you born outside of the [place]? No              Today's PHQ-2 Score:       7/26/2023    12:39 PM   PHQ-2 ( 1999 Pfizer)   Q1: Little interest or pleasure in doing things 0   Q2: Feeling down, depressed or hopeless 0   PHQ-2 Score 0         8/7/2024   Substance Use   Alcohol more than 3/day or more than 7/wk No   Do you have a current opioid prescription? No   How severe/bad is pain from 1 to 10? 5/10   Do you use any other substances recreationally? No        Social History     Tobacco Use    Smoking status: Never    Smokeless tobacco: Never   Substance Use Topics    Alcohol use: Not Currently     Comment: 6/2022 - sober 6 years    Drug use: No            2024   AAA Screening   Family history of Abdominal Aortic Aneurysm (AAA)? No      ASCVD Risk   The ASCVD Risk score (Heydi VILLARREAL, et al., 2019) failed to calculate for the following reasons:    The valid total cholesterol range is 130 to 320 mg/dL    Fracture Risk Assessment Tool  Link to Frax Calculator  Use the information below to complete the Frax calculator  : 1955  Sex: male  Weight (kg): 107 kg (actual weight)  Height (cm): 178.4 cm  Previous Fragility Fracture:  No  History of parent with fractured hip:  No  Current Smoking:  No  Patient has been on glucocorticoids for more than 3 months (5mg/day or more): No  Rheumatoid Arthritis on Problem List:  No  Secondary Osteoporosis on Problem List:  No  Consumes 3 or more units of alcohol per day: No  Femoral Neck BMD (g/cm2)            Reviewed and updated as needed this visit by Provider     Meds  Problems               Past Medical History:   Diagnosis Date    DM2 (diabetes mellitus, type 2) (H)     Esophageal reflux 6/15/2012    History of colonic polyps 2021    HTN (hypertension) 2011    Leg pain, bilateral     Low back pain     Numbness and tingling     LEGS, R/T LUMBAR STENOSIS    Obesity, unspecified 1/10/2014    S/P lumbar laminectomy 2017     Lab work is in process  Current providers sharing in care for this patient include:  Patient Care Team:  Cony Ordoñez APRN CNP as PCP - General (Nurse Practitioner Primary Care)  Clara Watkins RD as Diabetes Educator (Dietitian, Registered)  Cony Ordoñez APRN CNP as Assigned PCP    The following health maintenance items are reviewed in Epic and correct as of today:  Health Maintenance   Topic Date Due    ZOSTER IMMUNIZATION (1 of 2) Never done    RSV VACCINE (Pregnancy & 60+) (1 - 1-dose 60+ series) Never done    DTAP/TDAP/TD IMMUNIZATION (2 - Td or Tdap) 2023    COVID-19 Vaccine ( - -24 season) 2023    PHQ-2 (once per calendar year)  2024     "BMP  01/23/2024    MICROALBUMIN  07/26/2024    INFLUENZA VACCINE (1) 09/01/2024    A1C  02/12/2025    MEDICARE ANNUAL WELLNESS VISIT  08/12/2025    LIPID  08/12/2025    DIABETIC FOOT EXAM  08/12/2025    FALL RISK ASSESSMENT  08/12/2025    EYE EXAM  02/07/2026    ADVANCE CARE PLANNING  06/29/2027    COLORECTAL CANCER SCREENING  11/11/2031    HEPATITIS C SCREENING  Completed    Pneumococcal Vaccine: 65+ Years  Completed    IPV IMMUNIZATION  Aged Out    HPV IMMUNIZATION  Aged Out    MENINGITIS IMMUNIZATION  Aged Out    RSV MONOCLONAL ANTIBODY  Aged Out         Review of Systems  Constitutional, HEENT, cardiovascular, pulmonary, gi and gu systems are negative, except as otherwise noted.     Objective    Exam  BP (!) 147/75   Pulse 74   Ht 1.784 m (5' 10.25\")   Wt 107 kg (236 lb)   SpO2 99%   BMI 33.62 kg/m     Estimated body mass index is 33.62 kg/m  as calculated from the following:    Height as of this encounter: 1.784 m (5' 10.25\").    Weight as of this encounter: 107 kg (236 lb).    Physical Exam  Feet:      Right foot:      Protective Sensation: 5 sites tested.  3 sites sensed.      Toenail Condition: Right toenails are abnormally thick. Fungal disease present.     Left foot:      Protective Sensation: 5 sites tested.  3 sites sensed.      Toenail Condition: Left toenails are abnormally thick. Fungal disease present.     Comments: Unable to sense microfilament test on toes bilaterally and on pad of foot bilaterally. Could not feel heel of right foot.   Neurological:      Cranial Nerves: Cranial nerves 2-12 are intact.       GENERAL: alert and no distress  EYES: Eyes grossly normal to inspection, PERRL and conjunctivae and sclerae normal  HENT: ear canals and TM's normal, nose and mouth without ulcers or lesions  NECK: no adenopathy, no asymmetry, masses, or scars  RESP: lungs clear to auscultation - no rales, rhonchi or wheezes  CV: regular rate and rhythm, normal S1 S2, no S3 or S4, no murmur, click or rub, " no peripheral edema  ABDOMEN: soft, nontender, no hepatosplenomegaly, no masses and bowel sounds normal  MS: no gross musculoskeletal defects noted, no edema  SKIN: no suspicious lesions or rashes  NEURO: Normal strength and tone, mentation intact and speech normal  PSYCH: mentation appears normal, affect normal/bright         8/12/2024   Mini Cog   Clock Draw Score 2 Normal   3 Item Recall 3 objects recalled   Mini Cog Total Score 5                 Signed Electronically by: STACY Reeves CNP

## 2024-08-12 ENCOUNTER — OFFICE VISIT (OUTPATIENT)
Dept: FAMILY MEDICINE | Facility: CLINIC | Age: 69
End: 2024-08-12

## 2024-08-12 VITALS
SYSTOLIC BLOOD PRESSURE: 147 MMHG | BODY MASS INDEX: 33.79 KG/M2 | OXYGEN SATURATION: 99 % | WEIGHT: 236 LBS | HEIGHT: 70 IN | HEART RATE: 74 BPM | DIASTOLIC BLOOD PRESSURE: 75 MMHG

## 2024-08-12 DIAGNOSIS — E11.43 TYPE 2 DIABETES MELLITUS WITH DIABETIC AUTONOMIC NEUROPATHY, WITHOUT LONG-TERM CURRENT USE OF INSULIN (H): ICD-10-CM

## 2024-08-12 DIAGNOSIS — E78.00 HYPERCHOLESTEREMIA: ICD-10-CM

## 2024-08-12 DIAGNOSIS — Z01.110 ENCOUNTER FOR HEARING EXAMINATION FOLLOWING FAILED HEARING SCREENING: ICD-10-CM

## 2024-08-12 DIAGNOSIS — Z12.5 SCREENING FOR PROSTATE CANCER: ICD-10-CM

## 2024-08-12 DIAGNOSIS — I25.118 CORONARY ARTERY DISEASE OF NATIVE ARTERY OF NATIVE HEART WITH STABLE ANGINA PECTORIS (H): ICD-10-CM

## 2024-08-12 DIAGNOSIS — F10.21 ALCOHOL DEPENDENCE IN REMISSION (H): ICD-10-CM

## 2024-08-12 DIAGNOSIS — N39.41 URGE INCONTINENCE OF URINE: ICD-10-CM

## 2024-08-12 DIAGNOSIS — G63 POLYNEUROPATHY ASSOCIATED WITH UNDERLYING DISEASE (H): ICD-10-CM

## 2024-08-12 DIAGNOSIS — R35.0 BENIGN PROSTATIC HYPERPLASIA WITH URINARY FREQUENCY: ICD-10-CM

## 2024-08-12 DIAGNOSIS — N40.1 BENIGN PROSTATIC HYPERPLASIA WITH URINARY FREQUENCY: ICD-10-CM

## 2024-08-12 DIAGNOSIS — I10 ESSENTIAL HYPERTENSION: ICD-10-CM

## 2024-08-12 DIAGNOSIS — Z00.00 ENCOUNTER FOR MEDICARE ANNUAL WELLNESS EXAM: Primary | ICD-10-CM

## 2024-08-12 DIAGNOSIS — B35.3 TINEA PEDIS OF BOTH FEET: ICD-10-CM

## 2024-08-12 DIAGNOSIS — J96.11 CHRONIC RESPIRATORY FAILURE WITH HYPOXIA (H): ICD-10-CM

## 2024-08-12 LAB
ALBUMIN SERPL BCG-MCNC: 4.3 G/DL (ref 3.5–5.2)
ALP SERPL-CCNC: 150 U/L (ref 40–150)
ALT SERPL W P-5'-P-CCNC: 23 U/L (ref 0–70)
ANION GAP SERPL CALCULATED.3IONS-SCNC: 10 MMOL/L (ref 7–15)
AST SERPL W P-5'-P-CCNC: 22 U/L (ref 0–45)
BILIRUB SERPL-MCNC: 0.3 MG/DL
BUN SERPL-MCNC: 18.9 MG/DL (ref 8–23)
CALCIUM SERPL-MCNC: 9.5 MG/DL (ref 8.8–10.4)
CHLORIDE SERPL-SCNC: 90 MMOL/L (ref 98–107)
CHOLESTEROL: 103 MG/DL (ref 100–199)
CREAT SERPL-MCNC: 1.36 MG/DL (ref 0.67–1.17)
EGFRCR SERPLBLD CKD-EPI 2021: 56 ML/MIN/1.73M2
FASTING STATUS PATIENT QL REPORTED: YES
FASTING?: YES
GLUCOSE SERPL-MCNC: 107 MG/DL (ref 70–99)
HBA1C MFR BLD: 6.5 % (ref 4–6)
HCO3 SERPL-SCNC: 32 MMOL/L (ref 22–29)
HDL (RMG): 36 MG/DL (ref 40–?)
LDL CALCULATED (RMG): 44 MG/DL (ref 0–130)
POTASSIUM SERPL-SCNC: 4.3 MMOL/L (ref 3.4–5.3)
PROT SERPL-MCNC: 6.6 G/DL (ref 6.4–8.3)
PSA SERPL DL<=0.01 NG/ML-MCNC: 1.3 NG/ML (ref 0–6.5)
SODIUM SERPL-SCNC: 132 MMOL/L (ref 135–145)
TRIGLYCERIDES (RMG): 112 MG/DL (ref 0–149)

## 2024-08-12 PROCEDURE — G0103 PSA SCREENING: HCPCS | Mod: 90

## 2024-08-12 PROCEDURE — 80061 LIPID PANEL: CPT | Mod: QW

## 2024-08-12 PROCEDURE — 36415 COLL VENOUS BLD VENIPUNCTURE: CPT

## 2024-08-12 PROCEDURE — 83036 HEMOGLOBIN GLYCOSYLATED A1C: CPT | Mod: 90

## 2024-08-12 PROCEDURE — 80053 COMPREHEN METABOLIC PANEL: CPT | Mod: 90

## 2024-08-12 PROCEDURE — 99207 PR FOOT EXAM NO CHARGE: CPT

## 2024-08-12 PROCEDURE — 99214 OFFICE O/P EST MOD 30 MIN: CPT | Mod: 25

## 2024-08-12 PROCEDURE — G0439 PPPS, SUBSEQ VISIT: HCPCS

## 2024-08-12 RX ORDER — GABAPENTIN 300 MG/1
300-600 CAPSULE ORAL 2 TIMES DAILY PRN
Qty: 360 CAPSULE | Refills: 1 | Status: SHIPPED | OUTPATIENT
Start: 2024-08-12 | End: 2024-09-20

## 2024-08-12 RX ORDER — KETOCONAZOLE 20 MG/G
CREAM TOPICAL DAILY
Qty: 60 G | Refills: 1 | Status: SHIPPED | OUTPATIENT
Start: 2024-08-12

## 2024-08-12 NOTE — PATIENT INSTRUCTIONS
Vaccines: Tdap, Covid, RSV, Zoster due     Patient Education   Preventive Care Advice   This is general advice given by our system to help you stay healthy. However, your care team may have specific advice just for you. Please talk to your care team about your preventive care needs.  Nutrition  Eat 5 or more servings of fruits and vegetables each day.  Try wheat bread, brown rice and whole grain pasta (instead of white bread, rice, and pasta).  Get enough calcium and vitamin D. Check the label on foods and aim for 100% of the RDA (recommended daily allowance).  Lifestyle  Exercise at least 150 minutes each week  (30 minutes a day, 5 days a week).  Do muscle strengthening activities 2 days a week. These help control your weight and prevent disease.  No smoking.  Wear sunscreen to prevent skin cancer.  Have a dental exam and cleaning every 6 months.  Yearly exams  See your health care team every year to talk about:  Any changes in your health.  Any medicines your care team has prescribed.  Preventive care, family planning, and ways to prevent chronic diseases.  Shots (vaccines)   HPV shots (up to age 26), if you've never had them before.  Hepatitis B shots (up to age 59), if you've never had them before.  COVID-19 shot: Get this shot when it's due.  Flu shot: Get a flu shot every year.  Tetanus shot: Get a tetanus shot every 10 years.  Pneumococcal, hepatitis A, and RSV shots: Ask your care team if you need these based on your risk.  Shingles shot (for age 50 and up)  General health tests  Diabetes screening:  Starting at age 35, Get screened for diabetes at least every 3 years.  If you are younger than age 35, ask your care team if you should be screened for diabetes.  Cholesterol test: At age 39, start having a cholesterol test every 5 years, or more often if advised.  Bone density scan (DEXA): At age 50, ask your care team if you should have this scan for osteoporosis (brittle bones).  Hepatitis C: Get tested at  least once in your life.  STIs (sexually transmitted infections)  Before age 24: Ask your care team if you should be screened for STIs.  After age 24: Get screened for STIs if you're at risk. You are at risk for STIs (including HIV) if:  You are sexually active with more than one person.  You don't use condoms every time.  You or a partner was diagnosed with a sexually transmitted infection.  If you are at risk for HIV, ask about PrEP medicine to prevent HIV.  Get tested for HIV at least once in your life, whether you are at risk for HIV or not.  Cancer screening tests  Cervical cancer screening: If you have a cervix, begin getting regular cervical cancer screening tests starting at age 21.  Breast cancer scan (mammogram): If you've ever had breasts, begin having regular mammograms starting at age 40. This is a scan to check for breast cancer.  Colon cancer screening: It is important to start screening for colon cancer at age 45.  Have a colonoscopy test every 10 years (or more often if you're at risk) Or, ask your provider about stool tests like a FIT test every year or Cologuard test every 3 years.  To learn more about your testing options, visit:   .  For help making a decision, visit:   https://bit.ly/hf34243.  Prostate cancer screening test: If you have a prostate, ask your care team if a prostate cancer screening test (PSA) at age 55 is right for you.  Lung cancer screening: If you are a current or former smoker ages 50 to 80, ask your care team if ongoing lung cancer screenings are right for you.  For informational purposes only. Not to replace the advice of your health care provider. Copyright   2023 [facility]. All rights reserved. Clinically reviewed by the [facility]. Stretchr [id number] - REV 01/24.  Preventing Falls: Care Instructions  Injuries and health problems such as trouble walking or poor eyesight can increase your risk of falling. So can some medicines. But  "there are things you can do to help prevent falls. You can exercise to get stronger. You can also arrange your home to make it safer.    Talk to your doctor about the medicines you take. Ask if any of them increase the risk of falls and whether they can be changed or stopped.   Try to exercise regularly. It can help improve your strength and balance. This can help lower your risk of falling.     Practice fall safety and prevention.    Wear low-heeled shoes that fit well and give your feet good support. Talk to your doctor if you have foot problems that make this hard.  Carry a cellphone or wear a medical alert device that you can use to call for help.  Use stepladders instead of chairs to reach high objects. Don't climb if you're at risk for falls. Ask for help, if needed.  Wear the correct eyeglasses, if you need them.    Make your home safer.    Remove rugs, cords, clutter, and furniture from walkways.  Keep your house well lit. Use night-lights in hallways and bathrooms.  Install and use sturdy handrails on stairways.  Wear nonskid footwear, even inside. Don't walk barefoot or in socks without shoes.    Be safe outside.    Use handrails, curb cuts, and ramps whenever possible.  Keep your hands free by using a shoulder bag or backpack.  Try to walk in well-lit areas. Watch out for uneven ground, changes in pavement, and debris.  Be careful in the winter. Walk on the grass or gravel when sidewalks are slippery. Use de-icer on steps and walkways. Add non-slip devices to shoes.    Put grab bars and nonskid mats in your shower or tub and near the toilet. Try to use a shower chair or bath bench when bathing.   Get into a tub or shower by putting in your weaker leg first. Get out with your strong side first. Have a phone or medical alert device in the bathroom with you.   Where can you learn more?  Go to https://www.[employer].net/patiented  Enter G117 in the search box to learn more about \"Preventing Falls: Care " "Instructions.\"  Current as of: July 17, 2023               Content Version: 14.0    2885-3052 [employer].   Care instructions adapted under license by your healthcare professional. If you have questions about a medical condition or this instruction, always ask your healthcare professional. [employer] disclaims any warranty or liability for your use of this information.      Bladder Training: Care Instructions  Your Care Instructions     Bladder training is used to treat urge incontinence and stress incontinence. Urge incontinence means that the need to urinate comes on so fast that you can't get to a toilet in time. Stress incontinence means that you leak urine because of pressure on your bladder. For example, it may happen when you laugh, cough, or lift something heavy.  Bladder training can increase how long you can wait before you have to urinate. It can also help your bladder hold more urine. And it can give you better control over the urge to urinate.  It is important to remember that bladder training takes a few weeks to a few months to make a difference. You may not see results right away, but don't give up.  Follow-up care is a key part of your treatment and safety. Be sure to make and go to all appointments, and call your doctor if you are having problems. It's also a good idea to know your test results and keep a list of the medicines you take.  How can you care for yourself at home?  Work with your doctor to come up with a bladder training program that is right for you. You may use one or more of the following methods.  Delayed urination  In the beginning, try to keep from urinating for 5 minutes after you first feel the need to go.  While you wait, take deep, slow breaths to relax. Kegel exercises can also help you delay the need to go to the bathroom.  After some practice, when you can easily wait 5 minutes to urinate, try to wait 10 minutes before you urinate.  Slowly " "increase the waiting period until you are able to control when you have to urinate.  Scheduled urination  Empty your bladder when you first wake up in the morning.  Schedule times throughout the day when you will urinate.  Start by going to the bathroom every hour, even if you don't need to go.  Slowly increase the time between trips to the bathroom.  When you have found a schedule that works well for you, keep doing it.  If you wake up during the night and have to urinate, do it. Apply your schedule to waking hours only.  Kegel exercises  These tighten and strengthen pelvic muscles, which can help you control the flow of urine. (If doing these exercises causes pain, stop doing them and talk with your doctor.) To do Kegel exercises:  Squeeze your muscles as if you were trying not to pass gas. Or squeeze your muscles as if you were stopping the flow of urine. Your belly, legs, and buttocks shouldn't move.  Hold the squeeze for 3 seconds, then relax for 5 to 10 seconds.  Start with 3 seconds, then add 1 second each week until you are able to squeeze for 10 seconds.  Repeat the exercise 10 times a session. Do 3 to 8 sessions a day.  When should you call for help?  Watch closely for changes in your health, and be sure to contact your doctor if:    Your incontinence is getting worse.     You do not get better as expected.   Where can you learn more?  Go to https://www.[employer].net/patiented  Enter V684 in the search box to learn more about \"Bladder Training: Care Instructions.\"  Current as of: November 15, 2023               Content Version: 14.0    3390-6961 [employer].   Care instructions adapted under license by your healthcare professional. If you have questions about a medical condition or this instruction, always ask your healthcare professional. [employer] disclaims any warranty or liability for your use of this information.         "

## 2024-08-14 RX ORDER — DULAGLUTIDE 3 MG/.5ML
3 INJECTION, SOLUTION SUBCUTANEOUS
Qty: 12 ML | Refills: 0 | Status: SHIPPED | OUTPATIENT
Start: 2024-08-14

## 2024-08-26 DIAGNOSIS — E11.43 TYPE 2 DIABETES MELLITUS WITH DIABETIC AUTONOMIC NEUROPATHY, WITHOUT LONG-TERM CURRENT USE OF INSULIN (H): ICD-10-CM

## 2024-08-27 DIAGNOSIS — G47.00 INSOMNIA, UNSPECIFIED TYPE: ICD-10-CM

## 2024-08-27 NOTE — TELEPHONE ENCOUNTER
Med: Glipizide    LOV (related): 8/12/24 with Cony Ordoñez, CNP + see 8/15 mychart    Last Lab: 8/12/24      Due for F/U around: end of 10/2024 with MTM to discuss medication options    Next Appt: None      Lab Results   Component Value Date    A1C 6.5 08/12/2024    A1C 6.6 04/24/2024    A1C 6.4 11/29/2023    A1C 7.3 07/26/2023    A1C 6.9 01/23/2023

## 2024-08-27 NOTE — TELEPHONE ENCOUNTER
Med: Trazodone    LOV (related): 8/12/24 with Cony Ordoñez CNP       Due for F/U around: end of 10/2024 with MTM to discuss medication options     Next Appt: None

## 2024-08-28 RX ORDER — TRAZODONE HYDROCHLORIDE 50 MG/1
50-100 TABLET, FILM COATED ORAL AT BEDTIME
Qty: 60 TABLET | Refills: 2 | Status: SHIPPED | OUTPATIENT
Start: 2024-08-28

## 2024-09-11 RX ORDER — GLIPIZIDE 10 MG/1
10 TABLET, FILM COATED, EXTENDED RELEASE ORAL DAILY
Qty: 90 TABLET | Refills: 0 | Status: SHIPPED | OUTPATIENT
Start: 2024-09-11

## 2024-09-18 ENCOUNTER — TRANSFERRED RECORDS (OUTPATIENT)
Dept: FAMILY MEDICINE | Facility: CLINIC | Age: 69
End: 2024-09-18

## 2024-09-20 ENCOUNTER — MYC MEDICAL ADVICE (OUTPATIENT)
Dept: FAMILY MEDICINE | Facility: CLINIC | Age: 69
End: 2024-09-20

## 2024-09-20 RX ORDER — GABAPENTIN 300 MG/1
600 CAPSULE ORAL 2 TIMES DAILY
Qty: 360 CAPSULE | Refills: 2 | Status: SHIPPED | OUTPATIENT
Start: 2024-09-20 | End: 2024-12-19

## 2024-09-24 DIAGNOSIS — M54.41 ACUTE BILATERAL LOW BACK PAIN WITH BILATERAL SCIATICA: ICD-10-CM

## 2024-09-24 DIAGNOSIS — M54.42 ACUTE BILATERAL LOW BACK PAIN WITH BILATERAL SCIATICA: ICD-10-CM

## 2024-09-24 NOTE — TELEPHONE ENCOUNTER
Med: cyclobenzaprine (FLEXERIL) 10 MG tablet     LOV (related): 8/12/24 AWV      Due for F/U around: Return to the clinic in every 3 months.     Next Appt: none- reminder given

## 2024-09-25 RX ORDER — CYCLOBENZAPRINE HCL 10 MG
10 TABLET ORAL 3 TIMES DAILY PRN
Qty: 60 TABLET | Refills: 0 | Status: SHIPPED | OUTPATIENT
Start: 2024-09-25

## 2024-09-30 DIAGNOSIS — E11.43 TYPE 2 DIABETES MELLITUS WITH DIABETIC AUTONOMIC NEUROPATHY, WITHOUT LONG-TERM CURRENT USE OF INSULIN (H): ICD-10-CM

## 2024-10-01 NOTE — TELEPHONE ENCOUNTER
Med: metFORMIN (GLUCOPHAGE XR) 500 MG 24 hr tablet     LOV (related): 8/12/24    Last Lab: 8/12/24      Due for F/U around: Return to the clinic in every 3 months. (11/13/24)    Next Appt: none        Lab Results   Component Value Date    A1C 6.5 08/12/2024    A1C 6.6 04/24/2024    A1C 6.4 11/29/2023    A1C 7.3 07/26/2023    A1C 6.9 01/23/2023

## 2024-10-02 RX ORDER — METFORMIN HYDROCHLORIDE 500 MG/1
1000 TABLET, EXTENDED RELEASE ORAL 2 TIMES DAILY WITH MEALS
Qty: 360 TABLET | Refills: 0 | Status: SHIPPED | OUTPATIENT
Start: 2024-10-02

## 2024-10-11 DIAGNOSIS — I10 ESSENTIAL HYPERTENSION: ICD-10-CM

## 2024-10-11 RX ORDER — CHLORTHALIDONE 25 MG/1
25 TABLET ORAL DAILY
Qty: 90 TABLET | Refills: 3 | Status: SHIPPED | OUTPATIENT
Start: 2024-10-11

## 2024-10-11 NOTE — CONFIDENTIAL NOTE
Med: CHLORTHALIDONE    LOV (related): 8/12/24 - CPX    Last Lab: 8/12/24      Due for F/U around: 11/2024 FOR DM CHECK    Next Appt: NONE        BP Readings from Last 3 Encounters:   08/12/24 (!) 147/75   04/24/24 125/88   11/29/23 (!) 152/75       Last Comprehensive Metabolic Panel:  Lab Results   Component Value Date     (L) 08/12/2024    POTASSIUM 4.3 08/12/2024    CHLORIDE 90 (L) 08/12/2024    CO2 32 (H) 08/12/2024    ANIONGAP 10 08/12/2024     (H) 08/12/2024    BUN 18.9 08/12/2024    CR 1.36 (H) 08/12/2024    GFRESTIMATED 56 (L) 08/12/2024    HEAVENLY 9.5 08/12/2024

## 2024-10-21 DIAGNOSIS — M54.42 ACUTE BILATERAL LOW BACK PAIN WITH BILATERAL SCIATICA: ICD-10-CM

## 2024-10-21 DIAGNOSIS — M54.41 ACUTE BILATERAL LOW BACK PAIN WITH BILATERAL SCIATICA: ICD-10-CM

## 2024-10-21 RX ORDER — CYCLOBENZAPRINE HCL 10 MG
10 TABLET ORAL 3 TIMES DAILY PRN
Qty: 60 TABLET | Refills: 0 | Status: SHIPPED | OUTPATIENT
Start: 2024-10-21

## 2024-10-21 NOTE — CONFIDENTIAL NOTE
Med: VANESA    LOV (related): 8/12/24 - CPX      Due for F/U around: 11/2024 FOR DM CHECK    Next Appt: 11/20/24

## 2024-10-28 ENCOUNTER — MYC MEDICAL ADVICE (OUTPATIENT)
Dept: FAMILY MEDICINE | Facility: CLINIC | Age: 69
End: 2024-10-28

## 2024-10-28 DIAGNOSIS — E11.43 TYPE 2 DIABETES MELLITUS WITH DIABETIC AUTONOMIC NEUROPATHY, WITHOUT LONG-TERM CURRENT USE OF INSULIN (H): ICD-10-CM

## 2024-10-28 NOTE — TELEPHONE ENCOUNTER
Patient requesting refill on Trulicity 3mg/week.   Last related visit: 8/14/24 with Cony Ordoñez NP. Trulicity dose was increased then with plan to RTC in 3 months.   Future appt: scheduled 11/20/24 to see Cony Ordoñez NP     Med last ordered: 8/14/24 for 12ml (6 month supply) to [facility].   Last filled: 8/2/24 6ml at [facility].   Called [facility] - they are able to order Trulicity 3mg from their .

## 2024-11-05 RX ORDER — DULAGLUTIDE 3 MG/.5ML
3 INJECTION, SOLUTION SUBCUTANEOUS WEEKLY
Qty: 6 ML | Refills: 4 | Status: SHIPPED | OUTPATIENT
Start: 2024-11-05

## 2024-11-15 DIAGNOSIS — G47.00 INSOMNIA, UNSPECIFIED TYPE: ICD-10-CM

## 2024-11-15 RX ORDER — TRAZODONE HYDROCHLORIDE 50 MG/1
50-100 TABLET, FILM COATED ORAL AT BEDTIME
Qty: 60 TABLET | Refills: 2 | Status: SHIPPED | OUTPATIENT
Start: 2024-11-15

## 2024-11-15 NOTE — TELEPHONE ENCOUNTER
Med: Trazodone    LOV (related): 8/12/24      Due for F/U around: 11/12/24 DM check    Next Appt: 11/20/24

## 2024-11-18 DIAGNOSIS — M54.41 ACUTE BILATERAL LOW BACK PAIN WITH BILATERAL SCIATICA: ICD-10-CM

## 2024-11-18 DIAGNOSIS — M54.42 ACUTE BILATERAL LOW BACK PAIN WITH BILATERAL SCIATICA: ICD-10-CM

## 2024-11-19 NOTE — TELEPHONE ENCOUNTER
Med: Cyclobenzaprine    LOV (related): 8/12/24      Due for F/U around: 8/12/25    Next Appt: 11/20/24 DM check

## 2024-11-20 ENCOUNTER — OFFICE VISIT (OUTPATIENT)
Dept: FAMILY MEDICINE | Facility: CLINIC | Age: 69
End: 2024-11-20

## 2024-11-20 VITALS
HEART RATE: 92 BPM | OXYGEN SATURATION: 99 % | WEIGHT: 235 LBS | DIASTOLIC BLOOD PRESSURE: 85 MMHG | SYSTOLIC BLOOD PRESSURE: 144 MMHG | BODY MASS INDEX: 33.48 KG/M2

## 2024-11-20 DIAGNOSIS — Z23 IMMUNIZATION DUE: ICD-10-CM

## 2024-11-20 DIAGNOSIS — E11.43 TYPE 2 DIABETES MELLITUS WITH DIABETIC AUTONOMIC NEUROPATHY, WITHOUT LONG-TERM CURRENT USE OF INSULIN (H): Primary | ICD-10-CM

## 2024-11-20 LAB — HBA1C MFR BLD: 6.9 % (ref 4–6)

## 2024-11-20 RX ORDER — CYCLOBENZAPRINE HCL 10 MG
10 TABLET ORAL 3 TIMES DAILY PRN
Qty: 60 TABLET | Refills: 0 | Status: SHIPPED | OUTPATIENT
Start: 2024-11-20

## 2024-11-20 NOTE — PROGRESS NOTES
SUBJECTIVE:    Syd Joyce, is a 69 year old male presenting for the below:     1. Follow up of diabetes mellitus type 2.    Last HgbA1c: 6.5%    Glycemic control medications: max dose metformin, trulicity (cannot afford), and glipizide  Home BG monitoring: fasting BG in morning  Blood pressure: does have hypertension.   Cholesterol:  Control   good  Goal LDL <  130 . is taking statin.   Tobacco abuse: absent  Taking ASA 81 mg daily: yes  Annual eye exam: February 2024  Morning readings have increased 150-170    OBJECTIVE:  Vitals:    11/20/24 1108   BP: (!) 144/85   Pulse: 92   SpO2: 99%   Weight: 106.6 kg (235 lb)    Body mass index is 33.48 kg/m .  General: no acute distress, cooperative with exam.  Eyes: no injection or drainage.  Ears: TM's and canals show no erythema, discharge, or effusion bilaterally.  Throat: moist mucous membranes, no tonsillar exudate or hypertrophy, posterior oral pharynx non-erythematous without lesions.  Neck: supple, no thyroid nodules or enlargement.  Lungs: clear to auscultation bilaterally, normal respiratory effort.  Heart: regular rate, normal S1 and S2, no murmurs.   Neuro: grossly intact   Psych: mental status normal, mood and affect appropriate.      ASSESSMENT / PLAN:      Eddie was seen today for diabetes.    Diagnoses and all orders for this visit:    Type 2 diabetes mellitus with diabetic autonomic neuropathy, without long-term current use of insulin (H)  -     Cancel: Albumin Random Urine Quantitative with Creat Ratio; Future  -     Hemoglobin A1C (RMG)  -     Med Therapy Management Referral  - Patient to follow up with MTM Pharmacist to assist with diabetes and affordability of medications     Immunization due  - received influenza and covid today     Other orders  -     Cancel: Hemoglobin A1C ([facility])  -     VENOUS COLLECTION  -     INFLUENZA VACCINE, TRIVALENT 65+ (FLUAD)  -     COVID-19 12+ (PFIZER)

## 2024-11-20 NOTE — PATIENT INSTRUCTIONS
Vaccines due for: RSV, Tdap (tetanus, diptheria, and acellular pertussis), Zoster due     Schedule diabetes follow up with Yelena

## 2024-12-05 ENCOUNTER — OFFICE VISIT (OUTPATIENT)
Dept: PHARMACY | Facility: PHYSICIAN GROUP | Age: 69
End: 2024-12-05
Payer: COMMERCIAL

## 2024-12-05 VITALS
OXYGEN SATURATION: 98 % | SYSTOLIC BLOOD PRESSURE: 146 MMHG | HEART RATE: 88 BPM | DIASTOLIC BLOOD PRESSURE: 60 MMHG | BODY MASS INDEX: 33.19 KG/M2 | WEIGHT: 233 LBS

## 2024-12-05 DIAGNOSIS — N40.1 BENIGN PROSTATIC HYPERPLASIA WITH URINARY FREQUENCY: ICD-10-CM

## 2024-12-05 DIAGNOSIS — E11.43 TYPE 2 DIABETES MELLITUS WITH DIABETIC AUTONOMIC NEUROPATHY, WITHOUT LONG-TERM CURRENT USE OF INSULIN (H): Primary | ICD-10-CM

## 2024-12-05 DIAGNOSIS — R35.0 BENIGN PROSTATIC HYPERPLASIA WITH URINARY FREQUENCY: ICD-10-CM

## 2024-12-05 DIAGNOSIS — G63 POLYNEUROPATHY ASSOCIATED WITH UNDERLYING DISEASE (H): ICD-10-CM

## 2024-12-05 DIAGNOSIS — I10 ESSENTIAL HYPERTENSION: ICD-10-CM

## 2024-12-05 DIAGNOSIS — G47.00 INSOMNIA, UNSPECIFIED TYPE: ICD-10-CM

## 2024-12-05 DIAGNOSIS — E78.00 HYPERCHOLESTEREMIA: ICD-10-CM

## 2024-12-05 DIAGNOSIS — I25.118 CORONARY ARTERY DISEASE OF NATIVE ARTERY OF NATIVE HEART WITH STABLE ANGINA PECTORIS (H): ICD-10-CM

## 2024-12-05 RX ORDER — AMLODIPINE BESYLATE 5 MG/1
5 TABLET ORAL DAILY
Qty: 90 TABLET | Refills: 0 | Status: SHIPPED | OUTPATIENT
Start: 2024-12-05

## 2024-12-05 RX ORDER — ATORVASTATIN CALCIUM 40 MG/1
40 TABLET, FILM COATED ORAL DAILY
Qty: 90 TABLET | Refills: 1 | Status: SHIPPED | OUTPATIENT
Start: 2024-12-05

## 2024-12-05 RX ORDER — GABAPENTIN 300 MG/1
900 CAPSULE ORAL 2 TIMES DAILY
Status: SHIPPED
Start: 2024-12-05

## 2024-12-05 NOTE — PATIENT INSTRUCTIONS
"Recommendations from today's MTM visit:                                                         1. Use up Trulicity 3mg this weekend then next week start the semeglutide 0.5mg weekly     2. Filled out the application for [employer] assistance to get Ozempic from the company.     3. Stop chlorthalidone and start amlodipine, monitor for swelling in your feet/ankles.     4. Stop Simvastatin and start atorvastatin 40mg daily.     Follow-up: Return in about 4 weeks (around 1/2/2025) for MTM visit in clinic.    It was great speaking with you today.  I value your experience and would be very thankful for your time in providing feedback in our clinic survey. In the next few days, you may receive an email or text message from [employer] with a link to a survey related to your  clinical pharmacist.\"     My Clinical Pharmacist's contact information:                                                      Please feel free to contact me with any questions or concerns you have.      Yelena Grey, Pharm.D, [facility]  Medication Therapy Management Pharmacist  [phone]        [employer] Patient Assistance information:    If you have not gotten an update on your enrollment status after 3 weeks, please call [employer] ([place] line [phone]) for an update. It typically takes 1-2 weeks to get approved for the program, and then it can take 2-6 weeks to receive the medication.    You must re-enroll in this program every year. It is recommend to being re-enrollment paperwork around November for the following year.      Ozempic, Victoza or Rybelsus are shipped to the clinic. The clinic will notify you once the medication has arrived at clinic and can be picked up.      Insulin and pen needles are shipped to the patient's home and will require the patient speak with [employer] to confirm shipping before it is sent.     The company typically sends a 120 day supply of medication at a time.    When you need refills of your medication, " call [employer] ([place] line [phone]) to request the refill.  It is recommend to begin requesting your refill when you have about 1 month of medication left to make sure you don't run out.  If you have had a dose change of your medication, contact your clinic to submit a new order form to [employer] with your new dosage.

## 2024-12-05 NOTE — LETTER
"Recommended To-Do List      Prepared on: Dec 5, 2024       You can get the best results from your medications by completing the items on this \"To-Do List.\"      Bring your To-Do List when you go to your doctor. And, share it with your family or caregivers.    My To-Do List:  What we talked about: What I should do:   An issue with your medication    Change the medication you are taking from chlorthalidone (HYGROTON) to amLODIPine (NORVASC)          What we talked about: What I should do:   A medication that is not working    Change the medication you are taking from simvastatin (ZOCOR) to atorvastatin (LIPITOR)          What we talked about: What I should do:   The cost of your medication(s)    Change the medication you are taking from Trulicity to Ozempic (0.25 or 0.5 MG/DOSE)          What we talked about: What I should do:                     "

## 2024-12-05 NOTE — PROGRESS NOTES
Medication Therapy Management (MTM) Encounter    ASSESSMENT:                            Medication Adherence/Access: See below for considerations.    Diabetes  Patient is not meeting A1c goal of < 7%.  Self monitoring of blood glucose is not at goal of fasting  mg/dL.  Patient would benefit from finding affordable option and should qualify for assistance with [employer] For Ozempic. Would consider GLP1/GIP in place of GLP1 given sugar levels, however unable to get this through patient assistance, so cost limitations for this option currently.      Hypertension /CAD  Due to significant impact on lifestyle with urinary symptoms, will trial off diuretic and replace with CCB and monitoring close for side effects. Home BP monitoring for efficacy recommended as well.    Hyperlipidemia   Patient would benefit from switching statin for high potency and avoid interaction with amlodipine.     BPH  Suggest limiting diuretic as noted above to improve urgency concerns.     Neuropathy  Stable.     Insomnia  Would improve with reduced nocturia.     PLAN:                            1. Use up Trulicity 3mg this weekend then next week start the semeglutide 0.5mg weekly     2. Filled out the application for [employer] assistance to get Ozempic from the company.     3. Stop chlorthalidone and start amlodipine, monitor for swelling in your feet/ankles.     4. Stop Simvastatin and start atorvastatin 40mg daily.     Follow-up: Return in about 4 weeks (around 1/2/2025) for MTM visit in clinic.    SUBJECTIVE/OBJECTIVE:                          Eddie Joyce is a 69 year old male seen for an initial visit. Previously saw MTM in 2022.       Reason for visit: Medication Review and medication cost issues.     Allergies/ADRs: Reviewed in chart  Past Medical History: Reviewed in chart  Tobacco: He reports that he has never smoked. He has never used smokeless tobacco.  Alcohol: history of dependence    Medication Adherence/Access: no issues  reported.    Diabetes   Metformin 2000 mg daily  Trulicity 3 mg weekly--unable to afford, increased 3 weeks ago, but doesn't feel this is making a big difference  Glipizide XL 10 mg daily  Aspirin 81mg daily  Patient is not experiencing side effects.  Current diabetes symptoms: polyuria     Blood sugar monitorin, 184, 169, 175, 154, 166, 170, 181, 154, 166, 196  Eye exam is up to date  Foot exam is up to date    Hypertension /CAD  Carvedilol 50 mg twice daily  Losartan 100 mg daily  Chlorthalidone 25 mg daily  Patient reports the following medication side effects: frequent urination, but has not changed medication due to concern for BP management.   Patient does not self-monitor blood pressure, but has ability at home.      Hyperlipidemia   simvastatin 20mg daily  Patient reports no significant myalgias or other side effects.     BPH   Tamsulosin 0.4 mg daily  Previously stopped finasteride 5mg daily, deals with urgency. No other symptoms, denies side effects.    Neuropathy  Gabapentin 900 mg twice daily   Feels this increase from 600mg has made a big difference and is working well.   Denies side effects at this time.     Insomnia   trazodone  mg at bedtime   Patient reports no side effects at this time.   Does wake up frequently for urination.        Today's Vitals: BP (!) 146/60   Pulse 88   Wt 233 lb (105.7 kg)   SpO2 98%   BMI 33.19 kg/m    ----------------    I spent 35 minutes with this patient today. All changes were made via collaborative practice agreement with STACY Reeves CNP. A copy of the visit note was provided to the patient's provider(s).    A summary of these recommendations was given to the patient.    Yelena Grey, Pharm.D, [facility]  Medication Therapy Management Pharmacist  [phone]     Medication Therapy Recommendations  Essential hypertension   1 Current Medication: chlorthalidone (HYGROTON) 25 MG tablet (Discontinued)   Current Medication Sig: TAKE 1 TABLET(25 MG) BY  MOUTH DAILY   Rationale: Undesirable effect - Adverse medication event - Safety   Recommendation: Change Medication - amLODIPine 5 MG tablet - 1 daily   Status: Accepted per CPA   Identified Date: 12/5/2024 Completed Date: 12/5/2024         Hypercholesteremia   1 Current Medication: simvastatin (ZOCOR) 20 MG tablet (Discontinued)   Current Medication Sig: TAKE 1 TABLET(20 MG) BY MOUTH AT BEDTIME   Rationale: More effective medication available - Ineffective medication - Effectiveness   Recommendation: Change Medication - atorvastatin 40 MG tablet   Status: Accepted per CPA   Identified Date: 12/5/2024 Completed Date: 12/5/2024         Type 2 diabetes mellitus with diabetic autonomic neuropathy, without long-term current use of insulin (H)   1 Current Medication: Dulaglutide (TRULICITY) 3 MG/0.5ML SOAJ (Discontinued)   Current Medication Sig: Inject 3 mg subcutaneously once a week.   Rationale: Cannot afford medication product - Cost - Adherence   Recommendation: Change Medication - Ozempic (0.25 or 0.5 MG/DOSE) 2 MG/1.5ML Sopn - PAP for Raina help   Status: Accepted per CPA   Identified Date: 12/5/2024 Completed Date: 12/5/2024

## 2024-12-05 NOTE — LETTER
_  Medication List        Prepared on: Dec 5, 2024     Bring your Medication List when you go to the doctor, hospital, or   emergency room. And, share it with your family or caregivers.     Note any changes to how you take your medications.  Cross out medications when you no longer use them.    Medication How I take it Why I use it Prescriber   amLODIPine (NORVASC) 5 MG tablet Take 1 tablet (5 mg) by mouth daily. Essential Hypertension STACY Reeves CNP   aspirin (ASA) 81 MG EC tablet Take 1 tablet (81 mg) by mouth daily Type 2 diabetes mellitus with diabetic autonomic neuropathy, without long-term current use of insulin (H) Shan Arenas MD   atorvastatin (LIPITOR) 40 MG tablet Take 1 tablet (40 mg) by mouth daily. Coronary artery disease of native artery of native heart with stable angina pectoris (H) STACY Reeves CNP   blood glucose monitoring (ONE TOUCH ULTRA 2) meter device kit Use to test blood sugar 2 imes daily or as directed. Type 2 Diabetes Mellitus (H) Shan Arenas MD   cyclobenzaprine (FLEXERIL) 10 MG tablet TAKE 1 TABLET(10 MG) BY MOUTH THREE TIMES DAILY AS NEEDED FOR MUSCLE SPASMS Acute bilateral low back pain with bilateral sciatica STACY Reeves CNP   gabapentin (NEURONTIN) 300 MG capsule Take 3 capsules (900 mg) by mouth 2 times daily. Polyneuropathy associated with underlying disease (H) STACY Reeves CNP   glipiZIDE (GLUCOTROL XL) 10 MG 24 hr tablet TAKE 1 TABLET(10 MG) BY MOUTH DAILY Type 2 diabetes mellitus with diabetic autonomic neuropathy, without long-term current use of insulin (H) STACY Reeves CNP   ketoconazole (NIZORAL) 2 % external cream Apply topically daily Tinea pedis of both feet STACY Reeves CNP   losartan (COZAAR) 100 MG tablet TAKE 1 TABLET(100 MG) BY MOUTH DAILY Essential Hypertension STACY Reeves CNP   metFORMIN (GLUCOPHAGE XR) 500 MG 24 hr tablet TAKE 2 TABLETS(1000 MG) BY MOUTH TWICE DAILY WITH MEALS Type 2 diabetes mellitus  with diabetic autonomic neuropathy, without long-term current use of insulin (H) STACY Reeves CNP   pantoprazole (PROTONIX) 40 MG EC tablet TAKE 1 TABLET(40 MG) BY MOUTH DAILY Gastroesophageal reflux disease with esophagitis, unspecified whether hemorrhage STACY Reeves CNP   sildenafil (REVATIO) 20 MG tablet TAKE 2 TO 4 TABLETS BY MOUTH AS NEEDED, NEVER USE WITH NTG, DOXASOSIN, OR TERAZOSIN Erectile dysfunction, unspecified erectile dysfunction type STACY Reeves CNP   tamsulosin (FLOMAX) 0.4 MG capsule Take 1 capsule (0.4 mg) by mouth daily Benign prostatic hyperplasia with urinary frequency STACY Reeves CNP   tiZANidine (ZANAFLEX) 4 MG tablet TAKE 1 TABLET(4 MG) BY MOUTH DAILY Acute bilateral low back pain with bilateral sciatica STACY Reeves CNP   traZODone (DESYREL) 50 MG tablet TAKE 1 TO 2 TABLETS(50  MG) BY MOUTH AT BEDTIME Insomnia, unspecified type STACY Reeves CNP         Add new medications, over-the-counter drugs, herbals, vitamins, or  minerals in the blank rows below.    Medication How I take it Why I use it Prescriber                                      Allergies:      No Known Allergies        Side effects I have had:      No Known Side Effects        Other Information:              My notes and questions:

## 2024-12-05 NOTE — LETTER
December 5, 2024  Syd Joyce  [address] [place] [facility] [address]    Dear Mr. Joyce, [facility]     Thank you for talking with me on Dec 5, 2024 about your health and medications. As a follow-up to our conversation, I have included two documents:      Your Recommended To-Do List has steps you should take to get the best results from your medications.  Your Medication List will help you keep track of your medications and how to take them.    If you want to talk about these documents, please call Yelena Grey RPH at phone: [phone], Monday-Friday 8-4:30pm.    I look forward to working with you and your doctors to make sure your medications work well for you.    Sincerely,  Yelena Grey RPH  [facility] Pharmacist, [facility]

## 2024-12-09 DIAGNOSIS — E11.43 TYPE 2 DIABETES MELLITUS WITH DIABETIC AUTONOMIC NEUROPATHY, WITHOUT LONG-TERM CURRENT USE OF INSULIN (H): ICD-10-CM

## 2024-12-10 NOTE — TELEPHONE ENCOUNTER
Med: Glipizide    LOV (related): 12/5/24 MTM    Last Lab: 11/20/24      Due for F/U around: 1/2/25 MTM    Next Appt: NotScheduled        Lab Results   Component Value Date    A1C 6.9 11/20/2024    A1C 6.5 08/12/2024    A1C 6.6 04/24/2024    A1C 6.4 11/29/2023    A1C 7.3 07/26/2023

## 2024-12-11 RX ORDER — GLIPIZIDE 10 MG/1
10 TABLET, FILM COATED, EXTENDED RELEASE ORAL DAILY
Qty: 90 TABLET | Refills: 0 | Status: SHIPPED | OUTPATIENT
Start: 2024-12-11

## 2024-12-17 DIAGNOSIS — M54.41 ACUTE BILATERAL LOW BACK PAIN WITH BILATERAL SCIATICA: ICD-10-CM

## 2024-12-17 DIAGNOSIS — M54.42 ACUTE BILATERAL LOW BACK PAIN WITH BILATERAL SCIATICA: ICD-10-CM

## 2024-12-17 NOTE — TELEPHONE ENCOUNTER
Med: Cyclobenzaprine    LOV (related): 8/12/24 CPX      Due for F/U around: 8/12/25    Next Appt: Not Scheduled

## 2024-12-19 RX ORDER — CYCLOBENZAPRINE HCL 10 MG
10 TABLET ORAL 3 TIMES DAILY PRN
Qty: 60 TABLET | Refills: 0 | Status: SHIPPED | OUTPATIENT
Start: 2024-12-19

## 2024-12-23 DIAGNOSIS — I25.118 CORONARY ARTERY DISEASE OF NATIVE ARTERY OF NATIVE HEART WITH STABLE ANGINA PECTORIS (H): ICD-10-CM

## 2024-12-23 DIAGNOSIS — G63 POLYNEUROPATHY ASSOCIATED WITH UNDERLYING DISEASE (H): ICD-10-CM

## 2024-12-24 NOTE — TELEPHONE ENCOUNTER
Med: Carvedilol    LOV (related): 12/5/24 MTM    Last Lab: 8/12/24      Due for F/U around: 1/2/25    Next Appt: Not Scheduled        BP Readings from Last 3 Encounters:   12/05/24 (!) 146/60   11/20/24 (!) 144/85   08/12/24 (!) 147/75       Last Comprehensive Metabolic Panel:  Lab Results   Component Value Date     (L) 08/12/2024    POTASSIUM 4.3 08/12/2024    CHLORIDE 90 (L) 08/12/2024    CO2 32 (H) 08/12/2024    ANIONGAP 10 08/12/2024     (H) 08/12/2024    BUN 18.9 08/12/2024    CR 1.36 (H) 08/12/2024    GFRESTIMATED 56 (L) 08/12/2024    HEAVENLY 9.5 08/12/2024

## 2024-12-26 RX ORDER — CARVEDILOL 25 MG/1
TABLET ORAL
Qty: 360 TABLET | Refills: 1 | Status: SHIPPED | OUTPATIENT
Start: 2024-12-26

## 2024-12-26 RX ORDER — GABAPENTIN 300 MG/1
900 CAPSULE ORAL 2 TIMES DAILY
Qty: 540 CAPSULE | Refills: 0 | Status: SHIPPED | OUTPATIENT
Start: 2024-12-26

## 2025-01-09 ENCOUNTER — OFFICE VISIT (OUTPATIENT)
Dept: UROLOGY | Facility: CLINIC | Age: 70
End: 2025-01-09
Payer: COMMERCIAL

## 2025-01-09 VITALS — OXYGEN SATURATION: 98 % | SYSTOLIC BLOOD PRESSURE: 157 MMHG | HEART RATE: 90 BPM | DIASTOLIC BLOOD PRESSURE: 73 MMHG

## 2025-01-09 DIAGNOSIS — N40.1 BENIGN PROSTATIC HYPERPLASIA WITH URINARY FREQUENCY: Primary | ICD-10-CM

## 2025-01-09 DIAGNOSIS — N39.41 URGE INCONTINENCE OF URINE: ICD-10-CM

## 2025-01-09 DIAGNOSIS — R35.0 BENIGN PROSTATIC HYPERPLASIA WITH URINARY FREQUENCY: Primary | ICD-10-CM

## 2025-01-09 LAB — RESIDUAL VOLUME (RV) (EXTERNAL): 105

## 2025-01-09 RX ORDER — FINASTERIDE 5 MG/1
5 TABLET, FILM COATED ORAL DAILY
Qty: 90 TABLET | Refills: 3 | Status: SHIPPED | OUTPATIENT
Start: 2025-01-09

## 2025-01-09 RX ORDER — TAMSULOSIN HYDROCHLORIDE 0.4 MG/1
0.8 CAPSULE ORAL DAILY
Qty: 180 CAPSULE | Refills: 3 | Status: SHIPPED | OUTPATIENT
Start: 2025-01-09

## 2025-01-09 NOTE — LETTER
1/9/2025       RE: Syd Joyce  [address] [place]  [facility] [address]     Dear Colleague,    Thank you for referring your patient, Syd Joyce, to the [facility] at . Please see a copy of my visit note below.      Urology Outpatient Visit    Name: Syd Joyce    MRN: [id number]   YOB: 1955               Chief Complaint:   LUTS/BPH         Impression and Plan:   Impression / Plan:   Syd Joyce is a 69 year old male with BPH w LUTS & vasculogenic ED.      It was my pleasure to meet with Mr. Joyce in clinic today to discuss his lower urinary tract symptoms. Patient presentation is not consistent with prostate cancer, UTI, urinary obstruction, prostatitis, neurogenic bladder, diabetes, nor diuretic related. I explained that his lower urinary tract symptoms are likely secondary to benign prostatic hyperplasia (BPH). We reviewed the natural history of BPH, its prevalence, and management options. We discussed that, in general, treatment of BPH is based on the extent of bother caused by lower urinary tract symptoms. We then reviewed options for ongoing management including observation/watchful waiting, lifestyle modifications, medical management, vs. other surgical options. Pros and cons of these options were discussed in detail. All patient questions, comments, concerns addressed.     Following our discussion, Mr. Joyce expressed an interest in proceeding with increased medical management w Flomax 0.8 mg every day and initiating finasteride.    Follow-up in 6-8 mo for Sx recheck, PVR.     Thank you for the opportunity to participate in the care of Syd Joyce.     STACY Sood, CNP   [facility] - Department of Urology  [phone]          History of Present Illness:   Syd Joyce is a 69 year old male transferring care from [facility] to [facility]. Here today to discuss his BPH  related LUTS. He also has Hx of ED, HTN, obesity, T2DM under good control.     He experiences weak stream, post void dribbling.    Takes Flomax 0.4 mg every day     For ED he has trialed up to 120 mg of Viagra without success.     AUASS 3-2-4-5-5-1-3     mL    Lab Results   Component Value Date    PSA 1.30 08/12/2024    PSA 1.21 04/24/2024     History is obtained from patient & EMR          Past Medical History:     Past Medical History:   Diagnosis Date     DM2 (diabetes mellitus, type 2) (H)      Esophageal reflux 6/15/2012     History of colonic polyps 4/26/2021     HTN (hypertension) 8/12/2011     Leg pain, bilateral      Low back pain      Numbness and tingling     LEGS, R/T LUMBAR STENOSIS     Obesity, unspecified 1/10/2014     S/P lumbar laminectomy 6/30/2017          Past Surgical History:     Past Surgical History:   Procedure Laterality Date     COLONOSCOPY       DISCECTOMY LUMBAR MINIMALLY INVASIVE ONE LEVEL N/A 9/1/2022    Procedure: REVISION DISCECTOMY, SPINE, LUMBAR, LEVEL 4-5;  Surgeon: Daniel Miller MD;  Location: [facility]     DISCECTOMY LUMBAR POSTERIOR MICROSCOPIC TWO LEVELS N/A 6/30/2017    Procedure: DISCECTOMY LUMBAR POSTERIOR MICROSCOPIC TWO LEVELS;  L3-4 L4-5 POSTERIOR DECOMPRESSION AND INTERSPINUS FIXATION WITHOUT FUSION;  Surgeon: Ray Perez MD;  Location:  OR     PHACOEMULSIFICATION CLEAR CORNEA WITH STANDARD INTRAOCULAR LENS IMPLANT Right 7/17/2018    Procedure: PHACOEMULSIFICATION CLEAR CORNEA WITH STANDARD INTRAOCULAR LENS IMPLANT;  RIGHT EYE PHACOEMULSIFICATION CLEAR CORNEA WITH STANDARD INTRAOCULAR LENS IMPLANT;  Surgeon: Elsie Kay MD;  [facility]     PHACOEMULSIFICATION CLEAR CORNEA WITH STANDARD INTRAOCULAR LENS IMPLANT Left 7/31/2018    Procedure: PHACOEMULSIFICATION CLEAR CORNEA WITH STANDARD INTRAOCULAR LENS IMPLANT;  COMPLEX LEFT EYE PHACOEMULSIFICATION CLEAR CORNEA WITH STANDARD INTRAOCULAR LENS IMPLANT WITH MALYUGIN RING;  Surgeon:  Elsie Kay MD;  Location:  [facility]     WISDOM TEETH            Social History:     Social History     Tobacco Use     Smoking status: Never     Smokeless tobacco: Never   Substance Use Topics     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 90 tablet 0     aspirin (ASA) 81 MG EC tablet Take 1 tablet (81 mg) by mouth daily       atorvastatin (LIPITOR) 40 MG tablet Take 1 tablet (40 mg) by mouth daily. 90 tablet 1     blood glucose (NO BRAND SPECIFIED) lancets standard Use to test blood sugar 2 times daily or as directed. 180 each 3     blood glucose (ONETOUCH ULTRA) test strip USE TO TEST BLOOD SUGAR TWICE DAILY OR AS DIRECTED 100 strip 11     blood glucose monitoring (ONE TOUCH ULTRA 2) meter device kit Use to test blood sugar 2 imes daily or as directed. 1 kit 0     carvedilol (COREG) 25 MG tablet TAKE 2 TABLETS BY MOUTH TWICE DAILY- HOLD IF HEART RATE IS LESS THAN 55 360 tablet 1     cyclobenzaprine (FLEXERIL) 10 MG tablet TAKE 1 TABLET(10 MG) BY MOUTH THREE TIMES DAILY AS NEEDED FOR MUSCLE SPASMS 60 tablet 0     gabapentin (NEURONTIN) 300 MG capsule Take 3 capsules (900 mg) by mouth 2 times daily. 540 capsule 0     glipiZIDE (GLUCOTROL XL) 10 MG 24 hr tablet TAKE 1 TABLET(10 MG) BY MOUTH DAILY 90 tablet 0     ketoconazole (NIZORAL) 2 % external cream Apply topically daily 60 g 1     losartan (COZAAR) 100 MG tablet TAKE 1 TABLET(100 MG) BY MOUTH DAILY 90 tablet 3     metFORMIN (GLUCOPHAGE XR) 500 MG 24 hr tablet TAKE 2 TABLETS(1000 MG) BY  MOUTH TWICE DAILY WITH MEALS 360 tablet 0     pantoprazole (PROTONIX) 40 MG EC tablet TAKE 1 TABLET(40 MG) BY MOUTH DAILY 90 tablet 3     sildenafil (REVATIO) 20 MG tablet TAKE 2 TO 4 TABLETS BY MOUTH AS NEEDED, NEVER USE WITH NTG, DOXASOSIN, OR TERAZOSIN 30 tablet 11     tamsulosin (FLOMAX) 0.4 MG capsule Take 1 capsule (0.4 mg) by mouth daily 90 capsule 1     tiZANidine (ZANAFLEX) 4 MG tablet TAKE 1 TABLET(4 MG) BY MOUTH DAILY 90 tablet 3     traZODone (DESYREL) 50 MG tablet TAKE 1 TO 2 TABLETS(50  MG) BY MOUTH AT BEDTIME 60 tablet 2     No current facility-administered medications for this visit.          Review of Systems:    ROS: See HPI for pertinent details.  Remainder of 10-point ROS negative.         Physical Exam:   VS:  T: Data Unavailable    HR: 90    BP: 157/73    RR: Data Unavailable     GEN:  Alert.  NAD.   HEENT:  Sclerae anicteric.    CV:  No obvious jugular venous distension.  LUNGS: No respiratory distress, breathing comfortably wo accessory muscle use.  ABD:  ND.     SKIN:  Dry. No visible rashes.   NEURO:  CN grossly intact.          Laboratory Data:     Lab Results   Component Value Date    PSA 1.30 08/12/2024    PSA 1.21 04/24/2024     Lab Results   Component Value Date    CR 1.36 08/12/2024    CR 1.09 01/23/2023    CR 1.05 09/03/2022    CR 1.17 09/02/2022    CR 1.02 09/01/2022    CR 1.07 08/31/2022    CR 1.06 06/20/2022    CR 1.07 01/11/2022    CR 1.15 09/14/2021    CR 1.14 07/20/2020    CR 1.02 09/02/2019    CR 1.26 06/26/2019    CR 1.10 05/07/2019    CR 1.16 05/03/2019    CR 1.10 04/25/2019    CR 1.14 04/24/2019    CR 1.47 04/23/2019     Lab Results   Component Value Date    GFRESTIMATED 56 08/12/2024    GFRESTIMATED 77 09/03/2022    GFRESTIMATED 67 09/02/2022    GFRESTIMATED 80 09/01/2022    GFRESTIMATED 75 08/31/2022    GFRESTIMATED 76 09/02/2019    GFRESTIMATED >60 05/07/2019    GFRESTIMATED >60 05/03/2019    GFRESTIMATED 69 04/25/2019    GFRESTIMATED 67 04/24/2019     GFRESTIMATED 49 04/23/2019    GFRESTIMATED 45 04/22/2019    GFRESTIMATED 60 04/03/2018    GFRESTIMATED 61 02/23/2018    GFRESTIMATED 75 02/21/2018    GFRESTIMATED 73 05/26/2017    GFRESTIMATED 73 03/27/2015            Again, thank you for allowing me to participate in the care of your patient.      Sincerely,    STACY Viera CNP

## 2025-01-09 NOTE — NURSING NOTE
Pt sometimes has urgency.  Pt sometimes leaks.    Pt up 3 times to void at nt.    Pt tried to void when he got here an he was unsuccessful.      Random PVR by scanner= 105mL    Pt went to [facility] but did not like them there.    KEITH Gleason CMA

## 2025-01-09 NOTE — PROGRESS NOTES
Urology Outpatient Visit    Name: Syd Joyce    MRN: [id number]   YOB: 1955               Chief Complaint:   LUTS/BPH         Impression and Plan:   Impression / Plan:   Syd Joyce is a 69 year old male with BPH w LUTS & vasculogenic ED.      It was my pleasure to meet with Mr. Joyce in clinic today to discuss his lower urinary tract symptoms. Patient presentation is not consistent with prostate cancer, UTI, urinary obstruction, prostatitis, neurogenic bladder, diabetes, nor diuretic related. I explained that his lower urinary tract symptoms are likely secondary to benign prostatic hyperplasia (BPH). We reviewed the natural history of BPH, its prevalence, and management options. We discussed that, in general, treatment of BPH is based on the extent of bother caused by lower urinary tract symptoms. We then reviewed options for ongoing management including observation/watchful waiting, lifestyle modifications, medical management, vs. other surgical options. Pros and cons of these options were discussed in detail. All patient questions, comments, concerns addressed.     Following our discussion, Mr. Joyce expressed an interest in proceeding with increased medical management w Flomax 0.8 mg every day and initiating finasteride.    Follow-up in 6-8 mo for Sx recheck, PVR.     Thank you for the opportunity to participate in the care of Syd Joyce.     STACY Sood, CNP   [facility] - Department of Urology  [phone]          History of Present Illness:   Syd Joyce is a 69 year old male transferring care from [facility] to [facility]. Here today to discuss his BPH related LUTS. He also has Hx of ED, HTN, obesity, T2DM under good control.     He experiences weak stream, post void dribbling, urge incontinence, mostly at night, wears depends, nocturia 2-3x.     Takes Flomax 0.4 mg every day     For ED he has trialed up to 120 mg of Viagra without success.     GARRISON  3-2-4-5-5-1-3     mL    Lab Results   Component Value Date    PSA 1.30 08/12/2024    PSA 1.21 04/24/2024     History is obtained from patient & EMR          Past Medical History:     Past Medical History:   Diagnosis Date    DM2 (diabetes mellitus, type 2) (H)     Esophageal reflux 6/15/2012    History of colonic polyps 4/26/2021    HTN (hypertension) 8/12/2011    Leg pain, bilateral     Low back pain     Numbness and tingling     LEGS, R/T LUMBAR STENOSIS    Obesity, unspecified 1/10/2014    S/P lumbar laminectomy 6/30/2017          Past Surgical History:     Past Surgical History:   Procedure Laterality Date    COLONOSCOPY      DISCECTOMY LUMBAR MINIMALLY INVASIVE ONE LEVEL N/A 9/1/2022    Procedure: REVISION DISCECTOMY, SPINE, LUMBAR, LEVEL 4-5;  Surgeon: Daniel Miller MD;  Location: [facility]    DISCECTOMY LUMBAR POSTERIOR MICROSCOPIC TWO LEVELS N/A 6/30/2017    Procedure: DISCECTOMY LUMBAR POSTERIOR MICROSCOPIC TWO LEVELS;  L3-4 L4-5 POSTERIOR DECOMPRESSION AND INTERSPINUS FIXATION WITHOUT FUSION;  Surgeon: Ray Perez MD;  Location:  OR    PHACOEMULSIFICATION CLEAR CORNEA WITH STANDARD INTRAOCULAR LENS IMPLANT Right 7/17/2018    Procedure: PHACOEMULSIFICATION CLEAR CORNEA WITH STANDARD INTRAOCULAR LENS IMPLANT;  RIGHT EYE PHACOEMULSIFICATION CLEAR CORNEA WITH STANDARD INTRAOCULAR LENS IMPLANT;  Surgeon: Elsie Kay MD;  [facility]:  [facility]    PHACOEMULSIFICATION CLEAR CORNEA WITH STANDARD INTRAOCULAR LENS IMPLANT Left 7/31/2018    Procedure: PHACOEMULSIFICATION CLEAR CORNEA WITH STANDARD INTRAOCULAR LENS IMPLANT;  COMPLEX LEFT EYE PHACOEMULSIFICATION CLEAR CORNEA WITH STANDARD INTRAOCULAR LENS IMPLANT WITH MALYUGIN RING;  Surgeon: Elsie Kay MD;  Location:  [facility]    WISDOM TEETH            Social History:     Social History     Tobacco Use    Smoking status: Never    Smokeless tobacco: Never   Substance Use Topics    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 90 tablet 0    aspirin (ASA) 81 MG EC tablet Take 1 tablet (81 mg) by mouth daily      atorvastatin (LIPITOR) 40 MG tablet Take 1 tablet (40 mg) by mouth daily. 90 tablet 1    blood glucose (NO BRAND SPECIFIED) lancets standard Use to test blood sugar 2 times daily or as directed. 180 each 3    blood glucose (ONETOUCH ULTRA) test strip USE TO TEST BLOOD SUGAR TWICE DAILY OR AS DIRECTED 100 strip 11    blood glucose monitoring (ONE TOUCH ULTRA 2) meter device kit Use to test blood sugar 2 imes daily or as directed. 1 kit 0    carvedilol (COREG) 25 MG tablet TAKE 2 TABLETS BY MOUTH TWICE DAILY- HOLD IF HEART RATE IS LESS THAN 55 360 tablet 1    cyclobenzaprine (FLEXERIL) 10 MG tablet TAKE 1 TABLET(10 MG) BY MOUTH THREE TIMES DAILY AS NEEDED FOR MUSCLE SPASMS 60 tablet 0    gabapentin (NEURONTIN) 300 MG capsule Take 3 capsules (900 mg) by mouth 2 times daily. 540 capsule 0    glipiZIDE (GLUCOTROL XL) 10 MG 24 hr tablet TAKE 1 TABLET(10 MG) BY MOUTH DAILY 90 tablet 0    ketoconazole (NIZORAL) 2 % external cream Apply topically daily 60 g 1    losartan (COZAAR) 100 MG tablet TAKE 1 TABLET(100 MG) BY MOUTH DAILY 90 tablet 3    metFORMIN (GLUCOPHAGE XR) 500 MG 24 hr tablet TAKE 2 TABLETS(1000 MG) BY MOUTH TWICE DAILY WITH MEALS 360 tablet 0    pantoprazole (PROTONIX) 40 MG EC tablet TAKE 1 TABLET(40 MG) BY MOUTH DAILY 90 tablet 3    sildenafil (REVATIO) 20 MG tablet TAKE 2 TO 4 TABLETS BY MOUTH AS NEEDED, NEVER USE WITH NTG, DOXASOSIN, OR TERAZOSIN 30 tablet 11    tamsulosin  (FLOMAX) 0.4 MG capsule Take 1 capsule (0.4 mg) by mouth daily 90 capsule 1    tiZANidine (ZANAFLEX) 4 MG tablet TAKE 1 TABLET(4 MG) BY MOUTH DAILY 90 tablet 3    traZODone (DESYREL) 50 MG tablet TAKE 1 TO 2 TABLETS(50  MG) BY MOUTH AT BEDTIME 60 tablet 2     No current facility-administered medications for this visit.          Review of Systems:    ROS: See HPI for pertinent details.  Remainder of 10-point ROS negative.         Physical Exam:   VS:  T: Data Unavailable    HR: 90    BP: 157/73    RR: Data Unavailable     GEN:  Alert.  NAD.   HEENT:  Sclerae anicteric.    CV:  No obvious jugular venous distension.  LUNGS: No respiratory distress, breathing comfortably wo accessory muscle use.  ABD:  ND.     SKIN:  Dry. No visible rashes.   NEURO:  CN grossly intact.          Laboratory Data:     Lab Results   Component Value Date    PSA 1.30 08/12/2024    PSA 1.21 04/24/2024     Lab Results   Component Value Date    CR 1.36 08/12/2024    CR 1.09 01/23/2023    CR 1.05 09/03/2022    CR 1.17 09/02/2022    CR 1.02 09/01/2022    CR 1.07 08/31/2022    CR 1.06 06/20/2022    CR 1.07 01/11/2022    CR 1.15 09/14/2021    CR 1.14 07/20/2020    CR 1.02 09/02/2019    CR 1.26 06/26/2019    CR 1.10 05/07/2019    CR 1.16 05/03/2019    CR 1.10 04/25/2019    CR 1.14 04/24/2019    CR 1.47 04/23/2019     Lab Results   Component Value Date    GFRESTIMATED 56 08/12/2024    GFRESTIMATED 77 09/03/2022    GFRESTIMATED 67 09/02/2022    GFRESTIMATED 80 09/01/2022    GFRESTIMATED 75 08/31/2022    GFRESTIMATED 76 09/02/2019    GFRESTIMATED >60 05/07/2019    GFRESTIMATED >60 05/03/2019    GFRESTIMATED 69 04/25/2019    GFRESTIMATED 67 04/24/2019    GFRESTIMATED 49 04/23/2019    GFRESTIMATED 45 04/22/2019    GFRESTIMATED 60 04/03/2018    GFRESTIMATED 61 02/23/2018    GFRESTIMATED 75 02/21/2018    GFRESTIMATED 73 05/26/2017    GFRESTIMATED 73 03/27/2015

## 2025-01-14 ENCOUNTER — OFFICE VISIT (OUTPATIENT)
Dept: PHARMACY | Facility: PHYSICIAN GROUP | Age: 70
End: 2025-01-14
Payer: COMMERCIAL

## 2025-01-14 ENCOUNTER — DOCUMENTATION ONLY (OUTPATIENT)
Dept: FAMILY MEDICINE | Facility: CLINIC | Age: 70
End: 2025-01-14

## 2025-01-14 VITALS
SYSTOLIC BLOOD PRESSURE: 132 MMHG | OXYGEN SATURATION: 99 % | HEIGHT: 70 IN | BODY MASS INDEX: 35.24 KG/M2 | DIASTOLIC BLOOD PRESSURE: 86 MMHG | WEIGHT: 246.2 LBS | HEART RATE: 87 BPM

## 2025-01-14 DIAGNOSIS — G63 POLYNEUROPATHY ASSOCIATED WITH UNDERLYING DISEASE: ICD-10-CM

## 2025-01-14 DIAGNOSIS — N40.1 BENIGN PROSTATIC HYPERPLASIA WITH URINARY FREQUENCY: ICD-10-CM

## 2025-01-14 DIAGNOSIS — R35.0 BENIGN PROSTATIC HYPERPLASIA WITH URINARY FREQUENCY: ICD-10-CM

## 2025-01-14 DIAGNOSIS — I25.118 CORONARY ARTERY DISEASE OF NATIVE ARTERY OF NATIVE HEART WITH STABLE ANGINA PECTORIS: ICD-10-CM

## 2025-01-14 DIAGNOSIS — G47.00 INSOMNIA, UNSPECIFIED TYPE: ICD-10-CM

## 2025-01-14 DIAGNOSIS — M62.830 BACK MUSCLE SPASM: ICD-10-CM

## 2025-01-14 DIAGNOSIS — E78.00 HYPERCHOLESTEREMIA: ICD-10-CM

## 2025-01-14 DIAGNOSIS — I10 ESSENTIAL HYPERTENSION: ICD-10-CM

## 2025-01-14 DIAGNOSIS — E11.43 TYPE 2 DIABETES MELLITUS WITH DIABETIC AUTONOMIC NEUROPATHY, WITHOUT LONG-TERM CURRENT USE OF INSULIN (H): Primary | ICD-10-CM

## 2025-01-14 PROCEDURE — 99607 MTMS BY PHARM ADDL 15 MIN: CPT | Performed by: PHARMACIST

## 2025-01-14 PROCEDURE — 99605 MTMS BY PHARM NP 15 MIN: CPT | Performed by: PHARMACIST

## 2025-01-14 NOTE — PROGRESS NOTES
Medication Therapy Management (MTM) Encounter    ASSESSMENT:                            Medication Adherence/Access: See below for considerations.    Diabetes   Patient is not meeting A1c goal of < 7%.  Self monitoring of blood glucose is not at goal of fasting  mg/dL.  Patient would benefit from finalizing application to get assistance for Ozempic and request higher dose given sugars are too high. Will provide sample semaglutide 0.5mg weekly today to avoid needing to re-titrate medication when approval arrives.     Hypertension/CAD   Patient is meeting blood pressure goal of < 140/90mmHg.    Hyperlipidemia   Patient would benefit from recheck of lipids with next lab draw for A1c.    Insomnia   Stable, but will try to work down on this with increase in gabapentin.     BPH  Continue with urology for follow up.    Neuropathy/Back pain  Keep plan with neurology. Will increase gabapentin at bedtime slightly to help with neuropathy, monitoring discussed and possible impact of reducing other therapies (trazodone or muscle relaxants as able).    PLAN:                            1. Increase gabapentin at night to 4 capsules- 1200mg (900mg in the morning and 1200mg at bedtime).     2. Finished Raina Application today to get sent in for the Ozempic 1mg pen request. Additional 0.5mg Semaglutide samples provided today to get shots for next 3-4 weeks while waiting for shipment/approval for the dose increase to 1mg weekly.      Follow-up: Return in about 6 weeks (around 2/25/2025) for MTM visit in clinic.    SUBJECTIVE/OBJECTIVE:                          Eddie Joyce is a 69 year old male seen for a follow-up visit.       Reason for visit: medication review.    Allergies/ADRs: Reviewed in chart  Past Medical History: Reviewed in chart  Tobacco: He reports that he has never smoked. He has never used smokeless tobacco.  Alcohol: 7-9 beverages / week    Medication Adherence/Access: Patient assistance program(s): applied for  [employer] at our December visit, however did not get all signatures and was waiting for him to come sign 3 remaining sections.  Signed these today.    Diabetes   Metformin 2000 mg daily  Has not been on anything - Ozempic 0.5mg weekly x 4 shots, then ran out so didn't get a shot for this last . Replaced Trulicity 3 mg weekly--unable to afford, increased 3 weeks ago, but doesn't feel this is making a big difference  Glipizide XL 10 mg daily  Aspirin 81mg daily  Patient is not experiencing side effects.  Current diabetes symptoms: polyuria     Blood sugar monitorin time(s) daily; Ranges: (patient reported) back to Dec 4th 216, 216, 212, 267, 195, 183, 174,184,169   Prior to that -   175, 154, 166, 170   Eye exam is up to date  Foot exam is up to date    Hypertension /CAD  Carvedilol 50 mg twice daily  Losartan 100 mg daily  Amlodipine 5mg daily - replaced Chlorthalidone 25 mg daily due to urinary concerns, has helped some.   Aspirin 81mg daily  Patient reports the following medication side effects: mild swelling.  Patient does self-monitor blood pressure  138/75- usually 130/70s.      Hyperlipidemia   Atorvastatin 40mg daily (replaced simvastatin 20mg daily)  Patient reports no significant myalgias or other side effects.     Insomnia   trazodone  mg at bedtime   Patient reports no side effects at this time.   Does wake up frequently for urination, but adjustments are improving.       BPH  Tamsulosin 0.8 mg daily- doubled by urology now.   Finasteride restarted by urology now-Previously stopped finasteride 5mg daily, deals with urgency.   No other symptoms, denies side effects.    Neuropathy/Back pain  Gabapentin 900 mg twice daily   Tizanidine 4mg as needed- will alternate with cyclobenzaprine  Cyclobenzaprine 10mg as needed  Denies side effects at this time. Feels the gabapentin has been very helpful especially at night, would like a slightly higher amount at night if able.   Has a neurology  "consult in Feb.     GERD    Pantoprazole 40 mg once daily   Patient reports no current symptoms.        Today's Vitals: /86   Pulse 87   Ht 5' 10\" (1.778 m)   Wt 246 lb 3.2 oz (111.7 kg)   SpO2 99%   BMI 35.33 kg/m    ----------------    I spent 50 minutes with this patient today. All changes were made via collaborative practice agreement with STACY Reeves CNP. A copy of the visit note was provided to the patient's provider(s).    A summary of these recommendations was given to the patient.    Yelena Grey, Pharm.D, [facility]  Medication Therapy Management Pharmacist  [phone]       Medication Therapy Recommendations  Polyneuropathy associated with underlying disease   1 Current Medication: gabapentin (NEURONTIN) 300 MG capsule   Current Medication Sig: Take 3 capsules (900 mg) by mouth 2 times daily.   Rationale: Dose too low - Dosage too low - Effectiveness   Recommendation: Increase Dose   Status: Accepted per CPA   Identified Date: 1/14/2025 Completed Date: 1/14/2025         Type 2 diabetes mellitus with diabetic autonomic neuropathy, without long-term current use of insulin (H)   1 Current Medication: Semaglutide, 1 MG/DOSE, (OZEMPIC) 4 MG/3ML pen   Current Medication Sig: Inject 1 mg subcutaneously every 7 days.   Rationale: Medication product not available - Adherence - Adherence   Recommendation: updated signed document.   Status: Accepted per CPA   Identified Date: 1/14/2025 Completed Date: 1/14/2025              "

## 2025-01-14 NOTE — PATIENT INSTRUCTIONS
"Recommendations from today's MTM visit:                                                       1. Increase gabapentin at night to 4 capsules- 1200mg (900mg in the morning and 1200mg at bedtime).     2. Finished Raina Application today to get sent in for the Ozempic 1mg pen request.     Follow-up: Return in about 6 weeks (around 2/25/2025) for MTM visit in clinic.    It was great speaking with you today.  I value your experience and would be very thankful for your time in providing feedback in our clinic survey. In the next few days, you may receive an email or text message from [employer] with a link to a survey related to your  clinical pharmacist.\"     My Clinical Pharmacist's contact information:                                                      Please feel free to contact me with any questions or concerns you have.      Yelena Grey, Pharm.D, [facility]  Medication Therapy Management Pharmacist  [phone]      "

## 2025-01-14 NOTE — LETTER
_  Medication List        Prepared on: Jan 14, 2025     Bring your Medication List when you go to the doctor, hospital, or   emergency room. And, share it with your family or caregivers.     Note any changes to how you take your medications.  Cross out medications when you no longer use them.    Medication How I take it Why I use it Prescriber   amLODIPine (NORVASC) 5 MG tablet Take 1 tablet (5 mg) by mouth daily. Essential Hypertension STACY Reeves CNP   aspirin (ASA) 81 MG EC tablet Take 1 tablet (81 mg) by mouth daily Type 2 diabetes mellitus with diabetic autonomic neuropathy, without long-term current use of insulin (H) Shan Arenas MD   atorvastatin (LIPITOR) 40 MG tablet Take 1 tablet (40 mg) by mouth daily. Coronary artery disease of native artery of native heart with stable angina pectoris STACY Reeves CNP   blood glucose (ONETOUCH ULTRA) test strip USE TO TEST BLOOD SUGAR TWICE DAILY OR AS DIRECTED Type 2 diabetes mellitus with diabetic autonomic neuropathy, without long-term current use of insulin (H) Shan Arenas MD   carvedilol (COREG) 25 MG tablet TAKE 2 TABLETS BY MOUTH TWICE DAILY- HOLD IF HEART RATE IS LESS THAN 55 Coronary artery disease of native artery of native heart with stable angina pectoris STACY Reeves CNP   cyclobenzaprine (FLEXERIL) 10 MG tablet TAKE 1 TABLET(10 MG) BY MOUTH THREE TIMES DAILY AS NEEDED FOR MUSCLE SPASMS Acute bilateral low back pain with bilateral sciatica STACY Reeves CNP   finasteride (PROSCAR) 5 MG tablet Take 1 tablet (5 mg) by mouth daily. Benign prostatic hyperplasia with urinary frequency STACY Viera CNP   gabapentin (NEURONTIN) 300 MG capsule Take 3 capsules (900 mg) by mouth in the morning and 4 capsules (1200mg) daily at bedtime. Polyneuropathy associated with underlying disease STACY Reeves CNP   glipiZIDE (GLUCOTROL XL) 10 MG 24 hr tablet TAKE 1 TABLET(10 MG) BY MOUTH DAILY Type 2 diabetes mellitus with  diabetic autonomic neuropathy, without long-term current use of insulin (H) STACY Reeves CNP   ketoconazole (NIZORAL) 2 % external cream Apply topically daily Tinea pedis of both feet STACY Reeves CNP   losartan (COZAAR) 100 MG tablet TAKE 1 TABLET(100 MG) BY MOUTH DAILY Essential Hypertension STACY Reeves CNP   metFORMIN (GLUCOPHAGE XR) 500 MG 24 hr tablet TAKE 2 TABLETS(1000 MG) BY MOUTH TWICE DAILY WITH MEALS Type 2 diabetes mellitus with diabetic autonomic neuropathy, without long-term current use of insulin (H) STACY Reeves CNP   pantoprazole (PROTONIX) 40 MG EC tablet TAKE 1 TABLET(40 MG) BY MOUTH DAILY Gastroesophageal reflux disease with esophagitis, unspecified whether hemorrhage STACY Reeves CNP   Semaglutide, 1 MG/DOSE, (OZEMPIC) 4 MG/3ML pen Inject 1 mg subcutaneously every 7 days. When arriving from [facility].  Type 2 diabetes mellitus with diabetic autonomic neuropathy, without long-term current use of insulin (H) STACY Reeves CNP   sildenafil (REVATIO) 20 MG tablet TAKE 2 TO 4 TABLETS BY MOUTH AS NEEDED, NEVER USE WITH NTG, DOXASOSIN, OR TERAZOSIN Erectile dysfunction, unspecified erectile dysfunction type STACY Reeves CNP   tamsulosin (FLOMAX) 0.4 MG capsule Take 2 capsules (0.8 mg) by mouth daily. Benign prostatic hyperplasia with urinary frequency STACY Viera CNP   tiZANidine (ZANAFLEX) 4 MG tablet TAKE 1 TABLET(4 MG) BY MOUTH DAILY Acute bilateral low back pain with bilateral sciatica STACY Reeves CNP   traZODone (DESYREL) 50 MG tablet TAKE 1 TO 2 TABLETS(50  MG) BY MOUTH AT BEDTIME Insomnia, unspecified type STACY Reeves CNP         Add new medications, over-the-counter drugs, herbals, vitamins, or  minerals in the blank rows below.    Medication How I take it Why I use it Prescriber                                      Allergies:      No Known Allergies        Side effects I have had:      No Known Side Effects         Other Information:              My notes and questions:

## 2025-01-14 NOTE — LETTER
January 14, 2025  Syd Joyce  [address] [place] [facility] [place] [address]    Dear Mr. Joyce, [facility]     Thank you for talking with me on Jan 14, 2025 about your health and medications. As a follow-up to our conversation, I have included two documents:      Your Recommended To-Do List has steps you should take to get the best results from your medications.  Your Medication List will help you keep track of your medications and how to take them.    If you want to talk about these documents, please call Yelena Grey RPH at phone: [phone], Monday-Friday 8-4:30pm.    I look forward to working with you and your doctors to make sure your medications work well for you.    Sincerely,  Yelena Grey RPH  [facility] Pharmacist, [facility]

## 2025-01-14 NOTE — LETTER
"Recommended To-Do List      Prepared on: Jan 14, 2025       You can get the best results from your medications by completing the items on this \"To-Do List.\"      Bring your To-Do List when you go to your doctor. And, share it with your family or caregivers.    My To-Do List:  What we talked about: What I should do:   Your medication dosage being too low    Increase your dosage of gabapentin (NEURONTIN) at bedtime to 4 capsules.           What we talked about: What I should do:   The importance of taking your medication as intended     Updated signature pages for Ozempic application          What we talked about: What I should do:                     "

## 2025-02-06 DIAGNOSIS — M54.41 ACUTE BILATERAL LOW BACK PAIN WITH BILATERAL SCIATICA: ICD-10-CM

## 2025-02-06 DIAGNOSIS — M54.42 ACUTE BILATERAL LOW BACK PAIN WITH BILATERAL SCIATICA: ICD-10-CM

## 2025-02-10 RX ORDER — CYCLOBENZAPRINE HCL 10 MG
10 TABLET ORAL 3 TIMES DAILY PRN
Qty: 60 TABLET | Refills: 0 | Status: SHIPPED | OUTPATIENT
Start: 2025-02-10

## 2025-02-10 NOTE — TELEPHONE ENCOUNTER
Med: cyclobenzaprine     LOV (related): 08/12/2024      Due for F/U around: 02/25/2025    Next Appt: 09/2025    Patient working on long term control plan with MTM to try and decrease muscle relaxer use

## 2025-02-12 DIAGNOSIS — M54.41 ACUTE BILATERAL LOW BACK PAIN WITH BILATERAL SCIATICA: ICD-10-CM

## 2025-02-12 DIAGNOSIS — M54.42 ACUTE BILATERAL LOW BACK PAIN WITH BILATERAL SCIATICA: ICD-10-CM

## 2025-02-12 NOTE — TELEPHONE ENCOUNTER
Med: Tizanidine    LOV (related): 08/12/2024      Due for F/U around: 02/25/2025 (MTM f/u) MyChart message sent    Next Appt: 09/10/2025

## 2025-02-27 ENCOUNTER — OFFICE VISIT (OUTPATIENT)
Dept: PHARMACY | Facility: PHYSICIAN GROUP | Age: 70
End: 2025-02-27
Payer: COMMERCIAL

## 2025-02-27 ENCOUNTER — TELEPHONE (OUTPATIENT)
Dept: FAMILY MEDICINE | Facility: CLINIC | Age: 70
End: 2025-02-27

## 2025-02-27 VITALS
SYSTOLIC BLOOD PRESSURE: 89 MMHG | OXYGEN SATURATION: 98 % | HEART RATE: 89 BPM | WEIGHT: 244 LBS | BODY MASS INDEX: 35.01 KG/M2 | DIASTOLIC BLOOD PRESSURE: 74 MMHG

## 2025-02-27 DIAGNOSIS — M62.830 BACK MUSCLE SPASM: ICD-10-CM

## 2025-02-27 DIAGNOSIS — G63 POLYNEUROPATHY ASSOCIATED WITH UNDERLYING DISEASE: ICD-10-CM

## 2025-02-27 DIAGNOSIS — E11.43 TYPE 2 DIABETES MELLITUS WITH DIABETIC AUTONOMIC NEUROPATHY, WITHOUT LONG-TERM CURRENT USE OF INSULIN (H): Primary | ICD-10-CM

## 2025-02-27 DIAGNOSIS — I25.118 CORONARY ARTERY DISEASE OF NATIVE ARTERY OF NATIVE HEART WITH STABLE ANGINA PECTORIS: ICD-10-CM

## 2025-02-27 DIAGNOSIS — I10 ESSENTIAL HYPERTENSION: ICD-10-CM

## 2025-02-27 LAB — HBA1C MFR BLD: 8 % (ref 4–6)

## 2025-02-27 PROCEDURE — 36415 COLL VENOUS BLD VENIPUNCTURE: CPT

## 2025-02-27 PROCEDURE — 83036 HEMOGLOBIN GLYCOSYLATED A1C: CPT

## 2025-02-27 RX ORDER — GABAPENTIN 300 MG/1
CAPSULE ORAL
COMMUNITY

## 2025-02-27 NOTE — TELEPHONE ENCOUNTER
[employer] patient assistance program Ozempic arrived in clinic. 4 boxes of 1mg pens arrived. Patient notified that medication was available for  in clinic. Tashia Briceno

## 2025-02-27 NOTE — PATIENT INSTRUCTIONS
"Recommendations from today's MTM visit:                                                         1. Finish your current pen at 1mg weekly. Then will increase to 2mg weekly by taking 2 shots of 1mg and I will request a dose increase with NovoNordisk for 2mg pen weekly.     2. Next will consider adding in Jardiance.     Follow-up: Return in about 6 weeks (around 4/10/2025) for MTM visit in clinic.    It was great speaking with you today.  I value your experience and would be very thankful for your time in providing feedback in our clinic survey. In the next few days, you may receive an email or text message from [facility] [employer] with a link to a survey related to your  clinical pharmacist.\"     My Clinical Pharmacist's contact information:                                                      Please feel free to contact me with any questions or concerns you have.      Yelena Grey, Pharm.D, [facility]  Medication Therapy Management Pharmacist  [phone]      "

## 2025-02-27 NOTE — PROGRESS NOTES
Medication Therapy Management (MTM) Encounter    ASSESSMENT:                            Medication Adherence/Access: update order for dose change with Raina needed.     Diabetes   Patient is not meeting A1c goal of < 7%.  Self monitoring of blood glucose is not at goal of fasting  mg/dL.  Patient would benefit from increase in Ozempic as able each month to max of 2mg weekly. Discussed additional therapy with Jardiance which would be preferred for his diabetes and CAD. Due for microalbumin rechceck today as well.     HypertensionCAD  Patient is meeting blood pressure goal of < 140/90mmHg.     Neuropathy/Back pain   Stable.     PLAN:                            1. Finish your current pen at 1mg weekly. Then will increase to 2mg weekly by taking 2 shots of 1mg and I will request a dose increase with NovoNordisk for 2mg pen weekly.     2. Next will consider adding in Jardiance.     Follow-up: Return in about 6 weeks (around 4/10/2025) for MTM visit in clinic.    SUBJECTIVE/OBJECTIVE:                          Eddie Joyce is a 70 year old male seen for a follow-up visit.       Reason for visit: medication review.    Allergies/ADRs: Reviewed in chart  Past Medical History: Reviewed in chart  Tobacco: He reports that he has never smoked. He has never used smokeless tobacco.  Alcohol: 7-9 beverages / week    Medication Adherence/Access: Patient assistance program(s): NovoNordisk Ozmepic 1mg weekly-has not arrived yet.     Diabetes   Metformin 2000 mg daily  Ozempic 1mg weekly- one shot so far of current supply.  Replaced Trulicity 3 mg weekly--unable to afford  Glipizide XL 10 mg daily  Aspirin 81mg daily  Patient is not experiencing side effects.  Current diabetes symptoms: polyuria     Blood sugar monitorin time(s) daily; Ranges: (patient reported) 200s   Eye exam is up to date  Foot exam is up to date    Hypertension /CAD  Carvedilol 50 mg twice daily  Losartan 100 mg daily  Amlodipine 5mg daily - replaced  Chlorthalidone 25 mg daily due to urinary concerns, has helped some.   Aspirin 81mg daily  Patient reports the following medication side effects: mild swelling.  Patient does self-monitor blood pressure     Neuropathy/Back pain  Gabapentin 900 mg morning and 1200mg at night.   Tizanidine 4mg as needed- will alternate with cyclobenzaprine  Cyclobenzaprine 10mg as needed  Denies side effects at this time. Feels the gabapentin has been very helpful especially at night, would like a slightly higher amount at night if able.   Had a neurology consult in Feb, was referred to physical therapy for balance training had labs- no results provided to us.       Today's Vitals: BP 89/74   Pulse 89   Wt 244 lb (110.7 kg)   SpO2 98%   BMI 35.01 kg/m    ----------------    I spent 22 minutes with this patient today. All changes were made via collaborative practice agreement with STACY Reeves CNP. A copy of the visit note was provided to the patient's provider(s).    A summary of these recommendations was given to the patient.    Yelena Grey, Pharm.D, [facility]  Medication Therapy Management Pharmacist  [phone]     Medication Therapy Recommendations  Type 2 diabetes mellitus with diabetic autonomic neuropathy, without long-term current use of insulin (H)   1 Current Medication: Semaglutide, 1 MG/DOSE, (OZEMPIC) 4 MG/3ML pen   Current Medication Sig: Inject 1 mg subcutaneously every 7 days.   Rationale: Dose too low - Dosage too low - Effectiveness   Recommendation: Increase Dose - Ozempic (2 MG/DOSE) 8 MG/3ML Sopn - 2mg weekly   Status: Accepted per CPA   Identified Date: 2/27/2025 Completed Date: 2/27/2025

## 2025-02-27 NOTE — PROGRESS NOTES
Shipment arrived for NovoNordisk 1mg pens x4- patient contacted to .     Yelena Grey, Pharm.D, [facility]  Medication Therapy Management Pharmacist  [phone]

## 2025-03-03 DIAGNOSIS — I10 ESSENTIAL HYPERTENSION: ICD-10-CM

## 2025-03-04 RX ORDER — AMLODIPINE BESYLATE 5 MG/1
5 TABLET ORAL DAILY
Qty: 90 TABLET | Refills: 1 | Status: SHIPPED | OUTPATIENT
Start: 2025-03-04

## 2025-03-04 NOTE — TELEPHONE ENCOUNTER
Med: AMLODIPINE    LOV (related): 8/12/24 - CPX  LAST MTM: 2/27/25    Last Lab: 8/12/24      Due for F/U around: 5/2025 FOR DM CHECK - 8/2025 FOR CPX    Next Appt: 4/10/25 MTM        BP Readings from Last 3 Encounters:   02/27/25 89/74   01/14/25 132/86   01/09/25 157/73       Last Comprehensive Metabolic Panel:  Lab Results   Component Value Date     (L) 08/12/2024    POTASSIUM 4.3 08/12/2024    CHLORIDE 90 (L) 08/12/2024    CO2 32 (H) 08/12/2024    ANIONGAP 10 08/12/2024     (H) 08/12/2024    BUN 18.9 08/12/2024    CR 1.36 (H) 08/12/2024    GFRESTIMATED 56 (L) 08/12/2024    HEAVENLY 9.5 08/12/2024

## 2025-03-12 ENCOUNTER — MYC REFILL (OUTPATIENT)
Dept: FAMILY MEDICINE | Facility: CLINIC | Age: 70
End: 2025-03-12

## 2025-03-12 DIAGNOSIS — M54.42 ACUTE BILATERAL LOW BACK PAIN WITH BILATERAL SCIATICA: ICD-10-CM

## 2025-03-12 DIAGNOSIS — M54.41 ACUTE BILATERAL LOW BACK PAIN WITH BILATERAL SCIATICA: ICD-10-CM

## 2025-03-12 RX ORDER — GABAPENTIN 300 MG/1
CAPSULE ORAL
Status: CANCELLED | OUTPATIENT
Start: 2025-03-12

## 2025-03-12 NOTE — CONFIDENTIAL NOTE
CONTROLLED SUBSTANCE REFILL REQUEST FOR GABAPENTIN  DOSE: 300 MG - # 540  SIG: 3 CAPS IN AM, 4 CAPS IN PM      LAST REFILL: 12/27/24    CONTROLLED SUBSTANCE AGREEMENT: DUE    URINE DRUG SCREEN: DUE        FILL HISTORY PER :          REFILL ROUTED TO TYESHA CARBONE FOR REVIEW    Jennie Patel, [facility]

## 2025-03-12 NOTE — TELEPHONE ENCOUNTER
Med: Cyclobenzaprine      Last refill 2/10/25 #60, 0-R    LOV (related): 8/12/24 (CPX)      Due for F/U around: due for DM visit 5/27/25-8/27/25    Next Appt: 4/10/25 With ZANDER Sy

## 2025-03-13 RX ORDER — CYCLOBENZAPRINE HCL 10 MG
10 TABLET ORAL 3 TIMES DAILY PRN
Qty: 60 TABLET | Refills: 0 | Status: SHIPPED | OUTPATIENT
Start: 2025-03-13

## 2025-03-13 RX ORDER — GABAPENTIN 300 MG/1
CAPSULE ORAL
Qty: 540 CAPSULE | Refills: 0 | Status: SHIPPED | OUTPATIENT
Start: 2025-03-13

## 2025-04-08 DIAGNOSIS — M54.42 ACUTE BILATERAL LOW BACK PAIN WITH BILATERAL SCIATICA: ICD-10-CM

## 2025-04-08 DIAGNOSIS — E11.43 TYPE 2 DIABETES MELLITUS WITH DIABETIC AUTONOMIC NEUROPATHY, WITHOUT LONG-TERM CURRENT USE OF INSULIN (H): ICD-10-CM

## 2025-04-08 DIAGNOSIS — M54.41 ACUTE BILATERAL LOW BACK PAIN WITH BILATERAL SCIATICA: ICD-10-CM

## 2025-04-08 RX ORDER — METFORMIN HYDROCHLORIDE 500 MG/1
1000 TABLET, EXTENDED RELEASE ORAL 2 TIMES DAILY WITH MEALS
Qty: 360 TABLET | Refills: 1 | Status: SHIPPED | OUTPATIENT
Start: 2025-04-08

## 2025-04-08 RX ORDER — CYCLOBENZAPRINE HCL 10 MG
10 TABLET ORAL 3 TIMES DAILY PRN
Qty: 30 TABLET | Refills: 0 | Status: SHIPPED | OUTPATIENT
Start: 2025-04-08

## 2025-04-08 NOTE — CONFIDENTIAL NOTE
Med: CYCLOBENZAPRINE, METFORMIN    LOV (related): 11/20/24 - DM  LAST MTM: 2/27/25    Last Lab: 2/27/25      Due for F/U around: 5/2025 FOR DM CHECK    Next Appt: 4/10/25 - DM & MTM        Lab Results   Component Value Date    A1C 8.0 02/27/2025    A1C 6.9 11/20/2024    A1C 6.5 08/12/2024    A1C 6.6 04/24/2024    A1C 6.4 11/29/2023

## 2025-04-09 ENCOUNTER — TELEPHONE (OUTPATIENT)
Dept: FAMILY MEDICINE | Facility: CLINIC | Age: 70
End: 2025-04-09

## 2025-04-09 NOTE — TELEPHONE ENCOUNTER
Called patient to notify that ozempic arrived in clinic today. Four pens/boxes Ozempic 8mg/3mL. Will  tomorrow 4/10 during MTM appt.    Suzy Bryan CMA on 4/9/2025 at 10:09 AM

## 2025-04-09 NOTE — PROGRESS NOTES
"  Assessment & Plan     Localized swelling of both lower legs  Patient presents for bilateral lower leg edema. R>L. Reviewed possible etiologies. Likely Lymphedema and/or amlodipine side effect. No red flags on exam to suggest possible DVT. Has history of lymphedema. Will stop Amlodipine. Plan to restart chlorthalidone at lower dose 12.5 mg daily. Reviewed OTC symptomatic cares including elevating legs, increasing activity and wearing compression stockings. Declines lymphedema referral today. Red flags that warrant emergent evaluation discussed. Follow up in 1 month or sooner as needed. Patient agreeable to plan. All questions answered.      Essential hypertension  BP at goal today. See plan above.   - chlorthalidone (HYGROTON) 25 MG tablet  Dispense: 45 tablet; Refill: 0  - Basic metabolic panel  - VENOUS COLLECTION  - Basic metabolic panel            BMI  Estimated body mass index is 34.58 kg/m  as calculated from the following:    Height as of 1/14/25: 1.778 m (5' 10\").    Weight as of this encounter: 109.3 kg (241 lb).   Weight management plan: Discussed healthy diet and exercise guidelines      Work on weight loss  Regular exercise  See Patient Instructions    Return in about 4 weeks (around 5/8/2025) for Follow up.    Adrian Morgan is a 70 year old, presenting for the following health issues:  Leg Problem (X 2 weeks  R Lower Leg painful & swollen, feels warm)    History of Present Illness       Reason for visit:  Swollen and painful right lower leg   He is taking medications regularly.          Concern - Leg problem  Onset: 2 weeks ago  Description: Right lower legs. Was off chlorthalidone for BP due to urinary issues. No improvement in urinary symptoms off Chlorthalidone. Saw [facility] pharmacist and will restart. Has had some minor swelling in the feet but not the the extent his calf muscle hurt and warm feeling. No shortness of breath or chest pain. NO swelling in the morning and increases throughout the day. " Has been inactive lately especially in the winter.   Intensity: 3-4/10 only when pressing on it  Progression of Symptoms:  same  Accompanying Signs & Symptoms: Right lower leg swelling, no redness, some warmth and some soreness. Normal in the morning and balloons up throughout the day.   Previous history of similar problem: yes edema in the past lymphedema in 2018 and did PT  Precipitating factors:        Worsened by: none  Alleviating factors:        Improved by: none  Therapies tried and outcome: Has compression stockings ans problems with edema in the past.     BPH and having urinary frequency so previously stopped chlorthalidone and restarted amlodipine in Dec. Now noticing right leg selling. Per MTM plan to restart chlorthalidone 12.5 mg.  Also taking Gabpapentin neuropathy     BP Readings from Last 6 Encounters:   04/10/25 132/70   04/10/25 138/70   02/27/25 89/74   01/14/25 132/86   01/09/25 157/73   12/05/24 (!) 146/60        Review of Systems  Constitutional, HEENT, cardiovascular, pulmonary, gi and gu systems are negative, except as otherwise noted.      Objective    /70   Pulse 89   Wt 109.3 kg (241 lb)   SpO2 97%   BMI 34.58 kg/m    Body mass index is 34.58 kg/m .  Physical Exam   GENERAL: alert and no distress  RESP: lungs clear to auscultation - no rales, rhonchi or wheezes  CV: regular rate and rhythm, normal S1 S2, no S3 or S4, no murmur, click or rub, no peripheral edema  MS: bilateral lower leg edema R>L. Mild tenderness, no warmth, erythema or drainage noted.   SKIN: no suspicious lesions or rashes  PSYCH: mentation appears normal, affect normal/bright    No results found for this or any previous visit (from the past 24 hours).        Signed Electronically by: STACY Saucedo CNP

## 2025-04-10 ENCOUNTER — OFFICE VISIT (OUTPATIENT)
Dept: FAMILY MEDICINE | Facility: CLINIC | Age: 70
End: 2025-04-10

## 2025-04-10 ENCOUNTER — OFFICE VISIT (OUTPATIENT)
Dept: PHARMACY | Facility: PHYSICIAN GROUP | Age: 70
End: 2025-04-10
Payer: COMMERCIAL

## 2025-04-10 VITALS
DIASTOLIC BLOOD PRESSURE: 70 MMHG | HEART RATE: 89 BPM | SYSTOLIC BLOOD PRESSURE: 138 MMHG | OXYGEN SATURATION: 97 % | WEIGHT: 241 LBS | BODY MASS INDEX: 34.58 KG/M2

## 2025-04-10 VITALS
BODY MASS INDEX: 34.58 KG/M2 | HEART RATE: 89 BPM | SYSTOLIC BLOOD PRESSURE: 132 MMHG | OXYGEN SATURATION: 97 % | WEIGHT: 241 LBS | DIASTOLIC BLOOD PRESSURE: 70 MMHG

## 2025-04-10 DIAGNOSIS — R22.43 LOCALIZED SWELLING OF BOTH LOWER LEGS: Primary | ICD-10-CM

## 2025-04-10 DIAGNOSIS — E11.43 TYPE 2 DIABETES MELLITUS WITH DIABETIC AUTONOMIC NEUROPATHY, WITHOUT LONG-TERM CURRENT USE OF INSULIN (H): Primary | ICD-10-CM

## 2025-04-10 DIAGNOSIS — I10 ESSENTIAL HYPERTENSION: ICD-10-CM

## 2025-04-10 DIAGNOSIS — I25.118 CORONARY ARTERY DISEASE OF NATIVE ARTERY OF NATIVE HEART WITH STABLE ANGINA PECTORIS: ICD-10-CM

## 2025-04-10 LAB
ANION GAP SERPL CALCULATED.3IONS-SCNC: 13 MMOL/L (ref 7–15)
BUN SERPL-MCNC: 14.9 MG/DL (ref 8–23)
CALCIUM SERPL-MCNC: 9.2 MG/DL (ref 8.8–10.4)
CHLORIDE SERPL-SCNC: 96 MMOL/L (ref 98–107)
CREAT SERPL-MCNC: 1.05 MG/DL (ref 0.67–1.17)
EGFRCR SERPLBLD CKD-EPI 2021: 75 ML/MIN/1.73M2
FASTING STATUS PATIENT QL REPORTED: ABNORMAL
GLUCOSE SERPL-MCNC: 135 MG/DL (ref 70–99)
HCO3 SERPL-SCNC: 27 MMOL/L (ref 22–29)
POTASSIUM SERPL-SCNC: 4 MMOL/L (ref 3.4–5.3)
SODIUM SERPL-SCNC: 136 MMOL/L (ref 135–145)

## 2025-04-10 PROCEDURE — 3078F DIAST BP <80 MM HG: CPT

## 2025-04-10 PROCEDURE — 80048 BASIC METABOLIC PNL TOTAL CA: CPT | Mod: 90

## 2025-04-10 PROCEDURE — 36415 COLL VENOUS BLD VENIPUNCTURE: CPT

## 2025-04-10 PROCEDURE — 99214 OFFICE O/P EST MOD 30 MIN: CPT

## 2025-04-10 PROCEDURE — 3075F SYST BP GE 130 - 139MM HG: CPT

## 2025-04-10 PROCEDURE — G2211 COMPLEX E/M VISIT ADD ON: HCPCS

## 2025-04-10 RX ORDER — CHLORTHALIDONE 25 MG/1
12.5 TABLET ORAL DAILY
Qty: 45 TABLET | Refills: 0 | Status: SHIPPED | OUTPATIENT
Start: 2025-04-10

## 2025-04-10 NOTE — PROGRESS NOTES
Medication Therapy Management (MTM) Encounter    ASSESSMENT:                            Medication Adherence/Access: No issues identified.    Diabetes   Patient is not meeting A1c goal of < 7%.  Self monitoring of blood glucose is at goal of fasting  mg/dL.  Patient would benefit from no changes at this time.     Hypertension/CAD  Patient is not meeting blood pressure goal of < 140/90mmHg. Consider restarting chlorthalidone 12.5mg daily in the morning to help with blood pressure and swelling, discussed this could worsen urinary frequency however due to lack of benefit seen when he stopped it benefit>risk at this time.     PLAN:                            1. Seeing provider after- may consider adding back lower dose chlorthalidone.     2. Continue Ozempic 2mg weekly.     Follow-up: Return in about 4 weeks (around 2025) for MTM visit in clinic.    SUBJECTIVE/OBJECTIVE:                          Eddie Joyce is a 70 year old male seen for a follow-up visit.       Reason for visit: medication review.    Allergies/ADRs: Reviewed in chart  Past Medical History: Reviewed in chart  Tobacco: He reports that he has never smoked. He has never used smokeless tobacco.  Alcohol: not currently using    Medication Adherence/Access: Patient assistance program(s): [employer] Ozmepic 2mg weekly- picking up 4 month supply today.     Diabetes   Metformin 2000 mg daily  Ozempic 2mg weekly-  Replaced Trulicity 3 mg weekly due to costs.   Glipizide XL 10 mg daily  Aspirin 81mg daily  Patient is not experiencing side effects.  Current diabetes symptoms: polyuria     Blood sugar monitorin time(s) daily; Ranges: (patient reported) 131, 137, 139, 166, 126,126, 133, 125, 132, 154, 149   Eye exam is up to date  Foot exam is up to date    Hypertension /CAD  Carvedilol 50 mg twice daily  Losartan 100 mg daily  Amlodipine 5mg daily - replaced Chlorthalidone 25 mg daily due to urinary concerns- continues to have significant frequency  concerns and waiting for finasteride to have more effect (started 2/20/25)   Aspirin 81mg daily  Patient reports the following medication side effects: mild swelling, more right side. Seeing provider after  Patient does self-monitor blood pressure- 140/70s still.        Today's Vitals: /70   Pulse 89   Wt 241 lb (109.3 kg)   SpO2 97%   BMI 34.58 kg/m    ----------------    I spent 25 minutes with this patient today. All changes were made via collaborative practice agreement with STACY Reeves CNP. A copy of the visit note was provided to the patient's provider(s).    A summary of these recommendations was sent via [employer].    Yelena Grey, Pharm.D, [facility]  Medication Therapy Management Pharmacist  [phone]       Medication Therapy Recommendations  Essential hypertension   1 Current Medication: amLODIPine (NORVASC) 5 MG tablet (Discontinued)   Current Medication Sig: TAKE 1 TABLET(5 MG) BY MOUTH DAILY   Rationale: Synergistic therapy - Needs additional medication therapy - Indication   Recommendation: Start Medication - chlorthalidone 12.5 mg Tabs half-tab   Status: Accepted per Provider   Identified Date: 4/10/2025 Completed Date: 4/10/2025

## 2025-04-10 NOTE — PATIENT INSTRUCTIONS
"Recommendations from today's MTM visit:                                                       1. Seeing provider after- may consider adding back lower dose chlorthalidone.     2. Continue Ozempic 2mg weekly.  Diabetes recheck after 5/27/25.     Follow-up: Return in about 4 weeks (around 5/8/2025) for MTM visit in clinic.    It was great speaking with you today.  I value your experience and would be very thankful for your time in providing feedback in our clinic survey. In the next few days, you may receive an email or text message from [employer] with a link to a survey related to your  clinical pharmacist.\"     My Clinical Pharmacist's contact information:                                                      Please feel free to contact me with any questions or concerns you have.      Yelena Grey, Pharm.D, [facility]  Medication Therapy Management Pharmacist  [phone]      "

## 2025-04-28 DIAGNOSIS — M54.41 ACUTE BILATERAL LOW BACK PAIN WITH BILATERAL SCIATICA: ICD-10-CM

## 2025-04-28 DIAGNOSIS — G47.00 INSOMNIA, UNSPECIFIED TYPE: ICD-10-CM

## 2025-04-28 DIAGNOSIS — I10 ESSENTIAL HYPERTENSION: ICD-10-CM

## 2025-04-28 DIAGNOSIS — M54.42 ACUTE BILATERAL LOW BACK PAIN WITH BILATERAL SCIATICA: ICD-10-CM

## 2025-04-28 NOTE — TELEPHONE ENCOUNTER
Med: Trazodone and Cyclobenzaprine    LOV (related): 4/10/25      Due for F/U around: 5/8/25    Next Appt: 5/8/25

## 2025-05-01 RX ORDER — TRAZODONE HYDROCHLORIDE 50 MG/1
50-100 TABLET ORAL AT BEDTIME
Qty: 180 TABLET | Refills: 0 | Status: SHIPPED | OUTPATIENT
Start: 2025-05-01

## 2025-05-01 RX ORDER — CYCLOBENZAPRINE HCL 10 MG
10 TABLET ORAL 3 TIMES DAILY PRN
Qty: 30 TABLET | Refills: 0 | Status: SHIPPED | OUTPATIENT
Start: 2025-05-01

## 2025-05-01 RX ORDER — LOSARTAN POTASSIUM 100 MG/1
100 TABLET ORAL DAILY
Qty: 90 TABLET | Refills: 0 | Status: SHIPPED | OUTPATIENT
Start: 2025-05-01

## 2025-05-06 NOTE — PROGRESS NOTES
Assessment & Plan     Type 2 diabetes mellitus with diabetic autonomic neuropathy, without long-term current use of insulin (H)  - stable and reviewed with MTM today, no changes  - Albumin Random Urine Quantitative with Creat Ratio  - VENOUS COLLECTION    Essential hypertension  Given change to chlorthalidone a few weeks ago, should recheck BMP today.   BP at goal, < 140/90  - diet and exercise discussed, losing weight on ozempic   - Basic metabolic panel  - VENOUS COLLECTION  - Basic metabolic panel    Venous (peripheral) insufficiency  - suspect swelling r/t venous insufficiency, recommend compression stockings and to return if not having improvement over the next month     Follow-up  No follow-ups on file.    Adrian Morgan is a 70 year old, presenting for the following health issues:  RECHECK (Nonfasting/4/10/25 restarted Chlorthalidone  and stopped Amlodipine)    History of Present Illness       Diabetes:   He presents for follow up of diabetes.  He is checking home blood glucose one time daily.   He checks blood glucose before meals.  Blood glucose is never over 200 and never under 70.  When his blood glucose is low, the patient is asymptomatic for confusion, blurred vision, lethargy and reports not feeling dizzy, shaky, or weak.   He has no concerns regarding his diabetes at this time.  He is having numbness in feet.            He eats 0-1 servings of fruits and vegetables daily.He consumes 0 sweetened beverage(s) daily.He exercises with enough effort to increase his heart rate 10 to 19 minutes per day.  He exercises with enough effort to increase his heart rate 3 or less days per week.   He is taking medications regularly.      Answers submitted by the patient for this visit:  Diabetes Visit (Submitted on 5/2/2025)  Chief Complaint: Chronic problems general questions HPI Form  Frequency of checking blood sugars:: one time daily  What time of day are you checking your blood sugars : before meals  Have  you had any blood sugars above 200?: No  Have you had any blood sugars below 70?: No  Hypoglycemia symptoms:: none  Diabetic concerns:: none  Paraesthesia present:: numbness in feet  General Questionnaire (Submitted on 5/2/2025)  Chief Complaint: Chronic problems general questions HPI Form  How many servings of fruits and vegetables do you eat daily?: 0-1  On average, how many sweetened beverages do you drink each day (Examples: soda, juice, sweet tea, etc.  Do NOT count diet or artificially sweetened beverages)?: 0  How many minutes a day do you exercise enough to make your heart beat faster?: 10 to 19  How many days a week do you exercise enough to make your heart beat faster?: 3 or less  How many days per week do you miss taking your medication?: 0  Questionnaire about: Chronic problems general questions HPI Form (Submitted on 5/2/2025)  Chief Complaint: Chronic problems general questions HPI Form  Wt Readings from Last 4 Encounters:   05/08/25 105.9 kg (233 lb 6.4 oz)   05/08/25 105.9 kg (233 lb 6.4 oz)   04/10/25 109.3 kg (241 lb)   04/10/25 109.3 kg (241 lb)      Review of Systems  Constitutional, HEENT, cardiovascular, pulmonary, gi and gu systems are negative, except as otherwise noted.      Objective    BP (!) 140/71   Pulse 86   Wt 105.9 kg (233 lb 6.4 oz)   SpO2 96%   BMI 33.49 kg/m    Body mass index is 33.49 kg/m .  Physical Exam   GENERAL: alert and no distress  RESP: lungs clear to auscultation - no rales, rhonchi or wheezes  CV: regular rate and rhythm, normal S1 S2, no S3 or S4, no murmur, click or rub, no peripheral edema  CV: +1 peripheral edema bilaterally regular rates and rhythm, normal S1 S2, no S3 or S4, no murmur, click or rub, peripheral pulses strong  MS: no gross musculoskeletal defects noted, no edema  SKIN: flaking and mild discoloration of skin of LLE, no redness or warmth to touch     No results found for this or any previous visit (from the past 24 hours).        Signed  Electronically by: STACY Reeves CNP

## 2025-05-07 NOTE — PROGRESS NOTES
Medication Therapy Management (MTM) Encounter    ASSESSMENT:                            Medication Adherence/Access: No issues identified.    Diabetes   Patient is not meeting A1c goal of < 7%.  Self monitoring of blood glucose is at goal of fasting  mg/dL.  Patient would benefit from no changes at this time for medications continue on diet and exercise efforts and weight loss.     Hypertension/CAD  Patient is meeting blood pressure goal of < 140/90mmHg.    PLAN:                            1. Update BMP for safety today on chlorthalidone.     Follow-up: Return in about 4 weeks (around 2025) for MTM visit in clinic, Lab Work.    SUBJECTIVE/OBJECTIVE:                          Eddie Joyce is a 70 year old male seen for a follow-up visit.       Reason for visit: medication review.    Allergies/ADRs: Reviewed in chart  Past Medical History: Reviewed in chart  Tobacco: He reports that he has never smoked. He has never used smokeless tobacco.  Alcohol: not currently using    Medication Adherence/Access: Patient assistance program(s): [employer] patient assistance - Ozempic .    Diabetes   Metformin 2000 mg daily  Ozempic 2mg weekly-  Replaced Trulicity 3 mg weekly due to costs.   Glipizide XL 10 mg daily  Aspirin 81mg daily  Patient is not experiencing side effects.  Current diabetes symptoms: polyuria     Blood sugar monitorin time(s) daily; Ranges: (patient reported) 119, 141, 127, 122, 154, 128, 123, 135, 162, 135, 132, 131   Eye exam is up to date  Foot exam is up to date    Hypertension /CAD  Carvedilol 50 mg twice daily  Losartan 100 mg daily  Chlorthalidone 12.5mg daily (added 4/10)- replaced Amlodipine 5mg daily (swelling)  Aspirin 81mg daily  Patient reports the following medication side effects: still swelling, more right side. Seeing provider after again today.  Patient does self-monitor blood pressure-   135/75s  Wt Readings from Last 4 Encounters:   25 105.9 kg (233 lb 6.4 oz)    05/08/25 105.9 kg (233 lb 6.4 oz)   04/10/25 109.3 kg (241 lb)   04/10/25 109.3 kg (241 lb)          Today's Vitals: /68   Pulse 86   Wt 105.9 kg (233 lb 6.4 oz)   SpO2 96%   BMI 33.49 kg/m    ----------------    I spent 10 minutes with this patient today. All changes were made via collaborative practice agreement with STACY Reeves CNP. A copy of the visit note was provided to the patient's provider(s).    A summary of these recommendations was given to the patient.    Yelena Grey, Pharm.D, [facility]  Medication Therapy Management Pharmacist  [phone]       Medication Therapy Recommendations  Essential hypertension   1 Current Medication: chlorthalidone (HYGROTON) 25 MG tablet   Current Medication Sig: Take 0.5 tablets (12.5 mg) by mouth daily.   Rationale: Medication requires monitoring - Needs additional monitoring - Safety   Recommendation: Order Lab   Status: Accepted per CPA   Identified Date: 5/8/2025 Completed Date: 5/8/2025

## 2025-05-07 NOTE — PATIENT INSTRUCTIONS
"Recommendations from today's MTM visit:                                                       1. Update BMP for safety today on chlorthalidone.     Follow-up: Return in about 4 weeks (around 6/5/2025) for MTM visit in clinic, Lab Work.    It was great speaking with you today.  I value your experience and would be very thankful for your time in providing feedback in our clinic survey. In the next few days, you may receive an email or text message from [employer] with a link to a survey related to your  clinical pharmacist.\"     My Clinical Pharmacist's contact information:                                                      Please feel free to contact me with any questions or concerns you have.      Yelena Grey, Pharm.D, [facility]  Medication Therapy Management Pharmacist  [phone]      "

## 2025-05-08 ENCOUNTER — OFFICE VISIT (OUTPATIENT)
Dept: FAMILY MEDICINE | Facility: CLINIC | Age: 70
End: 2025-05-08

## 2025-05-08 ENCOUNTER — OFFICE VISIT (OUTPATIENT)
Dept: PHARMACY | Facility: PHYSICIAN GROUP | Age: 70
End: 2025-05-08
Payer: COMMERCIAL

## 2025-05-08 VITALS
DIASTOLIC BLOOD PRESSURE: 68 MMHG | WEIGHT: 233.4 LBS | HEART RATE: 86 BPM | BODY MASS INDEX: 33.49 KG/M2 | SYSTOLIC BLOOD PRESSURE: 134 MMHG | OXYGEN SATURATION: 96 %

## 2025-05-08 VITALS
HEART RATE: 86 BPM | DIASTOLIC BLOOD PRESSURE: 71 MMHG | BODY MASS INDEX: 33.49 KG/M2 | SYSTOLIC BLOOD PRESSURE: 140 MMHG | OXYGEN SATURATION: 96 % | WEIGHT: 233.4 LBS

## 2025-05-08 DIAGNOSIS — I10 ESSENTIAL HYPERTENSION: ICD-10-CM

## 2025-05-08 DIAGNOSIS — E11.43 TYPE 2 DIABETES MELLITUS WITH DIABETIC AUTONOMIC NEUROPATHY, WITHOUT LONG-TERM CURRENT USE OF INSULIN (H): Primary | ICD-10-CM

## 2025-05-08 DIAGNOSIS — I25.118 CORONARY ARTERY DISEASE OF NATIVE ARTERY OF NATIVE HEART WITH STABLE ANGINA PECTORIS: ICD-10-CM

## 2025-05-08 DIAGNOSIS — I87.2 VENOUS (PERIPHERAL) INSUFFICIENCY: ICD-10-CM

## 2025-05-08 LAB
ANION GAP SERPL CALCULATED.3IONS-SCNC: 14 MMOL/L (ref 7–15)
BUN SERPL-MCNC: 16 MG/DL (ref 8–23)
CALCIUM SERPL-MCNC: 9 MG/DL (ref 8.8–10.4)
CHLORIDE SERPL-SCNC: 93 MMOL/L (ref 98–107)
CREAT SERPL-MCNC: 1.04 MG/DL (ref 0.67–1.17)
EGFRCR SERPLBLD CKD-EPI 2021: 76 ML/MIN/1.73M2
FASTING STATUS PATIENT QL REPORTED: YES
GLUCOSE SERPL-MCNC: 135 MG/DL (ref 70–99)
HCO3 SERPL-SCNC: 27 MMOL/L (ref 22–29)
POTASSIUM SERPL-SCNC: 4 MMOL/L (ref 3.4–5.3)
SODIUM SERPL-SCNC: 134 MMOL/L (ref 135–145)

## 2025-05-08 PROCEDURE — 80048 BASIC METABOLIC PNL TOTAL CA: CPT | Mod: 90

## 2025-05-08 PROCEDURE — G2211 COMPLEX E/M VISIT ADD ON: HCPCS

## 2025-05-08 PROCEDURE — 36415 COLL VENOUS BLD VENIPUNCTURE: CPT

## 2025-05-08 PROCEDURE — 3077F SYST BP >= 140 MM HG: CPT

## 2025-05-08 PROCEDURE — 99213 OFFICE O/P EST LOW 20 MIN: CPT

## 2025-05-08 PROCEDURE — 3078F DIAST BP <80 MM HG: CPT

## 2025-05-12 ENCOUNTER — RESULTS FOLLOW-UP (OUTPATIENT)
Dept: FAMILY MEDICINE | Facility: CLINIC | Age: 70
End: 2025-05-12

## 2025-05-17 DIAGNOSIS — M54.42 ACUTE BILATERAL LOW BACK PAIN WITH BILATERAL SCIATICA: ICD-10-CM

## 2025-05-17 DIAGNOSIS — M54.41 ACUTE BILATERAL LOW BACK PAIN WITH BILATERAL SCIATICA: ICD-10-CM

## 2025-05-17 NOTE — CONFIDENTIAL NOTE
Med: CYCLOBENZAPRINE    LOV (related): 8/12/24 - CPX  LAST MTM: 6/5/25      Due for F/U around: 11/2025 FOR DM CHECK & CPX    Next Appt: NONE

## 2025-05-19 RX ORDER — CYCLOBENZAPRINE HCL 10 MG
10 TABLET ORAL 3 TIMES DAILY PRN
Qty: 30 TABLET | Refills: 2 | Status: SHIPPED | OUTPATIENT
Start: 2025-05-19

## 2025-06-03 DIAGNOSIS — I25.118 CORONARY ARTERY DISEASE OF NATIVE ARTERY OF NATIVE HEART WITH STABLE ANGINA PECTORIS: ICD-10-CM

## 2025-06-04 RX ORDER — ATORVASTATIN CALCIUM 40 MG/1
40 TABLET, FILM COATED ORAL DAILY
Qty: 90 TABLET | Refills: 0 | Status: SHIPPED | OUTPATIENT
Start: 2025-06-04

## 2025-06-04 NOTE — CONFIDENTIAL NOTE
"Med: ATORVASTATIN    LOV (related): 8/12/24 - CPX  LAST MTM: 5/8/25    Last Lab: 8/12/24      Due for F/U around: 8/2025 FOR CPX    Next Appt: 6/5/25 - MTM        Cholesterol   Date Value Ref Range Status   08/12/2024 103 100 - 199 mg/dL Final   07/26/2023 116 100 - 199 mg/dL Final   09/14/2021 136 100 - 199 mg/dL Final   07/20/2020 126 100 - 199 mg/dL Final     HDL Cholesterol   Date Value Ref Range Status   09/14/2021 45 >39 mg/dL Final   07/20/2020 43 >39 mg/dL Final     HDL   Date Value Ref Range Status   08/12/2024 36 (A) >=40 mg/dL Final   07/26/2023 40 >=40 mg/dL Final     LDL Cholesterol Calculated   Date Value Ref Range Status   09/14/2021 72 0 - 99 mg/dL Final   07/20/2020 64 0 - 99 mg/dL Final     LDL CALCULATED ([facility])   Date Value Ref Range Status   08/12/2024 44 0 - 130 mg/dL Final   07/26/2023 51 0 - 130 mg/dL Final     Triglycerides   Date Value Ref Range Status   08/12/2024 112 0 - 149 mg/dL Final   07/26/2023 120 0 - 149 mg/dL Final   09/14/2021 104 0 - 149 mg/dL Final   07/20/2020 94 0 - 149 mg/dL Final     No results found for: \"CHOLHDLRATIO\"     "

## 2025-06-05 ENCOUNTER — OFFICE VISIT (OUTPATIENT)
Dept: PHARMACY | Facility: PHYSICIAN GROUP | Age: 70
End: 2025-06-05
Payer: COMMERCIAL

## 2025-06-05 VITALS
BODY MASS INDEX: 32.57 KG/M2 | WEIGHT: 227 LBS | DIASTOLIC BLOOD PRESSURE: 82 MMHG | OXYGEN SATURATION: 97 % | SYSTOLIC BLOOD PRESSURE: 138 MMHG | HEART RATE: 90 BPM

## 2025-06-05 DIAGNOSIS — I25.118 CORONARY ARTERY DISEASE OF NATIVE ARTERY OF NATIVE HEART WITH STABLE ANGINA PECTORIS: ICD-10-CM

## 2025-06-05 DIAGNOSIS — I10 ESSENTIAL HYPERTENSION: ICD-10-CM

## 2025-06-05 DIAGNOSIS — E11.43 TYPE 2 DIABETES MELLITUS WITH DIABETIC AUTONOMIC NEUROPATHY, WITHOUT LONG-TERM CURRENT USE OF INSULIN (H): Primary | ICD-10-CM

## 2025-06-05 LAB — HBA1C MFR BLD: 6.1 % (ref 4–6)

## 2025-06-05 PROCEDURE — 3044F HG A1C LEVEL LT 7.0%: CPT

## 2025-06-05 PROCEDURE — 36415 COLL VENOUS BLD VENIPUNCTURE: CPT

## 2025-06-05 PROCEDURE — 83036 HEMOGLOBIN GLYCOSYLATED A1C: CPT

## 2025-06-05 NOTE — PATIENT INSTRUCTIONS
"Recommendations from today's MTM visit:                                                       1. Continue checking home readings, let us know if you are seeing >140/90 or if you have side effects (swelling dizziness or other concerns)    2. Continue to monitor blood sugars and if you are getting readings under 70mg/dl or having times of feeling sweaty,shaky, weak, then please let us know and we will look to adjust your glipizide.     Follow-up: Return in about 3 months (around 9/5/2025) for Lab Work, Primary Care Provider, MTM visit in clinic.    It was great speaking with you today.  I value your experience and would be very thankful for your time in providing feedback in our clinic survey. In the next few days, you may receive an email or text message from [employer] with a link to a survey related to your  clinical pharmacist.\"     My Clinical Pharmacist's contact information:                                                      Please feel free to contact me with any questions or concerns you have.      Yelena Grey, Pharm.D, BCACP  Medication Therapy Management Pharmacist  [phone]      "

## 2025-06-05 NOTE — PROGRESS NOTES
Medication Therapy Management (MTM) Encounter    ASSESSMENT:                            Medication Adherence/Access: No issues identified.    Diabetes   Patient is not meeting A1c goal of < 7%.  Self monitoring of blood glucose is at goal of fasting  mg/dL mostly, has seen good results with weight changes on Ozempic.   Patient would benefit from no changes, but continue to monitor for lows and reduce glipizide if these start to appear.     Hypertension/CAd  Patient is meeting blood pressure goal of < 140/90mmHg, but not tighter goal of <130/80mmHg. Home readings are lower than office readings, has not tolerated further adjustments and concern for further hyponatremia and urinary concerns if chlorthalidone is increased. Patient would benefit from home monitoring and continued weight loss efforts.     PLAN:                            1. Continue checking home readings, let us know if you are seeing >140/90 or if you have side effects (swelling dizziness or other concerns)    2. Continue to monitor blood sugars and if you are getting readings under 70mg/dl or having times of feeling sweaty,shaky, weak, then please let us know and we will look to adjust your glipizide.     Follow-up: Return in about 3 months (around 9/5/2025) for Lab Work, Primary Care Provider, MTM visit in clinic.    SUBJECTIVE/OBJECTIVE:                          Eddie Joyce is a 70 year old male seen for a follow-up visit.       Reason for visit: medication review.    Allergies/ADRs: Reviewed in chart  Past Medical History: Reviewed in chart  Tobacco: He reports that he has never smoked. He has never used smokeless tobacco.  Alcohol: not currently using    Medication Adherence/Access: Patient assistance program(s): [employer] patient assistance - Ozempic 2025     Diabetes   Metformin 2000 mg daily  Ozempic 2mg weekly-  Replaced Trulicity 3 mg weekly due to costs.   Glipizide XL 10 mg daily  Aspirin 81mg daily  Patient is not experiencing side  effects.  Current diabetes symptoms: polyuria     Blood sugar monitorin time(s) daily; Ranges: (patient reported)   131, 147, 136, 115, 89, 127, 99, 127, 116, 113, 127, 132, 131, 115,    Eye exam is up to date  Foot exam is up to date  Wt Readings from Last 4 Encounters:   25 227 lb (103 kg)   25 233 lb 6.4 oz (105.9 kg)   25 233 lb 6.4 oz (105.9 kg)   04/10/25 241 lb (109.3 kg)     Hypertension /CAD  Carvedilol 50 mg twice daily  Losartan 100 mg daily  Chlorthalidone 12.5mg daily (added 4/10)- replaced Amlodipine 5mg daily (swelling)  Aspirin 81mg daily  Patient reports the following medication side effects: improved swelling now.   Patient does self-monitor blood pressure-   Avg 135/75       Today's Vitals: /82   Pulse 90   Wt 227 lb (103 kg)   SpO2 97%   BMI 32.57 kg/m    ----------------    I spent 26 minutes with this patient today. All changes were made via collaborative practice agreement with STACY Reeves CNP. A copy of the visit note was provided to the patient's provider(s).    A summary of these recommendations was given to the patient.    Yelena Grey, Pharm.D, [facility]  Medication Therapy Management Pharmacist  [phone]       Medication Therapy Recommendations  No medication therapy recommendations to display

## 2025-06-13 PROBLEM — D12.6 COLON ADENOMA: Status: ACTIVE | Noted: 2025-06-13

## 2025-06-15 DIAGNOSIS — K21.00 GASTROESOPHAGEAL REFLUX DISEASE WITH ESOPHAGITIS, UNSPECIFIED WHETHER HEMORRHAGE: ICD-10-CM

## 2025-06-15 NOTE — CONFIDENTIAL NOTE
Med: PANTOPRAZOLE    LOV (related): 8/12/24 - CPX      Due for F/U around: 8/2025 FOR CPX & DM CHECK    Next Appt: NONE

## 2025-06-16 RX ORDER — PANTOPRAZOLE SODIUM 40 MG/1
40 TABLET, DELAYED RELEASE ORAL DAILY
Qty: 90 TABLET | Refills: 0 | Status: SHIPPED | OUTPATIENT
Start: 2025-06-16

## 2025-06-23 DIAGNOSIS — I25.118 CORONARY ARTERY DISEASE OF NATIVE ARTERY OF NATIVE HEART WITH STABLE ANGINA PECTORIS: ICD-10-CM

## 2025-06-23 NOTE — TELEPHONE ENCOUNTER
"Med: Carvedilol    Last refill 12/26/24 #360 ,1-R      LOV (related): 5/8/25    Last Lab: 5/8/25      Due for F/U around: due for cpx 8/2025    Next Appt: No current future appointments scheduled at [facility] [place]         BP Readings from Last 3 Encounters:   06/05/25 138/82   05/08/25 (!) 140/71   05/08/25 134/68       Last Comprehensive Metabolic Panel:  Lab Results   Component Value Date     (L) 05/08/2025    POTASSIUM 4.0 05/08/2025    CHLORIDE 93 (L) 05/08/2025    CO2 27 05/08/2025    ANIONGAP 14 05/08/2025     (H) 05/08/2025    BUN 16.0 05/08/2025    CR 1.04 05/08/2025    GFRESTIMATED 76 05/08/2025    HEAVENLY 9.0 05/08/2025             Cholesterol   Date Value Ref Range Status   08/12/2024 103 100 - 199 mg/dL Final   07/26/2023 116 100 - 199 mg/dL Final   09/14/2021 136 100 - 199 mg/dL Final   07/20/2020 126 100 - 199 mg/dL Final     HDL Cholesterol   Date Value Ref Range Status   09/14/2021 45 >39 mg/dL Final   07/20/2020 43 >39 mg/dL Final     HDL   Date Value Ref Range Status   08/12/2024 36 (A) >=40 mg/dL Final   07/26/2023 40 >=40 mg/dL Final     LDL Cholesterol Calculated   Date Value Ref Range Status   09/14/2021 72 0 - 99 mg/dL Final   07/20/2020 64 0 - 99 mg/dL Final     LDL CALCULATED ([facility])   Date Value Ref Range Status   08/12/2024 44 0 - 130 mg/dL Final   07/26/2023 51 0 - 130 mg/dL Final     Triglycerides   Date Value Ref Range Status   08/12/2024 112 0 - 149 mg/dL Final   07/26/2023 120 0 - 149 mg/dL Final   09/14/2021 104 0 - 149 mg/dL Final   07/20/2020 94 0 - 149 mg/dL Final     No results found for: \"CHOLHDLRATIO\"    "

## 2025-06-25 RX ORDER — CARVEDILOL 25 MG/1
TABLET ORAL
Qty: 360 TABLET | Refills: 0 | Status: SHIPPED | OUTPATIENT
Start: 2025-06-25

## 2025-07-03 DIAGNOSIS — M54.41 ACUTE BILATERAL LOW BACK PAIN WITH BILATERAL SCIATICA: ICD-10-CM

## 2025-07-03 DIAGNOSIS — M54.42 ACUTE BILATERAL LOW BACK PAIN WITH BILATERAL SCIATICA: ICD-10-CM

## 2025-07-03 RX ORDER — CYCLOBENZAPRINE HCL 10 MG
10 TABLET ORAL 3 TIMES DAILY PRN
Qty: 30 TABLET | Refills: 1 | Status: SHIPPED | OUTPATIENT
Start: 2025-07-03

## 2025-07-03 NOTE — CONFIDENTIAL NOTE
Med: CYCLOBENZAPRINE    LOV (related): 87/12/24 - CPX      Due for F/U around: 8/2025 FOR CPX & DM CHECK    Next Appt: NONE    [facility] MESSAGE READ 6/15 THAT HE IS DUE FOR CPX

## 2025-07-06 DIAGNOSIS — E11.43 TYPE 2 DIABETES MELLITUS WITH DIABETIC AUTONOMIC NEUROPATHY, WITHOUT LONG-TERM CURRENT USE OF INSULIN (H): ICD-10-CM

## 2025-07-07 NOTE — TELEPHONE ENCOUNTER
Med: metformin     LOV (related): 05/08/2025    Last Lab: 06/05/2025      Due for F/U around: 12/05/2025 for DM check     Next Appt: None on file         Lab Results   Component Value Date    A1C 6.1 06/05/2025    A1C 8.0 02/27/2025    A1C 6.9 11/20/2024    A1C 6.5 08/12/2024    A1C 6.6 04/24/2024

## 2025-07-07 NOTE — CONFIDENTIAL NOTE
CHANGING FROM 1 TO ZERO REFILLS - PT OVERDUE FOR APPT AND KNOWS THAT HE IS DUE FOR CPX - NOTHING SCHEDULED

## 2025-07-08 RX ORDER — METFORMIN HYDROCHLORIDE 500 MG/1
1000 TABLET, EXTENDED RELEASE ORAL 2 TIMES DAILY WITH MEALS
Qty: 360 TABLET | Refills: 0 | Status: SHIPPED | OUTPATIENT
Start: 2025-07-08

## 2025-07-28 ENCOUNTER — TELEPHONE (OUTPATIENT)
Dept: FAMILY MEDICINE | Facility: CLINIC | Age: 70
End: 2025-07-28

## 2025-07-28 NOTE — TELEPHONE ENCOUNTER
Patient assistance Ozempic arrived in clinic on 7/28/25, 4 boxes 2mg pens. Patient notified that medication is available for  in clinic.  Suzy Bryan RN

## 2025-08-04 ENCOUNTER — OFFICE VISIT (OUTPATIENT)
Dept: UROLOGY | Facility: CLINIC | Age: 70
End: 2025-08-04
Payer: COMMERCIAL

## 2025-08-04 VITALS — SYSTOLIC BLOOD PRESSURE: 170 MMHG | DIASTOLIC BLOOD PRESSURE: 93 MMHG | HEART RATE: 91 BPM | OXYGEN SATURATION: 97 %

## 2025-08-04 DIAGNOSIS — R35.0 BENIGN PROSTATIC HYPERPLASIA WITH URINARY FREQUENCY: Primary | ICD-10-CM

## 2025-08-04 DIAGNOSIS — R39.9 LOWER URINARY TRACT SYMPTOMS (LUTS): ICD-10-CM

## 2025-08-04 DIAGNOSIS — N40.1 BENIGN PROSTATIC HYPERPLASIA WITH URINARY FREQUENCY: Primary | ICD-10-CM

## 2025-08-04 LAB
PSA SERPL-MCNC: 1.7 UG/L (ref 0–4)
T WHIPPLEI DNA BLD QL NAA+PROBE: 62

## 2025-08-04 PROCEDURE — 36415 COLL VENOUS BLD VENIPUNCTURE: CPT | Performed by: NURSE PRACTITIONER

## 2025-08-04 PROCEDURE — 99213 OFFICE O/P EST LOW 20 MIN: CPT | Mod: 25 | Performed by: NURSE PRACTITIONER

## 2025-08-04 PROCEDURE — 3077F SYST BP >= 140 MM HG: CPT | Performed by: NURSE PRACTITIONER

## 2025-08-04 PROCEDURE — 3080F DIAST BP >= 90 MM HG: CPT | Performed by: NURSE PRACTITIONER

## 2025-08-04 PROCEDURE — 84153 ASSAY OF PSA TOTAL: CPT | Performed by: NURSE PRACTITIONER

## 2025-08-04 PROCEDURE — 51798 US URINE CAPACITY MEASURE: CPT | Performed by: NURSE PRACTITIONER

## 2025-08-04 RX ORDER — TROSPIUM CHLORIDE 20 MG/1
20 TABLET, FILM COATED ORAL
Qty: 180 TABLET | Refills: 4 | Status: SHIPPED | OUTPATIENT
Start: 2025-08-04

## 2025-08-07 DIAGNOSIS — M54.42 ACUTE BILATERAL LOW BACK PAIN WITH BILATERAL SCIATICA: ICD-10-CM

## 2025-08-07 DIAGNOSIS — M54.41 ACUTE BILATERAL LOW BACK PAIN WITH BILATERAL SCIATICA: ICD-10-CM

## 2025-08-07 RX ORDER — CYCLOBENZAPRINE HCL 10 MG
10 TABLET ORAL 3 TIMES DAILY PRN
Qty: 30 TABLET | Refills: 0 | Status: SHIPPED | OUTPATIENT
Start: 2025-08-07

## 2025-08-11 SDOH — HEALTH STABILITY: PHYSICAL HEALTH: ON AVERAGE, HOW MANY MINUTES DO YOU ENGAGE IN EXERCISE AT THIS LEVEL?: 10 MIN

## 2025-08-11 SDOH — HEALTH STABILITY: PHYSICAL HEALTH: ON AVERAGE, HOW MANY DAYS PER WEEK DO YOU ENGAGE IN MODERATE TO STRENUOUS EXERCISE (LIKE A BRISK WALK)?: 2 DAYS

## 2025-08-11 ASSESSMENT — SOCIAL DETERMINANTS OF HEALTH (SDOH): HOW OFTEN DO YOU GET TOGETHER WITH FRIENDS OR RELATIVES?: ONCE A WEEK

## 2025-08-18 ENCOUNTER — OFFICE VISIT (OUTPATIENT)
Dept: FAMILY MEDICINE | Facility: CLINIC | Age: 70
End: 2025-08-18

## 2025-08-18 VITALS
OXYGEN SATURATION: 97 % | HEIGHT: 69 IN | DIASTOLIC BLOOD PRESSURE: 76 MMHG | SYSTOLIC BLOOD PRESSURE: 148 MMHG | BODY MASS INDEX: 33.47 KG/M2 | HEART RATE: 101 BPM | WEIGHT: 226 LBS

## 2025-08-18 DIAGNOSIS — F10.21 ALCOHOL DEPENDENCE IN REMISSION (H): ICD-10-CM

## 2025-08-18 DIAGNOSIS — J96.11 CHRONIC RESPIRATORY FAILURE WITH HYPOXIA (H): ICD-10-CM

## 2025-08-18 DIAGNOSIS — R35.0 BENIGN PROSTATIC HYPERPLASIA WITH URINARY FREQUENCY: ICD-10-CM

## 2025-08-18 DIAGNOSIS — Z12.11 SCREENING FOR COLON CANCER: ICD-10-CM

## 2025-08-18 DIAGNOSIS — E78.00 HYPERCHOLESTEREMIA: ICD-10-CM

## 2025-08-18 DIAGNOSIS — M54.50 CHRONIC BILATERAL LOW BACK PAIN, UNSPECIFIED WHETHER SCIATICA PRESENT: ICD-10-CM

## 2025-08-18 DIAGNOSIS — I10 ESSENTIAL HYPERTENSION: ICD-10-CM

## 2025-08-18 DIAGNOSIS — Z00.00 ENCOUNTER FOR MEDICARE ANNUAL WELLNESS EXAM: Primary | ICD-10-CM

## 2025-08-18 DIAGNOSIS — G63 POLYNEUROPATHY ASSOCIATED WITH UNDERLYING DISEASE: ICD-10-CM

## 2025-08-18 DIAGNOSIS — G47.00 INSOMNIA, UNSPECIFIED TYPE: ICD-10-CM

## 2025-08-18 DIAGNOSIS — I25.118 CORONARY ARTERY DISEASE OF NATIVE ARTERY OF NATIVE HEART WITH STABLE ANGINA PECTORIS: ICD-10-CM

## 2025-08-18 DIAGNOSIS — N52.9 ERECTILE DYSFUNCTION, UNSPECIFIED ERECTILE DYSFUNCTION TYPE: ICD-10-CM

## 2025-08-18 DIAGNOSIS — N40.1 BENIGN PROSTATIC HYPERPLASIA WITH URINARY FREQUENCY: ICD-10-CM

## 2025-08-18 DIAGNOSIS — E11.43 TYPE 2 DIABETES MELLITUS WITH DIABETIC AUTONOMIC NEUROPATHY, WITHOUT LONG-TERM CURRENT USE OF INSULIN (H): ICD-10-CM

## 2025-08-18 DIAGNOSIS — G89.29 CHRONIC BILATERAL LOW BACK PAIN, UNSPECIFIED WHETHER SCIATICA PRESENT: ICD-10-CM

## 2025-08-18 PROCEDURE — G0439 PPPS, SUBSEQ VISIT: HCPCS

## 2025-08-18 PROCEDURE — 3078F DIAST BP <80 MM HG: CPT

## 2025-08-18 PROCEDURE — 99214 OFFICE O/P EST MOD 30 MIN: CPT | Mod: 25

## 2025-08-18 PROCEDURE — 3077F SYST BP >= 140 MM HG: CPT

## 2025-08-18 PROCEDURE — 99207 PR FOOT EXAM NO CHARGE: CPT

## 2025-08-18 RX ORDER — DULOXETIN HYDROCHLORIDE 30 MG/1
CAPSULE, DELAYED RELEASE ORAL
Qty: 70 CAPSULE | Refills: 0 | Status: SHIPPED | OUTPATIENT
Start: 2025-08-18 | End: 2025-09-29

## 2025-09-03 DIAGNOSIS — I25.118 CORONARY ARTERY DISEASE OF NATIVE ARTERY OF NATIVE HEART WITH STABLE ANGINA PECTORIS: ICD-10-CM

## 2025-09-03 RX ORDER — ATORVASTATIN CALCIUM 40 MG/1
40 TABLET, FILM COATED ORAL DAILY
Qty: 90 TABLET | Refills: 0 | Status: SHIPPED | OUTPATIENT
Start: 2025-09-03

## (undated) DEVICE — DRAIN BLAKE ROUND 15FR W/TROCAR SIL 2229

## (undated) DEVICE — DRAPE OVERHEAD TABLE

## (undated) DEVICE — DRAPE POUCH INSTRUMENT 1018

## (undated) DEVICE — DRAIN JACKSON PRATT Y CONNECTOR

## (undated) DEVICE — SU VICRYL 3-0 PS-2 18" UND J497G

## (undated) DEVICE — DRAIN JACKSON PRATT RESERVOIR 100ML SU130-1305

## (undated) DEVICE — DRSG ADAPTIC 3X8" 6113

## (undated) DEVICE — DRAPE MICROSCOPE EQUIP 48X120" 6130VL2

## (undated) DEVICE — PACK CATARACT CUSTOM SO DALE SEY32CTFCX

## (undated) DEVICE — GOWN XXL 9575

## (undated) DEVICE — EYE PACK CUSTOM ANTERIOR 30DEG TIP CENTURION PPK6682-04

## (undated) DEVICE — SU VICRYL 2-0 CT-2 CR 8X18" J726D

## (undated) DEVICE — GLOVE PROTEXIS MICRO 7.0  2D73PM70

## (undated) DEVICE — GLOVE PROTEXIS W/NEU-THERA 7.0  2D73TE70

## (undated) DEVICE — EYE CANN IRR 30GA  ANTERIOR CHAMBER 581273

## (undated) DEVICE — ESU GROUND PAD UNIVERSAL W/O CORD

## (undated) DEVICE — SPONGE SURGIFOAM 50

## (undated) DEVICE — DRAPE SHEET REV FOLD 3/4 9349

## (undated) DEVICE — SU VICRYL 0 CT-2 CR 8X18" J727D

## (undated) DEVICE — LINEN TOWEL PACK X5 5464

## (undated) DEVICE — Device

## (undated) DEVICE — RX SURGIFLO HEMOSTATIC MATRIX W/THROMBIN 8ML NEXTGEN 2993

## (undated) DEVICE — GLOVE PROTEXIS BLUE W/NEU-THERA 7.0  2D73EB70

## (undated) DEVICE — SPONGE COTTONOID 3/4X3/4" 80-1401

## (undated) DEVICE — ESU ELEC BLADE 2.75" COATED/INSULATED E1455

## (undated) DEVICE — EYE PACK BVI READYPAK KIT #1

## (undated) DEVICE — PACK SPINE SM CUSTOM SNE15SSFSK

## (undated) DEVICE — GLOVE PROTEXIS BLUE W/NEU-THERA 8.5  2D73EB85

## (undated) DEVICE — DRSG KERLIX FLUFFS X5

## (undated) DEVICE — EYE KNIFE SLIT XSTAR VISITEC 2.5MM 45DEG BEVEL UP 373725

## (undated) DEVICE — GLOVE PROTEXIS MICRO 6.5  2D73PM65

## (undated) DEVICE — NDL SPINAL 18GA 3.5" 405184

## (undated) DEVICE — DRAPE C-ARM 60X42" 1013

## (undated) DEVICE — DRSG TELFA ISLAND 4X8" 7541

## (undated) DEVICE — NDL 27GA 1.25" 305136

## (undated) DEVICE — SU VICRYL 3-0 SH CR 8X18" J774

## (undated) DEVICE — EYE RING MALYUGIN PUPIL EXPANDER 7.0MM MAL-000-2

## (undated) DEVICE — SPONGE KITTNER 31001010

## (undated) DEVICE — DRAPE MAYO STAND 23X54 8337

## (undated) DEVICE — EYE TIP IRRIGATION & ASPIRATION POLYMER 35D BENT 8065751511

## (undated) DEVICE — EYE SHIELD PLASTIC

## (undated) DEVICE — SPONGE SURGIFOAM 100 1974

## (undated) DEVICE — DECANTER VIAL 2006S

## (undated) DEVICE — EYE SOL BSS 500ML

## (undated) DEVICE — SUCTION FRAZIER 12FR W/HANDLE K73

## (undated) DEVICE — ESU PENCIL W/HOLSTER E2350H

## (undated) DEVICE — SEALANT OCULAR RESURE RS-1004-US-10

## (undated) DEVICE — SPONGE RAY-TEC 4X8" 7318

## (undated) DEVICE — CHANG HYDRODISSECTOR

## (undated) DEVICE — SYR 03ML LL W/O NDL 309657

## (undated) DEVICE — GLOVE PROTEXIS W/NEU-THERA 7.5  2D73TE75

## (undated) DEVICE — STPL SKIN 35W 6.9MM  PXW35

## (undated) DEVICE — MIDAS REX DISSECTING TOOL 5.5MM T14MH25

## (undated) DEVICE — RX SURGIFLO HEMOSTATIC MATRIX W/THROMBIN 8ML 2994

## (undated) DEVICE — EYE DRSG PAD OVAL

## (undated) DEVICE — ESU CORD BIPOLAR 12' E0509

## (undated) DEVICE — DRAPE COVER C-ARM SEAMLESS SNAP-KAP 03-KP26 LATEX FREE

## (undated) DEVICE — PREP CHLORAPREP 26ML TINTED HI-LITE ORANGE 930815

## (undated) DEVICE — NDL 30GA 1" 305128

## (undated) DEVICE — GLOVE PROTEXIS W/NEU-THERA 8.5  2D73TE85

## (undated) DEVICE — POSITIONER PT PRONESAFE HEAD REST W/DERMAPROX INSERT 40599

## (undated) DEVICE — DRAIN CHANNEL ROUND 15FR FLUTED 072188

## (undated) DEVICE — SU VICRYL 2-0 CT-2 27" J333H

## (undated) DEVICE — TUBING SUCTION SOFT 20'X3/16" 0036570

## (undated) DEVICE — SOL WATER IRRIG 1000ML BOTTLE 2F7114

## (undated) DEVICE — SPONGE BALL KERLIX ROUND XL W/O STRING LATEX 4935

## (undated) DEVICE — GLOVE PROTEXIS MICRO 6.0  2D73PM60

## (undated) DEVICE — CUSHION INSERT LG PRONE VIEW JACKSON TABLE

## (undated) DEVICE — SU VICRYL 1 CT-2 27" J335H

## (undated) DEVICE — DRAPE STERI TOWEL LG 1010

## (undated) DEVICE — ESU ELEC BLADE 4" COATED

## (undated) DEVICE — ESU BIPOLAR SEALER AQUAMANTYS 6MM 23-112-1

## (undated) DEVICE — PREP DURAPREP 26ML APL 8630

## (undated) DEVICE — DRSG GAUZE 4X4" 3033

## (undated) RX ORDER — POTASSIUM CHLORIDE 1500 MG/1
TABLET, EXTENDED RELEASE ORAL
Status: DISPENSED
Start: 2018-02-21

## (undated) RX ORDER — LIDOCAINE HYDROCHLORIDE 10 MG/ML
INJECTION, SOLUTION EPIDURAL; INFILTRATION; INTRACAUDAL; PERINEURAL
Status: DISPENSED
Start: 2018-07-31

## (undated) RX ORDER — PROPOFOL 10 MG/ML
INJECTION, EMULSION INTRAVENOUS
Status: DISPENSED
Start: 2022-09-02

## (undated) RX ORDER — LIDOCAINE HYDROCHLORIDE 10 MG/ML
INJECTION, SOLUTION EPIDURAL; INFILTRATION; INTRACAUDAL; PERINEURAL
Status: DISPENSED
Start: 2017-06-06

## (undated) RX ORDER — METHYLPREDNISOLONE ACETATE 40 MG/ML
INJECTION, SUSPENSION INTRA-ARTICULAR; INTRALESIONAL; INTRAMUSCULAR; SOFT TISSUE
Status: DISPENSED
Start: 2017-06-30

## (undated) RX ORDER — KETAMINE HCL IN 0.9 % NACL 20 MG/2 ML
SYRINGE (ML) INTRAVENOUS
Status: DISPENSED
Start: 2017-06-30

## (undated) RX ORDER — ADENOSINE 3 MG/ML
INJECTION, SOLUTION INTRAVENOUS
Status: DISPENSED
Start: 2018-02-21

## (undated) RX ORDER — LIDOCAINE HYDROCHLORIDE 20 MG/ML
INJECTION, SOLUTION EPIDURAL; INFILTRATION; INTRACAUDAL; PERINEURAL
Status: DISPENSED
Start: 2017-06-30

## (undated) RX ORDER — CEFAZOLIN SODIUM 2 G/100ML
INJECTION, SOLUTION INTRAVENOUS
Status: DISPENSED
Start: 2017-06-30

## (undated) RX ORDER — NITROGLYCERIN 5 MG/ML
VIAL (ML) INTRAVENOUS
Status: DISPENSED
Start: 2018-02-21

## (undated) RX ORDER — FENTANYL CITRATE 0.05 MG/ML
INJECTION, SOLUTION INTRAMUSCULAR; INTRAVENOUS
Status: DISPENSED
Start: 2022-09-01

## (undated) RX ORDER — KETOROLAC TROMETHAMINE 30 MG/ML
INJECTION, SOLUTION INTRAMUSCULAR; INTRAVENOUS
Status: DISPENSED
Start: 2022-09-01

## (undated) RX ORDER — CEFAZOLIN SODIUM 1 G/3ML
INJECTION, POWDER, FOR SOLUTION INTRAMUSCULAR; INTRAVENOUS
Status: DISPENSED
Start: 2017-06-30

## (undated) RX ORDER — HYDROMORPHONE HYDROCHLORIDE 1 MG/ML
INJECTION, SOLUTION INTRAMUSCULAR; INTRAVENOUS; SUBCUTANEOUS
Status: DISPENSED
Start: 2022-09-01

## (undated) RX ORDER — VERAPAMIL HYDROCHLORIDE 2.5 MG/ML
INJECTION, SOLUTION INTRAVENOUS
Status: DISPENSED
Start: 2018-02-21

## (undated) RX ORDER — FENTANYL CITRATE 50 UG/ML
INJECTION, SOLUTION INTRAMUSCULAR; INTRAVENOUS
Status: DISPENSED
Start: 2018-07-31

## (undated) RX ORDER — DEXAMETHASONE SODIUM PHOSPHATE 10 MG/ML
INJECTION, SOLUTION INTRAMUSCULAR; INTRAVENOUS
Status: DISPENSED
Start: 2017-06-06

## (undated) RX ORDER — ONDANSETRON 2 MG/ML
INJECTION INTRAMUSCULAR; INTRAVENOUS
Status: DISPENSED
Start: 2018-07-31

## (undated) RX ORDER — FENTANYL CITRATE 50 UG/ML
INJECTION, SOLUTION INTRAMUSCULAR; INTRAVENOUS
Status: DISPENSED
Start: 2017-06-30

## (undated) RX ORDER — BUPIVACAINE HYDROCHLORIDE AND EPINEPHRINE 5; 5 MG/ML; UG/ML
INJECTION, SOLUTION EPIDURAL; INTRACAUDAL; PERINEURAL
Status: DISPENSED
Start: 2017-06-30

## (undated) RX ORDER — FENTANYL CITRATE 50 UG/ML
INJECTION, SOLUTION INTRAMUSCULAR; INTRAVENOUS
Status: DISPENSED
Start: 2022-09-02

## (undated) RX ORDER — LIDOCAINE HYDROCHLORIDE 10 MG/ML
INJECTION, SOLUTION EPIDURAL; INFILTRATION; INTRACAUDAL; PERINEURAL
Status: DISPENSED
Start: 2018-02-21

## (undated) RX ORDER — GLYCOPYRROLATE 0.2 MG/ML
INJECTION, SOLUTION INTRAMUSCULAR; INTRAVENOUS
Status: DISPENSED
Start: 2017-06-30

## (undated) RX ORDER — HYDROMORPHONE HYDROCHLORIDE 1 MG/ML
INJECTION, SOLUTION INTRAMUSCULAR; INTRAVENOUS; SUBCUTANEOUS
Status: DISPENSED
Start: 2017-06-30

## (undated) RX ORDER — DEXAMETHASONE SODIUM PHOSPHATE 4 MG/ML
INJECTION, SOLUTION INTRA-ARTICULAR; INTRALESIONAL; INTRAMUSCULAR; INTRAVENOUS; SOFT TISSUE
Status: DISPENSED
Start: 2017-06-30

## (undated) RX ORDER — FENTANYL CITRATE 50 UG/ML
INJECTION, SOLUTION INTRAMUSCULAR; INTRAVENOUS
Status: DISPENSED
Start: 2018-02-21

## (undated) RX ORDER — LIDOCAINE HYDROCHLORIDE 20 MG/ML
INJECTION, SOLUTION EPIDURAL; INFILTRATION; INTRACAUDAL; PERINEURAL
Status: DISPENSED
Start: 2022-09-01

## (undated) RX ORDER — ONDANSETRON 2 MG/ML
INJECTION INTRAMUSCULAR; INTRAVENOUS
Status: DISPENSED
Start: 2017-06-30

## (undated) RX ORDER — REGADENOSON 0.08 MG/ML
INJECTION, SOLUTION INTRAVENOUS
Status: DISPENSED
Start: 2018-02-06

## (undated) RX ORDER — PROPOFOL 10 MG/ML
INJECTION, EMULSION INTRAVENOUS
Status: DISPENSED
Start: 2017-06-30

## (undated) RX ORDER — FENTANYL CITRATE 50 UG/ML
INJECTION, SOLUTION INTRAMUSCULAR; INTRAVENOUS
Status: DISPENSED
Start: 2022-09-01

## (undated) RX ORDER — HEPARIN SODIUM 1000 [USP'U]/ML
INJECTION, SOLUTION INTRAVENOUS; SUBCUTANEOUS
Status: DISPENSED
Start: 2018-02-21

## (undated) RX ORDER — KETOROLAC TROMETHAMINE 30 MG/ML
INJECTION, SOLUTION INTRAMUSCULAR; INTRAVENOUS
Status: DISPENSED
Start: 2017-06-30

## (undated) RX ORDER — PROPOFOL 10 MG/ML
INJECTION, EMULSION INTRAVENOUS
Status: DISPENSED
Start: 2022-09-01

## (undated) RX ORDER — HYDROXYZINE HYDROCHLORIDE 50 MG/ML
INJECTION, SOLUTION INTRAMUSCULAR
Status: DISPENSED
Start: 2017-06-30